# Patient Record
Sex: FEMALE | Race: WHITE | Employment: OTHER | URBAN - NONMETROPOLITAN AREA
[De-identification: names, ages, dates, MRNs, and addresses within clinical notes are randomized per-mention and may not be internally consistent; named-entity substitution may affect disease eponyms.]

---

## 2017-01-03 ENCOUNTER — HOSPITAL ENCOUNTER (OUTPATIENT)
Dept: MRI IMAGING | Age: 62
Discharge: HOME | End: 2017-01-03
Payer: COMMERCIAL

## 2017-01-03 ENCOUNTER — HOSPITAL ENCOUNTER (OUTPATIENT)
Dept: PSYCHIATRY | Age: 62
Setting detail: THERAPIES SERIES
Discharge: HOME | End: 2017-01-03
Payer: COMMERCIAL

## 2017-01-03 VITALS
TEMPERATURE: 99 F | DIASTOLIC BLOOD PRESSURE: 98 MMHG | RESPIRATION RATE: 20 BRPM | OXYGEN SATURATION: 94 % | SYSTOLIC BLOOD PRESSURE: 160 MMHG | HEART RATE: 96 BPM

## 2017-01-03 DIAGNOSIS — R56.9 SEIZURE (HCC): ICD-10-CM

## 2017-01-03 DIAGNOSIS — R51.9 HEADACHE, UNSPECIFIED HEADACHE TYPE: ICD-10-CM

## 2017-01-03 LAB
GFR NON-AFRICAN AMERICAN: >60
PERFORMED ON: NORMAL
POC CREATININE: 0.8 MG/DL (ref 0.3–1.3)
POC SAMPLE TYPE: NORMAL

## 2017-01-03 PROCEDURE — A9579 GAD-BASE MR CONTRAST NOS,1ML: HCPCS | Performed by: FAMILY MEDICINE

## 2017-01-03 PROCEDURE — 90853 GROUP PSYCHOTHERAPY: CPT

## 2017-01-03 PROCEDURE — G0177 OPPS/PHP; TRAIN & EDUC SERV: HCPCS

## 2017-01-03 PROCEDURE — 82565 ASSAY OF CREATININE: CPT

## 2017-01-03 PROCEDURE — 70553 MRI BRAIN STEM W/O & W/DYE: CPT

## 2017-01-03 PROCEDURE — 6360000004 HC RX CONTRAST MEDICATION: Performed by: FAMILY MEDICINE

## 2017-01-03 RX ORDER — QUETIAPINE FUMARATE 100 MG/1
TABLET, FILM COATED ORAL
Qty: 90 TABLET | Refills: 0 | Status: SHIPPED | OUTPATIENT
Start: 2017-01-03 | End: 2017-01-25

## 2017-01-03 RX ORDER — CLONAZEPAM 1 MG/1
TABLET ORAL
Qty: 90 TABLET | Refills: 0 | Status: SHIPPED | OUTPATIENT
Start: 2017-01-03 | End: 2017-01-31

## 2017-01-03 RX ORDER — CITALOPRAM 20 MG/1
20 TABLET ORAL DAILY
Qty: 30 TABLET | Refills: 0 | Status: SHIPPED | OUTPATIENT
Start: 2017-01-03 | End: 2017-01-09

## 2017-01-03 RX ADMIN — GADOPENTETATE DIMEGLUMINE 19 ML: 469.01 INJECTION INTRAVENOUS at 07:45

## 2017-01-05 ENCOUNTER — HOSPITAL ENCOUNTER (OUTPATIENT)
Dept: PSYCHIATRY | Age: 62
Setting detail: THERAPIES SERIES
Discharge: HOME | End: 2017-01-05
Payer: COMMERCIAL

## 2017-01-05 ENCOUNTER — TELEPHONE (OUTPATIENT)
Dept: FAMILY MEDICINE CLINIC | Facility: CLINIC | Age: 62
End: 2017-01-05

## 2017-01-05 PROCEDURE — 90853 GROUP PSYCHOTHERAPY: CPT

## 2017-01-05 PROCEDURE — G0177 OPPS/PHP; TRAIN & EDUC SERV: HCPCS

## 2017-01-05 RX ORDER — CLONIDINE HYDROCHLORIDE 0.1 MG/1
0.1 TABLET ORAL 2 TIMES DAILY
Qty: 60 TABLET | Refills: 0 | Status: SHIPPED | OUTPATIENT
Start: 2017-01-05 | End: 2017-02-03 | Stop reason: SDUPTHER

## 2017-01-05 RX ORDER — AMLODIPINE BESYLATE 10 MG/1
5 TABLET ORAL 2 TIMES DAILY
COMMUNITY

## 2017-01-05 NOTE — TELEPHONE ENCOUNTER
Pt called and states her BP is still running high this am at [place] 150/109 and is concerned and wants to know if needs to switch BP med's, add another BP med, etc [facility], [phone]

## 2017-01-05 NOTE — TELEPHONE ENCOUNTER
Add catapress 0.1 mg one bid  (she is not to let anyone give her a diuretic with her history of low Na)

## 2017-01-06 RX ORDER — CLONIDINE HYDROCHLORIDE 0.1 MG/1
0.1 TABLET ORAL 2 TIMES DAILY
COMMUNITY

## 2017-01-09 ENCOUNTER — HOSPITAL ENCOUNTER (OUTPATIENT)
Dept: PSYCHIATRY | Age: 62
Setting detail: THERAPIES SERIES
Discharge: HOME | End: 2017-01-09
Payer: COMMERCIAL

## 2017-01-09 PROCEDURE — 90853 GROUP PSYCHOTHERAPY: CPT

## 2017-01-09 PROCEDURE — G0177 OPPS/PHP; TRAIN & EDUC SERV: HCPCS

## 2017-01-09 PROCEDURE — 90832 PSYTX W PT 30 MINUTES: CPT | Performed by: NURSE PRACTITIONER

## 2017-01-09 RX ORDER — CITALOPRAM 20 MG/1
20 TABLET ORAL DAILY
Qty: 30 TABLET | Refills: 0
Start: 2017-01-09 | End: 2017-01-31

## 2017-01-11 ENCOUNTER — TELEPHONE (OUTPATIENT)
Dept: FAMILY MEDICINE CLINIC | Facility: CLINIC | Age: 62
End: 2017-01-11

## 2017-01-11 ENCOUNTER — TELEPHONE (OUTPATIENT)
Dept: PSYCHIATRY | Age: 62
End: 2017-01-11

## 2017-01-11 ENCOUNTER — HOSPITAL ENCOUNTER (OUTPATIENT)
Dept: PSYCHIATRY | Age: 62
Setting detail: THERAPIES SERIES
Discharge: HOME | End: 2017-01-11
Payer: COMMERCIAL

## 2017-01-11 VITALS — DIASTOLIC BLOOD PRESSURE: 93 MMHG | HEART RATE: 84 BPM | SYSTOLIC BLOOD PRESSURE: 137 MMHG

## 2017-01-11 DIAGNOSIS — Z79.899 LONG-TERM USE OF HIGH-RISK MEDICATION: ICD-10-CM

## 2017-01-11 DIAGNOSIS — Z79.899 LONG-TERM USE OF HIGH-RISK MEDICATION: Primary | ICD-10-CM

## 2017-01-11 DIAGNOSIS — F32.A CHRONIC DEPRESSION: ICD-10-CM

## 2017-01-11 DIAGNOSIS — M54.2 CHRONIC NECK PAIN: Chronic | ICD-10-CM

## 2017-01-11 DIAGNOSIS — M54.9 CHRONIC BACK PAIN, UNSPECIFIED BACK LOCATION, UNSPECIFIED BACK PAIN LATERALITY: Chronic | ICD-10-CM

## 2017-01-11 DIAGNOSIS — G89.29 CHRONIC NECK PAIN: Chronic | ICD-10-CM

## 2017-01-11 DIAGNOSIS — F33.2 SEVERE RECURRENT MAJOR DEPRESSION WITHOUT PSYCHOTIC FEATURES (HCC): ICD-10-CM

## 2017-01-11 DIAGNOSIS — R26.9 GAIT DIFFICULTY: Primary | ICD-10-CM

## 2017-01-11 DIAGNOSIS — G89.29 CHRONIC BACK PAIN, UNSPECIFIED BACK LOCATION, UNSPECIFIED BACK PAIN LATERALITY: Chronic | ICD-10-CM

## 2017-01-11 LAB
ALBUMIN SERPL-MCNC: 3.7 G/DL (ref 3.5–5.2)
ALP BLD-CCNC: 122 U/L (ref 35–104)
ALT SERPL-CCNC: 16 U/L (ref 5–33)
ANION GAP SERPL CALCULATED.3IONS-SCNC: 12 MMOL/L (ref 7–19)
AST SERPL-CCNC: 18 U/L (ref 5–32)
BILIRUB SERPL-MCNC: <0.2 MG/DL (ref 0.2–1.2)
BUN BLDV-MCNC: 9 MG/DL (ref 8–23)
CALCIUM SERPL-MCNC: 8.8 MG/DL (ref 8.8–10.2)
CHLORIDE BLD-SCNC: 97 MMOL/L (ref 98–111)
CO2: 25 MMOL/L (ref 22–29)
CREAT SERPL-MCNC: 0.7 MG/DL (ref 0.5–0.9)
GFR NON-AFRICAN AMERICAN: >60
GLOBULIN: 2.5 G/DL
GLUCOSE BLD-MCNC: 82 MG/DL (ref 74–109)
PHENYTOIN LEVEL: 5.4 UG/ML (ref 10–20)
POTASSIUM SERPL-SCNC: 4 MMOL/L (ref 3.5–5)
SODIUM BLD-SCNC: 134 MMOL/L (ref 136–145)
TOTAL PROTEIN: 6.2 G/DL (ref 6.6–8.7)
TSH SERPL DL<=0.05 MIU/L-ACNC: 5.91 UIU/ML (ref 0.27–4.2)

## 2017-01-11 PROCEDURE — 90853 GROUP PSYCHOTHERAPY: CPT

## 2017-01-11 PROCEDURE — G0177 OPPS/PHP; TRAIN & EDUC SERV: HCPCS

## 2017-01-11 RX ORDER — LEVETIRACETAM 1000 MG/1
1000 TABLET ORAL 2 TIMES DAILY
COMMUNITY

## 2017-01-11 RX ORDER — LEVOTHYROXINE SODIUM 0.1 MG/1
100 TABLET ORAL DAILY
COMMUNITY

## 2017-01-11 NOTE — TELEPHONE ENCOUNTER
When coming into office this am found a note tape to door and it states that she woke up this around 1 am and felt weird and took her BP it was 167/11 she took her am dose of med and by 4 am it was down to 147/94 and she states she is going to take another dose of medicine this am because she has a meeting, and pt is just wanting to know what to do next

## 2017-01-11 NOTE — TELEPHONE ENCOUNTER
142/93 when pt got to [place] for meeting pt's BP was a lot better and  thinks it might be home BP machine, advised  to take machine when pt has to got to [place] and see if they can check BP and machine to see if home machine accurate,  agreeable

## 2017-01-12 ENCOUNTER — TELEPHONE (OUTPATIENT)
Dept: NEUROLOGY | Age: 62
End: 2017-01-12

## 2017-01-12 ENCOUNTER — TELEPHONE (OUTPATIENT)
Dept: PSYCHIATRY | Age: 62
End: 2017-01-12

## 2017-01-12 PROBLEM — D64.9 ANEMIA: Chronic | Status: ACTIVE | Noted: 2017-01-12

## 2017-01-12 PROBLEM — M47.9 DEGENERATIVE JOINT DISEASE OF SPINE: Chronic | Status: ACTIVE | Noted: 2017-01-12

## 2017-01-12 PROBLEM — E61.1 IRON DEFICIENCY: Chronic | Status: ACTIVE | Noted: 2017-01-12

## 2017-01-12 PROBLEM — R73.01 ELEVATED FASTING GLUCOSE: Chronic | Status: ACTIVE | Noted: 2017-01-12

## 2017-01-12 PROBLEM — Z00.00 WELLNESS EXAMINATION: Status: ACTIVE | Noted: 2017-01-12

## 2017-01-13 ENCOUNTER — OFFICE VISIT (OUTPATIENT)
Dept: FAMILY MEDICINE CLINIC | Facility: CLINIC | Age: 62
End: 2017-01-13

## 2017-01-13 VITALS
RESPIRATION RATE: 18 BRPM | DIASTOLIC BLOOD PRESSURE: 84 MMHG | WEIGHT: 211 LBS | TEMPERATURE: 98 F | SYSTOLIC BLOOD PRESSURE: 120 MMHG | HEART RATE: 106 BPM | BODY MASS INDEX: 30.21 KG/M2 | HEIGHT: 70 IN | OXYGEN SATURATION: 97 %

## 2017-01-13 DIAGNOSIS — F41.9 ANXIETY: Primary | Chronic | ICD-10-CM

## 2017-01-13 DIAGNOSIS — G89.29 CHRONIC NECK PAIN: Chronic | ICD-10-CM

## 2017-01-13 DIAGNOSIS — I10 ESSENTIAL HYPERTENSION: ICD-10-CM

## 2017-01-13 DIAGNOSIS — M54.9 CHRONIC BACK PAIN, UNSPECIFIED BACK LOCATION, UNSPECIFIED BACK PAIN LATERALITY: Chronic | ICD-10-CM

## 2017-01-13 DIAGNOSIS — M54.2 CHRONIC NECK PAIN: Chronic | ICD-10-CM

## 2017-01-13 DIAGNOSIS — F32.A DEPRESSION, UNSPECIFIED DEPRESSION TYPE: Chronic | ICD-10-CM

## 2017-01-13 DIAGNOSIS — G89.29 CHRONIC BACK PAIN, UNSPECIFIED BACK LOCATION, UNSPECIFIED BACK PAIN LATERALITY: Chronic | ICD-10-CM

## 2017-01-13 PROCEDURE — 99213 OFFICE O/P EST LOW 20 MIN: CPT | Performed by: FAMILY MEDICINE

## 2017-01-13 RX ORDER — QUETIAPINE FUMARATE 100 MG/1
100 TABLET, FILM COATED ORAL 4 TIMES DAILY
Status: ON HOLD | COMMUNITY
End: 2017-07-17

## 2017-01-13 RX ORDER — CLONAZEPAM 1 MG/1
1 TABLET ORAL 3 TIMES DAILY PRN
COMMUNITY
End: 2018-06-01 | Stop reason: SDUPTHER

## 2017-01-13 RX ORDER — AMLODIPINE BESYLATE 10 MG/1
5 TABLET ORAL 2 TIMES DAILY
Qty: 30 TABLET | Refills: 5 | Status: ON HOLD | OUTPATIENT
Start: 2017-01-13 | End: 2017-07-13

## 2017-01-13 RX ORDER — TRAZODONE HYDROCHLORIDE 50 MG/1
50 TABLET ORAL NIGHTLY
COMMUNITY
End: 2017-06-08

## 2017-01-13 RX ORDER — LEVOTHYROXINE SODIUM 0.1 MG/1
100 TABLET ORAL DAILY
COMMUNITY
End: 2017-07-11

## 2017-01-13 RX ORDER — CITALOPRAM 20 MG/1
20 TABLET ORAL DAILY
COMMUNITY
End: 2017-07-11

## 2017-01-13 NOTE — PROGRESS NOTES
Subjective   Bita Croft is a 61 y.o. female presenting with chief complaint of:   Chief Complaint   Patient presents with   • Hypertension       History of Present Illness :  With .  To review her HTN; has had for years/it is a chronic problem.  Recently it has been elevated and Rx changes have been made. Has history of SIADH/hyponatremia contributed by SSRIs, diuretics, and polydypsia; currently using Biotene for dry mouth (other Rx influenced); and not drinking excessive fluids.  Back on Celexa. Feeling better; no seizures/spells and working with Dr Brown/[facility] (not [facility] currently); and doing better.  Has also seen Jose Alberto/neuro without any suggestions otherwise.     Other chronic problem/s to consider:  Anxiety: This has been present for years/over a year.  It is chronic.  It is stable.  It is associated with stressors.  Medications being used help.   Depression: This has been present for years/over a year.  It is chronic.  It is stable.  It is associated with stressors.  Medications being used help. No suicide ideation/intent.   Hypothyroidism: This has been present for years/over a year.  It is chronic.  It is stable as there has been stable labs and energy level, hair-skin texture, stooling, are all the same; no palpitations.    The following portions of the patient's history were reviewed and updated as appropriate: allergies, current medications, past family history, past medical history, past social history, past surgical history and problem list.  TCC migrated    Current Outpatient Prescriptions:   •  amLODIPine (NORVASC) 5 MG tablet, Take 1 tablet by mouth 2 (Two) Times a Day., Disp: 60 tablet, Rfl: 1  •  citalopram (CeleXA) 20 MG tablet, Take 20 mg by mouth Daily., Disp: , Rfl:   •  clonazePAM (KlonoPIN) 1 MG tablet, Take  by mouth. One half to one by mouth 2-3 times daily as needed for anxiety, Disp: , Rfl:   •  CloNIDine (CATAPRES) 0.1 MG tablet, Take 1 tablet by mouth 2 (Two)  Times a Day., Disp: 60 tablet, Rfl: 0  •  HYDROcodone-acetaminophen (NORCO)  MG per tablet, Take 1 tablet by mouth Every 8 (Eight) Hours As Needed for moderate pain (4-6) (headaches). (Patient taking differently: Take 1 tablet by mouth Every 4 (Four) Hours As Needed for moderate pain (4-6) (headaches).), Disp: 12 tablet, Rfl: 0  •  KLOR-CON 20 MEQ CR tablet, Take 1 tablet by mouth 2 (Two) Times a Day., Disp: , Rfl:   •  levETIRAcetam (KEPPRA) 1000 MG tablet, Take 1 tablet by mouth 2 (Two) Times a Day., Disp: , Rfl:   •  levothyroxine (SYNTHROID, LEVOTHROID) 100 MCG tablet, Take 100 mcg by mouth Daily., Disp: , Rfl:   •  phenytoin (DILANTIN) 100 MG ER capsule, Take 1 capsule by mouth 2 (Two) Times a Day., Disp: , Rfl:   •  QUEtiapine (SEROquel) 100 MG tablet, Take  by mouth Every Night. One by mouth in am and 2 by mouth in pm, Disp: , Rfl:   •  traZODone (DESYREL) 50 MG tablet, Take 50 mg by mouth Every Night., Disp: , Rfl:     No Known Allergies    ROS:  GENERAL:  Inactive/slower with limits, speed, samni for age, balance and back pain. Sleeps better with Rx.  No fever.  ENDO:  No syncope, near or diaphoretic sweaty spells.    HEENT: No head injury or headache,  No vision change, No hearing loss/pain/drainage.  No sore throat.  No nasal/sinus congestion/drainage. No epistaxis.  CHEST: No chest wall tenderness or mass. No cough, wheeze, SOB or hemoptysis.  CV: No chest pain, palpatations, ankle edema.  GI: No heartburn, dysphagia, abdominal pain, diarrhea, constipation, rectal bleeding, or melena.  :  Voids without dysuria, or incontience to completion.  ORTHO: No painful/swollen joints but various on /off sore.  No change daily sore neck or back.  No acute neck or back pain without recent injury.   NEURO: No dizziness; marked UE/LE weakness of extremities.  No change LE numbness/parethesias.   PSYCH: No memory loss.  Mood good; less anxious, depressed and not suicidal.  Tolerated stress.   Review of  Systems    Results for orders placed or performed in visit on 12/05/16   Comprehensive Metabolic Panel   Result Value Ref Range    Glucose 86 70 - 100 mg/dL    BUN 10 5 - 21 mg/dL    Creatinine 0.69 0.50 - 1.40 mg/dL    eGFR Non African Am 86 >60 mL/min/1.73    eGFR African Am 105 >60 mL/min/1.73    BUN/Creatinine Ratio 14.5 7.0 - 25.0    Sodium 131 (L) 135 - 145 mmol/L    Potassium 4.5 3.5 - 5.3 mmol/L    Chloride 93 (L) 98 - 110 mmol/L    Total CO2 28.0 24.0 - 31.0 mmol/L    Calcium 9.1 8.4 - 10.4 mg/dL    Total Protein 6.0 (L) 6.3 - 8.7 g/dL    Albumin 3.30 (L) 3.50 - 5.00 g/dL    Globulin 2.7 gm/dL    A/G Ratio 1.2 1.1 - 2.5 g/dL    Total Bilirubin 0.5 0.1 - 1.0 mg/dL    Alkaline Phosphatase 197 (H) 24 - 120 U/L    AST (SGOT) 28 7 - 45 U/L    ALT (SGPT) 35 0 - 54 U/L   TSH   Result Value Ref Range    TSH 10.600 (H) 0.470 - 4.680 mIU/mL   CBC & AUTO Differential   Result Value Ref Range    WBC 5.85 4.80 - 10.80 10*3/mm3    RBC 3.74 (L) 4.20 - 5.40 10*6/mm3    Hemoglobin 11.1 (L) 12.0 - 16.0 g/dL    Hematocrit 35.2 (L) 37.0 - 47.0 %    MCV 94.1 82.0 - 98.0 fL    MCH 29.7 28.0 - 32.0 pg    MCHC 31.5 (L) 33.0 - 36.0 g/dL    RDW 17.3 (H) 12.0 - 15.0 %    Platelets 369 130 - 400 10*3/mm3    Neutrophil Rel % 65.8 39.0 - 78.0 %    Lymphocyte Rel % 22.9 15.0 - 45.0 %    Monocyte Rel % 9.2 4.0 - 12.0 %    Eosinophil Rel % 1.4 0.0 - 4.0 %    Basophil Rel % 0.5 0.0 - 2.0 %    Neutrophils Absolute 3.85 1.87 - 8.40 10*3/mm3    Lymphocytes Absolute 1.34 0.72 - 4.86 10*3/mm3    Monocytes Absolute 0.54 0.19 - 1.30 10*3/mm3    Eosinophils Absolute 0.08 0.00 - 0.70 10*3/mm3    Basophils Absolute 0.03 0.00 - 0.20 10*3/mm3    Immature Granulocyte Rel % 0.2 0.0 - 5.0 %    Immature Grans Absolute 0.01 0.00 - 0.03 10*3/mm3       No results found for: HGBA1C    Lab Results   Component Value Date     (L) 12/05/2016     (L) 01/04/2016     (L) 05/31/2015    K 4.5 12/05/2016    K 4.7 01/04/2016    K 3.8 05/31/2015    CL 93  "(L) 12/05/2016    CL 90 (L) 01/04/2016     05/31/2015    CO2 28.0 12/05/2016    CO2 30 01/04/2016    CO2 25 05/31/2015    GLUCOSE 78 01/04/2016    GLUCOSE 57 (L) 05/31/2015    GLUCOSE 65 (L) 05/30/2015    BUN 10 12/05/2016    BUN 14 01/04/2016    BUN 4 (L) 05/31/2015    CREATININE 0.69 12/05/2016    CREATININE 1.03 01/04/2016    CREATININE 0.72 05/31/2015    CALCIUM 9.1 12/05/2016    CALCIUM 9.1 01/04/2016    CALCIUM 7.8 (L) 05/31/2015    EGFRCLEARA 55 01/04/2016       No results found for: PSA     Objective   Visit Vitals   • /84 (BP Location: Right arm, Patient Position: Sitting, Cuff Size: Large Adult)   • Pulse 106   • Temp 98 °F (36.7 °C) (Tympanic)   • Resp 18   • Ht 70\" (177.8 cm)   • Wt 211 lb (95.7 kg)   • SpO2 97%   • Breastfeeding No   • BMI 30.28 kg/m2       EXAM:  GENERAL:  Well nourished/developed  in no acute distress. Obese.   SKIN: Turgor excellent, without wound, rash, lesion.   HEENT: Normal cephalic without trauma.  Pupils equal round reactive to light. Extraocular motions full without nystagmus.   External canals nonobstructive nontender without reddness. Tymphatic membranes chris with jaskaran structures intact.   Oral cavity without growths, exudates, and moist.  Posterior pharnyx without mass, obstruction, reddness.  No thyroidmegaly, mass, tenderness, lymphadenopathy and supple.   CV: Regular rhythm.  No murmur, gallop, edema. Posterior pulses intact.  No carotid bruits.   CHEST: No chest wall tenderness or mass.   LUNGS: Symmetric motion with clear to auscultation.  No dullness to percussion.   ABD: Soft, nontender without mass.   ORTHO: Symmetric extremities without swelling/point tenderness.  Full gross range of motion.    NEURO: CN 2-12 grossly intact.  Symmetric facies.   UE/LE   3/5 strength throughout.  Nonfocal use extremities. Speech clear.   PSYCH: Oriented x 3.  Pleasant calm, well kept.  Purposeful/directed conservation with intact short/long gross memory.   Physical " Exam  Assessment/Plan     1. Anxiety  Chronic/doing better with current approach    2. Depression, unspecified depression type  Same as anxiety    3. Chronic back pain, unspecified back location, unspecified back pain laterality    4. Chronic neck pain    5. Essential hypertension  Not doing badly; maybe her home machine not working    Consider another bp cuff  Rx: reviewed/see above and: same  LAB: reviewed past TCC as needed, above and new orders: 6/12m  Wrap-up/other instructions  There are no Patient Instructions on file for this visit.    Follow up: Return for as planned 4.10.17.

## 2017-01-13 NOTE — MR AVS SNAPSHOT
Bita Croft   1/13/2017 9:45 AM   Office Visit    Dept Phone:  [phone]   Encounter #:  [id number]    Provider:  Josafat Gonzales MD   Department:  [facility]                Your Full Care Plan              Today's Medication Changes          These changes are accurate as of: 1/13/17 10:49 AM.  If you have any questions, ask your nurse or doctor.               Medication(s)that have changed:     amLODIPine 10 MG tablet   Commonly known as:  NORVASC   Take 0.5 tablets by mouth 2 (Two) Times a Day.   What changed:  medication strength   Changed by:  Josafat Gonzales MD       QUEtiapine 100 MG tablet   Commonly known as:  SEROquel   Take  by mouth Every Night. One by mouth in am and 2 by mouth in pm   What changed:  Another medication with the same name was removed. Continue taking this medication, and follow the directions you see here.   Changed by:  Josafat Gonzales MD         Stop taking medication(s)listed here:     metoprolol tartrate 50 MG tablet   Commonly known as:  LOPRESSOR   Stopped by:  Josafat Gonzales MD           oxyCODONE-acetaminophen  MG per tablet   Commonly known as:  PERCOCET   Stopped by:  Josafat Gonzales MD           zolpidem CR 12.5 MG CR tablet   Commonly known as:  AMBIEN CR   Stopped by:  Josafat Gonzales MD                Where to Get Your Medications      These medications were sent to [facility] [address] [place] [address] - [address] AT [facility] [address] [phone] [facility] [phone] FX  [address] [facility] [address]     Phone:  [phone]     amLODIPine 10 MG tablet                  Your Updated Medication List          This list is accurate as of: 1/13/17 10:49 AM.  Always use your most recent med list.                amLODIPine 10 MG tablet   Commonly known as:  NORVASC   Take 0.5 tablets by mouth 2 (Two) Times a Day.       citalopram 20 MG tablet   Commonly  known as:  CeleXA       clonazePAM 1 MG tablet   Commonly known as:  KlonoPIN       CloNIDine 0.1 MG tablet   Commonly known as:  CATAPRES   Take 1 tablet by mouth 2 (Two) Times a Day.       HYDROcodone-acetaminophen  MG per tablet   Commonly known as:  NORCO   Take 1 tablet by mouth Every 8 (Eight) Hours As Needed for moderate pain (4-6) (headaches).       KLOR-CON 20 MEQ CR tablet   Generic drug:  potassium chloride       levETIRAcetam 1000 MG tablet   Commonly known as:  KEPPRA       levothyroxine 100 MCG tablet   Commonly known as:  SYNTHROID, LEVOTHROID       phenytoin 100 MG ER capsule   Commonly known as:  DILANTIN       QUEtiapine 100 MG tablet   Commonly known as:  SEROquel       traZODone 50 MG tablet   Commonly known as:  DESYREL               You Were Diagnosed With        Codes Comments    Anxiety    -  Primary ICD-10-CM: F41.9  ICD-9-CM: 300.00     Depression, unspecified depression type     ICD-10-CM: F32.9  ICD-9-CM: 311     Chronic back pain, unspecified back location, unspecified back pain laterality     ICD-10-CM: M54.9, G89.29  ICD-9-CM: 724.5, 338.29     Chronic neck pain     ICD-10-CM: M54.2, G89.29  ICD-9-CM: 723.1, 338.29     Essential hypertension     ICD-10-CM: I10  ICD-9-CM: 401.9       Instructions     None    Patient Instructions History      Upcoming Appointments     Visit Type Date Time Department    OFFICE VISIT 1/13/2017  9:45 AM [facility], [place]    FOLLOW UP 4/10/2017 11:00 AM [facility], [place]      MyChart Signup     Our records indicate that you have declined [facility] signup. If you would like to sign up for [email or web address], please email [email or web address] or call [phone] to obtain an activation code.             Other Info from Your Visit           Your Appointments     Apr 10, 2017 11:00 AM CDT   Follow Up with Josafat Gonzales MD   [facility] (--)    [address] [place] [id number] [phone]         "   Arrive 15 minutes prior to appointment.              Allergies     No Known Allergies      Reason for Visit     Hypertension           Vital Signs     Blood Pressure Pulse Temperature Respirations Height Weight    120/84 (BP Location: Right arm, Patient Position: Sitting, Cuff Size: Large Adult) 106 98 °F (36.7 °C) (Tympanic) 18 70\" (177.8 cm) 211 lb (95.7 kg)    Oxygen Saturation Breastfeeding? Body Mass Index Smoking Status          97% No 30.28 kg/m2 Never Smoker        Problems and Diagnoses Noted     Anemia    Anxiety problem    Chronic back pain    Chronic neck pain    Arthritis of spine    Depression    Elevated fasting glucose    High blood pressure    Iron deficiency    Wellness examination        "

## 2017-01-16 ENCOUNTER — HOSPITAL ENCOUNTER (OUTPATIENT)
Dept: PSYCHIATRY | Age: 62
Setting detail: THERAPIES SERIES
Discharge: HOME | End: 2017-01-16
Payer: COMMERCIAL

## 2017-01-16 PROCEDURE — 90853 GROUP PSYCHOTHERAPY: CPT

## 2017-01-16 PROCEDURE — G0177 OPPS/PHP; TRAIN & EDUC SERV: HCPCS

## 2017-01-16 RX ORDER — POTASSIUM CHLORIDE 20 MEQ/1
20 TABLET, EXTENDED RELEASE ORAL 2 TIMES DAILY
COMMUNITY

## 2017-01-16 RX ORDER — TRAZODONE HYDROCHLORIDE 50 MG/1
50 TABLET ORAL NIGHTLY
Qty: 30 TABLET | Refills: 0
Start: 2017-01-16 | End: 2017-01-25

## 2017-01-18 ENCOUNTER — HOSPITAL ENCOUNTER (OUTPATIENT)
Dept: PSYCHIATRY | Age: 62
Setting detail: THERAPIES SERIES
Discharge: HOME | End: 2017-01-18
Payer: COMMERCIAL

## 2017-01-18 VITALS
DIASTOLIC BLOOD PRESSURE: 93 MMHG | TEMPERATURE: 99.3 F | HEART RATE: 96 BPM | RESPIRATION RATE: 18 BRPM | SYSTOLIC BLOOD PRESSURE: 143 MMHG

## 2017-01-18 PROCEDURE — 90853 GROUP PSYCHOTHERAPY: CPT

## 2017-01-18 PROCEDURE — G0177 OPPS/PHP; TRAIN & EDUC SERV: HCPCS

## 2017-01-20 ENCOUNTER — HOSPITAL ENCOUNTER (OUTPATIENT)
Dept: PSYCHIATRY | Age: 62
Setting detail: THERAPIES SERIES
Discharge: HOME | End: 2017-01-20
Payer: COMMERCIAL

## 2017-01-20 VITALS — SYSTOLIC BLOOD PRESSURE: 136 MMHG | DIASTOLIC BLOOD PRESSURE: 83 MMHG | HEART RATE: 80 BPM | RESPIRATION RATE: 18 BRPM

## 2017-01-20 PROCEDURE — G0177 OPPS/PHP; TRAIN & EDUC SERV: HCPCS | Performed by: SOCIAL WORKER

## 2017-01-20 PROCEDURE — 90853 GROUP PSYCHOTHERAPY: CPT | Performed by: SOCIAL WORKER

## 2017-01-23 ENCOUNTER — HOSPITAL ENCOUNTER (OUTPATIENT)
Dept: PSYCHIATRY | Age: 62
Setting detail: THERAPIES SERIES
Discharge: HOME | End: 2017-01-23
Payer: COMMERCIAL

## 2017-01-23 PROCEDURE — G0177 OPPS/PHP; TRAIN & EDUC SERV: HCPCS | Performed by: SOCIAL WORKER

## 2017-01-23 PROCEDURE — 90853 GROUP PSYCHOTHERAPY: CPT | Performed by: SOCIAL WORKER

## 2017-01-24 NOTE — RS.OPPTEV2
Date of Note: 01/24/17


Visit #: 1


Date of Evaluation: 01/24/17


Date of Onset/Injury/Change in Status: 01/20/16


Surgery Performed?: No


Treatment Diagnosis: Unsteady gait


History of Condition/Mechanism of Injury:: Patient reports she had a nervious 

breakdown in October and was in a coma for about 6 days.  Her  reports 

she had constant seizures while in the coma.  She is now very weak in her legs 

and arms. She is in a support group at [facility] 3X/wk and is now mainly having 

difficulty with her walking.  She had stage IV colon Ca in the past 3 yrs and 

had many complications from the chemo that followed the Ca.  She also has hx of 

neck and back surgeries.


Prior Level of Function.....Patient was independent with: ADL's, Self Care, Work

/Vocation, Caregiving, Ambulation/Mobility, Community Integration/Access


Functional Limitations: Sleep, Self Care, ADL's, Reaching, Pushing, Pulling, 

Lifting, Carrying, Sitting, Standing, Bending, Squatting, Ambulation, Community 

Access/Integration


Current Subjective/complaints:: I want to be able to walk again w/o the walker.


Treatment Side (optional): Bilateral





Medical History


Medical History: Hypertension, Arthritis, Cancer


Surgical History: Cervical Spine, Lumbar Spine, Tonsillectomy, Hysterectomy, 

Other


Surgical History Comments:: T12 - L1 fx with surgery due to fall.  Knee 

surgeries bilaterally.  Colon resection due to Ca.


Smoking Status: Never smoker





Pain Assessment





- Pain Description


Pain Location: Bilateral legs from her mid thighs distally to her feet.


Pain Description: Aching


Current Pain Intensity: 9/10


Worst Pain Intensity: 10/10





Functional Outcome Measure


Tinetti: 16





- G Codes & Severity Modifier


G Codes & Modifier: NA


Source of G Code score: NA





Gait





- Gait Pattern


Gait Comments: Patient has delayed step advancement greatest on the RLE, 

decreased kinesthetic awareness is noted and decreased balance with use of FWW.

  She is unable to maintain standing balance w/o support.  She has been on FWW 

since DC from the hospital in November 2016.





General


Range of Motion: Right knee extension -45 degrees; left knee extension -35 

degrees


Muscle Strength: Bilateral hip flexors 3/5, right knee ext 3+/5, left knee ext 4

/5 and bilateral hamstring 4/5, bilateral 4/5 to 4+/5.  Pateint has rapid 

muscle faitgue with all activity.





Palpation


Palpation Findings: Tenderness (Bilateral thighs with multiple tender points 

present left > right.)





Sensation





- Sensation


Right Lower Extremity: Impaired


Left Lower Extremity: Impaired


Sensation Description: Numbness (Lower legs and feet)





Balance





- Sitting Balance


Static Sitting Balance: Normal


Dynamic Sitting Balance: Normal





- Standing Balance


Static Standing Balance: Fair


Dynamic Standing Balance: Poor (Without assistive device)





Interventions





- Exercise/Activities/Manual Therapy


Exercises/Activities: NA


Manual Therapy: NA





- Charges


Total Direct Minutes: 60


Total Treatment Time: 60


Procedures billed for this date of service:: PT Perla (High)





Assessment


Assessment: Neuromuscular defictis with muslce weakness and rapid fatigue.  She 

has decreased standing balance and gait deficits as well as difficulty 

performing sit to/from transfers.


Patient Education: Education of Plan of Care


Rehab Potential: Good





Short Term Goals


Goal #1: Patient is independent with hamstring stretches.


Goal to be met by: 03/17/17


Goal #2: BLE strength 4 to 4+/5 throughout.


Goal to be met by: 02/17/17


Goal #3: Dynamic standing balance 4/5 without FWW.


Goal to be met by: 02/17/17


Goal #4: Patient ambulates 300' with SC and good balance.


Goal to be met by: 02/17/17





Long Term Goals


Goal #1: Bilateral hamstring length WFLs.


Goal to be met by: 03/10/17


Goal #2: Pateint is able to ambulate w/o use of AD safely.


Goal to be met by: 03/10/17


Goal #3: BLE strength 5/5 to improve stability and balance.


Goal to be met by: 03/10/17


Goal #4: Tinetti score 24/28


Goal to be met by: 03/10/17





Plan





- Treatment to be Provided


Procedures: Therapeutic Exercises, Therapeutic Activity, Gait Training, 

Neuromuscular Rehab, Manual Therapy, Massage, Patient Education


Modalities: Electrical Stimulation, Ultrasound/Phonophoresis, Cryotherapy, Hot 

Packs





- Treatment Plan


Frequency: 3 X week


Duration: 6 weeks


ORDER # VISITS AND/OR THROUGH DATE: 03/10/2017





- Treatment Code


(1) Ataxia


Comments: 


R27.0








(2) Muscle weakness


Comments: 


M62.81

## 2017-01-25 ENCOUNTER — HOSPITAL ENCOUNTER (OUTPATIENT)
Dept: PSYCHIATRY | Age: 62
Setting detail: THERAPIES SERIES
Discharge: HOME | End: 2017-01-25
Payer: COMMERCIAL

## 2017-01-25 VITALS — RESPIRATION RATE: 18 BRPM | DIASTOLIC BLOOD PRESSURE: 91 MMHG | SYSTOLIC BLOOD PRESSURE: 138 MMHG | HEART RATE: 80 BPM

## 2017-01-25 PROCEDURE — G0177 OPPS/PHP; TRAIN & EDUC SERV: HCPCS | Performed by: SOCIAL WORKER

## 2017-01-25 PROCEDURE — 90853 GROUP PSYCHOTHERAPY: CPT | Performed by: SOCIAL WORKER

## 2017-01-25 RX ORDER — TRAZODONE HYDROCHLORIDE 50 MG/1
50 TABLET ORAL NIGHTLY
Qty: 30 TABLET | Refills: 0 | Status: SHIPPED | OUTPATIENT
Start: 2017-01-25 | End: 2017-01-31

## 2017-01-25 RX ORDER — QUETIAPINE FUMARATE 100 MG/1
TABLET, FILM COATED ORAL
Qty: 90 TABLET | Refills: 0 | Status: SHIPPED | OUTPATIENT
Start: 2017-01-25 | End: 2017-02-22

## 2017-01-26 ENCOUNTER — HOSPITAL ENCOUNTER (OUTPATIENT)
Dept: HOSPITAL 58 - REHAB | Age: 62
LOS: 5 days | End: 2017-01-31
Attending: FAMILY MEDICINE

## 2017-01-26 VITALS — BODY MASS INDEX: 34.4 KG/M2

## 2017-01-26 DIAGNOSIS — R26.9: Primary | ICD-10-CM

## 2017-01-26 DIAGNOSIS — G89.29: ICD-10-CM

## 2017-01-26 DIAGNOSIS — M54.2: ICD-10-CM

## 2017-01-26 DIAGNOSIS — M54.9: ICD-10-CM

## 2017-01-26 NOTE — RS.OPPTDN
Subjective


Date of Note: 01/26/17


Visit #: 2


Date of Evaluation: 01/24/17


Treatment Diagnosis: Unsteady gait


Current Subjective/complaints:: Patient reports her main goal is to walk again 

without an assistive device.





Pain Assessment





- Pain Description


Pain Location: Bilateral legs from her mid thighs distally to her feet.


Pain Description: Burning, Aching


Pain Description: brning/tingling in her feet


Current Pain Intensity: 7/10





Interventions





- Exercise/Activities/Manual Therapy


Exercises/Activities: 45 mins. total of supine AROM to both LE's,with 2.5 #,

including ankle pumps,QS,gluteal sets,SAQ's,heelslides,hip abduction/

adduction.Attempted bridging,but elicits low back pain.Ended session with HEP 

review of those done today.


Total minutes of Exercise: 45


Manual Therapy: NA


Total minutes of Manual Therapy: 0


HOME EXERCISE PROGRAM: LE AROM in all planes,recommended 2-3x/day as tolerated 

in PAIN FREE ROM.





- Charges


Total Direct Minutes: 45


Total Treatment Time: 45


Procedures billed for this date of service:: ex 3


Assessment: Patient very attentive ,uses good technique for each exercise done 

today.She cannot do bridging due to this causing her back pain.She does need 

cues for controlling the speed of movement,especially with eccentric 

motions.Her gait pattern has widened CECIL,minimal knee flexion,as she reports 

occasionally falling at home,and feels safer at this time walking this way.


Patient Education: Education of diagnosis, Body/Joint mechanics, Home Exercise 

Program, Home Safety, Activity Modification, Education of Plan of Care


Patient demonstrates compliance with HEP?: Yes





Short Term Goals


Goal #1: Patient is independent with hamstring stretches.


Goal to be met by: 03/17/17


Goal #2: BLE strength 4 to 4+/5 throughout.


Goal to be met by: 02/17/17


Goal #3: Dynamic standing balance 4/5 without FWW.


Goal to be met by: 02/17/17


Goal #4: Patient ambulates 300' with SC and good balance.


Goal to be met by: 02/17/17





Long Term Goals


Goal #1: Bilateral hamstring length WFLs.


Goal to be met by: 03/10/17


Goal #2: Pateint is able to ambulate w/o use of AD safely.


Goal to be met by: 03/10/17


Goal #3: BLE strength 5/5 to improve stability and balance.


Goal to be met by: 03/10/17


Goal #4: Tinetti score 24/28


Goal to be met by: 03/10/17





Plan


PLAN OF CARE EXPIRES ON:: 03/10/17


ORDER # VISITS AND/OR THROUGH DATE: 03/10/2017


PLAN: Continue Plan of Care

## 2017-01-27 ENCOUNTER — HOSPITAL ENCOUNTER (OUTPATIENT)
Dept: PSYCHIATRY | Age: 62
Setting detail: THERAPIES SERIES
Discharge: HOME | End: 2017-01-27
Payer: COMMERCIAL

## 2017-01-27 VITALS
DIASTOLIC BLOOD PRESSURE: 93 MMHG | SYSTOLIC BLOOD PRESSURE: 131 MMHG | HEART RATE: 90 BPM | RESPIRATION RATE: 20 BRPM | OXYGEN SATURATION: 96 % | TEMPERATURE: 97.8 F

## 2017-01-27 PROCEDURE — 90853 GROUP PSYCHOTHERAPY: CPT

## 2017-01-27 PROCEDURE — G0177 OPPS/PHP; TRAIN & EDUC SERV: HCPCS

## 2017-01-30 ENCOUNTER — OFFICE VISIT (OUTPATIENT)
Dept: NEUROLOGY | Age: 62
End: 2017-01-30
Payer: COMMERCIAL

## 2017-01-30 VITALS
HEART RATE: 82 BPM | WEIGHT: 220 LBS | SYSTOLIC BLOOD PRESSURE: 126 MMHG | BODY MASS INDEX: 31.5 KG/M2 | DIASTOLIC BLOOD PRESSURE: 88 MMHG | HEIGHT: 70 IN | RESPIRATION RATE: 18 BRPM

## 2017-01-30 DIAGNOSIS — G89.29 CHRONIC BILATERAL LOW BACK PAIN WITH BILATERAL SCIATICA: ICD-10-CM

## 2017-01-30 DIAGNOSIS — G40.311 INTRACTABLE GENERALIZED IDIOPATHIC EPILEPSY WITH STATUS EPILEPTICUS (HCC): Primary | ICD-10-CM

## 2017-01-30 DIAGNOSIS — M54.42 CHRONIC BILATERAL LOW BACK PAIN WITH BILATERAL SCIATICA: ICD-10-CM

## 2017-01-30 DIAGNOSIS — M54.41 CHRONIC BILATERAL LOW BACK PAIN WITH BILATERAL SCIATICA: ICD-10-CM

## 2017-01-30 DIAGNOSIS — G60.3 IDIOPATHIC PROGRESSIVE NEUROPATHY: ICD-10-CM

## 2017-01-30 PROCEDURE — 99214 OFFICE O/P EST MOD 30 MIN: CPT | Performed by: PSYCHIATRY & NEUROLOGY

## 2017-01-30 RX ORDER — PHENYTOIN SODIUM 100 MG/1
CAPSULE, EXTENDED RELEASE ORAL
Qty: 90 CAPSULE | Refills: 5 | Status: SHIPPED | OUTPATIENT
Start: 2017-01-30 | End: 2017-07-11 | Stop reason: SDUPTHER

## 2017-01-30 NOTE — RS.QUICKDC
Discharge from PT


Date of Discharge: 01/30/17


Number of Visits: 2


Reason for Discharge: Patient called,reports Dr. Baugh wants her to 

discontinue therapy.

## 2017-01-31 ENCOUNTER — HOSPITAL ENCOUNTER (OUTPATIENT)
Dept: PSYCHIATRY | Age: 62
Setting detail: THERAPIES SERIES
Discharge: HOME | End: 2017-01-31
Payer: COMMERCIAL

## 2017-01-31 VITALS — SYSTOLIC BLOOD PRESSURE: 147 MMHG | RESPIRATION RATE: 18 BRPM | DIASTOLIC BLOOD PRESSURE: 99 MMHG | HEART RATE: 90 BPM

## 2017-01-31 PROCEDURE — 90853 GROUP PSYCHOTHERAPY: CPT

## 2017-01-31 PROCEDURE — G0177 OPPS/PHP; TRAIN & EDUC SERV: HCPCS

## 2017-01-31 PROCEDURE — 90832 PSYTX W PT 30 MINUTES: CPT | Performed by: NURSE PRACTITIONER

## 2017-01-31 RX ORDER — TRAZODONE HYDROCHLORIDE 50 MG/1
50 TABLET ORAL NIGHTLY
Qty: 30 TABLET | Refills: 1 | Status: SHIPPED | OUTPATIENT
Start: 2017-01-31 | End: 2017-02-22

## 2017-01-31 RX ORDER — CLONAZEPAM 1 MG/1
TABLET ORAL
Qty: 90 TABLET | Refills: 0 | Status: SHIPPED | OUTPATIENT
Start: 2017-01-31 | End: 2017-02-22

## 2017-01-31 RX ORDER — CITALOPRAM 40 MG/1
40 TABLET ORAL DAILY
Qty: 30 TABLET | Refills: 1 | Status: SHIPPED | OUTPATIENT
Start: 2017-01-31 | End: 2017-02-22 | Stop reason: HOSPADM

## 2017-01-31 RX ORDER — QUETIAPINE FUMARATE 25 MG/1
TABLET, FILM COATED ORAL
Qty: 60 TABLET | Refills: 0 | Status: SHIPPED | OUTPATIENT
Start: 2017-01-31 | End: 2017-02-22

## 2017-02-01 ENCOUNTER — HOSPITAL ENCOUNTER (OUTPATIENT)
Dept: PSYCHIATRY | Age: 62
Setting detail: THERAPIES SERIES
Discharge: HOME | End: 2017-02-01
Payer: COMMERCIAL

## 2017-02-01 VITALS — RESPIRATION RATE: 18 BRPM | DIASTOLIC BLOOD PRESSURE: 94 MMHG | SYSTOLIC BLOOD PRESSURE: 152 MMHG | HEART RATE: 95 BPM

## 2017-02-01 PROCEDURE — 90853 GROUP PSYCHOTHERAPY: CPT | Performed by: SOCIAL WORKER

## 2017-02-01 PROCEDURE — G0177 OPPS/PHP; TRAIN & EDUC SERV: HCPCS | Performed by: SOCIAL WORKER

## 2017-02-02 ENCOUNTER — TELEPHONE (OUTPATIENT)
Dept: NEUROLOGY | Age: 62
End: 2017-02-02

## 2017-02-03 ENCOUNTER — HOSPITAL ENCOUNTER (OUTPATIENT)
Dept: PSYCHIATRY | Age: 62
Setting detail: THERAPIES SERIES
Discharge: HOME | End: 2017-02-03
Payer: COMMERCIAL

## 2017-02-03 VITALS — DIASTOLIC BLOOD PRESSURE: 92 MMHG | RESPIRATION RATE: 18 BRPM | SYSTOLIC BLOOD PRESSURE: 153 MMHG | HEART RATE: 92 BPM

## 2017-02-03 PROCEDURE — 90853 GROUP PSYCHOTHERAPY: CPT

## 2017-02-03 PROCEDURE — G0177 OPPS/PHP; TRAIN & EDUC SERV: HCPCS

## 2017-02-03 RX ORDER — CLONIDINE HYDROCHLORIDE 0.1 MG/1
TABLET ORAL
Qty: 60 TABLET | Refills: 5 | Status: SHIPPED | OUTPATIENT
Start: 2017-02-03 | End: 2017-07-11

## 2017-02-06 ENCOUNTER — HOSPITAL ENCOUNTER (OUTPATIENT)
Dept: PSYCHIATRY | Age: 62
Setting detail: THERAPIES SERIES
Discharge: HOME | End: 2017-02-06
Payer: COMMERCIAL

## 2017-02-06 VITALS — HEART RATE: 84 BPM | SYSTOLIC BLOOD PRESSURE: 139 MMHG | RESPIRATION RATE: 18 BRPM | DIASTOLIC BLOOD PRESSURE: 90 MMHG

## 2017-02-06 PROCEDURE — G0177 OPPS/PHP; TRAIN & EDUC SERV: HCPCS

## 2017-02-06 PROCEDURE — 90853 GROUP PSYCHOTHERAPY: CPT

## 2017-02-08 ENCOUNTER — APPOINTMENT (OUTPATIENT)
Dept: PSYCHIATRY | Age: 62
End: 2017-02-08
Payer: COMMERCIAL

## 2017-02-08 ENCOUNTER — HOSPITAL ENCOUNTER (OUTPATIENT)
Dept: NEUROLOGY | Age: 62
Discharge: HOME | End: 2017-02-08
Payer: COMMERCIAL

## 2017-02-08 DIAGNOSIS — G60.3 IDIOPATHIC PROGRESSIVE NEUROPATHY: ICD-10-CM

## 2017-02-08 DIAGNOSIS — M54.41 CHRONIC BILATERAL LOW BACK PAIN WITH BILATERAL SCIATICA: ICD-10-CM

## 2017-02-08 DIAGNOSIS — M54.42 CHRONIC BILATERAL LOW BACK PAIN WITH BILATERAL SCIATICA: ICD-10-CM

## 2017-02-08 DIAGNOSIS — G89.29 CHRONIC BILATERAL LOW BACK PAIN WITH BILATERAL SCIATICA: ICD-10-CM

## 2017-02-08 PROCEDURE — 95886 MUSC TEST DONE W/N TEST COMP: CPT

## 2017-02-08 PROCEDURE — 95911 NRV CNDJ TEST 9-10 STUDIES: CPT

## 2017-02-08 PROCEDURE — 95912 NRV CNDJ TEST 11-12 STUDIES: CPT | Performed by: PSYCHIATRY & NEUROLOGY

## 2017-02-08 PROCEDURE — 95886 MUSC TEST DONE W/N TEST COMP: CPT | Performed by: PSYCHIATRY & NEUROLOGY

## 2017-02-09 ENCOUNTER — HOSPITAL ENCOUNTER (OUTPATIENT)
Dept: PSYCHIATRY | Age: 62
Setting detail: THERAPIES SERIES
Discharge: HOME | End: 2017-02-09
Payer: COMMERCIAL

## 2017-02-09 VITALS — DIASTOLIC BLOOD PRESSURE: 88 MMHG | HEART RATE: 87 BPM | RESPIRATION RATE: 18 BRPM | SYSTOLIC BLOOD PRESSURE: 135 MMHG

## 2017-02-09 DIAGNOSIS — G61.82 MULTIFOCAL MOTOR NEUROPATHY (HCC): Primary | ICD-10-CM

## 2017-02-09 PROCEDURE — 90853 GROUP PSYCHOTHERAPY: CPT

## 2017-02-09 PROCEDURE — G0177 OPPS/PHP; TRAIN & EDUC SERV: HCPCS

## 2017-02-10 ENCOUNTER — TELEPHONE (OUTPATIENT)
Dept: NEUROLOGY | Age: 62
End: 2017-02-10

## 2017-02-10 ENCOUNTER — HOSPITAL ENCOUNTER (OUTPATIENT)
Dept: PSYCHIATRY | Age: 62
Setting detail: THERAPIES SERIES
Discharge: HOME | End: 2017-02-10
Payer: COMMERCIAL

## 2017-02-10 VITALS — HEART RATE: 82 BPM | DIASTOLIC BLOOD PRESSURE: 87 MMHG | SYSTOLIC BLOOD PRESSURE: 134 MMHG | RESPIRATION RATE: 18 BRPM

## 2017-02-10 PROCEDURE — 90853 GROUP PSYCHOTHERAPY: CPT

## 2017-02-10 PROCEDURE — G0177 OPPS/PHP; TRAIN & EDUC SERV: HCPCS

## 2017-02-13 ENCOUNTER — HOSPITAL ENCOUNTER (OUTPATIENT)
Dept: PSYCHIATRY | Age: 62
Setting detail: THERAPIES SERIES
Discharge: HOME | End: 2017-02-13
Payer: COMMERCIAL

## 2017-02-13 VITALS — HEART RATE: 83 BPM | SYSTOLIC BLOOD PRESSURE: 138 MMHG | RESPIRATION RATE: 18 BRPM | DIASTOLIC BLOOD PRESSURE: 90 MMHG

## 2017-02-13 PROCEDURE — 90853 GROUP PSYCHOTHERAPY: CPT | Performed by: SOCIAL WORKER

## 2017-02-13 PROCEDURE — G0177 OPPS/PHP; TRAIN & EDUC SERV: HCPCS | Performed by: SOCIAL WORKER

## 2017-02-15 ENCOUNTER — HOSPITAL ENCOUNTER (OUTPATIENT)
Dept: PSYCHIATRY | Age: 62
Setting detail: THERAPIES SERIES
Discharge: HOME | End: 2017-02-15
Payer: COMMERCIAL

## 2017-02-15 VITALS — HEART RATE: 84 BPM | RESPIRATION RATE: 18 BRPM | DIASTOLIC BLOOD PRESSURE: 89 MMHG | SYSTOLIC BLOOD PRESSURE: 144 MMHG

## 2017-02-15 DIAGNOSIS — G61.82 MULTIFOCAL MOTOR NEUROPATHY (HCC): ICD-10-CM

## 2017-02-15 PROCEDURE — G0177 OPPS/PHP; TRAIN & EDUC SERV: HCPCS

## 2017-02-15 PROCEDURE — 90853 GROUP PSYCHOTHERAPY: CPT

## 2017-02-16 ENCOUNTER — TELEPHONE (OUTPATIENT)
Dept: NEUROLOGY | Age: 62
End: 2017-02-16

## 2017-02-17 ENCOUNTER — TELEPHONE (OUTPATIENT)
Dept: PSYCHIATRY | Age: 62
End: 2017-02-17

## 2017-02-18 LAB
ALBUMIN SERPL-MCNC: 4.65 G/DL (ref 3.75–5.01)
ALPHA-1-GLOBULIN: 0.33 G/DL (ref 0.19–0.46)
ALPHA-2-GLOBULIN: 1.01 G/DL (ref 0.48–1.05)
ANA IGG, ELISA: NORMAL
BETA GLOBULIN: 0.72 G/DL (ref 0.48–1.1)
C3 COMPLEMENT: 138 MG/DL (ref 88–201)
C4 COMPLEMENT: 32 MG/DL (ref 10–40)
GAMMA GLOBULIN: 0.68 G/DL (ref 0.62–1.51)
MISCELLANEOUS LAB TEST ORDER: NORMAL
PROTEIN ELECTROPHORESIS, SERUM: NORMAL
RHEUMATOID FACTOR: <10 IU/ML (ref 0–14)
SPE/IFE INTERPRETATION: NORMAL
TOTAL PROTEIN: 7.4 G/DL (ref 6–8.3)

## 2017-02-19 LAB
ARSENIC BLOOD: <10 UG/L (ref 0–13)
LEAD LEVEL BLOOD: <2 UG/DL (ref 0–4.9)
MERCURY BLOOD: <3 UG/L (ref 0–10)

## 2017-02-20 ENCOUNTER — TELEPHONE (OUTPATIENT)
Dept: NEUROLOGY | Age: 62
End: 2017-02-20

## 2017-02-22 ENCOUNTER — HOSPITAL ENCOUNTER (OUTPATIENT)
Dept: PSYCHIATRY | Age: 62
Setting detail: THERAPIES SERIES
Discharge: HOME | End: 2017-02-22
Payer: COMMERCIAL

## 2017-02-22 ENCOUNTER — TELEPHONE (OUTPATIENT)
Dept: NEUROLOGY | Age: 62
End: 2017-02-22

## 2017-02-22 DIAGNOSIS — G89.29 CHRONIC BILATERAL LOW BACK PAIN WITH BILATERAL SCIATICA: Primary | ICD-10-CM

## 2017-02-22 DIAGNOSIS — M54.42 CHRONIC BILATERAL LOW BACK PAIN WITH BILATERAL SCIATICA: Primary | ICD-10-CM

## 2017-02-22 DIAGNOSIS — M54.41 CHRONIC BILATERAL LOW BACK PAIN WITH BILATERAL SCIATICA: Primary | ICD-10-CM

## 2017-02-22 PROCEDURE — G0177 OPPS/PHP; TRAIN & EDUC SERV: HCPCS

## 2017-02-22 PROCEDURE — 90832 PSYTX W PT 30 MINUTES: CPT | Performed by: NURSE PRACTITIONER

## 2017-02-22 PROCEDURE — 90853 GROUP PSYCHOTHERAPY: CPT

## 2017-02-22 RX ORDER — TRAZODONE HYDROCHLORIDE 50 MG/1
100 TABLET ORAL NIGHTLY
Qty: 30 TABLET | Refills: 1 | Status: SHIPPED | OUTPATIENT
Start: 2017-02-22 | End: 2017-03-08

## 2017-02-22 RX ORDER — VENLAFAXINE HYDROCHLORIDE 75 MG/1
CAPSULE, EXTENDED RELEASE ORAL
Qty: 60 CAPSULE | Refills: 0 | Status: SHIPPED | OUTPATIENT
Start: 2017-02-22 | End: 2017-03-08

## 2017-02-22 RX ORDER — CITALOPRAM 40 MG/1
TABLET ORAL
Qty: 30 TABLET | Refills: 3
Start: 2017-02-22 | End: 2017-03-08 | Stop reason: HOSPADM

## 2017-02-22 RX ORDER — QUETIAPINE FUMARATE 25 MG/1
TABLET, FILM COATED ORAL
Qty: 60 TABLET | Refills: 0 | Status: SHIPPED | OUTPATIENT
Start: 2017-02-22 | End: 2017-03-08

## 2017-02-22 RX ORDER — QUETIAPINE FUMARATE 100 MG/1
TABLET, FILM COATED ORAL
Qty: 90 TABLET | Refills: 0 | Status: SHIPPED | OUTPATIENT
Start: 2017-02-22 | End: 2017-03-08

## 2017-02-22 RX ORDER — CLONAZEPAM 1 MG/1
TABLET ORAL
Qty: 90 TABLET | Refills: 0 | Status: SHIPPED | OUTPATIENT
Start: 2017-02-22 | End: 2017-03-08

## 2017-02-23 LAB — MISCELLANEOUS LAB TEST RESULT: NORMAL

## 2017-02-24 ENCOUNTER — HOSPITAL ENCOUNTER (OUTPATIENT)
Dept: PSYCHIATRY | Age: 62
Setting detail: THERAPIES SERIES
Discharge: HOME | End: 2017-02-24
Payer: COMMERCIAL

## 2017-02-24 ENCOUNTER — TELEPHONE (OUTPATIENT)
Dept: NEUROLOGY | Age: 62
End: 2017-02-24

## 2017-02-24 PROCEDURE — G0177 OPPS/PHP; TRAIN & EDUC SERV: HCPCS

## 2017-02-24 PROCEDURE — 90853 GROUP PSYCHOTHERAPY: CPT

## 2017-02-24 RX ORDER — GABAPENTIN 300 MG/1
300 CAPSULE ORAL 3 TIMES DAILY
Qty: 90 CAPSULE | Refills: 3 | Status: SHIPPED | OUTPATIENT
Start: 2017-02-24

## 2017-02-27 ENCOUNTER — HOSPITAL ENCOUNTER (OUTPATIENT)
Dept: PSYCHIATRY | Age: 62
Setting detail: THERAPIES SERIES
Discharge: HOME | End: 2017-02-27
Payer: COMMERCIAL

## 2017-02-27 VITALS — SYSTOLIC BLOOD PRESSURE: 138 MMHG | RESPIRATION RATE: 18 BRPM | HEART RATE: 82 BPM | DIASTOLIC BLOOD PRESSURE: 93 MMHG

## 2017-02-27 PROCEDURE — 90853 GROUP PSYCHOTHERAPY: CPT

## 2017-02-27 PROCEDURE — G0177 OPPS/PHP; TRAIN & EDUC SERV: HCPCS

## 2017-03-03 ENCOUNTER — HOSPITAL ENCOUNTER (OUTPATIENT)
Dept: PSYCHIATRY | Age: 62
Setting detail: THERAPIES SERIES
Discharge: HOME | End: 2017-03-03
Payer: COMMERCIAL

## 2017-03-03 PROCEDURE — 90853 GROUP PSYCHOTHERAPY: CPT

## 2017-03-03 PROCEDURE — G0177 OPPS/PHP; TRAIN & EDUC SERV: HCPCS

## 2017-03-06 ENCOUNTER — HOSPITAL ENCOUNTER (OUTPATIENT)
Dept: CT IMAGING | Age: 62
Discharge: HOME | End: 2017-03-06
Payer: COMMERCIAL

## 2017-03-08 ENCOUNTER — HOSPITAL ENCOUNTER (OUTPATIENT)
Dept: PSYCHIATRY | Age: 62
Setting detail: THERAPIES SERIES
Discharge: HOME | End: 2017-03-08
Payer: COMMERCIAL

## 2017-03-08 VITALS
HEART RATE: 92 BPM | OXYGEN SATURATION: 95 % | TEMPERATURE: 97.5 F | SYSTOLIC BLOOD PRESSURE: 130 MMHG | RESPIRATION RATE: 18 BRPM | DIASTOLIC BLOOD PRESSURE: 92 MMHG

## 2017-03-08 PROCEDURE — G0177 OPPS/PHP; TRAIN & EDUC SERV: HCPCS

## 2017-03-08 PROCEDURE — 90832 PSYTX W PT 30 MINUTES: CPT | Performed by: NURSE PRACTITIONER

## 2017-03-08 PROCEDURE — 90853 GROUP PSYCHOTHERAPY: CPT

## 2017-03-08 RX ORDER — QUETIAPINE FUMARATE 25 MG/1
TABLET, FILM COATED ORAL
Qty: 60 TABLET | Refills: 0 | Status: SHIPPED | OUTPATIENT
Start: 2017-03-08 | End: 2017-05-18

## 2017-03-08 RX ORDER — VENLAFAXINE HYDROCHLORIDE 150 MG/1
CAPSULE, EXTENDED RELEASE ORAL
Qty: 30 CAPSULE | Refills: 0 | Status: SHIPPED | OUTPATIENT
Start: 2017-03-08

## 2017-03-08 RX ORDER — CLONAZEPAM 1 MG/1
TABLET ORAL
Qty: 90 TABLET | Refills: 0 | Status: SHIPPED | OUTPATIENT
Start: 2017-03-08

## 2017-03-08 RX ORDER — QUETIAPINE FUMARATE 100 MG/1
TABLET, FILM COATED ORAL
Qty: 60 TABLET | Refills: 0 | Status: SHIPPED | OUTPATIENT
Start: 2017-03-08 | End: 2017-05-18

## 2017-03-08 RX ORDER — MORPHINE SULFATE 30 MG/1
30 TABLET ORAL EVERY 8 HOURS
COMMUNITY

## 2017-03-08 RX ORDER — TRAZODONE HYDROCHLORIDE 50 MG/1
50 TABLET ORAL NIGHTLY
Qty: 30 TABLET | Refills: 1 | Status: SHIPPED | OUTPATIENT
Start: 2017-03-08

## 2017-03-13 ENCOUNTER — HOSPITAL ENCOUNTER (OUTPATIENT)
Dept: CT IMAGING | Age: 62
Discharge: HOME | End: 2017-03-13
Payer: COMMERCIAL

## 2017-03-13 ENCOUNTER — OFFICE VISIT (OUTPATIENT)
Dept: NEUROLOGY | Age: 62
End: 2017-03-13
Payer: COMMERCIAL

## 2017-03-13 VITALS
HEART RATE: 86 BPM | SYSTOLIC BLOOD PRESSURE: 125 MMHG | BODY MASS INDEX: 30.06 KG/M2 | HEIGHT: 70 IN | WEIGHT: 210 LBS | DIASTOLIC BLOOD PRESSURE: 83 MMHG | RESPIRATION RATE: 18 BRPM

## 2017-03-13 DIAGNOSIS — M47.26 OSTEOARTHRITIS OF SPINE WITH RADICULOPATHY, LUMBAR REGION: ICD-10-CM

## 2017-03-13 DIAGNOSIS — G40.311 INTRACTABLE GENERALIZED IDIOPATHIC EPILEPSY WITH STATUS EPILEPTICUS (HCC): Primary | ICD-10-CM

## 2017-03-13 DIAGNOSIS — M54.42 CHRONIC BILATERAL LOW BACK PAIN WITH BILATERAL SCIATICA: ICD-10-CM

## 2017-03-13 DIAGNOSIS — M54.41 CHRONIC BILATERAL LOW BACK PAIN WITH BILATERAL SCIATICA: ICD-10-CM

## 2017-03-13 DIAGNOSIS — G60.9 IDIOPATHIC PERIPHERAL NEUROPATHY: ICD-10-CM

## 2017-03-13 DIAGNOSIS — G89.29 CHRONIC BILATERAL LOW BACK PAIN WITH BILATERAL SCIATICA: ICD-10-CM

## 2017-03-13 DIAGNOSIS — R53.1 WEAKNESS: ICD-10-CM

## 2017-03-13 DIAGNOSIS — G95.9 MYELOPATHY (HCC): ICD-10-CM

## 2017-03-13 PROCEDURE — 72131 CT LUMBAR SPINE W/O DYE: CPT

## 2017-03-13 PROCEDURE — 99214 OFFICE O/P EST MOD 30 MIN: CPT | Performed by: PSYCHIATRY & NEUROLOGY

## 2017-03-13 RX ORDER — CLONIDINE HYDROCHLORIDE 0.1 MG/1
TABLET ORAL
COMMUNITY
Start: 2017-02-03 | End: 2017-03-13

## 2017-03-13 RX ORDER — QUETIAPINE FUMARATE 300 MG/1
TABLET, FILM COATED ORAL
COMMUNITY
Start: 2017-02-19 | End: 2017-03-13

## 2017-03-17 ENCOUNTER — TELEPHONE (OUTPATIENT)
Dept: NEUROLOGY | Age: 62
End: 2017-03-17

## 2017-03-17 DIAGNOSIS — G95.9 MYELOPATHY (HCC): Primary | ICD-10-CM

## 2017-03-30 ENCOUNTER — TELEPHONE (OUTPATIENT)
Dept: NEUROLOGY | Age: 62
End: 2017-03-30

## 2017-04-21 ENCOUNTER — TELEPHONE (OUTPATIENT)
Dept: FAMILY MEDICINE CLINIC | Facility: CLINIC | Age: 62
End: 2017-04-21

## 2017-04-21 RX ORDER — AZITHROMYCIN 250 MG/1
TABLET, FILM COATED ORAL
Qty: 6 TABLET | Refills: 0 | Status: SHIPPED | OUTPATIENT
Start: 2017-04-21 | End: 2017-06-08

## 2017-04-21 NOTE — TELEPHONE ENCOUNTER
"Vm \"I wanted to see if Dr Gonzales could call me in something for an ear infection in my Left ear\"    Verbal ok for \"[facility]\"  "

## 2017-04-24 RX ORDER — LEVETIRACETAM 1000 MG/1
1000 TABLET ORAL 2 TIMES DAILY
Qty: 60 TABLET | Refills: 5 | Status: ON HOLD | OUTPATIENT
Start: 2017-04-24 | End: 2017-07-13

## 2017-04-24 RX ORDER — POTASSIUM CHLORIDE 1500 MG/1
20 TABLET, EXTENDED RELEASE ORAL 2 TIMES DAILY
Qty: 60 TABLET | Refills: 5 | Status: ON HOLD | OUTPATIENT
Start: 2017-04-24 | End: 2017-07-13

## 2017-04-30 ENCOUNTER — HOSPITAL ENCOUNTER (EMERGENCY)
Dept: HOSPITAL 58 - ED | Age: 62
Discharge: HOME | End: 2017-04-30

## 2017-04-30 VITALS — DIASTOLIC BLOOD PRESSURE: 92 MMHG | TEMPERATURE: 98.2 F | SYSTOLIC BLOOD PRESSURE: 135 MMHG

## 2017-04-30 VITALS — BODY MASS INDEX: 31.5 KG/M2

## 2017-04-30 DIAGNOSIS — M54.42: Primary | ICD-10-CM

## 2017-04-30 DIAGNOSIS — G89.29: ICD-10-CM

## 2017-04-30 PROCEDURE — 96372 THER/PROPH/DIAG INJ SC/IM: CPT

## 2017-04-30 PROCEDURE — 99283 EMERGENCY DEPT VISIT LOW MDM: CPT

## 2017-04-30 NOTE — ED.PDOC
General


ED Provider: 


Dr. JITENDRA MINAYA





Chief Complaint: Extremity Pain/Injury


Stated Complaint: Paige complains of severe lower back pain that is chronic 

but got worse tonight. has chronic bilateral lower ext numbness from her prior 

chemotherapy. Took Morphine 5 hours ago but did get any relief.


Time Seen by Physician: 02:28


Mode of Arrival: Wheelchair


Information Source: Patient


Exam Limitations: No limitations


Primary Care Provider: 


PASCALE REYNA





Nursing and Triage Documentation Reviewed and Agree: Yes





Review of Systems





- Review Of Systems


Constitutional: Reports: No symptoms


Eyes: Reports: No symptoms


Ears, Nose, Mouth, Throat: Reports: No symptoms


Respiratory: Reports: No symptoms


Cardiac: Reports: No symptoms


GI: Reports: No symptoms


: Reports: No symptoms


Musculoskeletal: Reports: Back pain, Joint pain


Skin: Reports: No symptoms


Neurological: Reports: Anxiety, Depressed


Endocrine: Reports: No symptoms


Hematologic/Lymphatic: Reports: No symptoms


All Other Systems: Reviewed and Negative





Past Medical History





- Past Medical History


Endocrine: Reports: None


Cardiovascular: Reports: Hypertension


Respiratory: Reports: None


Hematological: Reports: Anemia


Gastrointestinal: Reports: None


Genitourinary: Reports: None


Neuro/Psych: Reports: Seizure, Anxiety, Depression


Musculoskeletal: Reports: Back Pain, Joint Pain


Cancer: Reports: None


Last Menstrual Period: HYSTERECTOMY AT 32 YRS OLD





- Surgical History


General Surgical History: Reports: Tonsillectomy, Back Surgery ( x 2 )





- Family History


Family History: Reports: Unknown





- Social History


Smoking Status: Former smoker


Hx Substance Use: No


Alcohol Screening: None





- Immunizations


Tetanus Shot up to Date: Yes





Physical Exam





- Physical Exam


Appearance: Ill-appearing, Obese


Pain Distress: Severe


Neck: Supple


Respiratory: Airway patent, Breath sounds clear, Breath sounds equal


Cardiovascular: RRR, Pulses normal


GI/: Soft, Nontender, No masses, Bowel sounds normal, No Organomegaly


Musculoskeletal: Normal strength, ROM intact, No edema, No calf tenderness


Skin: Warm, Dry, Normal color


Neurological: Sensation intact, Reflexes intact, Alert, Oriented


Psychiatric: Anxious, Depressed





Re-Evaluation





- Re-Evaluation


Time of Re-Evaluation: 03:00


Status: Improved


Pain Level: much better 





Critical Care Note





- Critical Care Note


Total Time (mins): 0





Course





- Course


Orders, Labs, Meds: 


Orders











 Category Date Time Status


 


 Hydromorphone HCl [Dilaudid 1 mg/ml Syringe] MEDS  04/30/17 02:27 Discontinued





 1 mg IM ONCE STA   


 


 Ondansetron HCl/Pf [Zofran 4 mg/2 ml] MEDS  04/30/17 02:30 Discontinued





 4 mg IM ONCE STA   








Medications














Discontinued Medications














Generic Name Dose Route Start Last Admin





  Trade Name Carolyn  PRN Reason Stop Dose Admin


 


Hydromorphone HCl  1 mg  04/30/17 02:27  04/30/17 02:42





  Dilaudid 1 Mg/Ml Syringe  IM  04/30/17 02:28  1 mg





  ONCE STA   Administration


 


Ondansetron HCl  4 mg  04/30/17 02:30  04/30/17 02:43





  Zofran 4 Mg/2 Ml  IM  04/30/17 02:31  4 mg





  ONCE STA   Administration











Vital Signs: 


 











  Temp Pulse Resp BP Pulse Ox


 


 04/30/17 02:11  98.2 F  87  18  135/92 H  96














Departure





- Departure


Time of Disposition: 03:30


Disposition: HOME SELF-CARE


Discharge Problem: 


Chronic back pain


Qualifiers:


 Back pain location: low back pain Back pain laterality: bilateral Sciatica 

presence: with sciatica Sciatica laterality: bilateral sciatica Qualifier Code: 

(M54.42) Lumbago with sciatica, left side





Instructions:  Low Back Strain (ED), Chronic Back Pain (ED)


Condition: Stable


Pt referred to PMD for follow-up: Yes (3 days )


Additional Instructions: 


Continue home Medication 


Follow up with PCP in 3 days


Allergies/Adverse Reactions: 


Allergies





No Known Allergies Allergy (Unverified 04/30/17 02:28)


 








Home Medications: 


Ambulatory Orders





Levothyroxine Sodium [Synthroid] 100 mcg PO QDAC 03/13/14 


Losartan Potassium 100 mg PO DAILY 03/13/14 


Metoprolol Tartrate [Lopressor] 50 mg PO BID 03/13/14 


Oxycodone HCl/Acetaminophen [Oxycodone-Acetaminophen ] 2 tab PO Q6HR 03/13 /14 


Quetiapine Fumarate [Seroquel] 50 mg PO BEDTIME 03/13/14 


Venlafaxine HCl [Venlafaxine HCl ER] 2 tab PO BID 03/13/14 


Zolpidem Tartrate [Ambien] 5 mg PO BEDTIME 03/13/14 


Clonazepam [Clonazepam] 0.5 tab PO QID 08/04/14 








Disposition Discussed With: Patient, Family

## 2017-05-18 ENCOUNTER — OFFICE VISIT (OUTPATIENT)
Dept: NEUROLOGY | Age: 62
End: 2017-05-18
Payer: COMMERCIAL

## 2017-05-18 VITALS
DIASTOLIC BLOOD PRESSURE: 89 MMHG | HEART RATE: 94 BPM | SYSTOLIC BLOOD PRESSURE: 124 MMHG | OXYGEN SATURATION: 96 % | WEIGHT: 225 LBS | HEIGHT: 70 IN | BODY MASS INDEX: 32.21 KG/M2

## 2017-05-18 DIAGNOSIS — M54.42 CHRONIC BILATERAL LOW BACK PAIN WITH BILATERAL SCIATICA: ICD-10-CM

## 2017-05-18 DIAGNOSIS — G95.9 MYELOPATHY (HCC): ICD-10-CM

## 2017-05-18 DIAGNOSIS — G40.311 INTRACTABLE GENERALIZED IDIOPATHIC EPILEPSY WITH STATUS EPILEPTICUS (HCC): Primary | ICD-10-CM

## 2017-05-18 DIAGNOSIS — G89.29 CHRONIC BILATERAL LOW BACK PAIN WITH BILATERAL SCIATICA: ICD-10-CM

## 2017-05-18 DIAGNOSIS — M54.41 CHRONIC BILATERAL LOW BACK PAIN WITH BILATERAL SCIATICA: ICD-10-CM

## 2017-05-18 DIAGNOSIS — R53.1 WEAKNESS: ICD-10-CM

## 2017-05-18 PROCEDURE — 99214 OFFICE O/P EST MOD 30 MIN: CPT | Performed by: PSYCHIATRY & NEUROLOGY

## 2017-05-18 RX ORDER — AZITHROMYCIN 250 MG/1
TABLET, FILM COATED ORAL
COMMUNITY
Start: 2017-04-21

## 2017-05-18 RX ORDER — LEVETIRACETAM 1000 MG/1
1000 TABLET ORAL
COMMUNITY
Start: 2017-04-24 | End: 2017-05-18

## 2017-05-18 RX ORDER — OXYCODONE HYDROCHLORIDE 10 MG/1
TABLET ORAL
Refills: 0 | COMMUNITY
Start: 2017-05-09

## 2017-05-18 RX ORDER — QUETIAPINE FUMARATE 300 MG/1
TABLET, FILM COATED ORAL
Refills: 0 | COMMUNITY
Start: 2017-04-05 | End: 2017-06-14 | Stop reason: ALTCHOICE

## 2017-05-18 RX ORDER — ZOLPIDEM TARTRATE 12.5 MG/1
TABLET, FILM COATED, EXTENDED RELEASE ORAL
Refills: 2 | COMMUNITY
Start: 2017-05-05

## 2017-05-18 RX ORDER — AZITHROMYCIN 250 MG/1
TABLET, FILM COATED ORAL
Refills: 0 | COMMUNITY
Start: 2017-04-24 | End: 2017-05-18

## 2017-05-18 RX ORDER — HYDROCODONE BITARTRATE AND ACETAMINOPHEN 10; 325 MG/1; MG/1
TABLET ORAL
Refills: 0 | COMMUNITY
Start: 2017-03-17

## 2017-05-18 RX ORDER — POTASSIUM CHLORIDE 20 MEQ/1
20 TABLET, EXTENDED RELEASE ORAL
COMMUNITY
Start: 2017-04-24 | End: 2017-05-18

## 2017-05-18 RX ORDER — DULOXETIN HYDROCHLORIDE 30 MG/1
CAPSULE, DELAYED RELEASE ORAL
Refills: 1 | COMMUNITY
Start: 2017-05-09

## 2017-05-22 ENCOUNTER — TELEPHONE (OUTPATIENT)
Dept: NEUROLOGY | Age: 62
End: 2017-05-22

## 2017-06-08 ENCOUNTER — APPOINTMENT (OUTPATIENT)
Dept: PREADMISSION TESTING | Facility: HOSPITAL | Age: 62
End: 2017-06-08

## 2017-06-08 ENCOUNTER — HOSPITAL ENCOUNTER (EMERGENCY)
Dept: HOSPITAL 58 - ED | Age: 62
Discharge: HOME | End: 2017-06-08

## 2017-06-08 ENCOUNTER — TELEPHONE (OUTPATIENT)
Dept: FAMILY MEDICINE CLINIC | Facility: CLINIC | Age: 62
End: 2017-06-08

## 2017-06-08 VITALS — TEMPERATURE: 99.2 F | DIASTOLIC BLOOD PRESSURE: 82 MMHG | SYSTOLIC BLOOD PRESSURE: 133 MMHG

## 2017-06-08 VITALS
DIASTOLIC BLOOD PRESSURE: 98 MMHG | HEIGHT: 70 IN | TEMPERATURE: 98.2 F | SYSTOLIC BLOOD PRESSURE: 159 MMHG | WEIGHT: 218 LBS | HEART RATE: 93 BPM | OXYGEN SATURATION: 98 % | RESPIRATION RATE: 16 BRPM | BODY MASS INDEX: 31.21 KG/M2

## 2017-06-08 VITALS — BODY MASS INDEX: 32.3 KG/M2
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
## 2017-06-08 DIAGNOSIS — S52.501A: Primary | ICD-10-CM

## 2017-06-08 LAB
ANION GAP SERPL CALCULATED.3IONS-SCNC: 10 MMOL/L (ref 4–13)
BUN BLD-MCNC: 10 MG/DL (ref 5–21)
BUN/CREAT SERPL: 13 (ref 7–25)
CALCIUM SPEC-SCNC: 8.7 MG/DL (ref 8.4–10.4)
CHLORIDE SERPL-SCNC: 94 MMOL/L (ref 98–110)
CO2 SERPL-SCNC: 27 MMOL/L (ref 24–31)
CREAT BLD-MCNC: 0.77 MG/DL (ref 0.5–1.4)
DEPRECATED RDW RBC AUTO: 47.3 FL (ref 40–54)
ERYTHROCYTE [DISTWIDTH] IN BLOOD BY AUTOMATED COUNT: 14.4 % (ref 12–15)
GFR SERPL CREATININE-BSD FRML MDRD: 76 ML/MIN/1.73
GLUCOSE BLD-MCNC: 136 MG/DL (ref 70–100)
HCT VFR BLD AUTO: 39.1 % (ref 37–47)
HGB BLD-MCNC: 12.8 G/DL (ref 12–16)
MCH RBC QN AUTO: 29.3 PG (ref 28–32)
MCHC RBC AUTO-ENTMCNC: 32.7 G/DL (ref 33–36)
MCV RBC AUTO: 89.5 FL (ref 82–98)
PLATELET # BLD AUTO: 322 10*3/MM3 (ref 130–400)
PMV BLD AUTO: 9.6 FL (ref 6–12)
POTASSIUM BLD-SCNC: 4.3 MMOL/L (ref 3.5–5.3)
RBC # BLD AUTO: 4.37 10*6/MM3 (ref 4.2–5.4)
SODIUM BLD-SCNC: 131 MMOL/L (ref 135–145)
WBC NRBC COR # BLD: 11.07 10*3/MM3 (ref 4.8–10.8)

## 2017-06-08 PROCEDURE — 93005 ELECTROCARDIOGRAM TRACING: CPT

## 2017-06-08 PROCEDURE — 96372 THER/PROPH/DIAG INJ SC/IM: CPT

## 2017-06-08 PROCEDURE — 99283 EMERGENCY DEPT VISIT LOW MDM: CPT

## 2017-06-08 PROCEDURE — 36415 COLL VENOUS BLD VENIPUNCTURE: CPT

## 2017-06-08 PROCEDURE — 85027 COMPLETE CBC AUTOMATED: CPT | Performed by: ORTHOPAEDIC SURGERY

## 2017-06-08 PROCEDURE — 80048 BASIC METABOLIC PNL TOTAL CA: CPT | Performed by: ORTHOPAEDIC SURGERY

## 2017-06-08 PROCEDURE — 93010 ELECTROCARDIOGRAM REPORT: CPT | Performed by: INTERNAL MEDICINE

## 2017-06-08 RX ORDER — ZOLPIDEM TARTRATE 12.5 MG/1
12.5 TABLET, FILM COATED, EXTENDED RELEASE ORAL NIGHTLY
COMMUNITY
End: 2017-07-17 | Stop reason: HOSPADM

## 2017-06-08 NOTE — ED.PDOC
General


ED Provider: 


Dr. NICOLA TORREZ-ER





Chief Complaint: Wrist Pain/Injury


Stated Complaint: she feel this am--her wrist hurts


Time Seen by Physician: 09:40


Mode of Arrival: Wheelchair


Information Source: Patient


Exam Limitations: No limitations


Primary Care Provider: 


PASCALE REYNA





Nursing and Triage Documentation Reviewed and Agree: Yes





Musculoskeletal Complaint Exam





- Hand/Wrist Complaint/Exam


Location of Pain: Reports: Right, Wrist


Mechanism of Injury: Reports: Trauma


Onset/Duration: 6hrs


Symptoms Are: Still present


Onset of Pain: Reports: Immediate


Initial Severity: Moderate


Current Severity: Moderate


Location: Reports: Discrete (right wrist)


Character: Reports: Dull, Aching, Throbbing


Aggravating: Reports: Movement


Associated Signs and Symptoms: Reports: Swelling.  Denies: Redness, Bruising, 

Fever, Weakness, Numbness, Tingling


Related History: Reports: Similar episode


Dominant Hand: Right


Related Surgical History: Reports: Wrist, Other Orthopedic Surgery


Hand/Wrist Findings: Present: Swelling, Abnormal contour


Tenderness: Present: Radius, Ulna


Compartment Syndrome Risk Factors: Present: Pain.  Absent: Paralysis, Pallor, 

Pulselessness, Paresthesias


Differential Diagnoses: Closed Fracture





Review of Systems





- Review Of Systems


Constitutional: Reports: No symptoms


Eyes: Reports: No symptoms


Ears, Nose, Mouth, Throat: Reports: No symptoms


Respiratory: Reports: No symptoms


Cardiac: Reports: No symptoms


GI: Reports: No symptoms


: Reports: No symptoms


Musculoskeletal: Reports: Joint pain, Joint swelling


Skin: Reports: No symptoms


Neurological: Reports: No symptoms


Endocrine: Reports: No symptoms


Hematologic/Lymphatic: Reports: No symptoms


All Other Systems: Reviewed and Negative





Past Medical History





- Past Medical History


Previously Healthy: No


Endocrine: Reports: None


Cardiovascular: Reports: Hypertension


Respiratory: Reports: None


Hematological: Reports: Anemia


Gastrointestinal: Reports: None


Genitourinary: Reports: None


Neuro/Psych: Reports: Seizure, Anxiety, Depression


Musculoskeletal: Reports: Back Pain, Joint Pain


Cancer: Reports: None


Last Menstrual Period: N/A





- Surgical History


General Surgical History: Reports: Tonsillectomy, Back Surgery ( x 2 )





- Family History


Family History: Reports: Unknown





- Social History


Smoking Status: Former smoker


Hx Substance Use: No


Alcohol Screening: None


Lives: With family





- Immunizations


Tetanus Shot up to Date: Yes





Physical Exam





- Physical Exam


Appearance: Well-appearing


Pain Distress: Moderate


Eyes: ANGELA, EOMI, Conjunctiva clear


ENT: Ears normal, Nose normal, Oropharynx normal


Neck: Supple


Respiratory: Airway patent, Breath sounds clear, Breath sounds equal, 

Respirations nonlabored


Cardiovascular: RRR, Pulses normal, No rub, No murmur


GI/: Soft, Nontender, No masses, Bowel sounds normal, No Organomegaly


Musculoskeletal: Limited ROM


Skin: Warm, Dry, Normal color


Neurological: Sensation intact, Motor intact, Reflexes intact, Cranial nerves 

intact, Alert, Oriented


Psychiatric: Affect appropriate





Interpretation





- Radiology Interpretation


Radiology Interpretation By: Radiologist


Radiology Results: Positive ("displaced distal radial fx")





Procedures





- Splinting


Location: right wrist 


Hand-Made Type: Orthoglass


Splint: Sugar-tong


Pre-Proc Neuro Vasc Exam: Normal


Post-Proc Neuro Vasc Exam: Normal





Re-Evaluation





- Re-Evaluation


Time of Re-Evaluation: 11:12


Status: Improved


Vital Signs Stable: Yes


Pain Level: 2


Appearance: NAD


Lungs: Clear


Skin: Warm and Dry


Neuro: Alert and Oriented X3


CV: RRR





Physician Notification





- Case Discussed


Physician Notified: dr robins--asked to send the patient to the office now


Time of Notification: 11:12





Critical Care Note





- Critical Care Note


Total Time (mins): 0





Course





- Course


Orders, Labs, Meds: 


Orders











 Category Date Time Status


 


 Morphine Sulfate [Morphine 2 mg/ml Syringe] MEDS  06/08/17 09:53 Discontinued





 2 mg IM ONCE STA   


 


 Ondansetron HCl/Pf [Zofran 4 mg/2 ml] MEDS  06/08/17 09:53 Discontinued





 4 mg IM ONCE STA   


 


 WRIST, RIGHT 3 VIEWS Stat RADS  06/08/17 09:53 Completed








Medications














Discontinued Medications














Generic Name Dose Route Start Last Admin





  Trade Name Carolyn  PRN Reason Stop Dose Admin


 


Morphine Sulfate  2 mg  06/08/17 09:53  06/08/17 10:05





  Morphine 2 Mg/Ml Syringe  IM  06/08/17 09:54  2 mg





  ONCE STA   Administration


 


Ondansetron HCl  4 mg  06/08/17 09:53  06/08/17 10:05





  Zofran 4 Mg/2 Ml  IM  06/08/17 09:54  4 mg





  ONCE STA   Administration











Vital Signs: 


 











  Temp Pulse Resp BP Pulse Ox


 


 06/08/17 09:39  99.2 F  96 H  20  133/82  92 L














Departure





- Departure


Time of Disposition: 11:13


Disposition: HOME SELF-CARE


Discharge Problem: 


Fracture of distal end of right radius


Qualifiers:


 Encounter type: initial encounter Fracture type: closed Fracture morphology: 

unspecified fracture morphology Qualifier Code: (S52.501A) Unspecified fracture 

of the lower end of right radius, initial encounter for closed fracture





Instructions:  Wrist Fracture in Adults (ED)


Condition: Stable


Pt referred to PMD for follow-up: Yes


Additional Instructions: 


go straight to dr dean office 


Allergies/Adverse Reactions: 


Allergies





No Known Allergies Allergy (Unverified 06/08/17 09:48)


 








Home Medications: 


Ambulatory Orders





Levothyroxine Sodium [Synthroid] 100 mcg PO QDAC 03/13/14 


Losartan Potassium 100 mg PO DAILY 03/13/14 


Metoprolol Tartrate [Lopressor] 50 mg PO BID 03/13/14 


Oxycodone HCl/Acetaminophen [Oxycodone-Acetaminophen ] 2 tab PO Q6HR 03/13 /14 


Quetiapine Fumarate [Seroquel] 50 mg PO BEDTIME 03/13/14 


Venlafaxine HCl [Venlafaxine HCl ER] 2 tab PO BID 03/13/14 


Zolpidem Tartrate [Ambien] 5 mg PO BEDTIME 03/13/14 


Clonazepam [Clonazepam] 0.5 tab PO QID 08/04/14 








Disposition Discussed With: Patient, Family

## 2017-06-08 NOTE — DI
EXAM:  Right wrist three-view 

  

HISTORY:  Wrist injury 

  

COMPARISON:  None 

  

FINDINGS:  There is a mild to moderately displaced and impacted fracture of the distal radius with d
orsal angulation of the distal fracture fragment.  There is a small avulsion fracture of the ulnar s
tyloid.  No dislocation.  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  No focal soft
 tissue abnormality. 

  

IMPERSSION:  Fractures of the distal radius and ulnar styloid.

## 2017-06-08 NOTE — DISCHARGE INSTRUCTIONS
DAY OF SURGERY INSTRUCTIONS        YOUR SURGEON: Alec Cardoza     PROCEDURE: Open Reduction Internal Fixation of Right Wrist     DATE OF SURGERY: June 9, 2017     ARRIVAL TIME: AS DIRECTED BY OFFICE     DAY OF SURGERY TAKE ONLY THESE MEDICATIONS: Norvasc, Clonidine, Keppra, Dilantin          BEFORE YOU COME TO THE HOSPITAL  (Pre-op instructions)  • Do not eat, drink, smoke or chew gum after midnight the night before surgery.  This also includes no mints.  • Morning of surgery take only the medicines you have been instructed with a sip of water unless otherwise instructed  by your physician.  • Do not shave, wear makeup or dark nail polish.  • Remove all jewelry including rings.  • Leave anything you consider valuable at home.  • Leave your suitcase in the car until after your surgery.  • Bring the following with you if applicable:  o Picture ID and insurance, Medicare or Medicaid cards  o Co-pay/deductible required by insurance (cash, check, credit card)  o Copy of advance directive, living will or power-of- documents if not brought to PAT  o CPAP or BIPAP mask and tubing  o Relaxation aids (MP3 player, book, magazine)  • Confirm your arrival time with you surgeon the day before your surgery (surgery times are subject to change)  • On the day of surgery check in at registration located at the main entrance of the hospital.       Outpatient Surgery Guidelines, Adult  Outpatient procedures are those for which the person having the procedure is allowed to go home the same day as the procedure. Various procedures are done on an outpatient basis. You should follow some general guidelines if you will be having an outpatient procedure.  LET YOUR HEALTH CARE PROVIDER KNOW ABOUT:  · Any allergies you have.  · All medicines you are taking, including vitamins, herbs, eye drops, creams, and over-the-counter medicines.  · Previous problems you or members of your family have had with the use of anesthetics.  · Any  blood disorders you have.  · Previous surgeries you have had.  · Medical conditions you have.  RISKS AND COMPLICATIONS  Your health care provider will discuss possible risks and complications with you before surgery. Common risks and complications include:    · Problems due to the use of anesthetics.  · Blood loss and replacement (does not apply to minor surgical procedures).  · Temporary increase in pain due to surgery.  · Uncorrected pain or problems that the surgery was meant to correct.  · Infection.  · New damage.  BEFORE THE PROCEDURE  · Ask your health care provider about changing or stopping your regular medicines. You may need to stop taking certain medicines in the days or weeks before the procedure.  · Stop smoking at least 2 weeks before surgery. This lowers your risk for complications during and after surgery. Ask your health care provider for help with this if needed.  · Eat your usual meals and a light supper the day before surgery. Continue fluid intake. Do not drink alcohol.  · Do not eat or drink after midnight the night before your surgery.   · Arrange for someone to take you home and to stay with you for 24 hours after the procedure. Medicine given for your procedure may affect your ability to drive or to care for yourself.  · Call your health care provider's office if you develop an illness or problem that may prevent you from safely having your procedure.  AFTER THE PROCEDURE  After surgery, you will be taken to a recovery area, where your progress will be monitored. If there are no complications, you will be allowed to go home when you are awake, stable, and taking fluids well. You may have numbness around the surgical site. Healing will take some time. You will have tenderness at the surgical site and may have some swelling and bruising. You may also have some nausea.  HOME CARE INSTRUCTIONS  · Do not drive for 24 hours, or as directed by your health care provider. Do not drive while taking  prescription pain medicines.  · Do not drink alcohol for 24 hours.  · Do not make important decisions or sign legal documents for 24 hours.  · You may resume a normal diet and activities as directed.  · Do not lift anything heavier than 10 pounds (4.5 kg) or play contact sports until your health care provider says it is okay.  · Change your bandages (dressings) as directed.  · Only take over-the-counter or prescription medicines as directed by your health care provider.  · Follow up with your health care provider as directed.  SEEK MEDICAL CARE IF:  · You have increased bleeding (more than a small spot) from the surgical site.  · You have redness, swelling, or increasing pain in the wound.  · You see pus coming from the wound.  · You have a fever.  · You notice a bad smell coming from the wound or dressing.  · You feel lightheaded or faint.  · You develop a rash.  · You have trouble breathing.  · You develop allergies.  MAKE SURE YOU:  · Understand these instructions.  · Will watch your condition.  · Will get help right away if you are not doing well or get worse.     This information is not intended to replace advice given to you by your health care provider. Make sure you discuss any questions you have with your health care provider.     Document Released: 09/12/2002 Document Revised: 05/03/2016 Document Reviewed: 05/22/2014  [employer] Interactive Patient Education ©2016 [employer]       Fall Prevention in Hospitals, Adult  As a hospital patient, your condition and the treatments you receive can increase your risk for falls. Some additional risk factors for falls in a hospital include:  · Being in an unfamiliar environment.  · Being on bed rest.  · Your surgery.  · Taking certain medicines.  · Your tubing requirements, such as intravenous (IV) therapy or catheters.  It is important that you learn how to decrease fall risks while at the hospital. Below are important tips that can help prevent falls.  SAFETY TIPS  FOR PREVENTING FALLS  Talk about your risk of falling.  · Ask your health care provider why you are at risk for falling. Is it your medicine, illness, tubing placement, or something else?  · Make a plan with your health care provider to keep you safe from falls.  · Ask your health care provider or pharmacist about side effects of your medicines. Some medicines can make you dizzy or affect your coordination.  Ask for help.  · Ask for help before getting out of bed. You may need to press your call button.  · Ask for assistance in getting safely to the toilet.  · Ask for a walker or cane to be put at your bedside. Ask that most of the side rails on your bed be placed up before your health care provider leaves the room.  · Ask family or friends to sit with you.  · Ask for things that are out of your reach, such as your glasses, hearing aids, telephone, bedside table, or call button.  Follow these tips to avoid falling:  · Stay lying or seated, rather than standing, while waiting for help.  · Wear rubber-soled slippers or shoes whenever you walk in the hospital.  · Avoid quick, sudden movements.  ¨ Change positions slowly.  ¨ Sit on the side of your bed before standing.  ¨ Stand up slowly and wait before you start to walk.  · Let your health care provider know if there is a spill on the floor.  · Pay careful attention to the medical equipment, electrical cords, and tubes around you.  · When you need help, use your call button by your bed or in the bathroom. Wait for one of your health care providers to help you.  · If you feel dizzy or unsure of your footing, return to bed and wait for assistance.  · Avoid being distracted by the TV, telephone, or another person in your room.  · Do not lean or support yourself on rolling objects, such as IV poles or bedside tables.     This information is not intended to replace advice given to you by your health care provider. Make sure you discuss any questions you have with your  health care provider.     Document Released: 12/15/2001 Document Revised: 01/08/2016 Document Reviewed: 08/25/2013  [employer] Interactive Patient Education ©2016 [employer]       Surgical Site Infections FAQs  What is a Surgical Site Infection (SSI)?  A surgical site infection is an infection that occurs after surgery in the part of the body where the surgery took place. Most patients who have surgery do not develop an infection. However, infections develop in about 1 to 3 out of every 100 patients who have surgery.  Some of the common symptoms of a surgical site infection are:  · Redness and pain around the area where you had surgery  · Drainage of cloudy fluid from your surgical wound  · Fever  Can SSIs be treated?  Yes. Most surgical site infections can be treated with antibiotics. The antibiotic given to you depends on the bacteria (germs) causing the infection. Sometimes patients with SSIs also need another surgery to treat the infection.  What are some of the things that hospitals are doing to prevent SSIs?  To prevent SSIs, doctors, nurses, and other healthcare providers:  · Clean their hands and arms up to their elbows with an antiseptic agent just before the surgery.  · Clean their hands with soap and water or an alcohol-based hand rub before and after caring for each patient.  · May remove some of your hair immediately before your surgery using electric clippers if the hair is in the same area where the procedure will occur. They should not shave you with a razor.  · Wear special hair covers, masks, gowns, and gloves during surgery to keep the surgery area clean.  · Give you antibiotics before your surgery starts. In most cases, you should get antibiotics within 60 minutes before the surgery starts and the antibiotics should be stopped within 24 hours after surgery.  · Clean the skin at the site of your surgery with a special soap that kills germs.  What can I do to help prevent SSIs?  Before your  surgery:  · Tell your doctor about other medical problems you may have. Health problems such as allergies, diabetes, and obesity could affect your surgery and your treatment.  · Quit smoking. Patients who smoke get more infections. Talk to your doctor about how you can quit before your surgery.  · Do not shave near where you will have surgery. Shaving with a razor can irritate your skin and make it easier to develop an infection.  At the time of your surgery:  · Speak up if someone tries to shave you with a razor before surgery. Ask why you need to be shaved and talk with your surgeon if you have any concerns.  · Ask if you will get antibiotics before surgery.  After your surgery:  · Make sure that your healthcare providers clean their hands before examining you, either with soap and water or an alcohol-based hand rub.  · If you do not see your providers clean their hands, please ask them to do so.  · Family and friends who visit you should not touch the surgical wound or dressings.  · Family and friends should clean their hands with soap and water or an alcohol-based hand rub before and after visiting you. If you do not see them clean their hands, ask them to clean their hands.  What do I need to do when I go home from the hospital?  · Before you go home, your doctor or nurse should explain everything you need to know about taking care of your wound. Make sure you understand how to care for your wound before you leave the hospital.  · Always clean your hands before and after caring for your wound.  · Before you go home, make sure you know who to contact if you have questions or problems after you get home.  · If you have any symptoms of an infection, such as redness and pain at the surgery site, drainage, or fever, call your doctor immediately.  If you have additional questions, please ask your doctor or nurse.  Developed and co-sponsored by The Society for Healthcare Epidemiology of [place] (SHEA); Infectious  Diseases Society of [place] (IDSA); American Hospital Association; Association for Professionals in Infection Control and Epidemiology (APIC); Centers for Disease Control and Prevention (CDC); and The Joint Commission.     This information is not intended to replace advice given to you by your health care provider. Make sure you discuss any questions you have with your health care provider.     Document Released: 12/23/2014 Document Revised: 01/08/2016 Document Reviewed: 03/02/2016  [employer] Interactive Patient Education ©2016 [employer].       [facility]  CHG 4% Patient Instruction Sheet    Preparing the Skin Before Surgery  Preparing or “prepping” skin before surgery can reduce the risk of infection at the surgical site. To make the process easier,[facility] has chosen 4% Chlorhexidine Gluconate (CHG) antiseptic solution.   The steps below outline the prepping process and should be carefully followed.                                                                                                                                                      Prep the skin at the following time(s):                                                      We recommend you shower the night before surgery, and again the morning of surgery with the 4% CHG antiseptic solution using half of the bottle and a cloth each time.  Dress in clean clothes/sleepwear after showering.  See instructions below for application.          Do not apply any lotions or moisturizers.       Do not shave the area to be prepped for at least 2 days prior to surgery.    Clipping the hair may be done immediately prior to your surgery at the hospital    if needed.    Directions:  Thoroughly rinse your body with water.  Apply 4% CHG to a cloth and wash skin gently, paying special attention to the operative site.  Rinse again thoroughly.  Once you have begun using this product do not apply anything else to your skin. If itching or redness persists, rinse  affected areas and discontinue use.    When using this product:  • Keep out of eyes, ears, and mouth.  • If solution should contact these areas, rinse out promptly and thoroughly with water.  • For external use only.  • Do not use in genital area, on your face or head.      PATIENT/FAMILY/RESPONSIBLE PARTY VERBALIZES UNDERSTANDING OF ABOVE EDUCATION.  COPY OF PAIN SCALE GIVEN AND REVIEWED WITH VERBALIZED UNDERSTANDING.

## 2017-06-08 NOTE — TELEPHONE ENCOUNTER
MR DERREK CALLED AND SAID HIS WIFE FELL AND HE THINKS HER WRIST IS BROKEN.  HE IS TAKING HER TO [facility].  IF THE WRIST IS BROKEN HE SAID HE NEEDS TO HAVE HER AT [facility] UNTIL IT HEALS BECAUSE HE CAN'T TAKE CARE OF HER WITH BOTH WRISTS OUT OF COMMISSION.  DR ODELL INFORMED, SAID TO FIND OUT ABOUT THE WRIST FIRST.  ER WAS INFORMED THAT PATIENT IS ON THE WAY.

## 2017-06-09 ENCOUNTER — ANESTHESIA (OUTPATIENT)
Dept: PERIOP | Facility: HOSPITAL | Age: 62
End: 2017-06-09

## 2017-06-09 ENCOUNTER — HOSPITAL ENCOUNTER (OUTPATIENT)
Facility: HOSPITAL | Age: 62
Setting detail: HOSPITAL OUTPATIENT SURGERY
Discharge: HOME | End: 2017-06-09
Attending: ORTHOPAEDIC SURGERY | Admitting: ORTHOPAEDIC SURGERY

## 2017-06-09 ENCOUNTER — APPOINTMENT (OUTPATIENT)
Dept: GENERAL RADIOLOGY | Facility: HOSPITAL | Age: 62
End: 2017-06-09

## 2017-06-09 ENCOUNTER — ANESTHESIA EVENT (OUTPATIENT)
Dept: PERIOP | Facility: HOSPITAL | Age: 62
End: 2017-06-09

## 2017-06-09 VITALS
TEMPERATURE: 98.2 F | HEART RATE: 87 BPM | RESPIRATION RATE: 16 BRPM | OXYGEN SATURATION: 94 % | DIASTOLIC BLOOD PRESSURE: 74 MMHG | SYSTOLIC BLOOD PRESSURE: 144 MMHG

## 2017-06-09 PROCEDURE — 73100 X-RAY EXAM OF WRIST: CPT

## 2017-06-09 PROCEDURE — C1713 ANCHOR/SCREW BN/BN,TIS/BN: HCPCS | Performed by: ORTHOPAEDIC SURGERY

## 2017-06-09 PROCEDURE — 25010000002 FENTANYL CITRATE (PF) 250 MCG/5ML SOLUTION: Performed by: NURSE ANESTHETIST, CERTIFIED REGISTERED

## 2017-06-09 PROCEDURE — 25010000002 ROPIVACAINE PER 1 MG: Performed by: NURSE ANESTHETIST, CERTIFIED REGISTERED

## 2017-06-09 PROCEDURE — 76000 FLUOROSCOPY <1 HR PHYS/QHP: CPT

## 2017-06-09 PROCEDURE — 25010000002 ONDANSETRON PER 1 MG: Performed by: NURSE ANESTHETIST, CERTIFIED REGISTERED

## 2017-06-09 PROCEDURE — 25010000002 DEXAMETHASONE PER 1 MG: Performed by: NURSE ANESTHETIST, CERTIFIED REGISTERED

## 2017-06-09 PROCEDURE — 25010000003 CEFAZOLIN PER 500 MG: Performed by: NURSE ANESTHETIST, CERTIFIED REGISTERED

## 2017-06-09 PROCEDURE — 25010000002 PROPOFOL 10 MG/ML EMULSION: Performed by: NURSE ANESTHETIST, CERTIFIED REGISTERED

## 2017-06-09 PROCEDURE — 25010000002 MIDAZOLAM PER 1 MG: Performed by: ANESTHESIOLOGY

## 2017-06-09 PROCEDURE — 25010000002 FENTANYL CITRATE (PF) 100 MCG/2ML SOLUTION: Performed by: ANESTHESIOLOGY

## 2017-06-09 PROCEDURE — 25010000002 KETOROLAC TROMETHAMINE PER 15 MG: Performed by: NURSE ANESTHETIST, CERTIFIED REGISTERED

## 2017-06-09 DEVICE — PLT VOLR LCPVA 2CLMN D/R6H HD3H SS2.4RT: Type: IMPLANTABLE DEVICE | Status: FUNCTIONAL

## 2017-06-09 DEVICE — SCRW LK VA W STRDRV 2.4X20MM: Type: IMPLANTABLE DEVICE | Status: FUNCTIONAL

## 2017-06-09 DEVICE — SCRW LK VA W STRDRV 2.4X18MM: Type: IMPLANTABLE DEVICE | Status: FUNCTIONAL

## 2017-06-09 DEVICE — KWIRE TROC/TP SS 1.25X150MM NS: Type: IMPLANTABLE DEVICE | Status: FUNCTIONAL

## 2017-06-09 DEVICE — SCRW CORT S/TAP STRDRV 2.4X14MM: Type: IMPLANTABLE DEVICE | Status: FUNCTIONAL

## 2017-06-09 DEVICE — SCRW LK VA W STRDRV 2.4X22MM: Type: IMPLANTABLE DEVICE | Status: FUNCTIONAL

## 2017-06-09 RX ORDER — SODIUM CHLORIDE 0.9 % (FLUSH) 0.9 %
1-10 SYRINGE (ML) INJECTION AS NEEDED
Status: DISCONTINUED | OUTPATIENT
Start: 2017-06-09 | End: 2017-06-09 | Stop reason: HOSPADM

## 2017-06-09 RX ORDER — MIDAZOLAM HYDROCHLORIDE 1 MG/ML
1 INJECTION INTRAMUSCULAR; INTRAVENOUS
Status: DISCONTINUED | OUTPATIENT
Start: 2017-06-09 | End: 2017-06-09 | Stop reason: HOSPADM

## 2017-06-09 RX ORDER — DEXTROSE MONOHYDRATE 25 G/50ML
12.5 INJECTION, SOLUTION INTRAVENOUS AS NEEDED
Status: DISCONTINUED | OUTPATIENT
Start: 2017-06-09 | End: 2017-06-09 | Stop reason: HOSPADM

## 2017-06-09 RX ORDER — DEXAMETHASONE SODIUM PHOSPHATE 4 MG/ML
4 INJECTION, SOLUTION INTRA-ARTICULAR; INTRALESIONAL; INTRAMUSCULAR; INTRAVENOUS; SOFT TISSUE ONCE AS NEEDED
Status: DISCONTINUED | OUTPATIENT
Start: 2017-06-09 | End: 2017-06-09 | Stop reason: HOSPADM

## 2017-06-09 RX ORDER — DEXAMETHASONE SODIUM PHOSPHATE 4 MG/ML
INJECTION, SOLUTION INTRA-ARTICULAR; INTRALESIONAL; INTRAMUSCULAR; INTRAVENOUS; SOFT TISSUE AS NEEDED
Status: DISCONTINUED | OUTPATIENT
Start: 2017-06-09 | End: 2017-06-09 | Stop reason: SURG

## 2017-06-09 RX ORDER — LABETALOL HYDROCHLORIDE 5 MG/ML
5 INJECTION, SOLUTION INTRAVENOUS
Status: DISCONTINUED | OUTPATIENT
Start: 2017-06-09 | End: 2017-06-09 | Stop reason: HOSPADM

## 2017-06-09 RX ORDER — FENTANYL CITRATE 50 UG/ML
25 INJECTION, SOLUTION INTRAMUSCULAR; INTRAVENOUS
Status: DISCONTINUED | OUTPATIENT
Start: 2017-06-09 | End: 2017-06-09 | Stop reason: HOSPADM

## 2017-06-09 RX ORDER — ONDANSETRON 2 MG/ML
INJECTION INTRAMUSCULAR; INTRAVENOUS AS NEEDED
Status: DISCONTINUED | OUTPATIENT
Start: 2017-06-09 | End: 2017-06-09 | Stop reason: SURG

## 2017-06-09 RX ORDER — MORPHINE SULFATE 2 MG/ML
2 INJECTION, SOLUTION INTRAMUSCULAR; INTRAVENOUS
Status: DISCONTINUED | OUTPATIENT
Start: 2017-06-09 | End: 2017-06-09 | Stop reason: HOSPADM

## 2017-06-09 RX ORDER — PROPOFOL 10 MG/ML
VIAL (ML) INTRAVENOUS AS NEEDED
Status: DISCONTINUED | OUTPATIENT
Start: 2017-06-09 | End: 2017-06-09 | Stop reason: SURG

## 2017-06-09 RX ORDER — KETOROLAC TROMETHAMINE 30 MG/ML
INJECTION, SOLUTION INTRAMUSCULAR; INTRAVENOUS AS NEEDED
Status: DISCONTINUED | OUTPATIENT
Start: 2017-06-09 | End: 2017-06-09 | Stop reason: SURG

## 2017-06-09 RX ORDER — CEFAZOLIN SODIUM 1 G/3ML
INJECTION, POWDER, FOR SOLUTION INTRAMUSCULAR; INTRAVENOUS AS NEEDED
Status: DISCONTINUED | OUTPATIENT
Start: 2017-06-09 | End: 2017-06-09 | Stop reason: SURG

## 2017-06-09 RX ORDER — ONDANSETRON 2 MG/ML
4 INJECTION INTRAMUSCULAR; INTRAVENOUS ONCE AS NEEDED
Status: DISCONTINUED | OUTPATIENT
Start: 2017-06-09 | End: 2017-06-09 | Stop reason: HOSPADM

## 2017-06-09 RX ORDER — SODIUM CHLORIDE, SODIUM LACTATE, POTASSIUM CHLORIDE, CALCIUM CHLORIDE 600; 310; 30; 20 MG/100ML; MG/100ML; MG/100ML; MG/100ML
100 INJECTION, SOLUTION INTRAVENOUS CONTINUOUS PRN
Status: DISCONTINUED | OUTPATIENT
Start: 2017-06-09 | End: 2017-06-09 | Stop reason: HOSPADM

## 2017-06-09 RX ORDER — IPRATROPIUM BROMIDE AND ALBUTEROL SULFATE 2.5; .5 MG/3ML; MG/3ML
3 SOLUTION RESPIRATORY (INHALATION) ONCE AS NEEDED
Status: DISCONTINUED | OUTPATIENT
Start: 2017-06-09 | End: 2017-06-09 | Stop reason: HOSPADM

## 2017-06-09 RX ORDER — NALOXONE HCL 0.4 MG/ML
0.4 VIAL (ML) INJECTION AS NEEDED
Status: DISCONTINUED | OUTPATIENT
Start: 2017-06-09 | End: 2017-06-09 | Stop reason: HOSPADM

## 2017-06-09 RX ORDER — MAGNESIUM HYDROXIDE 1200 MG/15ML
LIQUID ORAL AS NEEDED
Status: DISCONTINUED | OUTPATIENT
Start: 2017-06-09 | End: 2017-06-09 | Stop reason: HOSPADM

## 2017-06-09 RX ORDER — ROPIVACAINE HYDROCHLORIDE 5 MG/ML
INJECTION, SOLUTION EPIDURAL; INFILTRATION; PERINEURAL AS NEEDED
Status: DISCONTINUED | OUTPATIENT
Start: 2017-06-09 | End: 2017-06-09 | Stop reason: SURG

## 2017-06-09 RX ORDER — HYDRALAZINE HYDROCHLORIDE 20 MG/ML
5 INJECTION INTRAMUSCULAR; INTRAVENOUS
Status: DISCONTINUED | OUTPATIENT
Start: 2017-06-09 | End: 2017-06-09 | Stop reason: HOSPADM

## 2017-06-09 RX ORDER — MIDAZOLAM HYDROCHLORIDE 1 MG/ML
2 INJECTION INTRAMUSCULAR; INTRAVENOUS
Status: DISCONTINUED | OUTPATIENT
Start: 2017-06-09 | End: 2017-06-09 | Stop reason: HOSPADM

## 2017-06-09 RX ORDER — MEPERIDINE HYDROCHLORIDE 25 MG/ML
12.5 INJECTION INTRAMUSCULAR; INTRAVENOUS; SUBCUTANEOUS
Status: DISCONTINUED | OUTPATIENT
Start: 2017-06-09 | End: 2017-06-09 | Stop reason: HOSPADM

## 2017-06-09 RX ORDER — SODIUM CHLORIDE, SODIUM LACTATE, POTASSIUM CHLORIDE, CALCIUM CHLORIDE 600; 310; 30; 20 MG/100ML; MG/100ML; MG/100ML; MG/100ML
9 INJECTION, SOLUTION INTRAVENOUS CONTINUOUS
Status: DISCONTINUED | OUTPATIENT
Start: 2017-06-09 | End: 2017-06-09 | Stop reason: HOSPADM

## 2017-06-09 RX ORDER — DIPHENHYDRAMINE HYDROCHLORIDE 50 MG/ML
12.5 INJECTION INTRAMUSCULAR; INTRAVENOUS
Status: DISCONTINUED | OUTPATIENT
Start: 2017-06-09 | End: 2017-06-09 | Stop reason: HOSPADM

## 2017-06-09 RX ORDER — FENTANYL CITRATE 50 UG/ML
INJECTION, SOLUTION INTRAMUSCULAR; INTRAVENOUS AS NEEDED
Status: DISCONTINUED | OUTPATIENT
Start: 2017-06-09 | End: 2017-06-09 | Stop reason: SURG

## 2017-06-09 RX ADMIN — LIDOCAINE HYDROCHLORIDE 0.5 ML: 10 INJECTION, SOLUTION EPIDURAL; INFILTRATION; INTRACAUDAL; PERINEURAL at 11:50

## 2017-06-09 RX ADMIN — KETOROLAC TROMETHAMINE 30 MG: 30 INJECTION, SOLUTION INTRAMUSCULAR at 13:18

## 2017-06-09 RX ADMIN — CEFAZOLIN 2 G: 1 INJECTION, POWDER, FOR SOLUTION INTRAVENOUS at 12:39

## 2017-06-09 RX ADMIN — DEXAMETHASONE SODIUM PHOSPHATE 4 MG: 4 INJECTION, SOLUTION INTRAMUSCULAR; INTRAVENOUS at 12:55

## 2017-06-09 RX ADMIN — ONDANSETRON HYDROCHLORIDE 4 MG: 2 SOLUTION INTRAMUSCULAR; INTRAVENOUS at 12:55

## 2017-06-09 RX ADMIN — ROPIVACAINE HYDROCHLORIDE 20 ML: 5 INJECTION, SOLUTION EPIDURAL; INFILTRATION; PERINEURAL at 11:56

## 2017-06-09 RX ADMIN — FENTANYL CITRATE 25 MCG: 50 INJECTION INTRAMUSCULAR; INTRAVENOUS at 12:31

## 2017-06-09 RX ADMIN — SODIUM CHLORIDE, POTASSIUM CHLORIDE, SODIUM LACTATE AND CALCIUM CHLORIDE 9 ML/HR: 600; 310; 30; 20 INJECTION, SOLUTION INTRAVENOUS at 11:50

## 2017-06-09 RX ADMIN — FENTANYL CITRATE 100 MCG: 50 INJECTION INTRAMUSCULAR; INTRAVENOUS at 11:56

## 2017-06-09 RX ADMIN — PROPOFOL 180 MG: 10 INJECTION, EMULSION INTRAVENOUS at 12:31

## 2017-06-09 RX ADMIN — MIDAZOLAM HYDROCHLORIDE 2 MG: 1 INJECTION, SOLUTION INTRAMUSCULAR; INTRAVENOUS at 11:55

## 2017-06-09 NOTE — PLAN OF CARE
Problem: Patient Care Overview (Adult)  Goal: Plan of Care Review  Outcome: Ongoing (interventions implemented as appropriate)    06/09/17 1224   Coping/Psychosocial Response Interventions   Plan Of Care Reviewed With patient   Patient Care Overview   Progress improving   Outcome Evaluation   Outcome Summary/Follow up Plan patient's pain level improving

## 2017-06-09 NOTE — PLAN OF CARE
Problem: Patient Care Overview (Adult)  Goal: Plan of Care Review  Outcome: Ongoing (interventions implemented as appropriate)    06/09/17 1400   Coping/Psychosocial Response Interventions   Plan Of Care Reviewed With patient   Patient Care Overview   Progress improving   Outcome Evaluation   Outcome Summary/Follow up Plan met pacu dc criteria. dtv. denies c/o, karen po ice well         Problem: Perioperative Period (Adult)  Goal: Signs and Symptoms of Listed Potential Problems Will be Absent or Manageable (Perioperative Period)  Outcome: Ongoing (interventions implemented as appropriate)

## 2017-06-09 NOTE — ANESTHESIA POSTPROCEDURE EVALUATION
Patient: Bita Croft    Procedure Summary     Date Anesthesia Start Anesthesia Stop Room / Location    06/09/17 1225 1328 [facility] OR 11 / [facility]       Procedure Diagnosis Surgeon Provider    WRIST OPEN REDUCTION INTERNAL FIXATION, RIGHT (Right Wrist) No diagnosis on file. MD Sergio Orozco, LITA          Anesthesia Type: general  Last vitals  BP (!) 154/107 (06/09/17 1435)    Temp      Pulse 86 (06/09/17 1435)   Resp 16 (06/09/17 1435)    SpO2 95 % (06/09/17 1435)      Post Anesthesia Care and Evaluation    Patient location during evaluation: PACU  Patient participation: complete - patient participated  Level of consciousness: awake and alert  Pain management: adequate  Airway patency: patent  Anesthetic complications: No anesthetic complications    Cardiovascular status: acceptable  Respiratory status: acceptable  Hydration status: acceptable

## 2017-06-09 NOTE — DISCHARGE INSTRUCTIONS
DO NOT remove splint. Keep left upper extremity elevated to help with swelling. Some swelling and bruising will be expected with this surgery. Contact the office with any questions or concerns. [phone] ext [phone]            General Anesthesia, Adult, Care After  Refer to this sheet in the next few weeks. These instructions provide you with information on caring for yourself after your procedure. Your health care provider may also give you more specific instructions. Your treatment has been planned according to current medical practices, but problems sometimes occur. Call your health care provider if you have any problems or questions after your procedure.  WHAT TO EXPECT AFTER THE PROCEDURE  After the procedure, it is typical to experience:  · Sleepiness.  · Nausea and vomiting.  HOME CARE INSTRUCTIONS  · For the first 24 hours after general anesthesia:  ¨ Have a responsible person with you.  ¨ Do not drive a car. If you are alone, do not take public transportation.  ¨ Do not drink alcohol.  ¨ Do not take medicine that has not been prescribed by your health care provider.  ¨ Do not sign important papers or make important decisions.  ¨ You may resume a normal diet and activities as directed by your health care provider.  · Change bandages (dressings) as directed.  · If you have questions or problems that seem related to general anesthesia, call the hospital and ask for the anesthetist or anesthesiologist on call.  SEEK MEDICAL CARE IF:  · You have nausea and vomiting that continue the day after anesthesia.  · You develop a rash.  SEEK IMMEDIATE MEDICAL CARE IF:    · You have difficulty breathing.  · You have chest pain.  · You have any allergic problems.     This information is not intended to replace advice given to you by your health care provider. Make sure you discuss any questions you have with your health care provider.     Document Released: 03/26/2002 Document Revised: 01/08/2016 Document Reviewed:  07/03/2014  [employer] Interactive Patient Education ©2016 [employer]    CALL YOUR PHYSICIAN IF YOU EXPERIENCE  INCREASED PAIN NOT HELPED BY YOUR PAIN MEDICATION.    IF YOU HAVE QUESTIONS OR CONCERNS AFTER YOU ARE DISCHARGED CALL THE NURSE AT THE [facility] LINE [phone].            Fall Prevention in the Home      Falls can cause injuries. They can happen to people of all ages. There are many things you can do to make your home safe and to help prevent falls.    WHAT CAN I DO ON THE OUTSIDE OF MY HOME?  · Regularly fix the edges of walkways and driveways and fix any cracks.  · Remove anything that might make you trip as you walk through a door, such as a raised step or threshold.  · Trim any bushes or trees on the path to your home.  · Use bright outdoor lighting.  · Clear any walking paths of anything that might make someone trip, such as rocks or tools.  · Regularly check to see if handrails are loose or broken. Make sure that both sides of any steps have handrails.  · Any raised decks and porches should have guardrails on the edges.  · Have any leaves, snow, or ice cleared regularly.  · Use sand or salt on walking paths during winter.  · Clean up any spills in your garage right away. This includes oil or grease spills.  WHAT CAN I DO IN THE BATHROOM?    · Use night lights.  · Install grab bars by the toilet and in the tub and shower. Do not use towel bars as grab bars.  · Use non-skid mats or decals in the tub or shower.  · If you need to sit down in the shower, use a plastic, non-slip stool.  · Keep the floor dry. Clean up any water that spills on the floor as soon as it happens.  · Remove soap buildup in the tub or shower regularly.  · Attach bath mats securely with double-sided non-slip rug tape.  · Do not have throw rugs and other things on the floor that can make you trip.  WHAT CAN I DO IN THE BEDROOM?  · Use night lights.  · Make sure that you have a light by your bed that is easy to  reach.  · Do not use any sheets or blankets that are too big for your bed. They should not hang down onto the floor.  · Have a firm chair that has side arms. You can use this for support while you get dressed.  · Do not have throw rugs and other things on the floor that can make you trip.  WHAT CAN I DO IN THE KITCHEN?  · Clean up any spills right away.  · Avoid walking on wet floors.  · Keep items that you use a lot in easy-to-reach places.  · If you need to reach something above you, use a strong step stool that has a grab bar.  · Keep electrical cords out of the way.  · Do not use floor polish or wax that makes floors slippery. If you must use wax, use non-skid floor wax.  · Do not have throw rugs and other things on the floor that can make you trip.  WHAT CAN I DO WITH MY STAIRS?  · Do not leave any items on the stairs.  · Make sure that there are handrails on both sides of the stairs and use them. Fix handrails that are broken or loose. Make sure that handrails are as long as the stairways.  · Check any carpeting to make sure that it is firmly attached to the stairs. Fix any carpet that is loose or worn.  · Avoid having throw rugs at the top or bottom of the stairs. If you do have throw rugs, attach them to the floor with carpet tape.  · Make sure that you have a light switch at the top of the stairs and the bottom of the stairs. If you do not have them, ask someone to add them for you.  WHAT ELSE CAN I DO TO HELP PREVENT FALLS?  · Wear shoes that:  ¨ Do not have high heels.  ¨ Have rubber bottoms.  ¨ Are comfortable and fit you well.  ¨ Are closed at the toe. Do not wear sandals.  · If you use a stepladder:  ¨ Make sure that it is fully opened. Do not climb a closed stepladder.  ¨ Make sure that both sides of the stepladder are locked into place.  ¨ Ask someone to hold it for you, if possible.  · Clearly jamey and make sure that you can see:  ¨ Any grab bars or handrails.  ¨ First and last steps.  ¨ Where the  edge of each step is.  · Use tools that help you move around (mobility aids) if they are needed. These include:  ¨ Canes.  ¨ Walkers.  ¨ Scooters.  ¨ Crutches.  · Turn on the lights when you go into a dark area. Replace any light bulbs as soon as they burn out.  · Set up your furniture so you have a clear path. Avoid moving your furniture around.  · If any of your floors are uneven, fix them.  · If there are any pets around you, be aware of where they are.  · Review your medicines with your doctor. Some medicines can make you feel dizzy. This can increase your chance of falling.  Ask your doctor what other things that you can do to help prevent falls.     This information is not intended to replace advice given to you by your health care provider. Make sure you discuss any questions you have with your health care provider.     Document Released: 10/14/2010 Document Revised: 05/03/2016 Document Reviewed: 01/22/2016  [employer] Interactive Patient Education ©2016 [employer]     PATIENT/FAMILY/RESPONSIBLE PARTY VERBALIZES UNDERSTANDING OF ABOVE EDUCATION.  COPY OF PAIN SCALE GIVEN AND REVIEWED WITH VERBALIZED UNDERSTANDING.

## 2017-06-09 NOTE — ANESTHESIA PREPROCEDURE EVALUATION
Anesthesia Evaluation     Patient summary reviewed   no history of anesthetic complications:  NPO Solid Status: > 8 hours       Airway   Mallampati: II  TM distance: >3 FB  Neck ROM: full  Dental      Pulmonary    (+) sleep apnea,   (-) asthma, not a smoker  Cardiovascular   Exercise tolerance: poor (<4 METS)    ECG reviewed    (+) hypertension,   (-) pacemaker, past MI, angina, cardiac stents      Neuro/Psych  (+) seizures (last 10/2016),    (-) CVA    ROS Comment: Pt reports h/o distal extremity neuropathy.  W/u by neuro has been non diagnostic to this point.  GI/Hepatic/Renal/Endo    (+) obesity,  hypothyroidism,   (-) GERD, liver disease, no renal disease, diabetes    Musculoskeletal     Abdominal    Substance History      OB/GYN          Other                                        Anesthesia Plan    ASA 3     general     intravenous induction   Anesthetic plan and risks discussed with patient.

## 2017-06-09 NOTE — PLAN OF CARE
Problem: Patient Care Overview (Adult)  Goal: Plan of Care Review  Outcome: Outcome(s) achieved Date Met:  06/09/17 06/09/17 5729   Coping/Psychosocial Response Interventions   Plan Of Care Reviewed With patient;spouse   Patient Care Overview   Progress improving   Outcome Evaluation   Outcome Summary/Follow up Plan Patient meets discharge criteria and is ready for discharge.       Goal: Adult Individualization and Mutuality  Outcome: Outcome(s) achieved Date Met:  06/09/17  Goal: Discharge Needs Assessment  Outcome: Outcome(s) achieved Date Met:  06/09/17    Problem: Perioperative Period (Adult)  Goal: Signs and Symptoms of Listed Potential Problems Will be Absent or Manageable (Perioperative Period)  Outcome: Outcome(s) achieved Date Met:  06/09/17

## 2017-06-09 NOTE — ANESTHESIA PROCEDURE NOTES
Peripheral Block    Patient location during procedure: holding area  Start time: 6/9/2017 11:55 AM  Stop time: 6/9/2017 11:57 AM  Reason for block: post-op pain management  Performed by  Anesthesiologist: CHRISTINE DENG  Preanesthetic Checklist  Completed: patient identified, site marked, surgical consent, pre-op evaluation, timeout performed, IV checked, risks and benefits discussed and monitors and equipment checked  Peripheral Block Prep:  Sterile barriers:gloves  Prep: ChloraPrep  Patient monitoring: blood pressure monitoring, continuous pulse oximetry and EKG  Peripheral Procedure  Sedation:yes  Guidance:ultrasound guided and nerve stimulator  Images:still images obtained    Laterality:right  Block Type:infraclavicular  Injection Technique:single-shot  Needle Type:echogenic  Needle Gauge:20 G    Medications  Local Injected:ropivacaine 0.5% without epinephrine Local Amount Injected:20mL  Post Assessment  Injection Assessment: negative aspiration for heme, no paresthesia on injection and incremental injection  Patient Tolerance:comfortable throughout block  Complications:no

## 2017-06-09 NOTE — ANESTHESIA PROCEDURE NOTES
Airway    General Information and Staff    CRNA: CHRISTINE DEMARCO    Indications and Patient Condition  Indications for airway management: CNS depression    Preoxygenated: yes  Mask difficulty assessment: 0 - not attempted    Final Airway Details  Final airway type: supraglottic airway      Successful airway: I-gel  Size 4    Number of attempts at approach: 1    Additional Comments  Atraumatic Insertion

## 2017-06-09 NOTE — PLAN OF CARE
Problem: Perioperative Period (Adult)  Goal: Signs and Symptoms of Listed Potential Problems Will be Absent or Manageable (Perioperative Period)  Outcome: Ongoing (interventions implemented as appropriate)    06/09/17 1140   Perioperative Period   Problems Assessed (Perioperative Period) pain;hypothermia;hypoxia/hypoxemia;situational response   Problems Present (Perioperative Period) situational response;pain

## 2017-06-13 RX ORDER — QUETIAPINE FUMARATE 25 MG/1
TABLET, FILM COATED ORAL
Refills: 1 | COMMUNITY
Start: 2017-05-21 | End: 2017-06-14 | Stop reason: ALTCHOICE

## 2017-06-14 ENCOUNTER — HOSPITAL ENCOUNTER (OUTPATIENT)
Dept: GENERAL RADIOLOGY | Age: 62
Discharge: HOME | End: 2017-06-14
Payer: COMMERCIAL

## 2017-06-14 ENCOUNTER — HOSPITAL ENCOUNTER (OUTPATIENT)
Dept: CT IMAGING | Age: 62
Discharge: HOME | End: 2017-06-14
Payer: COMMERCIAL

## 2017-06-14 VITALS
SYSTOLIC BLOOD PRESSURE: 146 MMHG | OXYGEN SATURATION: 94 % | WEIGHT: 218 LBS | DIASTOLIC BLOOD PRESSURE: 83 MMHG | RESPIRATION RATE: 16 BRPM | HEIGHT: 70 IN | HEART RATE: 86 BPM | TEMPERATURE: 98 F | BODY MASS INDEX: 31.21 KG/M2

## 2017-06-14 DIAGNOSIS — G89.29 CHRONIC BILATERAL LOW BACK PAIN WITH BILATERAL SCIATICA: ICD-10-CM

## 2017-06-14 DIAGNOSIS — R53.1 WEAKNESS: ICD-10-CM

## 2017-06-14 DIAGNOSIS — M54.42 CHRONIC BILATERAL LOW BACK PAIN WITH BILATERAL SCIATICA: ICD-10-CM

## 2017-06-14 DIAGNOSIS — G40.311 INTRACTABLE GENERALIZED IDIOPATHIC EPILEPSY WITH STATUS EPILEPTICUS (HCC): ICD-10-CM

## 2017-06-14 DIAGNOSIS — M54.41 CHRONIC BILATERAL LOW BACK PAIN WITH BILATERAL SCIATICA: ICD-10-CM

## 2017-06-14 DIAGNOSIS — Z98.890 STATUS POST MYELOGRAM: ICD-10-CM

## 2017-06-14 DIAGNOSIS — G40.311 GENERALIZED IDIOPATHIC EPILEPSY AND EPILEPTIC SYNDROMES, INTRACTABLE, WITH STATUS EPILEPTICUS (HCC): ICD-10-CM

## 2017-06-14 DIAGNOSIS — G95.9 MYELOPATHY (HCC): ICD-10-CM

## 2017-06-14 LAB
APPEARANCE CSF: CLEAR
APTT: 24.6 SEC (ref 26–36.2)
CLOT EVALUATION CSF: NORMAL
COLOR CSF: COLORLESS
GLUCOSE, CSF: 54 MG/DL (ref 40–70)
INR BLD: 0.93 (ref 0.88–1.18)
PATH CONSULT CSF: NO
PHENYTOIN LEVEL: 18.4 UG/ML (ref 10–20)
PLATELET # BLD: 313 K/UL (ref 130–400)
PROTEIN CSF: 38 MG/DL (ref 15–45)
PROTHROMBIN TIME: 12.4 SEC (ref 12–14.6)
RBC CSF: 3 /CUMM (ref 0–5)
TUBE NUMBER CSF: NORMAL
WBC CSF: 3 /CUMM (ref 0–8)

## 2017-06-14 PROCEDURE — 72125 CT NECK SPINE W/O DYE: CPT

## 2017-06-14 PROCEDURE — 89050 BODY FLUID CELL COUNT: CPT

## 2017-06-14 PROCEDURE — 84157 ASSAY OF PROTEIN OTHER: CPT

## 2017-06-14 PROCEDURE — 86618 LYME DISEASE ANTIBODY: CPT

## 2017-06-14 PROCEDURE — 86789 WEST NILE VIRUS ANTIBODY: CPT

## 2017-06-14 PROCEDURE — 82945 GLUCOSE OTHER FLUID: CPT

## 2017-06-14 PROCEDURE — 83916 OLIGOCLONAL BANDS: CPT

## 2017-06-14 PROCEDURE — 82040 ASSAY OF SERUM ALBUMIN: CPT

## 2017-06-14 PROCEDURE — 87529 HSV DNA AMP PROBE: CPT

## 2017-06-14 PROCEDURE — 82042 OTHER SOURCE ALBUMIN QUAN EA: CPT

## 2017-06-14 PROCEDURE — 82784 ASSAY IGA/IGD/IGG/IGM EACH: CPT

## 2017-06-14 PROCEDURE — 72131 CT LUMBAR SPINE W/O DYE: CPT

## 2017-06-14 PROCEDURE — 86788 WEST NILE VIRUS AB IGM: CPT

## 2017-06-14 PROCEDURE — 80185 ASSAY OF PHENYTOIN TOTAL: CPT

## 2017-06-14 PROCEDURE — 72270 MYELOGPHY 2/> SPINE REGIONS: CPT

## 2017-06-14 PROCEDURE — 86592 SYPHILIS TEST NON-TREP QUAL: CPT

## 2017-06-14 PROCEDURE — 83873 ASSAY OF CSF PROTEIN: CPT

## 2017-06-14 PROCEDURE — 85610 PROTHROMBIN TIME: CPT

## 2017-06-14 PROCEDURE — 85730 THROMBOPLASTIN TIME PARTIAL: CPT

## 2017-06-14 PROCEDURE — 87070 CULTURE OTHR SPECIMN AEROBIC: CPT

## 2017-06-14 PROCEDURE — 87075 CULTR BACTERIA EXCEPT BLOOD: CPT

## 2017-06-14 PROCEDURE — 85049 AUTOMATED PLATELET COUNT: CPT

## 2017-06-14 PROCEDURE — 87205 SMEAR GRAM STAIN: CPT

## 2017-06-14 PROCEDURE — 6360000004 HC RX CONTRAST MEDICATION: Performed by: PSYCHIATRY & NEUROLOGY

## 2017-06-14 RX ORDER — QUETIAPINE FUMARATE 100 MG/1
100 TABLET, FILM COATED ORAL 3 TIMES DAILY
COMMUNITY

## 2017-06-14 RX ADMIN — IOPAMIDOL 12 ML: 612 INJECTION, SOLUTION INTRATHECAL at 13:21

## 2017-06-14 ASSESSMENT — PAIN - FUNCTIONAL ASSESSMENT: PAIN_FUNCTIONAL_ASSESSMENT: 0-10

## 2017-06-15 ENCOUNTER — TELEPHONE (OUTPATIENT)
Dept: NEUROLOGY | Age: 62
End: 2017-06-15

## 2017-06-16 LAB — B BURGDORFERI AB,CSF: 0.08 LIV

## 2017-06-16 NOTE — OP NOTE
DATE OF PROCEDURE: 06/09/2017    PREOPERATIVE DIAGNOSIS: Closed transverse extra-articular fracture of a displaced right distal radius.     POSTOPERATIVE DIAGNOSIS: Closed transverse extra-articular fracture of a displaced right distal radius.     PROCEDURE PERFORMED: Open reduction and internal fixation, right distal radial fracture.     SURGEON: Alec Cardoza MD     INDICATIONS FOR PROCEDURE: The patient fell, sustaining the above-noted injury. It was felt that internal fixation was indicated. The patient understands the risk of infection, nonunion, and posttraumatic arthritis and asked me to proceed. At surgery, the Synthes volar locking plate system was used using a short plate.     DESCRIPTION OF PROCEDURE: After an adequate level of general anesthesia, the patient's right upper extremity was prepped and draped in the usual fashion.  The extremity was then exsanguinated with an Esmarch bandage and the tourniquet was inflated to 250 mmHg. An incision was then made over the flexor carpi radialis tendon and the tendon sheath was incised along the entirety of the wound. The tendon was then retracted toward the midline and the sheath deep to the tendon was incised. Blunt dissection was then carried down retracting the muscle and tendon of the flexor pollicis longus toward the midline. The pronator quadratus was then split in the midline and elevated off of the volar aspect of the radius. The fracture was then reduced by manipulation. The plate was then placed and temporarily fixated. The construct was then visualized in 2 planes with the fluoroscope and the plate placement and the reduction appeared excellent. Attention was then placed back to the plate. The distal locking screws were placed followed by the placement of the final proximal nonlocking screw. The fixation was then visualized again with the fluoroscope and appeared anatomic and stable. The wound was then irrigated. The skin was closed with nylon  suture. A dressing was then applied followed by a volar plaster splint. The patient tolerated the procedure well and is to follow up in the office.       cc:                   JORDI [id number]  D:  06/16/2017 09:08:59(Eastern Time)  T:  06/16/2017 09:26:12(Eastern Time)  Voice ID:  [id number]/Document ID:  [id number]

## 2017-06-17 LAB
ALBUMIN CSF: 24 MG/DL (ref 0–35)
ALBUMIN INDEX: 8.1 RATIO (ref 0–9)
ALBUMIN, SERUM BY NEPHELOMETRY: 2950 MG/DL (ref 3500–5200)
HERPES SIMPLEX VIRUS BY PCR: NOT DETECTED
HSV SOURCE: NORMAL
IGG CSF: 2 MG/DL (ref 0–6)
IGG INDEX: 0.46 RATIO (ref 0.28–0.66)
IGG SYNTHESIS RATE CSF: <0 MG/D
IGG/ALBUMIN CSF: 0.08 RATIO (ref 0.09–0.25)
IGG: 532 MG/DL (ref 768–1632)
MULTIPLE SCLEROSIS PANEL: ABNORMAL
MYELIN BASIC PROTEIN, CSF: 3.42 NG/ML (ref 0–5.5)
OLIGOCLONAL BANDS CSF: NEGATIVE
OLIGOCLONAL BANDS NUMBER: 0 BANDS (ref 0–1)
VDRL CSF SCREEN: NON REACTIVE
WEST NILE IGG, CSF: 0.08 IV
WEST NILE IGM ANTIBODY CSF: 0.03 IV

## 2017-06-20 ENCOUNTER — TELEPHONE (OUTPATIENT)
Dept: NEUROLOGY | Age: 62
End: 2017-06-20

## 2017-06-21 ENCOUNTER — TELEPHONE (OUTPATIENT)
Dept: NEUROLOGY | Age: 62
End: 2017-06-21

## 2017-06-21 ENCOUNTER — TELEPHONE (OUTPATIENT)
Dept: NEUROSURGERY | Age: 62
End: 2017-06-21

## 2017-06-21 LAB
ALBUMIN CSF: 22.3 MG/DL (ref 8.4–34.2)
ALPHA 1 CSF: 1.8 MG/DL (ref 0–3.1)
ALPHA 2 CSF: 3.1 MG/DL (ref 0–5.4)
ANAEROBIC CULTURE: NORMAL
BETA GLOBULIN (CSF): 4.5 MG/DL (ref 0–8.1)
CSF CULTURE: NORMAL
GAMMA GLOBULIN (CSF): 2.5 MG/DL (ref 0–5.4)
GRAM STAIN RESULT: NORMAL
PREALBUMIN CSF: 1.3 MG/DL (ref 0–3.1)
PROTEIN CSF: 35.5 MG/DL (ref 15–45)

## 2017-06-29 ENCOUNTER — OFFICE VISIT (OUTPATIENT)
Dept: NEUROSURGERY | Age: 62
End: 2017-06-29
Payer: COMMERCIAL

## 2017-06-29 VITALS
HEART RATE: 108 BPM | SYSTOLIC BLOOD PRESSURE: 137 MMHG | OXYGEN SATURATION: 94 % | HEIGHT: 70 IN | WEIGHT: 220 LBS | BODY MASS INDEX: 31.5 KG/M2 | DIASTOLIC BLOOD PRESSURE: 80 MMHG

## 2017-06-29 DIAGNOSIS — Z98.890 S/P LUMBAR LAMINECTOMY: ICD-10-CM

## 2017-06-29 DIAGNOSIS — F32.A DEPRESSION, UNSPECIFIED DEPRESSION TYPE: ICD-10-CM

## 2017-06-29 DIAGNOSIS — Z98.1 S/P LUMBAR FUSION: ICD-10-CM

## 2017-06-29 DIAGNOSIS — G95.9 MYELOPATHY (HCC): ICD-10-CM

## 2017-06-29 DIAGNOSIS — M54.42 CHRONIC BILATERAL LOW BACK PAIN WITH BILATERAL SCIATICA: Primary | ICD-10-CM

## 2017-06-29 DIAGNOSIS — M54.41 CHRONIC BILATERAL LOW BACK PAIN WITH BILATERAL SCIATICA: Primary | ICD-10-CM

## 2017-06-29 DIAGNOSIS — G89.29 CHRONIC BILATERAL LOW BACK PAIN WITH BILATERAL SCIATICA: Primary | ICD-10-CM

## 2017-06-29 DIAGNOSIS — Z98.1 S/P CERVICAL SPINAL FUSION: ICD-10-CM

## 2017-06-29 PROCEDURE — 99204 OFFICE O/P NEW MOD 45 MIN: CPT | Performed by: NURSE PRACTITIONER

## 2017-06-29 ASSESSMENT — ENCOUNTER SYMPTOMS
RESPIRATORY NEGATIVE: 1
HEARTBURN: 0
DIARRHEA: 1
NAUSEA: 0
CONSTIPATION: 0
ABDOMINAL PAIN: 0
VOMITING: 0
EYES NEGATIVE: 1
BACK PAIN: 1
BLOOD IN STOOL: 0

## 2017-06-30 ENCOUNTER — TELEPHONE (OUTPATIENT)
Dept: NEUROLOGY | Age: 62
End: 2017-06-30

## 2017-07-05 ENCOUNTER — TELEPHONE (OUTPATIENT)
Dept: FAMILY MEDICINE CLINIC | Facility: CLINIC | Age: 62
End: 2017-07-05

## 2017-07-05 DIAGNOSIS — R30.0 DYSURIA: Primary | ICD-10-CM

## 2017-07-05 NOTE — TELEPHONE ENCOUNTER
Pt notified and order done, pt wants to know after sample brought in will Dr Gonzales treat before urine come back

## 2017-07-05 NOTE — TELEPHONE ENCOUNTER
Pt called and wants to know if Dr Gonzales would be willing to call her something into [facility] for UTI states she has burning and frequency [phone]

## 2017-07-07 ENCOUNTER — TELEPHONE (OUTPATIENT)
Dept: NEUROLOGY | Age: 62
End: 2017-07-07

## 2017-07-11 ENCOUNTER — APPOINTMENT (OUTPATIENT)
Dept: MRI IMAGING | Facility: HOSPITAL | Age: 62
End: 2017-07-11

## 2017-07-11 ENCOUNTER — HOSPITAL ENCOUNTER (EMERGENCY)
Facility: HOSPITAL | Age: 62
Discharge: HOME | End: 2017-07-11
Attending: FAMILY MEDICINE | Admitting: FAMILY MEDICINE

## 2017-07-11 ENCOUNTER — APPOINTMENT (OUTPATIENT)
Dept: GENERAL RADIOLOGY | Facility: HOSPITAL | Age: 62
End: 2017-07-11

## 2017-07-11 ENCOUNTER — HOSPITAL ENCOUNTER (OUTPATIENT)
Dept: HOSPITAL 58 - AMBL | Age: 62
Discharge: ANOTHER HOSPITAL | End: 2017-07-11
Attending: INTERNAL MEDICINE

## 2017-07-11 VITALS
OXYGEN SATURATION: 98 % | HEART RATE: 97 BPM | DIASTOLIC BLOOD PRESSURE: 104 MMHG | SYSTOLIC BLOOD PRESSURE: 185 MMHG | BODY MASS INDEX: 31.5 KG/M2 | TEMPERATURE: 98.5 F | WEIGHT: 220 LBS | HEIGHT: 70 IN | RESPIRATION RATE: 16 BRPM

## 2017-07-11 VITALS — BODY MASS INDEX: 32.3 KG/M2

## 2017-07-11 DIAGNOSIS — M79.605: ICD-10-CM

## 2017-07-11 DIAGNOSIS — M79.671: ICD-10-CM

## 2017-07-11 DIAGNOSIS — G62.9 NEUROPATHY: ICD-10-CM

## 2017-07-11 DIAGNOSIS — T17.998A: Primary | ICD-10-CM

## 2017-07-11 DIAGNOSIS — M54.16 LUMBAR RADICULAR PAIN: Primary | ICD-10-CM

## 2017-07-11 DIAGNOSIS — F41.9: ICD-10-CM

## 2017-07-11 DIAGNOSIS — M79.604: ICD-10-CM

## 2017-07-11 DIAGNOSIS — R41.0: ICD-10-CM

## 2017-07-11 DIAGNOSIS — M54.6: ICD-10-CM

## 2017-07-11 DIAGNOSIS — M79.601: ICD-10-CM

## 2017-07-11 DIAGNOSIS — M79.672: ICD-10-CM

## 2017-07-11 DIAGNOSIS — R07.0: ICD-10-CM

## 2017-07-11 DIAGNOSIS — M79.602: ICD-10-CM

## 2017-07-11 DIAGNOSIS — M54.5: ICD-10-CM

## 2017-07-11 DIAGNOSIS — K22.4 ESOPHAGEAL SPASM: ICD-10-CM

## 2017-07-11 LAB
ALBUMIN SERPL-MCNC: 3.8 G/DL (ref 3.5–5)
ALBUMIN/GLOB SERPL: 1.3 G/DL (ref 1.1–2.5)
ALP SERPL-CCNC: 187 U/L (ref 24–120)
ALT SERPL W P-5'-P-CCNC: 34 U/L (ref 0–54)
ANION GAP SERPL CALCULATED.3IONS-SCNC: 11 MMOL/L (ref 4–13)
AST SERPL-CCNC: 24 U/L (ref 7–45)
BASOPHILS # BLD AUTO: 0.02 10*3/MM3 (ref 0–0.2)
BASOPHILS NFR BLD AUTO: 0.2 % (ref 0–2)
BILIRUB SERPL-MCNC: 0.4 MG/DL (ref 0.1–1)
BUN BLD-MCNC: 11 MG/DL (ref 5–21)
BUN/CREAT SERPL: 18.6 (ref 7–25)
CALCIUM SPEC-SCNC: 8.9 MG/DL (ref 8.4–10.4)
CHLORIDE SERPL-SCNC: 98 MMOL/L (ref 98–110)
CO2 SERPL-SCNC: 26 MMOL/L (ref 24–31)
CREAT BLD-MCNC: 0.59 MG/DL (ref 0.5–1.4)
DEPRECATED RDW RBC AUTO: 44.5 FL (ref 40–54)
EOSINOPHIL # BLD AUTO: 0.09 10*3/MM3 (ref 0–0.7)
EOSINOPHIL NFR BLD AUTO: 1.1 % (ref 0–4)
ERYTHROCYTE [DISTWIDTH] IN BLOOD BY AUTOMATED COUNT: 14 % (ref 12–15)
GFR SERPL CREATININE-BSD FRML MDRD: 104 ML/MIN/1.73
GLOBULIN UR ELPH-MCNC: 3 GM/DL
GLUCOSE BLD-MCNC: 112 MG/DL (ref 70–100)
HCT VFR BLD AUTO: 40.6 % (ref 37–47)
HGB BLD-MCNC: 13.4 G/DL (ref 12–16)
IMM GRANULOCYTES # BLD: 0.01 10*3/MM3 (ref 0–0.03)
IMM GRANULOCYTES NFR BLD: 0.1 % (ref 0–5)
LYMPHOCYTES # BLD AUTO: 0.75 10*3/MM3 (ref 0.72–4.86)
LYMPHOCYTES NFR BLD AUTO: 8.8 % (ref 15–45)
MCH RBC QN AUTO: 28.9 PG (ref 28–32)
MCHC RBC AUTO-ENTMCNC: 33 G/DL (ref 33–36)
MCV RBC AUTO: 87.7 FL (ref 82–98)
MONOCYTES # BLD AUTO: 0.61 10*3/MM3 (ref 0.19–1.3)
MONOCYTES NFR BLD AUTO: 7.1 % (ref 4–12)
NEUTROPHILS # BLD AUTO: 7.06 10*3/MM3 (ref 1.87–8.4)
NEUTROPHILS NFR BLD AUTO: 82.7 % (ref 39–78)
PLATELET # BLD AUTO: 280 10*3/MM3 (ref 130–400)
PMV BLD AUTO: 10 FL (ref 6–12)
POTASSIUM BLD-SCNC: 4.2 MMOL/L (ref 3.5–5.3)
PROT SERPL-MCNC: 6.8 G/DL (ref 6.3–8.7)
RBC # BLD AUTO: 4.63 10*6/MM3 (ref 4.2–5.4)
SODIUM BLD-SCNC: 135 MMOL/L (ref 135–145)
WBC NRBC COR # BLD: 8.54 10*3/MM3 (ref 4.8–10.8)

## 2017-07-11 PROCEDURE — 96374 THER/PROPH/DIAG INJ IV PUSH: CPT

## 2017-07-11 PROCEDURE — 25010000002 ONDANSETRON PER 1 MG: Performed by: FAMILY MEDICINE

## 2017-07-11 PROCEDURE — 72148 MRI LUMBAR SPINE W/O DYE: CPT

## 2017-07-11 PROCEDURE — 96376 TX/PRO/DX INJ SAME DRUG ADON: CPT

## 2017-07-11 PROCEDURE — 72141 MRI NECK SPINE W/O DYE: CPT

## 2017-07-11 PROCEDURE — 25010000002 HYDROMORPHONE PER 4 MG: Performed by: FAMILY MEDICINE

## 2017-07-11 PROCEDURE — 80053 COMPREHEN METABOLIC PANEL: CPT | Performed by: FAMILY MEDICINE

## 2017-07-11 PROCEDURE — 99284 EMERGENCY DEPT VISIT MOD MDM: CPT

## 2017-07-11 PROCEDURE — 85025 COMPLETE CBC W/AUTO DIFF WBC: CPT | Performed by: FAMILY MEDICINE

## 2017-07-11 PROCEDURE — 71010 HC CHEST PA OR AP: CPT

## 2017-07-11 PROCEDURE — 96361 HYDRATE IV INFUSION ADD-ON: CPT

## 2017-07-11 PROCEDURE — 96375 TX/PRO/DX INJ NEW DRUG ADDON: CPT

## 2017-07-11 PROCEDURE — 25010000002 LORAZEPAM PER 2 MG: Performed by: FAMILY MEDICINE

## 2017-07-11 RX ORDER — PHENYTOIN SODIUM 100 MG/1
200 CAPSULE, EXTENDED RELEASE ORAL NIGHTLY
COMMUNITY
End: 2017-12-26 | Stop reason: DRUGHIGH

## 2017-07-11 RX ORDER — LORAZEPAM 2 MG/ML
1 INJECTION INTRAMUSCULAR ONCE
Status: COMPLETED | OUTPATIENT
Start: 2017-07-11 | End: 2017-07-11

## 2017-07-11 RX ORDER — TRAZODONE HYDROCHLORIDE 50 MG/1
50 TABLET ORAL NIGHTLY
COMMUNITY
End: 2018-01-01 | Stop reason: SDUPTHER

## 2017-07-11 RX ORDER — CLONIDINE HYDROCHLORIDE 0.1 MG/1
0.1 TABLET ORAL EVERY 12 HOURS
COMMUNITY
End: 2017-07-17 | Stop reason: HOSPADM

## 2017-07-11 RX ORDER — VENLAFAXINE HYDROCHLORIDE 150 MG/1
300 CAPSULE, EXTENDED RELEASE ORAL DAILY
COMMUNITY
End: 2017-07-17 | Stop reason: HOSPADM

## 2017-07-11 RX ORDER — OXYCODONE HCL 10 MG/1
10 TABLET, FILM COATED, EXTENDED RELEASE ORAL EVERY 4 HOURS PRN
COMMUNITY
End: 2017-07-11

## 2017-07-11 RX ORDER — QUETIAPINE FUMARATE 25 MG/1
25 TABLET, FILM COATED ORAL 2 TIMES DAILY
Status: ON HOLD | COMMUNITY
End: 2017-07-13

## 2017-07-11 RX ORDER — DULOXETIN HYDROCHLORIDE 30 MG/1
30 CAPSULE, DELAYED RELEASE ORAL DAILY
COMMUNITY

## 2017-07-11 RX ORDER — ALUMINA, MAGNESIA, AND SIMETHICONE 2400; 2400; 240 MG/30ML; MG/30ML; MG/30ML
15 SUSPENSION ORAL ONCE
Status: COMPLETED | OUTPATIENT
Start: 2017-07-11 | End: 2017-07-11

## 2017-07-11 RX ORDER — DICYCLOMINE HCL 20 MG
20 TABLET ORAL EVERY 8 HOURS PRN
Qty: 10 TABLET | Refills: 0 | Status: ON HOLD | OUTPATIENT
Start: 2017-07-11 | End: 2017-07-13

## 2017-07-11 RX ORDER — ONDANSETRON 2 MG/ML
4 INJECTION INTRAMUSCULAR; INTRAVENOUS ONCE
Status: COMPLETED | OUTPATIENT
Start: 2017-07-11 | End: 2017-07-11

## 2017-07-11 RX ORDER — PHENYTOIN SODIUM 100 MG/1
CAPSULE, EXTENDED RELEASE ORAL
Qty: 90 CAPSULE | Refills: 5 | Status: SHIPPED | OUTPATIENT
Start: 2017-07-11

## 2017-07-11 RX ORDER — GABAPENTIN 300 MG/1
300 CAPSULE ORAL 3 TIMES DAILY
COMMUNITY
End: 2018-05-31 | Stop reason: DRUGHIGH

## 2017-07-11 RX ORDER — OXYCODONE HYDROCHLORIDE 5 MG/1
10 TABLET ORAL EVERY 4 HOURS PRN
Status: ON HOLD | COMMUNITY
End: 2017-07-13 | Stop reason: DRUGHIGH

## 2017-07-11 RX ADMIN — HYDROMORPHONE HYDROCHLORIDE 1 MG: 1 INJECTION, SOLUTION INTRAMUSCULAR; INTRAVENOUS; SUBCUTANEOUS at 14:16

## 2017-07-11 RX ADMIN — HYDROMORPHONE HYDROCHLORIDE 1 MG: 1 INJECTION, SOLUTION INTRAMUSCULAR; INTRAVENOUS; SUBCUTANEOUS at 17:22

## 2017-07-11 RX ADMIN — LIDOCAINE HYDROCHLORIDE 15 ML: 20 SOLUTION ORAL; TOPICAL at 14:43

## 2017-07-11 RX ADMIN — LORAZEPAM 1 MG: 2 INJECTION INTRAMUSCULAR; INTRAVENOUS at 14:16

## 2017-07-11 RX ADMIN — LIDOCAINE HYDROCHLORIDE 15 ML: 20 SOLUTION ORAL; TOPICAL at 19:51

## 2017-07-11 RX ADMIN — ALUMINUM HYDROXIDE, MAGNESIUM HYDROXIDE, AND DIMETHICONE 15 ML: 400; 400; 40 SUSPENSION ORAL at 19:51

## 2017-07-11 RX ADMIN — LORAZEPAM 1 MG: 2 INJECTION INTRAMUSCULAR; INTRAVENOUS at 19:51

## 2017-07-11 RX ADMIN — SODIUM CHLORIDE 1000 ML: 9 INJECTION, SOLUTION INTRAVENOUS at 14:43

## 2017-07-11 RX ADMIN — ONDANSETRON 4 MG: 2 INJECTION, SOLUTION INTRAMUSCULAR; INTRAVENOUS at 17:22

## 2017-07-11 RX ADMIN — ONDANSETRON 4 MG: 2 INJECTION, SOLUTION INTRAMUSCULAR; INTRAVENOUS at 14:16

## 2017-07-11 RX ADMIN — ALUMINUM HYDROXIDE, MAGNESIUM HYDROXIDE, AND DIMETHICONE 15 ML: 400; 400; 40 SUSPENSION ORAL at 14:43

## 2017-07-11 NOTE — ED NOTES
PT INCONTINENT OF BLADDER. CHANGED BRIEF AND SHEETS, PERICARE PROVIDED. PT ABLE TO TURN ON SIDE. NO SKIN ISSUES NOTED. PT REQUESTING PAIN MEDICATION. PT STATES PAIN IS EVERYWHERE. WO MRI. CHECK LIST COMPLETE     Kala Martinez RN  07/11/17 4650

## 2017-07-11 NOTE — ED NOTES
STATES PT WAS DRINKING A GLASS OF WATER AND SHE STARTED COUGHING. EMS CALLED. PT HAS HX OF NOT BEING ABLE TO WALK FOR ABOUT A YEAR. PT SEEN AT [facility] FOR THIS. CAN NOT RECALL NEUROLOGIST. PT IS INCONSOLABLE AND NOT REDIRECTABLE. PT SAYS HER NOSE HURTS AND HER BACK HURTS, BUT WHEN ASKED TO POINT AT PAIN SHE PRESSES ON HER NECK.     Kala Martinez RN  07/11/17 3805

## 2017-07-11 NOTE — ED PROVIDER NOTES
"Subjective   HPI Comments:  relates that the patient had a change in her chronic issues.  Her leg weakness and sensation drastically changed 1 day ago, and she can not stand now.  Recent ct shows disease and no acute fracture.    Patient is a 61 y.o. female presenting with general illness.   Illness   Location:  Choking on water episode leading to panic and throat pain.  Severity:  Severe  Onset quality:  Sudden  Duration:  3 hours  Timing:  Unable to specify  Progression:  Unchanged  Chronicity:  Recurrent  Context:  Choked on water  Relieved by:  Nothing  Worsened by:  \"everything\"  Ineffective treatments:  Waiting  Associated symptoms: cough, fatigue and nausea    Associated symptoms: no abdominal pain, no chest pain, no congestion, no diarrhea, no ear pain, no fever, no loss of consciousness, no myalgias, no rash, no rhinorrhea, no shortness of breath, no sore throat, no vomiting and no wheezing        Review of Systems   Constitutional: Positive for fatigue. Negative for fever.   HENT: Negative for congestion, ear pain, rhinorrhea and sore throat.    Respiratory: Positive for cough. Negative for shortness of breath and wheezing.    Cardiovascular: Negative for chest pain.   Gastrointestinal: Positive for nausea. Negative for abdominal pain, diarrhea and vomiting.   Musculoskeletal: Negative for myalgias.   Skin: Negative for rash.   Neurological: Negative for loss of consciousness.       Past Medical History:   Diagnosis Date   • Asthma    • Cancer     colon cancer   • Depression    • Disease of thyroid gland    • Hypertension    • Insomnia    • Seizure    • Sleep apnea        No Known Allergies    Past Surgical History:   Procedure Laterality Date   • BACK SURGERY     • COLON SURGERY     • GASTRIC BYPASS     • HYSTERECTOMY     • KNEE ARTHROSCOPY      bilateral   • NECK SURGERY     • ORIF WRIST FRACTURE Right 6/9/2017    Procedure: WRIST OPEN REDUCTION INTERNAL FIXATION, RIGHT;  Surgeon: Alec ANTON" MD Sony;  Location: [facility] OR;  Service:    • TONSILLECTOMY     • WRIST FRACTURE SURGERY         History reviewed. No pertinent family history.    Social History     Social History   • Marital status:      Spouse name: Carlito   • Number of children: 1   • Years of education: 18+     Occupational History   • retired      [employer]     Social History Main Topics   • Smoking status: Never Smoker   • Smokeless tobacco: Never Used   • Alcohol use No   • Drug use: No   • Sexual activity: Defer     Other Topics Concern   • None     Social History Narrative           Objective   Physical Exam   Constitutional: She appears well-developed and well-nourished.   HENT:   Head: Normocephalic.   Nose: Nose normal.   Mouth/Throat: Oropharynx is clear and moist.   Eyes: Conjunctivae and EOM are normal. Pupils are equal, round, and reactive to light.   Neck: Normal range of motion. Neck supple. No JVD present. No thyromegaly present.   Cardiovascular: Normal rate, regular rhythm, normal heart sounds and intact distal pulses.    Pulmonary/Chest: Effort normal and breath sounds normal.   Abdominal: Soft. Bowel sounds are normal. She exhibits no distension and no mass. There is no tenderness. There is no rebound and no guarding.   Musculoskeletal: Normal range of motion.   Lymphadenopathy:     She has no cervical adenopathy.   Neurological: She is alert. A sensory deficit is present. No cranial nerve deficit. She exhibits abnormal muscle tone.   Reflex Scores:       Patellar reflexes are 0 on the right side and 0 on the left side.  Pronounced weakness to lower ext.  Also stocking like decreased sensation to both lower ext.   Skin: Skin is warm and dry. No rash noted. No erythema.   Psychiatric: She has a normal mood and affect. Her behavior is normal. Judgment and thought content normal.   Nursing note and vitals reviewed.      Procedures         ED Course  ED Course                  MDM  Number of Diagnoses  or Management Options  Esophageal spasm: new and requires workup  Lumbar radicular pain: established and worsening  Neuropathy: established and worsening     Amount and/or Complexity of Data Reviewed  Clinical lab tests: ordered and reviewed  Tests in the radiology section of CPT®: ordered and reviewed  Decide to obtain previous medical records or to obtain history from someone other than the patient: yes  Review and summarize past medical records: yes  Independent visualization of images, tracings, or specimens: yes    Risk of Complications, Morbidity, and/or Mortality  Presenting problems: high  Diagnostic procedures: high  Management options: high    Patient Progress  Patient progress: stable      Final diagnoses:   Lumbar radicular pain   Neuropathy   Esophageal spasm            Codey Boothe MD  07/11/17 8221

## 2017-07-12 ENCOUNTER — TELEPHONE (OUTPATIENT)
Dept: FAMILY MEDICINE CLINIC | Facility: CLINIC | Age: 62
End: 2017-07-12

## 2017-07-12 ENCOUNTER — APPOINTMENT (OUTPATIENT)
Dept: CT IMAGING | Facility: HOSPITAL | Age: 62
End: 2017-07-12

## 2017-07-12 ENCOUNTER — HOSPITAL ENCOUNTER (INPATIENT)
Facility: HOSPITAL | Age: 62
LOS: 5 days | Discharge: HOME | End: 2017-07-17
Attending: INTERNAL MEDICINE | Admitting: FAMILY MEDICINE

## 2017-07-12 DIAGNOSIS — Z74.09 IMPAIRED MOBILITY: ICD-10-CM

## 2017-07-12 DIAGNOSIS — R13.10 DYSPHAGIA, UNSPECIFIED TYPE: ICD-10-CM

## 2017-07-12 DIAGNOSIS — R13.0 INABILITY TO SWALLOW: Primary | ICD-10-CM

## 2017-07-12 DIAGNOSIS — R26.2 DIFFICULTY IN WALKING, NOT ELSEWHERE CLASSIFIED: ICD-10-CM

## 2017-07-12 LAB
ALBUMIN SERPL-MCNC: 4.1 G/DL (ref 3.5–5)
ALBUMIN/GLOB SERPL: 1.3 G/DL (ref 1.1–2.5)
ALP SERPL-CCNC: 163 U/L (ref 24–120)
ALT SERPL W P-5'-P-CCNC: 23 U/L (ref 0–54)
ANION GAP SERPL CALCULATED.3IONS-SCNC: 13 MMOL/L (ref 4–13)
AST SERPL-CCNC: 26 U/L (ref 7–45)
BASOPHILS # BLD AUTO: 0.01 10*3/MM3 (ref 0–0.2)
BASOPHILS NFR BLD AUTO: 0.1 % (ref 0–2)
BILIRUB SERPL-MCNC: 0.7 MG/DL (ref 0.1–1)
BUN BLD-MCNC: 7 MG/DL (ref 5–21)
BUN/CREAT SERPL: 10.8 (ref 7–25)
CALCIUM SPEC-SCNC: 9.1 MG/DL (ref 8.4–10.4)
CHLORIDE SERPL-SCNC: 95 MMOL/L (ref 98–110)
CO2 SERPL-SCNC: 26 MMOL/L (ref 24–31)
CREAT BLD-MCNC: 0.65 MG/DL (ref 0.5–1.4)
D-LACTATE SERPL-SCNC: 2 MMOL/L (ref 0.5–2)
DEPRECATED RDW RBC AUTO: 44 FL (ref 40–54)
EOSINOPHIL # BLD AUTO: 0.05 10*3/MM3 (ref 0–0.7)
EOSINOPHIL NFR BLD AUTO: 0.4 % (ref 0–4)
ERYTHROCYTE [DISTWIDTH] IN BLOOD BY AUTOMATED COUNT: 13.8 % (ref 12–15)
GFR SERPL CREATININE-BSD FRML MDRD: 93 ML/MIN/1.73
GLOBULIN UR ELPH-MCNC: 3.1 GM/DL
GLUCOSE BLD-MCNC: 111 MG/DL (ref 70–100)
HCT VFR BLD AUTO: 39.1 % (ref 37–47)
HETEROPH AB SER QL LA: NEGATIVE
HGB BLD-MCNC: 13 G/DL (ref 12–16)
IMM GRANULOCYTES # BLD: 0.02 10*3/MM3 (ref 0–0.03)
IMM GRANULOCYTES NFR BLD: 0.2 % (ref 0–5)
LYMPHOCYTES # BLD AUTO: 1.17 10*3/MM3 (ref 0.72–4.86)
LYMPHOCYTES NFR BLD AUTO: 9.9 % (ref 15–45)
MCH RBC QN AUTO: 29 PG (ref 28–32)
MCHC RBC AUTO-ENTMCNC: 33.2 G/DL (ref 33–36)
MCV RBC AUTO: 87.3 FL (ref 82–98)
MONOCYTES # BLD AUTO: 0.96 10*3/MM3 (ref 0.19–1.3)
MONOCYTES NFR BLD AUTO: 8.2 % (ref 4–12)
NEUTROPHILS # BLD AUTO: 9.56 10*3/MM3 (ref 1.87–8.4)
NEUTROPHILS NFR BLD AUTO: 81.2 % (ref 39–78)
PLATELET # BLD AUTO: 267 10*3/MM3 (ref 130–400)
PMV BLD AUTO: 9.8 FL (ref 6–12)
POTASSIUM BLD-SCNC: 3.9 MMOL/L (ref 3.5–5.3)
PROT SERPL-MCNC: 7.2 G/DL (ref 6.3–8.7)
RBC # BLD AUTO: 4.48 10*6/MM3 (ref 4.2–5.4)
S PYO AG THROAT QL: NEGATIVE
SODIUM BLD-SCNC: 134 MMOL/L (ref 135–145)
WBC NRBC COR # BLD: 11.77 10*3/MM3 (ref 4.8–10.8)

## 2017-07-12 PROCEDURE — 87880 STREP A ASSAY W/OPTIC: CPT | Performed by: PHYSICIAN ASSISTANT

## 2017-07-12 PROCEDURE — 25010000002 METHYLPREDNISOLONE PER 125 MG: Performed by: PHYSICIAN ASSISTANT

## 2017-07-12 PROCEDURE — 25010000002 PIPERACILLIN SOD-TAZOBACTAM PER 1 G: Performed by: FAMILY MEDICINE

## 2017-07-12 PROCEDURE — 85025 COMPLETE CBC W/AUTO DIFF WBC: CPT | Performed by: PHYSICIAN ASSISTANT

## 2017-07-12 PROCEDURE — 83605 ASSAY OF LACTIC ACID: CPT | Performed by: PHYSICIAN ASSISTANT

## 2017-07-12 PROCEDURE — 25010000002 DEXAMETHASONE PER 1 MG: Performed by: FAMILY MEDICINE

## 2017-07-12 PROCEDURE — 25010000002 MORPHINE SULFATE (PF) 2 MG/ML SOLUTION: Performed by: FAMILY MEDICINE

## 2017-07-12 PROCEDURE — 87040 BLOOD CULTURE FOR BACTERIA: CPT | Performed by: INTERNAL MEDICINE

## 2017-07-12 PROCEDURE — 80053 COMPREHEN METABOLIC PANEL: CPT | Performed by: PHYSICIAN ASSISTANT

## 2017-07-12 PROCEDURE — 86308 HETEROPHILE ANTIBODY SCREEN: CPT | Performed by: PHYSICIAN ASSISTANT

## 2017-07-12 PROCEDURE — 25010000002 MORPHINE PER 10 MG: Performed by: EMERGENCY MEDICINE

## 2017-07-12 PROCEDURE — 99285 EMERGENCY DEPT VISIT HI MDM: CPT

## 2017-07-12 PROCEDURE — 70491 CT SOFT TISSUE NECK W/DYE: CPT

## 2017-07-12 PROCEDURE — 0 IOPAMIDOL 61 % SOLUTION: Performed by: PHYSICIAN ASSISTANT

## 2017-07-12 PROCEDURE — 87081 CULTURE SCREEN ONLY: CPT | Performed by: PHYSICIAN ASSISTANT

## 2017-07-12 RX ORDER — ONDANSETRON 2 MG/ML
4 INJECTION INTRAMUSCULAR; INTRAVENOUS EVERY 6 HOURS PRN
Status: DISCONTINUED | OUTPATIENT
Start: 2017-07-12 | End: 2017-07-17 | Stop reason: HOSPADM

## 2017-07-12 RX ORDER — MORPHINE SULFATE 4 MG/ML
4 INJECTION, SOLUTION INTRAMUSCULAR; INTRAVENOUS ONCE
Status: COMPLETED | OUTPATIENT
Start: 2017-07-12 | End: 2017-07-12

## 2017-07-12 RX ORDER — CLONIDINE HYDROCHLORIDE 0.1 MG/1
TABLET ORAL
Qty: 60 TABLET | Refills: 0 | Status: ON HOLD | OUTPATIENT
Start: 2017-07-12 | End: 2017-07-13 | Stop reason: SDUPTHER

## 2017-07-12 RX ORDER — DEXAMETHASONE SODIUM PHOSPHATE 4 MG/ML
4 INJECTION, SOLUTION INTRA-ARTICULAR; INTRALESIONAL; INTRAMUSCULAR; INTRAVENOUS; SOFT TISSUE EVERY 6 HOURS
Status: DISCONTINUED | OUTPATIENT
Start: 2017-07-12 | End: 2017-07-13

## 2017-07-12 RX ORDER — MORPHINE SULFATE 2 MG/ML
2 INJECTION, SOLUTION INTRAMUSCULAR; INTRAVENOUS
Status: DISCONTINUED | OUTPATIENT
Start: 2017-07-12 | End: 2017-07-17 | Stop reason: HOSPADM

## 2017-07-12 RX ORDER — CLINDAMYCIN PHOSPHATE 600 MG/50ML
600 INJECTION INTRAVENOUS EVERY 8 HOURS
Status: DISCONTINUED | OUTPATIENT
Start: 2017-07-12 | End: 2017-07-17

## 2017-07-12 RX ORDER — SODIUM CHLORIDE 9 MG/ML
100 INJECTION, SOLUTION INTRAVENOUS CONTINUOUS
Status: DISCONTINUED | OUTPATIENT
Start: 2017-07-12 | End: 2017-07-17 | Stop reason: HOSPADM

## 2017-07-12 RX ORDER — METHYLPREDNISOLONE SODIUM SUCCINATE 125 MG/2ML
125 INJECTION, POWDER, LYOPHILIZED, FOR SOLUTION INTRAMUSCULAR; INTRAVENOUS ONCE
Status: COMPLETED | OUTPATIENT
Start: 2017-07-12 | End: 2017-07-12

## 2017-07-12 RX ADMIN — ACETAMINOPHEN 650 MG: 325 SUPPOSITORY RECTAL at 15:23

## 2017-07-12 RX ADMIN — IOPAMIDOL 100 ML: 612 INJECTION, SOLUTION INTRAVENOUS at 15:55

## 2017-07-12 RX ADMIN — CLINDAMYCIN PHOSPHATE 600 MG: 12 INJECTION, SOLUTION INTRAVENOUS at 21:20

## 2017-07-12 RX ADMIN — METHYLPREDNISOLONE SODIUM SUCCINATE 125 MG: 125 INJECTION, POWDER, FOR SOLUTION INTRAMUSCULAR; INTRAVENOUS at 15:28

## 2017-07-12 RX ADMIN — MORPHINE SULFATE 2 MG: 2 INJECTION, SOLUTION INTRAMUSCULAR; INTRAVENOUS at 21:03

## 2017-07-12 RX ADMIN — MORPHINE SULFATE 4 MG: 4 INJECTION, SOLUTION INTRAMUSCULAR; INTRAVENOUS at 18:15

## 2017-07-12 RX ADMIN — SODIUM CHLORIDE 1000 ML: 9 INJECTION, SOLUTION INTRAVENOUS at 15:25

## 2017-07-12 RX ADMIN — DEXAMETHASONE SODIUM PHOSPHATE 4 MG: 4 INJECTION, SOLUTION INTRA-ARTICULAR; INTRALESIONAL; INTRAMUSCULAR; INTRAVENOUS; SOFT TISSUE at 21:04

## 2017-07-12 RX ADMIN — MORPHINE SULFATE 2 MG: 2 INJECTION, SOLUTION INTRAMUSCULAR; INTRAVENOUS at 23:06

## 2017-07-12 RX ADMIN — SODIUM CHLORIDE 100 ML/HR: 9 INJECTION, SOLUTION INTRAVENOUS at 20:08

## 2017-07-12 RX ADMIN — LIDOCAINE HYDROCHLORIDE 15 ML: 20 SOLUTION ORAL; TOPICAL at 16:21

## 2017-07-12 RX ADMIN — TAZOBACTAM SODIUM AND PIPERACILLIN SODIUM 3.38 G: 375; 3 INJECTION, SOLUTION INTRAVENOUS at 23:06

## 2017-07-12 NOTE — ED NOTES
Patients mouth suctioned with yankauer, small amount of clear mucus removed.     Mell Kennedy RN  07/12/17 9512

## 2017-07-12 NOTE — ED NOTES
Pt's sheets changed and partial bath given due to incont.  episode      Rosalio Marques, MARK ANTHONY  07/12/17 9770

## 2017-07-12 NOTE — ED NOTES
Pt continues to complain of severe throat pain.       Rosalio Marques, MARK ANTHONY  07/12/17 7834

## 2017-07-12 NOTE — ED PROVIDER NOTES
Subjective   History of Present Illness   61-year-old female presents with a chief complaint of throat pain.  She is uncooperative and a exam and in has difficulty answering questions.  She says her throat is more painful to swallow and to palpation bilaterally.  She confirms lymphadenopathy bilaterally of her neck.  Denies nausea vomiting diarrhea and confirms that her throat is worse than yesterday.  Today she presents with a fever and tachycardia.  She reports she received no medicine yesterday for her throat.  Has not taken anything for her fever.  Review of Systems   All other systems reviewed and are negative.      Past Medical History:   Diagnosis Date   • Asthma    • Cancer     colon cancer   • Depression    • Disease of thyroid gland    • Hypertension    • Insomnia    • Seizure    • Sleep apnea        No Known Allergies    Past Surgical History:   Procedure Laterality Date   • BACK SURGERY     • COLON SURGERY     • GASTRIC BYPASS     • HYSTERECTOMY     • KNEE ARTHROSCOPY      bilateral   • NECK SURGERY     • ORIF WRIST FRACTURE Right 6/9/2017    Procedure: WRIST OPEN REDUCTION INTERNAL FIXATION, RIGHT;  Surgeon: Alec Cardoza MD;  Location: [facility];  Service:    • TONSILLECTOMY     • WRIST FRACTURE SURGERY         History reviewed. No pertinent family history.    Social History     Social History   • Marital status:      Spouse name: Carlito   • Number of children: 1   • Years of education: 18+     Occupational History   • retired      [employer]     Social History Main Topics   • Smoking status: Never Smoker   • Smokeless tobacco: Never Used   • Alcohol use No   • Drug use: No   • Sexual activity: Defer     Other Topics Concern   • None     Social History Narrative           Objective   Physical Exam   Constitutional: She is oriented to person, place, and time. She appears distressed.   Grimacing in pain.   HENT:   Head: Normocephalic and atraumatic.   Eyes: EOM are normal.  Pupils are equal, round, and reactive to light.   Cardiovascular:   Tachycardic   Pulmonary/Chest: Effort normal and breath sounds normal.   Abdominal: Soft.   Musculoskeletal: Normal range of motion.   Lymphadenopathy:     She has cervical adenopathy.   Neurological: She is alert and oriented to person, place, and time.   Skin: Skin is warm. She is diaphoretic.   Nursing note and vitals reviewed.      Procedures         ED Course  ED Course   Comment By Time   [id number] Juanjose Last PA-C 07/12 1455                  MDM  Number of Diagnoses or Management Options  Diagnosis management comments: Patient has voiced no improvement in symptoms after viscous lidocaine attempt and solumedrol 125mg IV.  Unable to swallow or tolerate PO liquids or food at home.  Will admit.     Discussed patient with Dr. Gold, hospitalist.  He accepts patient to Dr. Lara for further evaluation of patients inability to swallow.        Amount and/or Complexity of Data Reviewed  Clinical lab tests: ordered and reviewed  Tests in the radiology section of CPT®: ordered and reviewed  Tests in the medicine section of CPT®: ordered and reviewed  Decide to obtain previous medical records or to obtain history from someone other than the patient: yes    Risk of Complications, Morbidity, and/or Mortality  Presenting problems: moderate  Diagnostic procedures: moderate  Management options: moderate    Patient Progress  Patient progress: stable      Final diagnoses:   Inability to swallow            Juanjose Last PA-C  07/12/17 1501       Juanjose Last PA-C  07/13/17 1143

## 2017-07-12 NOTE — TELEPHONE ENCOUNTER
Pt  called and said that pt was in ER last night and was supposed to call today.  He said he wasn't sure what they were supposed to call about.  [phone]

## 2017-07-12 NOTE — TELEPHONE ENCOUNTER
My understanding with talking to ER MD  A. They need home health; some help/PT?  B. Want us to refer them to another back MD?  (thought they might even be coming in today?)

## 2017-07-12 NOTE — ED NOTES
Patient continues to holler and scream out even when being spoke to.  Refused to hold and swallow her own medications.  While trying to get exit vitals, she would scream and tense up her arm, making the /104 and 162/149.  Could never get a true reading on her.  Consulted Dr. Boothe and he said to d/c patient.     Treva Li RN  07/11/17 2020

## 2017-07-13 ENCOUNTER — APPOINTMENT (OUTPATIENT)
Dept: GENERAL RADIOLOGY | Facility: HOSPITAL | Age: 62
End: 2017-07-13

## 2017-07-13 ENCOUNTER — TELEPHONE (OUTPATIENT)
Dept: FAMILY MEDICINE CLINIC | Facility: CLINIC | Age: 62
End: 2017-07-13

## 2017-07-13 PROBLEM — J38.7: Status: ACTIVE | Noted: 2017-07-13

## 2017-07-13 PROBLEM — F44.4 CONVERSION DISORDER WITH ABNORMAL MOVEMENT: Status: ACTIVE | Noted: 2017-07-13

## 2017-07-13 PROCEDURE — 99253 IP/OBS CNSLTJ NEW/EST LOW 45: CPT | Performed by: OTOLARYNGOLOGY

## 2017-07-13 PROCEDURE — 25010000003 DEXAMETHASONE SODIUM PHOSPHATE 100 MG/10ML SOLUTION 10 ML VIAL: Performed by: OTOLARYNGOLOGY

## 2017-07-13 PROCEDURE — 31575 DIAGNOSTIC LARYNGOSCOPY: CPT | Performed by: OTOLARYNGOLOGY

## 2017-07-13 PROCEDURE — 99222 1ST HOSP IP/OBS MODERATE 55: CPT | Performed by: INTERNAL MEDICINE

## 2017-07-13 PROCEDURE — 99223 1ST HOSP IP/OBS HIGH 75: CPT | Performed by: NEUROLOGICAL SURGERY

## 2017-07-13 PROCEDURE — 25010000002 DEXAMETHASONE PER 1 MG: Performed by: FAMILY MEDICINE

## 2017-07-13 PROCEDURE — 74230 X-RAY XM SWLNG FUNCJ C+: CPT

## 2017-07-13 PROCEDURE — G8997 SWALLOW GOAL STATUS: HCPCS

## 2017-07-13 PROCEDURE — 25010000002 MORPHINE SULFATE (PF) 2 MG/ML SOLUTION: Performed by: FAMILY MEDICINE

## 2017-07-13 PROCEDURE — 92611 MOTION FLUOROSCOPY/SWALLOW: CPT

## 2017-07-13 PROCEDURE — 25010000002 ENOXAPARIN PER 10 MG: Performed by: FAMILY MEDICINE

## 2017-07-13 PROCEDURE — 92526 ORAL FUNCTION THERAPY: CPT | Performed by: SPEECH-LANGUAGE PATHOLOGIST

## 2017-07-13 PROCEDURE — 25010000002 LORAZEPAM PER 2 MG: Performed by: FAMILY MEDICINE

## 2017-07-13 PROCEDURE — 92610 EVALUATE SWALLOWING FUNCTION: CPT | Performed by: SPEECH-LANGUAGE PATHOLOGIST

## 2017-07-13 PROCEDURE — 99222 1ST HOSP IP/OBS MODERATE 55: CPT | Performed by: FAMILY MEDICINE

## 2017-07-13 PROCEDURE — 0CJS8ZZ INSPECTION OF LARYNX, VIA NATURAL OR ARTIFICIAL OPENING ENDOSCOPIC: ICD-10-PCS | Performed by: OTOLARYNGOLOGY

## 2017-07-13 PROCEDURE — 25010000002 PIPERACILLIN SOD-TAZOBACTAM PER 1 G: Performed by: FAMILY MEDICINE

## 2017-07-13 PROCEDURE — G8996 SWALLOW CURRENT STATUS: HCPCS

## 2017-07-13 PROCEDURE — 25010000003 LEVETIRACETAM IN NACL 0.75% 1000 MG/100ML SOLUTION: Performed by: FAMILY MEDICINE

## 2017-07-13 RX ORDER — LEVETIRACETAM 1000 MG/1
1000 TABLET ORAL EVERY 12 HOURS
COMMUNITY
End: 2017-11-28 | Stop reason: SDUPTHER

## 2017-07-13 RX ORDER — LORAZEPAM 2 MG/ML
0.5 INJECTION INTRAMUSCULAR
Status: DISCONTINUED | OUTPATIENT
Start: 2017-07-13 | End: 2017-07-17 | Stop reason: HOSPADM

## 2017-07-13 RX ORDER — OXYCODONE HYDROCHLORIDE 10 MG/1
10 TABLET ORAL EVERY 4 HOURS PRN
COMMUNITY
End: 2018-06-07

## 2017-07-13 RX ORDER — POTASSIUM CHLORIDE 20 MEQ/1
20 TABLET, EXTENDED RELEASE ORAL EVERY 12 HOURS
COMMUNITY
End: 2018-05-23 | Stop reason: SDUPTHER

## 2017-07-13 RX ORDER — LEVETIRACETAM 10 MG/ML
1000 INJECTION INTRAVASCULAR EVERY 12 HOURS SCHEDULED
Status: DISCONTINUED | OUTPATIENT
Start: 2017-07-13 | End: 2017-07-16

## 2017-07-13 RX ORDER — FOSPHENYTOIN SODIUM 50 MG/ML
200 INJECTION, SOLUTION INTRAMUSCULAR; INTRAVENOUS NIGHTLY
Status: DISCONTINUED | OUTPATIENT
Start: 2017-07-13 | End: 2017-07-16

## 2017-07-13 RX ORDER — AMLODIPINE BESYLATE 10 MG/1
5 TABLET ORAL EVERY 12 HOURS
COMMUNITY
End: 2017-07-17 | Stop reason: HOSPADM

## 2017-07-13 RX ADMIN — MORPHINE SULFATE 2 MG: 2 INJECTION, SOLUTION INTRAMUSCULAR; INTRAVENOUS at 23:30

## 2017-07-13 RX ADMIN — MORPHINE SULFATE 2 MG: 2 INJECTION, SOLUTION INTRAMUSCULAR; INTRAVENOUS at 12:17

## 2017-07-13 RX ADMIN — MORPHINE SULFATE 2 MG: 2 INJECTION, SOLUTION INTRAMUSCULAR; INTRAVENOUS at 05:17

## 2017-07-13 RX ADMIN — CLINDAMYCIN PHOSPHATE 600 MG: 12 INJECTION, SOLUTION INTRAVENOUS at 20:51

## 2017-07-13 RX ADMIN — TAZOBACTAM SODIUM AND PIPERACILLIN SODIUM 3.38 G: 375; 3 INJECTION, SOLUTION INTRAVENOUS at 17:15

## 2017-07-13 RX ADMIN — DEXAMETHASONE SODIUM PHOSPHATE 6 MG: 10 INJECTION, SOLUTION INTRAMUSCULAR; INTRAVENOUS at 14:00

## 2017-07-13 RX ADMIN — MORPHINE SULFATE 2 MG: 2 INJECTION, SOLUTION INTRAMUSCULAR; INTRAVENOUS at 08:05

## 2017-07-13 RX ADMIN — TAZOBACTAM SODIUM AND PIPERACILLIN SODIUM 3.38 G: 375; 3 INJECTION, SOLUTION INTRAVENOUS at 22:54

## 2017-07-13 RX ADMIN — MORPHINE SULFATE 2 MG: 2 INJECTION, SOLUTION INTRAMUSCULAR; INTRAVENOUS at 10:14

## 2017-07-13 RX ADMIN — ENOXAPARIN SODIUM 40 MG: 40 INJECTION SUBCUTANEOUS at 10:12

## 2017-07-13 RX ADMIN — TAZOBACTAM SODIUM AND PIPERACILLIN SODIUM 3.38 G: 375; 3 INJECTION, SOLUTION INTRAVENOUS at 12:17

## 2017-07-13 RX ADMIN — CLINDAMYCIN PHOSPHATE 600 MG: 12 INJECTION, SOLUTION INTRAVENOUS at 15:56

## 2017-07-13 RX ADMIN — DEXAMETHASONE SODIUM PHOSPHATE 4 MG: 4 INJECTION, SOLUTION INTRA-ARTICULAR; INTRALESIONAL; INTRAMUSCULAR; INTRAVENOUS; SOFT TISSUE at 08:05

## 2017-07-13 RX ADMIN — CLINDAMYCIN PHOSPHATE 600 MG: 12 INJECTION, SOLUTION INTRAVENOUS at 04:23

## 2017-07-13 RX ADMIN — FOSPHENYTOIN SODIUM 200 MG PE: 50 INJECTION, SOLUTION INTRAMUSCULAR; INTRAVENOUS at 20:23

## 2017-07-13 RX ADMIN — CLONIDINE 1 PATCH: 0.1 PATCH TRANSDERMAL at 21:04

## 2017-07-13 RX ADMIN — TAZOBACTAM SODIUM AND PIPERACILLIN SODIUM 3.38 G: 375; 3 INJECTION, SOLUTION INTRAVENOUS at 05:17

## 2017-07-13 RX ADMIN — MORPHINE SULFATE 2 MG: 2 INJECTION, SOLUTION INTRAMUSCULAR; INTRAVENOUS at 21:21

## 2017-07-13 RX ADMIN — MORPHINE SULFATE 2 MG: 2 INJECTION, SOLUTION INTRAMUSCULAR; INTRAVENOUS at 01:05

## 2017-07-13 RX ADMIN — LEVETIRACETAM 1000 MG: 1000 INJECTION, SOLUTION INTRAVENOUS at 21:21

## 2017-07-13 RX ADMIN — MORPHINE SULFATE 2 MG: 2 INJECTION, SOLUTION INTRAMUSCULAR; INTRAVENOUS at 03:15

## 2017-07-13 RX ADMIN — DEXAMETHASONE SODIUM PHOSPHATE 4 MG: 4 INJECTION, SOLUTION INTRA-ARTICULAR; INTRALESIONAL; INTRAMUSCULAR; INTRAVENOUS; SOFT TISSUE at 02:41

## 2017-07-13 RX ADMIN — LORAZEPAM 0.5 MG: 2 INJECTION INTRAMUSCULAR at 20:21

## 2017-07-13 RX ADMIN — MORPHINE SULFATE 2 MG: 2 INJECTION, SOLUTION INTRAMUSCULAR; INTRAVENOUS at 19:15

## 2017-07-13 RX ADMIN — MORPHINE SULFATE 2 MG: 2 INJECTION, SOLUTION INTRAMUSCULAR; INTRAVENOUS at 15:03

## 2017-07-13 RX ADMIN — SODIUM CHLORIDE 100 ML/HR: 9 INJECTION, SOLUTION INTRAVENOUS at 11:19

## 2017-07-13 RX ADMIN — MORPHINE SULFATE 2 MG: 2 INJECTION, SOLUTION INTRAMUSCULAR; INTRAVENOUS at 17:09

## 2017-07-13 RX ADMIN — DEXAMETHASONE SODIUM PHOSPHATE 6 MG: 10 INJECTION, SOLUTION INTRAMUSCULAR; INTRAVENOUS at 20:21

## 2017-07-13 NOTE — MBS/VFSS/FEES
Acute Care - Speech Language Pathology   Swallow VFSS in Radiology  [facility]     Patient Name: Bita Croft  : 1955  MRN: [id number]  Today's Date: 2017        Referring Physician: Dr. Gonzales       Admit Date: 2017   SPEECH-LANGUAGE PATHOLOGY EVALUTION - VFSS  Subjective: The patient was seen on this date for a VFSS(Videofluoroscopic Swallowing Study).  Patient was alert and cooperative, complained of throat pain being a level 10 on a scale of 0-10 at time of eval.   Objective: Risks/benefits were reviewed with the patient, and consent was obtained. The study was completed with SLP and Radiologist present. The patient was seen in lateral view(s). Textures given included honey thick liquid and puree consistency.  Assessment: Pt was presented pudding thick and honey thick trials only, in the following order: honey thick, pudding thick, repeat honey thick. Pt was noted to have a significant delay in swallow initiation with premature loss of the bolus to the pyriform sinuses and into the laryngeal vestibule with the initial honey thick trial, as deep laryngeal penetration was observed. Pt was also noted to have weak laryngeal elevation and no epiglottic inversion. Three multiple swallows noted, with weak pharyngeal contraction and 1x laryngeal penetration on multiple swallows, which was also noted with the latter honey thick trial. With pudding thick, pt was noted to, again, have a delay in swallow initiation, with premature loss of the bolus to the pyriforms, again with weak laryngeal elevation and no epiglottic inversion. It must be noted that the pt did also exhibit multiple instances of laryngeal penetration with the pudding thick consistency. Four multiple swallows, with weak upper esophageal sphincter opening. No definite aspiration was observed throughout the study; however, the pt is at a high risk for aspiration with PO intake. Radiologist voiced concern with soft tissue swelling.  Recommended CT of neck for further assessment in regards to concern. Post swallow residue was felt to be of a severe extent in the pyriform sinuses following pudding thick, as well as the following remaining residue with honey thick: moderate in pyriform sinuses, mild back of tongue/base of tongue, vallecula, posterior pharyngeal wall, lateral channels. Esophageal screen was performed.       Silent Thin Nectar Honey Puree Fork Mashed Mech Soft Regular Liquid Wash   Penetration     x x       Aspiration               Residue    x x         SLP Findings: Patient presents with severe to profound pharyngeal dysphagia.   Comments: Immediately following the study, the pt was educated that she is at a high risk for aspiration at this time. She was educated that if any PO is consumed, ST recommends an occasional ice chip, yet given high risk for aspiration, pt was educated that oral care via water moistened toothette would be safest option for pt. Study results were also phoned to pt's RN, Tomas. She is aware that pt may be able to tolerate -Ovidio placement; however, ST is guarded if pt will be able to tolerate placement secondary to throat pain.   Recommendations: Diet Textures: NPO, including meds. Medications should be taken by alternate means. May have Ice between meals after oral care, under staff or family supervision and with the recommended strategies for safe swallowing, knowing the high risk for aspiration.  Recommended Strategies: Upright for PO. Oral care before breakfast, after all meals and PRN.  Other Recommended Evaluations: VFSS to be repeated when pt's swallow fx appears to be improved at bedside.  Dysphagia therapy is recommended, feel pt would most benefit from NMES tx when pain is more controlled. Rationale: See above. Thanks!   Marisabel Faye, CCC-SLP 2017 3:48 PM  Visit Dx:     ICD-10-CM ICD-9-CM   1. Inability to swallow R13.0 787.20   2. Dysphagia, unspecified type R13.10 787.20      Patient Active Problem List   Diagnosis   • Confusion   • Depression   • Anxiety   • Hyponatremia   • Seizure disorder   • Chronic neck pain   • Chronic back pain   • Hypothyroidism   • Abnormal thyroid blood test   • Hypertension   • Wellness examination-done   • Elevated fasting glucose   • Anemia   • Iron deficiency   • Degenerative joint disease of spine   • Inability to swallow   • Conversion disorder with abnormal movement   • Arytenoid cyst     Past Medical History:   Diagnosis Date   • Asthma    • Cancer     colon cancer   • Depression    • Disease of thyroid gland    • Hypertension    • Insomnia    • Seizure    • Sleep apnea      Past Surgical History:   Procedure Laterality Date   • BACK SURGERY     • COLON SURGERY     • GASTRIC BYPASS     • HYSTERECTOMY     • KNEE ARTHROSCOPY      bilateral   • NECK SURGERY     • ORIF WRIST FRACTURE Right 6/9/2017    Procedure: WRIST OPEN REDUCTION INTERNAL FIXATION, RIGHT;  Surgeon: Alec Cardoza MD;  Location: [facility] OR;  Service:    • TONSILLECTOMY     • WRIST FRACTURE SURGERY            SWALLOW EVALUATION (last 72 hours)      Swallow Evaluation       07/13/17 1350 07/13/17 0938             Rehab Evaluation    Document Type evaluation  -TM evaluation  -BN       Subjective Information complains of;pain;agree to therapy   Throat-Level 10 on 0-10 scale.  -TM no complaints;agree to therapy  -BN       General Information    Patient Profile Review yes  -TM yes  -BN       Onset of Illness/Injury 07/12/17  -TM 07/12/17  -BN       Referring Physician Dr. Gonzales   -TM Christian  -BN       Subjective Patient Observations Pt c/o pain with swallowing, which she stated has been present for the past few days. Level of discomfort at time of eval appeared high, pt rated pain in throat during swallowing to be a level 10. RN notified.  -TM Pt and spouse report coughing up phlegm shortly after swallowing. This started approx 3 days ago.   -BN       Pertinent History Of Current  Problem Pt stated swallowing trouble began approx 5 days prior to current admission. 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   -TM Pt stated swallowing trouble began approx 5 days prior to current admission. 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   -BN       Current Diet Limitations NPO  -TM NPO  -BN       Precautions/Limitations, Vision WFL with corrective lenses  -TM WFL with corrective lenses  -BN       Precautions/Limitations, Hearing WFL  -TM WFL  -BN       Prior Level of Function- Communication functional in all spheres  -TM functional in all spheres  -BN       Prior Level of Function- Swallowing no diet consistency restrictions  -TM no diet consistency restrictions  -BN       Plans/Goals Discussed With patient;other   RNTomas  -TM patient  -BN       Barriers to Rehab none identified  -TM none identified  -BN       Clinical Impression    Patient's Goals For Discharge patient did not state  -TM return to PO diet  -BN       Family Goals For Discharge other (see comments)   No family present at time of eval.   -TM patient able to return to PO diet  -BN       SLP Swallowing Diagnosis severe dysphagia;pharyngeal dysfunction  -TM pharyngeal dysfunction  -BN       Rehab Potential/Prognosis, Swallowing fair, will monitor progress closely  -TM good, to achieve stated therapy goals  -BN       Criteria for Skilled Therapeutic Interventions Met skilled criteria for dysphagia intervention met  -TM skilled criteria for dysphagia intervention met  -BN       FCM, Swallowing 1-->Level 1  -TM        Therapy Frequency daily  -TM PRN  -BN       Predicted Duration Therapy Interv (days) until discharge  -TM until discharge  -BN       Expected Duration Therapy Session (min) 15-30 minutes  -TM 15-30 minutes  -BN       SLP Diet Recommendation NPO: unsafe for food/liquid intake;temporary alternative means of nutrition/hydration  -TM NPO: unsafe for food/liquid intake  -BN       [place] [place]see comments)   When swallow fx appears improved at bedside, deemed per SLP.  [place] [place]  -BN       SLP Rec. for Method of Medication Administration meds via alternate route  -TM meds via alternate route  -BN       Monitor For Signs Of Aspiration cough;gurgly voice;throat clearing;pneumonia;right lower lobe infiltrates  -TM        Anticipated Discharge Disposition home  -TM home  -BN       Pain Assessment    Pain Assessment  0-10  -BN       Pain Score  10  -BN       Pain Location  Back   Pain with swallowing  -BN       Cognitive Assessment/Intervention    Current Cognitive/Communication Assessment  functional  -BN       Orientation Status  oriented x 4  -BN       Follows Commands/Answers Questions  100% of the time  -BN       Oral Motor Structure and Function    Oral Motor Anatomy and Physiology patient demonstrates anatomy and physiology that is WNL  -TM patient demonstrates anatomy and physiology that is WNL  -BN       Dentition Assessment present and adequate  -TM present and adequate  -BN       Secretion Management WNL/WFL  -TM WNL/WFL  -BN       Mucosal Quality moist, healthy  -TM moist, healthy  -BN       Velar Elevation  WFL (within functional limits)  -BN       Volitional Swallow mild to moderate difficulties initiating volitional swallow;other (see comments)   Felt to be exacerbated by throat pain.  -TM mild to moderate difficulties initiating volitional swallow  -BN       Volitional Cough no difficulties initiating volitional cough  -TM no difficulties initiating volitional cough;other (see comments)   coughs up and expels phlegm with cough  -BN       Oral Musculature General Assessment WFL (within functional limits)  -TM WFL (within functional limits)  -BN       General Feeding/Swallowing Observations    Current Feeding Method NPO  -TM NPO  -BN       Respiratory Support Currently in Use other (see comments)   Room air.   -TM other (see comments)   room air  -BN        "Observations of Posture During Feeding Upright in dysphagia chair.   -TM        Clinical Swallow Exam    Mode of Presentation  fed by clinician  -       Oral Phase Results  intact oral phase without signs of dysfunction  -BN       Pharyngeal Phase Results  cough;throat clear  -       Summary of Clinical Exam  Pt c/o of extreme pain with swallowing. Cough and throat clear with trials given. Pt expels phlegm upon coughing. Strained vocal quailty. Pt stated she feels like a \"hairball\" is stuck in the back of throat. Facial grimace and pain with puree, honey thick, and thin trials given. ST recommends MBS to r/o aspiration with PO.   -BN       Videofluoroscopic Swallowing Exam    Risks/Benefits Reviewed risks/benefits explained;agreed to eval;patient  -TM        Positioning Needs for Swallow Exam Upright in dysphagia chair.  -TM        Radiologic Views Used for Examination left lateral view  -TM        VFSS    Mode of Presentation spoon  -TM        Pharyngeal Phase Physiologic Impairment impaired timing with penetration before;bolus to pyriforms before initiation of response;reduced pharyngeal wall contraction;reduced laryngeal elevation;reduced anterior hyolaryngeal excursion;incomplete epiglottic inversion;other (see comments)   Penetration before swallow with honey thick.   -TM        Post Swallow Residue post swallow residue present;residue present pyriform sinuses;residue present in valleculae;residue present on pharyngeal walls;unable to clear residue in valleculae;unable to clear residue in pyriform sinuses  -TM        Att Compensatory Maneuvers multiple swallows  -TM        Pharyngeal Phase Results impaired pharyngeal phase of swallowing  -TM        Summary of VFSS VFSS completed. Pt was presented pudding thick and honey thick trials only, in the following order: honey thick, pudding thick, repeat honey thick. Pt was noted to have a significant delay in swallow initiation with premature loss of the bolus to " the pyriform sinuses and into the laryngeal vestibule with the initial honey thick trial, as deep laryngeal penetration was observed. Pt was also noted to have weak laryngeal elevation and no epiglottic inversion. Three multiple swallows noted, with weak pharyngeal contraction and 1x laryngeal penetration on multiple swallows, which was also noted with the latter honey thick trial. With pudding thick, pt was noted to, again, have a delay in swallow initiation, with premature loss of the bolus to the pyriforms, again with weak laryngeal elevation and no epiglottic inversion. It must be noted that the pt did also exhibit multiple instances of laryngeal penetration with the pudding thick consistency. Four multiple swallows, with weak upper esophageal sphincter opening. No definite aspiration was observed throughout the study; however, the pt is at a high risk for aspiration with PO intake. Radiologist voiced concern with soft tissue swelling. Recommended CT of neck for further assessment in regards to concern.   -TM        VFSS Swallow Recommendations    Oral Care oral care with toothbrush and dentifrice BID and PRN  -TM        Other Recommendations ice chips after oral care;other (comment)   Ok for occasional ice chip, knowing high risk for aspiration  -TM        Recommended Diet NPO: unsafe for food/liquid intake;temporary alternative means of nutrition/hydration  -TM        Pharyngeal Therapeutic Exercise Program Improve timing:;cold bolus;sour bolus;Improve elevation:;effortful pitch glide;Mendelsohn Maneuver;Improve tongue base/pharyngeal wall:;effortful swallow  -TM        Dysphagia Treatment Objectives and Progress    Dysphagia Treatment Objectives Improve timing of pharyngeal response;Improve laryngeal elevation;Improve tongue base & pharyngeal wall squeeze  -TM        Improve timing of pharyngeal response    To improve timing of pharyngeal response, patient will: Swallow in timely way after sensory  input;90%;without cues  -TM        Status: Improve timing of pharyngeal response New  -TM        Timing of Pharyngeal Response Progress continue to adress  -TM        Improve laryngeal elevation    To improve laryngeal elevation, patient will: Complete Mendelsohn maneuver;Complete pitch-glide;90%;without cues  -TM        Status: Improve laryngeal elevation New  -TM        Laryngeal Elevation Progress continue to adress  -TM        Improve tongue base & pharyngeal wall squeeze    To improve tongue base & pharyngeal wall squeeze, patient will: Complete effortful swallow;90%;without cues  -TM        Status: Improve tongue base & pharyngeal wall squeeze New  -TM        Tongue Base/Pharyngeal Wall Squeeze Progress continue to adress  -TM          User Key  (r) = Recorded By, (t) = Taken By, (c) = Cosigned By    Initials Name Effective Dates    TM Marisabel Faye, CCC-SLP 08/02/16 -     BN Suri Rosario CCC-SLP 08/02/16 -         EDUCATION  The patient has been educated in the following areas:   Dysphagia (Swallowing Impairment).    SLP Recommendation and Plan  SLP Swallowing Diagnosis: severe dysphagia, pharyngeal dysfunction  SLP Diet Recommendation: NPO: unsafe for food/liquid intake, temporary alternative means of nutrition/hydration     SLP Rec. for Method of Medication Administration: meds via alternate route  Monitor For Signs Of Aspiration: cough, gurgly voice, throat clearing, pneumonia, right lower lobe infiltrates  Recommended Diagnostics: VFSS ([facility] [place] other (see comments) (When swallow fx appears improved at bedside, deemed per SLP.)  Criteria for Skilled Therapeutic Interventions Met: skilled criteria for dysphagia intervention met  Anticipated Discharge Disposition: home  Rehab Potential/Prognosis, Swallowing: fair, will monitor progress closely  Therapy Frequency: daily             Plan of Care Review  Plan Of Care Reviewed With: patient, other (see comments) (Tomas)  Progress:  (Eval. )  Outcome  Summary/Follow up Plan: VFSS completed. Pt was presented pudding thick and honey thick trials only, in the following order: honey thick, pudding thick, repeat honey thick. Pt was noted to have a significant delay in swallow initiation with premature loss of the bolus to the pyriform sinuses and into the laryngeal vestibule with the initial honey thick trial, as deep laryngeal penetration was observed. Pt was also noted to have weak laryngeal elevation and no epiglottic inversion. Three multiple swallows noted, with weak pharyngeal contraction and 1x laryngeal penetration on multiple swallows, which was also noted with the latter honey thick trial. With pudding thick, pt was noted to, again, have a delay in swallow initiation, with premature loss of the bolus to the pyriforms, again with weak laryngeal elevation and no epiglottic inversion. It must be noted that the pt did also exhibit multiple instances of laryngeal penetration with the pudding thick consistency. Four multiple swallows, with weak upper esophageal sphincter opening. No definite aspiration was observed throughout the study; however, the pt is at a high risk for aspiration with PO intake. Radiologist voiced concern with soft tissue swelling. Recommended CT of neck for further assessment in regards to concern. At this time, ST recommends pt remain NPO secondary to the high risk for aspiration. Feel pt may be able to tolerate -Ovidio placement; however, uncertain of level of pt's ability to tolerate secondary to pain. Pt is ok for an occasional ice chip for comfort, knowing the high risk for aspiration. Otherwise, ST recommends oral care via water moistened toothette for comfort. ST to follow and tx.           IP SLP Goals       17 1531          Begin to Take Some PO Safely    Begin to Take Some PO Safely- SLP, Date Established 17  -TM      Begin to Take Some PO Safely- SLP, Time to Achieve by discharge  -TM      Begin to Take Some PO  Safely- SLP, Activity Level Patient will improve timing of pharyngeal response for safer, more efficient swallow;Patient will improve laryngeal elevation for safer, more efficient swallow;Patient will improve tongue base/pharyngeal wall squeeze for safer, more efficient swallow  -TM      Begin to Take Some PO Safely- SLP, Additional Goal Pt will tolerate NMES tx for dysphagia without s/s of distress and Pt will tolerate PO trials with SLP without overt s/s of aspiration.  -TM      Begin to Take Some PO Safely- SLP, Date Goal Reviewed 07/13/17  -TM      Begin to Take Some PO Safely- SLP, Outcome goal ongoing  -TM        User Key  (r) = Recorded By, (t) = Taken By, (c) = Cosigned By    Initials Name Provider Type     Marisabel Faye CCC-SLP Speech and Language Pathologist             SLP Outcome Measures (last 72 hours)      SLP Outcome Measures       07/13/17 1500          SLP Outcome Measures    Outcome Measure Used? Adult NOMS  -      FCM Scores    FCM Chosen Swallowing  -TM      Swallowing FCM Score 1  -TM        User Key  (r) = Recorded By, (t) = Taken By, (c) = Cosigned By    Initials Name Effective Dates     LEOLA Jones 08/02/16 -            Time Calculation:         Time Calculation- SLP       07/13/17 1537 07/13/17 1008       Time Calculation- SLP    SLP Start Time 1350  -TM 0938  -     SLP Stop Time 1510  -TM 1017  -BN     SLP Time Calculation (min) 80 min  -TM 39 min  -BN     SLP Received On 07/13/17  -TM 07/13/17  -BN     SLP Goal Re-Cert Due Date 07/23/17  -TM 07/23/17  -       User Key  (r) = Recorded By, (t) = Taken By, (c) = Cosigned By    Initials Name Provider Type     LEOLA Jones Speech and Language Pathologist    LEOLA Man Speech and Language Pathologist          Therapy Charges for Today     Code Description Service Date Service Provider Modifiers Qty    25586393578 HC ST SWALLOWING CURRENT STATUS 7/13/2017 LEOLA Jones GN, CN  1    47428040537 HC ST SWALLOWING PROJECTED 7/13/2017 RICH JonesSLP GN, CL 1    [id number] HC ST MOTION FLUORO EVAL SWALLOW 5 7/13/2017 LEOLA Jones GN 1          SLP G-Codes  SLP NOMS Used?: Yes  Functional Limitations: Swallowing  Swallow Current Status (): 100 percent impaired, limited or restricted  Swallow Goal Status (): At least 60 percent but less than 80 percent impaired, limited or restricted    LEOLA Castorena  7/13/2017

## 2017-07-13 NOTE — PLAN OF CARE
"Problem: Patient Care Overview (Adult)  Goal: Plan of Care Review  Outcome: Ongoing (interventions implemented as appropriate)    07/13/17 1007   Coping/Psychosocial Response Interventions   Plan Of Care Reviewed With patient;spouse   Outcome Evaluation   Outcome Summary/Follow up Plan Pt c/o of extreme pain with swallowing. Cough and throat clear with trials given. Pt expels phlegm upon coughing. Strained vocal quailty. Pt stated she feels like a \"hairball\" is stuck in the back of throat. Facial grimace and pain with puree, honey thick, and thin trials given. ST recommends MBS to r/o aspiration with PO.            "

## 2017-07-13 NOTE — CONSULTS
ENT/[facility] ([facility]) Consult Note:    Referring Provider: Josafat Gonzales MD    Reason for Consultation: Throat pain and dysphagia    Patient Care Team:  Josafat Gonzales MD as PCP - General  Josafat Gonzales MD as PCP - Family Medicine  Pawel Abrams MD as PCP - Cardiology (Cardiology)    Chief complaint inability to swallow and throat pain    Subjective .     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  This started 5 days ago.  This is progressed to the point of 9 out of 10 pain located in the laryngeal area.  She denies any shortness of breath.  She does have associated dysphagia.  The pain is constant and worse with swallowing.  Nothing makes it better.  She denies any chills.  She had one fever last night.  She has had no sick contacts.  She has had no otalgia.  She has had no voice change prior to this episode.  Now at this episode, she has a strained voice.  She is gotten a little bit better on antibiotics.    She had a stressful episode 1 year ago when her son was placed in [facility].  She reports that after that episode, she lost use of her legs.  She is now wheelchair bound.  She is seeing Dr. Magana neurology for this.  This sounds like it was some sort of conversion disorder.    Review of Systems  Review of Systems   Constitutional: Negative for unexpected weight change.   HENT: Positive for ear pain, trouble swallowing and voice change.    Genitourinary:        Incontinence   Musculoskeletal: Positive for back pain, gait problem and neck pain.        Lost use of lower extremities last year during a stressful episode.    Neurological: Negative for facial asymmetry.   Psychiatric/Behavioral:        Conversion disorder   All other systems reviewed and are negative.      History  Past Medical History:   Diagnosis Date   • Asthma    • Cancer     colon cancer   • Depression    •  Disease of thyroid gland    • Hypertension    • Insomnia    • Seizure    • Sleep apnea    ,   Past Surgical History:   Procedure Laterality Date   • BACK SURGERY     • COLON SURGERY     • GASTRIC BYPASS     • HYSTERECTOMY     • KNEE ARTHROSCOPY      bilateral   • NECK SURGERY     • ORIF WRIST FRACTURE Right 6/9/2017    Procedure: WRIST OPEN REDUCTION INTERNAL FIXATION, RIGHT;  Surgeon: Alec Cardoza MD;  Location: [facility];  Service:    • TONSILLECTOMY     • WRIST FRACTURE SURGERY     , History reviewed. No pertinent family history.,   Social History   Substance Use Topics   • Smoking status: Never Smoker   • Smokeless tobacco: Never Used   • Alcohol use No   ,   Prescriptions Prior to Admission   Medication Sig Dispense Refill Last Dose   • amLODIPine (NORVASC) 10 MG tablet Take 5 mg by mouth Every 12 (Twelve) Hours.   Past Week at Unknown time   • clonazePAM (KlonoPIN) 1 MG tablet Take 1 mg by mouth 3 (Three) Times a Day As Needed for Anxiety.   Past Week at Unknown time   • CloNIDine (CATAPRES) 0.1 MG tablet Take 0.1 mg by mouth Every 12 (Twelve) Hours.   Past Week at Unknown time   • DULoxetine (CYMBALTA) 30 MG capsule Take 30 mg by mouth Daily As Needed (for depression.).   Past Week at Unknown time   • gabapentin (NEURONTIN) 300 MG capsule Take 300 mg by mouth 3 (Three) Times a Day.   Past Week at Unknown time   • levETIRAcetam (KEPPRA) 1000 MG tablet Take 1,000 mg by mouth Every 12 (Twelve) Hours.   Past Week at Unknown time   • oxyCODONE (ROXICODONE) 10 MG tablet Take 10 mg by mouth Every 4 (Four) Hours As Needed for Moderate Pain .   Past Week at Unknown time   • phenytoin (DILANTIN) 100 MG ER capsule Take 100 mg by mouth Daily.   Past Week at Unknown time   • phenytoin (DILANTIN) 100 MG ER capsule Take 200 mg by mouth Every Night.   Past Week at Unknown time   • potassium chloride (K-DUR,KLOR-CON) 20 MEQ CR tablet Take 20 mEq by mouth Every 12 (Twelve) Hours.   Past Week at Unknown time   •  QUEtiapine (SEROquel) 100 MG tablet Take 100 mg by mouth 4 (Four) Times a Day.   Past Week at Unknown time   • traZODone (DESYREL) 50 MG tablet Take 50 mg by mouth Every Night.   Past Week at Unknown time   • venlafaxine XR (EFFEXOR-XR) 150 MG 24 hr capsule Take 300 mg by mouth Daily.   Past Week at Unknown time   • zolpidem CR (AMBIEN CR) 12.5 MG CR tablet Take 12.5 mg by mouth Every Night.   Past Week at Unknown time    and Allergies:  Review of patient's allergies indicates no known allergies.    Objective     Vital Signs   Temp:  [98.1 °F (36.7 °C)-101 °F (38.3 °C)] 98.4 °F (36.9 °C)  Heart Rate:  [] 85  Resp:  [18-22] 20  BP: (148-177)/() 177/99    Physical Exam:   Physical Exam   Constitutional: She appears well-developed. She appears distressed.   HENT:   Head: Normocephalic and atraumatic.   Right Ear: External ear normal.   Left Ear: External ear normal.   Nose: Nose normal.   Mouth/Throat: Oropharynx is clear and moist. No oropharyngeal exudate.   Eyes: Conjunctivae and EOM are normal. Pupils are equal, round, and reactive to light. Right eye exhibits no discharge. Left eye exhibits no discharge.   Neck: Normal range of motion. Neck supple. No tracheal deviation present. No thyromegaly present.   Pain is located at the left greater than right arytenoid area   Pulmonary/Chest: Effort normal. No stridor.   Lymphadenopathy:     She has no cervical adenopathy.   Neurological: She is alert. No cranial nerve deficit.   Skin: Skin is warm and dry. No rash noted. She is not diaphoretic. No erythema.   Psychiatric: Her behavior is normal. Judgment and thought content normal. Her mood appears anxious.   Speech is strained       Results Review:     Lab Results (last 24 hours)     Procedure Component Value Units Date/Time    CBC Auto Differential [[id number]]  (Abnormal) Collected:  07/12/17 1501    Specimen:  Blood Updated:  07/12/17 1514     WBC 11.77 (H) 10*3/mm3      RBC 4.48 10*6/mm3      Hemoglobin  13.0 g/dL      Hematocrit 39.1 %      MCV 87.3 fL      MCH 29.0 pg      MCHC 33.2 g/dL      RDW 13.8 %      RDW-SD 44.0 fl      MPV 9.8 fL      Platelets 267 10*3/mm3      Neutrophil % 81.2 (H) %      Lymphocyte % 9.9 (L) %      Monocyte % 8.2 %      Eosinophil % 0.4 %      Basophil % 0.1 %      Immature Grans % 0.2 %      Neutrophils, Absolute 9.56 (H) 10*3/mm3      Lymphocytes, Absolute 1.17 10*3/mm3      Monocytes, Absolute 0.96 10*3/mm3      Eosinophils, Absolute 0.05 10*3/mm3      Basophils, Absolute 0.01 10*3/mm3      Immature Grans, Absolute 0.02 10*3/mm3     CBC & Differential [[id number]] Collected:  07/12/17 1501    Specimen:  Blood Updated:  07/12/17 1514    Narrative:       The following orders were created for panel order CBC & Differential.  Procedure                               Abnormality         Status                     ---------                               -----------         ------                     CBC Auto Differential[[id number]]        Abnormal            Final result                 Please view results for these tests on the individual orders.    Lactic Acid, Plasma [[id number]]  (Normal) Collected:  07/12/17 1501    Specimen:  Blood Updated:  07/12/17 1523     Lactate 2.0 mmol/L     Comprehensive Metabolic Panel [[id number]]  (Abnormal) Collected:  07/12/17 1501    Specimen:  Blood Updated:  07/12/17 1528     Glucose 111 (H) mg/dL      BUN 7 mg/dL      Creatinine 0.65 mg/dL      Sodium 134 (L) mmol/L      Potassium 3.9 mmol/L      Chloride 95 (L) mmol/L      CO2 26.0 mmol/L      Calcium 9.1 mg/dL      Total Protein 7.2 g/dL      Albumin 4.10 g/dL      ALT (SGPT) 23 U/L      AST (SGOT) 26 U/L      Alkaline Phosphatase 163 (H) U/L      Total Bilirubin 0.7 mg/dL      eGFR Non African Amer 93 mL/min/1.73      Globulin 3.1 gm/dL      A/G Ratio 1.3 g/dL      BUN/Creatinine Ratio 10.8     Anion Gap 13.0 mmol/L     Mononucleosis Screen [[id number]]  (Normal) Collected:  07/12/17 1501    Specimen:   Blood Updated:  07/12/17 1542     Monospot Negative    Rapid Strep A Screen [[id number]]  (Normal) Collected:  07/12/17 1524    Specimen:  Swab from Throat Updated:  07/12/17 1558     Strep A Ag Negative    Blood Culture [[id number]]  (Normal) Collected:  07/12/17 1502    Specimen:  Blood from Arm, Left Updated:  07/13/17 0401     Blood Culture No growth at less than 24 hours    Beta Strep Culture, Throat [[id number]]  (Normal) Collected:  07/12/17 1524    Specimen:  Swab from Throat Updated:  07/13/17 0718     Throat Culture, Beta Strep No Beta Hemolytic Streptococcus Isolated at 24 hrs         Imaging Results (last 24 hours)     Procedure Component Value Units Date/Time    CT Soft Tissue Neck With Contrast [[id number]] Collected:  07/12/17 1636     Updated:  07/12/17 1644    Narrative:       EXAMINATION: CT SOFT TISSUE NECK W CONTRAST-  7/12/2017 4:36 PM CDT     HISTORY: Difficulty swallowing     COMPARISON:11/20/2014 head CT. Cervical spine MR dated 07/11/2017     TECHNIQUE:Radiation dose equals  mGy-cm.  Automated exposure  control dose reduction technique was implemented.     3 mm sequential transaxial images were obtained. 2-D sagittal and  coronal reconstruction images were generated.     FINDINGS:     Extensive postsurgical changes identified with anterior posterior fusion  changes.     Brain imaged appropriately without abnormal mass.     The nasopharynx is observed symmetrically.     There is artifact in the oral cavity related to the spinal fusion  changes. There are no oral cavity masses. The hypopharynx and laryngeal  structures are imaged symmetrically without definite masses.     6 mm low-attenuation nodule noted in the right lobe of the thyroid  gland.     The submandibular and parotid glands are imaged symmetrically.     There are no pathologically enlarged lymph nodes with small lateral  cervical nodes. No lymphadenopathy identified.       Impression:       1. No CT evidence of definite neck  mass or pathologically enlarged lymph  nodes/lymphadenopathy. 3. Extensive postsurgical changes cervical spine.  This report was finalized on 07/12/2017 16:41 by Dr. Obi Phan MD.          I have personally reviewed the CT scan of the neck.  The following is my interpretation: There is a fluid collection in the area of the right arytenoid measuring approximately 16 mm.  There is no peripheral enhancement, however, this is a low density, cystic type lesion.  She is status post extensive cervical fusion        Assessment/Plan     Active Problems:    Inability to swallow    Conversion disorder with abnormal movement    Arytenoid cyst      Continue clindamycin.  We will increase the Decadron to 6 mg IV every 6 hours following a 10 mg dose.  Will follow for progression/regression.    I discussed the patients findings and my recommendations with patient    Man Kunz MD  07/13/17  1:49 PM

## 2017-07-13 NOTE — PLAN OF CARE
Problem: Patient Care Overview (Adult)  Goal: Plan of Care Review  Outcome: Ongoing (interventions implemented as appropriate)    Problem: Inpatient SLP  Goal: Dysphagia- Patient will improve swallowing skills to begin to take some PO safely  Outcome: Ongoing (interventions implemented as appropriate)    07/13/17 1531   Begin to Take Some PO Safely   Begin to Take Some PO Safely- SLP, Date Established 07/13/17   Begin to Take Some PO Safely- SLP, Time to Achieve by discharge   Begin to Take Some PO Safely- SLP, Activity Level Patient will improve timing of pharyngeal response for safer, more efficient swallow;Patient will improve laryngeal elevation for safer, more efficient swallow;Patient will improve tongue base/pharyngeal wall squeeze for safer, more efficient swallow   Begin to Take Some PO Safely- SLP, Additional Goal Pt will tolerate NMES tx for dysphagia without s/s of distress and Pt will tolerate PO trials with SLP without overt s/s of aspiration.   Begin to Take Some PO Safely- SLP, Date Goal Reviewed 07/13/17   Begin to Take Some PO Safely- SLP, Outcome goal ongoing

## 2017-07-13 NOTE — THERAPY TREATMENT NOTE
Acute Care - Speech Language Pathology   Swallow Treatment Note [facility]     Patient Name: Bita Croft  : 1955  MRN: [id number]  Today's Date: 2017        Referring Physician: Dr. Gonzales       Admit Date: 2017    Visit Dx:      ICD-10-CM ICD-9-CM   1. Inability to swallow R13.0 787.20   2. Dysphagia, unspecified type R13.10 787.20     Patient Active Problem List   Diagnosis   • Confusion   • Depression   • Anxiety   • Hyponatremia   • Seizure disorder   • Chronic neck pain   • Chronic back pain   • Hypothyroidism   • Abnormal thyroid blood test   • Hypertension   • Wellness examination-done   • Elevated fasting glucose   • Anemia   • Iron deficiency   • Degenerative joint disease of spine   • Inability to swallow   • Conversion disorder with abnormal movement   • Arytenoid cyst             Adult Rehabilitation Note       17 1520 17 1350       Rehab Assessment/Intervention    Discipline speech language pathologist  -KW      Document Type therapy note (daily note)  -KW      Subjective Information pain  -KW      Recorded by [KW] Chaya Brewer MS CCC-SLP      Swallow Assessment/Intervention    Additional Documentation  Dysphagia/Swallow Intervention (Group)  -TM     Recorded by  [TM] Marisabel Faye CCC-SLP     Dysphagia/Swallow Intervention    Dysphagia/Swallow Intervention  Pt will tolerate 30 minutes of NMES tx without s/s of distress.  -TM     Dysphagia/Swallow Therapeutic Feed Discussed results of study with patient including NPO status, concern with no cricopharyngeal opeining, requested suction at bedside, RN to see about changing PO meds.   present and aware of recommendations.  Ok for ice chips.  -KW Pt will tolerate PO trials with SLP without overt s/s of aspiration.  -TM     Recorded by [KW] Chaya Brewer MS CCC-SLP [TM] Marisabel Faye CCC-SLP       User Key  (r) = Recorded By, (t) = Taken By, (c) = Cosigned By    Initials Name Effective Dates    TM Marisabel IRAHETA  Neyda CCC-SLP 08/02/16 -     KW Chaya Brewer, MS [facility]-SLP 08/02/16 -                   IP SLP Goals       07/13/17 1531          Begin to Take Some PO Safely    Begin to Take Some PO Safely- SLP, Date Established 07/13/17  -TM      Begin to Take Some PO Safely- SLP, Time to Achieve by discharge  -TM      Begin to Take Some PO Safely- SLP, Activity Level Patient will improve timing of pharyngeal response for safer, more efficient swallow;Patient will improve laryngeal elevation for safer, more efficient swallow;Patient will improve tongue base/pharyngeal wall squeeze for safer, more efficient swallow  -TM      Begin to Take Some PO Safely- SLP, Additional Goal Pt will tolerate NMES tx for dysphagia without s/s of distress and Pt will tolerate PO trials with SLP without overt s/s of aspiration.  -TM      Begin to Take Some PO Safely- SLP, Date Goal Reviewed 07/13/17  -TM      Begin to Take Some PO Safely- SLP, Outcome goal ongoing  -TM        User Key  (r) = Recorded By, (t) = Taken By, (c) = Cosigned By    Initials Name Provider Type    TM Marisabel Faye CCC-SLP Speech and Language Pathologist          EDUCATION  The patient has been educated in the following areas:   Dysphagia (Swallowing Impairment).    SLP Recommendation and Plan                                           Plan of Care Review  Plan Of Care Reviewed With: patient  Progress: improving  Outcome Summary/Follow up Plan: Discussed results of study with patient including NPO status, concern with no cricopharyngeal opeining, requested suction at bedside, RN to see about changing PO meds.  present and aware of recommendations. Ok for ice chips.       SLP Outcome Measures (last 72 hours)      SLP Outcome Measures       07/13/17 1500          SLP Outcome Measures    Outcome Measure Used? Adult NOMS  -TM      FCM Scores    FCM Chosen Swallowing  -TM      Swallowing FCM Score 1  -TM        User Key  (r) = Recorded By, (t) = Taken By, (c) = Cosigned By     Initials Name Effective Dates    TM RICH JonesSLP 08/02/16 -              Time Calculation:         Time Calculation- SLP       07/13/17 1603 07/13/17 1537 07/13/17 1008    Time Calculation- SLP    SLP Start Time 1520  -KW 1350  -TM 0938  -BN    SLP Stop Time 1530  -KW 1510  -TM 1017  -BN    SLP Time Calculation (min) 10 min  -KW 80 min  -TM 39 min  -BN    SLP Received On 07/13/17  -KW 07/13/17  -TM 07/13/17  -BN    SLP Goal Re-Cert Due Date  07/23/17  -TM 07/23/17  -BN      User Key  (r) = Recorded By, (t) = Taken By, (c) = Cosigned By    Initials Name Provider Type     Marisabel Faye CCC-SLP Speech and Language Pathologist    KW Chaya Brewer MS CCC-SLP Speech and Language Pathologist     Suri Rosario CCC-SLP Speech and Language Pathologist          Therapy Charges for Today     Code Description Service Date Service Provider Modifiers Qty    [id number]  ST TREATMENT SWALLOW 1 7/13/2017 Chaya Brewer MS CCC-SLP GN 1          SLP G-Codes  SLP NOMS Used?: Yes  Functional Limitations: Swallowing  Swallow Current Status (): 100 percent impaired, limited or restricted  Swallow Goal Status (): At least 60 percent but less than 80 percent impaired, limited or restricted      Chaya Brewer MS CCC-SLP  7/13/2017

## 2017-07-13 NOTE — H&P
"Admit: 17    SOURCE: The source of this information is prior knowledge of the patient, review of  office records,  current chart, as well as discussion with patient; she is considered reliable.      PATIENT PROFILE: The patient is a 62 y/o white  female; she was cooperative.      CHIEF COMPLAINT: \"I cannot swallow\"     HISTORY OF PRESENT ILLNESS:  Bita is a 61-year-old  female resident from [place], [place].  Her  called the day of admission and indicated his wife had not had oral intake for 3-4 days; that she could not swallow.  She was having a very severe sore throat.  I was aware she been in [facility] ER the day before; the call from Dr. Boothe at that time seemed to indicate that she was having more of her back problems and having difficulty getting around.  She is a very chronic neck and back problem.  So we had her sent back to the ER; this story was confirmed it was decided she needed to be admitted for IV antibiotics IV fluids to preclude dehydration and have ENT/GI  see her.    PAST HISTORY:   CHILDHOOD: unremarkable.      ALLERGIES: Cymbalta 30 mg (neurology opinion of [facility]), Emsam (rash), Lyrica (skin crawls), Neurotin (skin crawls), and Ziac 2.5 ([facility]).     HOME MEDICATIONS:  Klonopin 1 mg 3 times a day as needed  Balter 30 mg once a day as needed  Neurontin 300 mg 3 times a day  Keppra thousand milligrams twice a day  Roxicodone 10 mg every 4 hours as needed  Dilantin 100 mg in the morning and 200 at night  K-Dur 20 milliequivalents twice a day  Desyrel 50 mg at night    HOSPITALIZATION/SURGERY/PROCEDURES:  She is  1, Para 1, AB 0. She had a colonoscopy with a tortuous colon at [place], Dr. Michele, 2014; this was followed by a colonoscopy.  She had T and A at age 11.  Right knee surgery with Dr. Dodie Rodriguez at [facility] at age 13.  SANDRA and BSO with Dr. Mckeon, [facility], at age 32.  T12 surgery with Dr. Park, [place], in .   Gastric bypass in [place] in " 2001.  Left knee surgery with Dr. Martinez in [place] in 2005.  C-spine surgery with Dr. Mandel at [place] 05/08/2006.  C-spine  surgery with Dr. Mandel at [place] 02/04/2008.  Gallbladder at [facility],  Dr. [place], 03/18/2010.  Previous left knee arthroscopy, right laparoscopic  colectomy, Dr. Piedra, 08/13/2014.       MEDICAL ADMISSIONS: Last admit [facility] October-November 2016.  Other admits include [facility] 05/26/2015 through 05/31/2015 for  hyponatremia; [facility] 08/11/2008 through 08/12/2008 for hyponatremia;  02/08/2010 through 02/09/2010, left flank pain; 03/13/2014 through 03/15/2014  Dr. Hester for TIA.  Additional [facility] admissions include 07/21/1995 through  07/23/1995 for abdominal pain; 02/19/2008 through 02/21/2008 for DVT;  02/09/2010 through 02/11/2010 for abdominal pain, intercostal; 03/11/2012  through 03/19/2012 Dr. Hester for abdominal pain and SIADH; 03/24/2015 through  03/30/2015 for diarrhea.     FAMILY HISTORY:  Hypertension mother, maternal aunt, father, paternal  grandfather; heart disease in mother, father, maternal grandfather, maternal  grandmother, paternal grandmother; diabetes in maternal uncle.     HABITS:  Never smoked, only limited second-hand smoke exposure.  No alcohol or  drugs.     SOCIAL HISTORY:   in 1987 to her current , has 1 son.  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     REVIEW OF SYSTEMS:   GENERAL:  There has been no recent injuries or skin wounds.  INTEGUMENT:  No mention of any fever or rash prior to this.  HEENT:  No rhinorrhea.  NECK:  No mention of pain or mass.  CHEST:  Denies cough or wheeze.  CARDIOVASCULAR:  No mention of chest pain or ankle edema.    GI:  Frequent loose stools, nonbloody.  :  No mention of dysuria.  MUSCULOSKELETAL AND NEUROLOGIC:  Same mention of weakness of extremities; to point of not being able to walk..  PSYCHIATRIC:  She chronically sees Dr. Samaniego for anxiety and depression  and  has a significant amount of stress     PHYSICAL EXAMINATION:  Vitals: T 98.6, HR 85, /99, RR 20, W 217 Ht 71in  ..GENERAL:  Well nourished/developed in no acute distress. Obese  SKIN: Turgor excellent, without wound, rash, lesion.  HEENT: Normal cephalic without trauma.  Pupils equal round reactive to light. Extraocular motions full without nystagmus.   External canals nonobstructive nontender without reddness. Tymphatic membranes chris with jaskaran structures intact.   Oral cavity without growths, exudates, and moist.  Posterior pharnyx without mass, obstruction, reddness.  No thyroidmegaly, mass, tenderness, lymphadenopathy and supple.  CV: Regular rhythm.  No murmur, gallop,  edema. Posterior pulses intact.  No carotid bruits.   CHEST: No chest wall tenderness or mass.   LUNGS: Symmetric motion with clear to auscultation.  No dullness to percussion  ABD: Soft, nontender without mass.   ORTHO: Symmetric extremities without swelling/point tenderness.  Full gross range of motion.    NEURO: CN 2-12 grossly intact.  Symmetric facies. 1/4 x bicep knee equal reflexes.  UE/LE   3/5 strength throughout.  Nonfocal use extremities. Speech clear.    PSYCH: Oriented x 3.  Pleasant calm, well kept.  Purposeful/directed conservation with intact short/long gross memory.     ASSESSMENT/PROBLEM LIST:   A 61-year-old white female:  1.  Allergy/intolerances:  See above.  2.  Procedural history:  See above.  3.  Family history:  See above.  4.  Obesity.  This is status post gastric bypass initial failure and regain of  weight.    5.   1 para 1 AB 0.    6.  Surgical menopause.  7.  Osteoporosis on previous wrist study.    8.  Hypertension.    9.  Hypothyroidism.    10.  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    11.  History of DVT - left peroneal popliteal, 2008, treated  successfully with anticoagulation.    12.  Brief anticoagulation.  13.  Chronic insomnia.  14.  Spinal spondylosis; this is primarily cervical with multiple surgeries.  15.  Chronic neck pain.    16.  Chronic narcotics.  17.  Chronic depression - followed by Dr. Samaniego.    18.  Degenerative joint disease that is diffuse.    19.  History of gastric bypass and metabolic risk it carries.    20.  Colon cancer history with previous surgery and chemo, no obvious  recurrence.  21.  Gastroesophageal reflux.  22.  Documented neuropathy by EMG nerve conduction  23. Seizure disorder (2016)  24. antieliptics-Keppra/Dilantin  25. Gait decline    REASON FOR ADMISSION:    Dysphagia  Sore throat  NPO status  Gait decline-acute    PLANS:   IV fluids to prevent dehydration  Per ENT: they want abx/watch  Per GI: If needed for EGD  Per speech; recommendations for oral abilities/therapy  Per PT: goal to walk better  Per neurosurgery; worsening and ? Any options for LE weakness/gait decline  LAB-reviewed; to be ordered daily  Rx-with above/as IV able and transition back to oral if/when able  Discharge planning at beginning; home expected but other maybe necessary.

## 2017-07-13 NOTE — PLAN OF CARE
Problem: Patient Care Overview (Adult)  Goal: Plan of Care Review  Outcome: Ongoing (interventions implemented as appropriate)    07/13/17 0324   Coping/Psychosocial Response Interventions   Plan Of Care Reviewed With patient   Patient Care Overview   Progress no change   Outcome Evaluation   Outcome Summary/Follow up Plan Patient c/o pain throat 10/10. PRN pain meds given as ordered. A&O x4, VSS, Safety maintained.       Goal: Adult Individualization and Mutuality  Outcome: Ongoing (interventions implemented as appropriate)    Problem: Pain, Acute (Adult)  Goal: Identify Related Risk Factors and Signs and Symptoms  Outcome: Outcome(s) achieved Date Met:  07/13/17  Goal: Acceptable Pain Control/Comfort Level  Outcome: Ongoing (interventions implemented as appropriate)    Problem: Dysphagia (Adult)  Goal: Identify Related Risk Factors and Signs and Symptoms  Outcome: Outcome(s) achieved Date Met:  07/13/17  Goal: Functional/Safe Swallow  Outcome: Ongoing (interventions implemented as appropriate)  Goal: Compensatory Techniques to Improve Safety/Function with Swallowing  Outcome: Ongoing (interventions implemented as appropriate)    Problem: Fall Risk (Adult)  Goal: Identify Related Risk Factors and Signs and Symptoms  Outcome: Outcome(s) achieved Date Met:  07/13/17  Goal: Absence of Falls  Outcome: Ongoing (interventions implemented as appropriate)    Problem: Pressure Ulcer Risk (Jose Scale) (Adult,Obstetrics,Pediatric)  Goal: Identify Related Risk Factors and Signs and Symptoms  Outcome: Outcome(s) achieved Date Met:  07/13/17  Goal: Skin Integrity  Outcome: Ongoing (interventions implemented as appropriate)

## 2017-07-13 NOTE — TELEPHONE ENCOUNTER
Patient unable to swallow.  Swallow study showed she was unable to swallow anything.  Dr. Bates, Dr Pang, & Dr Kunz have all seen her.  She is in Room 352. Tomas said you could just put in some orders or you could call her.

## 2017-07-13 NOTE — CONSULTS
[facility]  Inpatient Consult Note  Today's date:  07/13/17    Bita Croft  1955       Referring Provider: Josafat Gonzales MD  Primary Physician: Josafat Gonzales MD   Primary Gastroenterologist: dr bullock    Date of Admission: 7/12/2017  Date of Service:  07/13/17    Reason for Consultation/Chief Complaint: Sore throat    History of present illness:    61-year-old female whom we have been asked to see for sore throat.  5 days ago she started with acute sore throat.  Before that she was having no throat pain.  She did not have any problems swallowing.  Over the last 5 days she has had extreme sore throat, described as burning.  It is very painful to swallow.  She has been avoiding swallowing much due to the pain.  She has febrile.  Temp on admission was 101, tenderness morning is 98.4.  White blood cell count is elevated.  Monospot negative, strep a negative, beta strep preliminary pending.  Blood cultures have also been obtained.  She denies any history of reflux symptoms.  No hematemesis.  Upper endoscopy done 2015 by Dr. Bullock noting findings of gastric bypass with a medium-sized pouch, normal esophagus.  Denies any complaints of esophageal dysphagia.  Speech is following the patient as well as well and to have a dysphagia study today.  ENT has been consult to see the patient.  Neurology is asked see the patient as well.  CT of the neck negative.  She has had some neurological symptoms over the last 8 months.  Has had seizures as well as unable to move her lower extremities.  She has been seen by some local neurologist.  Neurology has been consulted on  this admission as well.    Addendum per Dr. 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  She has asked to see regarding swallowing problems.  The patient has absolutely no problems with the food in the esophageal area.  She states that she has severe burning in her  throat and neck that was of abrupt onset 5 days ago.  She cannot identify any exacerbating or relieving factors..  An endoscopy done by Dr. Michele in May 2015 that showed post surgical state for gastric bypass.  Been no unintentional weight loss.  She has been no melena or hematochezia.  Speech therapy notes were reviewed.  There is concern of aspiration since she has laryngeal penetration with pudding thick substances.      Past Medical History:   Diagnosis Date   • Asthma    • Cancer     colon cancer   • Depression    • Disease of thyroid gland    • Hypertension    • Insomnia    • Seizure    • Sleep apnea        Past Surgical History:   Procedure Laterality Date   • BACK SURGERY     • COLON SURGERY     • GASTRIC BYPASS     • HYSTERECTOMY     • KNEE ARTHROSCOPY      bilateral   • NECK SURGERY     • ORIF WRIST FRACTURE Right 6/9/2017    Procedure: WRIST OPEN REDUCTION INTERNAL FIXATION, RIGHT;  Surgeon: Alec Cardoza MD;  Location: [facility];  Service:    • TONSILLECTOMY     • WRIST FRACTURE SURGERY          No Known Allergies    Prescriptions Prior to Admission   Medication Sig Dispense Refill Last Dose   • amLODIPine (NORVASC) 10 MG tablet Take 0.5 tablets by mouth 2 (Two) Times a Day. 30 tablet 5 6/9/2017 at 0730   • clonazePAM (KlonoPIN) 1 MG tablet Take 1 mg by mouth 3 (Three) Times a Day As Needed for Anxiety. One half to one by mouth 2-3 times daily as needed for anxiety    6/8/2017 at 2000   • CloNIDine (CATAPRES) 0.1 MG tablet Take 0.1 mg by mouth 2 (Two) Times a Day.      • CloNIDine (CATAPRES) 0.1 MG tablet TAKE 1 TABLET BY MOUTH TWICE DAILY 60 tablet 0    • dicyclomine (BENTYL) 20 MG tablet Take 1 tablet by mouth Every 8 (Eight) Hours As Needed (gi pain) for up to 10 doses. 10 tablet 0    • DULoxetine (CYMBALTA) 30 MG capsule Take 30 mg by mouth Daily.      • gabapentin (NEURONTIN) 300 MG capsule Take 300 mg by mouth 3 (Three) Times a Day.      • KLOR-CON 20 MEQ CR tablet Take 1 tablet by mouth 2  (Two) Times a Day. 60 tablet 5 6/8/2017 at 2000   • levETIRAcetam (KEPPRA) 1000 MG tablet Take 1 tablet by mouth 2 (Two) Times a Day. 60 tablet 5 6/9/2017 at 0730   • oxyCODONE (ROXICODONE) 5 MG immediate release tablet Take 10 mg by mouth Every 4 (Four) Hours As Needed for Moderate Pain (4-6).      • phenytoin (DILANTIN) 100 MG ER capsule Take 1 capsule by mouth Daily.   6/8/2017 at 2000   • phenytoin (DILANTIN) 100 MG ER capsule Take 200 mg by mouth Every Night.      • QUEtiapine (SEROquel) 100 MG tablet Take 100 mg by mouth 4 (Four) Times a Day.   6/8/2017 at 2000   • QUEtiapine (SEROquel) 25 MG tablet Take 25 mg by mouth 2 (Two) Times a Day. Take with 100mg      • traZODone (DESYREL) 50 MG tablet Take 50 mg by mouth Every Night.      • venlafaxine XR (EFFEXOR-XR) 150 MG 24 hr capsule Take 150 mg by mouth 2 (Two) Times a Day.      • zolpidem CR (AMBIEN CR) 12.5 MG CR tablet Take 12.5 mg by mouth At Night As Needed for Sleep.   6/8/2017 at 2000       Hospital Medications (active)       Dose Frequency Start End    acetaminophen (TYLENOL) suppository 650 mg 650 mg Once 7/12/2017 7/12/2017    Sig - Route: Insert 2 suppositories into the rectum 1 (One) Time. - Rectal    Cosign for Ordering: Accepted by Ryan Henning MD on 7/12/2017  3:20 PM    clindamycin (CLEOCIN) 600 mg in dextrose 5% 50 mL IVPB (premix) 600 mg Every 8 Hours 7/12/2017     Sig - Route: Infuse 50 mL into a venous catheter Every 8 (Eight) Hours. - Intravenous    dexamethasone (DECADRON) injection 4 mg 4 mg Every 6 Hours 7/12/2017     Sig - Route: Infuse 1 mL into a venous catheter Every 6 (Six) Hours. - Intravenous    enoxaparin (LOVENOX) syringe 40 mg 40 mg Daily 7/13/2017     Sig - Route: Inject 0.4 mL under the skin Daily. - Subcutaneous    iopamidol (ISOVUE-300) 61 % injection 100 mL 100 mL Once in Imaging 7/12/2017 7/12/2017    Sig - Route: Infuse 100 mL into a venous catheter Once. - Intravenous    lidocaine viscous (XYLOCAINE) 2 % mouth solution  15 mL 15 mL Once 7/12/2017 7/12/2017    Sig - Route: Apply 15 mL to the mouth or throat 1 (One) Time. - Mouth/Throat    Cosign for Ordering: Accepted by Ryan Henning MD on 7/12/2017  3:20 PM    methylPREDNISolone sodium succinate (SOLU-Medrol) injection 125 mg 125 mg Once 7/12/2017 7/12/2017    Sig - Route: Inject 2 mL into the shoulder, thigh, or buttocks 1 (One) Time. - Intramuscular    Cosign for Ordering: Accepted by Ryan Henning MD on 7/12/2017  3:20 PM    Morphine sulfate (PF) injection 2 mg 2 mg Every 2 Hours PRN 7/12/2017 7/22/2017    Sig - Route: Infuse 1 mL into a venous catheter Every 2 (Two) Hours As Needed for Severe Pain . - Intravenous    Morphine sulfate (PF) injection 4 mg 4 mg Once 7/12/2017 7/12/2017    Sig - Route: Infuse 1 mL into a venous catheter 1 (One) Time. - Intravenous    ondansetron (ZOFRAN) injection 4 mg 4 mg Every 6 Hours PRN 7/12/2017     Sig - Route: Infuse 2 mL into a venous catheter Every 6 (Six) Hours As Needed for Nausea or Vomiting. - Intravenous    piperacillin-tazobactam (ZOSYN) 3.375 g in iso-osmotic dextrose 50 ml (premix) 3.375 g Every 6 Hours 7/12/2017     Sig - Route: Infuse 50 mL into a venous catheter Every 6 (Six) Hours. - Intravenous    sodium chloride 0.9 % bolus 1,000 mL 1,000 mL Once 7/12/2017 7/12/2017    Sig - Route: Infuse 1,000 mL into a venous catheter 1 (One) Time. - Intravenous    Cosign for Ordering: Accepted by Ryan Henning MD on 7/12/2017  3:20 PM    sodium chloride 0.9 % bolus 1,000 mL 1,000 mL Once 7/12/2017     Sig - Route: Infuse 1,000 mL into a venous catheter 1 (One) Time. - Intravenous    Cosign for Ordering: Accepted by Ryan Henning MD on 7/12/2017  6:12 PM    sodium chloride 0.9 % infusion 100 mL/hr Continuous 7/12/2017     Sig - Route: Infuse 100 mL/hr into a venous catheter Continuous. - Intravenous    sodium chloride 0.9 % bolus 500 mL (Discontinued) 500 mL Once 7/12/2017 7/12/2017    Sig - Route: Infuse 500 mL into a venous catheter 1  (One) Time. - Intravenous    Cosign for Ordering: Accepted by Ryan Henning MD on 7/12/2017  3:20 PM          Social History   Substance Use Topics   • Smoking status: Never Smoker   • Smokeless tobacco: Never Used   • Alcohol use No        Past Family History:  History reviewed. No pertinent family history.    Review of Systems:  Review of Systems   Constitutional: Positive for fever.   HENT: Positive for sore throat and trouble swallowing.    Respiratory: Negative for cough, shortness of breath and wheezing.    Cardiovascular: Negative for chest pain and palpitations.   Gastrointestinal: Negative for abdominal distention, abdominal pain, anal bleeding, blood in stool, constipation, diarrhea, nausea, rectal pain and vomiting.   Endocrine: Negative for cold intolerance and heat intolerance.   Genitourinary: Negative for difficulty urinating and hematuria.   Musculoskeletal: Positive for gait problem.   Skin: Negative for color change and rash.   Neurological: Positive for seizures and light-headedness.        Over the last 8 months states that she has been unable to move her legs well.  She has to use a walker at home.  States that she has decreased sensation to the lower extremities.   Psychiatric/Behavioral: Negative for confusion.       Physical Exam:  Temp:  [98.1 °F (36.7 °C)-101 °F (38.3 °C)] 98.4 °F (36.9 °C)  Heart Rate:  [] 85  Resp:  [18-22] 20  BP: (148-177)/() 177/99  Body mass index is 30.27 kg/(m^2).    Intake/Output Summary (Last 24 hours) at 07/13/17 1046  Last data filed at 07/12/17 2120   Gross per 24 hour   Intake               50 ml   Output                0 ml   Net               50 ml        Physical Exam   Constitutional: She is oriented to person, place, and time. She appears well-developed and well-nourished. No distress.   HENT:   Head: Normocephalic and atraumatic.   Mouth/Throat: Oropharynx is clear and moist.   Eyes: No scleral icterus.   Neck: Normal range of motion. Neck  supple. No JVD present. No tracheal deviation present.   Cardiovascular: Normal rate, regular rhythm and normal heart sounds.  Exam reveals no gallop and no friction rub.    No murmur heard.  Pulmonary/Chest: Effort normal and breath sounds normal. No stridor. No respiratory distress. She has no wheezes. She has no rales.   Abdominal: Soft. Bowel sounds are normal. She exhibits no distension and no mass. There is no tenderness. There is no rebound and no guarding.   Musculoskeletal: She exhibits no edema.   Neurological: She is alert and oriented to person, place, and time.   Skin: Skin is warm and dry. No rash noted.   Psychiatric: She has a normal mood and affect. Her behavior is normal.   Vitals reviewed.    I have performed the physical exam and agree with the findings as noted above.   YUMI Pang MD    Results Review:  Lab Results (last 24 hours)     Procedure Component Value Units Date/Time    CBC Auto Differential [[id number]]  (Abnormal) Collected:  07/12/17 1501    Specimen:  Blood Updated:  07/12/17 1514     WBC 11.77 (H) 10*3/mm3      RBC 4.48 10*6/mm3      Hemoglobin 13.0 g/dL      Hematocrit 39.1 %      MCV 87.3 fL      MCH 29.0 pg      MCHC 33.2 g/dL      RDW 13.8 %      RDW-SD 44.0 fl      MPV 9.8 fL      Platelets 267 10*3/mm3      Neutrophil % 81.2 (H) %      Lymphocyte % 9.9 (L) %      Monocyte % 8.2 %      Eosinophil % 0.4 %      Basophil % 0.1 %      Immature Grans % 0.2 %      Neutrophils, Absolute 9.56 (H) 10*3/mm3      Lymphocytes, Absolute 1.17 10*3/mm3      Monocytes, Absolute 0.96 10*3/mm3      Eosinophils, Absolute 0.05 10*3/mm3      Basophils, Absolute 0.01 10*3/mm3      Immature Grans, Absolute 0.02 10*3/mm3     CBC & Differential [[id number]] Collected:  07/12/17 1501    Specimen:  Blood Updated:  07/12/17 1514    Narrative:       The following orders were created for panel order CBC & Differential.  Procedure                               Abnormality         Status                      ---------                               -----------         ------                     CBC Auto Differential[[id number]]        Abnormal            Final result                 Please view results for these tests on the individual orders.    Lactic Acid, Plasma [[id number]]  (Normal) Collected:  07/12/17 1501    Specimen:  Blood Updated:  07/12/17 1523     Lactate 2.0 mmol/L     Comprehensive Metabolic Panel [[id number]]  (Abnormal) Collected:  07/12/17 1501    Specimen:  Blood Updated:  07/12/17 1528     Glucose 111 (H) mg/dL      BUN 7 mg/dL      Creatinine 0.65 mg/dL      Sodium 134 (L) mmol/L      Potassium 3.9 mmol/L      Chloride 95 (L) mmol/L      CO2 26.0 mmol/L      Calcium 9.1 mg/dL      Total Protein 7.2 g/dL      Albumin 4.10 g/dL      ALT (SGPT) 23 U/L      AST (SGOT) 26 U/L      Alkaline Phosphatase 163 (H) U/L      Total Bilirubin 0.7 mg/dL      eGFR Non African Amer 93 mL/min/1.73      Globulin 3.1 gm/dL      A/G Ratio 1.3 g/dL      BUN/Creatinine Ratio 10.8     Anion Gap 13.0 mmol/L     Mononucleosis Screen [[id number]]  (Normal) Collected:  07/12/17 1501    Specimen:  Blood Updated:  07/12/17 1542     Monospot Negative    Rapid Strep A Screen [[id number]]  (Normal) Collected:  07/12/17 1524    Specimen:  Swab from Throat Updated:  07/12/17 1558     Strep A Ag Negative    Blood Culture [[id number]]  (Normal) Collected:  07/12/17 1502    Specimen:  Blood from Arm, Left Updated:  07/13/17 0401     Blood Culture No growth at less than 24 hours    Beta Strep Culture, Throat [[id number]]  (Normal) Collected:  07/12/17 1524    Specimen:  Swab from Throat Updated:  07/13/17 0718     Throat Culture, Beta Strep No Beta Hemolytic Streptococcus Isolated at 24 hrs          Radiology Review:  Imaging Results (last 72 hours)     Procedure Component Value Units Date/Time    CT Soft Tissue Neck With Contrast [[id number]] Collected:  07/12/17 1636     Updated:  07/12/17 1644    Narrative:       EXAMINATION: CT SOFT TISSUE  NECK W CONTRAST-  7/12/2017 4:36 PM CDT     HISTORY: Difficulty swallowing     COMPARISON:11/20/2014 head CT. Cervical spine MR dated 07/11/2017     TECHNIQUE:Radiation dose equals  mGy-cm.  Automated exposure  control dose reduction technique was implemented.     3 mm sequential transaxial images were obtained. 2-D sagittal and  coronal reconstruction images were generated.     FINDINGS:     Extensive postsurgical changes identified with anterior posterior fusion  changes.     Brain imaged appropriately without abnormal mass.     The nasopharynx is observed symmetrically.     There is artifact in the oral cavity related to the spinal fusion  changes. There are no oral cavity masses. The hypopharynx and laryngeal  structures are imaged symmetrically without definite masses.     6 mm low-attenuation nodule noted in the right lobe of the thyroid  gland.     The submandibular and parotid glands are imaged symmetrically.     There are no pathologically enlarged lymph nodes with small lateral  cervical nodes. No lymphadenopathy identified.       Impression:       1. No CT evidence of definite neck mass or pathologically enlarged lymph  nodes/lymphadenopathy. 3. Extensive postsurgical changes cervical spine.  This report was finalized on 07/12/2017 16:41 by Dr. Obi Phan MD.          Impression/Plan:  Patient Active Problem List   Diagnosis Code   • Confusion R41.0   • Depression F32.9   • Anxiety F41.9   • Hyponatremia E87.1   • Seizure disorder G40.909   • Chronic neck pain M54.2, G89.29   • Chronic back pain M54.9, G89.29   • Hypothyroidism E03.9   • Abnormal thyroid blood test R94.6   • Hypertension I10   • Wellness examination-done Z00.00   • Elevated fasting glucose R73.01   • Anemia D64.9   • Iron deficiency E61.1   • Degenerative joint disease of spine M47.9   • Inability to swallow R13.0       Oral pharyngeal dysphagia  Her main complaint is that of throat pain which started acutely 5 days ago.  Prior  to this she had no problems swallowing.  She continues to deny any problems with esophageal dysphagia.  Last endoscopy was done May 2015 by Dr. Michele.  This because of nausea and vomiting.  This study showed gastric bypass state.  He saw a small hole at the anastomosis that he thought might represent a fistula.  An upper GI series however was subsequently negative.  She has also had a temperature and white blood count has been elevated.  I feel this could be more infectious process.  No plans for EGD at this point.  If she is unable to maintain any by mouth intake due to oropharyngeal dysphagia, she unfortunately is NOT a candidate for PEG tube placement because she has had a gastric bypass.      Nella Mancia, DEBORAH  07/13/17   10:46 AM    Darlene Pang MD  [facility]  07/13/17  1:14 PM    Much of this encounter note is an electronic transcription/translation of spoken language to printed text. The electronic translation of spoken language may permit erroneous, or at times, nonsensical words or phrases to be inadvertently transcribed; although I have reviewed the note for such errors, some may still exist.

## 2017-07-13 NOTE — PLAN OF CARE
Problem: Patient Care Overview (Adult)  Goal: Plan of Care Review  Outcome: Ongoing (interventions implemented as appropriate)    07/13/17 1728   Coping/Psychosocial Response Interventions   Plan Of Care Reviewed With patient;spouse   Outcome Evaluation   Outcome Summary/Follow up Plan medicated about every 2 hours for c/o throat pain. spitting up clear mucous. failed dysphagia test today with speech therapy. npo. dr rutherford notified. [place] suction at bedside and pt taught how to use         Problem: Pain, Acute (Adult)  Goal: Acceptable Pain Control/Comfort Level  Outcome: Ongoing (interventions implemented as appropriate)    07/13/17 1728   Pain, Acute (Adult)   Acceptable Pain Control/Comfort Level making progress toward outcome         Problem: Dysphagia (Adult)  Goal: Functional/Safe Swallow  Outcome: Ongoing (interventions implemented as appropriate)    07/13/17 1728   Dysphagia (Adult)   Functional/Safe Swallow making progress toward outcome       Goal: Compensatory Techniques to Improve Safety/Function with Swallowing  Outcome: Ongoing (interventions implemented as appropriate)    07/13/17 1728   Dysphagia (Adult)   Compensatory Techniques to Improve Safety/Function with Swallowing making progress toward outcome         Problem: Fall Risk (Adult)  Goal: Absence of Falls  Outcome: Ongoing (interventions implemented as appropriate)    07/13/17 1728   Fall Risk (Adult)   Absence of Falls making progress toward outcome

## 2017-07-13 NOTE — CONSULTS
Referring Provider: @REFPROV    Reason for Consultation: The patient has had a chronic neurological impairment for almost a year involving her lower extremities primarily with weakness and numbness    Patient Care Team:  Josafat Gonzales MD as PCP - General  Josafat Gonzales MD as PCP - Family Medicine  Pawel Abrams MD as PCP - Cardiology (Cardiology)      Subjective .     Chief complaint/History of present illness:  She had history of 19 years ago falling down stairs steps necessitating surgery to thoraco- lumbar for fracture she also has had anterior and posterior cervical fusion in the past.  She is not certain why she had that surgery.  He has history of seizure disorder.  Her last seizure was approximately 6 weeks ago when she fell and sustained an injury of the right forearm forearm which is in a splint.  She has been followed extensively by Dr. Howie Abrams at [facility] having had imaging and spinal fluid studies.  She has also seen Dr. hunter the neurosurgeon at [facility].  He has not the demonstrated any neurosurgical lesion.  She indicates clearly that she has not been able to use her legs since last October.  Is not related specific pain in the spine.  She has had colon cancer.  She presented on this occasion with difficulty swallowing and fever and sore throat she denies hearing visual speech difficulty she has been incontinent for apparently over a year.  She is currently being treated by Dr. Delvis Samaniego for severe depression  .  .  Review of Systems  Review of Systems   Constitutional: Negative.    HENT: Positive for sore throat and trouble swallowing.    Eyes: Negative.    Respiratory: Positive for cough and choking.    Cardiovascular: Negative.    Gastrointestinal: Negative.         Colon cancer and incontinence   Endocrine: Negative.    Genitourinary: Negative.         In continence   Musculoskeletal: Negative.    Skin: Negative.    Allergic/Immunologic: Negative.    Neurological:  Positive for seizures.   Hematological: Negative.    Psychiatric/Behavioral: Negative.          depression   All other systems reviewed and are negative.      History  Past Medical History:   Diagnosis Date   • Asthma    • Cancer     colon cancer   • Depression    • Disease of thyroid gland    • Hypertension    • Insomnia    • Seizure    • Sleep apnea    ,   Past Surgical History:   Procedure Laterality Date   • BACK SURGERY     • COLON SURGERY     • GASTRIC BYPASS     • HYSTERECTOMY     • KNEE ARTHROSCOPY      bilateral   • NECK SURGERY     • ORIF WRIST FRACTURE Right 6/9/2017    Procedure: WRIST OPEN REDUCTION INTERNAL FIXATION, RIGHT;  Surgeon: Alec Cardoza MD;  Location: [facility];  Service:    • TONSILLECTOMY     • WRIST FRACTURE SURGERY     , History reviewed. No pertinent family history.,   Social History   Substance Use Topics   • Smoking status: Never Smoker   • Smokeless tobacco: Never Used   • Alcohol use No   ,   Prescriptions Prior to Admission   Medication Sig Dispense Refill Last Dose   • amLODIPine (NORVASC) 10 MG tablet Take 5 mg by mouth Every 12 (Twelve) Hours.   Past Week at Unknown time   • clonazePAM (KlonoPIN) 1 MG tablet Take 1 mg by mouth 3 (Three) Times a Day As Needed for Anxiety.   Past Week at Unknown time   • CloNIDine (CATAPRES) 0.1 MG tablet Take 0.1 mg by mouth Every 12 (Twelve) Hours.   Past Week at Unknown time   • DULoxetine (CYMBALTA) 30 MG capsule Take 30 mg by mouth Daily As Needed (for depression.).   Past Week at Unknown time   • gabapentin (NEURONTIN) 300 MG capsule Take 300 mg by mouth 3 (Three) Times a Day.   Past Week at Unknown time   • levETIRAcetam (KEPPRA) 1000 MG tablet Take 1,000 mg by mouth Every 12 (Twelve) Hours.   Past Week at Unknown time   • oxyCODONE (ROXICODONE) 10 MG tablet Take 10 mg by mouth Every 4 (Four) Hours As Needed for Moderate Pain .   Past Week at Unknown time   • phenytoin (DILANTIN) 100 MG ER capsule Take 100 mg by mouth Daily.   Past  Week at Unknown time   • phenytoin (DILANTIN) 100 MG ER capsule Take 200 mg by mouth Every Night.   Past Week at Unknown time   • potassium chloride (K-DUR,KLOR-CON) 20 MEQ CR tablet Take 20 mEq by mouth Every 12 (Twelve) Hours.   Past Week at Unknown time   • QUEtiapine (SEROquel) 100 MG tablet Take 100 mg by mouth 4 (Four) Times a Day.   Past Week at Unknown time   • traZODone (DESYREL) 50 MG tablet Take 50 mg by mouth Every Night.   Past Week at Unknown time   • venlafaxine XR (EFFEXOR-XR) 150 MG 24 hr capsule Take 300 mg by mouth Daily.   Past Week at Unknown time   • zolpidem CR (AMBIEN CR) 12.5 MG CR tablet Take 12.5 mg by mouth Every Night.   Past Week at Unknown time    and Allergies:  Review of patient's allergies indicates no known allergies.    Objective     Vital Signs   Temp:  [98.1 °F (36.7 °C)-99.6 °F (37.6 °C)] 98.4 °F (36.9 °C)  Heart Rate:  [] 85  Resp:  [18-20] 20  BP: (150-177)/() 177/99    Physical Exam:  She is awake alert she is oriented properly.  She does have flat affect.  She follows commands appropriately.  Her chest was clear heart regular.  I did not hear murmur nor bruit.  Her abdomen is soft.  She has no peripheral edema.  She is hirsuite.  Her cranial nerves II through XII appear to be intact.  She has generalized weakness of the upper extremities with the right forearm and wrist in a splint.  She has minimal movement of the feet.  No antigravity power in the lower extremities.  Pinprick sensation is appreciated throughout.  She denies position sense in both feet.  As no clonus she has no Elba signs.  She has the deep tendon reflexes are 1+ the upper extremities 1-2+ in the lower extremities with plantar extensor responses bilateral .   finger  to finger no past pointing  Physical Exam    Results Review:  Lab Results (last 24 hours)     Procedure Component Value Units Date/Time    Rapid Strep A Screen [[id number]]  (Normal) Collected:  07/12/17 4944    Specimen:  Swab  from Throat Updated:  07/12/17 1558     Strep A Ag Negative    Blood Culture [[id number]]  (Normal) Collected:  07/12/17 1502    Specimen:  Blood from Arm, Left Updated:  07/13/17 0401     Blood Culture No growth at less than 24 hours    Beta Strep Culture, Throat [[id number]]  (Normal) Collected:  07/12/17 1524    Specimen:  Swab from Throat Updated:  07/13/17 0718     Throat Culture, Beta Strep No Beta Hemolytic Streptococcus Isolated at 24 hrs            Assessment/Plan     Active Problems:    Inability to swallow    Conversion disorder with abnormal movement    Arytenoid cyst      She has a chronic condition which results inparaparesis with significant psychological overlay from the history.  She has had significant spinal injuries in the past requiring anterior and posterior cervical fusion in the cervical and posterior thoracic and lumbar fusion in the past.  She has had current imaging of the cervical spine and lumbar spine.  Beside  her previous surgical hardware and degenerative changes I do not see any acute findings that would require surgical intervention or that would be expected to change her current outcome.  She has had extensive workup by Dr. Howie Abrams with regards to  her weakness and seizure disorder.  I would recommend that she continue her care with Dr. Abrams to  maintain the continuity of her care    I discussed the patient's findings and my recommendations with the patient the patient's  and Dr. Pang and the physical therapist who are seeing the patient,  concurrently    Jr Bates MD  07/13/17  3:56 PM

## 2017-07-13 NOTE — THERAPY EVALUATION
"Acute Care - Speech Language Pathology   Swallow Initial Evaluation [facility]     Patient Name: Bita Croft  : 1955  MRN: [id number]  Today's Date: 2017        Referring Physician: Christian      Admit Date: 2017  Pt c/o of extreme pain with swallowing. Cough and throat clear with trials given. Pt expels phlegm upon coughing. Strained vocal quailty. Pt stated she feels like a \"hairball\" is stuck in the back of throat. Facial grimace and pain with puree, honey thick, and thin trials given. ST recommends MBS to r/o aspiration with PO. Pt to remain NPO at this time except small sips of water if needed. Meds via alt. Route. ST to follow.  Suir Rosario, CCC-SLP 2017 10:11 AM      Visit Dx:     ICD-10-CM ICD-9-CM   1. Inability to swallow R13.0 787.20   2. Dysphagia, unspecified type R13.10 787.20     Patient Active Problem List   Diagnosis   • Confusion   • Depression   • Anxiety   • Hyponatremia   • Seizure disorder   • Chronic neck pain   • Chronic back pain   • Hypothyroidism   • Abnormal thyroid blood test   • Hypertension   • Wellness examination-done   • Elevated fasting glucose   • Anemia   • Iron deficiency   • Degenerative joint disease of spine   • Inability to swallow     Past Medical History:   Diagnosis Date   • Asthma    • Cancer     colon cancer   • Depression    • Disease of thyroid gland    • Hypertension    • Insomnia    • Seizure    • Sleep apnea      Past Surgical History:   Procedure Laterality Date   • BACK SURGERY     • COLON SURGERY     • GASTRIC BYPASS     • HYSTERECTOMY     • KNEE ARTHROSCOPY      bilateral   • NECK SURGERY     • ORIF WRIST FRACTURE Right 2017    Procedure: WRIST OPEN REDUCTION INTERNAL FIXATION, RIGHT;  Surgeon: Alec Cardoza MD;  Location: [facility];  Service:    • TONSILLECTOMY     • WRIST FRACTURE SURGERY            SWALLOW EVALUATION (last 72 hours)      Swallow Evaluation       17 0938                Rehab Evaluation    " Document Type evaluation  -BN        Subjective Information no complaints;agree to therapy  -BN        General Information    Patient Profile Review yes  -BN        Onset of Illness/Injury 07/12/17  -BN        Referring Physician Christian  -BN        Subjective Patient Observations Pt and spouse report coughing up phlegm shortly after swallowing. This started approx 3 days ago.   -BN        Pertinent History Of Current Problem Pt stated swallowing trouble began approx 5 days prior to current admission. 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   -BN        Current Diet Limitations NPO  -BN        Precautions/Limitations, Vision WFL with corrective lenses  -BN        Precautions/Limitations, Hearing WFL  -BN        Prior Level of Function- Communication functional in all spheres  -BN        Prior Level of Function- Swallowing no diet consistency restrictions  -BN        Plans/Goals Discussed With patient  -BN        Barriers to Rehab none identified  -BN        Clinical Impression    Patient's Goals For Discharge return to PO diet  -BN        Family Goals For Discharge patient able to return to PO diet  -BN        SLP Swallowing Diagnosis pharyngeal dysfunction  -BN        Rehab Potential/Prognosis, Swallowing good, to achieve stated therapy goals  -BN        Criteria for Skilled Therapeutic Interventions Met skilled criteria for dysphagia intervention met  -BN        Therapy Frequency PRN  -BN        Predicted Duration Therapy Interv (days) until discharge  -BN        Expected Duration Therapy Session (min) 15-30 minutes  -BN        SLP Diet Recommendation NPO: unsafe for food/liquid intake  -BN        Recommended Diagnostics VFSS ([facility])  -BN        SLP Rec. for Method of Medication Administration meds via alternate route  -BN        Anticipated Discharge Disposition home  -BN        Pain Assessment    Pain Assessment 0-10  -BN        Pain Score 10  -BN        Pain Location Back   Pain with swallowing   "-BN        Cognitive Assessment/Intervention    Current Cognitive/Communication Assessment functional  -BN        Orientation Status oriented x 4  -BN        Follows Commands/Answers Questions 100% of the time  -BN        Oral Motor Structure and Function    Oral Motor Anatomy and Physiology patient demonstrates anatomy and physiology that is WNL  -BN        Dentition Assessment present and adequate  -BN        Secretion Management WNL/WFL  -BN        Mucosal Quality moist, healthy  -BN        Velar Elevation WFL (within functional limits)  -BN        Volitional Swallow mild to moderate difficulties initiating volitional swallow  -BN        Volitional Cough no difficulties initiating volitional cough;other (see comments)   coughs up and expels phlegm with cough  -BN        Oral Musculature General Assessment WFL (within functional limits)  -BN        General Feeding/Swallowing Observations    Current Feeding Method NPO  -BN        Respiratory Support Currently in Use other (see comments)   room air  -BN        Clinical Swallow Exam    Mode of Presentation fed by clinician  -BN        Oral Phase Results intact oral phase without signs of dysfunction  -BN        Pharyngeal Phase Results cough;throat clear  -BN        Summary of Clinical Exam Pt c/o of extreme pain with swallowing. Cough and throat clear with trials given. Pt expels phlegm upon coughing. Strained vocal quailty. Pt stated she feels like a \"hairball\" is stuck in the back of throat. Facial grimace and pain with puree, honey thick, and thin trials given. ST recommends MBS to r/o aspiration with PO.   -BN          User Key  (r) = Recorded By, (t) = Taken By, (c) = Cosigned By    Initials Name Effective Dates    CLARICE Rosario CCC-SLP 08/02/16 -         EDUCATION  The patient has been educated in the following areas:   Dysphagia (Swallowing Impairment).    SLP Recommendation and Plan  SLP Swallowing Diagnosis: pharyngeal dysfunction  SLP Diet " "Recommendation: NPO: unsafe for food/liquid intake     SLP Rec. for Method of Medication Administration: meds via alternate route     Recommended Diagnostics: VFSS (MBS)  Criteria for Skilled Therapeutic Interventions Met: skilled criteria for dysphagia intervention met  Anticipated Discharge Disposition: home  Rehab Potential/Prognosis, Swallowing: good, to achieve stated therapy goals  Therapy Frequency: PRN             Plan of Care Review  Plan Of Care Reviewed With: patient, spouse  Outcome Summary/Follow up Plan: Pt c/o of extreme pain with swallowing. Cough and throat clear with trials given. Pt expels phlegm upon coughing. Strained vocal quailty. Pt stated she feels like a \"hairball\" is stuck in the back of throat. Facial grimace and pain with puree, honey thick, and thin trials given.  recommends MBS to r/o aspiration with PO.              Time Calculation:         Time Calculation- SLP       07/13/17 1008          Time Calculation- SLP    SLP Start Time 0938  -BN      SLP Stop Time 1017  -      SLP Time Calculation (min) 39 min  -BN      SLP Received On 07/13/17  -      SLP Goal Re-Cert Due Date 07/23/17  -        User Key  (r) = Recorded By, (t) = Taken By, (c) = Cosigned By    Initials Name Provider Type    CLARICE Rosario CCC-SLP Speech and Language Pathologist          Therapy Charges for Today     Code Description Service Date Service Provider Modifiers Qty    [id number] [facility] EVAL ORAL PHARYNG SWALLOW 3 7/13/2017 RICH McgrathSLP GN 1               LEOLA Mcgrath  7/13/2017  "

## 2017-07-13 NOTE — PLAN OF CARE
Problem: Patient Care Overview (Adult)  Goal: Plan of Care Review  Outcome: Ongoing (interventions implemented as appropriate)    17 1531   Coping/Psychosocial Response Interventions   Plan Of Care Reviewed With patient;other (see comments)  (Tomas)   Patient Care Overview   Progress (Eval. )   Outcome Evaluation   Outcome Summary/Follow up Plan VFSS completed. Pt was presented pudding thick and honey thick trials only, in the following order: honey thick, pudding thick, repeat honey thick. Pt was noted to have a significant delay in swallow initiation with premature loss of the bolus to the pyriform sinuses and into the laryngeal vestibule with the initial honey thick trial, as deep laryngeal penetration was observed. Pt was also noted to have weak laryngeal elevation and no epiglottic inversion. Three multiple swallows noted, with weak pharyngeal contraction and 1x laryngeal penetration on multiple swallows, which was also noted with the latter honey thick trial. With pudding thick, pt was noted to, again, have a delay in swallow initiation, with premature loss of the bolus to the pyriforms, again with weak laryngeal elevation and no epiglottic inversion. It must be noted that the pt did also exhibit multiple instances of laryngeal penetration with the pudding thick consistency. Four multiple swallows, with weak upper esophageal sphincter opening. No definite aspiration was observed throughout the study; however, the pt is at a high risk for aspiration with PO intake. Radiologist voiced concern with soft tissue swelling. Recommended CT of neck for further assessment in regards to concern. At this time, ST recommends pt remain NPO secondary to the high risk for aspiration. Feel pt may be able to tolerate -Ovidio placement; however, uncertain of level of pt's ability to tolerate secondary to pain. Pt is ok for an occasional ice chip for comfort, knowing the high risk for aspiration. Otherwise, ST  recommends oral care via water moistened toothette for comfort. ST to follow and tx.          Problem: Inpatient SLP  Goal: Dysphagia- Patient will improve swallowing skills to begin to take some PO safely  Outcome: Ongoing (interventions implemented as appropriate)    07/13/17 1531   Begin to Take Some PO Safely   Begin to Take Some PO Safely- SLP, Date Established 07/13/17   Begin to Take Some PO Safely- SLP, Time to Achieve by discharge   Begin to Take Some PO Safely- SLP, Activity Level Patient will improve timing of pharyngeal response for safer, more efficient swallow;Patient will improve laryngeal elevation for safer, more efficient swallow;Patient will improve tongue base/pharyngeal wall squeeze for safer, more efficient swallow   Begin to Take Some PO Safely- SLP, Additional Goal Pt will tolerate NMES tx for dysphagia without s/s of distress and Pt will tolerate PO trials with SLP without overt s/s of aspiration.   Begin to Take Some PO Safely- SLP, Date Goal Reviewed 07/13/17   Begin to Take Some PO Safely- SLP, Outcome goal ongoing

## 2017-07-13 NOTE — PROGRESS NOTES
Discharge Planning Assessment  [facility]     Patient Name: iBta Croft  MRN: [id number]  Today's Date: 7/13/2017    Admit Date: 7/12/2017          Discharge Needs Assessment       07/13/17 0839    Living Environment    Lives With spouse    Living Arrangements house    Transportation Available family or friend will provide;car;ambulance    Living Environment    Provides Primary Care For no one    Primary Care Provided By spouse/significant other    Quality Of Family Relationships supportive;helpful;involved    Able to Return to Prior Living Arrangements yes    Discharge Needs Assessment    Concerns To Be Addressed adjustment to diagnosis/illness concerns;discharge planning concerns;home safety concerns    Readmission Within The Last 30 Days no previous admission in last 30 days    Outpatient/Agency/Support Group Needs homecare agency (specify level of care);inpatient rehabilitation facility (specify);skilled nursing facility (specify)    Anticipated Changes Related to Illness inability to care for self    Equipment Currently Used at Home wheelchair;commode;shower chair;walker, rolling    Equipment Needed After Discharge none    Discharge Facility/Level Of Care Needs home with home health;nursing facility, skilled    Current Discharge Risk physical impairment;dependent with mobility/activities of daily living    Discharge Planning Comments Patient lives with spouse and has all needed DME at home. Patient is dependent on  for ADLs at home and is wheelchair bound. Patient was just admitted last night, definite discharge planning disposition is unclear at this time. Options are potentially return home with home health care vs SNF.             Discharge Plan     None        Discharge Placement     No information found                Demographic Summary     None            Functional Status     None            Psychosocial     None            Abuse/Neglect     None            Legal     None            Substance  Abuse     None            Patient Forms     None          ANAND GuzmanW

## 2017-07-13 NOTE — OP NOTE
Preprocedure diagnosis: Dysphagia and right arytenoid swelling/mass    Post procedure diagnosis: Dysphagia and right arytenoid swelling/mass    Procedure performed: Flexible fiberoptic laryngoscopy     Surgeon: Man Kunz M.D.    Anesthesia: Topical Pontocaine and phenylephrine    Details: After patient verification and consent was obtained, the nasal cavities were topically anesthetized with Pontocaine and phenylephrine.  The flexible fiberoptic laryngoscope was passed into the left nostril.  The following is a summary of findings:    Nasal mucosa: Allergic appearing    Nasal septum: Deflected towards the right    Inferior turbinate: Healthy    Middle turbinate: Healthy    Nasopharynx: Healthy without mass or lesion    Oropharynx: Healthy without mass or lesion    Hypopharynx: No lesion in the hypopharynx.  There is swelling extending down onto the right arytenoid.  There is pooling of frothy solutions in the esophageal introitus.  She is asked to clear her secretions and the mucosa appears healthy without concerning mass.  There is, again swelling in the right arytenoid which appears noninfectious.      Larynx: Vocal folds are healthy with full mobility of the subglottis is healthy.    The scope was withdrawn.  The patient tolerated the procedure without any complications.

## 2017-07-14 PROBLEM — J04.0 REFLUX LARYNGITIS: Status: ACTIVE | Noted: 2017-07-14

## 2017-07-14 PROBLEM — J02.9 SORE THROAT: Status: ACTIVE | Noted: 2017-07-14

## 2017-07-14 PROBLEM — R29.898 WEAKNESS OF BOTH LEGS: Status: ACTIVE | Noted: 2017-07-14

## 2017-07-14 PROBLEM — F44.4 FUNCTIONAL DYSPHONIA: Status: ACTIVE | Noted: 2017-07-14

## 2017-07-14 PROBLEM — K21.9 REFLUX LARYNGITIS: Status: ACTIVE | Noted: 2017-07-14

## 2017-07-14 PROBLEM — R26.9 GAIT DIFFICULTY: Status: ACTIVE | Noted: 2017-07-14

## 2017-07-14 LAB — BACTERIA SPEC AEROBE CULT: NORMAL

## 2017-07-14 PROCEDURE — 25010000002 MORPHINE SULFATE (PF) 2 MG/ML SOLUTION: Performed by: FAMILY MEDICINE

## 2017-07-14 PROCEDURE — 25010000002 PIPERACILLIN SOD-TAZOBACTAM PER 1 G: Performed by: FAMILY MEDICINE

## 2017-07-14 PROCEDURE — 99231 SBSQ HOSP IP/OBS SF/LOW 25: CPT | Performed by: INTERNAL MEDICINE

## 2017-07-14 PROCEDURE — 99232 SBSQ HOSP IP/OBS MODERATE 35: CPT | Performed by: FAMILY MEDICINE

## 2017-07-14 PROCEDURE — 99232 SBSQ HOSP IP/OBS MODERATE 35: CPT | Performed by: OTOLARYNGOLOGY

## 2017-07-14 PROCEDURE — 92526 ORAL FUNCTION THERAPY: CPT

## 2017-07-14 PROCEDURE — G8978 MOBILITY CURRENT STATUS: HCPCS | Performed by: PHYSICAL THERAPIST

## 2017-07-14 PROCEDURE — 97530 THERAPEUTIC ACTIVITIES: CPT

## 2017-07-14 PROCEDURE — 25010000003 DEXAMETHASONE SODIUM PHOSPHATE 100 MG/10ML SOLUTION 1 ML VIAL: Performed by: OTOLARYNGOLOGY

## 2017-07-14 PROCEDURE — 25010000003 LEVETIRACETAM IN NACL 0.75% 1000 MG/100ML SOLUTION: Performed by: FAMILY MEDICINE

## 2017-07-14 PROCEDURE — 97162 PT EVAL MOD COMPLEX 30 MIN: CPT

## 2017-07-14 PROCEDURE — 25010000002 LORAZEPAM PER 2 MG: Performed by: FAMILY MEDICINE

## 2017-07-14 PROCEDURE — 97110 THERAPEUTIC EXERCISES: CPT

## 2017-07-14 PROCEDURE — 25010000002 ENOXAPARIN PER 10 MG: Performed by: FAMILY MEDICINE

## 2017-07-14 PROCEDURE — 97542 WHEELCHAIR MNGMENT TRAINING: CPT

## 2017-07-14 PROCEDURE — G8979 MOBILITY GOAL STATUS: HCPCS | Performed by: PHYSICAL THERAPIST

## 2017-07-14 PROCEDURE — 25010000003 DEXAMETHASONE SODIUM PHOSPHATE 100 MG/10ML SOLUTION 10 ML VIAL: Performed by: OTOLARYNGOLOGY

## 2017-07-14 PROCEDURE — 99231 SBSQ HOSP IP/OBS SF/LOW 25: CPT | Performed by: NEUROLOGICAL SURGERY

## 2017-07-14 RX ORDER — PANTOPRAZOLE SODIUM 40 MG/10ML
40 INJECTION, POWDER, LYOPHILIZED, FOR SOLUTION INTRAVENOUS EVERY 12 HOURS SCHEDULED
Status: DISCONTINUED | OUTPATIENT
Start: 2017-07-14 | End: 2017-07-14 | Stop reason: RX

## 2017-07-14 RX ORDER — FAMOTIDINE 10 MG/ML
40 INJECTION, SOLUTION INTRAVENOUS EVERY 12 HOURS SCHEDULED
Status: DISCONTINUED | OUTPATIENT
Start: 2017-07-14 | End: 2017-07-17 | Stop reason: HOSPADM

## 2017-07-14 RX ADMIN — MORPHINE SULFATE 2 MG: 2 INJECTION, SOLUTION INTRAMUSCULAR; INTRAVENOUS at 18:54

## 2017-07-14 RX ADMIN — FOSPHENYTOIN SODIUM 200 MG PE: 50 INJECTION, SOLUTION INTRAMUSCULAR; INTRAVENOUS at 22:26

## 2017-07-14 RX ADMIN — DEXAMETHASONE SODIUM PHOSPHATE 6 MG: 10 INJECTION, SOLUTION INTRAMUSCULAR; INTRAVENOUS at 09:23

## 2017-07-14 RX ADMIN — MORPHINE SULFATE 2 MG: 2 INJECTION, SOLUTION INTRAMUSCULAR; INTRAVENOUS at 06:50

## 2017-07-14 RX ADMIN — LORAZEPAM 0.5 MG: 2 INJECTION INTRAMUSCULAR at 14:23

## 2017-07-14 RX ADMIN — MORPHINE SULFATE 2 MG: 2 INJECTION, SOLUTION INTRAMUSCULAR; INTRAVENOUS at 21:04

## 2017-07-14 RX ADMIN — LORAZEPAM 0.5 MG: 2 INJECTION INTRAMUSCULAR at 21:05

## 2017-07-14 RX ADMIN — LORAZEPAM 0.5 MG: 2 INJECTION INTRAMUSCULAR at 12:04

## 2017-07-14 RX ADMIN — FAMOTIDINE 40 MG: 10 INJECTION INTRAVENOUS at 09:23

## 2017-07-14 RX ADMIN — LEVETIRACETAM 1000 MG: 1000 INJECTION, SOLUTION INTRAVENOUS at 09:23

## 2017-07-14 RX ADMIN — MORPHINE SULFATE 2 MG: 2 INJECTION, SOLUTION INTRAMUSCULAR; INTRAVENOUS at 02:20

## 2017-07-14 RX ADMIN — MORPHINE SULFATE 2 MG: 2 INJECTION, SOLUTION INTRAMUSCULAR; INTRAVENOUS at 09:24

## 2017-07-14 RX ADMIN — CLINDAMYCIN PHOSPHATE 600 MG: 12 INJECTION, SOLUTION INTRAVENOUS at 12:04

## 2017-07-14 RX ADMIN — MORPHINE SULFATE 2 MG: 2 INJECTION, SOLUTION INTRAMUSCULAR; INTRAVENOUS at 12:04

## 2017-07-14 RX ADMIN — CLONIDINE 1 PATCH: 0.2 PATCH TRANSDERMAL at 20:18

## 2017-07-14 RX ADMIN — MORPHINE SULFATE 2 MG: 2 INJECTION, SOLUTION INTRAMUSCULAR; INTRAVENOUS at 16:51

## 2017-07-14 RX ADMIN — TAZOBACTAM SODIUM AND PIPERACILLIN SODIUM 3.38 G: 375; 3 INJECTION, SOLUTION INTRAVENOUS at 16:55

## 2017-07-14 RX ADMIN — TAZOBACTAM SODIUM AND PIPERACILLIN SODIUM 3.38 G: 375; 3 INJECTION, SOLUTION INTRAVENOUS at 05:51

## 2017-07-14 RX ADMIN — LORAZEPAM 0.5 MG: 2 INJECTION INTRAMUSCULAR at 02:20

## 2017-07-14 RX ADMIN — SODIUM CHLORIDE 100 ML/HR: 9 INJECTION, SOLUTION INTRAVENOUS at 03:13

## 2017-07-14 RX ADMIN — CLINDAMYCIN PHOSPHATE 600 MG: 12 INJECTION, SOLUTION INTRAVENOUS at 04:48

## 2017-07-14 RX ADMIN — FAMOTIDINE 40 MG: 10 INJECTION INTRAVENOUS at 21:56

## 2017-07-14 RX ADMIN — MORPHINE SULFATE 2 MG: 2 INJECTION, SOLUTION INTRAMUSCULAR; INTRAVENOUS at 04:50

## 2017-07-14 RX ADMIN — DEXAMETHASONE SODIUM PHOSPHATE 6 MG: 10 INJECTION, SOLUTION INTRAMUSCULAR; INTRAVENOUS at 02:23

## 2017-07-14 RX ADMIN — LEVETIRACETAM 1000 MG: 1000 INJECTION, SOLUTION INTRAVENOUS at 21:56

## 2017-07-14 RX ADMIN — CLINDAMYCIN PHOSPHATE 600 MG: 12 INJECTION, SOLUTION INTRAVENOUS at 21:04

## 2017-07-14 RX ADMIN — TAZOBACTAM SODIUM AND PIPERACILLIN SODIUM 3.38 G: 375; 3 INJECTION, SOLUTION INTRAVENOUS at 11:20

## 2017-07-14 RX ADMIN — ENOXAPARIN SODIUM 40 MG: 40 INJECTION SUBCUTANEOUS at 09:23

## 2017-07-14 RX ADMIN — TAZOBACTAM SODIUM AND PIPERACILLIN SODIUM 3.38 G: 375; 3 INJECTION, SOLUTION INTRAVENOUS at 22:26

## 2017-07-14 RX ADMIN — MORPHINE SULFATE 2 MG: 2 INJECTION, SOLUTION INTRAMUSCULAR; INTRAVENOUS at 14:22

## 2017-07-14 RX ADMIN — LORAZEPAM 0.5 MG: 2 INJECTION INTRAMUSCULAR at 09:23

## 2017-07-14 NOTE — PROGRESS NOTES
LOS: 2 days   Patient Care Team:  Josafat Gonzales MD as PCP - General  Josafat Gonzales MD as PCP - Family Medicine  Pawel Abrams MD as PCP - Cardiology (Cardiology)    Chief Complaint: sore throat    Subjective     Interval History: Her temperature has been down.  She has no change of her neurological symptomatology.        Review of Systems:     unchanged    Objective     Vital Signs  Temp:  [96.9 °F (36.1 °C)-98.1 °F (36.7 °C)] 98.1 °F (36.7 °C)  Heart Rate:  [79-85] 80  Resp:  [16-20] 16  BP: (161-169)/(86-98) 169/98see above    Physical Exam:  Awake alert and oriented properly neck supple.  Minimal movement of all motor groups in lower extremities.  She still has reflexes and pain sensation absent position sense plantar extensor responses.  Cranial nerves intact        Results Review:    I reviewed the patient's new clinical results.  I reviewed the patient's new imaging results and agree with the interpretation.  I reviewed the patient's other test results and agree with the interpretation        Assessment/Plan 's  Active Problems:    Inability to swallow    Conversion disorder with abnormal movement    Arytenoid cyst      No changes in neurological condition since yesterday    Plan for disposition: Should maintain follow-up with Dr. Abrams, who has treated her throughout this neurological condition.  She does not have demonstration of a surgical problem at this point    Jr Bates MD  07/14/17  8:16 AM      Time:

## 2017-07-14 NOTE — PROGRESS NOTES
[facility] Gastroenterology  Inpatient Progress Note  Bita Croft  1955 7/14/2017  Reason for Follow Up:  Sore throat    Subjective     Subjective:   No new complaints    Current Facility-Administered Medications:   •  clindamycin (CLEOCIN) 600 mg in dextrose 5% 50 mL IVPB (premix), 600 mg, Intravenous, Q8H, Zeeshan Hester MD, Last Rate: 0 mL/hr at 07/14/17 0545, 600 mg at 07/14/17 1204  •  CloNIDine (CATAPRES-TTS) 0.1 MG/24HR patch 1 patch, 1 patch, Transdermal, Weekly, Josafat Gonzales MD, 1 patch at 07/13/17 2104  •  enoxaparin (LOVENOX) syringe 40 mg, 40 mg, Subcutaneous, Daily, Josafat Gonzales MD, 40 mg at 07/14/17 0923  •  famotidine (PEPCID) injection 40 mg, 40 mg, Intravenous, Q12H, Man Kunz MD, 40 mg at 07/14/17 0923  •  fosphenytoin (Cerebyx) injection 200 mg PE, 200 mg, Intravenous, Nightly, Josafat Gonzales MD, 200 mg PE at 07/13/17 2023  •  levETIRAcetam in NaCl 0.75% (KEPPRA) IVPB 1,000 mg, 1,000 mg, Intravenous, Q12H, Josafat Gonzales MD, Last Rate: 0 mL/hr at 07/13/17 2221, 1,000 mg at 07/14/17 0923  •  LORazepam (ATIVAN) injection 0.5 mg, 0.5 mg, Intravenous, Q2H PRN, Josafat Gonzales MD, 0.5 mg at 07/14/17 1204  •  Morphine sulfate (PF) injection 2 mg, 2 mg, Intravenous, Q2H PRN, Zeeshan Hester MD, 2 mg at 07/14/17 1204  •  ondansetron (ZOFRAN) injection 4 mg, 4 mg, Intravenous, Q6H PRN, Zeeshan Hester MD  •  piperacillin-tazobactam (ZOSYN) 3.375 g in iso-osmotic dextrose 50 ml (premix), 3.375 g, Intravenous, Q6H, Zeeshan Hester MD, Last Rate: 0 mL/hr at 07/14/17 0012, 3.375 g at 07/14/17 1120  •  sodium chloride 0.9 % bolus 1,000 mL, 1,000 mL, Intravenous, Once, Juanjose Last PA-C, 1,000 mL at 07/12/17 1721  •  sodium chloride 0.9 % infusion, 100 mL/hr, Intravenous, Continuous, Zeeshan Hester MD, Last Rate: 100 mL/hr at 07/14/17 0313, 100 mL/hr at 07/14/17 0313    Review of Systems:    Review of Systems    Constitutional: Positive for fatigue. Negative for activity change, appetite change, chills, diaphoresis, fever and unexpected weight change.   HENT: Positive for sore throat and trouble swallowing. Negative for ear pain, hearing loss, mouth sores and voice change.    Eyes: Negative.    Respiratory: Negative for cough, choking, shortness of breath and wheezing.    Cardiovascular: Negative for chest pain and palpitations.   Gastrointestinal: Negative for abdominal pain, blood in stool, constipation, diarrhea, nausea and vomiting.   Endocrine: Negative for cold intolerance and heat intolerance.   Genitourinary: Negative for decreased urine volume, dysuria, frequency, hematuria and urgency.   Musculoskeletal: Positive for gait problem. Negative for back pain and myalgias.   Skin: Negative for color change, pallor and rash.   Allergic/Immunologic: Negative for food allergies and immunocompromised state.   Neurological: Negative for dizziness, tremors, seizures, syncope, weakness, light-headedness, numbness and headaches.   Hematological: Negative for adenopathy. Does not bruise/bleed easily.   Psychiatric/Behavioral: Negative for agitation and confusion. The patient is not nervous/anxious.    All other systems reviewed and are negative.       Objective     Vital Signs  Temp:  [96.9 °F (36.1 °C)-98.5 °F (36.9 °C)] 98.5 °F (36.9 °C)  Heart Rate:  [79-85] 84  Resp:  [16-20] 18  BP: (161-169)/(86-98) 165/95  Body mass index is 30.27 kg/(m^2).    Intake/Output Summary (Last 24 hours) at 07/14/17 1323  Last data filed at 07/14/17 0313   Gross per 24 hour   Intake              551 ml   Output                0 ml   Net              551 ml             Physical Exam:   General: patient awake, alert and cooperative, no acute distress   Eyes: Normal lids and lashes, no scleral icterus   Neck: supple, no JVD   Skin: warm and dry   Cardiovascular: regular rhythm and rate, no murmurs auscultated   Pulm: clear to auscultation  bilaterally, regular and unlabored   Abdomen: soft, nontender, nondistended; normal bowel sounds   Psychiatric: Normal mood and behavior; converses appropriately     Results Review:    I have reviewed all of the patients current test results      Results from last 7 days  Lab Units 07/12/17  1501 07/11/17  1426   WBC 10*3/mm3 11.77* 8.54   HEMOGLOBIN g/dL 13.0 13.4   HEMATOCRIT % 39.1 40.6   PLATELETS 10*3/mm3 267 280         Results from last 7 days  Lab Units 07/12/17  1501 07/11/17  1426   SODIUM mmol/L 134* 135   POTASSIUM mmol/L 3.9 4.2   CHLORIDE mmol/L 95* 98   CO2 mmol/L 26.0 26.0   BUN mg/dL 7 11   CREATININE mg/dL 0.65 0.59   CALCIUM mg/dL 9.1 8.9   BILIRUBIN mg/dL 0.7 0.4   ALK PHOS U/L 163* 187*   ALT (SGPT) U/L 23 34   AST (SGOT) U/L 26 24   GLUCOSE mg/dL 111* 112*               0  Lab Value Date/Time   LIPASE 24 05/28/2015 0540   LIPASE 60 05/26/2015 1410       Radiology:    Imaging Results (last 24 hours)     Procedure Component Value Units Date/Time    CT Soft Tissue Neck With Contrast [[id number]] Collected:  07/12/17 1636     Updated:  07/13/17 1419    Addenda:        Addendum:     There is question low-attenuation asymmetric soft tissue prominence in  the right false cord/vestibule along the right aryepiglottic fold. The  significance uncertain, direct visualization suggested.  This report was finalized on 07/13/2017 14:16 by Dr. Obi Phan MD.  Signed:  07/13/17 1416 by Obi Phan MD    Narrative:       EXAMINATION: CT SOFT TISSUE NECK W CONTRAST-  7/12/2017 4:36 PM CDT     HISTORY: Difficulty swallowing     COMPARISON:11/20/2014 head CT. Cervical spine MR dated 07/11/2017     TECHNIQUE:Radiation dose equals  mGy-cm.  Automated exposure  control dose reduction technique was implemented.     3 mm sequential transaxial images were obtained. 2-D sagittal and  coronal reconstruction images were generated.     FINDINGS:     Extensive postsurgical changes identified with anterior  posterior fusion  changes.     Brain imaged appropriately without abnormal mass.     The nasopharynx is observed symmetrically.     There is artifact in the oral cavity related to the spinal fusion  changes. There are no oral cavity masses. The hypopharynx and laryngeal  structures are imaged symmetrically without definite masses.     6 mm low-attenuation nodule noted in the right lobe of the thyroid  gland.     The submandibular and parotid glands are imaged symmetrically.     There are no pathologically enlarged lymph nodes with small lateral  cervical nodes. No lymphadenopathy identified.       Impression:       1. No CT evidence of definite neck mass or pathologically enlarged lymph  nodes/lymphadenopathy. 3. Extensive postsurgical changes cervical spine.  This report was finalized on 07/12/2017 16:41 by Dr. Obi Phan MD.    FL Video Swallow With Speech [[id number]] Collected:  07/13/17 1415     Updated:  07/13/17 1421    Narrative:       FL VIDEO SWALLOW W SPEECH- 7/13/2017 12:44 CST     REASON FOR EXAM: dysphagia; R13.0-Aphagia; R13.10-Dysphagia, unspecified     COMPARISON: NONE      FLUOROSCOPY TIME: 1 minutes     TECHNIQUE: In conjunction with speech pathology services, video  fluoroscopic swallow examination was performed with oral ingestion of  barium containing substances of various viscosities.      FINDINGS: Medium bolus sizes were attempted. Unfortunately, procedure  was terminated as patient was having pain with each attempt. There is  prominence of the prevertebral soft tissues       Impression:       1.  Procedure prematurely terminated.  2.  Prominence of the soft tissues in the prevertebral space. Recommend  direct visualization.        Please see speech pathologist's report for further details and  recommendations.         This report was finalized on 07/13/2017 14:18 by Dr. Guillermina Laws MD.            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 Sore throat negative for strep    EMR Dragon/transcription disclaimer: Much of this encounter note is electronic transcription/translation of spoken language to printed text. The electronic translation of spoken language may be erroneous, or at times, nonsensical words or phrases may be inadvertently transcribed. Although I have reviewed the note for such errors, some may still exist.    DEBORAH Cobos  07/14/17  1:23 PM        I performed a history, physical examination, reviewed the progress note/consult note, and discussed the management of this patient with DEBORAH Suarez as her supervising physician.  I agree with the documented findings and plan of care as outlined with any exceptions or new recommendations noted below.    I do not think this is an ongoing GI condition.  Defer evaluation speech pathology and ENT    EMR Dragon/transcription disclaimer: Much of this encounter note is an electronic transcription/translation of spoken language to printed text.  The electronic translation of spoken language may permit erroneous, or at times, nonsensical words or phrases to be inadvertently transcribed.  Although I have reviewed the note for such errors, some may still exist.      Wilton Michele MD  2:30 PM  7/14/2017

## 2017-07-14 NOTE — THERAPY TREATMENT NOTE
Acute Care - Speech Language Pathology   Swallow Treatment Note [facility]     Patient Name: Bita Croft  : 1955  MRN: [id number]  Today's Date: 2017  Onset of Illness/Injury or Date of Surgery Date: (P) 17     Referring Physician: Dr. Gonzales       Admit Date: 2017  ST attempted PO trials of ice chips; pt immediately coughed with first trial and used suction to retrieve mucus. ST tried one more ice chip trial three minutes later and throat clear x2 was noted. Pt stated level of pain was 10/10 today; ST inquired if she wanted to participate in swallowing exercises and she agreed. Completed effortful x10 and Mendelsohn x5 with level 7/10 pain reported. ST to come back later and do e-stim with pt.  Lynn Dallas MS, CFY-SLP 2017 10:21 AM  Visit Dx:      ICD-10-CM ICD-9-CM   1. Inability to swallow R13.0 787.20   2. Dysphagia, unspecified type R13.10 787.20   3. Difficulty in walking, not elsewhere classified R26.2 719.7     Patient Active Problem List   Diagnosis   • Confusion   • Depression   • Anxiety   • Hyponatremia   • Seizure disorder   • Chronic neck pain   • Chronic back pain   • Hypothyroidism   • Abnormal thyroid blood test   • Hypertension   • Wellness examination-done   • Elevated fasting glucose   • Anemia   • Iron deficiency   • Degenerative joint disease of spine   • Inability to swallow   • Conversion disorder with abnormal movement   • Arytenoid cyst   • Functional dysphonia   • Reflux laryngitis             Adult Rehabilitation Note       17 0937 17 1520 17 1350    Rehab Assessment/Intervention    Discipline speech language pathologist  -EA speech language pathologist  -KW     Document Type therapy note (daily note)  -EA therapy note (daily note)  -KW     Subjective Information pain;agree to therapy  -EA pain  -KW     Recorded by [EA] Lynn Dallas MS, CFY-SLP [KW] Chaya Brewer MS CCC-SLP     Swallow Assessment/Intervention    Additional  Documentation   Dysphagia/Swallow Intervention (Group)  -TM    Recorded by   [TM] Marisabel Faye, [facility]-SLP    Dysphagia/Swallow Intervention    Dysphagia/Swallow Intervention   Pt will tolerate 30 minutes of NMES tx without s/s of distress.  -TM    Dysphagia/Swallow Therapeutic Feed ST attempted PO trials of ice chips; pt immediately coughed with first trial and used suction to retrieve mucus. ST tried one more ice chip trial three minutes later and throat clear x2 was noted. Pt stated level of pain was 10/10 today; ST inquired if she wanted to participate in swallowing exercises and she agreed. Completed effortful x10 and Mendelsohn x5 with level 7/10 pain reported.  -EA Discussed results of study with patient including NPO status, concern with no cricopharyngeal opeining, requested suction at bedside, RN to see about changing PO meds.   present and aware of recommendations.  Ok for ice chips.  -KW Pt will tolerate PO trials with SLP without overt s/s of aspiration.  -TM    Recorded by [EA] Lynn Dallas MS, CFY-SLP [KW] Chaya Brewer MS CCC-SLP [TM] Marisabel Faye, CCC-SLP    Improve timing of pharyngeal response    To improve timing of pharyngeal response, patient will: Swallow in timely way after sensory input;90%;without cues  -EA      Status: Improve timing of pharyngeal response Progressing as expected  -EA      Timing of Pharyngeal Response Progress 60%;with inconsistent cues;continue to adress  -EA      Comments: Improve timing of pharyngeal response Pt presented with weak and delayed swallow during ice chip trials and swallowing exercises.  -EA      Recorded by [EA] Lynn Dallas MS, CFY-SLP      Improve laryngeal elevation    To improve laryngeal elevation, patient will: Complete Mendelsohn maneuver;without cues;Complete pitch-glide;90%  -EA      Status: Improve laryngeal elevation Progressing as expected  -EA      Laryngeal Elevation Progress 50%;with inconsistent cues;continue to adress  -EA       Comments: Improve laryngeal elevation Pt completed Mendelsohn maneuver with 50% accuracy; delayed swallow was noted 3/5 times exercise was completed.  -EA      Recorded by [EA] Lynn Dallas MS, CFY-SLP      Improve tongue base & pharyngeal wall squeeze    To improve tongue base & pharyngeal wall squeeze, patient will: Complete effortful swallow;90%;without cues  -EA      Status: Improve tongue base & pharyngeal wall squeeze Progressing as expected  -EA      Tongue Base/Pharyngeal Wall Squeeze Progress 50%;with inconsistent cues;continue to adress  -EA      Comments: Improve tongue base & pharyngeal wall squeeze Pt completed effortful swallow 50% accuracy x10; delayed and weak swallow was noted. Pt became fatigued at end.  -EA      Recorded by [EA] Lynn Dallas MS, CFY-SLP        User Key  (r) = Recorded By, (t) = Taken By, (c) = Cosigned By    Initials Name Effective Dates    TM Marisabel Faye, CCC-SLP 08/02/16 -     KW Chaya Brewer MS [facility]SLP 08/02/16 -     EA Lynn Dallas MS, CFY-SLP 02/21/17 -                   IP SLP Goals       07/14/17 1018 07/13/17 1531       Begin to Take Some PO Safely    Begin to Take Some PO Safely- SLP, Date Established  07/13/17  -TM     Begin to Take Some PO Safely- SLP, Time to Achieve by discharge  -EA by discharge  -TM     Begin to Take Some PO Safely- SLP, Activity Level Patient will improve timing of pharyngeal response for safer, more efficient swallow;Patient will improve laryngeal elevation for safer, more efficient swallow;Patient will improve tongue base/pharyngeal wall squeeze for safer, more efficient swallow  -EA Patient will improve timing of pharyngeal response for safer, more efficient swallow;Patient will improve laryngeal elevation for safer, more efficient swallow;Patient will improve tongue base/pharyngeal wall squeeze for safer, more efficient swallow  -TM     Begin to Take Some PO Safely- SLP, Additional Goal  Pt will tolerate NMES tx for dysphagia  without s/s of distress and Pt will tolerate PO trials with SLP without overt s/s of aspiration.  -TM     Begin to Take Some PO Safely- SLP, Date Goal Reviewed 07/14/17  -EA 07/13/17  -TM     Begin to Take Some PO Safely- SLP, Outcome goal ongoing  -EA goal ongoing  -TM       User Key  (r) = Recorded By, (t) = Taken By, (c) = Cosigned By    Initials Name Provider Type    TM Marisabel Faye CCC-SLP Speech and Language Pathologist    STEPHANIE Dallas, MS, CFY-SLP Speech and Language Pathologist          EDUCATION  The patient has been educated in the following areas:   Dysphagia (Swallowing Impairment).    SLP Recommendation and Plan                                           Plan of Care Review  Plan Of Care Reviewed With: patient, other (see comments) (RNYudith.)  Progress: progress toward functional goals as expected  Outcome Summary/Follow up Plan: ST attempted PO trials of ice chips; pt immediately coughed with first trial and used suction to retrieve mucus. ST tried one more ice chip trial three minutes later and throat clear x2 was noted. Pt stated level of pain was 10/10 today; ST inquired if she wanted to participate in swallowing exercises and she agreed. Completed effortful x10 and Mendelsohn x5 with level 7/10 pain reported.       SLP Outcome Measures (last 72 hours)      SLP Outcome Measures       07/13/17 1500          SLP Outcome Measures    Outcome Measure Used? Adult NOMS  -      FCM Scores    FCM Chosen Swallowing  -      Swallowing FCM Score 1  -TM        User Key  (r) = Recorded By, (t) = Taken By, (c) = Cosigned By    Initials Name Effective Dates    TM Marisabel Faye CCC-SLP 08/02/16 -              Time Calculation:         Time Calculation- SLP       07/14/17 1020          Time Calculation- SLP    SLP Start Time 0937  -EA      SLP Stop Time 1020  -EA      SLP Time Calculation (min) 43 min  -EA      SLP Received On 07/14/17  -EA        User Key  (r) = Recorded By, (t) = Taken By, (c) =  Cosigned By    Initials Name Provider Type    STEPHANIE Dallas MS, CFY-SLP Speech and Language Pathologist          Therapy Charges for Today     Code Description Service Date Service Provider Modifiers Qty    32657350289 HC ST TREATMENT SWALLOW 3 7/14/2017 Lynn Dallas MS, GENEVIEVE-SLP GN 1          SLP G-Codes  SLP NOMS Used?: Yes  Functional Limitations: Swallowing  Swallow Current Status (): 100 percent impaired, limited or restricted  Swallow Goal Status (): At least 60 percent but less than 80 percent impaired, limited or restricted      Lynn Dallas MS, GENEVIEVE-SLP  7/14/2017

## 2017-07-14 NOTE — PLAN OF CARE
Problem: Patient Care Overview (Adult)  Goal: Plan of Care Review  Outcome: Ongoing (interventions implemented as appropriate)    07/14/17 0430   Coping/Psychosocial Response Interventions   Plan Of Care Reviewed With patient   Patient Care Overview   Progress no change   Outcome Evaluation   Outcome Summary/Follow up Plan Patient c/o pain, prn pain meds given as ordered. NPO, d/t failed dysghagia study with speech. Bp elevated Dr. Gonzales notified, clondine patch ordered. Safety maintained.          Problem: Pain, Acute (Adult)  Goal: Acceptable Pain Control/Comfort Level  Outcome: Ongoing (interventions implemented as appropriate)    Problem: Dysphagia (Adult)  Goal: Functional/Safe Swallow  Outcome: Ongoing (interventions implemented as appropriate)  Goal: Compensatory Techniques to Improve Safety/Function with Swallowing  Outcome: Ongoing (interventions implemented as appropriate)    Problem: Fall Risk (Adult)  Goal: Absence of Falls  Outcome: Ongoing (interventions implemented as appropriate)    Problem: Pressure Ulcer Risk (Jose Scale) (Adult,Obstetrics,Pediatric)  Goal: Skin Integrity  Outcome: Ongoing (interventions implemented as appropriate)

## 2017-07-14 NOTE — PLAN OF CARE
Problem: Patient Care Overview (Adult)  Goal: Plan of Care Review  Outcome: Ongoing (interventions implemented as appropriate)    07/14/17 0919   Coping/Psychosocial Response Interventions   Plan Of Care Reviewed With patient   Patient Care Overview   Progress progress toward functional goals as expected   Outcome Evaluation   Outcome Summary/Follow up Plan Physical therapy evaluation completed. Pt presents with B LE weakness and decreased functional activity tolerance. Pt was GCA for stand pivot transfers and min assist for 2 step transfer from bed to chair. Pt will benefit from continued skilled therapy for gait and transfer training in order to decrease her need for assistance. Therapy reccommends pt to d/c home with assist and HH PT vs SNF pending pts and spouse safety with transfers.          Problem: Inpatient Physical Therapy  Goal: Transfer Training Goal 1 LTG- PT  Outcome: Ongoing (interventions implemented as appropriate)    07/14/17 0919   Transfer Training PT LTG   Transfer Training PT LTG, Date Established 07/14/17   Transfer Training PT LTG, Time to Achieve by discharge   Transfer Training PT LTG, Activity Type all transfers   Transfer Training PT LTG, [place] Level contact guard assist   Transfer Training PT LTG, Assist Device walker, rolling       Goal: Gait Training Goal LTG- PT  Outcome: Ongoing (interventions implemented as appropriate)    07/14/17 0919   Gait Training PT LTG   Gait Training Goal PT LTG, Date Established 07/14/17   Gait Training Goal PT LTG, Time to Achieve by discharge   Gait Training Goal PT LTG, [place] Level minimum assist (75% patient effort)   Gait Training Goal PT LTG, Assist Device walker, rolling platform   Gait Training Goal PT LTG, Distance to Achieve 50 ft       Goal: Strength Goal LTG- PT  Outcome: Ongoing (interventions implemented as appropriate)    07/14/17 0919   Strength Goal PT LTG   Strength Goal PT LTG, Date Established 07/14/17   Strength Goal PT  LTG, Time to Achieve by discharge   Strength Goal PT LTG, Measure to Achieve Pt to increase all major LE musculaure to 4+/5       Goal: Caregiver Training Goal LTG- PT  Outcome: Ongoing (interventions implemented as appropriate)    07/14/17 0919   Caregiver Training PT LTG   Caregiver Training PT LTG, Date Established 07/14/17   Caregiver Training PT LTG, Time to Achieve by discharge   Caregiver Training PT LTG, Activity Type Pts spouse will demonstrate proper body mechanics and technique for all transfers 100% of the time.    Caregiver Training PT LTG, [place] Level able to assist adequately

## 2017-07-14 NOTE — PLAN OF CARE
Problem: Patient Care Overview (Adult)  Goal: Plan of Care Review  Outcome: Ongoing (interventions implemented as appropriate)    07/14/17 1018   Coping/Psychosocial Response Interventions   Plan Of Care Reviewed With patient;other (see comments)  (RNYudith.)   Patient Care Overview   Progress progress toward functional goals as expected   Outcome Evaluation   Outcome Summary/Follow up Plan ST attempted PO trials of ice chips; pt immediately coughed with first trial and used suction to retrieve mucus. ST tried one more ice chip trial three minutes later and throat clear x2 was noted. Pt stated level of pain was 10/10 today; ST inquired if she wanted to participate in swallowing exercises and she agreed. Completed effortful x10 and Mendelsohn x5 with level 7/10 pain reported.         Problem: Inpatient SLP  Goal: Dysphagia- Patient will improve swallowing skills to begin to take some PO safely  Outcome: Ongoing (interventions implemented as appropriate)    07/13/17 1531 07/14/17 1018   Begin to Take Some PO Safely   Begin to Take Some PO Safely- SLP, Date Established 07/13/17 --    Begin to Take Some PO Safely- SLP, Time to Achieve --  by discharge   Begin to Take Some PO Safely- SLP, Activity Level --  Patient will improve timing of pharyngeal response for safer, more efficient swallow;Patient will improve laryngeal elevation for safer, more efficient swallow;Patient will improve tongue base/pharyngeal wall squeeze for safer, more efficient swallow   Begin to Take Some PO Safely- SLP, Date Goal Reviewed --  07/14/17   Begin to Take Some PO Safely- SLP, Outcome --  goal ongoing

## 2017-07-14 NOTE — PROGRESS NOTES
LOS: 2 days   Patient Care Team:  Josafat Gonzales MD as PCP - General  Josafat Gonzales MD as PCP - Family Medicine  Pawel Abrams MD as PCP - Cardiology (Cardiology)    Chief Complaint:  I cannot swallow.    Subjective  2nd ER visit 2 days; admitted for sore throat/inability to swallow increasing for 5 days.  Reported fever at home; 101 temp in ER.  CT in ER unremarkable.  Mono, strept neg. Flex laryngoscopy only swelling R arytenoid/appearing noninfectious. Since admit IV fluids as speech cannot prove safe to swallow.   Many Rx on hold; as able Keppra/Dilantin transitioned to IV.  Pain being treated MS.  Along with this progressive back pain with LE weakness; unable to walk now.   Has been seeing Jason Nieto Hogancamp with CT/myelogram C/LS spine and slowly getting weaker with legs; now unable to walk.  Additional history of neuropathy, seizure disorder, chronic neck/back pain and polypharmacy/narcotic use.     Interval History: Not any better with sore throat, swallow or weakness LE.  GI does not think EGD would help; Joaquina following and Jana says nothing surgical; back to [facility].     Patient Complaints: I'm no better  Patient Denies:  Chest pain , SOB  History taken from: patient chart RN    Review of Systems:    All systems were reviewed and negative except for:  Constitution:  positive for fatigue  ENT:  positive for sore throat  Musculoskeletal: positive for  back pain, joint pain and neck pain    Objective     Vital Signs  Temp:  [96.9 °F (36.1 °C)-98.5 °F (36.9 °C)] 97.6 °F (36.4 °C)  Heart Rate:  [78-84] 78  Resp:  [16-18] 16  BP: (161-178)/(93-98) 178/94    Labs  Lab Results (last 24 hours)     Procedure Component Value Units Date/Time    Beta Strep Culture, Throat [[id number]]  (Normal) Collected:  07/12/17 1524    Specimen:  Swab from Throat Updated:  07/14/17 0631     Throat Culture, Beta Strep No Beta Hemolytic Streptococcus Isolated at 2 days    Blood Culture  [[id number]]  (Normal) Collected:  07/12/17 1502    Specimen:  Blood from Arm, Left Updated:  07/14/17 1601     Blood Culture No growth at 2 days          Lab Results:  CBC:     Results from last 7 days  Lab Units 07/12/17  1501 07/11/17  1426   WBC 10*3/mm3 11.77* 8.54   HEMOGLOBIN g/dL 13.0 13.4   HEMATOCRIT % 39.1 40.6   PLATELETS 10*3/mm3 267 280     BMP:    Results from last 7 days  Lab Units 07/12/17  1501 07/11/17  1426   SODIUM mmol/L 134* 135   POTASSIUM mmol/L 3.9 4.2   CHLORIDE mmol/L 95* 98   CO2 mmol/L 26.0 26.0   BUN mg/dL 7 11   CREATININE mg/dL 0.65 0.59   GLUCOSE mg/dL 111* 112*   CALCIUM mg/dL 9.1 8.9   ALT (SGPT) U/L 23 34     Culture Results:   Blood Culture   Date Value Ref Range Status   07/12/2017 No growth at 24 hours  Preliminary     Radiology: None  Additional Studies Reviewed: None    Physical Exam:   General Appearance alert, appears stated age and cooperative  Eyes lids and lashes normal, conjunctivae and sclerae normal, no icterus, no pallor, corneas clear and PERRLA  Lungs clear to auscultation, respirations regular, respirations even and respirations unlabored  Heart regular rhythm & normal rate, normal S1, S2, no murmur, no tala, no rub and no click  Chest Wall no abnormalities ovserved  Abdomen normal bowel sounds, no masses, no hepatomegaly, no splenomegaly, soft non-tender, no guarding and no rebound tenderness  Neurologic Mental Status orientated to person, place, time and situation, alertness alert, orientation date, person and place, mood/affect flat and thought content exhibits organized, Cranial Nerves cranial nerves 2 - 12 grossly intact as examined, Speech normal content and execusion, Motor strength is equal in upper and lower extremitits and weakness right upper extremity, left upper extremity, right lower extremity and left lower extremity     Results Review:     I reviewed the patient's new clinical results.  I reviewed the patient's new imaging results and agree with  the interpretation.  I reviewed the patient's other test results and agree with the interpretation  Discussed with patient    Medication Review: med list reviewed and necessary changes made    Assessment/Plan     Principal Problem:    Sore throat  Active Problems:    Chronic neck pain    Chronic back pain    Inability to swallow    Conversion disorder with abnormal movement    Arytenoid cyst    Functional dysphonia    Reflux laryngitis    Gait difficulty    Weakness of both legs    Rx-for now as we can  Labs reviewed and needed labs ordered:-daily  Diet reviewed-until oked by speech eval; NPO  Fluids reviewed-same/needed  Increase activity-as PT able  Discussed possible/future discharge plans- with RN; ? NH  Available for POA/caregiver        Josafat Gonzales MD  07/14/17  8:53 PM

## 2017-07-14 NOTE — PROGRESS NOTES
Continued Stay Note   Chanel     Patient Name: Bita Croft  MRN: [id number]  Today's Date: 7/14/2017    Admit Date: 7/12/2017          Discharge Plan       07/14/17 0920    Case Management/Social Work Plan    Plan Home    Additional Comments SW spoke to patient in room re needs at discharge. Now patient states she is unsure if she and her  will even want home health. Patient states that she and her  are private people and she is unsure if they will want someone to come in their home. Patient did agree to speak to her  re home health care. SW will follow and if patient requests, SW will request home health orders from Dr. Gonzales.               Discharge Codes     None            KRISHNA Guzman

## 2017-07-14 NOTE — PAYOR COMM NOTE
"FROM: DEE DEE GOMEZ  PHONE: [phone]  FAX: [phone]    PENDIN    Bita Croft (61 y.o. Female)     Date of Birth Social Security Number Address Home Phone MRN    1955  [address]   [facility] [address] [phone] [id number]    Holiness Marital Status          Gnosticism        Admission Date Admission Type Admitting Provider Attending Provider Department, Room/Bed    17 Emergency Josafat Gonzales MD Oliver, Josafat Melton MD [facility] 3A, 352/1    Discharge Date Discharge Disposition Discharge Destination                      Attending Provider: Josafat Gonzales MD     Allergies:  No Known Allergies    Isolation:  None   Infection:  None   Code Status:  FULL    Ht:  71\" (180.3 cm)   Wt:  217 lb (98.4 kg)    Admission Cmt:  None   Principal Problem:  None                Active Insurance as of 2017     Primary Coverage     Payor Plan Insurance Group Employer/Plan Group    [facility] [id number]     Payor Plan Address Payor Plan Phone Number Effective From Effective To    [address] [phone] [address]     [place] [address]       Subscriber Name Subscriber Birth Date Member ID       OCHOA CROFT 1955 [id number]                 Emergency Contacts      (Rel.) Home Phone Work Phone Mobile Phone    Carlito Croft (Spouse) [phone] -- --            History & Physical     No notes of this type exist for this encounter.           Emergency Department Notes      Juanjose Last PA-C at 2017  2:48 PM          Subjective   History of Present Illness   61-year-old female presents with a chief complaint of throat pain.  She is uncooperative and a exam and in has difficulty answering questions.  She says her throat is more painful to swallow and to palpation bilaterally.  She confirms lymphadenopathy bilaterally of her neck.  Denies nausea vomiting diarrhea and confirms that her throat is worse than yesterday.  Today " she presents with a fever and tachycardia.  She reports she received no medicine yesterday for her throat.  Has not taken anything for her fever.  Review of Systems   All other systems reviewed and are negative.      Past Medical History:   Diagnosis Date   • Asthma    • Cancer     colon cancer   • Depression    • Disease of thyroid gland    • Hypertension    • Insomnia    • Seizure    • Sleep apnea        No Known Allergies    Past Surgical History:   Procedure Laterality Date   • BACK SURGERY     • COLON SURGERY     • GASTRIC BYPASS     • HYSTERECTOMY     • KNEE ARTHROSCOPY      bilateral   • NECK SURGERY     • ORIF WRIST FRACTURE Right 6/9/2017    Procedure: WRIST OPEN REDUCTION INTERNAL FIXATION, RIGHT;  Surgeon: Alec Cardoza MD;  Location: [facility] OR;  Service:    • TONSILLECTOMY     • WRIST FRACTURE SURGERY         History reviewed. No pertinent family history.    Social History     Social History   • Marital status:      Spouse name: Carlito   • Number of children: 1   • Years of education: 18+     Occupational History   • retired      [employer]     Social History Main Topics   • Smoking status: Never Smoker   • Smokeless tobacco: Never Used   • Alcohol use No   • Drug use: No   • Sexual activity: Defer     Other Topics Concern   • None     Social History Narrative           Objective   Physical Exam   Constitutional: She is oriented to person, place, and time. She appears distressed.   Grimacing in pain.   HENT:   Head: Normocephalic and atraumatic.   Eyes: EOM are normal. Pupils are equal, round, and reactive to light.   Cardiovascular:   Tachycardic   Pulmonary/Chest: Effort normal and breath sounds normal.   Abdominal: Soft.   Musculoskeletal: Normal range of motion.   Lymphadenopathy:     She has cervical adenopathy.   Neurological: She is alert and oriented to person, place, and time.   Skin: Skin is warm. She is diaphoretic.   Nursing note and vitals  reviewed.      Procedures        ED Course  ED Course   Comment By Time   [id number] Juanjose Last PA-C 07/12 1455                  MDM  Number of Diagnoses or Management Options  Diagnosis management comments: Patient has voiced no improvement in symptoms after viscous lidocaine attempt and solumedrol 125mg IV.  Unable to swallow or tolerate PO liquids or food at home.  Will admit.     Discussed patient with Dr. Gold, hospitalist.  He accepts patient to Dr. Lara for further evaluation of patients inability to swallow.        Amount and/or Complexity of Data Reviewed  Clinical lab tests: ordered and reviewed  Tests in the radiology section of CPT®:  ordered and reviewed  Tests in the medicine section of CPT®:  ordered and reviewed  Decide to obtain previous medical records or to obtain history from someone other than the patient: yes    Risk of Complications, Morbidity, and/or Mortality  Presenting problems: moderate  Diagnostic procedures: moderate  Management options: moderate    Patient Progress  Patient progress: stable      Final diagnoses:   Inability to swallow            Juanjose Last PA-C  07/12/17 1501       Juanjose Last PA-C  07/13/17 1144       Electronically signed by Juanjose Last PA-C at 7/13/2017 11:44 AM      Rosalio Marques RN at 7/12/2017  3:38 PM          Pt's sheets changed and partial bath given due to incont.  episode      Rosalio Marques RN  07/12/17 1539       Electronically signed by Rosalio Marques RN at 7/12/2017  3:39 PM      Rosalio Marques RN at 7/12/2017  4:37 PM          Pt continues to complain of severe throat pain.       Rosalio Marques RN  07/12/17 1638       Electronically signed by Rosalio Marques RN at 7/12/2017  4:38 PM      Mell Kennedy RN at 7/12/2017  5:24 PM          Patients mouth suctioned with yankauer, small amount of clear mucus removed.     Mell Kennedy RN  07/12/17 6594       Electronically signed by Mell Kennedy RN at 7/12/2017   5:25 PM        Vital Signs (last 72 hrs)       07/11 0700  -  07/12 0659 07/12 0700  -  07/13 0659 07/13 0700  -  07/14 0659 07/14 0700  -  07/14 0919   Most Recent    Temp (°F)   98.1 -  (!)101    96.9 -  98.4      98.1     98.1 (36.7)    Heart Rate   96 -  (!)126    79 -  85      80     80    Resp   18 -  22    18 -  20      16     16    BP   148/83 -  166/100    161/93 -  177/99      169/98     169/98  Comment: RN NOTIFIED    SpO2 (%)   92 -  96    94 -  98      97     97          Hospital Medications (all)       Dose Frequency Start End    acetaminophen (TYLENOL) suppository 650 mg 650 mg Once 7/12/2017 7/12/2017    Sig - Route: Insert 2 suppositories into the rectum 1 (One) Time. - Rectal    Cosign for Ordering: Accepted by Ryan Henning MD on 7/12/2017  3:20 PM    clindamycin (CLEOCIN) 600 mg in dextrose 5% 50 mL IVPB (premix) 600 mg Every 8 Hours 7/12/2017     Sig - Route: Infuse 50 mL into a venous catheter Every 8 (Eight) Hours. - Intravenous    CloNIDine (CATAPRES-TTS) 0.1 MG/24HR patch 1 patch 1 patch Weekly 7/13/2017     Sig - Route: Place 1 patch on the skin 1 (One) Time Per Week. - Transdermal    dexamethasone sodium phosphate 6 mg in sodium chloride 0.9 % IVPB 6 mg Every 6 Hours 7/13/2017 7/14/2017    Sig - Route: Infuse 6 mg into a venous catheter Every 6 (Six) Hours. - Intravenous    Notes to Pharmacy: 6mg IV now, then 6 mg IV q 6 hours x 4 doses    enoxaparin (LOVENOX) syringe 40 mg 40 mg Daily 7/13/2017     Sig - Route: Inject 0.4 mL under the skin Daily. - Subcutaneous    famotidine (PEPCID) injection 40 mg 40 mg Every 12 Hours Scheduled 7/14/2017     Sig - Route: Infuse 4 mL into a venous catheter Every 12 (Twelve) Hours. - Intravenous    fosphenytoin (Cerebyx) injection 200 mg  mg Nightly 7/13/2017     Sig - Route: Infuse 4 mL into a venous catheter Every Night. - Intravenous    iopamidol (ISOVUE-300) 61 % injection 100 mL 100 mL Once in Imaging 7/12/2017 7/12/2017    Sig - Route: Infuse  100 mL into a venous catheter Once. - Intravenous    levETIRAcetam in NaCl 0.75% (KEPPRA) IVPB 1,000 mg 1,000 mg Every 12 Hours Scheduled 7/13/2017     Sig - Route: Infuse 100 mL into a venous catheter Every 12 (Twelve) Hours. - Intravenous    lidocaine viscous (XYLOCAINE) 2 % mouth solution 15 mL 15 mL Once 7/12/2017 7/12/2017    Sig - Route: Apply 15 mL to the mouth or throat 1 (One) Time. - Mouth/Throat    Cosign for Ordering: Accepted by Ryan Henning MD on 7/12/2017  3:20 PM    LORazepam (ATIVAN) injection 0.5 mg 0.5 mg Every 2 Hours PRN 7/13/2017 7/23/2017    Sig - Route: Infuse 0.25 mL into a venous catheter Every 2 (Two) Hours As Needed for Anxiety. - Intravenous    methylPREDNISolone sodium succinate (SOLU-Medrol) injection 125 mg 125 mg Once 7/12/2017 7/12/2017    Sig - Route: Inject 2 mL into the shoulder, thigh, or buttocks 1 (One) Time. - Intramuscular    Cosign for Ordering: Accepted by Ryan Henning MD on 7/12/2017  3:20 PM    Morphine sulfate (PF) injection 2 mg 2 mg Every 2 Hours PRN 7/12/2017 7/22/2017    Sig - Route: Infuse 1 mL into a venous catheter Every 2 (Two) Hours As Needed for Severe Pain . - Intravenous    Morphine sulfate (PF) injection 4 mg 4 mg Once 7/12/2017 7/12/2017    Sig - Route: Infuse 1 mL into a venous catheter 1 (One) Time. - Intravenous    ondansetron (ZOFRAN) injection 4 mg 4 mg Every 6 Hours PRN 7/12/2017     Sig - Route: Infuse 2 mL into a venous catheter Every 6 (Six) Hours As Needed for Nausea or Vomiting. - Intravenous    piperacillin-tazobactam (ZOSYN) 3.375 g in iso-osmotic dextrose 50 ml (premix) 3.375 g Every 6 Hours 7/12/2017     Sig - Route: Infuse 50 mL into a venous catheter Every 6 (Six) Hours. - Intravenous    sodium chloride 0.9 % bolus 1,000 mL 1,000 mL Once 7/12/2017 7/12/2017    Sig - Route: Infuse 1,000 mL into a venous catheter 1 (One) Time. - Intravenous    Cosign for Ordering: Accepted by Ryan Henning MD on 7/12/2017  3:20 PM    sodium chloride 0.9 %  bolus 1,000 mL 1,000 mL Once 7/12/2017     Sig - Route: Infuse 1,000 mL into a venous catheter 1 (One) Time. - Intravenous    Cosign for Ordering: Accepted by Ryan Henning MD on 7/12/2017  6:12 PM    sodium chloride 0.9 % infusion 100 mL/hr Continuous 7/12/2017     Sig - Route: Infuse 100 mL/hr into a venous catheter Continuous. - Intravenous    dexamethasone (DECADRON) injection 4 mg (Discontinued) 4 mg Every 6 Hours 7/12/2017 7/13/2017    Sig - Route: Infuse 1 mL into a venous catheter Every 6 (Six) Hours. - Intravenous    pantoprazole (PROTONIX) injection 40 mg (Discontinued) 40 mg Every 12 Hours Scheduled 7/14/2017 7/14/2017    Sig - Route: Infuse 10 mL into a venous catheter Every 12 (Twelve) Hours. - Intravenous    Reason for Discontinue: Availability    sodium chloride 0.9 % bolus 500 mL (Discontinued) 500 mL Once 7/12/2017 7/12/2017    Sig - Route: Infuse 500 mL into a venous catheter 1 (One) Time. - Intravenous    Cosign for Ordering: Accepted by yRan Henning MD on 7/12/2017  3:20 PM          Lab Results (last 72 hours)     Procedure Component Value Units Date/Time    CBC Auto Differential [[id number]]  (Abnormal) Collected:  07/12/17 1501    Specimen:  Blood Updated:  07/12/17 1514     WBC 11.77 (H) 10*3/mm3      RBC 4.48 10*6/mm3      Hemoglobin 13.0 g/dL      Hematocrit 39.1 %      MCV 87.3 fL      MCH 29.0 pg      MCHC 33.2 g/dL      RDW 13.8 %      RDW-SD 44.0 fl      MPV 9.8 fL      Platelets 267 10*3/mm3      Neutrophil % 81.2 (H) %      Lymphocyte % 9.9 (L) %      Monocyte % 8.2 %      Eosinophil % 0.4 %      Basophil % 0.1 %      Immature Grans % 0.2 %      Neutrophils, Absolute 9.56 (H) 10*3/mm3      Lymphocytes, Absolute 1.17 10*3/mm3      Monocytes, Absolute 0.96 10*3/mm3      Eosinophils, Absolute 0.05 10*3/mm3      Basophils, Absolute 0.01 10*3/mm3      Immature Grans, Absolute 0.02 10*3/mm3     CBC & Differential [[id number]] Collected:  07/12/17 1501    Specimen:  Blood Updated:  07/12/17  1514    Narrative:       The following orders were created for panel order CBC & Differential.  Procedure                               Abnormality         Status                     ---------                               -----------         ------                     CBC Auto Differential[[id number]]        Abnormal            Final result                 Please view results for these tests on the individual orders.    Lactic Acid, Plasma [[id number]]  (Normal) Collected:  07/12/17 1501    Specimen:  Blood Updated:  07/12/17 1523     Lactate 2.0 mmol/L     Comprehensive Metabolic Panel [[id number]]  (Abnormal) Collected:  07/12/17 1501    Specimen:  Blood Updated:  07/12/17 1528     Glucose 111 (H) mg/dL      BUN 7 mg/dL      Creatinine 0.65 mg/dL      Sodium 134 (L) mmol/L      Potassium 3.9 mmol/L      Chloride 95 (L) mmol/L      CO2 26.0 mmol/L      Calcium 9.1 mg/dL      Total Protein 7.2 g/dL      Albumin 4.10 g/dL      ALT (SGPT) 23 U/L      AST (SGOT) 26 U/L      Alkaline Phosphatase 163 (H) U/L      Total Bilirubin 0.7 mg/dL      eGFR Non African Amer 93 mL/min/1.73      Globulin 3.1 gm/dL      A/G Ratio 1.3 g/dL      BUN/Creatinine Ratio 10.8     Anion Gap 13.0 mmol/L     Mononucleosis Screen [[id number]]  (Normal) Collected:  07/12/17 1501    Specimen:  Blood Updated:  07/12/17 1542     Monospot Negative    Rapid Strep A Screen [[id number]]  (Normal) Collected:  07/12/17 1524    Specimen:  Swab from Throat Updated:  07/12/17 1558     Strep A Ag Negative    Blood Culture [[id number]]  (Normal) Collected:  07/12/17 1502    Specimen:  Blood from Arm, Left Updated:  07/13/17 1601     Blood Culture No growth at 24 hours    Beta Strep Culture, Throat [[id number]]  (Normal) Collected:  07/12/17 1524    Specimen:  Swab from Throat Updated:  07/14/17 0631     Throat Culture, Beta Strep No Beta Hemolytic Streptococcus Isolated at 2 days          Imaging Results (last 72 hours)     Procedure Component Value Units  Date/Time    CT Soft Tissue Neck With Contrast [[id number]] Collected:  07/12/17 1636     Updated:  07/13/17 1419    Addenda:        Addendum:     There is question low-attenuation asymmetric soft tissue prominence in  the right false cord/vestibule along the right aryepiglottic fold. The  significance uncertain, direct visualization suggested.  This report was finalized on 07/13/2017 14:16 by Dr. Obi Phan MD.  Signed:  07/13/17 1416 by Obi Phan MD    Narrative:       EXAMINATION: CT SOFT TISSUE NECK W CONTRAST-  7/12/2017 4:36 PM CDT     HISTORY: Difficulty swallowing     COMPARISON:11/20/2014 head CT. Cervical spine MR dated 07/11/2017     TECHNIQUE:Radiation dose equals  mGy-cm.  Automated exposure  control dose reduction technique was implemented.     3 mm sequential transaxial images were obtained. 2-D sagittal and  coronal reconstruction images were generated.     FINDINGS:     Extensive postsurgical changes identified with anterior posterior fusion  changes.     Brain imaged appropriately without abnormal mass.     The nasopharynx is observed symmetrically.     There is artifact in the oral cavity related to the spinal fusion  changes. There are no oral cavity masses. The hypopharynx and laryngeal  structures are imaged symmetrically without definite masses.     6 mm low-attenuation nodule noted in the right lobe of the thyroid  gland.     The submandibular and parotid glands are imaged symmetrically.     There are no pathologically enlarged lymph nodes with small lateral  cervical nodes. No lymphadenopathy identified.       Impression:       1. No CT evidence of definite neck mass or pathologically enlarged lymph  nodes/lymphadenopathy. 3. Extensive postsurgical changes cervical spine.  This report was finalized on 07/12/2017 16:41 by Dr. Obi Phan MD.    FL Video Swallow With Speech [[id number]] Collected:  07/13/17 1415     Updated:  07/13/17 1421    Narrative:       FL VIDEO SWALLOW W  SPEECH- 7/13/2017 12:44 CST     REASON FOR EXAM: dysphagia; R13.0-Aphagia; R13.10-Dysphagia, unspecified     COMPARISON: NONE      FLUOROSCOPY TIME: 1 minutes     TECHNIQUE: In conjunction with speech pathology services, video  fluoroscopic swallow examination was performed with oral ingestion of  barium containing substances of various viscosities.      FINDINGS: Medium bolus sizes were attempted. Unfortunately, procedure  was terminated as patient was having pain with each attempt. There is  prominence of the prevertebral soft tissues       Impression:       1.  Procedure prematurely terminated.  2.  Prominence of the soft tissues in the prevertebral space. Recommend  direct visualization.        Please see speech pathologist's report for further details and  recommendations.         This report was finalized on 07/13/2017 14:18 by Dr. Guillermina Laws MD.          ECG/EMG Results (last 72 hours)     ** No results found for the last 72 hours. **        Orders (last 72 hrs)     Start     Ordered    07/14/17 0900  pantoprazole (PROTONIX) injection 40 mg  Every 12 Hours Scheduled,   Status:  Discontinued      07/14/17 0707    07/14/17 0900  famotidine (PEPCID) injection 40 mg  Every 12 Hours Scheduled      07/14/17 0722    07/13/17 2100  levETIRAcetam in NaCl 0.75% (KEPPRA) IVPB 1,000 mg  Every 12 Hours Scheduled      07/13/17 1719    07/13/17 2100  fosphenytoin (Cerebyx) injection 200 mg PE  Nightly      07/13/17 1719 07/13/17 2030  CloNIDine (CATAPRES-TTS) 0.1 MG/24HR patch 1 patch  Weekly      07/13/17 1950    07/13/17 1943  LORazepam (ATIVAN) injection 0.5 mg  Every 2 Hours PRN      07/13/17 1948    07/13/17 1430  dexamethasone sodium phosphate 6 mg in sodium chloride 0.9 % IVPB  Every 6 Hours     Comments:  6mg IV now, then 6 mg IV q 6 hours x 4 doses    07/13/17 1351    07/13/17 1009  SLP Plan of Care Cert / Re-Cert  Once     Comments:  Speech Language Pathology Plan of Care  Initial  "Certification  Certification Period: 2017 - 2017    Patient Name: Bita Croft  : 1955    (R13.0) Inability to swallow  (primary encounter diagnosis)  (R13.10) Dysphagia, unspecified type                Assessment  SLP Assessment  SLP Swallowing Diagnosis: pharyngeal dysfunction  Rehab Potential/Prognosis, Swallowing: good, to achieve stated therapy goals  Plans/Goals Discussed With: patient  Criteria for Skilled Therapeutic Interventions Met: skilled criteria for dysphagia intervention met                    Plan    SLP Plan  Therapy Frequency: PRN  Predicted Duration Therapy Interv (days): until discharge  Expected Duration Therapy Session (min): 15-30 minutes  Outcome Summary/Follow up Plan: Pt c/o of extreme pain with swallowing. Cough and throat clear with trials given. Pt expels phlegm upon coughing. Strained vocal quailty. Pt stated she feels like a \"hairball\" is stuck in the back of throat. Facial grimace and pain with puree, honey thick, and thin trials given. ST recommends MBS to r/o aspiration with PO.         Suri Rosario CCC-SLP  2017      By cosigning this order, either electronically or physically, I certify that the therapy services are furnished while this patient is under my care, the services outline above are required by this patient, and will be reviewed every 30 days.        M.D.:__________________________________________ Date: ______________    17 1008    17 1009  FL Video Swallow With Speech  1 Time Imaging      17 1008    17 1009  SLP Video Swallow  Once      17 1008    17 0900  enoxaparin (LOVENOX) syringe 40 mg  Daily      17 0815    17 0818  Inpatient Consult to Neurosurgery  Once     Specialty:  Neurosurgery  Provider:  Fredy Aragon MD    17 0849    17 0817  Inpatient Consult to Gastroenterology  Once     Specialty:  Gastroenterology  Provider:  Darlene Pang MD    17 0849    " 07/13/17 0817  PT Consult: Eval & Treat  Once      07/13/17 0849    07/13/17 0816  VTE Risk Assessment - Moderate Risk  Once      07/13/17 0815    07/13/17 0816  Place Sequential Compression Device  Once      07/13/17 0815    07/13/17 0816  Maintain Sequential Compression Device  Continuous      07/13/17 0815    07/12/17 2135  Inpatient Consult to Case Management   Once     Provider:  (Not yet assigned)    07/12/17 2135 07/12/17 2133  Inpatient Consult to Spiritual Care  Once     Provider:  (Not yet assigned)    07/12/17 2133 07/12/17 2030  clindamycin (CLEOCIN) 600 mg in dextrose 5% 50 mL IVPB (premix)  Every 8 Hours      07/12/17 1957 07/12/17 2030  piperacillin-tazobactam (ZOSYN) 3.375 g in iso-osmotic dextrose 50 ml (premix)  Every 6 Hours      07/12/17 1957 07/12/17 2030  dexamethasone (DECADRON) injection 4 mg  Every 6 Hours,   Status:  Discontinued      07/12/17 1957 07/12/17 2030  sodium chloride 0.9 % infusion  Continuous      07/12/17 1958 07/12/17 1958  Inpatient Consult to ENT  Once     Specialty:  Otolaryngology  Provider:  Man Kunz MD    07/12/17 1957 07/12/17 1957  ondansetron (ZOFRAN) injection 4 mg  Every 6 Hours PRN      07/12/17 1957 07/12/17 1957  Morphine sulfate (PF) injection 2 mg  Every 2 Hours PRN      07/12/17 1957 07/12/17 1954  Full Code  Continuous      07/12/17 1953 07/12/17 1950  NPO Diet  Diet Effective Now     Comments:  Should remain in effect until a complete SLP evaluation occurs and a superceding MD order is received.    07/12/17 1949 07/12/17 1950  SLP Consult: Eval & Treat  Once      07/12/17 1949 07/12/17 1950  SLP Evaluation - Failed Bedside Dysphagia Screening  Once      07/12/17 1949 07/12/17 1815  Inpatient Admission  Once      07/12/17 1815 07/12/17 1812  Morphine sulfate (PF) injection 4 mg  Once      07/12/17 1810 07/12/17 1729  Inpatient Admission  Once,   Status:  Canceled      07/12/17 1725     07/12/17 1713  sodium chloride 0.9 % bolus 1,000 mL  Once      07/12/17 1711    07/12/17 1559  Beta Strep Culture, Throat  Once      07/12/17 1558    07/12/17 1555  iopamidol (ISOVUE-300) 61 % injection 100 mL  Once in Imaging      07/12/17 1554    07/12/17 1505  CT Soft Tissue Neck With Contrast  1 Time Imaging      07/12/17 1505    07/12/17 1502  Blood Culture  Once      07/12/17 1502    07/12/17 1456  CT Angiogram Neck With & Without Contrast  1 Time Imaging,   Status:  Canceled      07/12/17 1456    07/12/17 1456  MRI Brain With & Without Contrast  1 Time Imaging,   Status:  Canceled      07/12/17 1456    07/12/17 1450  sodium chloride 0.9 % bolus 1,000 mL  Once      07/12/17 1448    07/12/17 1449  acetaminophen (TYLENOL) suppository 650 mg  Once      07/12/17 1447    07/12/17 1449  sodium chloride 0.9 % bolus 500 mL  Once,   Status:  Discontinued      07/12/17 1447    07/12/17 1448  lidocaine viscous (XYLOCAINE) 2 % mouth solution 15 mL  Once      07/12/17 1446    07/12/17 1448  methylPREDNISolone sodium succinate (SOLU-Medrol) injection 125 mg  Once      07/12/17 1446    07/12/17 1446  Rapid Strep A Screen  STAT      07/12/17 1446    07/12/17 1446  Mononucleosis Screen  STAT      07/12/17 1446    07/12/17 1446  CBC & Differential  STAT      07/12/17 1446    07/12/17 1446  Comprehensive Metabolic Panel  STAT      07/12/17 1446    07/12/17 1446  Lactic Acid, Plasma  STAT      07/12/17 1446    07/12/17 1446  CBC Auto Differential  PROCEDURE ONCE      07/12/17 1447    --  levETIRAcetam (KEPPRA) 1000 MG tablet  Every 12 Hours      07/13/17 1217    --  oxyCODONE (ROXICODONE) 10 MG tablet  Every 4 Hours PRN      07/13/17 1217    --  amLODIPine (NORVASC) 10 MG tablet  Every 12 Hours      07/13/17 1217    --  potassium chloride (K-DUR,KLOR-CON) 20 MEQ CR tablet  Every 12 Hours      07/13/17 1217             Physician Progress Notes (last 72 hours) (Notes from 7/11/2017  9:19 AM through 7/14/2017  9:19 AM)      Jr  Ismael Bates MD at 7/14/2017  8:16 AM  Version 1 of 1              LOS: 2 days   Patient Care Team:  Josafat Gonzales MD as PCP - General  Josafat Gonzales MD as PCP - Family Medicine  Pawel Abrams MD as PCP - Cardiology (Cardiology)    Chief Complaint: sore throat    Subjective     Interval History: Her temperature has been down.  She has no change of her neurological symptomatology.        Review of Systems:     unchanged    Objective     Vital Signs  Temp:  [96.9 °F (36.1 °C)-98.1 °F (36.7 °C)] 98.1 °F (36.7 °C)  Heart Rate:  [79-85] 80  Resp:  [16-20] 16  BP: (161-169)/(86-98) 169/98see above    Physical Exam:  Awake alert and oriented properly neck supple.  Minimal movement of all motor groups in lower extremities.  She still has reflexes and pain sensation absent position sense plantar extensor responses.  Cranial nerves intact        Results Review:    I reviewed the patient's new clinical results.  I reviewed the patient's new imaging results and agree with the interpretation.  I reviewed the patient's other test results and agree with the interpretation        Assessment/Plan 's  Active Problems:    Inability to swallow    Conversion disorder with abnormal movement    Arytenoid cyst      No changes in neurological condition since yesterday    Plan for disposition: Should maintain follow-up with Dr. Abrams, who has treated her throughout this neurological condition.  She does not have demonstration of a surgical problem at this point    Jr Bates MD  07/14/17  8:16 AM      Time:      Electronically signed by Jr Bates MD at 7/14/2017  8:19 AM      Man Kunz MD at 7/14/2017  8:51 AM  Version 1 of 1         ENT/[facility] ([facility]) Progress Note:       LOS: 2 days   Patient Care Team:  Josafat Gonzales MD as PCP - General  Josafat Gonzales MD as PCP - Family Medicine  Pawel Abrams MD as PCP - Cardiology  (Cardiology)    Active consulting complaint: Dysphagia and throat pain    Subjective     Interval History:     Status of active consulting problem: Despite IV antibiotics and continued steroids, Ms. Croft reports that she is still unable to swallow.  Her pain is still the same, 9 out of 10 and worse with swallowing.  She has had no shortness of breath.  Her voice is stressed but this is been her baseline over the past 2-3 years.  She denies any fevers or chills.    History taken from: patient    Review of Systems:    Review of Systems   Constitutional: Negative for chills and fever.   HENT: Positive for sore throat and trouble swallowing. Negative for voice change.    Respiratory: Negative for shortness of breath.    Cardiovascular: Negative for chest pain.       Objective     Vital Signs  Temp:  [96.9 °F (36.1 °C)-98.1 °F (36.7 °C)] 98.1 °F (36.7 °C)  Heart Rate:  [79-85] 80  Resp:  [16-20] 16  BP: (161-169)/(86-98) 169/98    Physical Exam:   Physical Exam   Constitutional: She is oriented to person, place, and time. She appears well-developed and well-nourished. She is cooperative. No distress.   HENT:   Head: Normocephalic and atraumatic.   Right Ear: External ear normal.   Left Ear: External ear normal.   Nose: Nose normal.   Mouth/Throat: Oropharynx is clear and moist.   Eyes: Conjunctivae and EOM are normal. Pupils are equal, round, and reactive to light. Right eye exhibits no discharge. Left eye exhibits no discharge. No scleral icterus.   Neck: Normal range of motion. Neck supple. No JVD present. No tracheal deviation present. No thyromegaly present.   Cervical fusion scars    Phonation somewhat stressed consistent with a functional dysphonia   Pulmonary/Chest: Effort normal. No stridor.   Musculoskeletal: Normal range of motion. She exhibits no edema or deformity.   Lymphadenopathy:     She has no cervical adenopathy.   Neurological: She is alert and oriented to person, place, and time. She has normal  strength. No cranial nerve deficit. Coordination normal.   Skin: Skin is warm and dry. No rash noted. She is not diaphoretic. No erythema. No pallor.   Psychiatric: She has a normal mood and affect. Her speech is normal and behavior is normal. Judgment and thought content normal. Cognition and memory are normal.   Nursing note and vitals reviewed.       Results Review:       Lab Results (last 24 hours)     Procedure Component Value Units Date/Time    Blood Culture [[id number]]  (Normal) Collected:  07/12/17 1502    Specimen:  Blood from Arm, Left Updated:  07/13/17 1601     Blood Culture No growth at 24 hours    Beta Strep Culture, Throat [[id number]]  (Normal) Collected:  07/12/17 1524    Specimen:  Swab from Throat Updated:  07/14/17 0631     Throat Culture, Beta Strep No Beta Hemolytic Streptococcus Isolated at 2 days        Imaging Results (last 24 hours)     Procedure Component Value Units Date/Time    CT Soft Tissue Neck With Contrast [[id number]] Collected:  07/12/17 1636     Updated:  07/13/17 1419    Addenda:        Addendum:     There is question low-attenuation asymmetric soft tissue prominence in  the right false cord/vestibule along the right aryepiglottic fold. The  significance uncertain, direct visualization suggested.  This report was finalized on 07/13/2017 14:16 by Dr. Obi Phan MD.  Signed:  07/13/17 1416 by Obi Phan MD    Narrative:       EXAMINATION: CT SOFT TISSUE NECK W CONTRAST-  7/12/2017 4:36 PM CDT     HISTORY: Difficulty swallowing     COMPARISON:11/20/2014 head CT. Cervical spine MR dated 07/11/2017     TECHNIQUE:Radiation dose equals  mGy-cm.  Automated exposure  control dose reduction technique was implemented.     3 mm sequential transaxial images were obtained. 2-D sagittal and  coronal reconstruction images were generated.     FINDINGS:     Extensive postsurgical changes identified with anterior posterior fusion  changes.     Brain imaged appropriately without  abnormal mass.     The nasopharynx is observed symmetrically.     There is artifact in the oral cavity related to the spinal fusion  changes. There are no oral cavity masses. The hypopharynx and laryngeal  structures are imaged symmetrically without definite masses.     6 mm low-attenuation nodule noted in the right lobe of the thyroid  gland.     The submandibular and parotid glands are imaged symmetrically.     There are no pathologically enlarged lymph nodes with small lateral  cervical nodes. No lymphadenopathy identified.       Impression:       1. No CT evidence of definite neck mass or pathologically enlarged lymph  nodes/lymphadenopathy. 3. Extensive postsurgical changes cervical spine.  This report was finalized on 07/12/2017 16:41 by Dr. Obi Phan MD.    FL Video Swallow With Speech [[id number]] Collected:  07/13/17 1415     Updated:  07/13/17 1421    Narrative:       FL VIDEO SWALLOW W SPEECH- 7/13/2017 12:44 CST     REASON FOR EXAM: dysphagia; R13.0-Aphagia; R13.10-Dysphagia, unspecified     COMPARISON: NONE      FLUOROSCOPY TIME: 1 minutes     TECHNIQUE: In conjunction with speech pathology services, video  fluoroscopic swallow examination was performed with oral ingestion of  barium containing substances of various viscosities.      FINDINGS: Medium bolus sizes were attempted. Unfortunately, procedure  was terminated as patient was having pain with each attempt. There is  prominence of the prevertebral soft tissues       Impression:       1.  Procedure prematurely terminated.  2.  Prominence of the soft tissues in the prevertebral space. Recommend  direct visualization.        Please see speech pathologist's report for further details and  recommendations.         This report was finalized on 07/13/2017 14:18 by Dr. Guillermina Laws MD.          Medication Review:     Current Facility-Administered Medications   Medication Dose Route Frequency Provider Last Rate Last Dose   • clindamycin (CLEOCIN) 600  mg in dextrose 5% 50 mL IVPB (premix)  600 mg Intravenous Q8H Zeeshna Hester MD   Stopped at 07/14/17 0545   • CloNIDine (CATAPRES-TTS) 0.1 MG/24HR patch 1 patch  1 patch Transdermal Weekly Josafat Gonzales MD   1 patch at 07/13/17 2104   • dexamethasone sodium phosphate 6 mg in sodium chloride 0.9 % IVPB  6 mg Intravenous Q6H Man Kunz MD   Stopped at 07/14/17 0255   • enoxaparin (LOVENOX) syringe 40 mg  40 mg Subcutaneous Daily Josafat Gonzales MD   40 mg at 07/13/17 1012   • famotidine (PEPCID) injection 40 mg  40 mg Intravenous Q12H Man Kunz MD       • fosphenytoin (Cerebyx) injection 200 mg PE  200 mg Intravenous Nightly Josafat Gonzales MD   200 mg PE at 07/13/17 2023   • levETIRAcetam in NaCl 0.75% (KEPPRA) IVPB 1,000 mg  1,000 mg Intravenous Q12H Josafat Gonzales MD   Stopped at 07/13/17 2221   • LORazepam (ATIVAN) injection 0.5 mg  0.5 mg Intravenous Q2H PRN Josafat Gonzales MD   0.5 mg at 07/14/17 0220   • Morphine sulfate (PF) injection 2 mg  2 mg Intravenous Q2H PRN Zeeshan Hester MD   2 mg at 07/14/17 0650   • ondansetron (ZOFRAN) injection 4 mg  4 mg Intravenous Q6H PRN Zeeshan Hester MD       • piperacillin-tazobactam (ZOSYN) 3.375 g in iso-osmotic dextrose 50 ml (premix)  3.375 g Intravenous Q6H Zeeshan Hester MD 0 mL/hr at 07/14/17 0012 3.375 g at 07/14/17 0551   • sodium chloride 0.9 % bolus 1,000 mL  1,000 mL Intravenous Once Juanjose Last PA-C   1,000 mL at 07/12/17 1721   • sodium chloride 0.9 % infusion  100 mL/hr Intravenous Continuous Zeeshan Hester  mL/hr at 07/14/17 0313 100 mL/hr at 07/14/17 0313       Assessment/Plan     Active Problems:    Inability to swallow    Conversion disorder with abnormal movement    Arytenoid cyst    Functional dysphonia    Reflux laryngitis      She has had minimal improvement on the antibiotics and steroids.  Laryngoscopy demonstrated no obvious drainable  abscess.  I reviewed the  images with Dr. Choudhury, who will be taking call this weekend we will start a proton pump inhibitor and continue IV antibiotics and steroids and check for resolution.  We will continue to follow.    Man Kunz MD    07/14/17  8:54 AM         Electronically signed by Man Kunz MD at 7/14/2017  8:55 AM           Consult Notes (last 72 hours) (Notes from 7/11/2017  9:19 AM through 7/14/2017  9:19 AM)      Man Kunz MD at 7/13/2017 10:16 AM  Version 1 of 1     Consult Orders:    1. Inpatient Consult to ENT [[id number]] ordered by Zeeshan Hester MD at 07/12/17 1957                ENT/[facility] ([facility]) Consult Note:    Referring Provider: Josafat Gonzales MD    Reason for Consultation: Throat pain and dysphagia    Patient Care Team:  Josafat Gonzales MD as PCP - General  Josafat Gonzales MD as PCP - Family Medicine  Pawel Abrams MD as PCP - Cardiology (Cardiology)    Chief complaint inability to swallow and throat pain    Subjective .     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  This started 5 days ago.  This is progressed to the point of 9 out of 10 pain located in the laryngeal area.  She denies any shortness of breath.  She does have associated dysphagia.  The pain is constant and worse with swallowing.  Nothing makes it better.  She denies any chills.  She had one fever last night.  She has had no sick contacts.  She has had no otalgia.  She has had no voice change prior to this episode.  Now at this episode, she has a strained voice.  She is gotten a little bit better on antibiotics.    She had a stressful episode 1 year ago when her son was placed in longterm.  She reports that after that episode, she lost use of her legs.  She is now wheelchair bound.  She is seeing Dr. Magana neurology for this.  This sounds like it was some sort of conversion  disorder.    Review of Systems  Review of Systems   Constitutional: Negative for unexpected weight change.   HENT: Positive for ear pain, trouble swallowing and voice change.    Genitourinary:        Incontinence   Musculoskeletal: Positive for back pain, gait problem and neck pain.        Lost use of lower extremities last year during a stressful episode.    Neurological: Negative for facial asymmetry.   Psychiatric/Behavioral:        Conversion disorder   All other systems reviewed and are negative.      History  Past Medical History:   Diagnosis Date   • Asthma    • Cancer     colon cancer   • Depression    • Disease of thyroid gland    • Hypertension    • Insomnia    • Seizure    • Sleep apnea    ,   Past Surgical History:   Procedure Laterality Date   • BACK SURGERY     • COLON SURGERY     • GASTRIC BYPASS     • HYSTERECTOMY     • KNEE ARTHROSCOPY      bilateral   • NECK SURGERY     • ORIF WRIST FRACTURE Right 6/9/2017    Procedure: WRIST OPEN REDUCTION INTERNAL FIXATION, RIGHT;  Surgeon: Alec Cardoza MD;  Location: [facility] OR;  Service:    • TONSILLECTOMY     • WRIST FRACTURE SURGERY     , History reviewed. No pertinent family history.,   Social History   Substance Use Topics   • Smoking status: Never Smoker   • Smokeless tobacco: Never Used   • Alcohol use No   ,   Prescriptions Prior to Admission   Medication Sig Dispense Refill Last Dose   • amLODIPine (NORVASC) 10 MG tablet Take 5 mg by mouth Every 12 (Twelve) Hours.   Past Week at Unknown time   • clonazePAM (KlonoPIN) 1 MG tablet Take 1 mg by mouth 3 (Three) Times a Day As Needed for Anxiety.   Past Week at Unknown time   • CloNIDine (CATAPRES) 0.1 MG tablet Take 0.1 mg by mouth Every 12 (Twelve) Hours.   Past Week at Unknown time   • DULoxetine (CYMBALTA) 30 MG capsule Take 30 mg by mouth Daily As Needed (for depression.).   Past Week at Unknown time   • gabapentin (NEURONTIN) 300 MG capsule Take 300 mg by mouth 3 (Three) Times a Day.   Past Week  at Unknown time   • levETIRAcetam (KEPPRA) 1000 MG tablet Take 1,000 mg by mouth Every 12 (Twelve) Hours.   Past Week at Unknown time   • oxyCODONE (ROXICODONE) 10 MG tablet Take 10 mg by mouth Every 4 (Four) Hours As Needed for Moderate Pain .   Past Week at Unknown time   • phenytoin (DILANTIN) 100 MG ER capsule Take 100 mg by mouth Daily.   Past Week at Unknown time   • phenytoin (DILANTIN) 100 MG ER capsule Take 200 mg by mouth Every Night.   Past Week at Unknown time   • potassium chloride (K-DUR,KLOR-CON) 20 MEQ CR tablet Take 20 mEq by mouth Every 12 (Twelve) Hours.   Past Week at Unknown time   • QUEtiapine (SEROquel) 100 MG tablet Take 100 mg by mouth 4 (Four) Times a Day.   Past Week at Unknown time   • traZODone (DESYREL) 50 MG tablet Take 50 mg by mouth Every Night.   Past Week at Unknown time   • venlafaxine XR (EFFEXOR-XR) 150 MG 24 hr capsule Take 300 mg by mouth Daily.   Past Week at Unknown time   • zolpidem CR (AMBIEN CR) 12.5 MG CR tablet Take 12.5 mg by mouth Every Night.   Past Week at Unknown time    and Allergies:  Review of patient's allergies indicates no known allergies.    Objective     Vital Signs   Temp:  [98.1 °F (36.7 °C)-101 °F (38.3 °C)] 98.4 °F (36.9 °C)  Heart Rate:  [] 85  Resp:  [18-22] 20  BP: (148-177)/() 177/99    Physical Exam:   Physical Exam   Constitutional: She appears well-developed. She appears distressed.   HENT:   Head: Normocephalic and atraumatic.   Right Ear: External ear normal.   Left Ear: External ear normal.   Nose: Nose normal.   Mouth/Throat: Oropharynx is clear and moist. No oropharyngeal exudate.   Eyes: Conjunctivae and EOM are normal. Pupils are equal, round, and reactive to light. Right eye exhibits no discharge. Left eye exhibits no discharge.   Neck: Normal range of motion. Neck supple. No tracheal deviation present. No thyromegaly present.   Pain is located at the left greater than right arytenoid area   Pulmonary/Chest: Effort normal. No  stridor.   Lymphadenopathy:     She has no cervical adenopathy.   Neurological: She is alert. No cranial nerve deficit.   Skin: Skin is warm and dry. No rash noted. She is not diaphoretic. No erythema.   Psychiatric: Her behavior is normal. Judgment and thought content normal. Her mood appears anxious.   Speech is strained       Results Review:     Lab Results (last 24 hours)     Procedure Component Value Units Date/Time    CBC Auto Differential [[id number]]  (Abnormal) Collected:  07/12/17 1501    Specimen:  Blood Updated:  07/12/17 1514     WBC 11.77 (H) 10*3/mm3      RBC 4.48 10*6/mm3      Hemoglobin 13.0 g/dL      Hematocrit 39.1 %      MCV 87.3 fL      MCH 29.0 pg      MCHC 33.2 g/dL      RDW 13.8 %      RDW-SD 44.0 fl      MPV 9.8 fL      Platelets 267 10*3/mm3      Neutrophil % 81.2 (H) %      Lymphocyte % 9.9 (L) %      Monocyte % 8.2 %      Eosinophil % 0.4 %      Basophil % 0.1 %      Immature Grans % 0.2 %      Neutrophils, Absolute 9.56 (H) 10*3/mm3      Lymphocytes, Absolute 1.17 10*3/mm3      Monocytes, Absolute 0.96 10*3/mm3      Eosinophils, Absolute 0.05 10*3/mm3      Basophils, Absolute 0.01 10*3/mm3      Immature Grans, Absolute 0.02 10*3/mm3     CBC & Differential [[id number]] Collected:  07/12/17 1501    Specimen:  Blood Updated:  07/12/17 1514    Narrative:       The following orders were created for panel order CBC & Differential.  Procedure                               Abnormality         Status                     ---------                               -----------         ------                     CBC Auto Differential[[id number]]        Abnormal            Final result                 Please view results for these tests on the individual orders.    Lactic Acid, Plasma [[id number]]  (Normal) Collected:  07/12/17 1501    Specimen:  Blood Updated:  07/12/17 1523     Lactate 2.0 mmol/L     Comprehensive Metabolic Panel [[id number]]  (Abnormal) Collected:  07/12/17 1501    Specimen:  Blood  Updated:  07/12/17 1528     Glucose 111 (H) mg/dL      BUN 7 mg/dL      Creatinine 0.65 mg/dL      Sodium 134 (L) mmol/L      Potassium 3.9 mmol/L      Chloride 95 (L) mmol/L      CO2 26.0 mmol/L      Calcium 9.1 mg/dL      Total Protein 7.2 g/dL      Albumin 4.10 g/dL      ALT (SGPT) 23 U/L      AST (SGOT) 26 U/L      Alkaline Phosphatase 163 (H) U/L      Total Bilirubin 0.7 mg/dL      eGFR Non African Amer 93 mL/min/1.73      Globulin 3.1 gm/dL      A/G Ratio 1.3 g/dL      BUN/Creatinine Ratio 10.8     Anion Gap 13.0 mmol/L     Mononucleosis Screen [[id number]]  (Normal) Collected:  07/12/17 1501    Specimen:  Blood Updated:  07/12/17 1542     Monospot Negative    Rapid Strep A Screen [[id number]]  (Normal) Collected:  07/12/17 1524    Specimen:  Swab from Throat Updated:  07/12/17 1558     Strep A Ag Negative    Blood Culture [[id number]]  (Normal) Collected:  07/12/17 1502    Specimen:  Blood from Arm, Left Updated:  07/13/17 0401     Blood Culture No growth at less than 24 hours    Beta Strep Culture, Throat [[id number]]  (Normal) Collected:  07/12/17 1524    Specimen:  Swab from Throat Updated:  07/13/17 0718     Throat Culture, Beta Strep No Beta Hemolytic Streptococcus Isolated at 24 hrs         Imaging Results (last 24 hours)     Procedure Component Value Units Date/Time    CT Soft Tissue Neck With Contrast [[id number]] Collected:  07/12/17 1636     Updated:  07/12/17 1644    Narrative:       EXAMINATION: CT SOFT TISSUE NECK W CONTRAST-  7/12/2017 4:36 PM CDT     HISTORY: Difficulty swallowing     COMPARISON:11/20/2014 head CT. Cervical spine MR dated 07/11/2017     TECHNIQUE:Radiation dose equals  mGy-cm.  Automated exposure  control dose reduction technique was implemented.     3 mm sequential transaxial images were obtained. 2-D sagittal and  coronal reconstruction images were generated.     FINDINGS:     Extensive postsurgical changes identified with anterior posterior fusion  changes.     Brain  imaged appropriately without abnormal mass.     The nasopharynx is observed symmetrically.     There is artifact in the oral cavity related to the spinal fusion  changes. There are no oral cavity masses. The hypopharynx and laryngeal  structures are imaged symmetrically without definite masses.     6 mm low-attenuation nodule noted in the right lobe of the thyroid  gland.     The submandibular and parotid glands are imaged symmetrically.     There are no pathologically enlarged lymph nodes with small lateral  cervical nodes. No lymphadenopathy identified.       Impression:       1. No CT evidence of definite neck mass or pathologically enlarged lymph  nodes/lymphadenopathy. 3. Extensive postsurgical changes cervical spine.  This report was finalized on 07/12/2017 16:41 by Dr. Obi Phan MD.          I have personally reviewed the CT scan of the neck.  The following is my interpretation: There is a fluid collection in the area of the right arytenoid measuring approximately 16 mm.  There is no peripheral enhancement, however, this is a low density, cystic type lesion.  She is status post extensive cervical fusion        Assessment/Plan     Active Problems:    Inability to swallow    Conversion disorder with abnormal movement    Arytenoid cyst      Continue clindamycin.  We will increase the Decadron to 6 mg IV every 6 hours following a 10 mg dose.  Will follow for progression/regression.    I discussed the patients findings and my recommendations with patient    Man Kunz MD  07/13/17  1:49 PM               Electronically signed by Man Kunz MD at 7/13/2017  1:51 PM      Darlene Pang MD at 7/13/2017 10:46 AM  Version 2 of 2     Consult Orders:    1. Inpatient Consult to Gastroenterology [[id number]] ordered by Josafat Gonzales MD at 07/13/17 0849                        [facility] Gastroenterology  Inpatient Consult Note  Today's date:  07/13/17    Bita Croft  1955       Referring  Provider: Josafat Gonzales MD  Primary Physician: Josafat Gonzales MD   Primary Gastroenterologist: dr bullock    Date of Admission: 7/12/2017  Date of Service:  07/13/17    Reason for Consultation/Chief Complaint: Sore throat    History of present illness:    61-year-old female whom we have been asked to see for sore throat.  5 days ago she started with acute sore throat.  Before that she was having no throat pain.  She did not have any problems swallowing.  Over the last 5 days she has had extreme sore throat, described as burning.  It is very painful to swallow.  She has been avoiding swallowing much due to the pain.  She has febrile.  Temp on admission was 101, tenderness morning is 98.4.  White blood cell count is elevated.  Monospot negative, strep a negative, beta strep preliminary pending.  Blood cultures have also been obtained.  She denies any history of reflux symptoms.  No hematemesis.  Upper endoscopy done 2015 by Dr. Bullock noting findings of gastric bypass with a medium-sized pouch, normal esophagus.  Denies any complaints of esophageal dysphagia.  Speech is following the patient as well as well and to have a dysphagia study today.  ENT has been consult to see the patient.  Neurology is asked see the patient as well.  CT of the neck negative.  She has had some neurological symptoms over the last 8 months.  Has had seizures as well as unable to move her lower extremities.  She has been seen by some local neurologist.  Neurology has been consulted on  this admission as well.    Addendum per Dr. 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  She has asked to see regarding swallowing problems.  The patient has absolutely no problems with the food in the esophageal area.  She states that she has severe burning in her throat and neck that was of abrupt onset 5 days ago.  She cannot identify any exacerbating or relieving factors..  An endoscopy done by  Dr. Michele in May 2015 that showed post surgical state for gastric bypass.  Been no unintentional weight loss.  She has been no melena or hematochezia.  Speech therapy notes were reviewed.  There is concern of aspiration since she has laryngeal penetration with pudding thick substances.      Past Medical History:   Diagnosis Date   • Asthma    • Cancer     colon cancer   • Depression    • Disease of thyroid gland    • Hypertension    • Insomnia    • Seizure    • Sleep apnea        Past Surgical History:   Procedure Laterality Date   • BACK SURGERY     • COLON SURGERY     • GASTRIC BYPASS     • HYSTERECTOMY     • KNEE ARTHROSCOPY      bilateral   • NECK SURGERY     • ORIF WRIST FRACTURE Right 6/9/2017    Procedure: WRIST OPEN REDUCTION INTERNAL FIXATION, RIGHT;  Surgeon: Alec Cardoza MD;  Location: [facility];  Service:    • TONSILLECTOMY     • WRIST FRACTURE SURGERY          No Known Allergies    Prescriptions Prior to Admission   Medication Sig Dispense Refill Last Dose   • amLODIPine (NORVASC) 10 MG tablet Take 0.5 tablets by mouth 2 (Two) Times a Day. 30 tablet 5 6/9/2017 at 0730   • clonazePAM (KlonoPIN) 1 MG tablet Take 1 mg by mouth 3 (Three) Times a Day As Needed for Anxiety. One half to one by mouth 2-3 times daily as needed for anxiety    6/8/2017 at 2000   • CloNIDine (CATAPRES) 0.1 MG tablet Take 0.1 mg by mouth 2 (Two) Times a Day.      • CloNIDine (CATAPRES) 0.1 MG tablet TAKE 1 TABLET BY MOUTH TWICE DAILY 60 tablet 0    • dicyclomine (BENTYL) 20 MG tablet Take 1 tablet by mouth Every 8 (Eight) Hours As Needed (gi pain) for up to 10 doses. 10 tablet 0    • DULoxetine (CYMBALTA) 30 MG capsule Take 30 mg by mouth Daily.      • gabapentin (NEURONTIN) 300 MG capsule Take 300 mg by mouth 3 (Three) Times a Day.      • KLOR-CON 20 MEQ CR tablet Take 1 tablet by mouth 2 (Two) Times a Day. 60 tablet 5 6/8/2017 at 2000   • levETIRAcetam (KEPPRA) 1000 MG tablet Take 1 tablet by mouth 2 (Two) Times a Day. 60  tablet 5 6/9/2017 at 0730   • oxyCODONE (ROXICODONE) 5 MG immediate release tablet Take 10 mg by mouth Every 4 (Four) Hours As Needed for Moderate Pain (4-6).      • phenytoin (DILANTIN) 100 MG ER capsule Take 1 capsule by mouth Daily.   6/8/2017 at 2000   • phenytoin (DILANTIN) 100 MG ER capsule Take 200 mg by mouth Every Night.      • QUEtiapine (SEROquel) 100 MG tablet Take 100 mg by mouth 4 (Four) Times a Day.   6/8/2017 at 2000   • QUEtiapine (SEROquel) 25 MG tablet Take 25 mg by mouth 2 (Two) Times a Day. Take with 100mg      • traZODone (DESYREL) 50 MG tablet Take 50 mg by mouth Every Night.      • venlafaxine XR (EFFEXOR-XR) 150 MG 24 hr capsule Take 150 mg by mouth 2 (Two) Times a Day.      • zolpidem CR (AMBIEN CR) 12.5 MG CR tablet Take 12.5 mg by mouth At Night As Needed for Sleep.   6/8/2017 at 2000       Hospital Medications (active)       Dose Frequency Start End    acetaminophen (TYLENOL) suppository 650 mg 650 mg Once 7/12/2017 7/12/2017    Sig - Route: Insert 2 suppositories into the rectum 1 (One) Time. - Rectal    Cosign for Ordering: Accepted by Ryan Henning MD on 7/12/2017  3:20 PM    clindamycin (CLEOCIN) 600 mg in dextrose 5% 50 mL IVPB (premix) 600 mg Every 8 Hours 7/12/2017     Sig - Route: Infuse 50 mL into a venous catheter Every 8 (Eight) Hours. - Intravenous    dexamethasone (DECADRON) injection 4 mg 4 mg Every 6 Hours 7/12/2017     Sig - Route: Infuse 1 mL into a venous catheter Every 6 (Six) Hours. - Intravenous    enoxaparin (LOVENOX) syringe 40 mg 40 mg Daily 7/13/2017     Sig - Route: Inject 0.4 mL under the skin Daily. - Subcutaneous    iopamidol (ISOVUE-300) 61 % injection 100 mL 100 mL Once in Imaging 7/12/2017 7/12/2017    Sig - Route: Infuse 100 mL into a venous catheter Once. - Intravenous    lidocaine viscous (XYLOCAINE) 2 % mouth solution 15 mL 15 mL Once 7/12/2017 7/12/2017    Sig - Route: Apply 15 mL to the mouth or throat 1 (One) Time. - Mouth/Throat    Cosign for  Ordering: Accepted by Ryan Henning MD on 7/12/2017  3:20 PM    methylPREDNISolone sodium succinate (SOLU-Medrol) injection 125 mg 125 mg Once 7/12/2017 7/12/2017    Sig - Route: Inject 2 mL into the shoulder, thigh, or buttocks 1 (One) Time. - Intramuscular    Cosign for Ordering: Accepted by Ryan Henning MD on 7/12/2017  3:20 PM    Morphine sulfate (PF) injection 2 mg 2 mg Every 2 Hours PRN 7/12/2017 7/22/2017    Sig - Route: Infuse 1 mL into a venous catheter Every 2 (Two) Hours As Needed for Severe Pain . - Intravenous    Morphine sulfate (PF) injection 4 mg 4 mg Once 7/12/2017 7/12/2017    Sig - Route: Infuse 1 mL into a venous catheter 1 (One) Time. - Intravenous    ondansetron (ZOFRAN) injection 4 mg 4 mg Every 6 Hours PRN 7/12/2017     Sig - Route: Infuse 2 mL into a venous catheter Every 6 (Six) Hours As Needed for Nausea or Vomiting. - Intravenous    piperacillin-tazobactam (ZOSYN) 3.375 g in iso-osmotic dextrose 50 ml (premix) 3.375 g Every 6 Hours 7/12/2017     Sig - Route: Infuse 50 mL into a venous catheter Every 6 (Six) Hours. - Intravenous    sodium chloride 0.9 % bolus 1,000 mL 1,000 mL Once 7/12/2017 7/12/2017    Sig - Route: Infuse 1,000 mL into a venous catheter 1 (One) Time. - Intravenous    Cosign for Ordering: Accepted by Ryan Henning MD on 7/12/2017  3:20 PM    sodium chloride 0.9 % bolus 1,000 mL 1,000 mL Once 7/12/2017     Sig - Route: Infuse 1,000 mL into a venous catheter 1 (One) Time. - Intravenous    Cosign for Ordering: Accepted by Ryan Henning MD on 7/12/2017  6:12 PM    sodium chloride 0.9 % infusion 100 mL/hr Continuous 7/12/2017     Sig - Route: Infuse 100 mL/hr into a venous catheter Continuous. - Intravenous    sodium chloride 0.9 % bolus 500 mL (Discontinued) 500 mL Once 7/12/2017 7/12/2017    Sig - Route: Infuse 500 mL into a venous catheter 1 (One) Time. - Intravenous    Cosign for Ordering: Accepted by Ryan Henning MD on 7/12/2017  3:20 PM          Social History    Substance Use Topics   • Smoking status: Never Smoker   • Smokeless tobacco: Never Used   • Alcohol use No        Past Family History:  History reviewed. No pertinent family history.    Review of Systems:  Review of Systems   Constitutional: Positive for fever.   HENT: Positive for sore throat and trouble swallowing.    Respiratory: Negative for cough, shortness of breath and wheezing.    Cardiovascular: Negative for chest pain and palpitations.   Gastrointestinal: Negative for abdominal distention, abdominal pain, anal bleeding, blood in stool, constipation, diarrhea, nausea, rectal pain and vomiting.   Endocrine: Negative for cold intolerance and heat intolerance.   Genitourinary: Negative for difficulty urinating and hematuria.   Musculoskeletal: Positive for gait problem.   Skin: Negative for color change and rash.   Neurological: Positive for seizures and light-headedness.        Over the last 8 months states that she has been unable to move her legs well.  She has to use a walker at home.  States that she has decreased sensation to the lower extremities.   Psychiatric/Behavioral: Negative for confusion.       Physical Exam:  Temp:  [98.1 °F (36.7 °C)-101 °F (38.3 °C)] 98.4 °F (36.9 °C)  Heart Rate:  [] 85  Resp:  [18-22] 20  BP: (148-177)/() 177/99  Body mass index is 30.27 kg/(m^2).    Intake/Output Summary (Last 24 hours) at 07/13/17 1046  Last data filed at 07/12/17 2120   Gross per 24 hour   Intake               50 ml   Output                0 ml   Net               50 ml        Physical Exam   Constitutional: She is oriented to person, place, and time. She appears well-developed and well-nourished. No distress.   HENT:   Head: Normocephalic and atraumatic.   Mouth/Throat: Oropharynx is clear and moist.   Eyes: No scleral icterus.   Neck: Normal range of motion. Neck supple. No JVD present. No tracheal deviation present.   Cardiovascular: Normal rate, regular rhythm and normal heart sounds.   Exam reveals no gallop and no friction rub.    No murmur heard.  Pulmonary/Chest: Effort normal and breath sounds normal. No stridor. No respiratory distress. She has no wheezes. She has no rales.   Abdominal: Soft. Bowel sounds are normal. She exhibits no distension and no mass. There is no tenderness. There is no rebound and no guarding.   Musculoskeletal: She exhibits no edema.   Neurological: She is alert and oriented to person, place, and time.   Skin: Skin is warm and dry. No rash noted.   Psychiatric: She has a normal mood and affect. Her behavior is normal.   Vitals reviewed.    I have performed the physical exam and agree with the findings as noted above.   YUMI Pang MD    Results Review:  Lab Results (last 24 hours)     Procedure Component Value Units Date/Time    CBC Auto Differential [[id number]]  (Abnormal) Collected:  07/12/17 1501    Specimen:  Blood Updated:  07/12/17 1514     WBC 11.77 (H) 10*3/mm3      RBC 4.48 10*6/mm3      Hemoglobin 13.0 g/dL      Hematocrit 39.1 %      MCV 87.3 fL      MCH 29.0 pg      MCHC 33.2 g/dL      RDW 13.8 %      RDW-SD 44.0 fl      MPV 9.8 fL      Platelets 267 10*3/mm3      Neutrophil % 81.2 (H) %      Lymphocyte % 9.9 (L) %      Monocyte % 8.2 %      Eosinophil % 0.4 %      Basophil % 0.1 %      Immature Grans % 0.2 %      Neutrophils, Absolute 9.56 (H) 10*3/mm3      Lymphocytes, Absolute 1.17 10*3/mm3      Monocytes, Absolute 0.96 10*3/mm3      Eosinophils, Absolute 0.05 10*3/mm3      Basophils, Absolute 0.01 10*3/mm3      Immature Grans, Absolute 0.02 10*3/mm3     CBC & Differential [[id number]] Collected:  07/12/17 1501    Specimen:  Blood Updated:  07/12/17 1514    Narrative:       The following orders were created for panel order CBC & Differential.  Procedure                               Abnormality         Status                     ---------                               -----------         ------                     CBC Auto Differential[[id number]]         Abnormal            Final result                 Please view results for these tests on the individual orders.    Lactic Acid, Plasma [[id number]]  (Normal) Collected:  07/12/17 1501    Specimen:  Blood Updated:  07/12/17 1523     Lactate 2.0 mmol/L     Comprehensive Metabolic Panel [[id number]]  (Abnormal) Collected:  07/12/17 1501    Specimen:  Blood Updated:  07/12/17 1528     Glucose 111 (H) mg/dL      BUN 7 mg/dL      Creatinine 0.65 mg/dL      Sodium 134 (L) mmol/L      Potassium 3.9 mmol/L      Chloride 95 (L) mmol/L      CO2 26.0 mmol/L      Calcium 9.1 mg/dL      Total Protein 7.2 g/dL      Albumin 4.10 g/dL      ALT (SGPT) 23 U/L      AST (SGOT) 26 U/L      Alkaline Phosphatase 163 (H) U/L      Total Bilirubin 0.7 mg/dL      eGFR Non African Amer 93 mL/min/1.73      Globulin 3.1 gm/dL      A/G Ratio 1.3 g/dL      BUN/Creatinine Ratio 10.8     Anion Gap 13.0 mmol/L     Mononucleosis Screen [[id number]]  (Normal) Collected:  07/12/17 1501    Specimen:  Blood Updated:  07/12/17 1542     Monospot Negative    Rapid Strep A Screen [[id number]]  (Normal) Collected:  07/12/17 1524    Specimen:  Swab from Throat Updated:  07/12/17 1558     Strep A Ag Negative    Blood Culture [[id number]]  (Normal) Collected:  07/12/17 1502    Specimen:  Blood from Arm, Left Updated:  07/13/17 0401     Blood Culture No growth at less than 24 hours    Beta Strep Culture, Throat [[id number]]  (Normal) Collected:  07/12/17 1524    Specimen:  Swab from Throat Updated:  07/13/17 0718     Throat Culture, Beta Strep No Beta Hemolytic Streptococcus Isolated at 24 hrs          Radiology Review:  Imaging Results (last 72 hours)     Procedure Component Value Units Date/Time    CT Soft Tissue Neck With Contrast [[id number]] Collected:  07/12/17 1636     Updated:  07/12/17 1644    Narrative:       EXAMINATION: CT SOFT TISSUE NECK W CONTRAST-  7/12/2017 4:36 PM CDT     HISTORY: Difficulty swallowing     COMPARISON:11/20/2014 head CT. Cervical spine MR  dated 07/11/2017     TECHNIQUE:Radiation dose equals  mGy-cm.  Automated exposure  control dose reduction technique was implemented.     3 mm sequential transaxial images were obtained. 2-D sagittal and  coronal reconstruction images were generated.     FINDINGS:     Extensive postsurgical changes identified with anterior posterior fusion  changes.     Brain imaged appropriately without abnormal mass.     The nasopharynx is observed symmetrically.     There is artifact in the oral cavity related to the spinal fusion  changes. There are no oral cavity masses. The hypopharynx and laryngeal  structures are imaged symmetrically without definite masses.     6 mm low-attenuation nodule noted in the right lobe of the thyroid  gland.     The submandibular and parotid glands are imaged symmetrically.     There are no pathologically enlarged lymph nodes with small lateral  cervical nodes. No lymphadenopathy identified.       Impression:       1. No CT evidence of definite neck mass or pathologically enlarged lymph  nodes/lymphadenopathy. 3. Extensive postsurgical changes cervical spine.  This report was finalized on 07/12/2017 16:41 by Dr. Obi Phan MD.          Impression/Plan:  Patient Active Problem List   Diagnosis Code   • Confusion R41.0   • Depression F32.9   • Anxiety F41.9   • Hyponatremia E87.1   • Seizure disorder G40.909   • Chronic neck pain M54.2, G89.29   • Chronic back pain M54.9, G89.29   • Hypothyroidism E03.9   • Abnormal thyroid blood test R94.6   • Hypertension I10   • Wellness examination-done Z00.00   • Elevated fasting glucose R73.01   • Anemia D64.9   • Iron deficiency E61.1   • Degenerative joint disease of spine M47.9   • Inability to swallow R13.0       Oral pharyngeal dysphagia  Her main complaint is that of throat pain which started acutely 5 days ago.  Prior to this she had no problems swallowing.  She continues to deny any problems with esophageal dysphagia.  Last endoscopy was done  May 2015 by Dr. Bullock.  This because of nausea and vomiting.  This study showed gastric bypass state.  He saw a small hole at the anastomosis that he thought might represent a fistula.  An upper GI series however was subsequently negative.  She has also had a temperature and white blood count has been elevated.  I feel this could be more infectious process.  No plans for EGD at this point.  If she is unable to maintain any by mouth intake due to oropharyngeal dysphagia, she unfortunately is NOT a candidate for PEG tube placement because she has had a gastric bypass.      DEBORAH Samuels  07/13/17   10:46 AM    Darlene Pang MD  [facility]  07/13/17  1:14 PM    Much of this encounter note is an electronic transcription/translation of spoken language to printed text. The electronic translation of spoken language may permit erroneous, or at times, nonsensical words or phrases to be inadvertently transcribed; although I have reviewed the note for such errors, some may still exist.             Electronically signed by Darlene Pang MD at 7/13/2017  4:00 PM      DEBORAH Cooper at 7/13/2017 10:46 AM  Version 1 of 2                 [facility]  Inpatient Consult Note  Today's date:  07/13/17    Bita Croft  1955       Referring Provider: Josafat Gonzales MD  Primary Physician: Josafat Gonzales MD   Primary Gastroenterologist: dr bullock    Date of Admission: 7/12/2017  Date of Service:  07/13/17    Reason for Consultation/Chief Complaint: Dysphagia    History of present illness:        61-year-old female whom we have been asked to see for dysphagia.  5 days ago she started with acute sore throat.  Before that she was having no throat pain.  She did not have any problems swallowing.  Over the last 5 days she has had extreme sore throat, described as burning.  It is very painful to swallow.  She has been avoiding swallowing much due to the pain.  She has  febrile.  Temp on admission was 101, tenderness morning is 98.4.  White blood cell count is elevated.  Monospot negative, strep a negative, beta strep preliminary pending.  Blood cultures have also been obtained.  She denies any history of reflux symptoms.  No hematemesis.  Upper endoscopy done 2015 by Dr. Michele noting findings of gastric bypass with a medium-sized pouch, normal esophagus.  Denies any complaints of esophageal dysphagia.  Speech is following the patient as well as well and to have a dysphagia study today.  ENT has been consult to see the patient.  Neurology is asked see the patient as well.  CT of the neck negative.  She has had some neurological symptoms over the last 8 months.  Has had seizures as well as unable to move her lower extremities.  She has been seen by some local neurologist.  Neurology's consult this admission as well.    Past Medical History:   Diagnosis Date   • Asthma    • Cancer     colon cancer   • Depression    • Disease of thyroid gland    • Hypertension    • Insomnia    • Seizure    • Sleep apnea        Past Surgical History:   Procedure Laterality Date   • BACK SURGERY     • COLON SURGERY     • GASTRIC BYPASS     • HYSTERECTOMY     • KNEE ARTHROSCOPY      bilateral   • NECK SURGERY     • ORIF WRIST FRACTURE Right 6/9/2017    Procedure: WRIST OPEN REDUCTION INTERNAL FIXATION, RIGHT;  Surgeon: Alec Cardoza MD;  Location: [facility];  Service:    • TONSILLECTOMY     • WRIST FRACTURE SURGERY          No Known Allergies    Prescriptions Prior to Admission   Medication Sig Dispense Refill Last Dose   • amLODIPine (NORVASC) 10 MG tablet Take 0.5 tablets by mouth 2 (Two) Times a Day. 30 tablet 5 6/9/2017 at 0730   • clonazePAM (KlonoPIN) 1 MG tablet Take 1 mg by mouth 3 (Three) Times a Day As Needed for Anxiety. One half to one by mouth 2-3 times daily as needed for anxiety    6/8/2017 at 2000   • CloNIDine (CATAPRES) 0.1 MG tablet Take 0.1 mg by mouth 2 (Two) Times a Day.       • CloNIDine (CATAPRES) 0.1 MG tablet TAKE 1 TABLET BY MOUTH TWICE DAILY 60 tablet 0    • dicyclomine (BENTYL) 20 MG tablet Take 1 tablet by mouth Every 8 (Eight) Hours As Needed (gi pain) for up to 10 doses. 10 tablet 0    • DULoxetine (CYMBALTA) 30 MG capsule Take 30 mg by mouth Daily.      • gabapentin (NEURONTIN) 300 MG capsule Take 300 mg by mouth 3 (Three) Times a Day.      • KLOR-CON 20 MEQ CR tablet Take 1 tablet by mouth 2 (Two) Times a Day. 60 tablet 5 6/8/2017 at 2000   • levETIRAcetam (KEPPRA) 1000 MG tablet Take 1 tablet by mouth 2 (Two) Times a Day. 60 tablet 5 6/9/2017 at 0730   • oxyCODONE (ROXICODONE) 5 MG immediate release tablet Take 10 mg by mouth Every 4 (Four) Hours As Needed for Moderate Pain (4-6).      • phenytoin (DILANTIN) 100 MG ER capsule Take 1 capsule by mouth Daily.   6/8/2017 at 2000   • phenytoin (DILANTIN) 100 MG ER capsule Take 200 mg by mouth Every Night.      • QUEtiapine (SEROquel) 100 MG tablet Take 100 mg by mouth 4 (Four) Times a Day.   6/8/2017 at 2000   • QUEtiapine (SEROquel) 25 MG tablet Take 25 mg by mouth 2 (Two) Times a Day. Take with 100mg      • traZODone (DESYREL) 50 MG tablet Take 50 mg by mouth Every Night.      • venlafaxine XR (EFFEXOR-XR) 150 MG 24 hr capsule Take 150 mg by mouth 2 (Two) Times a Day.      • zolpidem CR (AMBIEN CR) 12.5 MG CR tablet Take 12.5 mg by mouth At Night As Needed for Sleep.   6/8/2017 at 2000       Hospital Medications (active)       Dose Frequency Start End    acetaminophen (TYLENOL) suppository 650 mg 650 mg Once 7/12/2017 7/12/2017    Sig - Route: Insert 2 suppositories into the rectum 1 (One) Time. - Rectal    Cosign for Ordering: Accepted by Ryan Henning MD on 7/12/2017  3:20 PM    clindamycin (CLEOCIN) 600 mg in dextrose 5% 50 mL IVPB (premix) 600 mg Every 8 Hours 7/12/2017     Sig - Route: Infuse 50 mL into a venous catheter Every 8 (Eight) Hours. - Intravenous    dexamethasone (DECADRON) injection 4 mg 4 mg Every 6 Hours  7/12/2017     Sig - Route: Infuse 1 mL into a venous catheter Every 6 (Six) Hours. - Intravenous    enoxaparin (LOVENOX) syringe 40 mg 40 mg Daily 7/13/2017     Sig - Route: Inject 0.4 mL under the skin Daily. - Subcutaneous    iopamidol (ISOVUE-300) 61 % injection 100 mL 100 mL Once in Imaging 7/12/2017 7/12/2017    Sig - Route: Infuse 100 mL into a venous catheter Once. - Intravenous    lidocaine viscous (XYLOCAINE) 2 % mouth solution 15 mL 15 mL Once 7/12/2017 7/12/2017    Sig - Route: Apply 15 mL to the mouth or throat 1 (One) Time. - Mouth/Throat    Cosign for Ordering: Accepted by Ryan Henning MD on 7/12/2017  3:20 PM    methylPREDNISolone sodium succinate (SOLU-Medrol) injection 125 mg 125 mg Once 7/12/2017 7/12/2017    Sig - Route: Inject 2 mL into the shoulder, thigh, or buttocks 1 (One) Time. - Intramuscular    Cosign for Ordering: Accepted by Ryan Henning MD on 7/12/2017  3:20 PM    Morphine sulfate (PF) injection 2 mg 2 mg Every 2 Hours PRN 7/12/2017 7/22/2017    Sig - Route: Infuse 1 mL into a venous catheter Every 2 (Two) Hours As Needed for Severe Pain . - Intravenous    Morphine sulfate (PF) injection 4 mg 4 mg Once 7/12/2017 7/12/2017    Sig - Route: Infuse 1 mL into a venous catheter 1 (One) Time. - Intravenous    ondansetron (ZOFRAN) injection 4 mg 4 mg Every 6 Hours PRN 7/12/2017     Sig - Route: Infuse 2 mL into a venous catheter Every 6 (Six) Hours As Needed for Nausea or Vomiting. - Intravenous    piperacillin-tazobactam (ZOSYN) 3.375 g in iso-osmotic dextrose 50 ml (premix) 3.375 g Every 6 Hours 7/12/2017     Sig - Route: Infuse 50 mL into a venous catheter Every 6 (Six) Hours. - Intravenous    sodium chloride 0.9 % bolus 1,000 mL 1,000 mL Once 7/12/2017 7/12/2017    Sig - Route: Infuse 1,000 mL into a venous catheter 1 (One) Time. - Intravenous    Cosign for Ordering: Accepted by Ryan Henning MD on 7/12/2017  3:20 PM    sodium chloride 0.9 % bolus 1,000 mL 1,000 mL Once 7/12/2017     Sig  - Route: Infuse 1,000 mL into a venous catheter 1 (One) Time. - Intravenous    Cosign for Ordering: Accepted by Ryan Henning MD on 7/12/2017  6:12 PM    sodium chloride 0.9 % infusion 100 mL/hr Continuous 7/12/2017     Sig - Route: Infuse 100 mL/hr into a venous catheter Continuous. - Intravenous    sodium chloride 0.9 % bolus 500 mL (Discontinued) 500 mL Once 7/12/2017 7/12/2017    Sig - Route: Infuse 500 mL into a venous catheter 1 (One) Time. - Intravenous    Cosign for Ordering: Accepted by Ryan Henning MD on 7/12/2017  3:20 PM          Social History   Substance Use Topics   • Smoking status: Never Smoker   • Smokeless tobacco: Never Used   • Alcohol use No        Past Family History:  History reviewed. No pertinent family history.    Review of Systems:  Review of Systems   Constitutional: Positive for fever.   HENT: Positive for sore throat and trouble swallowing.    Respiratory: Negative for cough, shortness of breath and wheezing.    Cardiovascular: Negative for chest pain and palpitations.   Gastrointestinal: Negative for abdominal distention, abdominal pain, anal bleeding, blood in stool, constipation, diarrhea, nausea, rectal pain and vomiting.   Endocrine: Negative for cold intolerance and heat intolerance.   Genitourinary: Negative for difficulty urinating and hematuria.   Musculoskeletal: Positive for gait problem.   Skin: Negative for color change and rash.   Neurological: Positive for seizures and light-headedness.        Over the last 8 months states that she has been unable to move her legs well.  She has to use a walker at home.  States that she has decreased sensation to the lower extremities.   Psychiatric/Behavioral: Negative for confusion.       Physical Exam:  Temp:  [98.1 °F (36.7 °C)-101 °F (38.3 °C)] 98.4 °F (36.9 °C)  Heart Rate:  [] 85  Resp:  [18-22] 20  BP: (148-177)/() 177/99  Body mass index is 30.27 kg/(m^2).    Intake/Output Summary (Last 24 hours) at 07/13/17  1046  Last data filed at 07/12/17 2120   Gross per 24 hour   Intake               50 ml   Output                0 ml   Net               50 ml        Physical Exam   Constitutional: She is oriented to person, place, and time. She appears well-developed and well-nourished. No distress.   HENT:   Head: Normocephalic and atraumatic.   Mouth/Throat: Oropharynx is clear and moist.   Eyes: No scleral icterus.   Neck: Normal range of motion. Neck supple. No JVD present. No tracheal deviation present.   Cardiovascular: Normal rate, regular rhythm and normal heart sounds.  Exam reveals no gallop and no friction rub.    No murmur heard.  Pulmonary/Chest: Effort normal and breath sounds normal. No stridor. No respiratory distress. She has no wheezes. She has no rales.   Abdominal: Soft. Bowel sounds are normal. She exhibits no distension and no mass. There is no tenderness. There is no rebound and no guarding.   Musculoskeletal: She exhibits no edema.   Neurological: She is alert and oriented to person, place, and time.   Skin: Skin is warm and dry. No rash noted.   Psychiatric: She has a normal mood and affect. Her behavior is normal.   Vitals reviewed.      Results Review:  Lab Results (last 24 hours)     Procedure Component Value Units Date/Time    CBC Auto Differential [[id number]]  (Abnormal) Collected:  07/12/17 1501    Specimen:  Blood Updated:  07/12/17 1514     WBC 11.77 (H) 10*3/mm3      RBC 4.48 10*6/mm3      Hemoglobin 13.0 g/dL      Hematocrit 39.1 %      MCV 87.3 fL      MCH 29.0 pg      MCHC 33.2 g/dL      RDW 13.8 %      RDW-SD 44.0 fl      MPV 9.8 fL      Platelets 267 10*3/mm3      Neutrophil % 81.2 (H) %      Lymphocyte % 9.9 (L) %      Monocyte % 8.2 %      Eosinophil % 0.4 %      Basophil % 0.1 %      Immature Grans % 0.2 %      Neutrophils, Absolute 9.56 (H) 10*3/mm3      Lymphocytes, Absolute 1.17 10*3/mm3      Monocytes, Absolute 0.96 10*3/mm3      Eosinophils, Absolute 0.05 10*3/mm3      Basophils,  Absolute 0.01 10*3/mm3      Immature Grans, Absolute 0.02 10*3/mm3     CBC & Differential [[id number]] Collected:  07/12/17 1501    Specimen:  Blood Updated:  07/12/17 1514    Narrative:       The following orders were created for panel order CBC & Differential.  Procedure                               Abnormality         Status                     ---------                               -----------         ------                     CBC Auto Differential[[id number]]        Abnormal            Final result                 Please view results for these tests on the individual orders.    Lactic Acid, Plasma [[id number]]  (Normal) Collected:  07/12/17 1501    Specimen:  Blood Updated:  07/12/17 1523     Lactate 2.0 mmol/L     Comprehensive Metabolic Panel [[id number]]  (Abnormal) Collected:  07/12/17 1501    Specimen:  Blood Updated:  07/12/17 1528     Glucose 111 (H) mg/dL      BUN 7 mg/dL      Creatinine 0.65 mg/dL      Sodium 134 (L) mmol/L      Potassium 3.9 mmol/L      Chloride 95 (L) mmol/L      CO2 26.0 mmol/L      Calcium 9.1 mg/dL      Total Protein 7.2 g/dL      Albumin 4.10 g/dL      ALT (SGPT) 23 U/L      AST (SGOT) 26 U/L      Alkaline Phosphatase 163 (H) U/L      Total Bilirubin 0.7 mg/dL      eGFR Non African Amer 93 mL/min/1.73      Globulin 3.1 gm/dL      A/G Ratio 1.3 g/dL      BUN/Creatinine Ratio 10.8     Anion Gap 13.0 mmol/L     Mononucleosis Screen [[id number]]  (Normal) Collected:  07/12/17 1501    Specimen:  Blood Updated:  07/12/17 1542     Monospot Negative    Rapid Strep A Screen [[id number]]  (Normal) Collected:  07/12/17 1524    Specimen:  Swab from Throat Updated:  07/12/17 1558     Strep A Ag Negative    Blood Culture [[id number]]  (Normal) Collected:  07/12/17 1502    Specimen:  Blood from Arm, Left Updated:  07/13/17 0401     Blood Culture No growth at less than 24 hours    Beta Strep Culture, Throat [[id number]]  (Normal) Collected:  07/12/17 1524    Specimen:  Swab from Throat Updated:   07/13/17 0718     Throat Culture, Beta Strep No Beta Hemolytic Streptococcus Isolated at 24 hrs          Radiology Review:  Imaging Results (last 72 hours)     Procedure Component Value Units Date/Time    CT Soft Tissue Neck With Contrast [[id number]] Collected:  07/12/17 1636     Updated:  07/12/17 1644    Narrative:       EXAMINATION: CT SOFT TISSUE NECK W CONTRAST-  7/12/2017 4:36 PM CDT     HISTORY: Difficulty swallowing     COMPARISON:11/20/2014 head CT. Cervical spine MR dated 07/11/2017     TECHNIQUE:Radiation dose equals  mGy-cm.  Automated exposure  control dose reduction technique was implemented.     3 mm sequential transaxial images were obtained. 2-D sagittal and  coronal reconstruction images were generated.     FINDINGS:     Extensive postsurgical changes identified with anterior posterior fusion  changes.     Brain imaged appropriately without abnormal mass.     The nasopharynx is observed symmetrically.     There is artifact in the oral cavity related to the spinal fusion  changes. There are no oral cavity masses. The hypopharynx and laryngeal  structures are imaged symmetrically without definite masses.     6 mm low-attenuation nodule noted in the right lobe of the thyroid  gland.     The submandibular and parotid glands are imaged symmetrically.     There are no pathologically enlarged lymph nodes with small lateral  cervical nodes. No lymphadenopathy identified.       Impression:       1. No CT evidence of definite neck mass or pathologically enlarged lymph  nodes/lymphadenopathy. 3. Extensive postsurgical changes cervical spine.  This report was finalized on 07/12/2017 16:41 by Dr. Obi Phan MD.          Impression/Plan:  Patient Active Problem List   Diagnosis Code   • Confusion R41.0   • Depression F32.9   • Anxiety F41.9   • Hyponatremia E87.1   • Seizure disorder G40.909   • Chronic neck pain M54.2, G89.29   • Chronic back pain M54.9, G89.29   • Hypothyroidism E03.9   • Abnormal  thyroid blood test R94.6   • Hypertension I10   • Wellness examination-done Z00.00   • Elevated fasting glucose R73.01   • Anemia D64.9   • Iron deficiency E61.1   • Degenerative joint disease of spine M47.9   • Inability to swallow R13.0     Her main complaint is that of throat pain which started acutely 5 days ago.  Prior to this she had no problems swallowing.  She has also had a temperature and white blood count has been elevated.  I feel this could be more infectious process.  No plans for EGD at this point.  Agree with ENT evaluation as well as speech evaluation at this time.      DEBORAH Samuels    07/13/17   10:46 AM           Electronically signed by DEBORAH Cooper at 7/13/2017 10:53 AM      Kash Cardoza at 7/13/2017 11:45 AM  Version 1 of 1     Consult Orders:    1. Inpatient Consult to Spiritual Care [[id number]] ordered by Josafat Gonzales MD at 07/12/17 2133                Patient requested visit, completed 7-     Electronically signed by Kash Cardoza at 7/13/2017 11:45 AM      Jr Bates MD at 7/13/2017  3:55 PM  Version 1 of 1     Consult Orders:    1. Inpatient Consult to Neurosurgery [[id number]] ordered by Josafat Gonzales MD at 07/13/17 0849                  Referring Provider: @REFPROV    Reason for Consultation: The patient has had a chronic neurological impairment for almost a year involving her lower extremities primarily with weakness and numbness    Patient Care Team:  Josafat Gonzales MD as PCP - General  Josafat Gonzales MD as PCP - Family Medicine  Pawel Abrams MD as PCP - Cardiology (Cardiology)      Subjective .     Chief complaint/History of present illness:  She had history of 19 years ago falling down stairs steps necessitating surgery to thoraco- lumbar for fracture she also has had anterior and posterior cervical fusion in the past.  She is not certain why she had that surgery.  He has history of seizure disorder.   Her last seizure was approximately 6 weeks ago when she fell and sustained an injury of the right forearm forearm which is in a splint.  She has been followed extensively by Dr. Howie Abrams at [facility] having had imaging and spinal fluid studies.  She has also seen Dr. hunter the neurosurgeon at [facility].  He has not the demonstrated any neurosurgical lesion.  She indicates clearly that she has not been able to use her legs since last October.  Is not related specific pain in the spine.  She has had colon cancer.  She presented on this occasion with difficulty swallowing and fever and sore throat she denies hearing visual speech difficulty she has been incontinent for apparently over a year.  She is currently being treated by Dr. Delvis Samaniego for severe depression  .  .  Review of Systems  Review of Systems   Constitutional: Negative.    HENT: Positive for sore throat and trouble swallowing.    Eyes: Negative.    Respiratory: Positive for cough and choking.    Cardiovascular: Negative.    Gastrointestinal: Negative.         Colon cancer and incontinence   Endocrine: Negative.    Genitourinary: Negative.         In continence   Musculoskeletal: Negative.    Skin: Negative.    Allergic/Immunologic: Negative.    Neurological: Positive for seizures.   Hematological: Negative.    Psychiatric/Behavioral: Negative.          depression   All other systems reviewed and are negative.      History  Past Medical History:   Diagnosis Date   • Asthma    • Cancer     colon cancer   • Depression    • Disease of thyroid gland    • Hypertension    • Insomnia    • Seizure    • Sleep apnea    ,   Past Surgical History:   Procedure Laterality Date   • BACK SURGERY     • COLON SURGERY     • GASTRIC BYPASS     • HYSTERECTOMY     • KNEE ARTHROSCOPY      bilateral   • NECK SURGERY     • ORIF WRIST FRACTURE Right 6/9/2017    Procedure: WRIST OPEN REDUCTION INTERNAL FIXATION, RIGHT;  Surgeon: Alec Cardoza MD;  Location: [facility] OR;   Service:    • TONSILLECTOMY     • WRIST FRACTURE SURGERY     , History reviewed. No pertinent family history.,   Social History   Substance Use Topics   • Smoking status: Never Smoker   • Smokeless tobacco: Never Used   • Alcohol use No   ,   Prescriptions Prior to Admission   Medication Sig Dispense Refill Last Dose   • amLODIPine (NORVASC) 10 MG tablet Take 5 mg by mouth Every 12 (Twelve) Hours.   Past Week at Unknown time   • clonazePAM (KlonoPIN) 1 MG tablet Take 1 mg by mouth 3 (Three) Times a Day As Needed for Anxiety.   Past Week at Unknown time   • CloNIDine (CATAPRES) 0.1 MG tablet Take 0.1 mg by mouth Every 12 (Twelve) Hours.   Past Week at Unknown time   • DULoxetine (CYMBALTA) 30 MG capsule Take 30 mg by mouth Daily As Needed (for depression.).   Past Week at Unknown time   • gabapentin (NEURONTIN) 300 MG capsule Take 300 mg by mouth 3 (Three) Times a Day.   Past Week at Unknown time   • levETIRAcetam (KEPPRA) 1000 MG tablet Take 1,000 mg by mouth Every 12 (Twelve) Hours.   Past Week at Unknown time   • oxyCODONE (ROXICODONE) 10 MG tablet Take 10 mg by mouth Every 4 (Four) Hours As Needed for Moderate Pain .   Past Week at Unknown time   • phenytoin (DILANTIN) 100 MG ER capsule Take 100 mg by mouth Daily.   Past Week at Unknown time   • phenytoin (DILANTIN) 100 MG ER capsule Take 200 mg by mouth Every Night.   Past Week at Unknown time   • potassium chloride (K-DUR,KLOR-CON) 20 MEQ CR tablet Take 20 mEq by mouth Every 12 (Twelve) Hours.   Past Week at Unknown time   • QUEtiapine (SEROquel) 100 MG tablet Take 100 mg by mouth 4 (Four) Times a Day.   Past Week at Unknown time   • traZODone (DESYREL) 50 MG tablet Take 50 mg by mouth Every Night.   Past Week at Unknown time   • venlafaxine XR (EFFEXOR-XR) 150 MG 24 hr capsule Take 300 mg by mouth Daily.   Past Week at Unknown time   • zolpidem CR (AMBIEN CR) 12.5 MG CR tablet Take 12.5 mg by mouth Every Night.   Past Week at Unknown time    and Allergies:   Review of patient's allergies indicates no known allergies.    Objective     Vital Signs   Temp:  [98.1 °F (36.7 °C)-99.6 °F (37.6 °C)] 98.4 °F (36.9 °C)  Heart Rate:  [] 85  Resp:  [18-20] 20  BP: (150-177)/() 177/99    Physical Exam:  She is awake alert she is oriented properly.  She does have flat affect.  She follows commands appropriately.  Her chest was clear heart regular.  I did not hear murmur nor bruit.  Her abdomen is soft.  She has no peripheral edema.  She is hirsuite.  Her cranial nerves II through XII appear to be intact.  She has generalized weakness of the upper extremities with the right forearm and wrist in a splint.  She has minimal movement of the feet.  No antigravity power in the lower extremities.  Pinprick sensation is appreciated throughout.  She denies position sense in both feet.  As no clonus she has no Elba signs.  She has the deep tendon reflexes are 1+ the upper extremities 1-2+ in the lower extremities with plantar extensor responses bilateral .   finger  to finger no past pointing  Physical Exam    Results Review:  Lab Results (last 24 hours)     Procedure Component Value Units Date/Time    Rapid Strep A Screen [[id number]]  (Normal) Collected:  07/12/17 1524    Specimen:  Swab from Throat Updated:  07/12/17 1558     Strep A Ag Negative    Blood Culture [[id number]]  (Normal) Collected:  07/12/17 1502    Specimen:  Blood from Arm, Left Updated:  07/13/17 0401     Blood Culture No growth at less than 24 hours    Beta Strep Culture, Throat [[id number]]  (Normal) Collected:  07/12/17 1524    Specimen:  Swab from Throat Updated:  07/13/17 0718     Throat Culture, Beta Strep No Beta Hemolytic Streptococcus Isolated at 24 hrs            Assessment/Plan     Active Problems:    Inability to swallow    Conversion disorder with abnormal movement    Arytenoid cyst      She has a chronic condition which results inparaparesis with significant psychological overlay from the history.   She has had significant spinal injuries in the past requiring anterior and posterior cervical fusion in the cervical and posterior thoracic and lumbar fusion in the past.  She has had current imaging of the cervical spine and lumbar spine.  Beside  her previous surgical hardware and degenerative changes I do not see any acute findings that would require surgical intervention or that would be expected to change her current outcome.  She has had extensive workup by Dr. Howie Abrams with regards to  her weakness and seizure disorder.  I would recommend that she continue her care with Dr. Abrams to  maintain the continuity of her care    I discussed the patient's findings and my recommendations with the patient the patient's  and Dr. Pang and the physical therapist who are seeing the patient,  concurrently    Jr Bates MD  07/13/17  3:56 PM     Electronically signed by Jr Bates MD at 7/13/2017  4:09 PM

## 2017-07-14 NOTE — THERAPY TREATMENT NOTE
Acute Care - Physical Therapy Treatment Note  [facility]     Patient Name: Bita Croft  : 1955  MRN: [id number]  Today's Date: 2017  Onset of Illness/Injury or Date of Surgery Date: 17  Date of Referral to PT: 17  Referring Physician: Josafat Gonzales MD    Admit Date: 2017    Visit Dx:    ICD-10-CM ICD-9-CM   1. Inability to swallow R13.0 787.20   2. Dysphagia, unspecified type R13.10 787.20   3. Difficulty in walking, not elsewhere classified R26.2 719.7   4. Impaired mobility Z74.09 799.89     Patient Active Problem List   Diagnosis   • Confusion   • Depression   • Anxiety   • Hyponatremia   • Seizure disorder   • Chronic neck pain   • Chronic back pain   • Hypothyroidism   • Abnormal thyroid blood test   • Hypertension   • Wellness examination-done   • Elevated fasting glucose   • Anemia   • Iron deficiency   • Degenerative joint disease of spine   • Inability to swallow   • Conversion disorder with abnormal movement   • Arytenoid cyst   • Functional dysphonia   • Reflux laryngitis               Adult Rehabilitation Note       17 1330 17 0937 17 1520    Rehab Assessment/Intervention    Discipline physical therapy assistant  -NESSA speech language pathologist  -EA speech language pathologist  -KW    Document Type therapy note (daily note)  -NESSA therapy note (daily note)  -EA therapy note (daily note)  -KW    Subjective Information agree to therapy;complains of;fatigue;weakness  -NESSA pain;agree to therapy  -EA pain  -KW    Precautions/Limitations fall precautions  -NESSA      Recorded by [NESSA] Wolfgang Bates PTA [EA] Lynn Dallas, MS, CFY-SLP [KW] Chaya Brewer MS CCC-SLP    Pain Assessment    Pain Assessment 0-10  -NESSA      Pain Score 10  -NESSA      Pain Location Throat  -NESSA      Pain Descriptors Burning  -NESSA      Pain Frequency Constant/continuous  -NESSA      Pain Intervention(s) Medication (See MAR)  -NESSA      Response to Interventions tolerated  -NESSA      Recorded by  [NESSA] Wolfgang Bates, PTA      Dysphagia/Swallow Intervention    Dysphagia/Swallow Therapeutic Feed  ST attempted PO trials of ice chips; pt immediately coughed with first trial and used suction to retrieve mucus. ST tried one more ice chip trial three minutes later and throat clear x2 was noted. Pt stated level of pain was 10/10 today; ST inquired if she wanted to participate in swallowing exercises and she agreed. Completed effortful x10 and Mendelsohn x5 with level 7/10 pain reported.  -EA Discussed results of study with patient including NPO status, concern with no cricopharyngeal opeining, requested suction at bedside, RN to see about changing PO meds.   present and aware of recommendations.  Ok for ice chips.  -KW    Recorded by  [EA] Lynn Dallas MS, CFY-SLP [KW] Chaya Brewer, MS CCC-SLP    Improve timing of pharyngeal response    To improve timing of pharyngeal response, patient will:  Swallow in timely way after sensory input;90%;without cues  -EA     Status: Improve timing of pharyngeal response  Progressing as expected  -EA     Timing of Pharyngeal Response Progress  60%;with inconsistent cues;continue to adress  -EA     Comments: Improve timing of pharyngeal response  Pt presented with weak and delayed swallow during ice chip trials and swallowing exercises.  -EA     Recorded by  [EA] Lynn Dallas MS, CFY-SLP     Improve laryngeal elevation    To improve laryngeal elevation, patient will:  Complete Mendelsohn maneuver;without cues;Complete pitch-glide;90%  -EA     Status: Improve laryngeal elevation  Progressing as expected  -EA     Laryngeal Elevation Progress  50%;with inconsistent cues;continue to adress  -EA     Comments: Improve laryngeal elevation  Pt completed Mendelsohn maneuver with 50% accuracy; delayed swallow was noted 3/5 times exercise was completed.  -EA     Recorded by  [EA] Lynn Dallas, MS, CFY-SLP     Improve tongue base & pharyngeal wall squeeze    To improve tongue  base & pharyngeal wall squeeze, patient will:  Complete effortful swallow;90%;without cues  -EA     Status: Improve tongue base & pharyngeal wall squeeze  Progressing as expected  -EA     Tongue Base/Pharyngeal Wall Squeeze Progress  50%;with inconsistent cues;continue to adress  -EA     Comments: Improve tongue base & pharyngeal wall squeeze  Pt completed effortful swallow 50% accuracy x10; delayed and weak swallow was noted. Pt became fatigued at end.  -EA     Recorded by  [EA] Lnyn Dallas MS, CFY-SLP     Bed Mobility, Assessment/Treatment    Bed Mobility, Assistive Device bed rails;head of bed elevated  -NESSA      Bed Mob, Supine to Sit, [place] conditional independence  -NESSA      Bed Mob, Sit to Supine, [place] conditional independence   in chair  -NESSA      Recorded by [NESSA] Wolfgang Bates PTA      Transfer Assessment/Treatment    Transfers, Sit-Stand [place] verbal cues required;minimum assist (75% patient effort)  -NESSA      Transfers, Stand-Sit [place] verbal cues required;contact guard assist  -NESSA      Transfer, Comment Pt stood with CGA/min assist.  Stand pivot to w/c with CGA.  Pt's knee buckled but was able to self correct.  T/F w/c to BSC, BSC to w/c all with CGA/ min assist  -NESSA      Recorded by [NESSA] Wolfgang Bates PTA      Balance Skills Training    Standing-Level of Assistance Contact guard  -NESSA      Static Standing Balance Support --   with RWX  -NESSA      Recorded by [NESSA] Wolfgang Bates PTA      Therapy Exercises    Bilateral Lower Extremities AROM:;20 reps;sitting;hip flexion;LAQ;ankle pumps/circles  -NESSA      Recorded by [NESSA] Wolfgang Bates PTA      Positioning and Restraints    Pre-Treatment Position in bed  -NESSA      Post Treatment Position wheelchair  -NESSA      In Wheelchair sitting;call light within reach;encouraged to call for assist;with family/caregiver  -NESSA      Recorded by [NESSA] Wolfgang Bates PTA        07/13/17 1350          Swallow Assessment/Intervention     Additional Documentation Dysphagia/Swallow Intervention (Group)  -TM      Recorded by [TM] Marisabel Faye [facility]-SLP      Dysphagia/Swallow Intervention    Dysphagia/Swallow Intervention Pt will tolerate 30 minutes of NMES tx without s/s of distress.  -TM      Dysphagia/Swallow Therapeutic Feed Pt will tolerate PO trials with SLP without overt s/s of aspiration.  -TM      Recorded by [TM] Marisabel Faye CCC-SLP        User Key  (r) = Recorded By, (t) = Taken By, (c) = Cosigned By    Initials Name Effective Dates    TM Marisabel Faye CCC-SLP 08/02/16 -     NESSA Bates, PTA 12/08/16 -     KW Chaya Brewer, MS [facility]-SLP 08/02/16 -     EA Lynn Dallas, MS, [facility]-SLP 02/21/17 -                 IP PT Goals       07/14/17 0919          Transfer Training PT LTG    Transfer Training PT LTG, Date Established 07/14/17  -MS (r) RZ (t) MS (c)      Transfer Training PT LTG, Time to Achieve by discharge  -MS (r) RZ (t) MS (c)      Transfer Training PT LTG, Activity Type all transfers  -MS (r) RZ (t) MS (c)      Transfer Training PT LTG, [place] Level contact guard assist  -MS (r) RZ (t) MS (c)      Transfer Training PT LTG, Assist Device walker, rolling  -MS (r) RZ (t) MS (c)      Gait Training PT LTG    Gait Training Goal PT LTG, Date Established 07/14/17  -MS (r) RZ (t) MS (c)      Gait Training Goal PT LTG, Time to Achieve by discharge  -MS (r) RZ (t) MS (c)      Gait Training Goal PT LTG, [place] Level minimum assist (75% patient effort)  -MS (r) RZ (t) MS (c)      Gait Training Goal PT LTG, Assist Device walker, rolling platform  -MS (r) RZ (t) MS (c)      Gait Training Goal PT LTG, Distance to Achieve 50 ft  -MS (r) RZ (t) MS (c)      Strength Goal PT LTG    Strength Goal PT LTG, Date Established 07/14/17  -MS (r) RZ (t) MS (c)      Strength Goal PT LTG, Time to Achieve by discharge  -MS (r) RZ (t) MS (c)      Strength Goal PT LTG, Measure to Achieve Pt to increase all major LE musculaure to 4+/5  -MS (r) RZ  (t) MS (c)      Caregiver Training PT LTG    Caregiver Training PT LTG, Date Established 07/14/17  -MS (r) RZ (t) MS (c)      Caregiver Training PT LTG, Time to Achieve by discharge  -MS (r) RZ (t) MS (c)      Caregiver Training PT LTG, Activity Type Pts spouse will demonstrate proper body mechanics and technique for all transfers 100% of the time.   -MS (r) RZ (t) MS (c)      Caregiver Training PT LTG, [place] Level able to assist adequately  -MS (r) RZ (t) MS (c)        User Key  (r) = Recorded By, (t) = Taken By, (c) = Cosigned By    Initials Name Provider Type    MS Belkis Esteban, PT DPT Physical Therapist    NINA Rae, PT Student PT Student          Physical Therapy Education     Title: PT OT SLP Therapies (Active)     Topic: Physical Therapy (Active)     Point: Mobility training (Done)    Learning Progress Summary    Learner Readiness Method Response Comment Documented by Status   Patient Acceptance E VU Pt educated on bed mobility and transfer training. Pt educated on fall precautions and to call for assistance prior to ambualtion.  07/14/17 0928 Done               Point: Precautions (Done)    Learning Progress Summary    Learner Readiness Method Response Comment Documented by Status   Patient Acceptance E VU Pt educated on bed mobility and transfer training. Pt educated on fall precautions and to call for assistance prior to ambualtion.  07/14/17 0928 Done                      User Key     Initials Effective Dates Name Provider Type Discipline     05/08/17 -  Kika Rae, PT Student PT Student PT                    PT Recommendation and Plan  Anticipated Equipment Needs At Discharge: tub bench  Anticipated Discharge Disposition: skilled nursing facility, home with home health  Demonstrates Need for Referral to Another Service: other (see comments) (psych)  Planned Therapy Interventions: balance training, bed mobility training, gait training, home exercise program, patient/family  education, strengthening, transfer training, wheelchair management/propulsion training  PT Frequency: daily, 2 times/day, per priority policy             Outcome Measures       07/14/17 0742          How much help from another person do you currently need...    Turning from your back to your side while in flat bed without using bedrails? 4  -MS (r) RZ (t) MS (c)      Moving from lying on back to sitting on the side of a flat bed without bedrails? 4  -MS (r) RZ (t) MS (c)      Moving to and from a bed to a chair (including a wheelchair)? 3  -MS (r) RZ (t) MS (c)      Standing up from a chair using your arms (e.g., wheelchair, bedside chair)? 3  -MS (r) RZ (t) MS (c)      Climbing 3-5 steps with a railing? 2  -MS (r) RZ (t) MS (c)      To walk in hospital room? 2  -MS (r) RZ (t) MS (c)      AM-PAC 6 Clicks Score 18  -MS (r) RZ (t)      Functional Assessment    Outcome Measure Options AM-PAC 6 Clicks Basic Mobility (PT)  -MS (r) RZ (t) MS (c)        User Key  (r) = Recorded By, (t) = Taken By, (c) = Cosigned By    Initials Name Provider Type    MS Belkis Esteban, PT DPT Physical Therapist    NINA Rae, PT Student PT Student           Time Calculation:         PT Charges       07/14/17 1429 07/14/17 0929       Time Calculation    Start Time 1330  -NESSA 0742  -MS (r) RZ (t) MS (c)     Stop Time 1430  -NESSA 0850  -MS (r) RZ (t) MS (c)     Time Calculation (min) 60 min  -NESSA 68 min  -MS (r) RZ (t)     PT Received On 07/14/17  -NESSA 07/14/17  -MS (r) RZ (t) MS (c)     PT Goal Re-Cert Due Date 07/24/17  -NESSA 07/24/17  -MS (r) RZ (t) MS (c)     Time Calculation- PT    Total Timed Code Minutes- PT 60 minute(s)  -NESSA 9 minute(s)  -MS (r) RZ (t) MS (c)       User Key  (r) = Recorded By, (t) = Taken By, (c) = Cosigned By    Initials Name Provider Type    MS Belkis Esteban, PT DPT Physical Therapist    NESSA Bates, PTA Physical Therapy Assistant    RBESSY Rae, PT Student PT Student          Therapy Charges for  Today     Code Description Service Date Service Provider Modifiers Qty    95549610760 HC PT THERAPEUTIC ACT EA 15 MIN 7/14/2017 Wolfgang Bates PTA GP 2    60661411605 HC PT THER PROC EA 15 MIN 7/14/2017 Wolfgang Bates PTA GP 2          PT G-Codes  PT Professional Judgement Used?: Yes  Outcome Measure Options: AM-PAC 6 Clicks Basic Mobility (PT)  Score: 18  Functional Limitation: Mobility: Walking and moving around  Mobility: Walking and Moving Around Current Status (): At least 40 percent but less than 60 percent impaired, limited or restricted  Mobility: Walking and Moving Around Goal Status (): At least 20 percent but less than 40 percent impaired, limited or restricted    Wolfgang Bates PTA  7/14/2017

## 2017-07-14 NOTE — THERAPY EVALUATION
Acute Care - Physical Therapy Initial Evaluation  [facility]     Patient Name: Bita Croft  : 1955  MRN: [id number]  Today's Date: 2017   Onset of Illness/Injury or Date of Surgery Date: 17  Date of Referral to PT: 17  Referring Physician: Josafat Gonzales MD      Admit Date: 2017     Visit Dx:    ICD-10-CM ICD-9-CM   1. Inability to swallow R13.0 787.20   2. Dysphagia, unspecified type R13.10 787.20   3. Difficulty in walking, not elsewhere classified R26.2 719.7     Patient Active Problem List   Diagnosis   • Confusion   • Depression   • Anxiety   • Hyponatremia   • Seizure disorder   • Chronic neck pain   • Chronic back pain   • Hypothyroidism   • Abnormal thyroid blood test   • Hypertension   • Wellness examination-done   • Elevated fasting glucose   • Anemia   • Iron deficiency   • Degenerative joint disease of spine   • Inability to swallow   • Conversion disorder with abnormal movement   • Arytenoid cyst   • Functional dysphonia   • Reflux laryngitis     Past Medical History:   Diagnosis Date   • Asthma    • Cancer     colon cancer   • Depression    • Disease of thyroid gland    • Hypertension    • Insomnia    • Seizure    • Sleep apnea      Past Surgical History:   Procedure Laterality Date   • BACK SURGERY     • COLON SURGERY     • GASTRIC BYPASS     • HYSTERECTOMY     • KNEE ARTHROSCOPY      bilateral   • NECK SURGERY     • ORIF WRIST FRACTURE Right 2017    Procedure: WRIST OPEN REDUCTION INTERNAL FIXATION, RIGHT;  Surgeon: Alec Cardoza MD;  Location: [facility];  Service:    • TONSILLECTOMY     • WRIST FRACTURE SURGERY            PT ASSESSMENT (last 72 hours)      PT Evaluation       17 0937 17 0742    Rehab Evaluation    Document Type therapy note (daily note)  -EA evaluation  -MS (r) RZ (t) MS (c)    Subjective Information pain;agree to therapy  -EA agree to therapy;complains of;pain;numbness;swelling;weakness   numbness and weakness B LE since 10/17   -MS (r) RZ (t) MS (c)    Patient Effort, Rehab Treatment  good  -MS (r) RZ (t) MS (c)    Symptoms Noted During/After Treatment  fatigue  -MS (r) RZ (t) MS (c)    General Information    Patient Profile Review  yes  -MS (r) RZ (t) MS (c)    Onset of Illness/Injury or Date of Surgery Date  07/11/17  -MS (r) RZ (t) MS (c)    Referring Physician  Josafat Gonzales MD  -MS (r) RZ (t) MS (c)    General Observations  Pt in fowlers, SCDs donned, IV intact, slight swelling noted at IV site nsg notifiied.  -MS    Pertinent History Of Current Problem  /Pt presented to ED on 7/11/17 with throat pain and inability to swallow and was sent home no significant findings were discovered and pt was sent home. Pt presented back to ED on 7/12/17 with sore throat inability to swallow, fever and tachycardia. Dx: inability to swallow, conversion disorder with abnormal movement, arythenoid cyst, hx seizures. 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 hx of cervical and lumbar fusion with no recent acute findings on imaging. Pt is followed by neurologist at [facility] ([facility]) and by Mendoza Samaniego for severe depression.  -MS (r) RZ (t) MS (c)    Precautions/Limitations  NPO;fall precautions  -MS (r) RZ (t) MS (c)    Prior Level of Function  independent:;grooming;dressing;bathing;mod assist:;max assist:;all household mobility;community mobility;gait;transfer;bed mobility;cooking;cleaning;driving   incontenent (since October), husbands helps with bathroom  -MS (r) RZ (t) MS (c)    Equipment Currently Used at Home  wheelchair;walker, rolling;shower chair;commode  -MS (r) RZ (t) MS (c)    Plans/Goals Discussed With  patient;agreed upon  -MS (r) RZ (t) MS (c)    Risks Reviewed  patient:;LOB;nausea/vomiting;dizziness;increased discomfort;lines disloged  -MS (r) RZ (t) MS (c)    Benefits Reviewed  patient:;improve function;increase  "independence;increase strength;increase balance;decrease pain;increase knowledge  -MS (r) RZ (t) MS (c)    Barriers to Rehab  physical barrier;ineffective coping;previous functional deficit   psychological   -MS (r) RZ (t) MS (c)    Living Environment    Lives With  spouse  -MS (r) RZ (t) MS (c)    Living Arrangements  house  -MS (r) RZ (t) MS (c)    Home Accessibility  bed and bath on same level;tub/shower is not walk in;stairs to enter home    assist with bath transfer  -MS (r) RZ (t) MS (c)    Number of Stairs to Enter Home  1  -MS (r) RZ (t) MS (c)    Number of Stairs Within Home  0  -MS (r) RZ (t) MS (c)    Living Environment Comment  Pt reports that  has been assisting her with all mobility and transfers and \"it is beginning to take a tole on him\"  -MS (r) RZ (t) MS (c)    Clinical Impression    Date of Referral to PT  07/13/17  -MS (r) RZ (t) MS (c)    PT Diagnosis  P presents with B LE and UE weakness.  -MS (r) RZ (t) MS (c)    Prognosis  Good  -MS (r) RZ (t) MS (c)    Patient/Family Goals Statement  I want to walk  -MS (r) RZ (t) MS (c)    Criteria for Skilled Therapeutic Interventions Met  yes;treatment indicated  -MS (r) RZ (t) MS (c)    Pathology/Pathophysiology Noted (Describe Specifically for Each System)  musculoskeletal;other (see comments)   Cognition/psycological   -MS (r) RZ (t) MS (c)    Impairments Found (describe specific impairments)  gait, locomotion, and balance;muscle performance;sensory Integrity  -MS (r) RZ (t) MS (c)    Rehab Potential  good, to achieve stated therapy goals  -MS (r) RZ (t) MS (c)    Predicted Duration of Therapy Intervention (days/wks)  until discharge   -MS (r) RZ (t) MS (c)    Pain Assessment    Pain Assessment  0-10  -MS (r) RZ (t) MS (c)    Pain Score  10  -MS (r) RZ (t) MS (c)    Pain Location  Throat  -MS (r) RZ (t) MS (c)    Pain Descriptors  Burning;Aching  -MS (r) RZ (t) MS (c)    Pain Onset  Awakened from sleep  -MS (r) RZ (t) MS (c)    Pain " Intervention(s)  Aromatherapy;Repositioned  -MS (r) RZ (t) MS (c)    Response to Interventions  No change with intervention   -MS (r) RZ (t) MS (c)    Multiple Pain Sites  Yes  -MS (r) RZ (t) MS (c)    Pain 2    Pain Score 2  10  -MS (r) RZ (t) MS (c)    Pre Tx Pain Score 2  10  -MS (r) RZ (t) MS (c)    Pain Type 2  Chronic pain  -MS (r) RZ (t) MS (c)    Pain Location 2  Leg  -MS (r) RZ (t) MS (c)    Pain Frequency 2  Constant/continuous  -MS (r) RZ (t) MS (c)    Pain Intervention(s) 2  Medication (See MAR);Ambulation/increased activity;Repositioned  -MS (r) RZ (t) MS (c)    Response to Interventions 2  Pt reports decreased with activity  -MS (r) RZ (t) MS (c)    Vision Assessment/Intervention    Visual Impairment  WFL with corrective lenses  -MS (r) RZ (t) MS (c)    Cognitive Assessment/Intervention    Current Cognitive/Communication Assessment  functional  -MS (r) RZ (t) MS (c)    Orientation Status  oriented x 4  -MS (r) RZ (t) MS (c)    Follows Commands/Answers Questions  100% of the time;able to follow single-step instructions;able to follow multi-step instructions  -MS (r) RZ (t) MS (c)    Personal Safety  WNL/WFL;fully aware of deficits;good awareness, safety precautions  -MS (r) RZ (t) MS (c)    Personal Safety Interventions  fall prevention program maintained;gait belt;muscle strengthening facilitated;nonskid shoes/slippers when out of bed;supervised activity  -MS (r) RZ (t) MS (c)    ROM (Range of Motion)    General ROM  no range of motion deficits identified  -MS (r) RZ (t) MS (c)    MMT (Manual Muscle Testing)    General MMT Assessment  upper extremity strength deficits identified;lower extremity strength deficits identified  -MS (r) RZ (t) MS (c)    Upper Extremity    Upper Ext Manual Muscle Testing Detail  B UE grossly 4/5, with noted weakness L UE 3+/5  -MS (r) RZ (t) MS (c)    Lower Extremity    Lower Ext Manual Muscle Testing Detail  B LE strength 4/5  -MS (r) RZ (t) MS (c)    Mobility  Assessment/Training    Extremity Weight-Bearing Status  right upper extremity  -MS (r) RZ (t) MS (c)    Right Upper Extremity Weight-Bearing  full weight-bearing   per pt report   -MS (r) RZ (t) MS (c)    Bed Mobility, Assessment/Treatment    Bed Mobility, Assistive Device  bed rails;head of bed elevated  -MS (r) RZ (t) MS (c)    Bed Mobility, Scoot/Bridge, [place]  supervision required  -MS (r) RZ (t) MS (c)    Bed Mob, Supine to Sit, [place]  conditional independence  -MS (r) RZ (t) MS (c)    Bed Mob, Sit to Supine, [place]  conditional independence  -MS (r) RZ (t) MS (c)    Bed Mobility, Impairments  pain  -MS (r) RZ (t) MS (c)    Bed Mobility, Comment  Pt reports that her  was assisting her with bed mobility prior  -MS (r) RZ (t) MS (c)    Transfer Assessment/Treatment    Transfers, Bed-Chair [place]  minimum assist (75% patient effort)   stand with two steps, knees buckle  -MS (r) RZ (t) MS (c)    Transfers, Chair-Bed [place]  contact guard assist   Stand pivot  -MS (r) RZ (t) MS (c)    Transfers, Sit-Stand [place]  contact guard assist;verbal cues required  -MS (r) RZ (t) MS (c)    Transfers, Stand-Sit [place]  contact guard assist;verbal cues required  -MS (r) RZ (t) MS (c)    Transfers, Sit-Stand-Sit, Assist Device  other (see comments)   HHA therapist   -MS (r) RZ (t) MS (c)    Transfer, Safety Issues  knees buckling  -MS (r) RZ (t) MS (c)    Transfer, Impairments  pain;strength decreased  -MS (r) RZ (t) MS (c)    Transfer, Comment  Pt was CGA for stand pivot for chair to bed, and min A for knee buckle with 2 side step transfer from bed to chair.  -MS (r) RZ (t) MS (c)    Gait Assessment/Treatment    Gait, Comment  Anticipate 2 person assist with chair to follow.   -MS (r) RZ (t) MS (c)    Motor Skills/Interventions    Additional Documentation  Balance Skills Training (Group);Gross Motor Coordination Training (Group)  -MS (r) RZ (t) MS (c)    Balance Skills Training     Sitting-Level of Assistance  Close supervision  -MS (r) RZ (t) MS (c)    Sitting-Balance Support  Feet supported  -MS (r) RZ (t) MS (c)    Standing-Level of Assistance  Contact guard  -MS (r) RZ (t) MS (c)    Static Standing Balance Support  Left upper extremity supported  -MS (r) RZ (t) MS (c)    Gross Motor Coordination Training    Gross Motor Skill, Impairments Detail  Grossly intact   -MS (r) RZ (t) MS (c)    Sensory Assessment/Intervention    Sensory Impairment  --   B UE intact and symmetrical per pt report   -MS (r) RZ (t) MS (c)    Light Touch  RLE;LLE  -MS (r) RZ (t) MS (c)    LLE Light Touch  WNL  -MS (r) RZ (t) MS (c)    RLE Light Touch  WNL  -MS (r) RZ (t) MS (c)    Deep Pressure  LLE;RLE  -MS (r) RZ (t) MS (c)    LLE Deep Pressure  WNL  -MS (r) RZ (t) MS (c)    RLE Deep Pressure  WNL  -MS (r) RZ (t) MS (c)    Proprioception  LLE;RLE  -MS (r) RZ (t) MS (c)    LLE Proprioception  mild impairment  -MS (r) RZ (t) MS (c)    RLE Proprioception  mild impairment  -MS (r) RZ (t) MS (c)    Edema Management    Edema Amount  left:;minimal   from IV nsg notified   -MS (r) RZ (t) MS (c)    General Interventions    Gait Training  Body weight support gait training for first session, progress from there.   -MS (r) RZ (t) MS (c)    Positioning and Restraints    Pre-Treatment Position  in bed  -MS (r) RZ (t) MS (c)    Post Treatment Position  bed  -MS (r) RZ (t) MS (c)    In Bed  notified nsg;fowlers;call light within reach;encouraged to call for assist;side rails up x1  -MS (r) RZ (t) MS (c)      07/13/17 1520 07/13/17 1350    Rehab Evaluation    Document Type therapy note (daily note)  -KW evaluation  -TM    Subjective Information pain  -KW complains of;pain;agree to therapy   Throat-Level 10 on 0-10 scale.  -TM      07/13/17 0938 07/13/17 0839    Rehab Evaluation    Document Type evaluation  -BN     Subjective Information no complaints;agree to therapy  -BN     General Information    Equipment Currently Used at Home   wheelchair;commode;shower chair;walker, rolling  -KP    Living Environment    Lives With  spouse  -KP    Living Arrangements  house  -KP    Transportation Available  family or friend will provide;car;ambulance  -    Pain Assessment    Pain Assessment 0-10  -BN     Pain Score 10  -BN     Pain Location Back   Pain with swallowing  -BN     Cognitive Assessment/Intervention    Current Cognitive/Communication Assessment functional  -BN     Orientation Status oriented x 4  -BN     Follows Commands/Answers Questions 100% of the time  -BN       07/12/17 2133 07/12/17 2129    General Information    Equipment Currently Used at Home  wheelchair;shower chair;commode  -AR    Living Environment    Lives With spouse  -AR     Living Arrangements house  -AR     Home Accessibility stairs to enter home  -AR     Number of Stairs to Enter Home 1  -AR     Stair Railings at Home none  -AR     Type of Financial/Environmental Concern none  -AR     Transportation Available car   with great difficulty can get into car, lots of times takes ambulence   -AR       User Key  (r) = Recorded By, (t) = Taken By, (c) = Cosigned By    Initials Name Provider Type    TM Marisabel Faye, CCC-SLP Speech and Language Pathologist    MS Belkis Esteban, PT DPT Physical Therapist    KW Chaya Brewer, MS CCC-SLP Speech and Language Pathologist    AR Sakshi Barajas, RN Registered Nurse    CLARICE Rosario, CCC-SLP Speech and Language Pathologist    KP Vesna Pollock, ANANDW     STEPHANIE Dallas, MS, CF-SLP Speech and Language Pathologist    RBESSY Rae, PT Student PT Student          Physical Therapy Education     Title: PT OT SLP Therapies (Active)     Topic: Physical Therapy (Active)     Point: Mobility training (Done)    Learning Progress Summary    Learner Readiness Method Response Comment Documented by Status   Patient Acceptance E VU Pt educated on bed mobility and transfer training. Pt educated on fall precautions and to  call for assistance prior to ambualtion.  07/14/17 0928 Done               Point: Precautions (Done)    Learning Progress Summary    Learner Readiness Method Response Comment Documented by Status   Patient Acceptance E VU Pt educated on bed mobility and transfer training. Pt educated on fall precautions and to call for assistance prior to ambualtion.  07/14/17 0928 Done                      User Key     Initials Effective Dates Name Provider Type Discipline     05/08/17 -  Kika Rae, PT Student PT Student PT                PT Recommendation and Plan  Anticipated Equipment Needs At Discharge: tub bench  Anticipated Discharge Disposition: skilled nursing facility, home with home health  Demonstrates Need for Referral to Another Service: other (see comments) (psych)  Planned Therapy Interventions: balance training, bed mobility training, gait training, home exercise program, patient/family education, strengthening, transfer training, wheelchair management/propulsion training  PT Frequency: daily, 2 times/day, per priority policy  Plan of Care Review  Plan Of Care Reviewed With: patient  Progress: progress toward functional goals as expected  Outcome Summary/Follow up Plan: Physical therapy evaluation completed. Pt presents with B LE weakness and decreased functional activity tolerance. Pt was GCA for stand pivot transfers and min assist for 2 step transfer from bed to chair. Pt will benefit from continued skilled therapy for gait and transfer training in order to decrease her need for assistance. Therapy reccommends pt to d/c home with assist and HH PT vs SNF pending pts and spouse safety with transfers.           IP PT Goals       07/14/17 0919          Transfer Training PT LTG    Transfer Training PT LTG, Date Established 07/14/17  -MS (r) RZ (t) MS (c)      Transfer Training PT LTG, Time to Achieve by discharge  -MS (r) RZ (t) MS (c)      Transfer Training PT LTG, Activity Type all transfers  -MS (r) RZ  (t) MS (c)      Transfer Training PT LTG, [place] Level contact guard assist  -MS (r) RZ (t) MS (c)      Transfer Training PT LTG, Assist Device walker, rolling  -MS (r) RZ (t) MS (c)      Gait Training PT LTG    Gait Training Goal PT LTG, Date Established 07/14/17  -MS (r) RZ (t) MS (c)      Gait Training Goal PT LTG, Time to Achieve by discharge  -MS (r) RZ (t) MS (c)      Gait Training Goal PT LTG, [place] minimum assist (75% patient effort)  -MS (r) RZ (t) MS (c)      Gait Training Goal PT LTG, Assist Device walker, rolling platform  -MS (r) RZ (t) MS (c)      Gait Training Goal PT LTG, Distance to Achieve 50 ft  -MS (r) RZ (t) MS (c)      Strength Goal PT LTG    Strength Goal PT LTG, Date Established 07/14/17  -MS (r) RZ (t) MS (c)      Strength Goal PT LTG, Time to Achieve by discharge  -MS (r) RZ (t) MS (c)      Strength Goal PT LTG, Measure to Achieve Pt to increase all major LE musculaure to 4+/5  -MS (r) RZ (t) MS (c)      Caregiver Training PT LTG    Caregiver Training PT LTG, Date Established 07/14/17  -MS (r) RZ (t) MS (c)      Caregiver Training PT LTG, Time to Achieve by discharge  -MS (r) RZ (t) MS (c)      Caregiver Training PT LTG, Activity Type Pts spouse will demonstrate proper body mechanics and technique for all transfers 100% of the time.   -MS (r) RZ (t) MS (c)      Caregiver Training PT LTG, [place] Level able to assist adequately  -MS (r) RZ (t) MS (c)        User Key  (r) = Recorded By, (t) = Taken By, (c) = Cosigned By    Initials Name Provider Type    MS Belkis Esteban, PT DPT Physical Therapist    NINA Rae, PT Student PT Student                Outcome Measures       07/14/17 0758          How much help from another person do you currently need...    Turning from your back to your side while in flat bed without using bedrails? 4  -MS (r) RZ (t) MS (c)      Moving from lying on back to sitting on the side of a flat bed without bedrails? 4  -MS (r) RZ (t)  MS (c)      Moving to and from a bed to a chair (including a wheelchair)? 3  -MS (r) RZ (t) MS (c)      Standing up from a chair using your arms (e.g., wheelchair, bedside chair)? 3  -MS (r) RZ (t) MS (c)      Climbing 3-5 steps with a railing? 2  -MS (r) RZ (t) MS (c)      To walk in hospital room? 2  -MS (r) RZ (t) MS (c)      AM-PAC 6 Clicks Score 18  -MS (r) RZ (t)      Functional Assessment    Outcome Measure Options AM-PAC 6 Clicks Basic Mobility (PT)  -MS (r) RZ (t) MS (c)        User Key  (r) = Recorded By, (t) = Taken By, (c) = Cosigned By    Initials Name Provider Type    MS Belkis FAUSTIN Carlito, PT DPT Physical Therapist    NINA Rae, PT Student PT Student           Time Calculation:         PT Charges       07/14/17 0929          Time Calculation    Start Time 0742  -MS (r) RZ (t) MS (c)      Stop Time 0850  -MS (r) RZ (t) MS (c)      Time Calculation (min) 68 min  -MS (r) RZ (t)      PT Received On 07/14/17  -MS (r) RZ (t) MS (c)      PT Goal Re-Cert Due Date 07/24/17  -MS (r) RZ (t) MS (c)      Time Calculation- PT    Total Timed Code Minutes- PT 9 minute(s)  -MS (r) RZ (t) MS (c)        User Key  (r) = Recorded By, (t) = Taken By, (c) = Cosigned By    Initials Name Provider Type    MS Belkis Esteban, PT DPT Physical Therapist    NINA Rae, PT Student PT Student          Therapy Charges for Today     Code Description Service Date Service Provider Modifiers Qty    65583396422 HC PT EVAL MOD COMPLEXITY 4 7/14/2017 Kika Rae, PT Student GP 1    49454756637 HC PT WHEELCHAIR MGMT EA 15 MIN 7/14/2017 Kika Rae PT Student GP 1          PT G-Codes  PT Professional Judgement Used?: Yes  Outcome Measure Options: AM-PAC 6 Clicks Basic Mobility (PT)  Score: 18  Functional Limitation: Mobility: Walking and moving around  Mobility: Walking and Moving Around Current Status (): At least 40 percent but less than 60 percent impaired, limited or restricted  Mobility: Walking and Moving  Around Goal Status (): At least 20 percent but less than 40 percent impaired, limited or restricted      Kika Rae, PT Student  7/14/2017

## 2017-07-14 NOTE — PLAN OF CARE
Problem: Patient Care Overview (Adult)  Goal: Plan of Care Review  Outcome: Ongoing (interventions implemented as appropriate)    07/14/17 1143   Coping/Psychosocial Response Interventions   Plan Of Care Reviewed With patient   Outcome Evaluation   Outcome Summary/Follow up Plan Continues to require pain medication about every 2 hours. Per speech swallow is improving, they will continue to work with her. Up with assist and gait belt to transfer to wheelchair with 1-2 steps. Safety maintained.          Problem: Pain, Acute (Adult)  Goal: Acceptable Pain Control/Comfort Level  Outcome: Ongoing (interventions implemented as appropriate)    Problem: Dysphagia (Adult)  Goal: Functional/Safe Swallow  Outcome: Ongoing (interventions implemented as appropriate)  Goal: Compensatory Techniques to Improve Safety/Function with Swallowing  Outcome: Ongoing (interventions implemented as appropriate)    Problem: Fall Risk (Adult)  Goal: Absence of Falls  Outcome: Ongoing (interventions implemented as appropriate)    Problem: Pressure Ulcer Risk (Jose Scale) (Adult,Obstetrics,Pediatric)  Goal: Skin Integrity  Outcome: Ongoing (interventions implemented as appropriate)

## 2017-07-14 NOTE — PLAN OF CARE
Problem: Patient Care Overview (Adult)  Goal: Plan of Care Review  Outcome: Ongoing (interventions implemented as appropriate)    07/14/17 1546   Coping/Psychosocial Response Interventions   Plan Of Care Reviewed With patient;other (see comments)  (Yudith HOPSON)   Patient Care Overview   Progress progress toward functional goals as expected   Outcome Evaluation   Outcome Summary/Follow up Plan ST spoke with Yudith HOPSON re: MIRIAN. It was stated that MD had not communicated any plans re: MIRIAN at this time. Pt unable to have PEG tube as she had gastric bypass surgery in the past. ST suggested Dophoff tube for MIRIAN as nutrition/hydration by mouth is not safe or appropriate at this time. ST to follow up with MD re: MIRIAN.

## 2017-07-14 NOTE — PROGRESS NOTES
[facility] ([facility]) Progress Note:       LOS: 2 days   Patient Care Team:  Josafat Gonzales MD as PCP - General  Josafat Gonzales MD as PCP - Family Medicine  Pawel Abrams MD as PCP - Cardiology (Cardiology)    Active consulting complaint: Dysphagia and throat pain    Subjective     Interval History:     Status of active consulting problem: Despite IV antibiotics and continued steroids, Ms. Croft reports that she is still unable to swallow.  Her pain is still the same, 9 out of 10 and worse with swallowing.  She has had no shortness of breath.  Her voice is stressed but this is been her baseline over the past 2-3 years.  She denies any fevers or chills.    History taken from: patient    Review of Systems:    Review of Systems   Constitutional: Negative for chills and fever.   HENT: Positive for sore throat and trouble swallowing. Negative for voice change.    Respiratory: Negative for shortness of breath.    Cardiovascular: Negative for chest pain.       Objective     Vital Signs  Temp:  [96.9 °F (36.1 °C)-98.1 °F (36.7 °C)] 98.1 °F (36.7 °C)  Heart Rate:  [79-85] 80  Resp:  [16-20] 16  BP: (161-169)/(86-98) 169/98    Physical Exam:   Physical Exam   Constitutional: She is oriented to person, place, and time. She appears well-developed and well-nourished. She is cooperative. No distress.   HENT:   Head: Normocephalic and atraumatic.   Right Ear: External ear normal.   Left Ear: External ear normal.   Nose: Nose normal.   Mouth/Throat: Oropharynx is clear and moist.   Eyes: Conjunctivae and EOM are normal. Pupils are equal, round, and reactive to light. Right eye exhibits no discharge. Left eye exhibits no discharge. No scleral icterus.   Neck: Normal range of motion. Neck supple. No JVD present. No tracheal deviation present. No thyromegaly present.   Cervical fusion scars    Phonation somewhat stressed consistent with a functional dysphonia   Pulmonary/Chest: Effort normal. No stridor.    Musculoskeletal: Normal range of motion. She exhibits no edema or deformity.   Lymphadenopathy:     She has no cervical adenopathy.   Neurological: She is alert and oriented to person, place, and time. She has normal strength. No cranial nerve deficit. Coordination normal.   Skin: Skin is warm and dry. No rash noted. She is not diaphoretic. No erythema. No pallor.   Psychiatric: She has a normal mood and affect. Her speech is normal and behavior is normal. Judgment and thought content normal. Cognition and memory are normal.   Nursing note and vitals reviewed.       Results Review:       Lab Results (last 24 hours)     Procedure Component Value Units Date/Time    Blood Culture [[id number]]  (Normal) Collected:  07/12/17 1502    Specimen:  Blood from Arm, Left Updated:  07/13/17 1601     Blood Culture No growth at 24 hours    Beta Strep Culture, Throat [[id number]]  (Normal) Collected:  07/12/17 1524    Specimen:  Swab from Throat Updated:  07/14/17 0631     Throat Culture, Beta Strep No Beta Hemolytic Streptococcus Isolated at 2 days        Imaging Results (last 24 hours)     Procedure Component Value Units Date/Time    CT Soft Tissue Neck With Contrast [[id number]] Collected:  07/12/17 1636     Updated:  07/13/17 1419    Addenda:        Addendum:     There is question low-attenuation asymmetric soft tissue prominence in  the right false cord/vestibule along the right aryepiglottic fold. The  significance uncertain, direct visualization suggested.  This report was finalized on 07/13/2017 14:16 by Dr. Obi Phan MD.  Signed:  07/13/17 1416 by Obi Phan MD    Narrative:       EXAMINATION: CT SOFT TISSUE NECK W CONTRAST-  7/12/2017 4:36 PM CDT     HISTORY: Difficulty swallowing     COMPARISON:11/20/2014 head CT. Cervical spine MR dated 07/11/2017     TECHNIQUE:Radiation dose equals  mGy-cm.  Automated exposure  control dose reduction technique was implemented.     3 mm sequential transaxial images were  obtained. 2-D sagittal and  coronal reconstruction images were generated.     FINDINGS:     Extensive postsurgical changes identified with anterior posterior fusion  changes.     Brain imaged appropriately without abnormal mass.     The nasopharynx is observed symmetrically.     There is artifact in the oral cavity related to the spinal fusion  changes. There are no oral cavity masses. The hypopharynx and laryngeal  structures are imaged symmetrically without definite masses.     6 mm low-attenuation nodule noted in the right lobe of the thyroid  gland.     The submandibular and parotid glands are imaged symmetrically.     There are no pathologically enlarged lymph nodes with small lateral  cervical nodes. No lymphadenopathy identified.       Impression:       1. No CT evidence of definite neck mass or pathologically enlarged lymph  nodes/lymphadenopathy. 3. Extensive postsurgical changes cervical spine.  This report was finalized on 07/12/2017 16:41 by Dr. Obi Phan MD.    FL Video Swallow With Speech [[id number]] Collected:  07/13/17 1415     Updated:  07/13/17 1421    Narrative:       FL VIDEO SWALLOW W SPEECH- 7/13/2017 12:44 CST     REASON FOR EXAM: dysphagia; R13.0-Aphagia; R13.10-Dysphagia, unspecified     COMPARISON: NONE      FLUOROSCOPY TIME: 1 minutes     TECHNIQUE: In conjunction with speech pathology services, video  fluoroscopic swallow examination was performed with oral ingestion of  barium containing substances of various viscosities.      FINDINGS: Medium bolus sizes were attempted. Unfortunately, procedure  was terminated as patient was having pain with each attempt. There is  prominence of the prevertebral soft tissues       Impression:       1.  Procedure prematurely terminated.  2.  Prominence of the soft tissues in the prevertebral space. Recommend  direct visualization.        Please see speech pathologist's report for further details and  recommendations.         This report was finalized  on 07/13/2017 14:18 by Dr. Guillermina Laws MD.          Medication Review:     Current Facility-Administered Medications   Medication Dose Route Frequency Provider Last Rate Last Dose   • clindamycin (CLEOCIN) 600 mg in dextrose 5% 50 mL IVPB (premix)  600 mg Intravenous Q8H Zeeshan Hester MD   Stopped at 07/14/17 0545   • CloNIDine (CATAPRES-TTS) 0.1 MG/24HR patch 1 patch  1 patch Transdermal Weekly Josafat Gonzales MD   1 patch at 07/13/17 2104   • dexamethasone sodium phosphate 6 mg in sodium chloride 0.9 % IVPB  6 mg Intravenous Q6H Man Kunz MD   Stopped at 07/14/17 0255   • enoxaparin (LOVENOX) syringe 40 mg  40 mg Subcutaneous Daily Josafat Gonzales MD   40 mg at 07/13/17 1012   • famotidine (PEPCID) injection 40 mg  40 mg Intravenous Q12H Man Kunz MD       • fosphenytoin (Cerebyx) injection 200 mg PE  200 mg Intravenous Nightly Josafat Gonzales MD   200 mg PE at 07/13/17 2023   • levETIRAcetam in NaCl 0.75% (KEPPRA) IVPB 1,000 mg  1,000 mg Intravenous Q12H Josafat Gonzales MD   Stopped at 07/13/17 2221   • LORazepam (ATIVAN) injection 0.5 mg  0.5 mg Intravenous Q2H PRN Josafat Gonzales MD   0.5 mg at 07/14/17 0220   • Morphine sulfate (PF) injection 2 mg  2 mg Intravenous Q2H PRN Zeeshan Hester MD   2 mg at 07/14/17 0650   • ondansetron (ZOFRAN) injection 4 mg  4 mg Intravenous Q6H PRN Zeeshan Hester MD       • piperacillin-tazobactam (ZOSYN) 3.375 g in iso-osmotic dextrose 50 ml (premix)  3.375 g Intravenous Q6H Zeeshan Hester MD 0 mL/hr at 07/14/17 0012 3.375 g at 07/14/17 0551   • sodium chloride 0.9 % bolus 1,000 mL  1,000 mL Intravenous Once Juanjose Last PA-C   1,000 mL at 07/12/17 1721   • sodium chloride 0.9 % infusion  100 mL/hr Intravenous Continuous Zeeshan Hester  mL/hr at 07/14/17 0313 100 mL/hr at 07/14/17 0313       Assessment/Plan     Active Problems:    Inability to swallow    Conversion disorder with abnormal  movement    Arytenoid cyst    Functional dysphonia    Reflux laryngitis      She has had minimal improvement on the antibiotics and steroids.  Laryngoscopy demonstrated no obvious drainable  abscess.  I reviewed the images with Dr. Choudhury, who will be taking call this weekend we will start a proton pump inhibitor and continue IV antibiotics and steroids and check for resolution.  We will continue to follow.    Man Kunz MD    07/14/17  8:54 AM

## 2017-07-15 LAB
ANION GAP SERPL CALCULATED.3IONS-SCNC: 12 MMOL/L (ref 4–13)
BASOPHILS # BLD AUTO: 0.02 10*3/MM3 (ref 0–0.2)
BASOPHILS NFR BLD AUTO: 0.3 % (ref 0–2)
BUN BLD-MCNC: 12 MG/DL (ref 5–21)
BUN/CREAT SERPL: 22.6 (ref 7–25)
CALCIUM SPEC-SCNC: 9.3 MG/DL (ref 8.4–10.4)
CHLORIDE SERPL-SCNC: 98 MMOL/L (ref 98–110)
CO2 SERPL-SCNC: 28 MMOL/L (ref 24–31)
CREAT BLD-MCNC: 0.53 MG/DL (ref 0.5–1.4)
DEPRECATED RDW RBC AUTO: 43 FL (ref 40–54)
EOSINOPHIL # BLD AUTO: 0.08 10*3/MM3 (ref 0–0.7)
EOSINOPHIL NFR BLD AUTO: 1.1 % (ref 0–4)
ERYTHROCYTE [DISTWIDTH] IN BLOOD BY AUTOMATED COUNT: 13.5 % (ref 12–15)
GFR SERPL CREATININE-BSD FRML MDRD: 117 ML/MIN/1.73
GLUCOSE BLD-MCNC: 93 MG/DL (ref 70–100)
HCT VFR BLD AUTO: 37.6 % (ref 37–47)
HGB BLD-MCNC: 12.4 G/DL (ref 12–16)
IMM GRANULOCYTES # BLD: 0.01 10*3/MM3 (ref 0–0.03)
IMM GRANULOCYTES NFR BLD: 0.1 % (ref 0–5)
LYMPHOCYTES # BLD AUTO: 0.99 10*3/MM3 (ref 0.72–4.86)
LYMPHOCYTES NFR BLD AUTO: 14 % (ref 15–45)
MCH RBC QN AUTO: 28.6 PG (ref 28–32)
MCHC RBC AUTO-ENTMCNC: 33 G/DL (ref 33–36)
MCV RBC AUTO: 86.6 FL (ref 82–98)
MONOCYTES # BLD AUTO: 0.61 10*3/MM3 (ref 0.19–1.3)
MONOCYTES NFR BLD AUTO: 8.6 % (ref 4–12)
NEUTROPHILS # BLD AUTO: 5.35 10*3/MM3 (ref 1.87–8.4)
NEUTROPHILS NFR BLD AUTO: 75.9 % (ref 39–78)
PLATELET # BLD AUTO: 275 10*3/MM3 (ref 130–400)
PMV BLD AUTO: 9.6 FL (ref 6–12)
POTASSIUM BLD-SCNC: 2.7 MMOL/L (ref 3.5–5.3)
RBC # BLD AUTO: 4.34 10*6/MM3 (ref 4.2–5.4)
SODIUM BLD-SCNC: 138 MMOL/L (ref 135–145)
WBC NRBC COR # BLD: 7.06 10*3/MM3 (ref 4.8–10.8)

## 2017-07-15 PROCEDURE — 92526 ORAL FUNCTION THERAPY: CPT | Performed by: SPEECH-LANGUAGE PATHOLOGIST

## 2017-07-15 PROCEDURE — 80048 BASIC METABOLIC PNL TOTAL CA: CPT | Performed by: FAMILY MEDICINE

## 2017-07-15 PROCEDURE — 85025 COMPLETE CBC W/AUTO DIFF WBC: CPT | Performed by: FAMILY MEDICINE

## 2017-07-15 PROCEDURE — 92612 ENDOSCOPY SWALLOW (FEES) VID: CPT | Performed by: SPEECH-LANGUAGE PATHOLOGIST

## 2017-07-15 PROCEDURE — 25010000003 LEVETIRACETAM IN NACL 0.75% 1000 MG/100ML SOLUTION: Performed by: FAMILY MEDICINE

## 2017-07-15 PROCEDURE — 25010000002 PIPERACILLIN SOD-TAZOBACTAM PER 1 G: Performed by: FAMILY MEDICINE

## 2017-07-15 PROCEDURE — 25010000002 POTASSIUM CHLORIDE PER 2 MEQ OF POTASSIUM: Performed by: FAMILY MEDICINE

## 2017-07-15 PROCEDURE — G8997 SWALLOW GOAL STATUS: HCPCS | Performed by: SPEECH-LANGUAGE PATHOLOGIST

## 2017-07-15 PROCEDURE — 99231 SBSQ HOSP IP/OBS SF/LOW 25: CPT | Performed by: NEUROLOGICAL SURGERY

## 2017-07-15 PROCEDURE — 25010000002 ENOXAPARIN PER 10 MG: Performed by: FAMILY MEDICINE

## 2017-07-15 PROCEDURE — G8996 SWALLOW CURRENT STATUS: HCPCS | Performed by: SPEECH-LANGUAGE PATHOLOGIST

## 2017-07-15 PROCEDURE — 25010000002 MORPHINE SULFATE (PF) 2 MG/ML SOLUTION: Performed by: FAMILY MEDICINE

## 2017-07-15 PROCEDURE — 97116 GAIT TRAINING THERAPY: CPT

## 2017-07-15 PROCEDURE — 25010000002 LORAZEPAM PER 2 MG: Performed by: FAMILY MEDICINE

## 2017-07-15 PROCEDURE — 99232 SBSQ HOSP IP/OBS MODERATE 35: CPT | Performed by: FAMILY MEDICINE

## 2017-07-15 RX ADMIN — MORPHINE SULFATE 2 MG: 2 INJECTION, SOLUTION INTRAMUSCULAR; INTRAVENOUS at 06:12

## 2017-07-15 RX ADMIN — POTASSIUM CHLORIDE 125 ML/HR: 2 INJECTION, SOLUTION, CONCENTRATE INTRAVENOUS at 15:31

## 2017-07-15 RX ADMIN — SODIUM CHLORIDE 100 ML/HR: 9 INJECTION, SOLUTION INTRAVENOUS at 11:11

## 2017-07-15 RX ADMIN — TAZOBACTAM SODIUM AND PIPERACILLIN SODIUM 3.38 G: 375; 3 INJECTION, SOLUTION INTRAVENOUS at 05:55

## 2017-07-15 RX ADMIN — LORAZEPAM 0.5 MG: 2 INJECTION INTRAMUSCULAR at 16:57

## 2017-07-15 RX ADMIN — FOSPHENYTOIN SODIUM 200 MG PE: 50 INJECTION, SOLUTION INTRAMUSCULAR; INTRAVENOUS at 22:01

## 2017-07-15 RX ADMIN — MORPHINE SULFATE 2 MG: 2 INJECTION, SOLUTION INTRAMUSCULAR; INTRAVENOUS at 09:02

## 2017-07-15 RX ADMIN — MORPHINE SULFATE 2 MG: 2 INJECTION, SOLUTION INTRAMUSCULAR; INTRAVENOUS at 03:18

## 2017-07-15 RX ADMIN — ENOXAPARIN SODIUM 40 MG: 40 INJECTION SUBCUTANEOUS at 09:02

## 2017-07-15 RX ADMIN — LEVETIRACETAM 1000 MG: 1000 INJECTION, SOLUTION INTRAVENOUS at 22:01

## 2017-07-15 RX ADMIN — MORPHINE SULFATE 2 MG: 2 INJECTION, SOLUTION INTRAMUSCULAR; INTRAVENOUS at 14:54

## 2017-07-15 RX ADMIN — CLINDAMYCIN PHOSPHATE 600 MG: 12 INJECTION, SOLUTION INTRAVENOUS at 05:00

## 2017-07-15 RX ADMIN — CLINDAMYCIN PHOSPHATE 600 MG: 12 INJECTION, SOLUTION INTRAVENOUS at 20:46

## 2017-07-15 RX ADMIN — CLINDAMYCIN PHOSPHATE 600 MG: 12 INJECTION, SOLUTION INTRAVENOUS at 14:56

## 2017-07-15 RX ADMIN — FAMOTIDINE 40 MG: 10 INJECTION INTRAVENOUS at 09:01

## 2017-07-15 RX ADMIN — LEVETIRACETAM 1000 MG: 1000 INJECTION, SOLUTION INTRAVENOUS at 09:01

## 2017-07-15 RX ADMIN — MORPHINE SULFATE 2 MG: 2 INJECTION, SOLUTION INTRAMUSCULAR; INTRAVENOUS at 11:13

## 2017-07-15 RX ADMIN — TAZOBACTAM SODIUM AND PIPERACILLIN SODIUM 3.38 G: 375; 3 INJECTION, SOLUTION INTRAVENOUS at 17:25

## 2017-07-15 RX ADMIN — TAZOBACTAM SODIUM AND PIPERACILLIN SODIUM 3.38 G: 375; 3 INJECTION, SOLUTION INTRAVENOUS at 10:18

## 2017-07-15 RX ADMIN — FAMOTIDINE 40 MG: 10 INJECTION INTRAVENOUS at 22:00

## 2017-07-15 RX ADMIN — MORPHINE SULFATE 2 MG: 2 INJECTION, SOLUTION INTRAMUSCULAR; INTRAVENOUS at 20:46

## 2017-07-15 NOTE — CODE DOCUMENTATION
Dr. Pérez at bedside, verbal order for IV labetalol . O2 sat 97% RA. Pt coughing up thick white mucus.

## 2017-07-15 NOTE — PLAN OF CARE
Problem: Patient Care Overview (Adult)  Goal: Plan of Care Review  Outcome: Ongoing (interventions implemented as appropriate)    07/15/17 1537   Coping/Psychosocial Response Interventions   Plan Of Care Reviewed With patient;spouse;caregiver   Patient Care Overview   Progress no change   Outcome Evaluation   Outcome Summary/Follow up Plan FEES study completed on swallow. Scope placed in left nares. Patient tolerated passage of scope well. Patient did have secretions sitting above the cricopharyngus muscle and what appeared to be white patchy area or secretions on the posterior pharyngeal wall. Left vocal cord did show some bloody red type tissue concerning for vocal cord abnormality. Patient does complain with high pain with swallow. She was observed to have possible swelling in laryngeal and pharyngeal area and at false vocal cords, however difficulty for SLP to tell fully. She was given a bite of applesauce resulting in patients inability to clear out of the left pharyngeal area unless patient takes multiple swallows. Patient does have gagging and coughing through out PO attempt. Did not try other consistencies as patient became very anxious and requested to discontinue testing. Rec 1) Natalio continues to have significant dysphagia of unknow origin. She is at high risk for aspiration. Continue NPO. 2) Patient will need some sort of MIRIAN as she has gone several days without nutrition. 3) ENT consult due to concerns with vocal cords seen during FEES and to r/o this as cause of her current deficits. 4) ST to follow.         Problem: Inpatient SLP  Goal: Dysphagia- Patient will improve swallowing skills to begin to take some PO safely  Outcome: Revised    07/13/17 1531 07/15/17 1537   Begin to Take Some PO Safely   Begin to Take Some PO Safely- SLP, Date Established --  07/15/17   Begin to Take Some PO Safely- SLP, Time to Achieve --  by discharge   Begin to Take Some PO Safely- SLP, Activity Level --  Patient will  improve laryngeal elevation for safer, more efficient swallow;Patient will improve hyolaryngeal excursion for safer, more efficient swallow;Patient will improve tongue base/pharyngeal wall squeeze for safer, more efficient swallow   Begin to Take Some PO Safely- SLP, Additional Goal Pt will tolerate NMES tx for dysphagia without s/s of distress and Pt will tolerate PO trials with SLP without overt s/s of aspiration. --    Begin to Take Some PO Safely- SLP, Date Goal Reviewed --  07/15/17

## 2017-07-15 NOTE — PROGRESS NOTES
LOS: 3 days   Patient Care Team:  Josafat Gonzales MD as PCP - General  Josafat Gonzales MD as PCP - Family Medicine  Pawel Abrams MD as PCP - Cardiology (Cardiology)     Chief Complaint:  I cannot swallow    Clifton Sunshine is a 61-year-old  female resident from [place], [place].  Her  called the day of admission and indicated his wife had not had oral intake for 3-4 days; that she could not swallow.  She was having a very severe sore throat.  I was aware she been in [facility], [place], operated by [facility] ER the day before; the call from Dr. Boothe at that time seemed to indicate that she was having more of her back problems and having difficulty getting around.  She is a very chronic neck and back problem.  So we had her sent back to the ER; this story was confirmed it was decided she needed to be admitted for IV antibiotics IV fluids to preclude dehydration and have ENT see her.    Interval History:  On abx; still sore throat.   No cough, sob, chest pain.     Patient Complaints: sore throat  Patient Denies:  Chest pain, SOB  History taken from: patient chart RN    Review of Systems:    All systems were reviewed and negative except for:  Constitution:  positive for fatigue  ENT:  positive for sore throat  Cardiovascular: positive for  elevated bp  Musculoskeletal: positive for  back pain and neck pain    Objective     Vital Signs  Temp:  [97.5 °F (36.4 °C)-98.5 °F (36.9 °C)] 97.9 °F (36.6 °C)  Heart Rate:  [75-84] 83  Resp:  [16-18] 18  BP: (163-178)/(85-97) 163/97    Labs  Lab Results (last 24 hours)     Procedure Component Value Units Date/Time    Blood Culture [[id number]]  (Normal) Collected:  07/12/17 1502    Specimen:  Blood from Arm, Left Updated:  07/14/17 1601     Blood Culture No growth at 2 days          Lab Results:  CBC:   Results from last 7 days  Lab Units 07/12/17  1501 07/11/17  1426   WBC 10*3/mm3 11.77* 8.54   HEMOGLOBIN g/dL 13.0 13.4   HEMATOCRIT % 39.1 40.6   PLATELETS 10*3/mm3 267 280      BMP:  Results from last 7 days  Lab Units 07/12/17  1501 07/11/17  1426   SODIUM mmol/L 134* 135   POTASSIUM mmol/L 3.9 4.2   CHLORIDE mmol/L 95* 98   CO2 mmol/L 26.0 26.0   BUN mg/dL 7 11   CREATININE mg/dL 0.65 0.59   GLUCOSE mg/dL 111* 112*   CALCIUM mg/dL 9.1 8.9   ALT (SGPT) U/L 23 34     Culture Results:   Blood Culture   Date Value Ref Range Status   07/12/2017 No growth at 2 days  Preliminary     Radiology: None  Additional Studies Reviewed: None    Physical Exam:   General Appearance alert, appears stated age and cooperative  Eyes lids and lashes normal, conjunctivae and sclerae normal, no icterus, no pallor, corneas clear and PERRLA  Lungs clear to auscultation, respirations regular, respirations even and respirations unlabored  Heart regular rhythm & normal rate, normal S1, S2, no murmur, no tala, no rub and no click  Chest Wall no abnormalities ovserved  Abdomen normal bowel sounds, no masses, no hepatomegaly, no splenomegaly, soft non-tender, no guarding and no rebound tenderness  Neurologic Mental Status orientated to person, place, time and situation, alertness awake, orientation date, person and place, mood/affect flat and thought content exhibits organized     Results Review:     I reviewed the patient's new clinical results.  I reviewed the patient's new imaging results and agree with the interpretation.  I reviewed the patient's other test results and agree with the interpretation  Discussed with patient    Medication Review: med list reviewed and necessary changes made    Assessment/Plan     Principal Problem:    Sore throat  Active Problems:    Chronic neck pain    Chronic back pain    Inability to swallow    Conversion disorder with abnormal movement    Arytenoid cyst    Functional dysphonia    Reflux laryngitis    Gait difficulty    Weakness of both legs    Elevated bp-asx    hypokalemia    Rx-reviewed; increase IV K  Labs reviewed and needed labs ordered: daily  Diet reviewed; not  able  Fluids reviewed-maintance  Increase activity-if/as able  Discussed possible/future discharge plans-see expects home  Available for POA/caregiver      Josafat Gonzales MD  07/15/17  9:32 AM

## 2017-07-15 NOTE — NURSING NOTE
Dr. Gonzales cancelled order of Labetalol 10 mg IVP...wanted nurse to recheck BP in a few minutes to see if would come down without meds..recheck at 1700 was 167/92 which was back to baseline

## 2017-07-15 NOTE — MBS/VFSS/FEES
Acute Care - Speech Language Pathology   Swallow FEES Study [facility]     Patient Name: Bita Croft  : 1955  MRN: [id number]  Today's Date: 7/15/2017  Onset of Illness/Injury or Date of Surgery Date: 17     Referring Physician: Dr. Gonzales       Admit Date: 2017  SPEECH-LANGUAGE PATHOLOGY EVALUATION - FEES  Subjective: The patient was seen on this date for a FEES (Fiberoptic Endoscopic Evaluation of Swallow).  Patient was alert and cooperative.    The patient's history is significant for dysphagia of unknown origin.  Patient is currently NPO.  Concerns with ability for NG due to hx of gastric bypass.   Objective: Risks/benefits were reviewed with the patient, and consent was obtained. The nasendoscope was introduced into the left nare without the use of topical anaesthetic. Textures given included puree consistency.  Assessment:  Velopharyngeal function was n/a.  Epiglottis was Adequate in appearance. Tongue base movement was symmetrical with adequate range of motion. Laryngeal function was characterized by possible swelling of false vocal cords and red bloody type area on left vocal cord.  Secretion rating scale score was 1 - Secretions present around the laryngeal vestibule. Difficulties were noted with puree consistency, characterized by significant residue on the left side resulting in patient having multiple swallows and gagging behaviors.  Patient did not want to continue with testing.     Thin Nectar Honey Puree Fork Mashed Mech Soft Regular Liquid Wash   Penetration           Aspiration           Residue    yes         SLP Findings:  Patient presents with severe oropharyngeal dysphagia.   Recommendations:  Diet Textures: NPO, no food. Medications should be taken crushed by alternate means. May have Ice between meals after oral care, under staff or family supervision and with the recommended strategies for safe swallowing.  Patient will also need MIRIAN as she has not been receiving  nutrition.  Recommended Strategies: Upright for PO and small bites and sips  Other Recommended Evaluations: Referral to ENT to determine if any abnormality of the vocal cords which may be causing her dysphagia and pain with swallow.    Dysphagia therapy is recommended.   Chaya Brewer MS CCC-SLP 7/15/2017 3:47 PM    Visit Dx:     ICD-10-CM ICD-9-CM   1. Inability to swallow R13.0 787.20   2. Dysphagia, unspecified type R13.10 787.20   3. Difficulty in walking, not elsewhere classified R26.2 719.7   4. Impaired mobility Z74.09 799.89     Patient Active Problem List   Diagnosis   • Confusion   • Depression   • Anxiety   • Hyponatremia   • Seizure disorder   • Chronic neck pain   • Chronic back pain   • Hypothyroidism   • Abnormal thyroid blood test   • Hypertension   • Wellness examination-done   • Elevated fasting glucose   • Anemia   • Iron deficiency   • Degenerative joint disease of spine   • Inability to swallow   • Conversion disorder with abnormal movement   • Arytenoid cyst   • Functional dysphonia   • Reflux laryngitis   • Gait difficulty   • Weakness of both legs   • Sore throat     Past Medical History:   Diagnosis Date   • Asthma    • Cancer     colon cancer   • Depression    • Disease of thyroid gland    • Hypertension    • Insomnia    • Seizure    • Sleep apnea      Past Surgical History:   Procedure Laterality Date   • BACK SURGERY     • COLON SURGERY     • GASTRIC BYPASS     • HYSTERECTOMY     • KNEE ARTHROSCOPY      bilateral   • NECK SURGERY     • ORIF WRIST FRACTURE Right 6/9/2017    Procedure: WRIST OPEN REDUCTION INTERNAL FIXATION, RIGHT;  Surgeon: Alec Cardoza MD;  Location: [facility];  Service:    • TONSILLECTOMY     • WRIST FRACTURE SURGERY            SWALLOW EVALUATION (last 72 hours)      Swallow Evaluation       07/15/17 1330 07/13/17 1350 07/13/17 0938          Rehab Evaluation    Document Type evaluation  -KW evaluation  -TM evaluation  -BN      Subjective Information complains  of;pain  -KW complains of;pain;agree to therapy   Throat-Level 10 on 0-10 scale.  -TM no complaints;agree to therapy  -BN      General Information    Patient Profile Review  yes  -TM yes  -BN      Onset of Illness/Injury  07/12/17  -TM 07/12/17  -BN      Referring Physician  Dr. Gonzales   - Christian  -      Subjective Patient Observations  Pt c/o pain with swallowing, which she stated has been present for the past few days. Level of discomfort at time of eval appeared high, pt rated pain in throat during swallowing to be a level 10. RN notified.  -TM Pt and spouse report coughing up phlegm shortly after swallowing. This started approx 3 days ago.   -BN      Pertinent History Of Current Problem  Pt stated swallowing trouble began approx 5 days prior to current admission. 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   -TM Pt stated swallowing trouble began approx 5 days prior to current admission. 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   -BN      Current Diet Limitations  NPO  -TM NPO  -BN      Precautions/Limitations, Vision  WFL with corrective lenses  -TM WFL with corrective lenses  -BN      Precautions/Limitations, Hearing  WFL  -TM WFL  -BN      Prior Level of Function- Communication  functional in all spheres  -TM functional in all spheres  -BN      Prior Level of Function- Swallowing  no diet consistency restrictions  -TM no diet consistency restrictions  -BN      Plans/Goals Discussed With  patient;other   RNTomas  - patient  -BN      Barriers to Rehab  none identified  -TM none identified  -BN      Clinical Impression    Patient's Goals For Discharge eat/drink without coughing/choking  -KW patient did not state  -TM return to PO diet  -BN      Family Goals For Discharge patient able to eat/drink without coughing/choking  -KW other (see comments)   No family present at time of eval.   -TM patient able to return to PO diet  -BN      SLP Swallowing Diagnosis severe  dysphagia  -KW severe dysphagia;pharyngeal dysfunction  -TM pharyngeal dysfunction  -BN      Rehab Potential/Prognosis, Swallowing fair, will monitor progress closely  -KW fair, will monitor progress closely  -TM good, to achieve stated therapy goals  -BN      Criteria for Skilled Therapeutic Interventions Met demonstrates skilled criteria for intervention  -KW skilled criteria for dysphagia intervention met  -TM skilled criteria for dysphagia intervention met  -BN      FCM, Swallowing 2-->Level 2  -KW 1-->Level 1  -TM       Therapy Frequency 3-5 times/wk  -KW daily  -TM PRN  -BN      Predicted Duration Therapy Interv (days) until discharge  -KW until discharge  -TM until discharge  -BN      Expected Duration Therapy Session (min) 15-30 minutes  -KW 15-30 minutes  -TM 15-30 minutes  -BN      SLP Diet Recommendation NPO: unsafe for food/liquid intake  -KW NPO: unsafe for food/liquid intake;temporary alternative means of nutrition/hydration  -TM NPO: unsafe for food/liquid intake  -BN      Recommended Diagnostics  VFSS (MBS);other (see comments)   When swallow fx appears improved at bedside, deemed per SLP.  -TM VFSS (MBS)  -BN      SLP Rec. for Method of Medication Administration meds via alternate route  -KW meds via alternate route  -TM meds via alternate route  -BN      Monitor For Signs Of Aspiration  cough;gurgly voice;throat clearing;pneumonia;right lower lobe infiltrates  -TM       Anticipated Discharge Disposition home with home health  -KW home  -TM home  -BN      Pain Assessment    Pain Assessment   0-10  -BN      Pain Score   10  -BN      Pain Location   Back   Pain with swallowing  -BN      Cognitive Assessment/Intervention    Current Cognitive/Communication Assessment   functional  -BN      Orientation Status   oriented x 4  -BN      Follows Commands/Answers Questions   100% of the time  -BN      Oral Motor Structure and Function    Oral Motor Anatomy and Physiology patient demonstrates anatomy and  "physiology that is WNL  -KW patient demonstrates anatomy and physiology that is WNL  -TM patient demonstrates anatomy and physiology that is WNL  -BN      Dentition Assessment present and adequate  -KW present and adequate  -TM present and adequate  -BN      Secretion Management wet vocal quality  -KW WNL/WFL  -TM WNL/WFL  -BN      Mucosal Quality moist, healthy  -KW moist, healthy  -TM moist, healthy  -BN      Velar Elevation WFL (within functional limits)  -KW  WFL (within functional limits)  -BN      Gag Response WFL (within functional limits)  -KW        Volitional Swallow no difficulties initiating volitional swallow  -KW mild to moderate difficulties initiating volitional swallow;other (see comments)   Felt to be exacerbated by throat pain.  -TM mild to moderate difficulties initiating volitional swallow  -BN      Volitional Cough no difficulties initiating volitional cough  -KW no difficulties initiating volitional cough  -TM no difficulties initiating volitional cough;other (see comments)   coughs up and expels phlegm with cough  -BN      Oral Musculature General Assessment WFL (within functional limits)  -KW WFL (within functional limits)  -TM WFL (within functional limits)  -BN      General Feeding/Swallowing Observations    Current Feeding Method NPO  -KW NPO  -TM NPO  -BN      Respiratory Support Currently in Use  other (see comments)   Room air.   -TM other (see comments)   room air  -BN      Observations of Posture During Feeding  Upright in dysphagia chair.   -TM       Clinical Swallow Exam    Mode of Presentation   fed by clinician  -BN      Oral Phase Results   intact oral phase without signs of dysfunction  -BN      Pharyngeal Phase Results   cough;throat clear  -BN      Summary of Clinical Exam   Pt c/o of extreme pain with swallowing. Cough and throat clear with trials given. Pt expels phlegm upon coughing. Strained vocal quailty. Pt stated she feels like a \"hairball\" is stuck in the back of " throat. Facial grimace and pain with puree, honey thick, and thin trials given. ST recommends MBS to r/o aspiration with PO.   -BN      Videofluoroscopic Swallowing Exam    Risks/Benefits Reviewed  risks/benefits explained;agreed to eval;patient  -TM       Positioning Needs for Swallow Exam  Upright in dysphagia chair.  -TM       Radiologic Views Used for Examination  left lateral view  -TM       VFSS    Mode of Presentation  spoon  -TM       Pharyngeal Phase Physiologic Impairment  impaired timing with penetration before;bolus to pyriforms before initiation of response;reduced pharyngeal wall contraction;reduced laryngeal elevation;reduced anterior hyolaryngeal excursion;incomplete epiglottic inversion;other (see comments)   Penetration before swallow with honey thick.   -TM       Post Swallow Residue  post swallow residue present;residue present pyriform sinuses;residue present in valleculae;residue present on pharyngeal walls;unable to clear residue in valleculae;unable to clear residue in pyriform sinuses  -TM       Att Compensatory Maneuvers  multiple swallows  -TM       Pharyngeal Phase Results  impaired pharyngeal phase of swallowing  -TM       Summary of VFSS  VFSS completed. Pt was presented pudding thick and honey thick trials only, in the following order: honey thick, pudding thick, repeat honey thick. Pt was noted to have a significant delay in swallow initiation with premature loss of the bolus to the pyriform sinuses and into the laryngeal vestibule with the initial honey thick trial, as deep laryngeal penetration was observed. Pt was also noted to have weak laryngeal elevation and no epiglottic inversion. Three multiple swallows noted, with weak pharyngeal contraction and 1x laryngeal penetration on multiple swallows, which was also noted with the latter honey thick trial. With pudding thick, pt was noted to, again, have a delay in swallow initiation, with premature loss of the bolus to the pyriforms,  again with weak laryngeal elevation and no epiglottic inversion. It must be noted that the pt did also exhibit multiple instances of laryngeal penetration with the pudding thick consistency. Four multiple swallows, with weak upper esophageal sphincter opening. No definite aspiration was observed throughout the study; however, the pt is at a high risk for aspiration with PO intake. Radiologist voiced concern with soft tissue swelling. Recommended CT of neck for further assessment in regards to concern.   -TM       VFSS Swallow Recommendations    Oral Care  oral care with toothbrush and dentifrice BID and PRN  -TM       Other Recommendations  ice chips after oral care;other (comment)   Ok for occasional ice chip, knowing high risk for aspiration  -TM       Recommended Diet  NPO: unsafe for food/liquid intake;temporary alternative means of nutrition/hydration  -TM       Pharyngeal Therapeutic Exercise Program  Improve timing:;cold bolus;sour bolus;Improve elevation:;effortful pitch glide;Mendelsohn Maneuver;Improve tongue base/pharyngeal wall:;effortful swallow  -TM       Fiberoptic Endoscopic Swallowing Exam    Risks/Benefits Reviewed risks/benefits explained;agreed to eval;patient  -KW        Positioning Needs for Swallow Exam Was upright in bed during evaluation.  -KW        Nasal Entry, Topical Anesthetic left:;nostril entered using no topical anesthetic  -KW        Anatomy and Physiology    Base of Tongue symmetrical  -KW        Epiglottis WFL  -KW        Secretions 1- Secretions present around the laryngeal vestibule  -KW        Secretion Description white  -KW        Spontaneous Swallow did not clear secretions  -KW        Sensory senses scope  -KW        Anatomy Hypopharynx    Observation Anatomic Considerations anatomic deviation observed  -KW        Observations Comment Patient observed to have some possible edema but difficult to tell.  Pateint did have red left vocal cord appearing to be busted blood  vessel.  Some possible swelling of the false vocal cords.  -        FEES    Mode of Presentation pudding:;spoon  -KW        Pharyngeal Phase Impairment pudding:  -KW        Post Swallow Residue residue present in valleculae;residue present on pharyngeal walls;unable to clear residue in pyriform sinuses  -KW        Pharyngeal Phase Results impaired pharyngeal phase of swallowing  -        FEES Summary FEES study completed on swallow.  Scope placed in left nares.  Patient tolerated passage of scope well.  Patient did have secretions sitting above the cricopharyngus muscle and what appeared to be white patchy area or secretions on the posterior pharyngeal wall.  Left vocal cord did show some bloody red type tissue concerning for vocal cord abnormality.  Patient does complain with high pain with swallow.  She was observed to have possible swelling in laryngeal and pharyngeal area and at false vocal cords, however difficulty for SLP to tell fully.  She was given a bite of applesauce resulting in patients inability to clear out of the left pharyngeal area unless patient takes multiple swallows.  Patient does have gagging and coughing through out PO attempt.  Did not try other consistencies as patient became very anxious and requested to discontinue testing.  Rec 1)  Patiwindy continues to  have significant dysphagia of unknow origin.  She is at high risk for aspiration.  Continue NPO.  2)  Patient will need some sort of MIRIAN as she has gone several days without nutrition.  3)  ENT consult due to concerns with vocal cords seen during FEES and to r/o this as cause of her current deficits.  4) ST to follow.  -        FEES Swallow Recommendations    Recommended Diet NPO: unsafe for food/liquid intake  -        Dysphagia Treatment Objectives and Progress    Dysphagia Treatment Objectives Improve laryngeal elevation;Improve tongue base & pharyngeal wall squeeze;Improve hyolaryngeal excursion  - Improve timing of  pharyngeal response;Improve laryngeal elevation;Improve tongue base & pharyngeal wall squeeze  -TM       Improve timing of pharyngeal response    To improve timing of pharyngeal response, patient will:  Swallow in timely way after sensory input;90%;without cues  -TM       Status: Improve timing of pharyngeal response  New  -TM       Timing of Pharyngeal Response Progress not met but discontinue  -KW continue to adress  -TM       Improve laryngeal elevation    To improve laryngeal elevation, patient will: Complete Mendelsohn maneuver;Complete pitch-glide;Complete super-supraglottic swallow;90%;without cues  -KW Complete Mendelsohn maneuver;Complete pitch-glide;90%;without cues  -TM       Status: Improve laryngeal elevation Revised  -KW New  -TM       Laryngeal Elevation Progress  continue to adress  -TM       Improve hyolaryngeal excursion    To improve hyolaryngeal excursion, patient will: Complete head lift sustained (comment number of seconds);Complete head lift repetitive (comment number of lifts);Complete chin tuck against resistance (comment number of repetitions);90%;without cues  -KW        Status: Improve hyolaryngeal excursion New  -KW        Improve tongue base & pharyngeal wall squeeze    To improve tongue base & pharyngeal wall squeeze, patient will: Complete effortful swallow;Complete tongue hold swallow;90%;without cues  -KW Complete effortful swallow;90%;without cues  -TM       Status: Improve tongue base & pharyngeal wall squeeze Revised  -KW New  -TM       Tongue Base/Pharyngeal Wall Squeeze Progress  continue to adress  -TM         User Key  (r) = Recorded By, (t) = Taken By, (c) = Cosigned By    Initials Name Effective Dates    TM Marisabel Faye CCC-SLP 08/02/16 -     KW Chaya Brewer MS [facility]-SLP 08/02/16 -     BN Suri Rosario CCC-SLP 08/02/16 -         EDUCATION  The patient has been educated in the following areas:   Dysphagia (Swallowing Impairment) NPO rationale.    SLP  Recommendation and Plan  SLP Swallowing Diagnosis: severe dysphagia  SLP Diet Recommendation: NPO: unsafe for food/liquid intake     SLP Rec. for Method of Medication Administration: meds via alternate route        Criteria for Skilled Therapeutic Interventions Met: demonstrates skilled criteria for intervention  Anticipated Discharge Disposition: home with home health  Rehab Potential/Prognosis, Swallowing: fair, will monitor progress closely  Therapy Frequency: 3-5 times/wk             Plan of Care Review  Plan Of Care Reviewed With: patient, spouse, caregiver  Progress: no change  Outcome Summary/Follow up Plan: FEES study completed on swallow. Scope placed in left nares. Patient tolerated passage of scope well. Patient did have secretions sitting above the cricopharyngus muscle and what appeared to be white patchy area or secretions on the posterior pharyngeal wall. Left vocal cord did show some bloody red type tissue concerning for vocal cord abnormality. Patient does complain with high pain with swallow. She was observed to have possible swelling in laryngeal and pharyngeal area and at false vocal cords, however difficulty for SLP to tell fully. She was given a bite of applesauce resulting in patients inability to clear out of the left pharyngeal area unless patient takes multiple swallows. Patient does have gagging and coughing through out PO attempt. Did not try other consistencies as patient became very anxious and requested to discontinue testing. Rec 1) Patien continues to have significant dysphagia of unknow origin. She is at high risk for aspiration. Continue NPO. 2) Patient will need some sort of MIRIAN as she has gone several days without nutrition. 3) ENT consult due to concerns with vocal cords seen during FEES and to r/o this as cause of her current deficits. 4) ST to follow.          IP SLP Goals       07/15/17 1537 07/15/17 1452 07/14/17 1018    Begin to Take Some PO Safely    Begin to Take Some PO  Safely- SLP, Date Established 07/15/17  -KW      Begin to Take Some PO Safely- SLP, Time to Achieve by discharge  -KW  by discharge  -EA    Begin to Take Some PO Safely- SLP, Activity Level Patient will improve laryngeal elevation for safer, more efficient swallow;Patient will improve hyolaryngeal excursion for safer, more efficient swallow;Patient will improve tongue base/pharyngeal wall squeeze for safer, more efficient swallow  -KW  Patient will improve timing of pharyngeal response for safer, more efficient swallow;Patient will improve laryngeal elevation for safer, more efficient swallow;Patient will improve tongue base/pharyngeal wall squeeze for safer, more efficient swallow  -EA    Begin to Take Some PO Safely- SLP, Date Goal Reviewed 07/15/17  -KW 07/15/17  -KW 07/14/17  -EA    Begin to Take Some PO Safely- SLP, Outcome   goal ongoing  -EA      07/13/17 1531          Begin to Take Some PO Safely    Begin to Take Some PO Safely- SLP, Date Established 07/13/17  -TM      Begin to Take Some PO Safely- SLP, Time to Achieve by discharge  -TM      Begin to Take Some PO Safely- SLP, Activity Level Patient will improve timing of pharyngeal response for safer, more efficient swallow;Patient will improve laryngeal elevation for safer, more efficient swallow;Patient will improve tongue base/pharyngeal wall squeeze for safer, more efficient swallow  -TM      Begin to Take Some PO Safely- SLP, Additional Goal Pt will tolerate NMES tx for dysphagia without s/s of distress and Pt will tolerate PO trials with SLP without overt s/s of aspiration.  -TM      Begin to Take Some PO Safely- SLP, Date Goal Reviewed 07/13/17  -TM      Begin to Take Some PO Safely- SLP, Outcome goal ongoing  -TM        User Key  (r) = Recorded By, (t) = Taken By, (c) = Cosigned By    Initials Name Provider Type    TM Marisabel Faye, CCC-SLP Speech and Language Pathologist    PEG Brewer, MS CCC-SLP Speech and Language Pathologist    STEPHANIE MARIN  MS Burt, CFY-SLP Speech and Language Pathologist             SLP Outcome Measures (last 72 hours)      SLP Outcome Measures       07/15/17 1500 07/13/17 1500       SLP Outcome Measures    Outcome Measure Used? Adult NOMS  -KW Adult NOMS  -TM     FCM Scores    FCM Chosen Swallowing  -KW Swallowing  -TM     Swallowing FCM Score 2  -KW 1  -TM       User Key  (r) = Recorded By, (t) = Taken By, (c) = Cosigned By    Initials Name Effective Dates    TM Marisabel Faye CCC-SLP 08/02/16 -     KW Chaya Brewer MS CCC-CHAIM 08/02/16 -            Time Calculation:         Time Calculation- SLP       07/15/17 1540 07/15/17 1454       Time Calculation- SLP    SLP Start Time 1330  -KW 1300  -KW     SLP Stop Time 1500  -KW 1315  -KW     SLP Time Calculation (min) 90 min  -KW 15 min  -KW     SLP Received On 07/15/17  -KW 07/15/17  -KW     SLP Goal Re-Cert Due Date 07/25/17  -KW        User Key  (r) = Recorded By, (t) = Taken By, (c) = Cosigned By    Initials Name Provider Type    PEG Brewer MS CCC-SLP Speech and Language Pathologist          Therapy Charges for Today     Code Description Service Date Service Provider Modifiers Qty    59167046010 HC ST TREATMENT SWALLOW 1 7/15/2017 Chaya Brewer MS CCC-SLP GN 1    86833923754 HC ST SWALLOWING CURRENT STATUS 7/15/2017 Chaya Brewer MS CCC-SLP GN, CM 1    32403037784 HC ST SWALLOWING PROJECTED 7/15/2017 Chaya Brewer MS CCC-SLP GN, CL 1    52116430734 HC ST FIBEROPTIC ENDO EVAL SWALL 6 7/15/2017 Chaya Brewer MS CCC-SLP GN 1          SLP G-Codes  SLP NOMS Used?: Yes  Functional Limitations: Swallowing  Swallow Current Status (): At least 80 percent but less than 100 percent impaired, limited or restricted  Swallow Goal Status (): At least 60 percent but less than 80 percent impaired, limited or restricted    Chaya Brewer MS CCC-CHAIM  7/15/2017

## 2017-07-15 NOTE — PLAN OF CARE
Problem: Patient Care Overview (Adult)  Goal: Plan of Care Review  Outcome: Ongoing (interventions implemented as appropriate)    07/15/17 1132   Coping/Psychosocial Response Interventions   Plan Of Care Reviewed With patient   Patient Care Overview   Progress progress towards functional goals is fair   Outcome Evaluation   Outcome Summary/Follow up Plan Pt trans to EOB cga-min assist, sit-stand min of 2, pt amb 12 feet min of 2 with rwx, follow with w/c, one sitting rest, then pt amb 12 feet back to bed. Pt would benefit from HH or out patient therapy

## 2017-07-15 NOTE — PLAN OF CARE
Problem: Patient Care Overview (Adult)  Goal: Plan of Care Review  Outcome: Ongoing (interventions implemented as appropriate)    07/15/17 3652   Coping/Psychosocial Response Interventions   Plan Of Care Reviewed With patient;caregiver   Patient Care Overview   Progress no change   Outcome Evaluation   Outcome Summary/Follow up Plan Asked to reevalulate swallow due to patinet being discharged if swallow is improved. Patient wanting to sit on side of bed. Complains of pain all over. Trials of applesauce showed multiple swallows, gag, couging up secretions, complaints of pain with swallow. Attempted thin liquids. This resulted in immediate cough with thin liquids coming out of mouth and nasal regurgitation. Reviewed MBS which showed decreaed cricopharyngeal opening. Difficulty to determine at times if behavior is having psychogenic behaviors, however resluts of dysphagia study did show deficits. Due to know change at bedside, I do not feel that patient has had any improvement in swallow. However, due to undetermined cuase of deficits, will complete FEES study to gain a second objective view on swallow. Results pending.         Problem: Inpatient SLP  Goal: Dysphagia- Patient will improve swallowing skills to begin to take some PO safely  Outcome: Ongoing (interventions implemented as appropriate)

## 2017-07-15 NOTE — PROGRESS NOTES
LOS: 3 days   Patient Care Team:  Josafat Gonzales MD as PCP - General  Josafat Gonzales MD as PCP - Family Medicine  Pawel Abrams MD as PCP - Cardiology (Cardiology)    Chief Complaint: paraparesis    Subjective     Interval History: She has remained afebrile.  She reports swallowing some ice chips earlier today and sitting up in a wheelchair.  No new neurological symptoms        Review of Systems:     unchanged    Objective     Vital Signs  Temp:  [97.5 °F (36.4 °C)-98.5 °F (36.9 °C)] 97.9 °F (36.6 °C)  Heart Rate:  [75-84] 83  Resp:  [16-18] 18  BP: (163-178)/(85-97) 163/97see above    Physical Exam:  She continues to have generalized weakness of both lower extremities sensation to pinprick intact position sense impaired plantar extensor responses present bilateral.   no change of neurological exam        Results Review:    I reviewed the patient's new clinical results.  I reviewed the patient's new imaging results and agree with the interpretation.  I reviewed the patient's other test results and agree with the interpretation        Assessment/Plan 's  Principal Problem:    Sore throat  Active Problems:    Chronic neck pain    Chronic back pain    Inability to swallow    Conversion disorder with abnormal movement    Arytenoid cyst    Functional dysphonia    Reflux laryngitis    Gait difficulty    Weakness of both legs    Neurological status is stable      Plan for disposition:   may need repeat dysphagia study next week    Jr Bates MD  07/15/17  8:55 AM      Time:

## 2017-07-15 NOTE — PROGRESS NOTES
[facility] Rapid Response Note       Date of Encounter: 7/15/2017    Attending Physician: Dr Gonzales    Description of Events: Rapid response to call to room 352. Pt was in respiratory distress and hypertensive.  Patient presented like choking episode.  Patient  try multiple attempts to cough, until she cough up a bottle cap.  Reevaluate patient's lung was clear.  Blood pressure remained high, 210/110.  Patient was given labetalol 10 mg IV.  Discussed with nurse to recheck blood pressure in 10 minutes.  O2 sats 97%.  Patient is alert and awake after she cough up the bottle.  No sign of confusion.  Patient was very thankful.    Mariano Pérez MD  7/15/2017  4:54 PM

## 2017-07-15 NOTE — PLAN OF CARE
Problem: Patient Care Overview (Adult)  Goal: Plan of Care Review  Outcome: Ongoing (interventions implemented as appropriate)    07/15/17 0728   Coping/Psychosocial Response Interventions   Plan Of Care Reviewed With patient   Patient Care Overview   Progress no change   Outcome Evaluation   Outcome Summary/Follow up Plan Pt sedated most of shift. C/o of pain 10/10 but falling asleep and speech slurred. Up x2 to pivot to [facility].          Problem: Pain, Acute (Adult)  Goal: Acceptable Pain Control/Comfort Level  Outcome: Ongoing (interventions implemented as appropriate)    Problem: Dysphagia (Adult)  Goal: Functional/Safe Swallow  Outcome: Ongoing (interventions implemented as appropriate)  Goal: Compensatory Techniques to Improve Safety/Function with Swallowing  Outcome: Ongoing (interventions implemented as appropriate)    Problem: Fall Risk (Adult)  Goal: Absence of Falls  Outcome: Ongoing (interventions implemented as appropriate)    Problem: Pressure Ulcer Risk (Jose Scale) (Adult,Obstetrics,Pediatric)  Goal: Skin Integrity  Outcome: Ongoing (interventions implemented as appropriate)

## 2017-07-15 NOTE — THERAPY TREATMENT NOTE
Acute Care - Physical Therapy Treatment Note  [facility]     Patient Name: Bita Croft  : 1955  MRN: [id number]  Today's Date: 7/15/2017  Onset of Illness/Injury or Date of Surgery Date: 17  Date of Referral to PT: 17  Referring Physician: Josafat Gonzales MD    Admit Date: 2017    Visit Dx:    ICD-10-CM ICD-9-CM   1. Inability to swallow R13.0 787.20   2. Dysphagia, unspecified type R13.10 787.20   3. Difficulty in walking, not elsewhere classified R26.2 719.7   4. Impaired mobility Z74.09 799.89     Patient Active Problem List   Diagnosis   • Confusion   • Depression   • Anxiety   • Hyponatremia   • Seizure disorder   • Chronic neck pain   • Chronic back pain   • Hypothyroidism   • Abnormal thyroid blood test   • Hypertension   • Wellness examination-done   • Elevated fasting glucose   • Anemia   • Iron deficiency   • Degenerative joint disease of spine   • Inability to swallow   • Conversion disorder with abnormal movement   • Arytenoid cyst   • Functional dysphonia   • Reflux laryngitis   • Gait difficulty   • Weakness of both legs   • Sore throat               Adult Rehabilitation Note       07/15/17 1015 17 1330 17 0937    Rehab Assessment/Intervention    Discipline physical therapy assistant  - physical therapy assistant  -NESSA speech language pathologist  -EA    Document Type therapy note (daily note)  - therapy note (daily note)  -NESSA therapy note (daily note)  -EA    Subjective Information agree to therapy;complains of;weakness;fatigue;pain  - agree to therapy;complains of;fatigue;weakness  -NESSA pain;agree to therapy  -EA    Precautions/Limitations fall precautions  - fall precautions  -     Recorded by [] Hailey Hernandez, PTA [NESSA] Wolfgang Bates PTA [EA] Lynn Dallas, MS, CFY-SLP    Pain Assessment    Pain Assessment 0-10  - 0-10  -NESSA     Pain Score 10  - 10  -NESSA     Pain Location Throat  - Throat  -     Pain Descriptors  Burning  -NESSA     Pain  Frequency Constant/continuous  -AH Constant/continuous  -NESSA     Pain Intervention(s) Medication (See MAR)  -AH Medication (See MAR)  -NESSA     Response to Interventions  tolerated  -NESSA     Recorded by [] Hailey Hernandez PTA [NESSA] Wolfgang Bates PTA     Pain 2    Pain Score 2 10  -AH      Pain Type 2 Chronic pain  -AH      Pain Location 2 Leg  -AH      Pain Orientation 2 Right;Left  -AH      Pain Frequency 2 Constant/continuous  -AH      Pain Intervention(s) 2 Medication (See MAR);Ambulation/increased activity;Repositioned  -      Response to Interventions 2 tolerated  -AH      Recorded by [] Hailey Hernandez PTA      Dysphagia/Swallow Intervention    Dysphagia/Swallow Therapeutic Feed   ST attempted PO trials of ice chips; pt immediately coughed with first trial and used suction to retrieve mucus. ST tried one more ice chip trial three minutes later and throat clear x2 was noted. Pt stated level of pain was 10/10 today; ST inquired if she wanted to participate in swallowing exercises and she agreed. Completed effortful x10 and Mendelsohn x5 with level 7/10 pain reported.  -EA    Recorded by   [EA] Lynn Dallas MS, CFY-SLP    Improve timing of pharyngeal response    To improve timing of pharyngeal response, patient will:   Swallow in timely way after sensory input;90%;without cues  -EA    Status: Improve timing of pharyngeal response   Progressing as expected  -EA    Timing of Pharyngeal Response Progress   60%;with inconsistent cues;continue to adress  -EA    Comments: Improve timing of pharyngeal response   Pt presented with weak and delayed swallow during ice chip trials and swallowing exercises.  -EA    Recorded by   [EA] Lynn Dallas MS, CFY-SLP    Improve laryngeal elevation    To improve laryngeal elevation, patient will:   Complete Mendelsohn maneuver;without cues;Complete pitch-glide;90%  -EA    Status: Improve laryngeal elevation   Progressing as expected  -EA    Laryngeal Elevation Progress    50%;with inconsistent cues;continue to adress  -EA    Comments: Improve laryngeal elevation   Pt completed Mendelsohn maneuver with 50% accuracy; delayed swallow was noted 3/5 times exercise was completed.  -EA    Recorded by   [EA] Lynn Dallas MS, CFY-SLP    Improve tongue base & pharyngeal wall squeeze    To improve tongue base & pharyngeal wall squeeze, patient will:   Complete effortful swallow;90%;without cues  -EA    Status: Improve tongue base & pharyngeal wall squeeze   Progressing as expected  -EA    Tongue Base/Pharyngeal Wall Squeeze Progress   50%;with inconsistent cues;continue to adress  -EA    Comments: Improve tongue base & pharyngeal wall squeeze   Pt completed effortful swallow 50% accuracy x10; delayed and weak swallow was noted. Pt became fatigued at end.  -EA    Recorded by   [EA] Lynn Dallas MS, CFY-SLP    Bed Mobility, Assessment/Treatment    Bed Mobility, Assistive Device  bed rails;head of bed elevated  -NESSA     Bed Mob, Supine to Sit, [place] verbal cues required;contact guard assist;minimum assist (75% patient effort)  - conditional independence  -NESSA     Bed Mob, Sit to Supine, [place] contact guard assist;verbal cues required  - conditional independence   in chair  -NESSA     Recorded by [] Hailey Hernandez PTA [NESSA] Wolfgang Bates PTA     Transfer Assessment/Treatment    Transfers, Sit-Stand [place] minimum assist (75% patient effort);2 person assist required;verbal cues required  - verbal cues required;minimum assist (75% patient effort)  -NESSA     Transfers, Stand-Sit [place] contact guard assist;verbal cues required  - verbal cues required;contact guard assist  -NESSA     Transfer, Comment  Pt stood with CGA/min assist.  Stand pivot to w/c with CGA.  Pt's knee buckled but was able to self correct.  T/F w/c to BSC, BSC to w/c all with CGA/ min assist  -NESSA     Recorded by [] Hailey Hernandez PTA [NESSA] Wolfgang Bates PTA     Gait Assessment/Treatment     Gait, [place] Level contact guard assist;2 person assist required;verbal cues required  -      Gait, Assistive Device rolling walker  -      Gait, Distance (Feet) 12   12 x 2, follow with w/c sit rest  -      Gait, Comment pt amb 12 feet, follow with chair, one sitting rest, then amb 12 feet back to bed  -      Recorded by [AH] Hailey Hernandez PTA      Balance Skills Training    Standing-Level of Assistance  Contact guard  -     Static Standing Balance Support  --   with RWX  -NESSA     Recorded by  [NESSA] Wolfgang Bates PTA     Therapy Exercises    Bilateral Lower Extremities  AROM:;20 reps;sitting;hip flexion;LAQ;ankle pumps/circles  -NESSA     Recorded by  [NESSA] Wolfgang Bates PTA     Positioning and Restraints    Pre-Treatment Position in bed  - in bed  -     Post Treatment Position bed  - wheelchair  -     In Bed fowlers;call light within reach;encouraged to call for assist;exit alarm on;SCD pump applied  -      In Wheelchair  sitting;call light within reach;encouraged to call for assist;with family/caregiver  -NESSA     Recorded by [AH] Hailey Hernandez PTA [NESSA] Wolfgang Bates PTA       07/13/17 1520 07/13/17 1350       Rehab Assessment/Intervention    Discipline speech language pathologist  -KW      Document Type therapy note (daily note)  -KW      Subjective Information pain  -KW      Recorded by [KW] Chaya Brewer MS CCC-SLP      Swallow Assessment/Intervention    Additional Documentation  Dysphagia/Swallow Intervention (Group)  -TM     Recorded by  [TM] Marisabel Faye [facility]-SLP     Dysphagia/Swallow Intervention    Dysphagia/Swallow Intervention  Pt will tolerate 30 minutes of NMES tx without s/s of distress.  -TM     Dysphagia/Swallow Therapeutic Feed Discussed results of study with patient including NPO status, concern with no cricopharyngeal opeining, requested suction at bedside, RN to see about changing PO meds.   present and aware of recommendations.  Ok for ice chips.   -KW Pt will tolerate PO trials with SLP without overt s/s of aspiration.  -TM     Recorded by [KW] Chaya Brewer, MS CCC-SLP [TM] Marisabel Faye, CCC-SLP       User Key  (r) = Recorded By, (t) = Taken By, (c) = Cosigned By    Initials Name Effective Dates    AH Hailey Hernandez, PTA 08/02/16 -     TM Marisabel Faye, CCC-[facility] 08/02/16 -     NESSA Wolfgang COBB Jana, PTA 12/08/16 -     KW Chaya Brewer, MS [facility]-SLP 08/02/16 -     EA Lynn Dallas, MS, [facility]-SLP 02/21/17 -                 IP PT Goals       07/14/17 0919          Transfer Training PT LTG    Transfer Training PT LTG, Date Established 07/14/17  -MS (r) RZ (t) MS (c)      Transfer Training PT LTG, Time to Achieve by discharge  -MS (r) RZ (t) MS (c)      Transfer Training PT LTG, Activity Type all transfers  -MS (r) RZ (t) MS (c)      Transfer Training PT LTG, [place] Level contact guard assist  -MS (r) RZ (t) MS (c)      Transfer Training PT LTG, Assist Device walker, rolling  -MS (r) RZ (t) MS (c)      Gait Training PT LTG    Gait Training Goal PT LTG, Date Established 07/14/17  -MS (r) RZ (t) MS (c)      Gait Training Goal PT LTG, Time to Achieve by discharge  -MS (r) RZ (t) MS (c)      Gait Training Goal PT LTG, [place] Level minimum assist (75% patient effort)  -MS (r) RZ (t) MS (c)      Gait Training Goal PT LTG, Assist Device walker, rolling platform  -MS (r) RZ (t) MS (c)      Gait Training Goal PT LTG, Distance to Achieve 50 ft  -MS (r) RZ (t) MS (c)      Strength Goal PT LTG    Strength Goal PT LTG, Date Established 07/14/17  -MS (r) RZ (t) MS (c)      Strength Goal PT LTG, Time to Achieve by discharge  -MS (r) RZ (t) MS (c)      Strength Goal PT LTG, Measure to Achieve Pt to increase all major LE musculaure to 4+/5  -MS (r) RZ (t) MS (c)      Caregiver Training PT LTG    Caregiver Training PT LTG, Date Established 07/14/17  -MS (r) RZ (t) MS (c)      Caregiver Training PT LTG, Time to Achieve by discharge  -MS (r) RZ (t) MS (c)      Caregiver  Training PT LTG, Activity Type Pts spouse will demonstrate proper body mechanics and technique for all transfers 100% of the time.   -MS (r) RZ (t) MS (c)      Caregiver Training PT LTG, [place] Level able to assist adequately  -MS (r) RZ (t) MS (c)        User Key  (r) = Recorded By, (t) = Taken By, (c) = Cosigned By    Initials Name Provider Type    MS Belkis Esteban, PT DPT Physical Therapist    NINA Rae, PT Student PT Student          Physical Therapy Education     Title: PT OT SLP Therapies (Active)     Topic: Physical Therapy (Active)     Point: Mobility training (Done)    Learning Progress Summary    Learner Readiness Method Response Comment Documented by Status   Patient Acceptance E VU safe trans  07/15/17 1131 Done    Acceptance E VU Pt educated on bed mobility and transfer training. Pt educated on fall precautions and to call for assistance prior to ambualtion.  07/14/17 0928 Done               Point: Precautions (Done)    Learning Progress Summary    Learner Readiness Method Response Comment Documented by Status   Patient Acceptance E VU Pt educated on bed mobility and transfer training. Pt educated on fall precautions and to call for assistance prior to ambualtion.  07/14/17 0928 Done                      User Key     Initials Effective Dates Name Provider Type [facility] 08/02/16 -  Hailey Hernandez, PTA Physical Therapy Assistant PT     05/08/17 -  Kika Rae, PT Student PT Student PT                    PT Recommendation and Plan  Anticipated Equipment Needs At Discharge: tub bench  Anticipated Discharge Disposition: skilled nursing facility, home with home health  Demonstrates Need for Referral to Another Service: other (see comments) (psych)  Planned Therapy Interventions: balance training, bed mobility training, gait training, home exercise program, patient/family education, strengthening, transfer training, wheelchair management/propulsion training  PT Frequency:  daily, 2 times/day, per priority policy  Plan of Care Review  Plan Of Care Reviewed With: patient  Progress: progress towards functional goals is fair  Outcome Summary/Follow up Plan: Pt trans to EOB cga-min assist, sit-stand min of 2, pt amb 12 feet min of 2 with rwx, follow with w/c, one sitting rest, then pt amb 12 feet back to bed. Pt would benefit from HH or out patient therapy          Outcome Measures       07/15/17 1100 07/14/17 0742       How much help from another person do you currently need...    Turning from your back to your side while in flat bed without using bedrails? 4  - 4  -MS (r) RZ (t) MS (c)     Moving from lying on back to sitting on the side of a flat bed without bedrails? 3  - 4  -MS (r) RZ (t) MS (c)     Moving to and from a bed to a chair (including a wheelchair)? 3  - 3  -MS (r) RZ (t) MS (c)     Standing up from a chair using your arms (e.g., wheelchair, bedside chair)? 3  - 3  -MS (r) RZ (t) MS (c)     Climbing 3-5 steps with a railing? 2  - 2  -MS (r) RZ (t) MS (c)     To walk in hospital room? 3  - 2  -MS (r) RZ (t) MS (c)     AM-PAC 6 Clicks Score 18  - 18  -MS (r) RZ (t)     Functional Assessment    Outcome Measure Options AM-PAC 6 Clicks Basic Mobility (PT)  - AM-PAC 6 Clicks Basic Mobility (PT)  -MS (r) RZ (t) MS (c)       User Key  (r) = Recorded By, (t) = Taken By, (c) = Cosigned By    Initials Name Provider Type     Hailey Hernandez, PTA Physical Therapy Assistant    MS Belkis Esteban, PT DPT Physical Therapist    NINA Rae, PT Student PT Student           Time Calculation:         PT Charges       07/15/17 1137          Time Calculation    Start Time 1015  -      Stop Time 1040  -      Time Calculation (min) 25 min  -      PT Received On 07/15/17  -      PT Goal Re-Cert Due Date 07/24/17  -      Time Calculation- PT    Total Timed Code Minutes- PT 25 minute(s)  -        User Key  (r) = Recorded By, (t) = Taken By, (c) = Cosigned By     Initials Name Provider Type     Hailey Hernandez PTA Physical Therapy Assistant          Therapy Charges for Today     Code Description Service Date Service Provider Modifiers Qty    [id number] HC GAIT TRAINING EA 15 MIN 7/15/2017 Hailey Hernandez PTA GP 2          PT G-Codes  PT Professional Judgement Used?: Yes  Outcome Measure Options: AM-PAC 6 Clicks Basic Mobility (PT)  Score: 18  Functional Limitation: Mobility: Walking and moving around  Mobility: Walking and Moving Around Current Status (): At least 40 percent but less than 60 percent impaired, limited or restricted  Mobility: Walking and Moving Around Goal Status (): At least 20 percent but less than 40 percent impaired, limited or restricted    Hailey Hernandez PTA  7/15/2017

## 2017-07-15 NOTE — THERAPY TREATMENT NOTE
Acute Care - Speech Language Pathology   Swallow Treatment Note [facility]     Patient Name: Bita Croft  : 1955  MRN: [id number]  Today's Date: 7/15/2017  Onset of Illness/Injury or Date of Surgery Date: 17     Referring Physician: Dr. Gonzales       Admit Date: 2017  Asked to reevalulate swallow due to patinet being discharged if swallow is improved. Patient wanting to sit on side of bed. Complains of pain all over. Trials of applesauce showed multiple swallows, gag, couging up secretions, complaints of pain with swallow. Attempted thin liquids. This resulted in immediate cough with thin liquids coming out of mouth and nasal regurgitation. Reviewed MBS which showed decreaed cricopharyngeal opening. Difficulty to determine at times if behavior is having psychogenic behaviors, however resluts of dysphagia study did show deficits. Due to know change at bedside, I do not feel that patient has had any improvement in swallow. However, due to undetermined cuase of deficits, will complete FEES study to gain a second objective view on swallow. Results pending.  Chaya Brewer, MS CCC-SLP 7/15/2017 2:54 PM    Visit Dx:      ICD-10-CM ICD-9-CM   1. Inability to swallow R13.0 787.20   2. Dysphagia, unspecified type R13.10 787.20   3. Difficulty in walking, not elsewhere classified R26.2 719.7   4. Impaired mobility Z74.09 799.89     Patient Active Problem List   Diagnosis   • Confusion   • Depression   • Anxiety   • Hyponatremia   • Seizure disorder   • Chronic neck pain   • Chronic back pain   • Hypothyroidism   • Abnormal thyroid blood test   • Hypertension   • Wellness examination-done   • Elevated fasting glucose   • Anemia   • Iron deficiency   • Degenerative joint disease of spine   • Inability to swallow   • Conversion disorder with abnormal movement   • Arytenoid cyst   • Functional dysphonia   • Reflux laryngitis   • Gait difficulty   • Weakness of both legs   • Sore throat             Adult  Rehabilitation Note       07/15/17 1300 07/15/17 1015 07/14/17 1330    Rehab Assessment/Intervention    Discipline speech language pathologist  -KW physical therapy assistant  - physical therapy assistant  -NESSA    Document Type therapy note (daily note)  -KW therapy note (daily note)  - therapy note (daily note)  -    Subjective Information complains of;pain  - agree to therapy;complains of;weakness;fatigue;pain  - agree to therapy;complains of;fatigue;weakness  -    Patient Effort, Rehab Treatment adequate  -      Precautions/Limitations  fall precautions  - fall precautions  -    Recorded by [KW] Chaya Brewer, MS CCC-SLP [] Hailey Hernandez PTA [NESSA] Wolfgang Bates PTA    Pain Assessment    Pain Assessment 0-10  - 0-10  - 0-10  -NESSA    Pain Score 10  -KW 10  - 10  -NESSA    Pain Location  Throat  - Throat  -    Pain Descriptors   Burning  -    Pain Frequency  Constant/continuous  - Constant/continuous  -    Pain Intervention(s)  Medication (See MAR)  - Medication (See MAR)  -    Response to Interventions   tolerated  -NESSA    Recorded by [KW] Chaya Brewer, MS CCC-SLP [] aHiley Hernandez PTA [NESSA] Wolfgang Bates PTA    Pain 2    Pain Score 2  10  -     Pain Type 2  Chronic pain  -     Pain Location 2  Leg  -     Pain Orientation 2  Right;Left  -     Pain Frequency 2  Constant/continuous  -     Pain Intervention(s) 2  Medication (See MAR);Ambulation/increased activity;Repositioned  -     Response to Interventions 2  tolerated  -     Recorded by  [] Hailey Hernandez PTA     Dysphagia/Swallow Intervention    Dysphagia/Swallow Therapeutic Feed Asked to reevalulate swallow due to patinet being discharged if swallow is improved.  Patient wanting to sit on side of bed.  Complains of pain all over.  Trials of applesauce showed multiple swallows, gag, couging up secretions, complaints of pain with swallow.  Attempted thin liquids.  This resulted in immediate cough with  thin liquids coming out of mouth and nasal regurgitation.  Reviewed MBS which showed decreaed cricopharyngeal opening.  Difficulty to determine at times if behavior is having psychogenic behaviors, however resluts of dysphagia study did show deficits.  Due to know change at bedside, I do not feel that patient has had any improvement in swallow.  However, due to undetermined cuase of deficits, will complete FEES study to gain a second objective view on swallow.  Results pending.  -KW      Recorded by [KW] Chaya Brewer MS CCC-SLP      Bed Mobility, Assessment/Treatment    Bed Mobility, Assistive Device   bed rails;head of bed elevated  -NESSA    Bed Mob, Supine to Sit, [place]  verbal cues required;contact guard assist;minimum assist (75% patient effort)  - conditional independence  -NESSA    Bed Mob, Sit to Supine, [place]  contact guard assist;verbal cues required  - conditional independence   in chair  -NESSA    Recorded by  [] Hailey Hernandez PTA [NESSA] Wolfgang Bates PTA    Transfer Assessment/Treatment    Transfers, Sit-Stand [place]  minimum assist (75% patient effort);2 person assist required;verbal cues required  - verbal cues required;minimum assist (75% patient effort)  -NESSA    Transfers, Stand-Sit [place]  contact guard assist;verbal cues required  - verbal cues required;contact guard assist  -NESSA    Transfer, Comment   Pt stood with CGA/min assist.  Stand pivot to w/c with CGA.  Pt's knee buckled but was able to self correct.  T/F w/c to BSC, BSC to w/c all with CGA/ min assist  -NESSA    Recorded by  [] Hailey Hernandez PTA [NESSA] Wolfgang Bates PTA    Gait Assessment/Treatment    Gait, [place] Level  contact guard assist;2 person assist required;verbal cues required  -     Gait, Assistive Device  rolling walker  -     Gait, Distance (Feet)  12   12 x 2, follow with w/c sit rest  -     Gait, Comment  pt amb 12 feet, follow with chair, one sitting rest, then amb 12 feet  back to bed  -     Recorded by  [AH] Hailey Hernandez PTA     Balance Skills Training    Standing-Level of Assistance   Contact guard  -NESSA    Static Standing Balance Support   --   with RWX  -NESSA    Recorded by   [NESSA] Wolfgang Bates PTA    Therapy Exercises    Bilateral Lower Extremities   AROM:;20 reps;sitting;hip flexion;LAQ;ankle pumps/circles  -NESSA    Recorded by   [NESSA] Wolfgang Bates PTA    Positioning and Restraints    Pre-Treatment Position  in bed  - in bed  -    Post Treatment Position  bed  - wheelchair  -    In Bed  fowlers;call light within reach;encouraged to call for assist;exit alarm on;SCD pump applied  -     In Wheelchair   sitting;call light within reach;encouraged to call for assist;with family/caregiver  -NESSA    Recorded by  [AH] Hailey Hernandez PTA [NESSA] Wolfgang Bates PTA      07/14/17 0937 07/13/17 1520 07/13/17 1350    Rehab Assessment/Intervention    Discipline speech language pathologist  -EA speech language pathologist  -KW     Document Type therapy note (daily note)  -EA therapy note (daily note)  -KW     Subjective Information pain;agree to therapy  -EA pain  -KW     Recorded by [EA] Lynn Dallas MS, CFY-SLP [KW] Chaya Brewer MS CCC-SLP     Swallow Assessment/Intervention    Additional Documentation   Dysphagia/Swallow Intervention (Group)  -TM    Recorded by   [TM] Marisabel Faye, [facility]-SLP    Dysphagia/Swallow Intervention    Dysphagia/Swallow Intervention   Pt will tolerate 30 minutes of NMES tx without s/s of distress.  -TM    Dysphagia/Swallow Therapeutic Feed ST attempted PO trials of ice chips; pt immediately coughed with first trial and used suction to retrieve mucus. ST tried one more ice chip trial three minutes later and throat clear x2 was noted. Pt stated level of pain was 10/10 today; ST inquired if she wanted to participate in swallowing exercises and she agreed. Completed effortful x10 and Mendelsohn x5 with level 7/10 pain reported.  -EA Discussed  results of study with patient including NPO status, concern with no cricopharyngeal opeining, requested suction at bedside, RN to see about changing PO meds.   present and aware of recommendations.  Ok for ice chips.  -KW Pt will tolerate PO trials with SLP without overt s/s of aspiration.  -TM    Recorded by [EA] Lynn Dallas MS, CFY-SLP [KW] Chaya Brewer MS CCC-SLP [TM] Marisabel Faye, CCC-SLP    Improve timing of pharyngeal response    To improve timing of pharyngeal response, patient will: Swallow in timely way after sensory input;90%;without cues  -EA      Status: Improve timing of pharyngeal response Progressing as expected  -EA      Timing of Pharyngeal Response Progress 60%;with inconsistent cues;continue to adress  -EA      Comments: Improve timing of pharyngeal response Pt presented with weak and delayed swallow during ice chip trials and swallowing exercises.  -EA      Recorded by [EA] Lynn Dallas MS, CFY-SLP      Improve laryngeal elevation    To improve laryngeal elevation, patient will: Complete Mendelsohn maneuver;without cues;Complete pitch-glide;90%  -EA      Status: Improve laryngeal elevation Progressing as expected  -EA      Laryngeal Elevation Progress 50%;with inconsistent cues;continue to adress  -EA      Comments: Improve laryngeal elevation Pt completed Mendelsohn maneuver with 50% accuracy; delayed swallow was noted 3/5 times exercise was completed.  -EA      Recorded by [EA] Lynn Dallas MS, CFY-SLP      Improve tongue base & pharyngeal wall squeeze    To improve tongue base & pharyngeal wall squeeze, patient will: Complete effortful swallow;90%;without cues  -EA      Status: Improve tongue base & pharyngeal wall squeeze Progressing as expected  -EA      Tongue Base/Pharyngeal Wall Squeeze Progress 50%;with inconsistent cues;continue to adress  -EA      Comments: Improve tongue base & pharyngeal wall squeeze Pt completed effortful swallow 50% accuracy x10; delayed and weak  swallow was noted. Pt became fatigued at end.  -EA      Recorded by [EA] Lynn Dallas, MS, CFY-SLP        User Key  (r) = Recorded By, (t) = Taken By, (c) = Cosigned By    Initials Name Effective Dates     Hailey Hernandez, PTA 08/02/16 -     TM Marisabel Faye CCC-SLP 08/02/16 -     NESSA Wolfgang JORDYN ObandoJana, PTA 12/08/16 -     KW Chaya Brewer, MS [facility]-SLP 08/02/16 -     EA Lynn Dallas, MS, CFY-SLP 02/21/17 -                   IP SLP Goals       07/15/17 1452 07/14/17 1018 07/13/17 1531    Begin to Take Some PO Safely    Begin to Take Some PO Safely- SLP, Date Established   07/13/17  -TM    Begin to Take Some PO Safely- SLP, Time to Achieve  by discharge  -EA by discharge  -TM    Begin to Take Some PO Safely- SLP, Activity Level  Patient will improve timing of pharyngeal response for safer, more efficient swallow;Patient will improve laryngeal elevation for safer, more efficient swallow;Patient will improve tongue base/pharyngeal wall squeeze for safer, more efficient swallow  -EA Patient will improve timing of pharyngeal response for safer, more efficient swallow;Patient will improve laryngeal elevation for safer, more efficient swallow;Patient will improve tongue base/pharyngeal wall squeeze for safer, more efficient swallow  -TM    Begin to Take Some PO Safely- SLP, Additional Goal   Pt will tolerate NMES tx for dysphagia without s/s of distress and Pt will tolerate PO trials with SLP without overt s/s of aspiration.  -TM    Begin to Take Some PO Safely- SLP, Date Goal Reviewed 07/15/17  -KW 07/14/17  -EA 07/13/17  -TM    Begin to Take Some PO Safely- SLP, Outcome  goal ongoing  -EA goal ongoing  -TM      User Key  (r) = Recorded By, (t) = Taken By, (c) = Cosigned By    Initials Name Provider Type    TM Marisabel Faye CCC-SLP Speech and Language Pathologist    PEG Brewer, MS CCC-SLP Speech and Language Pathologist    STEPHANIE Dallas, MS, CFY-SLP Speech and Language Pathologist          EDUCATION  The  patient has been educated in the following areas:   Dysphagia (Swallowing Impairment) NPO rationale.    SLP Recommendation and Plan                                           Plan of Care Review  Plan Of Care Reviewed With: patient, caregiver  Progress: no change  Outcome Summary/Follow up Plan: Asked to reevalulate swallow due to patinet being discharged if swallow is improved. Patient wanting to sit on side of bed. Complains of pain all over. Trials of applesauce showed multiple swallows, gag, couging up secretions, complaints of pain with swallow. Attempted thin liquids. This resulted in immediate cough with thin liquids coming out of mouth and nasal regurgitation. Reviewed MBS which showed decreaed cricopharyngeal opening. Difficulty to determine at times if behavior is having psychogenic behaviors, however resluts of dysphagia study did show deficits. Due to know change at bedside, I do not feel that patient has had any improvement in swallow. However, due to undetermined cuase of deficits, will complete FEES study to gain a second objective view on swallow. Results pending.       SLP Outcome Measures (last 72 hours)      SLP Outcome Measures       07/13/17 1500          SLP Outcome Measures    Outcome Measure Used? Adult NOMS  -      FCM Scores    FCM Chosen Swallowing  -      Swallowing FCM Score 1  -TM        User Key  (r) = Recorded By, (t) = Taken By, (c) = Cosigned By    Initials Name Effective Dates     Marisabel Faye CCC-SLP 08/02/16 -              Time Calculation:         Time Calculation- SLP       07/15/17 1454          Time Calculation- SLP    SLP Start Time 1300  -KW      SLP Stop Time 1315  -KW      SLP Time Calculation (min) 15 min  -KW      SLP Received On 07/15/17  -        User Key  (r) = Recorded By, (t) = Taken By, (c) = Cosigned By    Initials Name Provider Type    PEG Brewer MS CCC-SLP Speech and Language Pathologist          Therapy Charges for Today     Code Description  Service Date Service Provider Modifiers Qty    [id number]  ST TREATMENT SWALLOW 1 7/15/2017 Chaya Brewer, MS CCC-SLP GN 1          SLP G-Codes  SLP NOMS Used?: Yes  Functional Limitations: Swallowing  Swallow Current Status (): 100 percent impaired, limited or restricted  Swallow Goal Status (): At least 60 percent but less than 80 percent impaired, limited or restricted      Chaya Brewer MS CCC-SLP  7/15/2017

## 2017-07-15 NOTE — NURSING NOTE
"Patient called out yelling help...entered room and found patient in respiratory distress...stridor noted...Rapid response called..the patient coughed up a white \"water bottle lid\" before any interventions done..the patient reported immediate relief...both Dr. Gonzales and Dr. Choudhury notified  "

## 2017-07-16 PROBLEM — T17.208A: Status: RESOLVED | Noted: 2017-07-16 | Resolved: 2017-07-16

## 2017-07-16 PROBLEM — R13.0 INABILITY TO SWALLOW: Status: RESOLVED | Noted: 2017-07-12 | Resolved: 2017-07-16

## 2017-07-16 PROBLEM — F44.4 FUNCTIONAL DYSPHONIA: Status: RESOLVED | Noted: 2017-07-14 | Resolved: 2017-07-16

## 2017-07-16 PROBLEM — T17.208A: Status: ACTIVE | Noted: 2017-07-16

## 2017-07-16 LAB
ANION GAP SERPL CALCULATED.3IONS-SCNC: 10 MMOL/L (ref 4–13)
BUN BLD-MCNC: 9 MG/DL (ref 5–21)
BUN/CREAT SERPL: 18 (ref 7–25)
CALCIUM SPEC-SCNC: 8.5 MG/DL (ref 8.4–10.4)
CHLORIDE SERPL-SCNC: 100 MMOL/L (ref 98–110)
CO2 SERPL-SCNC: 27 MMOL/L (ref 24–31)
CREAT BLD-MCNC: 0.5 MG/DL (ref 0.5–1.4)
GFR SERPL CREATININE-BSD FRML MDRD: 125 ML/MIN/1.73
GLUCOSE BLD-MCNC: 86 MG/DL (ref 70–100)
POTASSIUM BLD-SCNC: 2.7 MMOL/L (ref 3.5–5.3)
SODIUM BLD-SCNC: 137 MMOL/L (ref 135–145)

## 2017-07-16 PROCEDURE — 25010000003 LEVETIRACETAM IN NACL 0.75% 1000 MG/100ML SOLUTION: Performed by: FAMILY MEDICINE

## 2017-07-16 PROCEDURE — 99232 SBSQ HOSP IP/OBS MODERATE 35: CPT | Performed by: FAMILY MEDICINE

## 2017-07-16 PROCEDURE — 25010000002 LORAZEPAM PER 2 MG: Performed by: FAMILY MEDICINE

## 2017-07-16 PROCEDURE — 99231 SBSQ HOSP IP/OBS SF/LOW 25: CPT | Performed by: NEUROLOGICAL SURGERY

## 2017-07-16 PROCEDURE — 25010000002 ENOXAPARIN PER 10 MG: Performed by: FAMILY MEDICINE

## 2017-07-16 PROCEDURE — 31575 DIAGNOSTIC LARYNGOSCOPY: CPT | Performed by: OTOLARYNGOLOGY

## 2017-07-16 PROCEDURE — 25010000002 MORPHINE SULFATE (PF) 2 MG/ML SOLUTION: Performed by: FAMILY MEDICINE

## 2017-07-16 PROCEDURE — 80048 BASIC METABOLIC PNL TOTAL CA: CPT | Performed by: FAMILY MEDICINE

## 2017-07-16 PROCEDURE — 99231 SBSQ HOSP IP/OBS SF/LOW 25: CPT | Performed by: OTOLARYNGOLOGY

## 2017-07-16 PROCEDURE — 92526 ORAL FUNCTION THERAPY: CPT | Performed by: SPEECH-LANGUAGE PATHOLOGIST

## 2017-07-16 PROCEDURE — 25010000002 PIPERACILLIN SOD-TAZOBACTAM PER 1 G: Performed by: FAMILY MEDICINE

## 2017-07-16 PROCEDURE — 25010000002 POTASSIUM CHLORIDE PER 2 MEQ OF POTASSIUM: Performed by: FAMILY MEDICINE

## 2017-07-16 PROCEDURE — 25010000002 ONDANSETRON PER 1 MG: Performed by: FAMILY MEDICINE

## 2017-07-16 RX ORDER — TRAZODONE HYDROCHLORIDE 50 MG/1
50 TABLET ORAL NIGHTLY
Status: DISCONTINUED | OUTPATIENT
Start: 2017-07-16 | End: 2017-07-17 | Stop reason: HOSPADM

## 2017-07-16 RX ORDER — OXYCODONE HYDROCHLORIDE 5 MG/1
10 TABLET ORAL EVERY 4 HOURS PRN
Status: DISCONTINUED | OUTPATIENT
Start: 2017-07-16 | End: 2017-07-17 | Stop reason: HOSPADM

## 2017-07-16 RX ORDER — CLONIDINE HYDROCHLORIDE 0.1 MG/1
0.1 TABLET ORAL EVERY 12 HOURS SCHEDULED
Status: DISCONTINUED | OUTPATIENT
Start: 2017-07-16 | End: 2017-07-17 | Stop reason: HOSPADM

## 2017-07-16 RX ORDER — LEVETIRACETAM 500 MG/1
1000 TABLET ORAL EVERY 12 HOURS SCHEDULED
Status: DISCONTINUED | OUTPATIENT
Start: 2017-07-16 | End: 2017-07-17 | Stop reason: HOSPADM

## 2017-07-16 RX ORDER — POTASSIUM CHLORIDE 750 MG/1
20 CAPSULE, EXTENDED RELEASE ORAL 2 TIMES DAILY WITH MEALS
Status: DISCONTINUED | OUTPATIENT
Start: 2017-07-16 | End: 2017-07-17 | Stop reason: HOSPADM

## 2017-07-16 RX ORDER — PHENYTOIN SODIUM 100 MG/1
200 CAPSULE, EXTENDED RELEASE ORAL NIGHTLY
Status: DISCONTINUED | OUTPATIENT
Start: 2017-07-16 | End: 2017-07-17 | Stop reason: HOSPADM

## 2017-07-16 RX ORDER — DULOXETIN HYDROCHLORIDE 30 MG/1
30 CAPSULE, DELAYED RELEASE ORAL DAILY
Status: DISCONTINUED | OUTPATIENT
Start: 2017-07-16 | End: 2017-07-17 | Stop reason: HOSPADM

## 2017-07-16 RX ORDER — AMLODIPINE BESYLATE 5 MG/1
5 TABLET ORAL EVERY 12 HOURS SCHEDULED
Status: DISCONTINUED | OUTPATIENT
Start: 2017-07-16 | End: 2017-07-17 | Stop reason: HOSPADM

## 2017-07-16 RX ORDER — QUETIAPINE FUMARATE 100 MG/1
100 TABLET, FILM COATED ORAL EVERY 12 HOURS SCHEDULED
Status: DISCONTINUED | OUTPATIENT
Start: 2017-07-16 | End: 2017-07-17 | Stop reason: HOSPADM

## 2017-07-16 RX ORDER — GABAPENTIN 300 MG/1
300 CAPSULE ORAL EVERY 8 HOURS SCHEDULED
Status: DISCONTINUED | OUTPATIENT
Start: 2017-07-16 | End: 2017-07-17 | Stop reason: HOSPADM

## 2017-07-16 RX ORDER — PHENYTOIN SODIUM 100 MG/1
100 CAPSULE, EXTENDED RELEASE ORAL DAILY
Status: DISCONTINUED | OUTPATIENT
Start: 2017-07-16 | End: 2017-07-17 | Stop reason: HOSPADM

## 2017-07-16 RX ORDER — CLONAZEPAM 1 MG/1
1 TABLET ORAL 3 TIMES DAILY PRN
Status: DISCONTINUED | OUTPATIENT
Start: 2017-07-16 | End: 2017-07-17 | Stop reason: HOSPADM

## 2017-07-16 RX ADMIN — POTASSIUM CHLORIDE 20 MEQ: 750 CAPSULE, EXTENDED RELEASE ORAL at 11:35

## 2017-07-16 RX ADMIN — CLINDAMYCIN PHOSPHATE 600 MG: 12 INJECTION, SOLUTION INTRAVENOUS at 12:32

## 2017-07-16 RX ADMIN — CLINDAMYCIN PHOSPHATE 600 MG: 12 INJECTION, SOLUTION INTRAVENOUS at 21:13

## 2017-07-16 RX ADMIN — MORPHINE SULFATE 2 MG: 2 INJECTION, SOLUTION INTRAMUSCULAR; INTRAVENOUS at 16:18

## 2017-07-16 RX ADMIN — CLONIDINE HYDROCHLORIDE 0.1 MG: 0.1 TABLET ORAL at 21:16

## 2017-07-16 RX ADMIN — TAZOBACTAM SODIUM AND PIPERACILLIN SODIUM 3.38 G: 375; 3 INJECTION, SOLUTION INTRAVENOUS at 04:36

## 2017-07-16 RX ADMIN — POTASSIUM CHLORIDE 20 MEQ: 750 CAPSULE, EXTENDED RELEASE ORAL at 17:35

## 2017-07-16 RX ADMIN — CLINDAMYCIN PHOSPHATE 600 MG: 12 INJECTION, SOLUTION INTRAVENOUS at 03:47

## 2017-07-16 RX ADMIN — POTASSIUM CHLORIDE 125 ML/HR: 2 INJECTION, SOLUTION, CONCENTRATE INTRAVENOUS at 03:47

## 2017-07-16 RX ADMIN — LORAZEPAM 0.5 MG: 2 INJECTION INTRAMUSCULAR at 04:30

## 2017-07-16 RX ADMIN — TAZOBACTAM SODIUM AND PIPERACILLIN SODIUM 3.38 G: 375; 3 INJECTION, SOLUTION INTRAVENOUS at 22:17

## 2017-07-16 RX ADMIN — QUETIAPINE FUMARATE 100 MG: 100 TABLET ORAL at 11:33

## 2017-07-16 RX ADMIN — GABAPENTIN 300 MG: 300 CAPSULE ORAL at 14:00

## 2017-07-16 RX ADMIN — TAZOBACTAM SODIUM AND PIPERACILLIN SODIUM 3.38 G: 375; 3 INJECTION, SOLUTION INTRAVENOUS at 00:09

## 2017-07-16 RX ADMIN — MORPHINE SULFATE 2 MG: 2 INJECTION, SOLUTION INTRAMUSCULAR; INTRAVENOUS at 10:00

## 2017-07-16 RX ADMIN — QUETIAPINE FUMARATE 100 MG: 100 TABLET ORAL at 22:17

## 2017-07-16 RX ADMIN — MORPHINE SULFATE 2 MG: 2 INJECTION, SOLUTION INTRAMUSCULAR; INTRAVENOUS at 03:10

## 2017-07-16 RX ADMIN — GABAPENTIN 300 MG: 300 CAPSULE ORAL at 22:17

## 2017-07-16 RX ADMIN — ENOXAPARIN SODIUM 40 MG: 40 INJECTION SUBCUTANEOUS at 09:49

## 2017-07-16 RX ADMIN — ONDANSETRON 4 MG: 2 INJECTION INTRAMUSCULAR; INTRAVENOUS at 04:31

## 2017-07-16 RX ADMIN — TRAZODONE HYDROCHLORIDE 50 MG: 50 TABLET ORAL at 22:17

## 2017-07-16 RX ADMIN — PHENYTOIN SODIUM 100 MG: 100 CAPSULE, EXTENDED RELEASE ORAL at 11:33

## 2017-07-16 RX ADMIN — AMLODIPINE BESYLATE 5 MG: 5 TABLET ORAL at 21:14

## 2017-07-16 RX ADMIN — LEVETIRACETAM 1000 MG: 500 TABLET, FILM COATED ORAL at 21:14

## 2017-07-16 RX ADMIN — POTASSIUM CHLORIDE 125 ML/HR: 2 INJECTION, SOLUTION, CONCENTRATE INTRAVENOUS at 15:55

## 2017-07-16 RX ADMIN — MORPHINE SULFATE 2 MG: 2 INJECTION, SOLUTION INTRAMUSCULAR; INTRAVENOUS at 00:28

## 2017-07-16 RX ADMIN — MORPHINE SULFATE 2 MG: 2 INJECTION, SOLUTION INTRAMUSCULAR; INTRAVENOUS at 07:19

## 2017-07-16 RX ADMIN — TAZOBACTAM SODIUM AND PIPERACILLIN SODIUM 3.38 G: 375; 3 INJECTION, SOLUTION INTRAVENOUS at 16:22

## 2017-07-16 RX ADMIN — TAZOBACTAM SODIUM AND PIPERACILLIN SODIUM 3.38 G: 375; 3 INJECTION, SOLUTION INTRAVENOUS at 11:32

## 2017-07-16 RX ADMIN — OXYCODONE HYDROCHLORIDE 10 MG: 5 TABLET ORAL at 21:13

## 2017-07-16 RX ADMIN — AMLODIPINE BESYLATE 5 MG: 5 TABLET ORAL at 11:33

## 2017-07-16 RX ADMIN — FAMOTIDINE 40 MG: 10 INJECTION INTRAVENOUS at 09:49

## 2017-07-16 RX ADMIN — FAMOTIDINE 40 MG: 10 INJECTION INTRAVENOUS at 22:17

## 2017-07-16 RX ADMIN — LEVETIRACETAM 1000 MG: 1000 INJECTION, SOLUTION INTRAVENOUS at 09:49

## 2017-07-16 RX ADMIN — DULOXETINE HYDROCHLORIDE 30 MG: 30 CAPSULE, DELAYED RELEASE ORAL at 11:33

## 2017-07-16 RX ADMIN — MORPHINE SULFATE 2 MG: 2 INJECTION, SOLUTION INTRAMUSCULAR; INTRAVENOUS at 19:03

## 2017-07-16 RX ADMIN — PHENYTOIN SODIUM 200 MG: 100 CAPSULE, EXTENDED RELEASE ORAL at 21:14

## 2017-07-16 NOTE — PLAN OF CARE
Problem: Patient Care Overview (Adult)  Goal: Plan of Care Review  Outcome: Ongoing (interventions implemented as appropriate)    07/16/17 0454   Coping/Psychosocial Response Interventions   Plan Of Care Reviewed With patient   Patient Care Overview   Progress no change   Outcome Evaluation   Outcome Summary/Follow up Plan Pt. slept most of night. c/o severe pain. medicated as ordered. At times pt. seemed confused/drowsy. pulled IV out twice. Ate a total of 1 1/2 cups ice chips over the night with no s/s dysphagia. Incontinent of urine.        Goal: Adult Individualization and Mutuality  Outcome: Ongoing (interventions implemented as appropriate)    Problem: Pain, Acute (Adult)  Goal: Acceptable Pain Control/Comfort Level  Outcome: Ongoing (interventions implemented as appropriate)    Problem: Dysphagia (Adult)  Goal: Functional/Safe Swallow  Outcome: Ongoing (interventions implemented as appropriate)  Goal: Compensatory Techniques to Improve Safety/Function with Swallowing  Outcome: Ongoing (interventions implemented as appropriate)    Problem: Fall Risk (Adult)  Goal: Absence of Falls  Outcome: Ongoing (interventions implemented as appropriate)    Problem: Pressure Ulcer Risk (Jose Scale) (Adult,Obstetrics,Pediatric)  Goal: Skin Integrity  Outcome: Ongoing (interventions implemented as appropriate)

## 2017-07-16 NOTE — THERAPY TREATMENT NOTE
Acute Care - Speech Language Pathology   Swallow Treatment Note [facility]     Patient Name: Bita Croft  : 1955  MRN: [id number]  Today's Date: 2017  Onset of Illness/Injury or Date of Surgery Date: 17     Referring Physician: Dr. Gonzales       Admit Date: 2017  Pooja had respiratory event last PM and then couoghed out bottle cap. Since then swallow has improved. She was started on pureed diet. No overt s/s of aspiration today with thin liquids or solids. Will upgrade to [facility] soft with thin liquids.  Chaya Brewer MS CCC-SLP 2017 1:53 PM    Visit Dx:      ICD-10-CM ICD-9-CM   1. Inability to swallow R13.0 787.20   2. Dysphagia, unspecified type R13.10 787.20   3. Difficulty in walking, not elsewhere classified R26.2 719.7   4. Impaired mobility Z74.09 799.89     Patient Active Problem List   Diagnosis   • Confusion   • Depression   • Anxiety   • Hyponatremia   • Seizure disorder   • Chronic neck pain   • Chronic back pain   • Hypothyroidism   • Abnormal thyroid blood test   • Hypertension   • Wellness examination-done   • Elevated fasting glucose   • Anemia   • Iron deficiency   • Degenerative joint disease of spine   • Conversion disorder with abnormal movement   • Reflux laryngitis   • Gait difficulty   • Weakness of both legs   • Sore throat             Adult Rehabilitation Note       17 1330 07/15/17 1330 07/15/17 1300    Rehab Assessment/Intervention    Discipline speech language pathologist  -KW  speech language pathologist  -KW    Document Type therapy note (daily note)  -KW  therapy note (daily note)  -KW    Subjective Information no complaints  -KW  complains of;pain  -KW    Patient Effort, Rehab Treatment good  -KW  adequate  -KW    Recorded by [KW] Chaya Brewer MS CCC-SLP  [KW] MS RICH RussoSLP    Pain Assessment    Pain Assessment   0-10  -KW    Pain Score   10  -KW    Recorded by   [KW] Chaya Brewer MS CCC-CHAIM    Dysphagia/Swallow Intervention     Dysphagia/Swallow Intervention  Patient will complete NMES for 30 minutes while completing swallow exercises with 90% acc  -KW     Dysphagia/Swallow Therapeutic Feed Pooja had respiratory event last PM and then couoghed out bottle cap.  Since then swallow has improved.  She was started on pureed diet.  No overt s/s of aspiration today with thin liquids or solids.  Will upgrade to [facility] soft with thin liquids.  -KW Patient will tolerate PO trials with SLP with no s/s of aspiration.  -KW Asked to reevalulate swallow due to patinet being discharged if swallow is improved.  Patient wanting to sit on side of bed.  Complains of pain all over.  Trials of applesauce showed multiple swallows, gag, couging up secretions, complaints of pain with swallow.  Attempted thin liquids.  This resulted in immediate cough with thin liquids coming out of mouth and nasal regurgitation.  Reviewed MBS which showed decreaed cricopharyngeal opening.  Difficulty to determine at times if behavior is having psychogenic behaviors, however resluts of dysphagia study did show deficits.  Due to know change at bedside, I do not feel that patient has had any improvement in swallow.  However, due to undetermined cuase of deficits, will complete FEES study to gain a second objective view on swallow.  Results pending.  -KW    Recorded by [KW] Chaya Brewer MS CCC-SLP [KW] Chaya Brewer MS CCC-SLP [KW] Chaya Brewer MS CCC-SLP      07/15/17 1015 07/14/17 1330 07/14/17 0937    Rehab Assessment/Intervention    Discipline physical therapy assistant  - physical therapy assistant  -NESSA speech language pathologist  -EA    Document Type therapy note (daily note)  - therapy note (daily note)  -NESSA therapy note (daily note)  -STEPHANIE    Subjective Information agree to therapy;complains of;weakness;fatigue;pain  - agree to therapy;complains of;fatigue;weakness  -NESSA pain;agree to therapy  -EA    Precautions/Limitations fall precautions  - fall precautions   -NESSA     Recorded by [] Hailey Hernandez PTA [NESSA] Wolfgang Bates PTA [EA] Lynn Dallas MS, CFY-SLP    Pain Assessment    Pain Assessment 0-10  - 0-10  -NESSA     Pain Score 10  -AH 10  -NESSA     Pain Location Throat  -AH Throat  -NESSA     Pain Descriptors  Burning  -NESSA     Pain Frequency Constant/continuous  -AH Constant/continuous  -NESSA     Pain Intervention(s) Medication (See MAR)  - Medication (See MAR)  -NESSA     Response to Interventions  tolerated  -NESSA     Recorded by [] Hailey Hernandez PTA [NESSA] Wolfgang Bates PTA     Pain 2    Pain Score 2 10  -      Pain Type 2 Chronic pain  -      Pain Location 2 Leg  -      Pain Orientation 2 Right;Left  -      Pain Frequency 2 Constant/continuous  -AH      Pain Intervention(s) 2 Medication (See MAR);Ambulation/increased activity;Repositioned  -      Response to Interventions 2 tolerated  -      Recorded by [] Hailey Hernandez PTA      Dysphagia/Swallow Intervention    Dysphagia/Swallow Therapeutic Feed   ST attempted PO trials of ice chips; pt immediately coughed with first trial and used suction to retrieve mucus. ST tried one more ice chip trial three minutes later and throat clear x2 was noted. Pt stated level of pain was 10/10 today; ST inquired if she wanted to participate in swallowing exercises and she agreed. Completed effortful x10 and Mendelsohn x5 with level 7/10 pain reported.  -EA    Recorded by   [EA] Lynn Dallas MS, CFY-SLP    Improve timing of pharyngeal response    To improve timing of pharyngeal response, patient will:   Swallow in timely way after sensory input;90%;without cues  -EA    Status: Improve timing of pharyngeal response   Progressing as expected  -EA    Timing of Pharyngeal Response Progress   60%;with inconsistent cues;continue to adress  -EA    Comments: Improve timing of pharyngeal response   Pt presented with weak and delayed swallow during ice chip trials and swallowing exercises.  -EA    Recorded by   [EA] Lynn MARIN  MS Burt, CFY-SLP    Improve laryngeal elevation    To improve laryngeal elevation, patient will:   Complete Mendelsohn maneuver;without cues;Complete pitch-glide;90%  -EA    Status: Improve laryngeal elevation   Progressing as expected  -EA    Laryngeal Elevation Progress   50%;with inconsistent cues;continue to adress  -EA    Comments: Improve laryngeal elevation   Pt completed Mendelsohn maneuver with 50% accuracy; delayed swallow was noted 3/5 times exercise was completed.  -EA    Recorded by   [EA] Lynn Dallas MS, CFY-SLP    Improve tongue base & pharyngeal wall squeeze    To improve tongue base & pharyngeal wall squeeze, patient will:   Complete effortful swallow;90%;without cues  -EA    Status: Improve tongue base & pharyngeal wall squeeze   Progressing as expected  -EA    Tongue Base/Pharyngeal Wall Squeeze Progress   50%;with inconsistent cues;continue to adress  -EA    Comments: Improve tongue base & pharyngeal wall squeeze   Pt completed effortful swallow 50% accuracy x10; delayed and weak swallow was noted. Pt became fatigued at end.  -EA    Recorded by   [EA] Lynn Dallas MS, CFY-SLP    Bed Mobility, Assessment/Treatment    Bed Mobility, Assistive Device  bed rails;head of bed elevated  -NESSA     Bed Mob, Supine to Sit, [place] verbal cues required;contact guard assist;minimum assist (75% patient effort)  - conditional independence  -     Bed Mob, Sit to Supine, [place] contact guard assist;verbal cues required  - conditional independence   in chair  -NESSA     Recorded by [] Hailey Hernandez, PTA [NESSA] Wolfgang Bates, PTA     Transfer Assessment/Treatment    Transfers, Sit-Stand [place] minimum assist (75% patient effort);2 person assist required;verbal cues required  - verbal cues required;minimum assist (75% patient effort)  -NESSA     Transfers, Stand-Sit [place] contact guard assist;verbal cues required  - verbal cues required;contact guard assist  -NESSA     Transfer,  Comment  Pt stood with CGA/min assist.  Stand pivot to w/c with CGA.  Pt's knee buckled but was able to self correct.  T/F w/c to BSC, BSC to w/c all with CGA/ min assist  -NESSA     Recorded by [AH] Hailey Hernandez PTA [NESSA] Wolfgang Bates PTA     Gait Assessment/Treatment    Gait, [place] Level contact guard assist;2 person assist required;verbal cues required  -      Gait, Assistive Device rolling walker  -      Gait, Distance (Feet) 12   12 x 2, follow with w/c sit rest  -      Gait, Comment pt amb 12 feet, follow with chair, one sitting rest, then amb 12 feet back to bed  -      Recorded by [AH] Hailey Hernandez PTA      Balance Skills Training    Standing-Level of Assistance  Contact guard  -     Static Standing Balance Support  --   with RWX  -NESSA     Recorded by  [NESSA] Wolfgang Bates PTA     Therapy Exercises    Bilateral Lower Extremities  AROM:;20 reps;sitting;hip flexion;LAQ;ankle pumps/circles  -NESSA     Recorded by  [NESSA] Wolfgang Bates PTA     Positioning and Restraints    Pre-Treatment Position in bed  - in bed  -     Post Treatment Position bed  - wheelchair  -NESSA     In Bed fowlers;call light within reach;encouraged to call for assist;exit alarm on;SCD pump applied  -      In Wheelchair  sitting;call light within reach;encouraged to call for assist;with family/caregiver  -NESSA     Recorded by [AH] Hailey Hernandez PTA [NESSA] Wolfgang Bates PTA       07/13/17 1520          Rehab Assessment/Intervention    Discipline speech language pathologist  -KW      Document Type therapy note (daily note)  -KW      Subjective Information pain  -KW      Recorded by [KW] Chaya Brewer MS CCC-SLP      Dysphagia/Swallow Intervention    Dysphagia/Swallow Therapeutic Feed Discussed results of study with patient including NPO status, concern with no cricopharyngeal opeining, requested suction at bedside, RN to see about changing PO meds.   present and aware of recommendations.  Ok for ice  chips.  -KW      Recorded by [KW] Chaya Brewer, MS CCC-SLP        User Key  (r) = Recorded By, (t) = Taken By, (c) = Cosigned By    Initials Name Effective Dates     Hailey Hernandez, PTA 08/02/16 -     NESSA Wolfgang Bates, PTA 12/08/16 -     KW Chaya Brewer, MS CCC-SLP 08/02/16 -     EA Lynn Dallas, MS, CFY-SLP 02/21/17 -                   IP SLP Goals       07/16/17 1350 07/15/17 1537 07/15/17 1452    Begin to Take Some PO Safely    Begin to Take Some PO Safely- SLP, Date Established  07/15/17  -KW     Begin to Take Some PO Safely- SLP, Time to Achieve  by discharge  -KW     Begin to Take Some PO Safely- SLP, Activity Level  Patient will improve laryngeal elevation for safer, more efficient swallow;Patient will improve hyolaryngeal excursion for safer, more efficient swallow;Patient will improve tongue base/pharyngeal wall squeeze for safer, more efficient swallow  -KW     Begin to Take Some PO Safely- SLP, Date Goal Reviewed 07/16/17  -KW 07/15/17  -KW 07/15/17  -KW      07/14/17 1018 07/13/17 1531       Begin to Take Some PO Safely    Begin to Take Some PO Safely- SLP, Date Established  07/13/17  -TM     Begin to Take Some PO Safely- SLP, Time to Achieve by discharge  -EA by discharge  -TM     Begin to Take Some PO Safely- SLP, Activity Level Patient will improve timing of pharyngeal response for safer, more efficient swallow;Patient will improve laryngeal elevation for safer, more efficient swallow;Patient will improve tongue base/pharyngeal wall squeeze for safer, more efficient swallow  -EA Patient will improve timing of pharyngeal response for safer, more efficient swallow;Patient will improve laryngeal elevation for safer, more efficient swallow;Patient will improve tongue base/pharyngeal wall squeeze for safer, more efficient swallow  -TM     Begin to Take Some PO Safely- SLP, Additional Goal  Pt will tolerate NMES tx for dysphagia without s/s of distress and Pt will tolerate PO trials with SLP  without overt s/s of aspiration.  -TM     Begin to Take Some PO Safely- SLP, Date Goal Reviewed 07/14/17  -EA 07/13/17  -TM     Begin to Take Some PO Safely- SLP, Outcome goal ongoing  -EA goal ongoing  -TM       User Key  (r) = Recorded By, (t) = Taken By, (c) = Cosigned By    Initials Name Provider Type    TM Marisabel Faye CCC-SLP Speech and Language Pathologist    PEG Brewer MS CCC-SLP Speech and Language Pathologist    STEPHANIE Dallas, MS, CFY-SLP Speech and Language Pathologist          EDUCATION  The patient has been educated in the following areas:   Dysphagia (Swallowing Impairment).    SLP Recommendation and Plan                                           Plan of Care Review  Plan Of Care Reviewed With: patient  Progress: improving  Outcome Summary/Follow up Plan: Pooja had respiratory event last PM and then couoghed out bottle cap. Since then swallow has improved. She was started on pureed diet. No overt s/s of aspiration today with thin liquids or solids. Will upgrade to [facility] soft with thin liquids.       SLP Outcome Measures (last 72 hours)      SLP Outcome Measures       07/15/17 1500 07/13/17 1500       SLP Outcome Measures    Outcome Measure Used? Adult NOMS  -KW Adult NOMS  -TM     FCM Scores    FCM Chosen Swallowing  -KW Swallowing  -TM     Swallowing FCM Score 2  -KW 1  -TM       User Key  (r) = Recorded By, (t) = Taken By, (c) = Cosigned By    Initials Name Effective Dates    TM Marisabel Faye CCC-SLP 08/02/16 -     PEG Brewer MS CCC-CHAIM 08/02/16 -              Time Calculation:         Time Calculation- SLP       07/16/17 1352          Time Calculation- SLP    SLP Start Time 1330  -KW      SLP Stop Time 1345  -KW      SLP Time Calculation (min) 15 min  -KW      SLP Received On 07/16/17  -        User Key  (r) = Recorded By, (t) = Taken By, (c) = Cosigned By    Initials Name Provider Type    PEG Brewer MS CCC-SLP Speech and Language Pathologist          Therapy  Charges for Today     Code Description Service Date Service Provider Modifiers Qty    19901197484 HC ST TREATMENT SWALLOW 1 7/15/2017 Chaya Brewer MS CCC-SLP GN 1    58682597146 HC ST SWALLOWING CURRENT STATUS 7/15/2017 Chaya Brewer MS CCC-SLP GN, CM 1    12569116514 HC ST SWALLOWING PROJECTED 7/15/2017 MS LEOLA Russo GN, CL 1    28291277756 HC ST FIBEROPTIC ENDO EVAL SWALL 6 7/15/2017 MS LEOLA Russo GN 1    80332421747 HC ST TREATMENT SWALLOW 1 7/16/2017 MS RICH RussoSLP GN 1          SLP G-Codes  SLP NOMS Used?: Yes  Functional Limitations: Swallowing  Swallow Current Status (): At least 80 percent but less than 100 percent impaired, limited or restricted  Swallow Goal Status (): At least 60 percent but less than 80 percent impaired, limited or restricted      MS LEOLA Bo  7/16/2017

## 2017-07-16 NOTE — PROCEDURES
Procedure Note    Anesthesia: topical 4% tetracaine and oxymetazoline mix    Endoscopy Type: Flexible Laryngoscopy    Indications for Procedure: Dysphagia status post expression of a foreign body of the throat    Procedure Details:    The patient was placed in the sitting position.  After topical anesthesia and decongestion, the 4 mm laryngoscope was passed.  The nasal cavities, nasopharynx, oropharynx, hypopharynx, and larynx were all examined.  Vocal cords were examined during respiration and phonation.  The following findings were noted:    Findings: There is erythema of the right vocal cord with good mobility of both vocal cords.  There is edema change to the right arytenoid region.  There was some mild secretions throughout the laryngeal examination.  There was no mass or lesion seen.  There was excoriation of the post cricoid region without old or fresh blood.    Condition:  Stable.  Patient tolerated procedure well.    Complications:  None    Man Choudhury MD  7/16/2017  9:09 AM

## 2017-07-16 NOTE — PLAN OF CARE
Problem: Patient Care Overview (Adult)  Goal: Plan of Care Review  Outcome: Ongoing (interventions implemented as appropriate)    07/16/17 1350   Coping/Psychosocial Response Interventions   Plan Of Care Reviewed With patient   Patient Care Overview   Progress improving   Outcome Evaluation   Outcome Summary/Follow up Plan Pooja had respiratory event last PM and then couoghed out bottle cap. Since then swallow has improved. She was started on pureed diet. No overt s/s of aspiration today with thin liquids or solids. Will upgrade to [facility] soft with thin liquids.         Problem: Inpatient SLP  Goal: Dysphagia- Patient will improve swallowing skills to begin to take some PO safely  Outcome: Ongoing (interventions implemented as appropriate)    07/13/17 1531 07/14/17 1018 07/15/17 1537   Begin to Take Some PO Safely   Begin to Take Some PO Safely- SLP, Date Established --  --  07/15/17   Begin to Take Some PO Safely- SLP, Time to Achieve --  --  by discharge   Begin to Take Some PO Safely- SLP, Activity Level --  --  Patient will improve laryngeal elevation for safer, more efficient swallow;Patient will improve hyolaryngeal excursion for safer, more efficient swallow;Patient will improve tongue base/pharyngeal wall squeeze for safer, more efficient swallow   Begin to Take Some PO Safely- SLP, Additional Goal Pt will tolerate NMES tx for dysphagia without s/s of distress and Pt will tolerate PO trials with SLP without overt s/s of aspiration. --  --    Begin to Take Some PO Safely- SLP, Date Goal Reviewed --  --  --    Begin to Take Some PO Safely- SLP, Outcome --  goal ongoing --      07/16/17 1350   Begin to Take Some PO Safely   Begin to Take Some PO Safely- SLP, Date Established --    Begin to Take Some PO Safely- SLP, Time to Achieve --    Begin to Take Some PO Safely- SLP, Activity Level --    Begin to Take Some PO Safely- SLP, Additional Goal --    Begin to Take Some PO Safely- SLP, Date Goal Reviewed 07/16/17    Begin to Take Some PO Safely- SLP, Outcome --

## 2017-07-16 NOTE — PLAN OF CARE
Problem: Patient Care Overview (Adult)  Goal: Plan of Care Review  Outcome: Ongoing (interventions implemented as appropriate)    07/16/17 1444   Coping/Psychosocial Response Interventions   Plan Of Care Reviewed With patient   Patient Care Overview   Progress no change   Outcome Evaluation   Outcome Summary/Follow up Plan Pt swallowing improved. Pt s/o sore throat d/t throat being scratched from bottle. Pt started on soft diet. tolerating well. Pt BP elevated this am. pt started back on home meds. Pt med with morphine for nicolás as ordered.         Problem: Pain, Acute (Adult)  Goal: Acceptable Pain Control/Comfort Level  Outcome: Ongoing (interventions implemented as appropriate)    Problem: Dysphagia (Adult)  Goal: Functional/Safe Swallow  Outcome: Ongoing (interventions implemented as appropriate)  Goal: Compensatory Techniques to Improve Safety/Function with Swallowing  Outcome: Ongoing (interventions implemented as appropriate)    Problem: Fall Risk (Adult)  Goal: Absence of Falls  Outcome: Ongoing (interventions implemented as appropriate)    Problem: Pressure Ulcer Risk (Jose Scale) (Adult,Obstetrics,Pediatric)  Goal: Skin Integrity  Outcome: Ongoing (interventions implemented as appropriate)

## 2017-07-16 NOTE — PROGRESS NOTES
LOS: 4 days   Patient Care Team:  Josafat Gonzales MD as PCP - General  Josafat Gonzales MD as PCP - Family Medicine  Pawel Abrams MD as PCP - Cardiology (Cardiology)    Chief Complaint:  Dysphagia    Subjective     Interval History:   Bita Croft is a 61 y.o. female admitted for dysphagia and numbness and weakness of her lower extremities.  A consult was obtained from Dr. Man Kunz for the dysphagia.  I am cross covering for him this weekend.  Dr. Kunz had performed flexible laryngoscopy and saw edema but no signs of mass lesion and specifically no overt signs of foreign body.  Patient had no history of foreign body aspiration but evidently yesterday had a choking spell in which a rapid response was called.  She eventually coughed out a water bottle lid.  She recalls no memory of swallowing this.  Since that time, she is much better with improved throat pain, improved ability to tolerate ice chips and no airway complaints.    Review of Systems:   Review of Systems   Constitutional: Negative for chills and fever.   HENT: Positive for sore throat and trouble swallowing (Improving). Negative for voice change.         As per HPI   Gastrointestinal: Negative for nausea and vomiting.   Neurological: Positive for weakness.       Objective     Vital Signs  Temp:  [97.7 °F (36.5 °C)-98.4 °F (36.9 °C)] 98.1 °F (36.7 °C)  Heart Rate:  [71-90] 76  Resp:  [18-20] 18  BP: (154-213)/() 166/96    Physical Exam:  CONSTITUTIONAL: well nourished, well-developed, alert, oriented, in no acute distress  COMMUNICATION AND VOICE: able to communicate normally for age, normal voice quality  HEAD: normocephalic, no lesions, atraumatic, no tenderness, no masses  FACE: appearance normal, no lesions, no tenderness, no deformities, facial motion symmetric  EYES: ocular motility normal, eyelids normal, orbits normal, no proptosis, conjunctiva normal , pupils equal, round  EARS:  Hearing: response to  conversational voice normal bilaterally  External Ears: auricles without lesions  NOSE:  External Nose: structure normal, no tenderness on palpation, no nasal discharge, no lesions, no evidence of trauma, nostrils patent  ORAL:  Lips: upper and lower lips without lesion  Oral cavity: No mass or lesion seen  Oropharynx no mass or lesion, no postnasal drip  Laryngeal: See flexible scope exam, there is right sided vocal cord erythema and right arytenoid edema with good mobility bilaterally.  There is some excoriation in the post cricoid region  NECK: neck appearance normal  CHEST/RESPIRATORY: respiratory effort normal, normal chest excursion  CARDIOVASCULAR: extremities without cyanosis or edema  NEUROLOGIC/PSYCHIATRIC: oriented appropriately, mood normal, affect appropriate, CN II-XII intact grossly       Results Review:    Lab Results (last 24 hours)     Procedure Component Value Units Date/Time    CBC & Differential [[id number]] Collected:  07/15/17 1251    Specimen:  Blood Updated:  07/15/17 1314    Narrative:       The following orders were created for panel order CBC & Differential.  Procedure                               Abnormality         Status                     ---------                               -----------         ------                     CBC Auto Differential[[id number]]        Abnormal            Final result                 Please view results for these tests on the individual orders.    CBC Auto Differential [[id number]]  (Abnormal) Collected:  07/15/17 1251    Specimen:  Blood Updated:  07/15/17 1314     WBC 7.06 10*3/mm3      RBC 4.34 10*6/mm3      Hemoglobin 12.4 g/dL      Hematocrit 37.6 %      MCV 86.6 fL      MCH 28.6 pg      MCHC 33.0 g/dL      RDW 13.5 %      RDW-SD 43.0 fl      MPV 9.6 fL      Platelets 275 10*3/mm3      Neutrophil % 75.9 %      Lymphocyte % 14.0 (L) %      Monocyte % 8.6 %      Eosinophil % 1.1 %      Basophil % 0.3 %      Immature Grans % 0.1 %      Neutrophils,  Absolute 5.35 10*3/mm3      Lymphocytes, Absolute 0.99 10*3/mm3      Monocytes, Absolute 0.61 10*3/mm3      Eosinophils, Absolute 0.08 10*3/mm3      Basophils, Absolute 0.02 10*3/mm3      Immature Grans, Absolute 0.01 10*3/mm3     Basic Metabolic Panel [[id number]]  (Abnormal) Collected:  07/15/17 1251    Specimen:  Blood Updated:  07/15/17 1329     Glucose 93 mg/dL      BUN 12 mg/dL      Creatinine 0.53 mg/dL      Sodium 138 mmol/L      Potassium 2.7 (C) mmol/L      Chloride 98 mmol/L      CO2 28.0 mmol/L      Calcium 9.3 mg/dL      eGFR Non African Amer 117 mL/min/1.73      BUN/Creatinine Ratio 22.6     Anion Gap 12.0 mmol/L     Narrative:       GFR Normal >60  Chronic Kidney Disease <60  Kidney Failure <15    Blood Culture [[id number]]  (Normal) Collected:  07/12/17 1502    Specimen:  Blood from Arm, Left Updated:  07/15/17 1601     Blood Culture No growth at 3 days        Imaging Results (last 24 hours)     ** No results found for the last 24 hours. **            Medications, Allergies and Past History Reviewed with the following changes:  No significant changes    Assessment/Plan     1. Inability to swallow -Improving    2. Dysphagia, unspecified type -Improving    3. Difficulty in walking, not elsewhere classified    4. Impaired mobility    5.      Status post foreign body ingestion      Assessment:    Condition: Improving.   (Improving status post expression of her foreign body of throat.).     Plan:   Advance diet as tolerated.  (I discussed with her the options of obtaining another swallow study to make sure there was no perforation versus advancing her diet.  She has been able to tolerate her ice chips without difficulty and I feel no evidence of subcutaneous emphysema.  She was to go ahead and advance her diet and get a swallow study if she is showing signs of an inability to swallow or signs of perforation.  She is on adequate antibiotic coverage in case of this.  If she tolerates her diet without  difficulty she will be able to be discharged from an ENT standpoint).         Man Choudhury MD  07/16/17  8:58 AM

## 2017-07-16 NOTE — PROGRESS NOTES
LOS: 4 days   Patient Care Team:  Josafat Gonzales MD as PCP - General  Josafat Gonzales MD as PCP - Family Medicine  Pawel Abrams MD as PCP - Cardiology (Cardiology)    Chief Complaint:  I cannot swallow    Subjective  Bita is a 61-year-old  female resident from [place], [place].  Her  called the day of admission and indicated his wife had not had oral intake for 3-4 days; that she could not swallow.  She was having a very severe sore throat.  I was aware she been in [facility] ER the day before; the call from Dr. Boothe at that time seemed to indicate that she was having more of her back problems and having difficulty getting around.  She is a very chronic neck and back problem.  So we had her sent back to the ER; this story was confirmed it was decided she needed to be admitted for IV antibiotics IV fluids to preclude dehydration and have ENT see her.    Interval History:  Coughed up top of water bottle; feeling a lot better.  Resser examined; nothing major now.     Patient Complaints: tired  Patient Denies:  Chest pain, SOB  History taken from: patient chart RN    Review of Systems:    All systems were reviewed and negative except for:  Constitution:  positive for fatigue  Cardiovascular: positive for  elevated bp  Musculoskeletal: positive for usual LE weakness  Neurological: positive for  difficulty walking and weakness    Objective     Vital Signs  Temp:  [97.7 °F (36.5 °C)-98.4 °F (36.9 °C)] 98.1 °F (36.7 °C)  Heart Rate:  [71-90] 76  Resp:  [18-20] 18  BP: (154-213)/() 166/96    Labs  Lab Results (last 24 hours)     Procedure Component Value Units Date/Time    CBC & Differential [[id number]] Collected:  07/15/17 1251    Specimen:  Blood Updated:  07/15/17 1314    Narrative:       The following orders were created for panel order CBC & Differential.  Procedure                               Abnormality         Status                     ---------                                -----------         ------                     CBC Auto Differential[[id number]]        Abnormal            Final result                 Please view results for these tests on the individual orders.    CBC Auto Differential [[id number]]  (Abnormal) Collected:  07/15/17 1251    Specimen:  Blood Updated:  07/15/17 1314     WBC 7.06 10*3/mm3      RBC 4.34 10*6/mm3      Hemoglobin 12.4 g/dL      Hematocrit 37.6 %      MCV 86.6 fL      MCH 28.6 pg      MCHC 33.0 g/dL      RDW 13.5 %      RDW-SD 43.0 fl      MPV 9.6 fL      Platelets 275 10*3/mm3      Neutrophil % 75.9 %      Lymphocyte % 14.0 (L) %      Monocyte % 8.6 %      Eosinophil % 1.1 %      Basophil % 0.3 %      Immature Grans % 0.1 %      Neutrophils, Absolute 5.35 10*3/mm3      Lymphocytes, Absolute 0.99 10*3/mm3      Monocytes, Absolute 0.61 10*3/mm3      Eosinophils, Absolute 0.08 10*3/mm3      Basophils, Absolute 0.02 10*3/mm3      Immature Grans, Absolute 0.01 10*3/mm3     Basic Metabolic Panel [[id number]]  (Abnormal) Collected:  07/15/17 1251    Specimen:  Blood Updated:  07/15/17 1329     Glucose 93 mg/dL      BUN 12 mg/dL      Creatinine 0.53 mg/dL      Sodium 138 mmol/L      Potassium 2.7 (C) mmol/L      Chloride 98 mmol/L      CO2 28.0 mmol/L      Calcium 9.3 mg/dL      eGFR Non African Amer 117 mL/min/1.73      BUN/Creatinine Ratio 22.6     Anion Gap 12.0 mmol/L     Narrative:       GFR Normal >60  Chronic Kidney Disease <60  Kidney Failure <15    Blood Culture [[id number]]  (Normal) Collected:  07/12/17 1502    Specimen:  Blood from Arm, Left Updated:  07/15/17 1601     Blood Culture No growth at 3 days    Basic Metabolic Panel [[id number]]  (Abnormal) Collected:  07/16/17 0951    Specimen:  Blood Updated:  07/16/17 1036     Glucose 86 mg/dL      BUN 9 mg/dL      Creatinine 0.50 mg/dL      Sodium 137 mmol/L      Potassium 2.7 (C) mmol/L      Chloride 100 mmol/L      CO2 27.0 mmol/L      Calcium 8.5 mg/dL      eGFR Non African Amer 125 mL/min/1.73       BUN/Creatinine Ratio 18.0     Anion Gap 10.0 mmol/L     Narrative:       GFR Normal >60  Chronic Kidney Disease <60  Kidney Failure <15          Lab Results:  CBC:   Results from last 7 days  Lab Units 07/15/17  1251 07/12/17  1501 07/11/17  1426   WBC 10*3/mm3 7.06 11.77* 8.54   HEMOGLOBIN g/dL 12.4 13.0 13.4   HEMATOCRIT % 37.6 39.1 40.6   PLATELETS 10*3/mm3 275 267 280     BMP:  Results from last 7 days  Lab Units 07/16/17  0951 07/15/17  1251 07/12/17  1501 07/11/17  1426   SODIUM mmol/L 137 138 134* 135   POTASSIUM mmol/L 2.7* 2.7* 3.9 4.2   CHLORIDE mmol/L 100 98 95* 98   CO2 mmol/L 27.0 28.0 26.0 26.0   BUN mg/dL 9 12 7 11   CREATININE mg/dL 0.50 0.53 0.65 0.59   GLUCOSE mg/dL 86 93 111* 112*   CALCIUM mg/dL 8.5 9.3 9.1 8.9   ALT (SGPT) U/L  --   --  23 34     Culture Results:   Blood Culture   Date Value Ref Range Status   07/12/2017 No growth at 3 days  Preliminary     Radiology: None  Additional Studies Reviewed: None    Physical Exam:   General Appearance alert, appears stated age and cooperative  Eyes lids and lashes normal, conjunctivae and sclerae normal, no icterus, no pallor, corneas clear and PERRLA  Lungs clear to auscultation, respirations regular, respirations even and respirations unlabored  Heart regular rhythm & normal rate, normal S1, S2, no murmur, no tala, no rub and no click  Chest Wall no abnormalities ovserved  Abdomen normal bowel sounds, no masses, no hepatomegaly, no splenomegaly, soft non-tender, no guarding and no rebound tenderness  Neurologic Mental Status orientated to person, place, time and situation, alertness alert, orientation date, person and place, mood/affect flat and thought content exhibits organized     Results Review:     I reviewed the patient's new clinical results.  I reviewed the patient's new imaging results and agree with the interpretation.  I reviewed the patient's other test results and agree with the interpretation  Discussed with  brother/patient    Medication Review: med list reviewed and necessary changes made    Assessment/Plan     Principal Problem:    Foreign body upper airway  Active Problems:    Sore throat    Chronic neck pain    Chronic back pain    Reflux laryngitis    Gait difficulty    Weakness of both legs    hypokalemia    Rx-reviewed; same but expect abx can be stopped.   Labs reviewed and needed labs ordered: daily  Diet reviewed-advance  Fluids reviewed-wean  Increase activity-as able  Discussed possible/future discharge plans-she wants home  Available for POA/caregiver        Josafat Gonzales MD  07/16/17  11:06 AM

## 2017-07-16 NOTE — PROGRESS NOTES
LOS: 4 days   Patient Care Team:  Josafat Gonzales MD as PCP - General  Josafat Gonzales MD as PCP - Family Medicine  Pawel Abrams MD as PCP - Cardiology (Cardiology)    Chief Complaint: Paraparesis    Subjective     Interval History: Yesterday she had an acute respiratory event and eventually coughed up a water bottle cap.  She feels that she is swallowing much better since then.  His been followed by physical therapy and does feel somewhat stronger with regards to her lower extremities        Review of Systems:     unchanged    Objective     Vital Signs  Temp:  [97.7 °F (36.5 °C)-98.4 °F (36.9 °C)] 98.1 °F (36.7 °C)  Heart Rate:  [71-90] 76  Resp:  [18-20] 18  BP: (154-213)/() 166/96see above    Physical Exam: Her generalized weakness in the lower extremities may be slightly improved.  She has continued sensation to pain of the lower extremities and absent position sense.  Awake alert and oriented properly        Results Review:    I reviewed the patient's new clinical results.  I reviewed the patient's new imaging results and agree with the interpretation.  I reviewed the patient's other test results and agree with the interpretation        Assessment/Plan 's  Principal Problem:    Sore throat  Active Problems:    Chronic neck pain    Chronic back pain    Conversion disorder with abnormal movement    Reflux laryngitis    Gait difficulty    Weakness of both legs      Neurological condition is stable    Plan for disposition: As per ENT    Jrernestine Bates MD  07/16/17  9:16 AM      Time:

## 2017-07-17 VITALS
SYSTOLIC BLOOD PRESSURE: 139 MMHG | HEIGHT: 71 IN | WEIGHT: 217 LBS | DIASTOLIC BLOOD PRESSURE: 77 MMHG | RESPIRATION RATE: 20 BRPM | HEART RATE: 84 BPM | TEMPERATURE: 98.1 F | OXYGEN SATURATION: 100 % | BODY MASS INDEX: 30.38 KG/M2

## 2017-07-17 LAB
ALBUMIN SERPL-MCNC: 3.2 G/DL (ref 3.5–5)
ALBUMIN/GLOB SERPL: 1.3 G/DL (ref 1.1–2.5)
ALP SERPL-CCNC: 105 U/L (ref 24–120)
ALT SERPL W P-5'-P-CCNC: 28 U/L (ref 0–54)
ANION GAP SERPL CALCULATED.3IONS-SCNC: 9 MMOL/L (ref 4–13)
AST SERPL-CCNC: 27 U/L (ref 7–45)
BACTERIA SPEC AEROBE CULT: NORMAL
BILIRUB SERPL-MCNC: 0.3 MG/DL (ref 0.1–1)
BUN BLD-MCNC: 5 MG/DL (ref 5–21)
BUN/CREAT SERPL: 8.9 (ref 7–25)
CALCIUM SPEC-SCNC: 8.3 MG/DL (ref 8.4–10.4)
CHLORIDE SERPL-SCNC: 103 MMOL/L (ref 98–110)
CO2 SERPL-SCNC: 26 MMOL/L (ref 24–31)
CREAT BLD-MCNC: 0.56 MG/DL (ref 0.5–1.4)
GFR SERPL CREATININE-BSD FRML MDRD: 110 ML/MIN/1.73
GLOBULIN UR ELPH-MCNC: 2.5 GM/DL
GLUCOSE BLD-MCNC: 81 MG/DL (ref 70–100)
POTASSIUM BLD-SCNC: 2.9 MMOL/L (ref 3.5–5.3)
PROT SERPL-MCNC: 5.7 G/DL (ref 6.3–8.7)
SODIUM BLD-SCNC: 138 MMOL/L (ref 135–145)

## 2017-07-17 PROCEDURE — 97110 THERAPEUTIC EXERCISES: CPT

## 2017-07-17 PROCEDURE — 99231 SBSQ HOSP IP/OBS SF/LOW 25: CPT | Performed by: CLINICAL NURSE SPECIALIST

## 2017-07-17 PROCEDURE — 97530 THERAPEUTIC ACTIVITIES: CPT

## 2017-07-17 PROCEDURE — 99231 SBSQ HOSP IP/OBS SF/LOW 25: CPT | Performed by: NURSE PRACTITIONER

## 2017-07-17 PROCEDURE — 25010000002 PIPERACILLIN SOD-TAZOBACTAM PER 1 G: Performed by: FAMILY MEDICINE

## 2017-07-17 PROCEDURE — 99231 SBSQ HOSP IP/OBS SF/LOW 25: CPT | Performed by: NEUROLOGICAL SURGERY

## 2017-07-17 PROCEDURE — 25010000002 ENOXAPARIN PER 10 MG: Performed by: FAMILY MEDICINE

## 2017-07-17 PROCEDURE — 25010000002 POTASSIUM CHLORIDE PER 2 MEQ OF POTASSIUM: Performed by: FAMILY MEDICINE

## 2017-07-17 PROCEDURE — 99238 HOSP IP/OBS DSCHRG MGMT 30/<: CPT | Performed by: FAMILY MEDICINE

## 2017-07-17 PROCEDURE — 80053 COMPREHEN METABOLIC PANEL: CPT | Performed by: NEUROLOGICAL SURGERY

## 2017-07-17 PROCEDURE — 25010000002 MORPHINE SULFATE (PF) 2 MG/ML SOLUTION: Performed by: FAMILY MEDICINE

## 2017-07-17 PROCEDURE — 97116 GAIT TRAINING THERAPY: CPT

## 2017-07-17 PROCEDURE — 92526 ORAL FUNCTION THERAPY: CPT

## 2017-07-17 RX ORDER — POTASSIUM CHLORIDE 750 MG/1
20 CAPSULE, EXTENDED RELEASE ORAL 2 TIMES DAILY WITH MEALS
Status: COMPLETED | OUTPATIENT
Start: 2017-07-17 | End: 2017-07-17

## 2017-07-17 RX ORDER — QUETIAPINE FUMARATE 100 MG/1
100 TABLET, FILM COATED ORAL 2 TIMES DAILY
Status: ON HOLD
Start: 2017-07-17 | End: 2019-01-01 | Stop reason: SDUPTHER

## 2017-07-17 RX ORDER — CLONIDINE HYDROCHLORIDE 0.1 MG/1
0.1 TABLET ORAL EVERY 12 HOURS SCHEDULED
Start: 2017-07-17 | End: 2018-06-07

## 2017-07-17 RX ORDER — CLONIDINE HYDROCHLORIDE 0.1 MG/1
0.1 TABLET ORAL ONCE
Status: COMPLETED | OUTPATIENT
Start: 2017-07-17 | End: 2017-07-17

## 2017-07-17 RX ORDER — CLONIDINE HYDROCHLORIDE 0.1 MG/1
0.1 TABLET ORAL EVERY 12 HOURS SCHEDULED
Status: DISCONTINUED | OUTPATIENT
Start: 2017-07-17 | End: 2017-07-17

## 2017-07-17 RX ORDER — AMLODIPINE BESYLATE 5 MG/1
5 TABLET ORAL EVERY 12 HOURS SCHEDULED
Start: 2017-07-17 | End: 2018-06-07

## 2017-07-17 RX ADMIN — PHENYTOIN SODIUM 100 MG: 100 CAPSULE, EXTENDED RELEASE ORAL at 09:48

## 2017-07-17 RX ADMIN — CLONIDINE HYDROCHLORIDE 0.1 MG: 0.1 TABLET ORAL at 17:14

## 2017-07-17 RX ADMIN — POTASSIUM CHLORIDE 125 ML/HR: 2 INJECTION, SOLUTION, CONCENTRATE INTRAVENOUS at 03:37

## 2017-07-17 RX ADMIN — CLONIDINE HYDROCHLORIDE 0.1 MG: 0.1 TABLET ORAL at 09:48

## 2017-07-17 RX ADMIN — GABAPENTIN 300 MG: 300 CAPSULE ORAL at 05:37

## 2017-07-17 RX ADMIN — OXYCODONE HYDROCHLORIDE 10 MG: 5 TABLET ORAL at 05:37

## 2017-07-17 RX ADMIN — ENOXAPARIN SODIUM 40 MG: 40 INJECTION SUBCUTANEOUS at 09:56

## 2017-07-17 RX ADMIN — OXYCODONE HYDROCHLORIDE 10 MG: 5 TABLET ORAL at 15:35

## 2017-07-17 RX ADMIN — FAMOTIDINE 40 MG: 10 INJECTION INTRAVENOUS at 09:57

## 2017-07-17 RX ADMIN — POTASSIUM CHLORIDE 20 MEQ: 750 CAPSULE, EXTENDED RELEASE ORAL at 17:15

## 2017-07-17 RX ADMIN — DULOXETINE HYDROCHLORIDE 30 MG: 30 CAPSULE, DELAYED RELEASE ORAL at 09:49

## 2017-07-17 RX ADMIN — TAZOBACTAM SODIUM AND PIPERACILLIN SODIUM 3.38 G: 375; 3 INJECTION, SOLUTION INTRAVENOUS at 05:37

## 2017-07-17 RX ADMIN — LEVETIRACETAM 1000 MG: 500 TABLET, FILM COATED ORAL at 09:48

## 2017-07-17 RX ADMIN — GABAPENTIN 300 MG: 300 CAPSULE ORAL at 14:28

## 2017-07-17 RX ADMIN — OXYCODONE HYDROCHLORIDE 10 MG: 5 TABLET ORAL at 09:56

## 2017-07-17 RX ADMIN — SODIUM CHLORIDE 100 ML/HR: 9 INJECTION, SOLUTION INTRAVENOUS at 13:10

## 2017-07-17 RX ADMIN — MORPHINE SULFATE 2 MG: 2 INJECTION, SOLUTION INTRAMUSCULAR; INTRAVENOUS at 12:13

## 2017-07-17 RX ADMIN — QUETIAPINE FUMARATE 100 MG: 100 TABLET ORAL at 09:49

## 2017-07-17 RX ADMIN — CLINDAMYCIN PHOSPHATE 600 MG: 12 INJECTION, SOLUTION INTRAVENOUS at 03:38

## 2017-07-17 RX ADMIN — AMLODIPINE BESYLATE 5 MG: 5 TABLET ORAL at 09:49

## 2017-07-17 RX ADMIN — POTASSIUM CHLORIDE 20 MEQ: 750 CAPSULE, EXTENDED RELEASE ORAL at 09:49

## 2017-07-17 RX ADMIN — POTASSIUM CHLORIDE 20 MEQ: 750 CAPSULE, EXTENDED RELEASE ORAL at 10:31

## 2017-07-17 NOTE — DISCHARGE INSTRUCTIONS
No driving  Gradually increase as able activity  Healthy heart diet; soft consistency  Fluid restrict 1500 mL/24 hr (dont drink too much water)  Try to resume your physicial therapy OR call Dr Gonzales office if you decide you want to do it with home health

## 2017-07-17 NOTE — THERAPY TREATMENT NOTE
Acute Care - Speech Language Pathology   Swallow Treatment Note [facility]     Patient Name: Bita Croft  : 1955  MRN: [id number]  Today's Date: 2017  Onset of Illness/Injury or Date of Surgery Date: 17     Referring Physician: Dr. Gonzales       Admit Date: 2017   Upon ST arrival, breakfast tray present on bedside table in front of pt. Pt was noted to have a 1x cough, yet uncertain how closely this was experienced in relation to PO trial (consumed before arrival). Persistent hoarse vocal quality. ST observed pt with multiple trials of mech soft biscuit and thin liquids. 1x delayed cough, yet this was minutes following a PO trial. Maintained clear vocal quality throughout session. Cannot fully r/o aspiration at this time, yet feel pt is tolerating diet. Educated pt re: mech soft diet consistency to be utilized at home until hoarse vocal quality and sore throat fully resolve. Then pt can begin to reattempt regular solid diet consistency and increase diet as tolerated. Pt was also educated to monitor for persistent concerns with dysphagia at home, as a repeat VFSS can be completed on an outpatient basis to r/o change or new concerns with swallow fx. Thanks!   Marisabel Faye, CCC-SLP 2017 9:52 AM  Visit Dx:      ICD-10-CM ICD-9-CM   1. Inability to swallow R13.0 787.20   2. Dysphagia, unspecified type R13.10 787.20   3. Difficulty in walking, not elsewhere classified R26.2 719.7   4. Impaired mobility Z74.09 799.89     Patient Active Problem List   Diagnosis   • Confusion   • Depression   • Anxiety   • Hyponatremia   • Seizure disorder   • Chronic neck pain   • Chronic back pain   • Hypothyroidism   • Abnormal thyroid blood test   • Hypertension   • Wellness examination-done   • Elevated fasting glucose   • Anemia   • Iron deficiency   • Degenerative joint disease of spine   • Conversion disorder with abnormal movement   • Reflux laryngitis   • Gait difficulty   • Weakness of both legs    • Sore throat             Adult Rehabilitation Note       07/17/17 0940 07/17/17 0855 07/17/17 0827    Rehab Assessment/Intervention    Discipline physical therapy assistant  -CHRISTI --  -KJ speech language pathologist  -TM    Document Type therapy note (daily note)  -KJ  therapy note (daily note)  -TM    Subjective Information   no complaints;agree to therapy  -TM    Patient Effort, Rehab Treatment   adequate  -TM    Recorded by [KJ] Mell Trinidad, PTA [KJ] Mell Trinidad PTA [TM] Marisabel Faye CCC-SLP    Pain Assessment    Pain Assessment   No/denies pain  -TM    Recorded by   [TM] Marisabel Faye CCC-SLP    Dysphagia/Swallow Intervention    Dysphagia/Swallow Therapeutic Feed   Upon ST arrival, breakfast tray present on bedside table in front of pt. Pt was noted to have a 1x cough, yet uncertain how closely this was experienced in relation to PO trial (consumed before arrival). Persistent hoarse vocal quality. ST observed pt with multiple trials of mech soft biscuit and thin liquids. 1x delayed cough, yet this was minutes following a PO trial. Maintained clear vocal quality throughout session. Cannot fully r/o aspiration at this time, yet feel pt is tolerating diet. Educated pt re: mech soft diet consistency to be utilized at home until hoarse vocal quality and sore throat fully resolve. Then pt can begin to reattempt regular solid diet consistency and increase diet as tolerated. Pt was also educated to monitor for persistent concerns with dysphagia at home, as a repeat VFSS can be completed on an outpatient basis to r/o change or new concerns with swallow fx.   -TM    Recorded by   [TM] Marisabel Faye CCC-SLP      07/16/17 1330 07/15/17 1330 07/15/17 1300    Rehab Assessment/Intervention    Discipline speech language pathologist  -KW  speech language pathologist  -KW    Document Type therapy note (daily note)  -KW  therapy note (daily note)  -KW    Subjective Information no complaints  -KW  complains of;pain   -KW    Patient Effort, Rehab Treatment good  -KW  adequate  -KW    Recorded by [PEG] Chaya Brewer MS CCC-SLP  [KW] Chaya Brewer MS CCC-SLP    Pain Assessment    Pain Assessment   0-10  -KW    Pain Score   10  -KW    Recorded by   [PEG] MS LEOLA Russo    Dysphagia/Swallow Intervention    Dysphagia/Swallow Intervention  Patient will complete NMES for 30 minutes while completing swallow exercises with 90% acc  -KW     Dysphagia/Swallow Therapeutic Feed Pooja had respiratory event last PM and then couoghed out bottle cap.  Since then swallow has improved.  She was started on pureed diet.  No overt s/s of aspiration today with thin liquids or solids.  Will upgrade to [facility] soft with thin liquids.  -KW Patient will tolerate PO trials with SLP with no s/s of aspiration.  -KW Asked to reevalulate swallow due to patinet being discharged if swallow is improved.  Patient wanting to sit on side of bed.  Complains of pain all over.  Trials of applesauce showed multiple swallows, gag, couging up secretions, complaints of pain with swallow.  Attempted thin liquids.  This resulted in immediate cough with thin liquids coming out of mouth and nasal regurgitation.  Reviewed MBS which showed decreaed cricopharyngeal opening.  Difficulty to determine at times if behavior is having psychogenic behaviors, however resluts of dysphagia study did show deficits.  Due to know change at bedside, I do not feel that patient has had any improvement in swallow.  However, due to undetermined cuase of deficits, will complete FEES study to gain a second objective view on swallow.  Results pending.  -KW    Recorded by [PEG] Chaya Brewer MS CCC-CHAIM [KW] MS LEOLA Russo [KW] Chaya Brewer MS CCC-SLP      07/15/17 1015 07/14/17 1330       Rehab Assessment/Intervention    Discipline physical therapy assistant  - physical therapy assistant  -NESSA     Document Type therapy note (daily note)  - therapy note (daily note)  -NESSA      Subjective Information agree to therapy;complains of;weakness;fatigue;pain  - agree to therapy;complains of;fatigue;weakness  -     Precautions/Limitations fall precautions  - fall precautions  -NESSA     Recorded by [] Hailey Hernandez PTA [NESSA] Wolfgang Bates PTA     Pain Assessment    Pain Assessment 0-10  - 0-10  -     Pain Score 10  - 10  -     Pain Location Throat  - Throat  -     Pain Descriptors  Burning  -     Pain Frequency Constant/continuous  - Constant/continuous  -NESSA     Pain Intervention(s) Medication (See MAR)  - Medication (See MAR)  -     Response to Interventions  tolerated  -     Recorded by [] Hailey Hernandez PTA [NESSA] Wolfgang Bates PTA     Pain 2    Pain Score 2 10  -      Pain Type 2 Chronic pain  -      Pain Location 2 Leg  -      Pain Orientation 2 Right;Left  -      Pain Frequency 2 Constant/continuous  -      Pain Intervention(s) 2 Medication (See MAR);Ambulation/increased activity;Repositioned  -      Response to Interventions 2 tolerated  -      Recorded by [] Hailey Hernandez PTA      Bed Mobility, Assessment/Treatment    Bed Mobility, Assistive Device  bed rails;head of bed elevated  -     Bed Mob, Supine to Sit, [place] verbal cues required;contact guard assist;minimum assist (75% patient effort)  - conditional independence  -     Bed Mob, Sit to Supine, [place] contact guard assist;verbal cues required  - conditional independence   in chair  -NESSA     Recorded by [] Hailey Hernandez PTA [NESSA] Wolfgang Bates PTA     Transfer Assessment/Treatment    Transfers, Sit-Stand [place] minimum assist (75% patient effort);2 person assist required;verbal cues required  - verbal cues required;minimum assist (75% patient effort)  -     Transfers, Stand-Sit [place] contact guard assist;verbal cues required  - verbal cues required;contact guard assist  -NESSA     Transfer, Comment  Pt stood with CGA/min assist.  Stand  pivot to w/c with CGA.  Pt's knee buckled but was able to self correct.  T/F w/c to BSC, BSC to w/c all with CGA/ min assist  -NESSA     Recorded by [] Hailey Hernandez PTA [NESSA] Wolfgang Bates PTA     Gait Assessment/Treatment    Gait, [place] Level contact guard assist;2 person assist required;verbal cues required  -      Gait, Assistive Device rolling walker  -      Gait, Distance (Feet) 12   12 x 2, follow with w/c sit rest  -      Gait, Comment pt amb 12 feet, follow with chair, one sitting rest, then amb 12 feet back to bed  -      Recorded by [] Hailey Hernandez PTA      Balance Skills Training    Standing-Level of Assistance  Contact guard  -     Static Standing Balance Support  --   with RWX  -NESSA     Recorded by  [NESSA] Wolfgang Bates PTA     Therapy Exercises    Bilateral Lower Extremities  AROM:;20 reps;sitting;hip flexion;LAQ;ankle pumps/circles  -NESSA     Recorded by  [NESSA] Wolfgang Bates PTA     Positioning and Restraints    Pre-Treatment Position in bed  - in bed  -     Post Treatment Position bed  - wheelchair  -NESSA     In Bed fowlers;call light within reach;encouraged to call for assist;exit alarm on;SCD pump applied  -      In Wheelchair  sitting;call light within reach;encouraged to call for assist;with family/caregiver  -NESSA     Recorded by [] Hailey Hernandez PTA [NESSA] Wolfgang Bates PTA       User Key  (r) = Recorded By, (t) = Taken By, (c) = Cosigned By    Initials Name Effective Dates     Hailey Hernandez, FOREIGN 08/02/16 -     KJ Mell Trinidad, [facility] 08/02/16 -     TM Marisabel Faye CCC-SLP 08/02/16 -     NESSA Wolfgang Bates, FOREIGN 12/08/16 -     KW Chaya Brewer, MS [facility]-SLP 08/02/16 -                   IP SLP Goals       07/17/17 0949 07/16/17 1350 07/15/17 1537    Begin to Take Some PO Safely    Begin to Take Some PO Safely- SLP, Date Established   07/15/17  -KW    Begin to Take Some PO Safely- SLP, Time to Achieve   by discharge  -KW    Begin to Take Some PO Safely-  SLP, Activity Level   Patient will improve laryngeal elevation for safer, more efficient swallow;Patient will improve hyolaryngeal excursion for safer, more efficient swallow;Patient will improve tongue base/pharyngeal wall squeeze for safer, more efficient swallow  -KW    Begin to Take Some PO Safely- SLP, Date Goal Reviewed 07/17/17  -TM 07/16/17  -KW 07/15/17  -KW      07/15/17 1452 07/14/17 1018 07/13/17 1531    Begin to Take Some PO Safely    Begin to Take Some PO Safely- SLP, Date Established   07/13/17  -TM    Begin to Take Some PO Safely- SLP, Time to Achieve  by discharge  -EA by discharge  -TM    Begin to Take Some PO Safely- SLP, Activity Level  Patient will improve timing of pharyngeal response for safer, more efficient swallow;Patient will improve laryngeal elevation for safer, more efficient swallow;Patient will improve tongue base/pharyngeal wall squeeze for safer, more efficient swallow  -EA Patient will improve timing of pharyngeal response for safer, more efficient swallow;Patient will improve laryngeal elevation for safer, more efficient swallow;Patient will improve tongue base/pharyngeal wall squeeze for safer, more efficient swallow  -TM    Begin to Take Some PO Safely- SLP, Additional Goal   Pt will tolerate NMES tx for dysphagia without s/s of distress and Pt will tolerate PO trials with SLP without overt s/s of aspiration.  -TM    Begin to Take Some PO Safely- SLP, Date Goal Reviewed 07/15/17  -KW 07/14/17  -EA 07/13/17  -TM    Begin to Take Some PO Safely- SLP, Outcome  goal ongoing  -EA goal ongoing  -TM      User Key  (r) = Recorded By, (t) = Taken By, (c) = Cosigned By    Initials Name Provider Type    TM Marisabel Faye, CCC-SLP Speech and Language Pathologist    PEG Brewer, MS CCC-SLP Speech and Language Pathologist    STEPHANIE [place], MS, CFY-SLP Speech and Language Pathologist          EDUCATION  The patient has been educated in the following areas:   Dysphagia (Swallowing  Impairment).    SLP Recommendation and Plan                                           Plan of Care Review  Plan Of Care Reviewed With: patient  Progress: improving  Outcome Summary/Follow up Plan: Swallowing tx completed to assess diet toleration. Upon ST arrival, breakfast tray present on bedside table in front of pt. Pt was noted to have a 1x cough, yet uncertain how closely this was experienced in relation to PO trial (consumed before arrival). Persistent hoarse vocal quality. ST observed pt with multiple trials of mech soft biscuit and thin liquids. 1x delayed cough, yet this was minutes following a PO trial. Maintained clear vocal quality throughout session. Cannot fully r/o aspiration at this time, yet feel pt is tolerating diet. Educated pt re: mech soft diet consistency to be utilized at home until hoarse vocal quality and sore throat fully resolve. Then pt can begin to reattempt regular solid diet consistency and increase diet as tolerated. Pt was also educated to monitor for persistent concerns with dysphagia at home, as a repeat VFSS can be completed on an outpatient basis to r/o change or new concerns with swallow fx. ST to monitor PRN. Thanks!        SLP Outcome Measures (last 72 hours)      SLP Outcome Measures       07/15/17 1500          SLP Outcome Measures    Outcome Measure Used? Adult NOMS  -KW      FCM Scores    FCM Chosen Swallowing  -KW      Swallowing FCM Score 2  -KW        User Key  (r) = Recorded By, (t) = Taken By, (c) = Cosigned By    Initials Name Effective Dates    KW Chaya Brewer MS CCC-SLP 08/02/16 -              Time Calculation:         Time Calculation- SLP       07/17/17 0950          Time Calculation- SLP    SLP Start Time 0827  -TM      SLP Stop Time 0851  -      SLP Time Calculation (min) 24 min  -      SLP Received On 07/17/17  -        User Key  (r) = Recorded By, (t) = Taken By, (c) = Cosigned By    Initials Name Provider Type    TM Marisabel Faye CCC-SLP Speech  and Language Pathologist          Therapy Charges for Today     Code Description Service Date Service Provider Modifiers Qty    [id number] HC ST TREATMENT SWALLOW 2 7/17/2017 Marisabel Faye CCC-SLP GN 1          SLP G-Codes  SLP NOMS Used?: Yes  Functional Limitations: Swallowing  Swallow Current Status (): At least 80 percent but less than 100 percent impaired, limited or restricted  Swallow Goal Status (): At least 60 percent but less than 80 percent impaired, limited or restricted      LEOLA Castorena  7/17/2017

## 2017-07-17 NOTE — PROGRESS NOTES
LOS: 5 days   Patient Care Team:  Josafat Gonzales MD as PCP - General  Josafat Gonzales MD as PCP - Family Medicine  Pawel Abrams MD as PCP - Cardiology (Cardiology)    Chief Complaint: paraparesis    Subjective     Interval History: She is feeling better.  She has started a diet and is swallowing better.  She also relates moving her lower extremities better.  Potassium remains slow in spite of some mild and her intravenous fluids I will order an oral potassium supplement        Review of Systems:     unchanged    Objective     Vital Signs  Temp:  [97.5 °F (36.4 °C)-98.1 °F (36.7 °C)] 97.7 °F (36.5 °C)  Heart Rate:  [72-78] 72  Resp:  [18] 18  BP: (133-166)/(74-96) 160/88see above    Physical Exam:  Awake alert and oriented properly better spirits.  She is moving her lower extremities with improved strength lifting her legs off the bed easily now.  As before it was quite a struggle.  Sensation remains the same.        Results Review:    I reviewed the patient's new clinical results.  I reviewed the patient's new imaging results and agree with the interpretation.  I reviewed the patient's other test results and agree with the interpretation        Assessment/Plan 's  Principal Problem:    Sore throat  Active Problems:    Chronic neck pain    Chronic back pain    Conversion disorder with abnormal movement    Reflux laryngitis    Gait difficulty    Weakness of both legs      She appears stronger in both lower extremities and is now eating    Plan for disposition: As per ENT and primary physician follow-up with Dr. Jose Alberto Bates MD  07/17/17  8:17 AM      Time:

## 2017-07-17 NOTE — PLAN OF CARE
Problem: Patient Care Overview (Adult)  Goal: Plan of Care Review  Outcome: Ongoing (interventions implemented as appropriate)    07/17/17 0359   Coping/Psychosocial Response Interventions   Plan Of Care Reviewed With patient   Patient Care Overview   Progress improving   Outcome Evaluation   Outcome Summary/Follow up Plan No s/s of swallowing difficulty. c/o pain in left hand/wrist. Discussed trying to take PO pain meds since that is what will be available to her at home. Pt. agreed. Medicated x1 so far for pain. Pt. has slept most of shift. Hopes to be discharged home today.         Problem: Pain, Acute (Adult)  Goal: Acceptable Pain Control/Comfort Level  Outcome: Ongoing (interventions implemented as appropriate)    Problem: Dysphagia (Adult)  Goal: Functional/Safe Swallow  Outcome: Ongoing (interventions implemented as appropriate)  Goal: Compensatory Techniques to Improve Safety/Function with Swallowing  Outcome: Ongoing (interventions implemented as appropriate)    Problem: Fall Risk (Adult)  Goal: Absence of Falls  Outcome: Ongoing (interventions implemented as appropriate)    Problem: Pressure Ulcer Risk (Jose Scale) (Adult,Obstetrics,Pediatric)  Goal: Skin Integrity  Outcome: Ongoing (interventions implemented as appropriate)

## 2017-07-17 NOTE — THERAPY TREATMENT NOTE
Acute Care - Physical Therapy Treatment Note  [facility]     Patient Name: Bita Croft  : 1955  MRN: [id number]  Today's Date: 2017  Onset of Illness/Injury or Date of Surgery Date: 17  Date of Referral to PT: 17  Referring Physician: Josafat Gonzales MD    Admit Date: 2017    Visit Dx:    ICD-10-CM ICD-9-CM   1. Inability to swallow R13.0 787.20   2. Dysphagia, unspecified type R13.10 787.20   3. Difficulty in walking, not elsewhere classified R26.2 719.7   4. Impaired mobility Z74.09 799.89     Patient Active Problem List   Diagnosis   • Confusion   • Depression   • Anxiety   • Hyponatremia   • Seizure disorder   • Chronic neck pain   • Chronic back pain   • Hypothyroidism   • Abnormal thyroid blood test   • Hypertension   • Wellness examination-done   • Elevated fasting glucose   • Anemia   • Iron deficiency   • Degenerative joint disease of spine   • Conversion disorder with abnormal movement   • Reflux laryngitis   • Gait difficulty   • Weakness of both legs   • Sore throat               Adult Rehabilitation Note       17 1418 17 0940 17 0855    Rehab Assessment/Intervention    Discipline physical therapy assistant  -KJ physical therapy assistant  -KJ --  -KJ    Document Type therapy note (daily note)  -KJ therapy note (daily note)  -KJ     Subjective Information agree to therapy;complains of;weakness  -KJ agree to therapy  -KJ     Patient Effort, Rehab Treatment adequate  -KJ      Symptoms Noted During/After Treatment  fatigue  -KJ     Precautions/Limitations fall precautions  -KJ fall precautions  -KJ     Recorded by [KJ] Mell Trinidad PTA [KJ] Mell Trinidad PTA [KJ] Mell Trinidad PTA    Pain Assessment    Pain Assessment 0-10  -KJ 0-10  -KJ     Pain Score 8  -KJ 10  -KJ     Pain Location Throat  -KJ Hand  -KJ     Pain Orientation Left  -KJ Left  -KJ     Pain Descriptors Aching  -KJ Aching  -KJ     Pain Intervention(s) Medication (See MAR)  -KJ       Response to Interventions tolerated  -KJ      Recorded by [KJ] Mell Trinidad PTA [KJ] Mell Trinidad PTA     Bed Mobility, Assessment/Treatment    Bed Mob, Supine to Sit, [place] independent  -KJ independent  -KJ     Bed Mob, Sit to Supine, [place] independent  -KJ      Recorded by [KJ] Mell Trinidad PTA [KJ] Mell Trinidad PTA     Transfer Assessment/Treatment    Transfers, Sit-Stand [place] verbal cues required;contact guard assist;minimum assist (75% patient effort)   minimal assistance from w/c  -KJ verbal cues required;contact guard assist;minimum assist (75% patient effort)   minimal assistance from w/c  -KJ     Transfers, Stand-Sit [place] verbal cues required;contact guard assist  -KJ verbal cues required;contact guard assist  -KJ     Transfer, Comment cues for hand placment  -KJ cueing for hand placement  -KJ     Recorded by [KJ] Mell Trinidad PTA [KJ] Mell Trinidad PTA     Gait Assessment/Treatment    Gait, [place] Level contact guard assist  -KJ contact guard assist  -KJ     Gait, Assistive Device rolling walker  -KJ rolling walker  -KJ     Gait, Distance (Feet) 185  -KJ 35   followed with w/c, sitting rest 35' back to room  -KJ     Gait, Gait Deviations bilateral:;hari decreased;step length decreased;toe-to-floor clearance decreased;weight-shifting ability decreased  -KJ bilateral:;hari decreased;step length decreased  -KJ     Gait, Safety Issues balance decreased during turns;step length decreased  -KJ step length decreased;weight-shifting ability decreased   patient has had several falls  -KJ     Gait, Comment follow with w/c for safety  -KJ follow with w/c for safety  -KJ     Recorded by [KJ] Mell Trinidad PTA [KJ] Mell Trinidad PTA     Positioning and Restraints    Pre-Treatment Position in bed  -KJ in bed  -KJ     Post Treatment Position bed  -KJ bed  -KJ     Recorded by [KJ] Mell Trinidad PTA [KJ] Mell Trinidad PTA       07/17/17 0827 07/16/17 1331  07/15/17 1330    Rehab Assessment/Intervention    Discipline speech language pathologist  -TM speech language pathologist  -KW     Document Type therapy note (daily note)  -TM therapy note (daily note)  -KW     Subjective Information no complaints;agree to therapy  -TM no complaints  -KW     Patient Effort, Rehab Treatment adequate  -TM good  -KW     Recorded by [TM] Marisabel Faye CCC-SLP [KW] Chaya ESEQUIEL Brewer, MS CCC-SLP     Pain Assessment    Pain Assessment No/denies pain  -TM      Recorded by [TM] Marisabel Faye CCC-SLP      Dysphagia/Swallow Intervention    Dysphagia/Swallow Intervention   Patient will complete NMES for 30 minutes while completing swallow exercises with 90% acc  -KW    Dysphagia/Swallow Therapeutic Feed Upon ST arrival, breakfast tray present on bedside table in front of pt. Pt was noted to have a 1x cough, yet uncertain how closely this was experienced in relation to PO trial (consumed before arrival). Persistent hoarse vocal quality. ST observed pt with multiple trials of mech soft biscuit and thin liquids. 1x delayed cough, yet this was minutes following a PO trial. Maintained clear vocal quality throughout session. Cannot fully r/o aspiration at this time, yet feel pt is tolerating diet. Educated pt re: mech soft diet consistency to be utilized at home until hoarse vocal quality and sore throat fully resolve. Then pt can begin to reattempt regular solid diet consistency and increase diet as tolerated. Pt was also educated to monitor for persistent concerns with dysphagia at home, as a repeat VFSS can be completed on an outpatient basis to r/o change or new concerns with swallow fx.   -TM Pooja had respiratory event last PM and then couoghed out bottle cap.  Since then swallow has improved.  She was started on pureed diet.  No overt s/s of aspiration today with thin liquids or solids.  Will upgrade to mech soft with thin liquids.  -KW Patient will tolerate PO trials with SLP with no s/s  of aspiration.  -KW    Recorded by [TM] Marisabel Faye, CCC-SLP [KW] Chaya Brewer MS CCC-SLP [KW] Chaya Brewer MS CCC-SLP      07/15/17 1300 07/15/17 1015       Rehab Assessment/Intervention    Discipline speech language pathologist  -KW physical therapy assistant  -     Document Type therapy note (daily note)  -KW therapy note (daily note)  -     Subjective Information complains of;pain  -KW agree to therapy;complains of;weakness;fatigue;pain  -     Patient Effort, Rehab Treatment adequate  -KW      Precautions/Limitations  fall precautions  -     Recorded by [KW] Chaya Brewer MS CCC-SLP [AH] Hailey Hernandez PTA     Pain Assessment    Pain Assessment 0-10  -KW 0-10  -     Pain Score 10  -KW 10  -AH     Pain Location  Throat  -     Pain Frequency  Constant/continuous  -     Pain Intervention(s)  Medication (See MAR)  -     Recorded by [KW] Chaya Brewer MS CCC-SLP [] Hailey Hernandez PTA     Pain 2    Pain Score 2  10  -AH     Pain Type 2  Chronic pain  -AH     Pain Location 2  Leg  -     Pain Orientation 2  Right;Left  -     Pain Frequency 2  Constant/continuous  -     Pain Intervention(s) 2  Medication (See MAR);Ambulation/increased activity;Repositioned  -     Response to Interventions 2  tolerated  -     Recorded by  [] Hailey Hernandez PTA     Dysphagia/Swallow Intervention    Dysphagia/Swallow Therapeutic Feed Asked to reevalulate swallow due to patinet being discharged if swallow is improved.  Patient wanting to sit on side of bed.  Complains of pain all over.  Trials of applesauce showed multiple swallows, gag, couging up secretions, complaints of pain with swallow.  Attempted thin liquids.  This resulted in immediate cough with thin liquids coming out of mouth and nasal regurgitation.  Reviewed MBS which showed decreaed cricopharyngeal opening.  Difficulty to determine at times if behavior is having psychogenic behaviors, however resluts of dysphagia study did show  deficits.  Due to know change at bedside, I do not feel that patient has had any improvement in swallow.  However, due to undetermined cuase of deficits, will complete FEES study to gain a second objective view on swallow.  Results pending.  -KW      Recorded by [KW] Chaya Brewer MS CCC-SLP      Bed Mobility, Assessment/Treatment    Bed Mob, Supine to Sit, [place]  verbal cues required;contact guard assist;minimum assist (75% patient effort)  -     Bed Mob, Sit to Supine, [place]  contact guard assist;verbal cues required  -     Recorded by  [] Hailey Hernandez PTA     Transfer Assessment/Treatment    Transfers, Sit-Stand [place]  minimum assist (75% patient effort);2 person assist required;verbal cues required  -     Transfers, Stand-Sit [place]  contact guard assist;verbal cues required  -     Recorded by  [] Hailey Hernandez PTA     Gait Assessment/Treatment    Gait, [place] Level  contact guard assist;2 person assist required;verbal cues required  -     Gait, Assistive Device  rolling walker  -     Gait, Distance (Feet)  12   12 x 2, follow with w/c sit rest  -     Gait, Comment  pt amb 12 feet, follow with chair, one sitting rest, then amb 12 feet back to bed  -     Recorded by  [] Hailey Hernandez PTA     Positioning and Restraints    Pre-Treatment Position  in bed  -     Post Treatment Position  bed  -AH     In Bed  fowlers;call light within reach;encouraged to call for assist;exit alarm on;SCD pump applied  -     Recorded by  [] Hailey Hernandez PTA       User Key  (r) = Recorded By, (t) = Taken By, (c) = Cosigned By    Initials Name Effective Dates     Hailey Hernandez, FOREIGN 08/02/16 -     KJ Mell Trinidad, hospitals 08/02/16 -     TM Marisabel Faye CCC-SLP 08/02/16 -     KW Chaya Brewer MS CCC-SLP 08/02/16 -                 IP PT Goals       07/14/17 0919          Transfer Training PT LTG    Transfer Training PT LTG, Date Established 07/14/17  -MS (sivan) NINA  (t) MS (c)      Transfer Training PT LTG, Time to Achieve by discharge  -MS (r) RZ (t) MS (c)      Transfer Training PT LTG, Activity Type all transfers  -MS (r) RZ (t) MS (c)      Transfer Training PT LTG, [place] Level contact guard assist  -MS (r) RZ (t) MS (c)      Transfer Training PT LTG, Assist Device walker, rolling  -MS (r) RZ (t) MS (c)      Gait Training PT LTG    Gait Training Goal PT LTG, Date Established 07/14/17  -MS (r) RZ (t) MS (c)      Gait Training Goal PT LTG, Time to Achieve by discharge  -MS (r) RZ (t) MS (c)      Gait Training Goal PT LTG, [place] Level minimum assist (75% patient effort)  -MS (r) RZ (t) MS (c)      Gait Training Goal PT LTG, Assist Device walker, rolling platform  -MS (r) RZ (t) MS (c)      Gait Training Goal PT LTG, Distance to Achieve 50 ft  -MS (r) RZ (t) MS (c)      Strength Goal PT LTG    Strength Goal PT LTG, Date Established 07/14/17  -MS (r) RZ (t) MS (c)      Strength Goal PT LTG, Time to Achieve by discharge  -MS (r) RZ (t) MS (c)      Strength Goal PT LTG, Measure to Achieve Pt to increase all major LE musculaure to 4+/5  -MS (r) RZ (t) MS (c)      Caregiver Training PT LTG    Caregiver Training PT LTG, Date Established 07/14/17  -MS (r) RZ (t) MS (c)      Caregiver Training PT LTG, Time to Achieve by discharge  -MS (r) RZ (t) MS (c)      Caregiver Training PT LTG, Activity Type Pts spouse will demonstrate proper body mechanics and technique for all transfers 100% of the time.   -MS (r) RZ (t) MS (c)      Caregiver Training PT LTG, [place] Level able to assist adequately  -MS (r) RZ (t) MS (c)        User Key  (r) = Recorded By, (t) = Taken By, (c) = Cosigned By    Initials Name Provider Type    MS Belkis Esteban, PT DPT Physical Therapist    NINA Rae, PT Student PT Student          Physical Therapy Education     Title: PT OT SLP Therapies (Active)     Topic: Physical Therapy (Active)     Point: Mobility training (Active)    Learning  Progress Summary    Learner Readiness Method Response Comment Documented by Status   Patient Acceptance E NR benefits of increasing activity, HEP, safety awareness program  07/17/17 1026 Active    Acceptance E VU safe trans  07/15/17 1131 Done    Acceptance E VU Pt educated on bed mobility and transfer training. Pt educated on fall precautions and to call for assistance prior to ambualtion.  07/14/17 0928 Done               Point: Body mechanics (Active)    Learning Progress Summary    Learner Readiness Method Response Comment Documented by Status   Patient Acceptance E NR benefits of increasing activity, HEP, safety awareness program  07/17/17 1026 Active               Point: Precautions (Done)    Learning Progress Summary    Learner Readiness Method Response Comment Documented by Status   Patient Acceptance E VU Pt educated on bed mobility and transfer training. Pt educated on fall precautions and to call for assistance prior to ambualtion.  07/14/17 0928 Done                      User Key     Initials Effective Dates Name Provider Type Discipline     08/02/16 -  Hailey Hernandez, PTA Physical Therapy Assistant PT     08/02/16 -  Mell Trinidad, PTA Physical Therapy Assistant PT     05/08/17 -  Kika Rae, PT Student PT Student PT                    PT Recommendation and Plan  Anticipated Equipment Needs At Discharge: tub bench  Anticipated Discharge Disposition: skilled nursing facility, home with home health  Demonstrates Need for Referral to Another Service: other (see comments) (psych)  Planned Therapy Interventions: balance training, bed mobility training, gait training, home exercise program, patient/family education, strengthening, transfer training, wheelchair management/propulsion training  PT Frequency: daily, 2 times/day, per priority policy  Plan of Care Review  Plan Of Care Reviewed With: patient  Progress: progress towards functional goals is fair  Outcome Summary/Follow up Plan:  PT tx completed. Pt sitting edge of bed I. C/O weakness past several months leading to falls. States she does not walk at home. CG/MIN sit-stand transfers, and amb 35' x 2. Followed with w/c for saffety and required 1 sitting rest. Pt would benefit from rehab or home health for strengthening program.          Outcome Measures       07/17/17 0940 07/15/17 1100       How much help from another person do you currently need...    Turning from your back to your side while in flat bed without using bedrails? 4  -KJ 4  -AH     Moving from lying on back to sitting on the side of a flat bed without bedrails? 3  -KJ 3  -AH     Moving to and from a bed to a chair (including a wheelchair)? 3  -KJ 3  -AH     Standing up from a chair using your arms (e.g., wheelchair, bedside chair)? 3  -KJ 3  -AH     Climbing 3-5 steps with a railing? 2  -KJ 2  -AH     To walk in hospital room? 3  -KJ 3  -AH     AM-PAC 6 Clicks Score 18  -KJ 18  -AH     Functional Assessment    Outcome Measure Options AM-PAC 6 Clicks Basic Mobility (PT)  -KJ AM-PAC 6 Clicks Basic Mobility (PT)  -AH       User Key  (r) = Recorded By, (t) = Taken By, (c) = Cosigned By    Initials Name Provider Type     Hailey Hernandez PTA Physical Therapy Assistant    CHRISTI Trinidad PTA Physical Therapy Assistant           Time Calculation:         PT Charges       07/17/17 0940          Time Calculation    Start Time 0940  -KJ      Stop Time 1020  -KJ      Time Calculation (min) 40 min  -KJ      PT Received On 07/17/17  -KJ      PT Goal Re-Cert Due Date 07/24/17  -KJ      Time Calculation- PT    Total Timed Code Minutes- PT 40 minute(s)  -KJ        User Key  (r) = Recorded By, (t) = Taken By, (c) = Cosigned By    Initials Name Provider Type    CHRISTI Trinidad PTA Physical Therapy Assistant          Therapy Charges for Today     Code Description Service Date Service Provider Modifiers Qty    20530571166 HC GAIT TRAINING EA 15 MIN 7/17/2017 Mell Trinidad PTA GP 1     [id number]  PT THER PROC EA 15 MIN 7/17/2017 Mell Trinidad, PTA GP 1    [id number]  PT THERAPEUTIC ACT EA 15 MIN 7/17/2017 Mell Trinidad, PTA GP 1          PT G-Codes  PT Professional Judgement Used?: Yes  Outcome Measure Options: AM-PAC 6 Clicks Basic Mobility (PT)  Score: 18  Functional Limitation: Mobility: Walking and moving around  Mobility: Walking and Moving Around Current Status (): At least 40 percent but less than 60 percent impaired, limited or restricted  Mobility: Walking and Moving Around Goal Status (): At least 20 percent but less than 40 percent impaired, limited or restricted    Mell Trinidad, PTA  7/17/2017

## 2017-07-17 NOTE — THERAPY TREATMENT NOTE
Acute Care - Physical Therapy Treatment Note  [facility]     Patient Name: Bita Croft  : 1955  MRN: [id number]  Today's Date: 2017  Onset of Illness/Injury or Date of Surgery Date: 17  Date of Referral to PT: 17  Referring Physician: Josafat Gonzales MD    Admit Date: 2017    Visit Dx:    ICD-10-CM ICD-9-CM   1. Inability to swallow R13.0 787.20   2. Dysphagia, unspecified type R13.10 787.20   3. Difficulty in walking, not elsewhere classified R26.2 719.7   4. Impaired mobility Z74.09 799.89     Patient Active Problem List   Diagnosis   • Confusion   • Depression   • Anxiety   • Hyponatremia   • Seizure disorder   • Chronic neck pain   • Chronic back pain   • Hypothyroidism   • Abnormal thyroid blood test   • Hypertension   • Wellness examination-done   • Elevated fasting glucose   • Anemia   • Iron deficiency   • Degenerative joint disease of spine   • Conversion disorder with abnormal movement   • Reflux laryngitis   • Gait difficulty   • Weakness of both legs   • Sore throat               Adult Rehabilitation Note       17 0940 17 0855 17 0827    Rehab Assessment/Intervention    Discipline physical therapy assistant  -KJ --  -KJ speech language pathologist  -TM    Document Type therapy note (daily note)  -KJ  therapy note (daily note)  -TM    Subjective Information agree to therapy  -KJ  no complaints;agree to therapy  -TM    Patient Effort, Rehab Treatment   adequate  -TM    Symptoms Noted During/After Treatment fatigue  -KJ      Precautions/Limitations fall precautions  -KJ      Recorded by [KJ] Mell Trinidad PTA [KJ] Mell Trinidad PTA [TM] Marisabel Faye CCC-SLP    Pain Assessment    Pain Assessment 0-10  -KJ  No/denies pain  -TM    Pain Score 10  -KJ      Pain Location Hand  -KJ      Pain Orientation Left  -KJ      Pain Descriptors Aching  -KJ      Recorded by [KJ] Mell Trinidad PTA  [TM] Marisabel Faye CCC-SLP    Dysphagia/Swallow Intervention     Dysphagia/Swallow Therapeutic Feed   Upon ST arrival, breakfast tray present on bedside table in front of pt. Pt was noted to have a 1x cough, yet uncertain how closely this was experienced in relation to PO trial (consumed before arrival). Persistent hoarse vocal quality. ST observed pt with multiple trials of mech soft biscuit and thin liquids. 1x delayed cough, yet this was minutes following a PO trial. Maintained clear vocal quality throughout session. Cannot fully r/o aspiration at this time, yet feel pt is tolerating diet. Educated pt re: mech soft diet consistency to be utilized at home until hoarse vocal quality and sore throat fully resolve. Then pt can begin to reattempt regular solid diet consistency and increase diet as tolerated. Pt was also educated to monitor for persistent concerns with dysphagia at home, as a repeat VFSS can be completed on an outpatient basis to r/o change or new concerns with swallow fx.   -TM    Recorded by   [TM] Marisabel Faye, CCC-SLP    Bed Mobility, Assessment/Treatment    Bed Mob, Supine to Sit, [place] independent  -KJ      Recorded by [KJ] Mell Trinidad PTA      Transfer Assessment/Treatment    Transfers, Sit-Stand [place] verbal cues required;contact guard assist;minimum assist (75% patient effort)   minimal assistance from w/c  -KJ      Transfers, Stand-Sit [place] verbal cues required;contact guard assist  -KJ      Transfer, Comment cueing for hand placement  -KJ      Recorded by [KJ] Mell Trinidad PTA      Gait Assessment/Treatment    Gait, [place] Level contact guard assist  -KJ      Gait, Assistive Device rolling walker  -KJ      Gait, Distance (Feet) 35   followed with w/c, sitting rest 35' back to room  -KJ      Gait, Gait Deviations bilateral:;hari decreased;step length decreased  -KJ      Gait, Safety Issues step length decreased;weight-shifting ability decreased   patient has had several falls  -KJ      Gait, Comment follow with w/c  for safety  -KJ      Recorded by [KJ] Mell Trinidad PTA      Positioning and Restraints    Pre-Treatment Position in bed  -KJ      Post Treatment Position bed  -KJ      Recorded by [KJ] Mell Trinidad PTA        07/16/17 1330 07/15/17 1330 07/15/17 1300    Rehab Assessment/Intervention    Discipline speech language pathologist  -KW  speech language pathologist  -KW    Document Type therapy note (daily note)  -KW  therapy note (daily note)  -KW    Subjective Information no complaints  -KW  complains of;pain  -KW    Patient Effort, Rehab Treatment good  -KW  adequate  -KW    Recorded by [KW] Chaya Brewer MS CCC-SLP  [KW] Chaya Brewer MS CCC-SLP    Pain Assessment    Pain Assessment   0-10  -KW    Pain Score   10  -KW    Recorded by   [KW] Chaya Brewer MS CCC-SLP    Dysphagia/Swallow Intervention    Dysphagia/Swallow Intervention  Patient will complete NMES for 30 minutes while completing swallow exercises with 90% acc  -KW     Dysphagia/Swallow Therapeutic Feed Pooja had respiratory event last PM and then couoghed out bottle cap.  Since then swallow has improved.  She was started on pureed diet.  No overt s/s of aspiration today with thin liquids or solids.  Will upgrade to [facility] soft with thin liquids.  -KW Patient will tolerate PO trials with SLP with no s/s of aspiration.  -KW Asked to reevalulate swallow due to pooja being discharged if swallow is improved.  Patient wanting to sit on side of bed.  Complains of pain all over.  Trials of applesauce showed multiple swallows, gag, couging up secretions, complaints of pain with swallow.  Attempted thin liquids.  This resulted in immediate cough with thin liquids coming out of mouth and nasal regurgitation.  Reviewed MBS which showed decreaed cricopharyngeal opening.  Difficulty to determine at times if behavior is having psychogenic behaviors, however resluts of dysphagia study did show deficits.  Due to know change at bedside, I do not feel that patient  has had any improvement in swallow.  However, due to undetermined cuase of deficits, will complete FEES study to gain a second objective view on swallow.  Results pending.  -KW    Recorded by [KW] Chaya Brewer MS CCC-SLP [KW] Chaya Brewer MS CCC-SLP [KW] Chaya Brewer MS CCC-SLP      07/15/17 1015 07/14/17 1330       Rehab Assessment/Intervention    Discipline physical therapy assistant  - physical therapy assistant  -     Document Type therapy note (daily note)  - therapy note (daily note)  -     Subjective Information agree to therapy;complains of;weakness;fatigue;pain  - agree to therapy;complains of;fatigue;weakness  -     Precautions/Limitations fall precautions  - fall precautions  -NESSA     Recorded by [] Hailey Hernandez PTA [NESSA] Wolfgang Bates PTA     Pain Assessment    Pain Assessment 0-10  - 0-10  -     Pain Score 10  - 10  -NESSA     Pain Location Throat  - Throat  -     Pain Descriptors  Burning  -     Pain Frequency Constant/continuous  - Constant/continuous  -     Pain Intervention(s) Medication (See MAR)  - Medication (See MAR)  -NESSA     Response to Interventions  tolerated  -NESSA     Recorded by [] Hailey Hernandez PTA [NESSA] Wolfgang Bates PTA     Pain 2    Pain Score 2 10  -      Pain Type 2 Chronic pain  -      Pain Location 2 Leg  -      Pain Orientation 2 Right;Left  -      Pain Frequency 2 Constant/continuous  -      Pain Intervention(s) 2 Medication (See MAR);Ambulation/increased activity;Repositioned  -      Response to Interventions 2 tolerated  -      Recorded by [] Hailey Heranndez PTA      Bed Mobility, Assessment/Treatment    Bed Mobility, Assistive Device  bed rails;head of bed elevated  -     Bed Mob, Supine to Sit, [place] verbal cues required;contact guard assist;minimum assist (75% patient effort)  - conditional independence  -     Bed Mob, Sit to Supine, [place] contact guard assist;verbal cues required  -  conditional independence   in chair  -NESSA     Recorded by [] Hailey Hernandez PTA [NESSA] Wolfgang Bates PTA     Transfer Assessment/Treatment    Transfers, Sit-Stand [place] minimum assist (75% patient effort);2 person assist required;verbal cues required  - verbal cues required;minimum assist (75% patient effort)  -NESSA     Transfers, Stand-Sit [place] contact guard assist;verbal cues required  - verbal cues required;contact guard assist  -NESSA     Transfer, Comment  Pt stood with CGA/min assist.  Stand pivot to w/c with CGA.  Pt's knee buckled but was able to self correct.  T/F w/c to BSC, BSC to w/c all with CGA/ min assist  -NESSA     Recorded by [] Hailey Hernandez PTA [NESSA] Wolfgang Bates PTA     Gait Assessment/Treatment    Gait, [place] Level contact guard assist;2 person assist required;verbal cues required  -      Gait, Assistive Device rolling walker  -      Gait, Distance (Feet) 12   12 x 2, follow with w/c sit rest  -      Gait, Comment pt amb 12 feet, follow with chair, one sitting rest, then amb 12 feet back to bed  -      Recorded by [] Hailey Hernandez PTA      Balance Skills Training    Standing-Level of Assistance  Contact guard  -     Static Standing Balance Support  --   with RWX  -ENSSA     Recorded by  [NESSA] Wolfgang Bates PTA     Therapy Exercises    Bilateral Lower Extremities  AROM:;20 reps;sitting;hip flexion;LAQ;ankle pumps/circles  -NESSA     Recorded by  [NESSA] Wolfgang Bates PTA     Positioning and Restraints    Pre-Treatment Position in bed  - in bed  -     Post Treatment Position bed  - wheelchair  -     In Bed fowlers;call light within reach;encouraged to call for assist;exit alarm on;SCD pump applied  -      In Wheelchair  sitting;call light within reach;encouraged to call for assist;with family/caregiver  -NESSA     Recorded by [] Hailey Hernandez PTA [NESSA] Wolfgang Bates PTA       User Key  (r) = Recorded By, (t) = Taken By, (c) = Cosigned By     Initials Name Effective Dates    AH Hailey HUMPHRIES aMry, PTA 08/02/16 -     KJ Mell Trinidad, PTA 08/02/16 -     TM Marisabel Faye, CCC-SLP 08/02/16 -     NESSA Wolfgang Bates, PTA 12/08/16 -     KW Chaya ESEQUIEL Brewer, MS CCC-SLP 08/02/16 -                 IP PT Goals       07/14/17 0919          Transfer Training PT LTG    Transfer Training PT LTG, Date Established 07/14/17  -MS (r) RZ (t) MS (c)      Transfer Training PT LTG, Time to Achieve by discharge  -MS (r) RZ (t) MS (c)      Transfer Training PT LTG, Activity Type all transfers  -MS (r) RZ (t) MS (c)      Transfer Training PT LTG, [place] Level contact guard assist  -MS (r) RZ (t) MS (c)      Transfer Training PT LTG, Assist Device walker, rolling  -MS (r) RZ (t) MS (c)      Gait Training PT LTG    Gait Training Goal PT LTG, Date Established 07/14/17  -MS (r) RZ (t) MS (c)      Gait Training Goal PT LTG, Time to Achieve by discharge  -MS (r) RZ (t) MS (c)      Gait Training Goal PT LTG, [place] Level minimum assist (75% patient effort)  -MS (r) RZ (t) MS (c)      Gait Training Goal PT LTG, Assist Device walker, rolling platform  -MS (r) RZ (t) MS (c)      Gait Training Goal PT LTG, Distance to Achieve 50 ft  -MS (r) RZ (t) MS (c)      Strength Goal PT LTG    Strength Goal PT LTG, Date Established 07/14/17  -MS (r) RZ (t) MS (c)      Strength Goal PT LTG, Time to Achieve by discharge  -MS (r) RZ (t) MS (c)      Strength Goal PT LTG, Measure to Achieve Pt to increase all major LE musculaure to 4+/5  -MS (r) RZ (t) MS (c)      Caregiver Training PT LTG    Caregiver Training PT LTG, Date Established 07/14/17  -MS (r) RZ (t) MS (c)      Caregiver Training PT LTG, Time to Achieve by discharge  -MS (r) RZ (t) MS (c)      Caregiver Training PT LTG, Activity Type Pts spouse will demonstrate proper body mechanics and technique for all transfers 100% of the time.   -MS (r) RZ (t) MS (c)      Caregiver Training PT LTG, [place] Level able to assist adequately  -MS  (r) RZ (t) MS (c)        User Key  (r) = Recorded By, (t) = Taken By, (c) = Cosigned By    Initials Name Provider Type    MS Belkis Esteban, PT DPT Physical Therapist    NINA Rae, PT Student PT Student          Physical Therapy Education     Title: PT OT SLP Therapies (Active)     Topic: Physical Therapy (Active)     Point: Mobility training (Active)    Learning Progress Summary    Learner Readiness Method Response Comment Documented by Status   Patient Acceptance E NR benefits of increasing activity, HEP, safety awareness program  07/17/17 1026 Active    Acceptance E VU safe trans  07/15/17 1131 Done    Acceptance E VU Pt educated on bed mobility and transfer training. Pt educated on fall precautions and to call for assistance prior to ambualtion.  07/14/17 0928 Done               Point: Body mechanics (Active)    Learning Progress Summary    Learner Readiness Method Response Comment Documented by Status   Patient Acceptance E NR benefits of increasing activity, HEP, safety awareness program  07/17/17 1026 Active               Point: Precautions (Done)    Learning Progress Summary    Learner Readiness Method Response Comment Documented by Status   Patient Acceptance E VU Pt educated on bed mobility and transfer training. Pt educated on fall precautions and to call for assistance prior to ambualtion.  07/14/17 0928 Done                      User Key     Initials Effective Dates Name Provider Type [facility] 08/02/16 -  Hailey Hernandez, PTA Physical Therapy Assistant PT     08/02/16 -  Mell Trinidad PTA Physical Therapy Assistant PT     05/08/17 -  Kika Rae, PT Student PT Student PT                    PT Recommendation and Plan  Anticipated Equipment Needs At Discharge: tub bench  Anticipated Discharge Disposition: skilled nursing facility, home with home health  Demonstrates Need for Referral to Another Service: other (see comments) (psych)  Planned Therapy Interventions:  balance training, bed mobility training, gait training, home exercise program, patient/family education, strengthening, transfer training, wheelchair management/propulsion training  PT Frequency: daily, 2 times/day, per priority policy  Plan of Care Review  Plan Of Care Reviewed With: patient  Progress: progress towards functional goals is fair  Outcome Summary/Follow up Plan: PT tx completed. Pt sitting edge of bed I. C/O weakness past several months leading to falls. States she does not walk at home. CG/MIN sit-stand transfers, and amb 35' x 2. Followed with w/c for saffety and required 1 sitting rest. Pt would benefit from rehab or home health for strengthening program.          Outcome Measures       07/15/17 1100          How much help from another person do you currently need...    Turning from your back to your side while in flat bed without using bedrails? 4  -AH      Moving from lying on back to sitting on the side of a flat bed without bedrails? 3  -AH      Moving to and from a bed to a chair (including a wheelchair)? 3  -AH      Standing up from a chair using your arms (e.g., wheelchair, bedside chair)? 3  -AH      Climbing 3-5 steps with a railing? 2  -AH      To walk in hospital room? 3  -AH      AM-PAC 6 Clicks Score 18  -AH      Functional Assessment    Outcome Measure Options AM-PAC 6 Clicks Basic Mobility (PT)  -        User Key  (r) = Recorded By, (t) = Taken By, (c) = Cosigned By    Initials Name Provider Type    RAMIRO Hernandez, PTA Physical Therapy Assistant           Time Calculation:           PT G-Codes  PT Professional Judgement Used?: Yes  Outcome Measure Options: AM-PAC 6 Clicks Basic Mobility (PT)  Score: 18  Functional Limitation: Mobility: Walking and moving around  Mobility: Walking and Moving Around Current Status (): At least 40 percent but less than 60 percent impaired, limited or restricted  Mobility: Walking and Moving Around Goal Status (): At least 20 percent but  less than 40 percent impaired, limited or restricted    Mell Trinidad, PTA  7/17/2017

## 2017-07-17 NOTE — PAYOR COMM NOTE
"[id number]  CLINICAL UPDATE FROM 7-15-17  UR PHONE [phone]   [phone]      Bita Croft (61 y.o. Female)     Date of Birth Social Security Number Address Home Phone MRN    1955  [address]   [place] [address] [phone] [id number]    Judaism Marital Status          Tenriism        Admission Date Admission Type Admitting Provider Attending Provider Department, Room/Bed    7/12/17 Emergency Josafat Gonzales MD Oliver, Randy Eugene, MD [facility] 3A, 352/1    Discharge Date Discharge Disposition Discharge Destination                      Attending Provider: Josafat Gonzales MD     Allergies:  No Known Allergies    Isolation:  None   Infection:  None   Code Status:  FULL    Ht:  71\" (180.3 cm)   Wt:  217 lb (98.4 kg)    Admission Cmt:  None   Principal Problem:  Sore throat [J02.9]                 Active Insurance as of 7/12/2017     Primary Coverage     Payor Plan Insurance Group Employer/Plan Group    [employer]ERCIAL AETNA [id number]     Payor Plan Address Payor Plan Phone Number Effective From Effective To    [address] [phone] 7/1/2017     [place] [address]       Subscriber Name Subscriber Birth Date Member ID       OCHOA CROFT 1955 [id number]                 Emergency Contacts      (Rel.) Home Phone Work Phone Mobile Phone    Carlito Croft (Spouse) [phone] -- --            Vital Signs (last 72 hrs)       07/14 0700  -  07/15 0659 07/15 0700  -  07/16 0659 07/16 0700  -  07/17 0659 07/17 0700  -  07/17 0712   Most Recent    Temp (°F) 97.5 -  98.5    97.7 -  98.4    97.5 -  98.1       97.7 (36.5)    Heart Rate 75 -  84    71 -  90    72 -  78       72    Resp 16 -  18    18 -  20      18       18    /95 -  178/96    154/86 -  (!) 213/100    133/74 -  166/96       160/88    SpO2 (%) 95 -  100    95 -  99    93 -  98       96          Intake & Output (last 3 days)       07/14 0701 - 07/15 0700 07/15 0701 - 07/16 0700 " 07/16 0701 - 07/17 0700 07/17 0701 - 07/18 0700    P.O.   240     I.V. (mL/kg)  2471.5 (25.1) 2414.6 (24.5)     IV Piggyback  200 50     Total Intake(mL/kg)  2671.5 (27.1) 2704.6 (27.5)     Urine (mL/kg/hr) 900 (0.4)       Total Output 900          Net -900 +2671.5 +2704.6              Unmeasured Urine Occurrence 5 x 5 x 5 x     Unmeasured Stool Occurrence  1 x 3 x         Lines, Drains & Airways    Active LDAs     Name:   Placement date:   Placement time:   Site:   Days:    Peripheral IV Line - Single Lumen 07/16/17 0212 basilic vein (medial side of arm), left 22 gauge  07/16/17    0212      1         Inactive LDAs     Name:   Placement date:   Placement time:   Removal date:   Removal time:   Site:   Days:    [REMOVED] Peripheral IV Line - Single Lumen 07/11/17 1420 median cubital vein (antecubital fossa), right 18 gauge  07/11/17    1420    07/11/17 2021      less than 1    [REMOVED] Peripheral IV Line - Single Lumen 07/12/17 1500 median cubital vein (antecubital fossa), left 20 gauge;1 in length  07/12/17    1500    07/12/17 2005      less than 1    [REMOVED] Peripheral IV Line - Single Lumen 07/12/17 2100 other (see comments) 20 gauge  07/12/17    2100    07/14/17    0904      1    [REMOVED] Peripheral IV Line - Single Lumen 07/14/17 0904 median cubital vein (antecubital fossa), left 20 gauge  07/14/17    0904    07/15/17    2000      1    [REMOVED] Peripheral IV Line - Single Lumen 07/15/17 2030 cephalic vein (lateral side of arm), left 20 gauge  07/15/17    2030    07/16/17    0200      less than 1                Hospital Medications (all)       Dose Frequency Start End    acetaminophen (TYLENOL) suppository 650 mg 650 mg Once 7/12/2017 7/12/2017    Sig - Route: Insert 2 suppositories into the rectum 1 (One) Time. - Rectal    Cosign for Ordering: Accepted by Ryan Henning MD on 7/12/2017  3:20 PM    amLODIPine (NORVASC) tablet 5 mg 5 mg Every 12 Hours Scheduled 7/16/2017     Sig - Route: Take 1 tablet  by mouth Every 12 (Twelve) Hours. - Oral    clindamycin (CLEOCIN) 600 mg in dextrose 5% 50 mL IVPB (premix) 600 mg Every 8 Hours 7/12/2017     Sig - Route: Infuse 50 mL into a venous catheter Every 8 (Eight) Hours. - Intravenous    clonazePAM (KlonoPIN) tablet 1 mg 1 mg 3 Times Daily PRN 7/16/2017     Sig - Route: Take 1 tablet by mouth 3 (Three) Times a Day As Needed for Anxiety. - Oral    CloNIDine (CATAPRES) tablet 0.1 mg 0.1 mg Every 12 Hours Scheduled 7/16/2017     Sig - Route: Take 1 tablet by mouth Every 12 (Twelve) Hours. - Oral    dexamethasone sodium phosphate 6 mg in sodium chloride 0.9 % IVPB 6 mg Every 6 Hours 7/13/2017 7/14/2017    Sig - Route: Infuse 6 mg into a venous catheter Every 6 (Six) Hours. - Intravenous    Notes to Pharmacy: 6mg IV now, then 6 mg IV q 6 hours x 4 doses    DULoxetine (CYMBALTA) DR capsule 30 mg 30 mg Daily 7/16/2017     Sig - Route: Take 1 capsule by mouth Daily. - Oral    enoxaparin (LOVENOX) syringe 40 mg 40 mg Daily 7/13/2017     Sig - Route: Inject 0.4 mL under the skin Daily. - Subcutaneous    famotidine (PEPCID) injection 40 mg 40 mg Every 12 Hours Scheduled 7/14/2017     Sig - Route: Infuse 4 mL into a venous catheter Every 12 (Twelve) Hours. - Intravenous    gabapentin (NEURONTIN) capsule 300 mg 300 mg Every 8 Hours Scheduled 7/16/2017     Sig - Route: Take 1 capsule by mouth Every 8 (Eight) Hours. - Oral    iopamidol (ISOVUE-300) 61 % injection 100 mL 100 mL Once in Imaging 7/12/2017 7/12/2017    Sig - Route: Infuse 100 mL into a venous catheter Once. - Intravenous    levETIRAcetam (KEPPRA) tablet 1,000 mg 1,000 mg Every 12 Hours Scheduled 7/16/2017     Sig - Route: Take 2 tablets by mouth Every 12 (Twelve) Hours. - Oral    lidocaine viscous (XYLOCAINE) 2 % mouth solution 15 mL 15 mL Once 7/12/2017 7/12/2017    Sig - Route: Apply 15 mL to the mouth or throat 1 (One) Time. - Mouth/Throat    Cosign for Ordering: Accepted by Ryan Henning MD on 7/12/2017  3:20 PM     LORazepam (ATIVAN) injection 0.5 mg 0.5 mg Every 2 Hours PRN 7/13/2017 7/23/2017    Sig - Route: Infuse 0.25 mL into a venous catheter Every 2 (Two) Hours As Needed for Anxiety. - Intravenous    methylPREDNISolone sodium succinate (SOLU-Medrol) injection 125 mg 125 mg Once 7/12/2017 7/12/2017    Sig - Route: Inject 2 mL into the shoulder, thigh, or buttocks 1 (One) Time. - Intramuscular    Cosign for Ordering: Accepted by Ryan Henning MD on 7/12/2017  3:20 PM    Morphine sulfate (PF) injection 2 mg 2 mg Every 2 Hours PRN 7/12/2017 7/22/2017    Sig - Route: Infuse 1 mL into a venous catheter Every 2 (Two) Hours As Needed for Severe Pain . - Intravenous    Morphine sulfate (PF) injection 4 mg 4 mg Once 7/12/2017 7/12/2017    Sig - Route: Infuse 1 mL into a venous catheter 1 (One) Time. - Intravenous    ondansetron (ZOFRAN) injection 4 mg 4 mg Every 6 Hours PRN 7/12/2017     Sig - Route: Infuse 2 mL into a venous catheter Every 6 (Six) Hours As Needed for Nausea or Vomiting. - Intravenous    oxyCODONE (ROXICODONE) immediate release tablet 10 mg 10 mg Every 4 Hours PRN 7/16/2017     Sig - Route: Take 2 tablets by mouth Every 4 (Four) Hours As Needed for Moderate Pain . - Oral    phenytoin (DILANTIN) ER capsule 100 mg 100 mg Daily 7/16/2017     Sig - Route: Take 1 capsule by mouth Daily. - Oral    phenytoin (DILANTIN) ER capsule 200 mg 200 mg Nightly 7/16/2017     Sig - Route: Take 2 capsules by mouth Every Night. - Oral    piperacillin-tazobactam (ZOSYN) 3.375 g in iso-osmotic dextrose 50 ml (premix) 3.375 g Every 6 Hours 7/12/2017     Sig - Route: Infuse 50 mL into a venous catheter Every 6 (Six) Hours. - Intravenous    potassium chloride (MICRO-K) CR capsule 20 mEq 20 mEq 2 Times Daily With Meals 7/16/2017     Sig - Route: Take 2 capsules by mouth 2 (Two) Times a Day With Meals. - Oral    QUEtiapine (SEROquel) tablet 100 mg 100 mg Every 12 Hours Scheduled 7/16/2017     Sig - Route: Take 1 tablet by mouth Every 12  (Twelve) Hours. - Oral    sodium chloride 0.9 % 985 mL with potassium chloride 30 mEq infusion 125 mL/hr Continuous 7/15/2017     Sig - Route: Infuse 125 mL/hr into a venous catheter Continuous. - Intravenous    sodium chloride 0.9 % bolus 1,000 mL 1,000 mL Once 7/12/2017 7/12/2017    Sig - Route: Infuse 1,000 mL into a venous catheter 1 (One) Time. - Intravenous    Cosign for Ordering: Accepted by Ryan Henning MD on 7/12/2017  3:20 PM    sodium chloride 0.9 % bolus 1,000 mL 1,000 mL Once 7/12/2017     Sig - Route: Infuse 1,000 mL into a venous catheter 1 (One) Time. - Intravenous    Cosign for Ordering: Accepted by Ryan Henning MD on 7/12/2017  6:12 PM    sodium chloride 0.9 % infusion 100 mL/hr Continuous 7/12/2017     Sig - Route: Infuse 100 mL/hr into a venous catheter Continuous. - Intravenous    traZODone (DESYREL) tablet 50 mg 50 mg Nightly 7/16/2017     Sig - Route: Take 1 tablet by mouth Every Night. - Oral    CloNIDine (CATAPRES-TTS) 0.1 MG/24HR patch 1 patch (Discontinued) 1 patch Weekly 7/13/2017 7/14/2017    Sig - Route: Place 1 patch on the skin 1 (One) Time Per Week. - Transdermal    CloNIDine (CATAPRES-TTS) 0.2 MG/24HR patch 1 patch (Discontinued) 1 patch Weekly 7/14/2017 7/16/2017    Sig - Route: Place 1 patch on the skin 1 (One) Time Per Week. - Transdermal    dexamethasone (DECADRON) injection 4 mg (Discontinued) 4 mg Every 6 Hours 7/12/2017 7/13/2017    Sig - Route: Infuse 1 mL into a venous catheter Every 6 (Six) Hours. - Intravenous    fosphenytoin (Cerebyx) injection 200 mg PE (Discontinued) 200 mg Nightly 7/13/2017 7/16/2017    Sig - Route: Infuse 4 mL into a venous catheter Every Night. - Intravenous    levETIRAcetam in NaCl 0.75% (KEPPRA) IVPB 1,000 mg (Discontinued) 1,000 mg Every 12 Hours Scheduled 7/13/2017 7/16/2017    Sig - Route: Infuse 100 mL into a venous catheter Every 12 (Twelve) Hours. - Intravenous    pantoprazole (PROTONIX) injection 40 mg (Discontinued) 40 mg Every 12 Hours  Scheduled 7/14/2017 7/14/2017    Sig - Route: Infuse 10 mL into a venous catheter Every 12 (Twelve) Hours. - Intravenous    Reason for Discontinue: Availability    sodium chloride 0.9 % bolus 500 mL (Discontinued) 500 mL Once 7/12/2017 7/12/2017    Sig - Route: Infuse 500 mL into a venous catheter 1 (One) Time. - Intravenous    Cosign for Ordering: Accepted by Ryan Henning MD on 7/12/2017  3:20 PM          Lab Results (last 72 hours)     Procedure Component Value Units Date/Time    CBC & Differential [[id number]] Collected:  07/15/17 1251    Specimen:  Blood Updated:  07/15/17 1314    Narrative:       The following orders were created for panel order CBC & Differential.  Procedure                               Abnormality         Status                     ---------                               -----------         ------                     CBC Auto Differential[[id number]]        Abnormal            Final result                 Please view results for these tests on the individual orders.    CBC Auto Differential [[id number]]  (Abnormal) Collected:  07/15/17 1251    Specimen:  Blood Updated:  07/15/17 1314     WBC 7.06 10*3/mm3      RBC 4.34 10*6/mm3      Hemoglobin 12.4 g/dL      Hematocrit 37.6 %      MCV 86.6 fL      MCH 28.6 pg      MCHC 33.0 g/dL      RDW 13.5 %      RDW-SD 43.0 fl      MPV 9.6 fL      Platelets 275 10*3/mm3      Neutrophil % 75.9 %      Lymphocyte % 14.0 (L) %      Monocyte % 8.6 %      Eosinophil % 1.1 %      Basophil % 0.3 %      Immature Grans % 0.1 %      Neutrophils, Absolute 5.35 10*3/mm3      Lymphocytes, Absolute 0.99 10*3/mm3      Monocytes, Absolute 0.61 10*3/mm3      Eosinophils, Absolute 0.08 10*3/mm3      Basophils, Absolute 0.02 10*3/mm3      Immature Grans, Absolute 0.01 10*3/mm3     Basic Metabolic Panel [[id number]]  (Abnormal) Collected:  07/15/17 1251    Specimen:  Blood Updated:  07/15/17 1329     Glucose 93 mg/dL      BUN 12 mg/dL      Creatinine 0.53 mg/dL      Sodium 138  mmol/L      Potassium 2.7 (C) mmol/L      Chloride 98 mmol/L      CO2 28.0 mmol/L      Calcium 9.3 mg/dL      eGFR Non African Amer 117 mL/min/1.73      BUN/Creatinine Ratio 22.6     Anion Gap 12.0 mmol/L     Narrative:       GFR Normal >60  Chronic Kidney Disease <60  Kidney Failure <15    Basic Metabolic Panel [[id number]]  (Abnormal) Collected:  07/16/17 0951    Specimen:  Blood Updated:  07/16/17 1036     Glucose 86 mg/dL      BUN 9 mg/dL      Creatinine 0.50 mg/dL      Sodium 137 mmol/L      Potassium 2.7 (C) mmol/L      Chloride 100 mmol/L      CO2 27.0 mmol/L      Calcium 8.5 mg/dL      eGFR Non African Amer 125 mL/min/1.73      BUN/Creatinine Ratio 18.0     Anion Gap 10.0 mmol/L     Narrative:       GFR Normal >60  Chronic Kidney Disease <60  Kidney Failure <15    Blood Culture [[id number]]  (Normal) Collected:  07/12/17 1502    Specimen:  Blood from Arm, Left Updated:  07/16/17 1601     Blood Culture No growth at 4 days    Comprehensive Metabolic Panel [[id number]]  (Abnormal) Collected:  07/17/17 0606    Specimen:  Blood Updated:  07/17/17 0637     Glucose 81 mg/dL      BUN 5 mg/dL      Creatinine 0.56 mg/dL      Sodium 138 mmol/L      Potassium 2.9 (C) mmol/L      Chloride 103 mmol/L      CO2 26.0 mmol/L      Calcium 8.3 (L) mg/dL      Total Protein 5.7 (L) g/dL      Albumin 3.20 (L) g/dL      ALT (SGPT) 28 U/L      AST (SGOT) 27 U/L      Alkaline Phosphatase 105 U/L      Total Bilirubin 0.3 mg/dL      eGFR Non African Amer 110 mL/min/1.73      Globulin 2.5 gm/dL      A/G Ratio 1.3 g/dL      BUN/Creatinine Ratio 8.9     Anion Gap 9.0 mmol/L           Imaging Results (last 72 hours)     ** No results found for the last 72 hours. **        Orders (last 72 hrs)     Start     Ordered    07/17/17 0600  Comprehensive Metabolic Panel  Once      07/16/17 0920    07/16/17 2100  levETIRAcetam (KEPPRA) tablet 1,000 mg  Every 12 Hours Scheduled      07/16/17 1104    07/16/17 2100  CloNIDine (CATAPRES) tablet 0.1 mg   Every 12 Hours Scheduled      07/16/17 1104    07/16/17 2100  phenytoin (DILANTIN) ER capsule 200 mg  Nightly      07/16/17 1104    07/16/17 2100  traZODone (DESYREL) tablet 50 mg  Nightly      07/16/17 1104    07/16/17 1400  gabapentin (NEURONTIN) capsule 300 mg  Every 8 Hours Scheduled      07/16/17 1104    07/16/17 1353  Diet Soft Texture; Ground; Thin  Diet Effective Now     Comments:  Advance to soft diet as tolerated. If problems have speech do bedside swallow evaluation    07/16/17 1352    07/16/17 1145  phenytoin (DILANTIN) ER capsule 100 mg  Daily      07/16/17 1104    07/16/17 1145  QUEtiapine (SEROquel) tablet 100 mg  Every 12 Hours Scheduled      07/16/17 1104    07/16/17 1115  amLODIPine (NORVASC) tablet 5 mg  Every 12 Hours Scheduled      07/16/17 1104    07/16/17 1115  potassium chloride (MICRO-K) CR capsule 20 mEq  2 Times Daily With Meals      07/16/17 1104    07/16/17 1115  DULoxetine (CYMBALTA) DR capsule 30 mg  Daily      07/16/17 1104    07/16/17 1101  clonazePAM (KlonoPIN) tablet 1 mg  3 Times Daily PRN      07/16/17 1104    07/16/17 1101  oxyCODONE (ROXICODONE) immediate release tablet 10 mg  Every 4 Hours PRN      07/16/17 1104    07/16/17 0934  Basic Metabolic Panel  Daily      07/16/17 0933    07/16/17 0915  Nursing Communication Advance to soft diet as tolerated. If problems have speech do bedside swallow evaluation  Continuous     Comments:  Advance to soft diet as tolerated. If problems have speech do bedside swallow evaluation    07/16/17 0914    07/16/17 0914  Diet Dysphagia; III - Pureed With Some Mashed  Diet Effective Now,   Status:  Canceled     Comments:  Advance to soft diet as tolerated. If problems have speech do bedside swallow evaluation    07/16/17 0914    07/16/17 0600  CBC & Differential  Morning Draw,   Status:  Canceled      07/15/17 0933    07/16/17 0600  Basic Metabolic Panel  Morning Draw,   Status:  Canceled      07/15/17 0933    07/15/17 1540  SLP Plan of Care Cert  / Re-Cert  Once     Comments:  Speech Language Pathology Plan of Care  Initial Certification  Certification Period: 7/15/2017 - 2017    Patient Name: Bita Croft  : 1955    (R13.0) Inability to swallow  (primary encounter diagnosis)  (R13.10) Dysphagia, unspecified type  (R26.2) Difficulty in walking, not elsewhere classified  (Z74.09) Impaired mobility                Assessment  SLP Assessment  SLP Swallowing Diagnosis: severe dysphagia  Rehab Potential/Prognosis, Swallowing: fair, will monitor progress closely  Plans/Goals Discussed With: patient, other (RN, Tomas)  Criteria for Skilled Therapeutic Interventions Met: demonstrates skilled criteria for intervention            IP SLP Goals       07/15/17 1537 07/15/17 1452 17 1018    Begin to Take Some PO Safely    Begin to Take Some PO Safely- SLP, Date Established 07/15/17  -KW      Begin to Take Some PO Safely- SLP, Time to Achieve by discharge  -KW  by   discharge  -EA    Begin to Take Some PO Safely- SLP, Activity Level Patient will improve   laryngeal elevation for safer, more efficient swallow;Patient will improve   hyolaryngeal excursion for safer, more efficient swallow;Patient will   improve tongue base/pharyngeal wall squeeze for safer, more efficient   swallow  -KW  Patient will improve timing of pharyngeal response for   safer, more efficient swallow;Patient will improve laryngeal elevation for   safer, more efficient swallow;Patient will improve tongue base/pharyngeal   wall squeeze for safer, more efficient swallow  -EA    Begin to Take Some PO Safely- SLP, Date Goal Reviewed 07/15/17  -KW   07/15/17  -KW 17  -EA    Begin to Take Some PO Safely- SLP, Outcome   goal ongoing  -EA      17 1531          Begin to Take Some PO Safely    Begin to Take Some PO Safely- SLP, Date Established 17  -TM      Begin to Take Some PO Safely- SLP, Time to Achieve by discharge  -TM      Begin to Take Some PO Safely- SLP,  Activity Level Patient will improve   timing of pharyngeal response for safer, more efficient swallow;Patient   will improve laryngeal elevation for safer, more efficient swallow;Patient   will improve tongue base/pharyngeal wall squeeze for safer, more efficient   swallow  -TM      Begin to Take Some PO Safely- SLP, Additional Goal Pt will tolerate NMES   tx for dysphagia without s/s of distress and Pt will tolerate PO trials   with SLP without overt s/s of aspiration.  -TM      Begin to Take Some PO Safely- SLP, Date Goal Reviewed 07/13/17  -TM      Begin to Take Some PO Safely- SLP, Outcome goal ongoing  -TM        User Key  (r) = Recorded By, (t) = Taken By, (c) = Cosigned By    Initials Name Provider Type     Marisabel Faye, CCC-SLP Speech and Language Pathologist    PEG Brewer, MS CCC-SLP Speech and Language Pathologist    STEPHANIE Dallas, MS, CFY-SLP Speech and Language Pathologist                SLP OP Goals       07/15/17 1137       Time Calculation    PT Goal Re-Cert Due Date 07/24/17  -       User Key  (r) = Recorded By, (t) = Taken By, (c) = Cosigned By    Initials Name Provider Type     Hailey Hernandez, PTA Physical Therapy Assistant            Plan    SLP Plan  Therapy Frequency: 3-5 times/wk  Predicted Duration Therapy Interv (days): until discharge  Expected Duration Therapy Session (min): 15-30 minutes  Outcome Summary/Follow up Plan: FEES study completed on swallow. Scope placed in left nares. Patient tolerated passage of scope well. Patient did have secretions sitting above the cricopharyngus muscle and what appeared to be white patchy area or secretions on the posterior pharyngeal wall. Left vocal cord did show some bloody red type tissue concerning for vocal cord abnormality. Patient does complain with high pain with swallow. She was observed to have possible swelling in laryngeal and pharyngeal area and at false vocal cords, however difficulty for SLP to tell fully. She was given a  bite of applesauce resulting in patients inability to clear out of the left pharyngeal area unless patient takes multiple swallows. Patient does have gagging and coughing through out PO attempt. Did not try other consistencies as patient became very anxious and requested to discontinue testing. Rec 1) Natalio continues to have significant dysphagia of unknow origin. She is at high risk for aspiration. Continue NPO. 2) Patient will need some sort of MIRIAN as she has gone several days without nutrition. 3) ENT consult due to concerns with vocal cords seen during FEES and to r/o this as cause of her current deficits. 4) ST to follow.        Chaya Brewer, MS CCC-SLP  7/15/2017      By cosigning this order, either electronically or physically, I certify that the therapy services are furnished while this patient is under my care, the services outline above are required by this patient, and will be reviewed every 30 days.        M.D.:__________________________________________ Date: ______________    07/15/17 1539    07/15/17 1530  sodium chloride 0.9 % 985 mL with potassium chloride 30 mEq infusion  Continuous      07/15/17 1450    07/15/17 1454  SLP FEES - Fiberoptic Endo Eval Swallow  Once      07/15/17 1454    07/15/17 0934  CBC & Differential  Morning Draw      07/15/17 0934    07/15/17 0934  CBC Auto Differential  PROCEDURE ONCE      07/15/17 0934    07/15/17 0933  Basic Metabolic Panel  Morning Draw      07/15/17 0934    17 2030  CloNIDine (CATAPRES-TTS) 0.2 MG/24HR patch 1 patch  Weekly,   Status:  Discontinued      17 1322  PT Plan of Care Cert / Re-Cert  Once     Comments:  Physical Therapy Plan of Care  Initial Certification  Certification Period: 2017 - 2017    Patient Name: Bita Croft  : 1955    (R13.0) Inability to swallow  (primary encounter diagnosis)  (R13.10) Dysphagia, unspecified type  (R26.2) Difficulty in walking, not elsewhere classified                   Assessment  PT Assessment  Impairments Found (describe specific impairments): gait, locomotion, and balance, muscle performance, sensory Integrity  PT Diagnosis: P presents with B LE and UE weakness.  Rehab Potential: good, to achieve stated therapy goals  Plans/Goals Discussed With: patient, agreed upon  Criteria for Skilled Therapeutic Interventions Met: yes, treatment indicated              IP PT Goals       07/14/17 0919          Transfer Training PT LTG    Transfer Training PT LTG, Date Established 07/14/17  -MS (r) RZ (t) MS   (c)      Transfer Training PT LTG, Time to Achieve by discharge  -MS (r) RZ (t) MS   (c)      Transfer Training PT LTG, Activity Type all transfers  -MS (r) RZ (t) MS   (c)      Transfer Training PT LTG, [place] Level contact guard assist  -MS   (r) RZ (t) MS (c)      Transfer Training PT LTG, Assist Device walker, rolling  -MS (r) RZ (t)   MS (c)      Gait Training PT LTG    Gait Training Goal PT LTG, Date Established 07/14/17  -MS (r) RZ (t) MS   (c)      Gait Training Goal PT LTG, Time to Achieve by discharge  -MS (r) RZ (t)   MS (c)      Gait Training Goal PT LTG, [place] Level minimum assist (75% patient   effort)  -MS (r) RZ (t) MS (c)      Gait Training Goal PT LTG, Assist Device walker, rolling platform  -MS   (r) RZ (t) MS (c)      Gait Training Goal PT LTG, Distance to Achieve 50 ft  -MS (r) RZ (t) MS   (c)      Strength Goal PT LTG    Strength Goal PT LTG, Date Established 07/14/17  -MS (r) RZ (t) MS (c)      Strength Goal PT LTG, Time to Achieve by discharge  -MS (r) RZ (t) MS (c)        Strength Goal PT LTG, Measure to Achieve Pt to increase all major LE   musculaure to 4+/5  -MS (r) RZ (t) MS (c)      Caregiver Training PT LTG    Caregiver Training PT LTG, Date Established 07/14/17  -MS (r) RZ (t) MS   (c)      Caregiver Training PT LTG, Time to Achieve by discharge  -MS (r) RZ (t)   MS (c)      Caregiver Training PT LTG, Activity Type Pts spouse will  demonstrate   proper body mechanics and technique for all transfers 100% of the time.     -MS (r) RZ (t) MS (c)      Caregiver Training PT LTG, [place] Level able to assist adequately    -MS (r) RZ (t) MS (c)        User Key  (r) = Recorded By, (t) = Taken By, (c) = Cosigned By    Initials Name Provider Type    MS Belkis R Carlito, PT DPT Physical Therapist    NINA Rae, PT Student PT Student            PT OP Goals       07/14/17 0929       Time Calculation    PT Goal Re-Cert Due Date 07/24/17  -MS (r) RZ (t) MS (c)       User Key  (r) = Recorded By, (t) = Taken By, (c) = Cosigned By    Initials Name Provider Type    MS Belkis R Carlito, PT DPT Physical Therapist    NINA Rae, PT Student PT Student            Plan  PT Plan  PT Frequency: daily, 2 times/day, per priority policy  Predicted Duration of Therapy Intervention (days/wks): until discharge   Planned Therapy Interventions: balance training, bed mobility training, gait training, home exercise program, patient/family education, strengthening, transfer training, wheelchair management/propulsion training  Outcome Summary/Follow up Plan: Physical therapy evaluation completed. Pt presents with B LE weakness and decreased functional activity tolerance. Pt was [facility] for stand pivot transfers and min assist for 2 step transfer from bed to chair. Pt will benefit from continued skilled therapy for gait and transfer training in order to decrease her need for assistance. Therapy reccommends pt to d/c home with assist and  PT vs SNF pending pts and spouse safety with transfers.         Belkis Esteban, PT DPT  7/14/2017            By cosigning this order, either electronically or physically, I certify that the therapy services are furnished while this patient is under my care, the services outline above are required by this patient, and will be reviewed every 30 days.        M.D.:__________________________________________ Date: ______________     07/14/17 1321    07/14/17 0900  pantoprazole (PROTONIX) injection 40 mg  Every 12 Hours Scheduled,   Status:  Discontinued      07/14/17 0707    07/14/17 0900  famotidine (PEPCID) injection 40 mg  Every 12 Hours Scheduled      07/14/17 0722    07/13/17 2100  levETIRAcetam in NaCl 0.75% (KEPPRA) IVPB 1,000 mg  Every 12 Hours Scheduled,   Status:  Discontinued      07/13/17 1719 07/13/17 2100  fosphenytoin (Cerebyx) injection 200 mg PE  Nightly,   Status:  Discontinued      07/13/17 1719 07/13/17 2030  CloNIDine (CATAPRES-TTS) 0.1 MG/24HR patch 1 patch  Weekly,   Status:  Discontinued      07/13/17 1950    07/13/17 1943  LORazepam (ATIVAN) injection 0.5 mg  Every 2 Hours PRN      07/13/17 1948 07/13/17 1430  dexamethasone sodium phosphate 6 mg in sodium chloride 0.9 % IVPB  Every 6 Hours     Comments:  6mg IV now, then 6 mg IV q 6 hours x 4 doses    07/13/17 1351    07/13/17 0900  enoxaparin (LOVENOX) syringe 40 mg  Daily      07/13/17 0815    07/12/17 2030  clindamycin (CLEOCIN) 600 mg in dextrose 5% 50 mL IVPB (premix)  Every 8 Hours      07/12/17 1957 07/12/17 2030  piperacillin-tazobactam (ZOSYN) 3.375 g in iso-osmotic dextrose 50 ml (premix)  Every 6 Hours      07/12/17 1957 07/12/17 2030  sodium chloride 0.9 % infusion  Continuous      07/12/17 1958 07/12/17 1957  ondansetron (ZOFRAN) injection 4 mg  Every 6 Hours PRN      07/12/17 1957 07/12/17 1957  Morphine sulfate (PF) injection 2 mg  Every 2 Hours PRN      07/12/17 1957 07/12/17 1713  sodium chloride 0.9 % bolus 1,000 mL  Once      07/12/17 1711    --  levETIRAcetam (KEPPRA) 1000 MG tablet  Every 12 Hours      07/13/17 1217    --  oxyCODONE (ROXICODONE) 10 MG tablet  Every 4 Hours PRN      07/13/17 1217    --  amLODIPine (NORVASC) 10 MG tablet  Every 12 Hours      07/13/17 1217    --  potassium chloride (K-DUR,KLOR-CON) 20 MEQ CR tablet  Every 12 Hours      07/13/17 1217             Physician Progress Notes (last 72 hours) (Notes  from 7/14/2017  7:13 AM through 7/17/2017  7:13 AM)      Jr Bates MD at 7/14/2017  8:16 AM  Version 1 of 1              LOS: 2 days   Patient Care Team:  Josafat Gonzales MD as PCP - General  Josafat Gonzales MD as PCP - Family Medicine  Pawel Abrams MD as PCP - Cardiology (Cardiology)    Chief Complaint: sore throat    Subjective     Interval History: Her temperature has been down.  She has no change of her neurological symptomatology.        Review of Systems:     unchanged    Objective     Vital Signs  Temp:  [96.9 °F (36.1 °C)-98.1 °F (36.7 °C)] 98.1 °F (36.7 °C)  Heart Rate:  [79-85] 80  Resp:  [16-20] 16  BP: (161-169)/(86-98) 169/98see above    Physical Exam:  Awake alert and oriented properly neck supple.  Minimal movement of all motor groups in lower extremities.  She still has reflexes and pain sensation absent position sense plantar extensor responses.  Cranial nerves intact        Results Review:    I reviewed the patient's new clinical results.  I reviewed the patient's new imaging results and agree with the interpretation.  I reviewed the patient's other test results and agree with the interpretation        Assessment/Plan 's  Active Problems:    Inability to swallow    Conversion disorder with abnormal movement    Arytenoid cyst      No changes in neurological condition since yesterday    Plan for disposition: Should maintain follow-up with Dr. Abrams, who has treated her throughout this neurological condition.  She does not have demonstration of a surgical problem at this point    Jr Bates MD  07/14/17  8:16 AM      Time:      Electronically signed by Jr Bates MD at 7/14/2017  8:19 AM      Josafat Gonzales MD at 7/14/2017  8:29 AM  Version 1 of 1              LOS: 2 days   Patient Care Team:  Josafat Gonzales MD as PCP - General  Josafat Gonzales MD as PCP - Family Medicine  Pawel Abrams MD as  PCP - Cardiology (Cardiology)    Chief Complaint:  I cannot swallow.    Subjective  2nd ER visit 2 days; admitted for sore throat/inability to swallow increasing for 5 days.  Reported fever at home; 101 temp in ER.  CT in ER unremarkable.  Mono, strept neg. Flex laryngoscopy only swelling R arytenoid/appearing noninfectious. Since admit IV fluids as speech cannot prove safe to swallow.   Many Rx on hold; as able Keppra/Dilantin transitioned to IV.  Pain being treated MS.  Along with this progressive back pain with LE weakness; unable to walk now.   Has been seeing Jason Nieto Hogmirna with CT/myelogram C/LS spine and slowly getting weaker with legs; now unable to walk.  Additional history of neuropathy, seizure disorder, chronic neck/back pain and polypharmacy/narcotic use.     Interval History: Not any better with sore throat, swallow or weakness LE.  GI does not think EGD would help; [facility] following and Jana says nothing surgical; back to [facility].     Patient Complaints: I'm no better  Patient Denies:  Chest pain , SOB  History taken from: patient chart RN    Review of Systems:    All systems were reviewed and negative except for:  Constitution:  positive for fatigue  ENT:  positive for sore throat  Musculoskeletal: positive for  back pain, joint pain and neck pain    Objective     Vital Signs  Temp:  [96.9 °F (36.1 °C)-98.5 °F (36.9 °C)] 97.6 °F (36.4 °C)  Heart Rate:  [78-84] 78  Resp:  [16-18] 16  BP: (161-178)/(93-98) 178/94    Labs  Lab Results (last 24 hours)     Procedure Component Value Units Date/Time    Beta Strep Culture, Throat [[id number]]  (Normal) Collected:  07/12/17 1524    Specimen:  Swab from Throat Updated:  07/14/17 0631     Throat Culture, Beta Strep No Beta Hemolytic Streptococcus Isolated at 2 days    Blood Culture [[id number]]  (Normal) Collected:  07/12/17 1502    Specimen:  Blood from Arm, Left Updated:  07/14/17 1601     Blood Culture No growth at 2 days          Lab  Results:  CBC:     Results from last 7 days  Lab Units 07/12/17  1501 07/11/17  1426   WBC 10*3/mm3 11.77* 8.54   HEMOGLOBIN g/dL 13.0 13.4   HEMATOCRIT % 39.1 40.6   PLATELETS 10*3/mm3 267 280     BMP:    Results from last 7 days  Lab Units 07/12/17  1501 07/11/17  1426   SODIUM mmol/L 134* 135   POTASSIUM mmol/L 3.9 4.2   CHLORIDE mmol/L 95* 98   CO2 mmol/L 26.0 26.0   BUN mg/dL 7 11   CREATININE mg/dL 0.65 0.59   GLUCOSE mg/dL 111* 112*   CALCIUM mg/dL 9.1 8.9   ALT (SGPT) U/L 23 34     Culture Results:   Blood Culture   Date Value Ref Range Status   07/12/2017 No growth at 24 hours  Preliminary     Radiology: None  Additional Studies Reviewed: None    Physical Exam:   General Appearance alert, appears stated age and cooperative  Eyes lids and lashes normal, conjunctivae and sclerae normal, no icterus, no pallor, corneas clear and PERRLA  Lungs clear to auscultation, respirations regular, respirations even and respirations unlabored  Heart regular rhythm & normal rate, normal S1, S2, no murmur, no tala, no rub and no click  Chest Wall no abnormalities ovserved  Abdomen normal bowel sounds, no masses, no hepatomegaly, no splenomegaly, soft non-tender, no guarding and no rebound tenderness  Neurologic Mental Status orientated to person, place, time and situation, alertness alert, orientation date, person and place, mood/affect flat and thought content exhibits organized, Cranial Nerves cranial nerves 2 - 12 grossly intact as examined, Speech normal content and execusion, Motor strength is equal in upper and lower extremitits and weakness right upper extremity, left upper extremity, right lower extremity and left lower extremity     Results Review:     I reviewed the patient's new clinical results.  I reviewed the patient's new imaging results and agree with the interpretation.  I reviewed the patient's other test results and agree with the interpretation  Discussed with patient    Medication Review: med list  reviewed and necessary changes made    Assessment/Plan     Principal Problem:    Sore throat  Active Problems:    Chronic neck pain    Chronic back pain    Inability to swallow    Conversion disorder with abnormal movement    Arytenoid cyst    Functional dysphonia    Reflux laryngitis    Gait difficulty    Weakness of both legs    Rx-for now as we can  Labs reviewed and needed labs ordered:-daily  Diet reviewed-until oked by speech eval; NPO  Fluids reviewed-same/needed  Increase activity-as PT able  Discussed possible/future discharge plans- with RN; ? NH  Available for POA/caregiver        Josafat Gonzales MD  07/14/17  8:53 PM       Electronically signed by Josafat Gonzales MD at 7/14/2017  8:54 PM      Man Kunz MD at 7/14/2017  8:51 AM  Version 1 of 1         ENT/[facility]PR[facility] ([facility]) Progress Note:       LOS: 2 days   Patient Care Team:  Josafat Gonzales MD as PCP - General  Josafat Gonzales MD as PCP - Family Medicine  Pawel Abrams MD as PCP - Cardiology (Cardiology)    Active consulting complaint: Dysphagia and throat pain    Subjective     Interval History:     Status of active consulting problem: Despite IV antibiotics and continued steroids, Ms. Croft reports that she is still unable to swallow.  Her pain is still the same, 9 out of 10 and worse with swallowing.  She has had no shortness of breath.  Her voice is stressed but this is been her baseline over the past 2-3 years.  She denies any fevers or chills.    History taken from: patient    Review of Systems:    Review of Systems   Constitutional: Negative for chills and fever.   HENT: Positive for sore throat and trouble swallowing. Negative for voice change.    Respiratory: Negative for shortness of breath.    Cardiovascular: Negative for chest pain.       Objective     Vital Signs  Temp:  [96.9 °F (36.1 °C)-98.1 °F (36.7 °C)] 98.1 °F (36.7 °C)  Heart Rate:  [79-85] 80  Resp:  [16-20] 16  BP: (161-169)/(86-98)  169/98    Physical Exam:   Physical Exam   Constitutional: She is oriented to person, place, and time. She appears well-developed and well-nourished. She is cooperative. No distress.   HENT:   Head: Normocephalic and atraumatic.   Right Ear: External ear normal.   Left Ear: External ear normal.   Nose: Nose normal.   Mouth/Throat: Oropharynx is clear and moist.   Eyes: Conjunctivae and EOM are normal. Pupils are equal, round, and reactive to light. Right eye exhibits no discharge. Left eye exhibits no discharge. No scleral icterus.   Neck: Normal range of motion. Neck supple. No JVD present. No tracheal deviation present. No thyromegaly present.   Cervical fusion scars    Phonation somewhat stressed consistent with a functional dysphonia   Pulmonary/Chest: Effort normal. No stridor.   Musculoskeletal: Normal range of motion. She exhibits no edema or deformity.   Lymphadenopathy:     She has no cervical adenopathy.   Neurological: She is alert and oriented to person, place, and time. She has normal strength. No cranial nerve deficit. Coordination normal.   Skin: Skin is warm and dry. No rash noted. She is not diaphoretic. No erythema. No pallor.   Psychiatric: She has a normal mood and affect. Her speech is normal and behavior is normal. Judgment and thought content normal. Cognition and memory are normal.   Nursing note and vitals reviewed.       Results Review:       Lab Results (last 24 hours)     Procedure Component Value Units Date/Time    Blood Culture [[id number]]  (Normal) Collected:  07/12/17 1502    Specimen:  Blood from Arm, Left Updated:  07/13/17 1601     Blood Culture No growth at 24 hours    Beta Strep Culture, Throat [[id number]]  (Normal) Collected:  07/12/17 1524    Specimen:  Swab from Throat Updated:  07/14/17 0631     Throat Culture, Beta Strep No Beta Hemolytic Streptococcus Isolated at 2 days        Imaging Results (last 24 hours)     Procedure Component Value Units Date/Time    CT Soft Tissue  Neck With Contrast [[id number]] Collected:  07/12/17 1636     Updated:  07/13/17 1419    Addenda:        Addendum:     There is question low-attenuation asymmetric soft tissue prominence in  the right false cord/vestibule along the right aryepiglottic fold. The  significance uncertain, direct visualization suggested.  This report was finalized on 07/13/2017 14:16 by Dr. Obi Phan MD.  Signed:  07/13/17 1416 by Obi Phan MD    Narrative:       EXAMINATION: CT SOFT TISSUE NECK W CONTRAST-  7/12/2017 4:36 PM CDT     HISTORY: Difficulty swallowing     COMPARISON:11/20/2014 head CT. Cervical spine MR dated 07/11/2017     TECHNIQUE:Radiation dose equals  mGy-cm.  Automated exposure  control dose reduction technique was implemented.     3 mm sequential transaxial images were obtained. 2-D sagittal and  coronal reconstruction images were generated.     FINDINGS:     Extensive postsurgical changes identified with anterior posterior fusion  changes.     Brain imaged appropriately without abnormal mass.     The nasopharynx is observed symmetrically.     There is artifact in the oral cavity related to the spinal fusion  changes. There are no oral cavity masses. The hypopharynx and laryngeal  structures are imaged symmetrically without definite masses.     6 mm low-attenuation nodule noted in the right lobe of the thyroid  gland.     The submandibular and parotid glands are imaged symmetrically.     There are no pathologically enlarged lymph nodes with small lateral  cervical nodes. No lymphadenopathy identified.       Impression:       1. No CT evidence of definite neck mass or pathologically enlarged lymph  nodes/lymphadenopathy. 3. Extensive postsurgical changes cervical spine.  This report was finalized on 07/12/2017 16:41 by Dr. Obi Phan MD.    FL Video Swallow With Speech [[id number]] Collected:  07/13/17 1415     Updated:  07/13/17 1421    Narrative:       FL VIDEO SWALLOW W SPEECH- 7/13/2017 12:44  CST     REASON FOR EXAM: dysphagia; R13.0-Aphagia; R13.10-Dysphagia, unspecified     COMPARISON: NONE      FLUOROSCOPY TIME: 1 minutes     TECHNIQUE: In conjunction with speech pathology services, video  fluoroscopic swallow examination was performed with oral ingestion of  barium containing substances of various viscosities.      FINDINGS: Medium bolus sizes were attempted. Unfortunately, procedure  was terminated as patient was having pain with each attempt. There is  prominence of the prevertebral soft tissues       Impression:       1.  Procedure prematurely terminated.  2.  Prominence of the soft tissues in the prevertebral space. Recommend  direct visualization.        Please see speech pathologist's report for further details and  recommendations.         This report was finalized on 07/13/2017 14:18 by Dr. Guillermina Laws MD.          Medication Review:     Current Facility-Administered Medications   Medication Dose Route Frequency Provider Last Rate Last Dose   • clindamycin (CLEOCIN) 600 mg in dextrose 5% 50 mL IVPB (premix)  600 mg Intravenous Q8H Zeeshan Hester MD   Stopped at 07/14/17 0545   • CloNIDine (CATAPRES-TTS) 0.1 MG/24HR patch 1 patch  1 patch Transdermal Weekly Josafat Gonzales MD   1 patch at 07/13/17 2104   • dexamethasone sodium phosphate 6 mg in sodium chloride 0.9 % IVPB  6 mg Intravenous Q6H Man Kunz MD   Stopped at 07/14/17 0255   • enoxaparin (LOVENOX) syringe 40 mg  40 mg Subcutaneous Daily Josafat Gonzales MD   40 mg at 07/13/17 1012   • famotidine (PEPCID) injection 40 mg  40 mg Intravenous Q12H Man Kunz MD       • fosphenytoin (Cerebyx) injection 200 mg PE  200 mg Intravenous Nightly Josafat Gonzales MD   200 mg PE at 07/13/17 2023   • levETIRAcetam in NaCl 0.75% (KEPPRA) IVPB 1,000 mg  1,000 mg Intravenous Q12H Josafat Gonzales MD   Stopped at 07/13/17 2221   • LORazepam (ATIVAN) injection 0.5 mg  0.5 mg Intravenous Q2H PRN Josafat Gonzales  MD   0.5 mg at 07/14/17 0220   • Morphine sulfate (PF) injection 2 mg  2 mg Intravenous Q2H PRN Zeeshan Hester MD   2 mg at 07/14/17 0650   • ondansetron (ZOFRAN) injection 4 mg  4 mg Intravenous Q6H PRN Zeeshan Hester MD       • piperacillin-tazobactam (ZOSYN) 3.375 g in iso-osmotic dextrose 50 ml (premix)  3.375 g Intravenous Q6H Zeeshan Hester MD 0 mL/hr at 07/14/17 0012 3.375 g at 07/14/17 0551   • sodium chloride 0.9 % bolus 1,000 mL  1,000 mL Intravenous Once Juanjose Last PA-C   1,000 mL at 07/12/17 1721   • sodium chloride 0.9 % infusion  100 mL/hr Intravenous Continuous Zeeshan Hester  mL/hr at 07/14/17 0313 100 mL/hr at 07/14/17 0313       Assessment/Plan     Active Problems:    Inability to swallow    Conversion disorder with abnormal movement    Arytenoid cyst    Functional dysphonia    Reflux laryngitis      She has had minimal improvement on the antibiotics and steroids.  Laryngoscopy demonstrated no obvious drainable  abscess.  I reviewed the images with Dr. Choudhury, who will be taking call this weekend we will start a proton pump inhibitor and continue IV antibiotics and steroids and check for resolution.  We will continue to follow.    Man Kunz MD    07/14/17  8:54 AM         Electronically signed by Man Kunz MD at 7/14/2017  8:55 AM      Wilton Michele MD at 7/14/2017  1:23 PM  Version 2 of 2         [facility]  Inpatient Progress Note  Bita Croft  1955 7/14/2017  Reason for Follow Up:  Sore throat    Subjective     Subjective:   No new complaints    Current Facility-Administered Medications:   •  clindamycin (CLEOCIN) 600 mg in dextrose 5% 50 mL IVPB (premix), 600 mg, Intravenous, Q8H, Zeeshan Hester MD, Last Rate: 0 mL/hr at 07/14/17 0545, 600 mg at 07/14/17 1204  •  CloNIDine (CATAPRES-TTS) 0.1 MG/24HR patch 1 patch, 1 patch, Transdermal, Weekly, Josafat Gonzales MD, 1 patch at 07/13/17 3765  •   enoxaparin (LOVENOX) syringe 40 mg, 40 mg, Subcutaneous, Daily, Josafat Gonzales MD, 40 mg at 07/14/17 0923  •  famotidine (PEPCID) injection 40 mg, 40 mg, Intravenous, Q12H, Mna Kunz MD, 40 mg at 07/14/17 0923  •  fosphenytoin (Cerebyx) injection 200 mg PE, 200 mg, Intravenous, Nightly, Josafat Gonzales MD, 200 mg PE at 07/13/17 2023  •  levETIRAcetam in NaCl 0.75% (KEPPRA) IVPB 1,000 mg, 1,000 mg, Intravenous, Q12H, Josafat Gonzales MD, Last Rate: 0 mL/hr at 07/13/17 2221, 1,000 mg at 07/14/17 0923  •  LORazepam (ATIVAN) injection 0.5 mg, 0.5 mg, Intravenous, Q2H PRN, Josafat Gonzales MD, 0.5 mg at 07/14/17 1204  •  Morphine sulfate (PF) injection 2 mg, 2 mg, Intravenous, Q2H PRN, Zeeshan Hester MD, 2 mg at 07/14/17 1204  •  ondansetron (ZOFRAN) injection 4 mg, 4 mg, Intravenous, Q6H PRN, Zeeshan Hester MD  •  piperacillin-tazobactam (ZOSYN) 3.375 g in iso-osmotic dextrose 50 ml (premix), 3.375 g, Intravenous, Q6H, Zeeshan Hester MD, Last Rate: 0 mL/hr at 07/14/17 0012, 3.375 g at 07/14/17 1120  •  sodium chloride 0.9 % bolus 1,000 mL, 1,000 mL, Intravenous, Once, Juanjose Last PA-C, 1,000 mL at 07/12/17 1721  •  sodium chloride 0.9 % infusion, 100 mL/hr, Intravenous, Continuous, Zeeshan Hester MD, Last Rate: 100 mL/hr at 07/14/17 0313, 100 mL/hr at 07/14/17 0313    Review of Systems:    Review of Systems   Constitutional: Positive for fatigue. Negative for activity change, appetite change, chills, diaphoresis, fever and unexpected weight change.   HENT: Positive for sore throat and trouble swallowing. Negative for ear pain, hearing loss, mouth sores and voice change.    Eyes: Negative.    Respiratory: Negative for cough, choking, shortness of breath and wheezing.    Cardiovascular: Negative for chest pain and palpitations.   Gastrointestinal: Negative for abdominal pain, blood in stool, constipation, diarrhea, nausea and vomiting.   Endocrine: Negative for  cold intolerance and heat intolerance.   Genitourinary: Negative for decreased urine volume, dysuria, frequency, hematuria and urgency.   Musculoskeletal: Positive for gait problem. Negative for back pain and myalgias.   Skin: Negative for color change, pallor and rash.   Allergic/Immunologic: Negative for food allergies and immunocompromised state.   Neurological: Negative for dizziness, tremors, seizures, syncope, weakness, light-headedness, numbness and headaches.   Hematological: Negative for adenopathy. Does not bruise/bleed easily.   Psychiatric/Behavioral: Negative for agitation and confusion. The patient is not nervous/anxious.    All other systems reviewed and are negative.       Objective     Vital Signs  Temp:  [96.9 °F (36.1 °C)-98.5 °F (36.9 °C)] 98.5 °F (36.9 °C)  Heart Rate:  [79-85] 84  Resp:  [16-20] 18  BP: (161-169)/(86-98) 165/95  Body mass index is 30.27 kg/(m^2).    Intake/Output Summary (Last 24 hours) at 07/14/17 1323  Last data filed at 07/14/17 0313   Gross per 24 hour   Intake              551 ml   Output                0 ml   Net              551 ml             Physical Exam:   General: patient awake, alert and cooperative, no acute distress   Eyes: Normal lids and lashes, no scleral icterus   Neck: supple, no JVD   Skin: warm and dry   Cardiovascular: regular rhythm and rate, no murmurs auscultated   Pulm: clear to auscultation bilaterally, regular and unlabored   Abdomen: soft, nontender, nondistended; normal bowel sounds   Psychiatric: Normal mood and behavior; converses appropriately     Results Review:    I have reviewed all of the patients current test results      Results from last 7 days  Lab Units 07/12/17  1501 07/11/17  1426   WBC 10*3/mm3 11.77* 8.54   HEMOGLOBIN g/dL 13.0 13.4   HEMATOCRIT % 39.1 40.6   PLATELETS 10*3/mm3 267 280         Results from last 7 days  Lab Units 07/12/17  1501 07/11/17  1426   SODIUM mmol/L 134* 135   POTASSIUM mmol/L 3.9 4.2   CHLORIDE mmol/L 95*  98   CO2 mmol/L 26.0 26.0   BUN mg/dL 7 11   CREATININE mg/dL 0.65 0.59   CALCIUM mg/dL 9.1 8.9   BILIRUBIN mg/dL 0.7 0.4   ALK PHOS U/L 163* 187*   ALT (SGPT) U/L 23 34   AST (SGOT) U/L 26 24   GLUCOSE mg/dL 111* 112*               0  Lab Value Date/Time   LIPASE 24 05/28/2015 0540   LIPASE 60 05/26/2015 1410       Radiology:    Imaging Results (last 24 hours)     Procedure Component Value Units Date/Time    CT Soft Tissue Neck With Contrast [[id number]] Collected:  07/12/17 1636     Updated:  07/13/17 1419    Addenda:        Addendum:     There is question low-attenuation asymmetric soft tissue prominence in  the right false cord/vestibule along the right aryepiglottic fold. The  significance uncertain, direct visualization suggested.  This report was finalized on 07/13/2017 14:16 by Dr. Obi Phan MD.  Signed:  07/13/17 1416 by Obi Phan MD    Narrative:       EXAMINATION: CT SOFT TISSUE NECK W CONTRAST-  7/12/2017 4:36 PM CDT     HISTORY: Difficulty swallowing     COMPARISON:11/20/2014 head CT. Cervical spine MR dated 07/11/2017     TECHNIQUE:Radiation dose equals  mGy-cm.  Automated exposure  control dose reduction technique was implemented.     3 mm sequential transaxial images were obtained. 2-D sagittal and  coronal reconstruction images were generated.     FINDINGS:     Extensive postsurgical changes identified with anterior posterior fusion  changes.     Brain imaged appropriately without abnormal mass.     The nasopharynx is observed symmetrically.     There is artifact in the oral cavity related to the spinal fusion  changes. There are no oral cavity masses. The hypopharynx and laryngeal  structures are imaged symmetrically without definite masses.     6 mm low-attenuation nodule noted in the right lobe of the thyroid  gland.     The submandibular and parotid glands are imaged symmetrically.     There are no pathologically enlarged lymph nodes with small lateral  cervical nodes. No  lymphadenopathy identified.       Impression:       1. No CT evidence of definite neck mass or pathologically enlarged lymph  nodes/lymphadenopathy. 3. Extensive postsurgical changes cervical spine.  This report was finalized on 07/12/2017 16:41 by Dr. Obi Phan MD.    FL Video Swallow With Speech [[id number]] Collected:  07/13/17 1415     Updated:  07/13/17 1421    Narrative:       FL VIDEO SWALLOW W SPEECH- 7/13/2017 12:44 CST     REASON FOR EXAM: dysphagia; R13.0-Aphagia; R13.10-Dysphagia, unspecified     COMPARISON: NONE      FLUOROSCOPY TIME: 1 minutes     TECHNIQUE: In conjunction with speech pathology services, video  fluoroscopic swallow examination was performed with oral ingestion of  barium containing substances of various viscosities.      FINDINGS: Medium bolus sizes were attempted. Unfortunately, procedure  was terminated as patient was having pain with each attempt. There is  prominence of the prevertebral soft tissues       Impression:       1.  Procedure prematurely terminated.  2.  Prominence of the soft tissues in the prevertebral space. Recommend  direct visualization.        Please see speech pathologist's report for further details and  recommendations.         This report was finalized on 07/13/2017 14:18 by Dr. Guillermina Laws MD.            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 Sore throat negative for strep    EMR Dragon/transcription disclaimer: Much of this encounter note is electronic transcription/translation of spoken language to printed text. The electronic translation of spoken language may be erroneous, or at times, nonsensical words or phrases may be inadvertently transcribed. Although I have reviewed the note for such errors, some may still exist.    DEBORAH Cobos  07/14/17  1:23 PM        I performed a history, physical examination, reviewed the progress note/consult note, and discussed the management of this patient with DEBORAH Suarez as her supervising physician.  I agree with the documented findings and plan of care as outlined with any exceptions or new recommendations noted below.    I do not think this is an ongoing GI condition.  Defer evaluation speech pathology and ENT    EMR Dragon/transcription disclaimer: Much of this encounter note is an electronic transcription/translation of spoken language to printed text.  The electronic translation of spoken language may permit erroneous, or at times, nonsensical words or phrases to be inadvertently transcribed.  Although I have reviewed the note for such errors, some may still exist.      Wilton Michele MD  2:30 PM  7/14/2017           Electronically signed by Wilton Michele MD at 7/14/2017  2:31 PM      DEBORAH Cobos at 7/14/2017  1:23 PM  Version 1 of 2         [facility] Gastroenterology  Inpatient Progress Note  Bita WRIGHT Lang  1955 7/14/2017  Reason for Follow Up:  Sore throat    Subjective     Subjective:   No new complaints    Current Facility-Administered Medications:   •  clindamycin (CLEOCIN) 600 mg in dextrose 5% 50 mL IVPB (premix), 600 mg, Intravenous, Q8H, Zeeshan Hester MD, Last Rate: 0 mL/hr at 07/14/17 0545, 600 mg at 07/14/17 1204  •  CloNIDine (CATAPRES-TTS) 0.1 MG/24HR patch 1 patch, 1 patch, Transdermal, Weekly, Josafat Melton  MD Christian, 1 patch at 07/13/17 2104  •  enoxaparin (LOVENOX) syringe 40 mg, 40 mg, Subcutaneous, Daily, Josafat Gonzales MD, 40 mg at 07/14/17 0923  •  famotidine (PEPCID) injection 40 mg, 40 mg, Intravenous, Q12H, Man Kunz MD, 40 mg at 07/14/17 0923  •  fosphenytoin (Cerebyx) injection 200 mg PE, 200 mg, Intravenous, Nightly, Josafat Gonzales MD, 200 mg PE at 07/13/17 2023  •  levETIRAcetam in NaCl 0.75% (KEPPRA) IVPB 1,000 mg, 1,000 mg, Intravenous, Q12H, Josafat Gonzales MD, Last Rate: 0 mL/hr at 07/13/17 2221, 1,000 mg at 07/14/17 0923  •  LORazepam (ATIVAN) injection 0.5 mg, 0.5 mg, Intravenous, Q2H PRN, Josafat Gonzales MD, 0.5 mg at 07/14/17 1204  •  Morphine sulfate (PF) injection 2 mg, 2 mg, Intravenous, Q2H PRN, Zeeshan Hester MD, 2 mg at 07/14/17 1204  •  ondansetron (ZOFRAN) injection 4 mg, 4 mg, Intravenous, Q6H PRN, Zeeshan Hester MD  •  piperacillin-tazobactam (ZOSYN) 3.375 g in iso-osmotic dextrose 50 ml (premix), 3.375 g, Intravenous, Q6H, Zeeshan Hester MD, Last Rate: 0 mL/hr at 07/14/17 0012, 3.375 g at 07/14/17 1120  •  sodium chloride 0.9 % bolus 1,000 mL, 1,000 mL, Intravenous, Once, Juanjose Last PA-C, 1,000 mL at 07/12/17 1721  •  sodium chloride 0.9 % infusion, 100 mL/hr, Intravenous, Continuous, Zeeshan Hester MD, Last Rate: 100 mL/hr at 07/14/17 0313, 100 mL/hr at 07/14/17 0313    Review of Systems:    Review of Systems   Constitutional: Positive for fatigue. Negative for activity change, appetite change, chills, diaphoresis, fever and unexpected weight change.   HENT: Positive for sore throat and trouble swallowing. Negative for ear pain, hearing loss, mouth sores and voice change.    Eyes: Negative.    Respiratory: Negative for cough, choking, shortness of breath and wheezing.    Cardiovascular: Negative for chest pain and palpitations.   Gastrointestinal: Negative for abdominal pain, blood in stool, constipation, diarrhea, nausea  and vomiting.   Endocrine: Negative for cold intolerance and heat intolerance.   Genitourinary: Negative for decreased urine volume, dysuria, frequency, hematuria and urgency.   Musculoskeletal: Positive for gait problem. Negative for back pain and myalgias.   Skin: Negative for color change, pallor and rash.   Allergic/Immunologic: Negative for food allergies and immunocompromised state.   Neurological: Negative for dizziness, tremors, seizures, syncope, weakness, light-headedness, numbness and headaches.   Hematological: Negative for adenopathy. Does not bruise/bleed easily.   Psychiatric/Behavioral: Negative for agitation and confusion. The patient is not nervous/anxious.    All other systems reviewed and are negative.       Objective     Vital Signs  Temp:  [96.9 °F (36.1 °C)-98.5 °F (36.9 °C)] 98.5 °F (36.9 °C)  Heart Rate:  [79-85] 84  Resp:  [16-20] 18  BP: (161-169)/(86-98) 165/95  Body mass index is 30.27 kg/(m^2).    Intake/Output Summary (Last 24 hours) at 07/14/17 1323  Last data filed at 07/14/17 0313   Gross per 24 hour   Intake              551 ml   Output                0 ml   Net              551 ml             Physical Exam:   General: patient awake, alert and cooperative, no acute distress   Eyes: Normal lids and lashes, no scleral icterus   Neck: supple, no JVD   Skin: warm and dry   Cardiovascular: regular rhythm and rate, no murmurs auscultated   Pulm: clear to auscultation bilaterally, regular and unlabored   Abdomen: soft, nontender, nondistended; normal bowel sounds   Psychiatric: Normal mood and behavior; converses appropriately     Results Review:    I have reviewed all of the patients current test results      Results from last 7 days  Lab Units 07/12/17  1501 07/11/17  1426   WBC 10*3/mm3 11.77* 8.54   HEMOGLOBIN g/dL 13.0 13.4   HEMATOCRIT % 39.1 40.6   PLATELETS 10*3/mm3 267 280         Results from last 7 days  Lab Units 07/12/17  1501 07/11/17  1426   SODIUM mmol/L 134* 135    POTASSIUM mmol/L 3.9 4.2   CHLORIDE mmol/L 95* 98   CO2 mmol/L 26.0 26.0   BUN mg/dL 7 11   CREATININE mg/dL 0.65 0.59   CALCIUM mg/dL 9.1 8.9   BILIRUBIN mg/dL 0.7 0.4   ALK PHOS U/L 163* 187*   ALT (SGPT) U/L 23 34   AST (SGOT) U/L 26 24   GLUCOSE mg/dL 111* 112*               0  Lab Value Date/Time   LIPASE 24 05/28/2015 0540   LIPASE 60 05/26/2015 1410       Radiology:    Imaging Results (last 24 hours)     Procedure Component Value Units Date/Time    CT Soft Tissue Neck With Contrast [[id number]] Collected:  07/12/17 1636     Updated:  07/13/17 1419    Addenda:        Addendum:     There is question low-attenuation asymmetric soft tissue prominence in  the right false cord/vestibule along the right aryepiglottic fold. The  significance uncertain, direct visualization suggested.  This report was finalized on 07/13/2017 14:16 by Dr. Obi Phan MD.  Signed:  07/13/17 1416 by Obi Phan MD    Narrative:       EXAMINATION: CT SOFT TISSUE NECK W CONTRAST-  7/12/2017 4:36 PM CDT     HISTORY: Difficulty swallowing     COMPARISON:11/20/2014 head CT. Cervical spine MR dated 07/11/2017     TECHNIQUE:Radiation dose equals  mGy-cm.  Automated exposure  control dose reduction technique was implemented.     3 mm sequential transaxial images were obtained. 2-D sagittal and  coronal reconstruction images were generated.     FINDINGS:     Extensive postsurgical changes identified with anterior posterior fusion  changes.     Brain imaged appropriately without abnormal mass.     The nasopharynx is observed symmetrically.     There is artifact in the oral cavity related to the spinal fusion  changes. There are no oral cavity masses. The hypopharynx and laryngeal  structures are imaged symmetrically without definite masses.     6 mm low-attenuation nodule noted in the right lobe of the thyroid  gland.     The submandibular and parotid glands are imaged symmetrically.     There are no pathologically enlarged lymph  nodes with small lateral  cervical nodes. No lymphadenopathy identified.       Impression:       1. No CT evidence of definite neck mass or pathologically enlarged lymph  nodes/lymphadenopathy. 3. Extensive postsurgical changes cervical spine.  This report was finalized on 07/12/2017 16:41 by Dr. Obi Phan MD.    FL Video Swallow With Speech [[id number]] Collected:  07/13/17 1415     Updated:  07/13/17 1421    Narrative:       FL VIDEO SWALLOW W SPEECH- 7/13/2017 12:44 CST     REASON FOR EXAM: dysphagia; R13.0-Aphagia; R13.10-Dysphagia, unspecified     COMPARISON: NONE      FLUOROSCOPY TIME: 1 minutes     TECHNIQUE: In conjunction with speech pathology services, video  fluoroscopic swallow examination was performed with oral ingestion of  barium containing substances of various viscosities.      FINDINGS: Medium bolus sizes were attempted. Unfortunately, procedure  was terminated as patient was having pain with each attempt. There is  prominence of the prevertebral soft tissues       Impression:       1.  Procedure prematurely terminated.  2.  Prominence of the soft tissues in the prevertebral space. Recommend  direct visualization.        Please see speech pathologist's report for further details and  recommendations.         This report was finalized on 07/13/2017 14:18 by Dr. Guillermina Laws MD.            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 Sore throat negative for strep    EMR Dragon/transcription disclaimer: Much of this encounter note is electronic transcription/translation of spoken language to printed text. The electronic translation of spoken language may be erroneous, or at times, nonsensical words or phrases may be inadvertently transcribed. Although I have reviewed the note for such errors, some may still exist.    DEBORAH Cobos  07/14/17  1:23 PM                 Electronically signed by DEBORAH Cobos at 7/14/2017  1:33 PM      Jr Bates MD at 7/15/2017  8:55 AM  Version 1 of 1              LOS: 3 days   Patient Care Team:  Josafat Gonzales MD as PCP - General  Josafat Gonzales MD as PCP - Family Medicine  Pawel Abrams MD as PCP - Cardiology (Cardiology)    Chief Complaint: paraparesis    Subjective     Interval History: She has remained afebrile.  She reports swallowing some ice chips earlier today and sitting up in a wheelchair.  No new neurological symptoms        Review of Systems:     unchanged    Objective     Vital Signs  Temp:  [97.5 °F (36.4 °C)-98.5 °F (36.9 °C)] 97.9 °F (36.6 °C)  Heart Rate:  [75-84] 83  Resp:  [16-18] 18  BP: (163-178)/(85-97) 163/97see above    Physical Exam:  She continues to have generalized weakness of both lower extremities sensation to pinprick intact position sense impaired plantar extensor responses present bilateral.   no change of neurological exam        Results Review:    I reviewed the patient's new clinical results.  I reviewed the patient's new imaging results and agree with the interpretation.  I reviewed the patient's other test results and agree with the interpretation        Assessment/Plan 's  Principal Problem:    Sore throat  Active Problems:    Chronic neck pain    Chronic back pain    Inability to swallow    Conversion disorder with abnormal movement     Arytenoid cyst    Functional dysphonia    Reflux laryngitis    Gait difficulty    Weakness of both legs    Neurological status is stable      Plan for disposition:   may need repeat dysphagia study next week    Jr Bates MD  07/15/17  8:55 AM      Time:      Electronically signed by Jr Bates MD at 7/15/2017  8:58 AM      Mariano Pérez MD at 7/15/2017  4:53 PM  Version 1 of 1             [facility] Medicine Rapid Response Note       Date of Encounter: 7/15/2017    Attending Physician: Dr Gonzales    Description of Events: Rapid response to call to room 352. Pt was in respiratory distress and hypertensive.  Patient presented like choking episode.  Patient  try multiple attempts to cough, until she cough up a bottle cap.  Reevaluate patient's lung was clear.  Blood pressure remained high, 210/110.  Patient was given labetalol 10 mg IV.  Discussed with nurse to recheck blood pressure in 10 minutes.  O2 sats 97%.  Patient is alert and awake after she cough up the bottle.  No sign of confusion.  Patient was very thankful.    Mariano Pérez MD  7/15/2017  4:54 PM         Electronically signed by Mariano Pérez MD at 7/15/2017  4:59 PM      Man Choudhury MD at 7/16/2017  8:58 AM  Version 1 of 1          LOS: 4 days   Patient Care Team:  Josafat Gonzales MD as PCP - General  Josafat Gonzales MD as PCP - Family Medicine  Pawel Abrams MD as PCP - Cardiology (Cardiology)    Chief Complaint:  Dysphagia    Subjective     Interval History:   Bita Croft is a 61 y.o. female admitted for dysphagia and numbness and weakness of her lower extremities.  A consult was obtained from Dr. Man Kunz for the dysphagia.  I am cross covering for him this weekend.  Dr. Kunz had performed flexible laryngoscopy and saw edema but no signs of mass lesion and specifically no overt signs of foreign body.  Patient had no history of foreign body  aspiration but evidently yesterday had a choking spell in which a rapid response was called.  She eventually coughed out a water bottle lid.  She recalls no memory of swallowing this.  Since that time, she is much better with improved throat pain, improved ability to tolerate ice chips and no airway complaints.    Review of Systems:   Review of Systems   Constitutional: Negative for chills and fever.   HENT: Positive for sore throat and trouble swallowing (Improving). Negative for voice change.         As per HPI   Gastrointestinal: Negative for nausea and vomiting.   Neurological: Positive for weakness.       Objective     Vital Signs  Temp:  [97.7 °F (36.5 °C)-98.4 °F (36.9 °C)] 98.1 °F (36.7 °C)  Heart Rate:  [71-90] 76  Resp:  [18-20] 18  BP: (154-213)/() 166/96    Physical Exam:  CONSTITUTIONAL: well nourished, well-developed, alert, oriented, in no acute distress  COMMUNICATION AND VOICE: able to communicate normally for age, normal voice quality  HEAD: normocephalic, no lesions, atraumatic, no tenderness, no masses  FACE: appearance normal, no lesions, no tenderness, no deformities, facial motion symmetric  EYES: ocular motility normal, eyelids normal, orbits normal, no proptosis, conjunctiva normal , pupils equal, round  EARS:  Hearing: response to conversational voice normal bilaterally  External Ears: auricles without lesions  NOSE:  External Nose: structure normal, no tenderness on palpation, no nasal discharge, no lesions, no evidence of trauma, nostrils patent  ORAL:  Lips: upper and lower lips without lesion  Oral cavity: No mass or lesion seen  Oropharynx no mass or lesion, no postnasal drip  Laryngeal: See flexible scope exam, there is right sided vocal cord erythema and right arytenoid edema with good mobility bilaterally.  There is some excoriation in the post cricoid region  NECK: neck appearance normal  CHEST/RESPIRATORY: respiratory effort normal, normal chest excursion  CARDIOVASCULAR:  extremities without cyanosis or edema  NEUROLOGIC/PSYCHIATRIC: oriented appropriately, mood normal, affect appropriate, CN II-XII intact grossly       Results Review:    Lab Results (last 24 hours)     Procedure Component Value Units Date/Time    CBC & Differential [[id number]] Collected:  07/15/17 1251    Specimen:  Blood Updated:  07/15/17 1314    Narrative:       The following orders were created for panel order CBC & Differential.  Procedure                               Abnormality         Status                     ---------                               -----------         ------                     CBC Auto Differential[[id number]]        Abnormal            Final result                 Please view results for these tests on the individual orders.    CBC Auto Differential [[id number]]  (Abnormal) Collected:  07/15/17 1251    Specimen:  Blood Updated:  07/15/17 1314     WBC 7.06 10*3/mm3      RBC 4.34 10*6/mm3      Hemoglobin 12.4 g/dL      Hematocrit 37.6 %      MCV 86.6 fL      MCH 28.6 pg      MCHC 33.0 g/dL      RDW 13.5 %      RDW-SD 43.0 fl      MPV 9.6 fL      Platelets 275 10*3/mm3      Neutrophil % 75.9 %      Lymphocyte % 14.0 (L) %      Monocyte % 8.6 %      Eosinophil % 1.1 %      Basophil % 0.3 %      Immature Grans % 0.1 %      Neutrophils, Absolute 5.35 10*3/mm3      Lymphocytes, Absolute 0.99 10*3/mm3      Monocytes, Absolute 0.61 10*3/mm3      Eosinophils, Absolute 0.08 10*3/mm3      Basophils, Absolute 0.02 10*3/mm3      Immature Grans, Absolute 0.01 10*3/mm3     Basic Metabolic Panel [[id number]]  (Abnormal) Collected:  07/15/17 1251    Specimen:  Blood Updated:  07/15/17 1329     Glucose 93 mg/dL      BUN 12 mg/dL      Creatinine 0.53 mg/dL      Sodium 138 mmol/L      Potassium 2.7 (C) mmol/L      Chloride 98 mmol/L      CO2 28.0 mmol/L      Calcium 9.3 mg/dL      eGFR Non African Amer 117 mL/min/1.73      BUN/Creatinine Ratio 22.6     Anion Gap 12.0 mmol/L     Narrative:       GFR Normal  >60  Chronic Kidney Disease <60  Kidney Failure <15    Blood Culture [[id number]]  (Normal) Collected:  07/12/17 1502    Specimen:  Blood from Arm, Left Updated:  07/15/17 1601     Blood Culture No growth at 3 days        Imaging Results (last 24 hours)     ** No results found for the last 24 hours. **            Medications, Allergies and Past History Reviewed with the following changes:  No significant changes    Assessment/Plan     1. Inability to swallow -Improving    2. Dysphagia, unspecified type -Improving    3. Difficulty in walking, not elsewhere classified    4. Impaired mobility    5.      Status post foreign body ingestion      Assessment:    Condition: Improving.   (Improving status post expression of her foreign body of throat.).     Plan:   Advance diet as tolerated.  (I discussed with her the options of obtaining another swallow study to make sure there was no perforation versus advancing her diet.  She has been able to tolerate her ice chips without difficulty and I feel no evidence of subcutaneous emphysema.  She was to go ahead and advance her diet and get a swallow study if she is showing signs of an inability to swallow or signs of perforation.  She is on adequate antibiotic coverage in case of this.  If she tolerates her diet without difficulty she will be able to be discharged from an ENT standpoint).         Man Choudhury MD  07/16/17  8:58 AM           Electronically signed by Man Choudhury MD at 7/16/2017  9:06 AM      Jr Bates MD at 7/16/2017  9:15 AM  Version 1 of 1              LOS: 4 days   Patient Care Team:  Josafat Gonzales MD as PCP - General  Josafat Gonzales MD as PCP - Family Medicine  Pawel Abrams MD as PCP - Cardiology (Cardiology)    Chief Complaint: Paraparesis    Subjective     Interval History: Yesterday she had an acute respiratory event and eventually coughed up a water bottle cap.  She feels that she is swallowing much  better since then.  His been followed by physical therapy and does feel somewhat stronger with regards to her lower extremities        Review of Systems:     unchanged    Objective     Vital Signs  Temp:  [97.7 °F (36.5 °C)-98.4 °F (36.9 °C)] 98.1 °F (36.7 °C)  Heart Rate:  [71-90] 76  Resp:  [18-20] 18  BP: (154-213)/() 166/96see above    Physical Exam: Her generalized weakness in the lower extremities may be slightly improved.  She has continued sensation to pain of the lower extremities and absent position sense.  Awake alert and oriented properly        Results Review:    I reviewed the patient's new clinical results.  I reviewed the patient's new imaging results and agree with the interpretation.  I reviewed the patient's other test results and agree with the interpretation        Assessment/Plan 's  Principal Problem:    Sore throat  Active Problems:    Chronic neck pain    Chronic back pain    Conversion disorder with abnormal movement    Reflux laryngitis    Gait difficulty    Weakness of both legs      Neurological condition is stable    Plan for disposition: As per ENT    Jr Bates MD  07/16/17  9:16 AM      Time:      Electronically signed by Jr Bates MD at 7/16/2017  9:19 AM        Consult Notes (last 72 hours) (Notes from 7/14/2017  7:13 AM through 7/17/2017  7:13 AM)     No notes of this type exist for this encounter.

## 2017-07-17 NOTE — PROGRESS NOTES
[facility] Gastroenterology  Inpatient Progress Note  Bita Croft  1955 7/17/2017  Reason for Follow Up:  Difficulty swallowing sore throat    Subjective     Subjective:   Pt is awake alert oriented sitting up at bedside. She is ready to go home. She says that her speech is better. Her swallowing is back to normal and her throat is feeling better. No nausea or vomiting.     Current Facility-Administered Medications:   •  amLODIPine (NORVASC) tablet 5 mg, 5 mg, Oral, Q12H, Josafat Gonzales MD, 5 mg at 07/17/17 0949  •  clonazePAM (KlonoPIN) tablet 1 mg, 1 mg, Oral, TID PRN, Josafat Gonzales MD  •  CloNIDine (CATAPRES) tablet 0.1 mg, 0.1 mg, Oral, Q12H, Josafat Gonzales MD, 0.1 mg at 07/17/17 0948  •  DULoxetine (CYMBALTA) DR capsule 30 mg, 30 mg, Oral, Daily, Josafat Gonzales MD, 30 mg at 07/17/17 0949  •  enoxaparin (LOVENOX) syringe 40 mg, 40 mg, Subcutaneous, Daily, Josafat Gonzales MD, 40 mg at 07/17/17 0956  •  famotidine (PEPCID) injection 40 mg, 40 mg, Intravenous, Q12H, Man Kunz MD, 40 mg at 07/17/17 0957  •  gabapentin (NEURONTIN) capsule 300 mg, 300 mg, Oral, Q8H, Josafat Gonzales MD, 300 mg at 07/17/17 0537  •  levETIRAcetam (KEPPRA) tablet 1,000 mg, 1,000 mg, Oral, Q12H, Josafat Gonzales MD, 1,000 mg at 07/17/17 0948  •  LORazepam (ATIVAN) injection 0.5 mg, 0.5 mg, Intravenous, Q2H PRN, Josafat Gonzales MD, 0.5 mg at 07/16/17 0430  •  Morphine sulfate (PF) injection 2 mg, 2 mg, Intravenous, Q2H PRN, Zeeshan Hester MD, 2 mg at 07/16/17 1903  •  ondansetron (ZOFRAN) injection 4 mg, 4 mg, Intravenous, Q6H PRN, Zeeshan Hester MD, 4 mg at 07/16/17 0431  •  oxyCODONE (ROXICODONE) immediate release tablet 10 mg, 10 mg, Oral, Q4H PRN, Josafat Gonzales MD, 10 mg at 07/17/17 0956  •  phenytoin (DILANTIN) ER capsule 100 mg, 100 mg, Oral, Daily, Josafat Gonzales MD, 100 mg at 07/17/17 0948  •  phenytoin (DILANTIN) ER capsule 200 mg, 200 mg,  Oral, Nightly, Josafat Gonzales MD, 200 mg at 07/16/17 2114  •  potassium chloride (MICRO-K) CR capsule 20 mEq, 20 mEq, Oral, BID With Meals, Josafat Gonzales MD, 20 mEq at 07/17/17 0949  •  potassium chloride (MICRO-K) CR capsule 20 mEq, 20 mEq, Oral, BID With Meals, Jr Bates MD  •  QUEtiapine (SEROquel) tablet 100 mg, 100 mg, Oral, Q12H, Josafat Gonzales MD, 100 mg at 07/17/17 0949  •  sodium chloride 0.9 % bolus 1,000 mL, 1,000 mL, Intravenous, Once, Juanjose Last PA-C, 1,000 mL at 07/12/17 1721  •  sodium chloride 0.9 % infusion, 100 mL/hr, Intravenous, Continuous, Zeeshan Hester MD, Stopped at 07/15/17 1530  •  traZODone (DESYREL) tablet 50 mg, 50 mg, Oral, Nightly, Josafat Gonzales MD, 50 mg at 07/16/17 2217    Review of Systems:    Review of Systems   Constitutional: Negative for activity change, appetite change, chills, diaphoresis, fatigue, fever and unexpected weight change.   HENT: Negative for ear pain, hearing loss, mouth sores, sore throat, trouble swallowing and voice change.    Eyes: Negative.    Respiratory: Negative for cough, choking, shortness of breath and wheezing.    Cardiovascular: Negative for chest pain and palpitations.   Gastrointestinal: Negative for abdominal pain, blood in stool, constipation, diarrhea, nausea and vomiting.   Endocrine: Negative for cold intolerance and heat intolerance.   Genitourinary: Negative for decreased urine volume, dysuria, frequency, hematuria and urgency.   Musculoskeletal: Negative for back pain, gait problem and myalgias.   Skin: Negative for color change, pallor and rash.   Allergic/Immunologic: Negative for food allergies and immunocompromised state.   Neurological: Negative for dizziness, tremors, seizures, syncope, weakness, light-headedness, numbness and headaches.   Hematological: Negative for adenopathy. Does not bruise/bleed easily.   Psychiatric/Behavioral: Negative for agitation and confusion. The  patient is not nervous/anxious.    All other systems reviewed and are negative.       Objective     Vital Signs  Temp:  [97.5 °F (36.4 °C)-97.9 °F (36.6 °C)] 97.9 °F (36.6 °C)  Heart Rate:  [72-90] 90  Resp:  [18] 18  BP: (133-160)/(74-93) 149/88  Body mass index is 30.27 kg/(m^2).    Intake/Output Summary (Last 24 hours) at 07/17/17 1016  Last data filed at 07/17/17 0438   Gross per 24 hour   Intake          2704.58 ml   Output                0 ml   Net          2704.58 ml             Physical Exam:   General: patient awake, alert and cooperative, no acute distress   Eyes: Normal lids and lashes, no scleral icterus   Neck: supple, no JVD   Skin: warm and dry   Cardiovascular: regular rhythm and rate, no murmurs auscultated   Pulm: clear to auscultation bilaterally, regular and unlabored   Abdomen: soft, nontender, nondistended; normal bowel sounds   Psychiatric: Normal mood and behavior; converses appropriately     Results Review:    I have reviewed all of the patients current test results      Results from last 7 days  Lab Units 07/15/17  1251 07/12/17  1501 07/11/17  1426   WBC 10*3/mm3 7.06 11.77* 8.54   HEMOGLOBIN g/dL 12.4 13.0 13.4   HEMATOCRIT % 37.6 39.1 40.6   PLATELETS 10*3/mm3 275 267 280         Results from last 7 days  Lab Units 07/17/17  0606 07/16/17  0951 07/15/17  1251 07/12/17  1501 07/11/17  1426   SODIUM mmol/L 138 137 138 134* 135   POTASSIUM mmol/L 2.9* 2.7* 2.7* 3.9 4.2   CHLORIDE mmol/L 103 100 98 95* 98   CO2 mmol/L 26.0 27.0 28.0 26.0 26.0   BUN mg/dL 5 9 12 7 11   CREATININE mg/dL 0.56 0.50 0.53 0.65 0.59   CALCIUM mg/dL 8.3* 8.5 9.3 9.1 8.9   BILIRUBIN mg/dL 0.3  --   --  0.7 0.4   ALK PHOS U/L 105  --   --  163* 187*   ALT (SGPT) U/L 28  --   --  23 34   AST (SGOT) U/L 27  --   --  26 24   GLUCOSE mg/dL 81 86 93 111* 112*               0  Lab Value Date/Time   LIPASE 24 05/28/2015 0540   LIPASE 60 05/26/2015 1410       Radiology:    Imaging Results (last 24 hours)     ** No results  found for the last 24 hours. 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 Sore throat difficulty swallowing better after coughing up a water bottle lid symptoms are resolving to follow up with us as needed. Will sign off.     EMR Dragon/transcription disclaimer: Much of this encounter note is electronic transcription/translation of spoken language to printed text. The electronic translation of spoken language may be erroneous, or at times, nonsensical words or phrases may be inadvertently transcribed. Although I have reviewed the note for such errors, some may still exist.    Almita Byers, DEBORAH  07/17/17  10:16 AM

## 2017-07-17 NOTE — PLAN OF CARE
Problem: Patient Care Overview (Adult)  Goal: Plan of Care Review  Outcome: Ongoing (interventions implemented as appropriate)    07/17/17 0000   Coping/Psychosocial Response Interventions   Plan Of Care Reviewed With patient   Patient Care Overview   Progress improving   Outcome Evaluation   Outcome Summary/Follow up Plan A&O X4. Safety maintained. Prn pain medication given upon request. Pt states her throat is sore but she will take something when she gets home. Waiting for husbands approval to discharge patient. Mechanical soft diet with thin liquids. Up X1 with walker. Incontinent at times. Right wrist brace, prior to admission, has been worn.        Goal: Adult Individualization and Mutuality  Outcome: Ongoing (interventions implemented as appropriate)  Goal: Discharge Needs Assessment  Outcome: Ongoing (interventions implemented as appropriate)    Problem: Pain, Acute (Adult)  Goal: Acceptable Pain Control/Comfort Level  Outcome: Ongoing (interventions implemented as appropriate)    Problem: Dysphagia (Adult)  Goal: Functional/Safe Swallow  Outcome: Ongoing (interventions implemented as appropriate)  Goal: Compensatory Techniques to Improve Safety/Function with Swallowing  Outcome: Ongoing (interventions implemented as appropriate)    Problem: Fall Risk (Adult)  Goal: Absence of Falls  Outcome: Ongoing (interventions implemented as appropriate)    Problem: Pressure Ulcer Risk (Jose Scale) (Adult,Obstetrics,Pediatric)  Goal: Skin Integrity  Outcome: Ongoing (interventions implemented as appropriate)

## 2017-07-17 NOTE — PLAN OF CARE
Problem: Patient Care Overview (Adult)  Goal: Plan of Care Review  Outcome: Ongoing (interventions implemented as appropriate)    07/17/17 1027   Coping/Psychosocial Response Interventions   Plan Of Care Reviewed With patient   Patient Care Overview   Progress progress towards functional goals is fair   Outcome Evaluation   Outcome Summary/Follow up Plan PT tx completed. Pt sitting edge of bed I. C/O weakness past several months leading to falls. States she does not walk at home. CG/MIN sit-stand transfers, and amb 35' x 2. Followed with w/c for saffety and required 1 sitting rest. Pt would benefit from rehab or home health for strengthening program.

## 2017-07-17 NOTE — PROGRESS NOTES
LOS: 5 days   Patient Care Team:  Josafat Gonzales MD as PCP - General  Josafat Gonzales MD as PCP - Family Medicine  Pawel Abrams MD as PCP - Cardiology (Cardiology)    Chief Complaint:  Dysphagia, sore throat    Subjective     Interval History: She reports she is much better and continues to improve. She does report mild, but improving throat pain. She tolerated soft diet for lunch without difficulty. Denies any dysphagia or SOB. No airway complaints. She is hoping to be discharged home today.     Review of Systems:   Review of Systems   Constitutional: Negative for chills and fever.   HENT: Positive for sore throat. Negative for trouble swallowing and voice change.         As per HPI   Gastrointestinal: Negative for nausea and vomiting.   Neurological: Positive for weakness.       Objective     Vital Signs  Temp:  [97.5 °F (36.4 °C)-98 °F (36.7 °C)] 98 °F (36.7 °C)  Heart Rate:  [72-90] 75  Resp:  [18] 18  BP: (133-160)/(74-93) 150/92    Physical Exam:  CONSTITUTIONAL: well nourished, well-developed, alert, oriented, in no acute distress  COMMUNICATION AND VOICE: able to communicate normally for age, normal voice quality  HEAD: normocephalic, no lesions, atraumatic, no tenderness, no masses  FACE: appearance normal, no lesions, no tenderness, no deformities, facial motion symmetric  EYES: ocular motility normal, eyelids normal, orbits normal, no proptosis, conjunctiva normal , pupils equal, round  EARS:  Hearing: response to conversational voice normal bilaterally  External Ears: auricles without lesions  NOSE:  External Nose: structure normal, no tenderness on palpation, no nasal discharge, no lesions, no evidence of trauma, nostrils patent  ORAL:  Lips: upper and lower lips without lesion  Oral cavity: No mass or lesion seen  Oropharynx: no mass or lesion, no postnasal drip, no erythema  NECK: neck appearance normal  CHEST/RESPIRATORY: respiratory effort normal, normal chest  excursion  CARDIOVASCULAR: extremities without cyanosis or edema  NEUROLOGIC/PSYCHIATRIC: oriented appropriately, mood normal, affect appropriate, CN II-XII intact grossly       Results Review:    Lab Results (last 24 hours)     Procedure Component Value Units Date/Time    Blood Culture [[id number]]  (Normal) Collected:  07/12/17 1502    Specimen:  Blood from Arm, Left Updated:  07/16/17 1601     Blood Culture No growth at 4 days    Comprehensive Metabolic Panel [[id number]]  (Abnormal) Collected:  07/17/17 0606    Specimen:  Blood Updated:  07/17/17 0637     Glucose 81 mg/dL      BUN 5 mg/dL      Creatinine 0.56 mg/dL      Sodium 138 mmol/L      Potassium 2.9 (C) mmol/L      Chloride 103 mmol/L      CO2 26.0 mmol/L      Calcium 8.3 (L) mg/dL      Total Protein 5.7 (L) g/dL      Albumin 3.20 (L) g/dL      ALT (SGPT) 28 U/L      AST (SGOT) 27 U/L      Alkaline Phosphatase 105 U/L      Total Bilirubin 0.3 mg/dL      eGFR Non African Amer 110 mL/min/1.73      Globulin 2.5 gm/dL      A/G Ratio 1.3 g/dL      BUN/Creatinine Ratio 8.9     Anion Gap 9.0 mmol/L         Imaging Results (last 24 hours)     ** No results found for the last 24 hours. **            Medications, Allergies and Past History Reviewed with the following changes:  No significant changes    Assessment/Plan     1. Inability to swallow - Improving   2. Dysphagia, unspecified type -Improving    3. Difficulty in walking, not elsewhere classified    4. Impaired mobility    5.      Status post foreign body ingestion      Assessment:    Condition: Improving.   (Improving status post expression of her foreign body of throat.).     Plan:   Advance diet as tolerated.  (She has tolerated a soft diet without difficulty. OK to be discharged home from an ENT standpoint. ).         Milagro Arnett, APRN  07/17/17  1:16 PM

## 2017-07-17 NOTE — PLAN OF CARE
Problem: Patient Care Overview (Adult)  Goal: Plan of Care Review  Outcome: Ongoing (interventions implemented as appropriate)    07/17/17 0940   Coping/Psychosocial Response Interventions   Plan Of Care Reviewed With patient   Patient Care Overview   Progress improving   Outcome Evaluation   Outcome Summary/Follow up Plan Swallowing tx completed to assess diet toleration. Upon ST arrival, breakfast tray present on bedside table in front of pt. Pt was noted to have a 1x cough, yet uncertain how closely this was experienced in relation to PO trial (consumed before arrival). Persistent hoarse vocal quality. ST observed pt with multiple trials of mech soft biscuit and thin liquids. 1x delayed cough, yet this was minutes following a PO trial. Maintained clear vocal quality throughout session. Cannot fully r/o aspiration at this time, yet feel pt is tolerating diet. Educated pt re: mech soft diet consistency to be utilized at home until hoarse vocal quality and sore throat fully resolve. Then pt can begin to reattempt regular solid diet consistency and increase diet as tolerated. Pt was also educated to monitor for persistent concerns with dysphagia at home, as a repeat VFSS can be completed on an outpatient basis to r/o change or new concerns with swallow fx. ST to monitor PRN. Thanks!          Problem: Inpatient SLP  Goal: Dysphagia- Patient will improve swallowing skills to begin to take some PO safely  Outcome: Ongoing (interventions implemented as appropriate)    07/13/17 1531 07/14/17 1018 07/15/17 1537   Begin to Take Some PO Safely   Begin to Take Some PO Safely- SLP, Date Established --  --  07/15/17   Begin to Take Some PO Safely- SLP, Time to Achieve --  --  by discharge   Begin to Take Some PO Safely- SLP, Activity Level --  --  Patient will improve laryngeal elevation for safer, more efficient swallow;Patient will improve hyolaryngeal excursion for safer, more efficient swallow;Patient will improve tongue  base/pharyngeal wall squeeze for safer, more efficient swallow   Begin to Take Some PO Safely- SLP, Additional Goal Pt will tolerate NMES tx for dysphagia without s/s of distress and Pt will tolerate PO trials with SLP without overt s/s of aspiration. --  --    Begin to Take Some PO Safely- SLP, Date Goal Reviewed --  --  --    Begin to Take Some PO Safely- SLP, Outcome --  goal ongoing --      07/17/17 0949   Begin to Take Some PO Safely   Begin to Take Some PO Safely- SLP, Date Established --    Begin to Take Some PO Safely- SLP, Time to Achieve --    Begin to Take Some PO Safely- SLP, Activity Level --    Begin to Take Some PO Safely- SLP, Additional Goal --    Begin to Take Some PO Safely- SLP, Date Goal Reviewed 07/17/17   Begin to Take Some PO Safely- SLP, Outcome --

## 2017-07-18 ENCOUNTER — TELEPHONE (OUTPATIENT)
Dept: FAMILY MEDICINE CLINIC | Facility: CLINIC | Age: 62
End: 2017-07-18

## 2017-07-18 DIAGNOSIS — M54.2 CHRONIC NECK PAIN: Chronic | ICD-10-CM

## 2017-07-18 DIAGNOSIS — R41.0 CONFUSION: ICD-10-CM

## 2017-07-18 DIAGNOSIS — R26.9 GAIT DIFFICULTY: ICD-10-CM

## 2017-07-18 DIAGNOSIS — G40.909 SEIZURE DISORDER (HCC): ICD-10-CM

## 2017-07-18 DIAGNOSIS — F32.A DEPRESSION, UNSPECIFIED DEPRESSION TYPE: Primary | Chronic | ICD-10-CM

## 2017-07-18 DIAGNOSIS — G89.29 CHRONIC NECK PAIN: Chronic | ICD-10-CM

## 2017-07-18 DIAGNOSIS — M54.9 CHRONIC BACK PAIN, UNSPECIFIED BACK LOCATION, UNSPECIFIED BACK PAIN LATERALITY: Chronic | ICD-10-CM

## 2017-07-18 DIAGNOSIS — F41.9 ANXIETY: Chronic | ICD-10-CM

## 2017-07-18 DIAGNOSIS — G89.29 CHRONIC BACK PAIN, UNSPECIFIED BACK LOCATION, UNSPECIFIED BACK PAIN LATERALITY: Chronic | ICD-10-CM

## 2017-07-18 DIAGNOSIS — R73.01 ELEVATED FASTING GLUCOSE: Chronic | ICD-10-CM

## 2017-07-18 DIAGNOSIS — D64.9 ANEMIA, UNSPECIFIED TYPE: Chronic | ICD-10-CM

## 2017-07-18 DIAGNOSIS — J04.0 REFLUX LARYNGITIS: ICD-10-CM

## 2017-07-18 DIAGNOSIS — R29.898 WEAKNESS OF BOTH LEGS: ICD-10-CM

## 2017-07-18 DIAGNOSIS — K21.9 REFLUX LARYNGITIS: ICD-10-CM

## 2017-07-18 NOTE — DISCHARGE SUMMARY
"ADMIT: 17  DISCHARGE: 17  CC:  Man Bates MD    PATIENT PROFILE: The patient is a 60 y/o white  female; she was cooperative.       CHIEF COMPLAINT: \"I cannot swallow\"      HISTORY OF PRESENT ILLNESS:  Bita is a 61-year-old  female resident from [place], [place]. Her  called the day of admission and indicated his wife had not had oral intake for 3-4 days; that she could not swallow. She was having a very severe sore throat. I was aware she been in [facility] ER the day before; the call from Dr. Boothe at that time seemed to indicate that she was having more of her back problems and having difficulty getting around. She is a very chronic neck and back problem. So we had her sent back to the ER; this story was confirmed it was decided she needed to be admitted for IV antibiotics IV fluids to preclude dehydration and have ENT/GI  see her.     PAST HISTORY:   CHILDHOOD: unremarkable.       ALLERGIES: Cymbalta 30 mg (neurology opinion of [facility]), Emsam (rash), Lyrica (skin crawls), Neurotin (skin crawls), and Ziac 2.5 ([facility]).      HOME MEDICATIONS:  Klonopin 1 mg 3 times a day as needed  Balter 30 mg once a day as needed  Neurontin 300 mg 3 times a day  Keppra thousand milligrams twice a day  Roxicodone 10 mg every 4 hours as needed  Dilantin 100 mg in the morning and 200 at night  K-Dur 20 milliequivalents twice a day  Desyrel 50 mg at night     HOSPITALIZATION/SURGERY/PROCEDURES:  She is  1, Para 1, AB 0. She had a colonoscopy with a tortuous colon at [place], Dr. Michele, 2014; this was followed by a colonoscopy.  She had T and A at age 11.  Right knee surgery with Dr. Dodie Rodriguez at [facility] at age 13.  SANDRA and BSO with Dr. Mckeon, [facility], at age 32.  T12 surgery with Dr. Park, [place], in .   Gastric bypass in [place] in .  Left knee surgery with Dr. Martinez in [place] in .  C-spine surgery with Dr. Mandel at [place] " 05/08/2006.  C-spine  surgery with Dr. Mandel at [place] 02/04/2008.  Gallbladder at [facility],  Dr. Roberts, 03/18/2010.  Previous left knee arthroscopy, right laparoscopic  colectomy, Dr. Piedra, 08/13/2014.        MEDICAL ADMISSIONS: Last admit [facility] October-November 2016. Other admits include [facility] 05/26/2015 through 05/31/2015 for  hyponatremia; [facility] 08/11/2008 through 08/12/2008 for hyponatremia;  02/08/2010 through 02/09/2010, left flank pain; 03/13/2014 through 03/15/2014  Dr. Hester for TIA.  Additional [facility] admissions include 07/21/1995 through  07/23/1995 for abdominal pain; 02/19/2008 through 02/21/2008 for DVT;  02/09/2010 through 02/11/2010 for abdominal pain, intercostal; 03/11/2012  through 03/19/2012 Dr. Hester for abdominal pain and SIADH; 03/24/2015 through  03/30/2015 for diarrhea.      FAMILY HISTORY:  Hypertension mother, maternal aunt, father, paternal  grandfather; heart disease in mother, father, maternal grandfather, maternal  grandmother, paternal grandmother; diabetes in maternal uncle.      HABITS:  Never smoked, only limited second-hand smoke exposure.  No alcohol or  drugs.      SOCIAL HISTORY:   in 1987 to her current , has 1 son.  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      REVIEW OF SYSTEMS:   GENERAL:  There has been no recent injuries or skin wounds.  INTEGUMENT:  No mention of any fever or rash prior to this.  HEENT:  No rhinorrhea.  NECK:  No mention of pain or mass.  CHEST:  Denies cough or wheeze.  CARDIOVASCULAR:  No mention of chest pain or ankle edema.    GI:  Frequent loose stools, nonbloody.  :  No mention of dysuria.  MUSCULOSKELETAL AND NEUROLOGIC:  Same mention of weakness of extremities; to point of not being able to walk..  PSYCHIATRIC:  She chronically sees Dr. Samaniego for anxiety and depression and  has a significant amount of stress      PHYSICAL EXAMINATION:  Vitals: T 98.6, HR 85, /99, RR  20, W 217 Ht 71in  ..GENERAL: Well nourished/developed in no acute distress. Obese  SKIN: Turgor excellent, without wound, rash, lesion.  HEENT: Normal cephalic without trauma. Pupils equal round reactive to light. Extraocular motions full without nystagmus. External canals nonobstructive nontender without reddness. Tymphatic membranes chris with jaskaran structures intact. Oral cavity without growths, exudates, and moist. Posterior pharnyx without mass, obstruction, reddness. No thyroidmegaly, mass, tenderness, lymphadenopathy and supple.  CV: Regular rhythm. No murmur, gallop, edema. Posterior pulses intact. No carotid bruits.   CHEST: No chest wall tenderness or mass.   LUNGS: Symmetric motion with clear to auscultation. No dullness to percussion  ABD: Soft, nontender without mass.   ORTHO: Symmetric extremities without swelling/point tenderness. Full gross range of motion.   NEURO: CN 2-12 grossly intact. Symmetric facies. 1/4 x bicep knee equal reflexes. UE/LE 3/5 strength throughout. Nonfocal use extremities. Speech clear.   PSYCH: Oriented x 3. Pleasant calm, well kept. Purposeful/directed conservation with intact short/long gross memory.      ASSESSMENT/PROBLEM LIST:   A 61-year-old white female:  1.  Allergy/intolerances:  See above.  2.  Procedural history:  See above.  3.  Family history:  See above.  4.  Obesity.  This is status post gastric bypass initial failure and regain of  weight.    5.   1 para 1 AB 0.    6.  Surgical menopause.  7.  Osteoporosis on previous wrist study.    8.  Hypertension.    9.  Hypothyroidism.    10.  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    11.  History of DVT - left peroneal popliteal, 2008, treated  successfully with anticoagulation.   12.  Brief anticoagulation.  13.  Chronic insomnia.  14.  Spinal spondylosis; this is primarily cervical with multiple  surgeries.  15.  Chronic neck pain.    16.  Chronic narcotics.  17.  Chronic depression - followed by Dr. Samaniego.    18.  Degenerative joint disease that is diffuse.    19.  History of gastric bypass and metabolic risk it carries.    20.  Colon cancer history with previous surgery and chemo, no obvious  recurrence.  21.  Gastroesophageal reflux.  22.  Documented neuropathy by EMG nerve conduction  23. Seizure disorder (2016)  24. antieliptics-Keppra/Dilantin  25. Gait decline     REASON FOR ADMISSION:    Dysphagia  Sore throat  NPO status  Gait decline-acute    HOSPITAL COARSE: She was kept on IV fluids for most of the stay;  for approximately 3 days  speech continued to say that she was not safe to swallow or eat.  Laryngoscopy was initially performed by Dr. Kunz and swelling around the arytenoid area was seen raising the question of a cyst there.  She was placed on antibiotics; and did not improve.  Dr. Pang did not think an EGD was necessary.  CT of the area showed only postsurgical changes from cervical spine surgery.  She had a coughing episode on July 15 and coughed up a water bottle cap.  She immediately improved after that.  Dr. Magaña cover the following day and examine the area and found no significant issues except some swelling.  She was started back on a diet; and tolerated this.  Speech came back the next day and thought it was okay for her to continue to eat.  Along with this her gait was improving with the days of therapy that she had one at this juncture her  thinks he can handle her at home.  While she was nothing by mouth several her medications could not be given; she seemed as she tolerate a reduction of medicines like Seroquel.  Her blood pressure did run high while here but was never symptomatic; we try to treat it with topical blood pressure patches.  Her potassium did drop and was replaced IV and then oral: He was improving by discharge.  Mono rapid strep beta culture were all  negative.  He had repeats of her MRIs of her neck and lumbar spine with only degenerative and postsurgical changes.  CXR was normal.    DISCHARGE ASSESSMENT:  Accidental injection water bottle cap  Dysphagia  Sore throat  NPO/decreased oral intake (several days)  Hypokalemia  Elevated blood pressure (asymptomatic)  hypertension    PLAN:   See AVS    Discharge Disposition:  Home or Self Care    Discharge Medications:   Bita Croft   Home Medication Instructions DESHAWN:[id number]    Printed on:07/17/17 2056   Medication Information                      amLODIPine (NORVASC) 5 MG tablet  Take 1 tablet by mouth Every 12 (Twelve) Hours.             clonazePAM (KlonoPIN) 1 MG tablet  Take 1 mg by mouth 3 (Three) Times a Day As Needed for Anxiety.             CloNIDine (CATAPRES) 0.1 MG tablet  Take 1 tablet by mouth Every 12 (Twelve) Hours.             DULoxetine (CYMBALTA) 30 MG capsule  Take 30 mg by mouth Daily As Needed (for depression.).             gabapentin (NEURONTIN) 300 MG capsule  Take 300 mg by mouth 3 (Three) Times a Day.             levETIRAcetam (KEPPRA) 1000 MG tablet  Take 1,000 mg by mouth Every 12 (Twelve) Hours.             oxyCODONE (ROXICODONE) 10 MG tablet  Take 10 mg by mouth Every 4 (Four) Hours As Needed for Moderate Pain .             phenytoin (DILANTIN) 100 MG ER capsule  Take 100 mg by mouth Daily.             phenytoin (DILANTIN) 100 MG ER capsule  Take 200 mg by mouth Every Night.             potassium chloride (K-DUR,KLOR-CON) 20 MEQ CR tablet  Take 20 mEq by mouth Every 12 (Twelve) Hours.             QUEtiapine (SEROquel) 100 MG tablet  Take 1 tablet by mouth 2 (Two) Times a Day.             traZODone (DESYREL) 50 MG tablet  Take 50 mg by mouth Every Night.                 No driving  Gradually increase as able activity  Healthy heart diet; soft consistency  Fluid restrict 1500 mL/24 hr (dont drink too much water)  Try to resume your physicial therapy OR call Dr Gonzales office if you  decide you want to do it with home health     Follow-up Appointments:   Dr Gonzales 4-7 days; to call for appointment.     CONDITION: stable/improved    PROGNOSIS: good

## 2017-07-18 NOTE — PLAN OF CARE
Problem: Inpatient Physical Therapy  Goal: Transfer Training Goal 1 LTG- PT  Outcome: Outcome(s) achieved Date Met:  07/18/17 07/14/17 0919 07/18/17 1453   Transfer Training PT LTG   Transfer Training PT LTG, Date Established 07/14/17 --    Transfer Training PT LTG, Time to Achieve by discharge --    Transfer Training PT LTG, Activity Type all transfers --    Transfer Training PT LTG, McKean Level contact guard assist --    Transfer Training PT LTG, Assist Device walker, rolling --    Transfer Training PT LTG, Date Goal Reviewed --  07/18/17   Transfer Training PT LTG, Outcome --  goal met       Goal: Gait Training Goal LTG- PT  Outcome: Outcome(s) achieved Date Met:  07/18/17 07/14/17 0919 07/18/17 1453   Gait Training PT LTG   Gait Training Goal PT LTG, Date Established 07/14/17 --    Gait Training Goal PT LTG, Time to Achieve by discharge --    Gait Training Goal PT LTG, McKean Level minimum assist (75% patient effort) --    Gait Training Goal PT LTG, Assist Device walker, rolling platform --    Gait Training Goal PT LTG, Distance to Achieve 50 ft --    Gait Training Goal PT LTG, Date Goal Reviewed --  07/18/17   Gait Training Goal PT LTG, Outcome --  goal met       Goal: Strength Goal LTG- PT  Outcome: Unable to achieve outcome(s) by discharge Date Met:  07/18/17 07/14/17 0919 07/18/17 1453   Strength Goal PT LTG   Strength Goal PT LTG, Date Established 07/14/17 --    Strength Goal PT LTG, Time to Achieve by discharge --    Strength Goal PT LTG, Measure to Achieve Pt to increase all major LE musculaure to 4+/5 --    Strength Goal PT LTG, Date Goal Reviewed --  07/18/17   Strength Goal PT LTG, Outcome --  goal not met   Strength Goal PT LTG, Reason Goal Not Met --  discharged from facility       Goal: Caregiver Training Goal LTG- PT  Outcome: Unable to achieve outcome(s) by discharge Date Met:  07/18/17 07/14/17 0919 07/18/17 1453   Caregiver Training PT LTG   Caregiver Training PT LTG,  Date Established 07/14/17 --    Caregiver Training PT LTG, Time to Achieve by discharge --    Caregiver Training PT LTG, Activity Type Pts spouse will demonstrate proper body mechanics and technique for all transfers 100% of the time.  --    Caregiver Training PT LTG, Daniels Level able to assist adequately --    Caregiver Training PT LTG, Date Goal Reviewed --  07/18/17   Caregiver Training PT LTG, Outcome --  goal not met   Caregiver Training PT LTG, Reason Goal Not Met --  discharged from facility

## 2017-07-18 NOTE — PLAN OF CARE
Problem: Inpatient SLP  Goal: Dysphagia- Patient will improve swallowing skills to begin to take some PO safely  Outcome: Unable to achieve outcome(s) by discharge Date Met:  07/18/17 07/13/17 1531 07/15/17 1537 07/17/17 0949   Begin to Take Some PO Safely   Begin to Take Some PO Safely- SLP, Date Established --  07/15/17 --    Begin to Take Some PO Safely- SLP, Time to Achieve --  by discharge --    Begin to Take Some PO Safely- SLP, Activity Level --  Patient will improve laryngeal elevation for safer, more efficient swallow;Patient will improve hyolaryngeal excursion for safer, more efficient swallow;Patient will improve tongue base/pharyngeal wall squeeze for safer, more efficient swallow --    Begin to Take Some PO Safely- SLP, Additional Goal Pt will tolerate NMES tx for dysphagia without s/s of distress and Pt will tolerate PO trials with SLP without overt s/s of aspiration. --  --    Begin to Take Some PO Safely- SLP, Date Goal Reviewed --  --  07/17/17   Begin to Take Some PO Safely- SLP, Outcome --  --  --    Begin to Take Some PO Safely- SLP, Reason Goal Not Met --  --  --      07/18/17 1457   Begin to Take Some PO Safely   Begin to Take Some PO Safely- SLP, Date Established --    Begin to Take Some PO Safely- SLP, Time to Achieve --    Begin to Take Some PO Safely- SLP, Activity Level --    Begin to Take Some PO Safely- SLP, Additional Goal --    Begin to Take Some PO Safely- SLP, Date Goal Reviewed --    Begin to Take Some PO Safely- SLP, Outcome goal partially met   Begin to Take Some PO Safely- SLP, Reason Goal Not Met discharged from facility

## 2017-07-18 NOTE — PAYOR COMM NOTE
"DC HOME 7-17-17  [id number]   PHONE    [phone]    Bita Croft (61 y.o. Female)     Date of Birth Social Security Number Address Home Phone MRN    1955  [address]   [place] [address] [phone] [id number]    [employer] Status          [employer]        Admission Date Admission Type Admitting Provider Attending Provider Department, Room/Bed    7/12/17 Emergency Josafat Gonzales MD  [facility] 3A, 352/1    Discharge Date Discharge Disposition Discharge Destination        7/17/2017 Home or Self Care             Attending Provider: (none)    Allergies:  No Known Allergies    Isolation:  None   Infection:  None   Code Status:  Prior    Ht:  71\" (180.3 cm)   Wt:  217 lb (98.4 kg)    Admission Cmt:  None   Principal Problem:  Sore throat [J02.9]                 Active Insurance as of 7/12/2017     Primary Coverage     Payor Plan Insurance Group Employer/Plan Group    [employer] A[employer] [id number]     Payor Plan Address Payor Plan Phone Number Effective From Effective To    [address] [phone] 7/1/2017     [place] [address]       Subscriber Name Subscriber Birth Date Member ID       OCHOA CROFT 1955 [id number]                 Emergency Contacts      (Rel.) Home Phone Work Phone Mobile Phone    Carlito Croft (Spouse) [phone] -- --               Physician Progress Notes (last 72 hours) (Notes from 7/15/2017  6:56 AM through 7/18/2017  6:56 AM)      Jr Bates MD at 7/15/2017  8:55 AM  Version 1 of 1              LOS: 3 days   Patient Care Team:  Josafat Gonzales MD as PCP - General  Josafat Gonzales MD as PCP - Family Medicine  Pawel Abrams MD as PCP - Cardiology (Cardiology)    Chief Complaint: paraparesis    Subjective     Interval History: She has remained afebrile.  She reports swallowing some ice chips earlier today and sitting up in a wheelchair.  No new neurological " symptoms        Review of Systems:     unchanged    Objective     Vital Signs  Temp:  [97.5 °F (36.4 °C)-98.5 °F (36.9 °C)] 97.9 °F (36.6 °C)  Heart Rate:  [75-84] 83  Resp:  [16-18] 18  BP: (163-178)/(85-97) 163/97see above    Physical Exam:  She continues to have generalized weakness of both lower extremities sensation to pinprick intact position sense impaired plantar extensor responses present bilateral.   no change of neurological exam        Results Review:    I reviewed the patient's new clinical results.  I reviewed the patient's new imaging results and agree with the interpretation.  I reviewed the patient's other test results and agree with the interpretation        Assessment/Plan 's  Principal Problem:    Sore throat  Active Problems:    Chronic neck pain    Chronic back pain    Inability to swallow    Conversion disorder with abnormal movement    Arytenoid cyst    Functional dysphonia    Reflux laryngitis    Gait difficulty    Weakness of both legs    Neurological status is stable      Plan for disposition:   may need repeat dysphagia study next week    Jr Bates MD  07/15/17  8:55 AM      Time:      Electronically signed by Jr Bates MD at 7/15/2017  8:58 AM      Josafat Gonzales MD at 7/15/2017  9:32 AM  Version 1 of 1              LOS: 3 days   Patient Care Team:  Josafat Gonzales MD as PCP - General  Josafat Gonzales MD as PCP - Family Medicine  Pawel Abrams MD as PCP - Cardiology (Cardiology)     Chief Complaint:  I cannot swallow    Clifton Sunshine is a 61-year-old  female resident from [place], [place].  Her  called the day of admission and indicated his wife had not had oral intake for 3-4 days; that she could not swallow.  She was having a very severe sore throat.  I was aware she been in [facility] ER the day before; the call from Dr. Boothe at that time seemed to indicate that she was having more of her back problems and  having difficulty getting around.  She is a very chronic neck and back problem.  So we had her sent back to the ER; this story was confirmed it was decided she needed to be admitted for IV antibiotics IV fluids to preclude dehydration and have ENT see her.    Interval History:  On abx; still sore throat.   No cough, sob, chest pain.     Patient Complaints: sore throat  Patient Denies:  Chest pain, SOB  History taken from: patient chart RN    Review of Systems:    All systems were reviewed and negative except for:  Constitution:  positive for fatigue  ENT:  positive for sore throat  Cardiovascular: positive for  elevated bp  Musculoskeletal: positive for  back pain and neck pain    Objective     Vital Signs  Temp:  [97.5 °F (36.4 °C)-98.5 °F (36.9 °C)] 97.9 °F (36.6 °C)  Heart Rate:  [75-84] 83  Resp:  [16-18] 18  BP: (163-178)/(85-97) 163/97    Labs  Lab Results (last 24 hours)     Procedure Component Value Units Date/Time    Blood Culture [[id number]]  (Normal) Collected:  07/12/17 1502    Specimen:  Blood from Arm, Left Updated:  07/14/17 1601     Blood Culture No growth at 2 days          Lab Results:  CBC:   Results from last 7 days  Lab Units 07/12/17  1501 07/11/17  1426   WBC 10*3/mm3 11.77* 8.54   HEMOGLOBIN g/dL 13.0 13.4   HEMATOCRIT % 39.1 40.6   PLATELETS 10*3/mm3 267 280     BMP:  Results from last 7 days  Lab Units 07/12/17  1501 07/11/17  1426   SODIUM mmol/L 134* 135   POTASSIUM mmol/L 3.9 4.2   CHLORIDE mmol/L 95* 98   CO2 mmol/L 26.0 26.0   BUN mg/dL 7 11   CREATININE mg/dL 0.65 0.59   GLUCOSE mg/dL 111* 112*   CALCIUM mg/dL 9.1 8.9   ALT (SGPT) U/L 23 34     Culture Results:   Blood Culture   Date Value Ref Range Status   07/12/2017 No growth at 2 days  Preliminary     Radiology: None  Additional Studies Reviewed: None    Physical Exam:   General Appearance alert, appears stated age and cooperative  Eyes lids and lashes normal, conjunctivae and sclerae normal, no icterus, no pallor, corneas clear  and PERRLA  Lungs clear to auscultation, respirations regular, respirations even and respirations unlabored  Heart regular rhythm & normal rate, normal S1, S2, no murmur, no tala, no rub and no click  Chest Wall no abnormalities ovserved  Abdomen normal bowel sounds, no masses, no hepatomegaly, no splenomegaly, soft non-tender, no guarding and no rebound tenderness  Neurologic Mental Status orientated to person, place, time and situation, alertness awake, orientation date, person and place, mood/affect flat and thought content exhibits organized     Results Review:     I reviewed the patient's new clinical results.  I reviewed the patient's new imaging results and agree with the interpretation.  I reviewed the patient's other test results and agree with the interpretation  Discussed with patient    Medication Review: med list reviewed and necessary changes made    Assessment/Plan     Principal Problem:    Sore throat  Active Problems:    Chronic neck pain    Chronic back pain    Inability to swallow    Conversion disorder with abnormal movement    Arytenoid cyst    Functional dysphonia    Reflux laryngitis    Gait difficulty    Weakness of both legs    Elevated bp-asx    hypokalemia    Rx-reviewed; increase IV K  Labs reviewed and needed labs ordered: daily  Diet reviewed; not able  Fluids reviewed-maintance  Increase activity-if/as able  Discussed possible/future discharge plans-see expects home  Available for POA/caregiver      Josafat Gonzales MD  07/15/17  9:32 AM       Electronically signed by Josafat Gonzales MD at 7/17/2017  9:16 PM      Mariano Pérez MD at 7/15/2017  4:53 PM  Version 1 of 1             [facility] Medicine Rapid Response Note       Date of Encounter: 7/15/2017    Attending Physician: Dr Gonzales    Description of Events: Rapid response to call to room 352. Pt was in respiratory distress and hypertensive.  Patient presented like choking episode.   Patient  try multiple attempts to cough, until she cough up a bottle cap.  Reevaluate patient's lung was clear.  Blood pressure remained high, 210/110.  Patient was given labetalol 10 mg IV.  Discussed with nurse to recheck blood pressure in 10 minutes.  O2 sats 97%.  Patient is alert and awake after she cough up the bottle.  No sign of confusion.  Patient was very thankful.    Mariano Pérez MD  7/15/2017  4:54 PM         Electronically signed by Mariano Pérez MD at 7/15/2017  4:59 PM      Man Choudhury MD at 7/16/2017  8:58 AM  Version 1 of 1          LOS: 4 days   Patient Care Team:  Josafat Gonzales MD as PCP - General  Josafat Gonzales MD as PCP - Family Medicine  Pawel Abrams MD as PCP - Cardiology (Cardiology)    Chief Complaint:  Dysphagia    Subjective     Interval History:   Bita Croft is a 61 y.o. female admitted for dysphagia and numbness and weakness of her lower extremities.  A consult was obtained from Dr. Man Kunz for the dysphagia.  I am cross covering for him this weekend.  Dr. Kunz had performed flexible laryngoscopy and saw edema but no signs of mass lesion and specifically no overt signs of foreign body.  Patient had no history of foreign body aspiration but evidently yesterday had a choking spell in which a rapid response was called.  She eventually coughed out a water bottle lid.  She recalls no memory of swallowing this.  Since that time, she is much better with improved throat pain, improved ability to tolerate ice chips and no airway complaints.    Review of Systems:   Review of Systems   Constitutional: Negative for chills and fever.   HENT: Positive for sore throat and trouble swallowing (Improving). Negative for voice change.         As per HPI   Gastrointestinal: Negative for nausea and vomiting.   Neurological: Positive for weakness.       Objective     Vital Signs  Temp:  [97.7 °F (36.5 °C)-98.4 °F (36.9 °C)] 98.1 °F (36.7 °C)  Heart Rate:   [71-90] 76  Resp:  [18-20] 18  BP: (154-213)/() 166/96    Physical Exam:  CONSTITUTIONAL: well nourished, well-developed, alert, oriented, in no acute distress  COMMUNICATION AND VOICE: able to communicate normally for age, normal voice quality  HEAD: normocephalic, no lesions, atraumatic, no tenderness, no masses  FACE: appearance normal, no lesions, no tenderness, no deformities, facial motion symmetric  EYES: ocular motility normal, eyelids normal, orbits normal, no proptosis, conjunctiva normal , pupils equal, round  EARS:  Hearing: response to conversational voice normal bilaterally  External Ears: auricles without lesions  NOSE:  External Nose: structure normal, no tenderness on palpation, no nasal discharge, no lesions, no evidence of trauma, nostrils patent  ORAL:  Lips: upper and lower lips without lesion  Oral cavity: No mass or lesion seen  Oropharynx no mass or lesion, no postnasal drip  Laryngeal: See flexible scope exam, there is right sided vocal cord erythema and right arytenoid edema with good mobility bilaterally.  There is some excoriation in the post cricoid region  NECK: neck appearance normal  CHEST/RESPIRATORY: respiratory effort normal, normal chest excursion  CARDIOVASCULAR: extremities without cyanosis or edema  NEUROLOGIC/PSYCHIATRIC: oriented appropriately, mood normal, affect appropriate, CN II-XII intact grossly       Results Review:    Lab Results (last 24 hours)     Procedure Component Value Units Date/Time    CBC & Differential [[id number]] Collected:  07/15/17 1251    Specimen:  Blood Updated:  07/15/17 1314    Narrative:       The following orders were created for panel order CBC & Differential.  Procedure                               Abnormality         Status                     ---------                               -----------         ------                     CBC Auto Differential[[id number]]        Abnormal            Final result                 Please view results  for these tests on the individual orders.    CBC Auto Differential [[id number]]  (Abnormal) Collected:  07/15/17 1251    Specimen:  Blood Updated:  07/15/17 1314     WBC 7.06 10*3/mm3      RBC 4.34 10*6/mm3      Hemoglobin 12.4 g/dL      Hematocrit 37.6 %      MCV 86.6 fL      MCH 28.6 pg      MCHC 33.0 g/dL      RDW 13.5 %      RDW-SD 43.0 fl      MPV 9.6 fL      Platelets 275 10*3/mm3      Neutrophil % 75.9 %      Lymphocyte % 14.0 (L) %      Monocyte % 8.6 %      Eosinophil % 1.1 %      Basophil % 0.3 %      Immature Grans % 0.1 %      Neutrophils, Absolute 5.35 10*3/mm3      Lymphocytes, Absolute 0.99 10*3/mm3      Monocytes, Absolute 0.61 10*3/mm3      Eosinophils, Absolute 0.08 10*3/mm3      Basophils, Absolute 0.02 10*3/mm3      Immature Grans, Absolute 0.01 10*3/mm3     Basic Metabolic Panel [[id number]]  (Abnormal) Collected:  07/15/17 1251    Specimen:  Blood Updated:  07/15/17 1329     Glucose 93 mg/dL      BUN 12 mg/dL      Creatinine 0.53 mg/dL      Sodium 138 mmol/L      Potassium 2.7 (C) mmol/L      Chloride 98 mmol/L      CO2 28.0 mmol/L      Calcium 9.3 mg/dL      eGFR Non African Amer 117 mL/min/1.73      BUN/Creatinine Ratio 22.6     Anion Gap 12.0 mmol/L     Narrative:       GFR Normal >60  Chronic Kidney Disease <60  Kidney Failure <15    Blood Culture [[id number]]  (Normal) Collected:  07/12/17 1502    Specimen:  Blood from Arm, Left Updated:  07/15/17 1601     Blood Culture No growth at 3 days        Imaging Results (last 24 hours)     ** No results found for the last 24 hours. **            Medications, Allergies and Past History Reviewed with the following changes:  No significant changes    Assessment/Plan     1. Inability to swallow -Improving    2. Dysphagia, unspecified type -Improving    3. Difficulty in walking, not elsewhere classified    4. Impaired mobility    5.      Status post foreign body ingestion      Assessment:    Condition: Improving.   (Improving status post expression of her  foreign body of throat.).     Plan:   Advance diet as tolerated.  (I discussed with her the options of obtaining another swallow study to make sure there was no perforation versus advancing her diet.  She has been able to tolerate her ice chips without difficulty and I feel no evidence of subcutaneous emphysema.  She was to go ahead and advance her diet and get a swallow study if she is showing signs of an inability to swallow or signs of perforation.  She is on adequate antibiotic coverage in case of this.  If she tolerates her diet without difficulty she will be able to be discharged from an ENT standpoint).         Man Choudhury MD  07/16/17  8:58 AM           Electronically signed by Man Choudhury MD at 7/16/2017  9:06 AM      Jr Bates MD at 7/16/2017  9:15 AM  Version 1 of 1              LOS: 4 days   Patient Care Team:  Josafat Gonzales MD as PCP - General  Josafat Gonzales MD as PCP - Family Medicine  Pawel Abrams MD as PCP - Cardiology (Cardiology)    Chief Complaint: Paraparesis    Subjective     Interval History: Yesterday she had an acute respiratory event and eventually coughed up a water bottle cap.  She feels that she is swallowing much better since then.  His been followed by physical therapy and does feel somewhat stronger with regards to her lower extremities        Review of Systems:     unchanged    Objective     Vital Signs  Temp:  [97.7 °F (36.5 °C)-98.4 °F (36.9 °C)] 98.1 °F (36.7 °C)  Heart Rate:  [71-90] 76  Resp:  [18-20] 18  BP: (154-213)/() 166/96see above    Physical Exam: Her generalized weakness in the lower extremities may be slightly improved.  She has continued sensation to pain of the lower extremities and absent position sense.  Awake alert and oriented properly        Results Review:    I reviewed the patient's new clinical results.  I reviewed the patient's new imaging results and agree with the interpretation.  I  reviewed the patient's other test results and agree with the interpretation        Assessment/Plan 's  Principal Problem:    Sore throat  Active Problems:    Chronic neck pain    Chronic back pain    Conversion disorder with abnormal movement    Reflux laryngitis    Gait difficulty    Weakness of both legs      Neurological condition is stable    Plan for disposition: As per ENT    Jr Bates MD  07/16/17  9:16 AM      Time:      Electronically signed by Jr Bates MD at 7/16/2017  9:19 AM      Josafat Gonzales MD at 7/16/2017 11:05 AM  Version 1 of 1              LOS: 4 days   Patient Care Team:  Josafat Gonzales MD as PCP - General  Josafat Gonzales MD as PCP - Family Medicine  Pawel Abrams MD as PCP - Cardiology (Cardiology)    Chief Complaint:  I cannot swallow    Clifton Sunshine is a 61-year-old  female resident from [place], [place].  Her  called the day of admission and indicated his wife had not had oral intake for 3-4 days; that she could not swallow.  She was having a very severe sore throat.  I was aware she been in [facility] ER the day before; the call from Dr. Boothe at that time seemed to indicate that she was having more of her back problems and having difficulty getting around.  She is a very chronic neck and back problem.  So we had her sent back to the ER; this story was confirmed it was decided she needed to be admitted for IV antibiotics IV fluids to preclude dehydration and have ENT see her.    Interval History:  Coughed up top of water bottle; feeling a lot better.  Resser examined; nothing major now.     Patient Complaints: tired  Patient Denies:  Chest pain, SOB  History taken from: patient chart RN    Review of Systems:    All systems were reviewed and negative except for:  Constitution:  positive for fatigue  Cardiovascular: positive for  elevated bp  Musculoskeletal: positive for usual LE weakness  Neurological: positive  for  difficulty walking and weakness    Objective     Vital Signs  Temp:  [97.7 °F (36.5 °C)-98.4 °F (36.9 °C)] 98.1 °F (36.7 °C)  Heart Rate:  [71-90] 76  Resp:  [18-20] 18  BP: (154-213)/() 166/96    Labs  Lab Results (last 24 hours)     Procedure Component Value Units Date/Time    CBC & Differential [[id number]] Collected:  07/15/17 1251    Specimen:  Blood Updated:  07/15/17 1314    Narrative:       The following orders were created for panel order CBC & Differential.  Procedure                               Abnormality         Status                     ---------                               -----------         ------                     CBC Auto Differential[[id number]]        Abnormal            Final result                 Please view results for these tests on the individual orders.    CBC Auto Differential [[id number]]  (Abnormal) Collected:  07/15/17 1251    Specimen:  Blood Updated:  07/15/17 1314     WBC 7.06 10*3/mm3      RBC 4.34 10*6/mm3      Hemoglobin 12.4 g/dL      Hematocrit 37.6 %      MCV 86.6 fL      MCH 28.6 pg      MCHC 33.0 g/dL      RDW 13.5 %      RDW-SD 43.0 fl      MPV 9.6 fL      Platelets 275 10*3/mm3      Neutrophil % 75.9 %      Lymphocyte % 14.0 (L) %      Monocyte % 8.6 %      Eosinophil % 1.1 %      Basophil % 0.3 %      Immature Grans % 0.1 %      Neutrophils, Absolute 5.35 10*3/mm3      Lymphocytes, Absolute 0.99 10*3/mm3      Monocytes, Absolute 0.61 10*3/mm3      Eosinophils, Absolute 0.08 10*3/mm3      Basophils, Absolute 0.02 10*3/mm3      Immature Grans, Absolute 0.01 10*3/mm3     Basic Metabolic Panel [[id number]]  (Abnormal) Collected:  07/15/17 1251    Specimen:  Blood Updated:  07/15/17 1329     Glucose 93 mg/dL      BUN 12 mg/dL      Creatinine 0.53 mg/dL      Sodium 138 mmol/L      Potassium 2.7 (C) mmol/L      Chloride 98 mmol/L      CO2 28.0 mmol/L      Calcium 9.3 mg/dL      eGFR Non African Amer 117 mL/min/1.73      BUN/Creatinine Ratio 22.6     Anion Gap 12.0  mmol/L     Narrative:       GFR Normal >60  Chronic Kidney Disease <60  Kidney Failure <15    Blood Culture [[id number]]  (Normal) Collected:  07/12/17 1502    Specimen:  Blood from Arm, Left Updated:  07/15/17 1601     Blood Culture No growth at 3 days    Basic Metabolic Panel [[id number]]  (Abnormal) Collected:  07/16/17 0951    Specimen:  Blood Updated:  07/16/17 1036     Glucose 86 mg/dL      BUN 9 mg/dL      Creatinine 0.50 mg/dL      Sodium 137 mmol/L      Potassium 2.7 (C) mmol/L      Chloride 100 mmol/L      CO2 27.0 mmol/L      Calcium 8.5 mg/dL      eGFR Non African Amer 125 mL/min/1.73      BUN/Creatinine Ratio 18.0     Anion Gap 10.0 mmol/L     Narrative:       GFR Normal >60  Chronic Kidney Disease <60  Kidney Failure <15          Lab Results:  CBC:   Results from last 7 days  Lab Units 07/15/17  1251 07/12/17  1501 07/11/17  1426   WBC 10*3/mm3 7.06 11.77* 8.54   HEMOGLOBIN g/dL 12.4 13.0 13.4   HEMATOCRIT % 37.6 39.1 40.6   PLATELETS 10*3/mm3 275 267 280     BMP:  Results from last 7 days  Lab Units 07/16/17  0951 07/15/17  1251 07/12/17  1501 07/11/17  1426   SODIUM mmol/L 137 138 134* 135   POTASSIUM mmol/L 2.7* 2.7* 3.9 4.2   CHLORIDE mmol/L 100 98 95* 98   CO2 mmol/L 27.0 28.0 26.0 26.0   BUN mg/dL 9 12 7 11   CREATININE mg/dL 0.50 0.53 0.65 0.59   GLUCOSE mg/dL 86 93 111* 112*   CALCIUM mg/dL 8.5 9.3 9.1 8.9   ALT (SGPT) U/L  --   --  23 34     Culture Results:   Blood Culture   Date Value Ref Range Status   07/12/2017 No growth at 3 days  Preliminary     Radiology: None  Additional Studies Reviewed: None    Physical Exam:   General Appearance alert, appears stated age and cooperative  Eyes lids and lashes normal, conjunctivae and sclerae normal, no icterus, no pallor, corneas clear and PERRLA  Lungs clear to auscultation, respirations regular, respirations even and respirations unlabored  Heart regular rhythm & normal rate, normal S1, S2, no murmur, no tala, no rub and no click  Chest Wall no  abnormalities ovserved  Abdomen normal bowel sounds, no masses, no hepatomegaly, no splenomegaly, soft non-tender, no guarding and no rebound tenderness  Neurologic Mental Status orientated to person, place, time and situation, alertness alert, orientation date, person and place, mood/affect flat and thought content exhibits organized     Results Review:     I reviewed the patient's new clinical results.  I reviewed the patient's new imaging results and agree with the interpretation.  I reviewed the patient's other test results and agree with the interpretation  Discussed with brother/patient    Medication Review: med list reviewed and necessary changes made    Assessment/Plan     Principal Problem:    Foreign body upper airway  Active Problems:    Sore throat    Chronic neck pain    Chronic back pain    Reflux laryngitis    Gait difficulty    Weakness of both legs    hypokalemia    Rx-reviewed; same but expect abx can be stopped.   Labs reviewed and needed labs ordered: daily  Diet reviewed-advance  Fluids reviewed-wean  Increase activity-as able  Discussed possible/future discharge plans-she wants home  Available for POA/caregiver        Josafat Gonzales MD  07/16/17  11:06 AM         Electronically signed by Josafat Gonzales MD at 7/17/2017  9:13 PM      Jr Bates MD at 7/17/2017  8:16 AM  Version 1 of 1              LOS: 5 days   Patient Care Team:  Josafat Gonzales MD as PCP - General  Josafat Gonzales MD as PCP - Family Medicine  Pawel Abrams MD as PCP - Cardiology (Cardiology)    Chief Complaint: paraparesis    Subjective     Interval History: She is feeling better.  She has started a diet and is swallowing better.  She also relates moving her lower extremities better.  Potassium remains slow in spite of some mild and her intravenous fluids I will order an oral potassium supplement        Review of Systems:     unchanged    Objective     Vital Signs  Temp:  [97.5  °F (36.4 °C)-98.1 °F (36.7 °C)] 97.7 °F (36.5 °C)  Heart Rate:  [72-78] 72  Resp:  [18] 18  BP: (133-166)/(74-96) 160/88see above    Physical Exam:  Awake alert and oriented properly better spirits.  She is moving her lower extremities with improved strength lifting her legs off the bed easily now.  As before it was quite a struggle.  Sensation remains the same.        Results Review:    I reviewed the patient's new clinical results.  I reviewed the patient's new imaging results and agree with the interpretation.  I reviewed the patient's other test results and agree with the interpretation        Assessment/Plan 's  Principal Problem:    Sore throat  Active Problems:    Chronic neck pain    Chronic back pain    Conversion disorder with abnormal movement    Reflux laryngitis    Gait difficulty    Weakness of both legs      She appears stronger in both lower extremities and is now eating    Plan for disposition: As per ENT and primary physician follow-up with Dr. Jose Alberto Bates MD  07/17/17  8:17 AM      Time:      Electronically signed by Jr Bates MD at 7/17/2017  8:22 AM      DEBORAH Cobos at 7/17/2017 10:16 AM  Version 1 of 1    Attestation signed by Wilton Michele MD at 7/17/2017  2:17 PM        I have reviewed the documentation above and agree.                                 [facility]  Inpatient Progress Note  Bita WRIGHT Lang  1955 7/17/2017  Reason for Follow Up:  Difficulty swallowing sore throat    Subjective     Subjective:   Pt is awake alert oriented sitting up at bedside. She is ready to go home. She says that her speech is better. Her swallowing is back to normal and her throat is feeling better. No nausea or vomiting.     Current Facility-Administered Medications:   •  amLODIPine (NORVASC) tablet 5 mg, 5 mg, Oral, Q12H, Josafat Gonzales MD, 5 mg at 07/17/17 0949  •  clonazePAM (KlonoPIN) tablet 1 mg, 1 mg,  Oral, TID PRN, Josafat Gonzales MD  •  CloNIDine (CATAPRES) tablet 0.1 mg, 0.1 mg, Oral, Q12H, Josafat Gonzales MD, 0.1 mg at 07/17/17 0948  •  DULoxetine (CYMBALTA) DR capsule 30 mg, 30 mg, Oral, Daily, Josafat Gonzales MD, 30 mg at 07/17/17 0949  •  enoxaparin (LOVENOX) syringe 40 mg, 40 mg, Subcutaneous, Daily, Josafat Gonzales MD, 40 mg at 07/17/17 0956  •  famotidine (PEPCID) injection 40 mg, 40 mg, Intravenous, Q12H, Man Kunz MD, 40 mg at 07/17/17 0957  •  gabapentin (NEURONTIN) capsule 300 mg, 300 mg, Oral, Q8H, Josafat Gonzales MD, 300 mg at 07/17/17 0537  •  levETIRAcetam (KEPPRA) tablet 1,000 mg, 1,000 mg, Oral, Q12H, Josafat Gonzales MD, 1,000 mg at 07/17/17 0948  •  LORazepam (ATIVAN) injection 0.5 mg, 0.5 mg, Intravenous, Q2H PRN, Josafat Gonzales MD, 0.5 mg at 07/16/17 0430  •  Morphine sulfate (PF) injection 2 mg, 2 mg, Intravenous, Q2H PRN, Zeeshan Hester MD, 2 mg at 07/16/17 1903  •  ondansetron (ZOFRAN) injection 4 mg, 4 mg, Intravenous, Q6H PRN, Zeeshan Hester MD, 4 mg at 07/16/17 0431  •  oxyCODONE (ROXICODONE) immediate release tablet 10 mg, 10 mg, Oral, Q4H PRN, Josafat Gonzales MD, 10 mg at 07/17/17 0956  •  phenytoin (DILANTIN) ER capsule 100 mg, 100 mg, Oral, Daily, Josafat Gonzales MD, 100 mg at 07/17/17 0948  •  phenytoin (DILANTIN) ER capsule 200 mg, 200 mg, Oral, Nightly, Josafat Gonzales MD, 200 mg at 07/16/17 2114  •  potassium chloride (MICRO-K) CR capsule 20 mEq, 20 mEq, Oral, BID With Meals, Josafat Gonzales MD, 20 mEq at 07/17/17 0949  •  potassium chloride (MICRO-K) CR capsule 20 mEq, 20 mEq, Oral, BID With Meals, Jr Bates MD  •  QUEtiapine (SEROquel) tablet 100 mg, 100 mg, Oral, Q12H, Josafat Gonzales MD, 100 mg at 07/17/17 0949  •  sodium chloride 0.9 % bolus 1,000 mL, 1,000 mL, Intravenous, Once, Juanjose Last PA-C, 1,000 mL at 07/12/17 1721  •  sodium chloride 0.9 % infusion, 100  mL/hr, Intravenous, Continuous, Zeeshan Hester MD, Stopped at 07/15/17 1530  •  traZODone (DESYREL) tablet 50 mg, 50 mg, Oral, Nightly, Josafat Gonzales MD, 50 mg at 07/16/17 6067    Review of Systems:    Review of Systems   Constitutional: Negative for activity change, appetite change, chills, diaphoresis, fatigue, fever and unexpected weight change.   HENT: Negative for ear pain, hearing loss, mouth sores, sore throat, trouble swallowing and voice change.    Eyes: Negative.    Respiratory: Negative for cough, choking, shortness of breath and wheezing.    Cardiovascular: Negative for chest pain and palpitations.   Gastrointestinal: Negative for abdominal pain, blood in stool, constipation, diarrhea, nausea and vomiting.   Endocrine: Negative for cold intolerance and heat intolerance.   Genitourinary: Negative for decreased urine volume, dysuria, frequency, hematuria and urgency.   Musculoskeletal: Negative for back pain, gait problem and myalgias.   Skin: Negative for color change, pallor and rash.   Allergic/Immunologic: Negative for food allergies and immunocompromised state.   Neurological: Negative for dizziness, tremors, seizures, syncope, weakness, light-headedness, numbness and headaches.   Hematological: Negative for adenopathy. Does not bruise/bleed easily.   Psychiatric/Behavioral: Negative for agitation and confusion. The patient is not nervous/anxious.    All other systems reviewed and are negative.       Objective     Vital Signs  Temp:  [97.5 °F (36.4 °C)-97.9 °F (36.6 °C)] 97.9 °F (36.6 °C)  Heart Rate:  [72-90] 90  Resp:  [18] 18  BP: (133-160)/(74-93) 149/88  Body mass index is 30.27 kg/(m^2).    Intake/Output Summary (Last 24 hours) at 07/17/17 1016  Last data filed at 07/17/17 0438   Gross per 24 hour   Intake          2704.58 ml   Output                0 ml   Net          2704.58 ml             Physical Exam:   General: patient awake, alert and cooperative, no acute distress   Eyes:  Normal lids and lashes, no scleral icterus   Neck: supple, no JVD   Skin: warm and dry   Cardiovascular: regular rhythm and rate, no murmurs auscultated   Pulm: clear to auscultation bilaterally, regular and unlabored   Abdomen: soft, nontender, nondistended; normal bowel sounds   Psychiatric: Normal mood and behavior; converses appropriately     Results Review:    I have reviewed all of the patients current test results      Results from last 7 days  Lab Units 07/15/17  1251 07/12/17  1501 07/11/17  1426   WBC 10*3/mm3 7.06 11.77* 8.54   HEMOGLOBIN g/dL 12.4 13.0 13.4   HEMATOCRIT % 37.6 39.1 40.6   PLATELETS 10*3/mm3 275 267 280         Results from last 7 days  Lab Units 07/17/17  0606 07/16/17  0951 07/15/17  1251 07/12/17  1501 07/11/17  1426   SODIUM mmol/L 138 137 138 134* 135   POTASSIUM mmol/L 2.9* 2.7* 2.7* 3.9 4.2   CHLORIDE mmol/L 103 100 98 95* 98   CO2 mmol/L 26.0 27.0 28.0 26.0 26.0   BUN mg/dL 5 9 12 7 11   CREATININE mg/dL 0.56 0.50 0.53 0.65 0.59   CALCIUM mg/dL 8.3* 8.5 9.3 9.1 8.9   BILIRUBIN mg/dL 0.3  --   --  0.7 0.4   ALK PHOS U/L 105  --   --  163* 187*   ALT (SGPT) U/L 28  --   --  23 34   AST (SGOT) U/L 27  --   --  26 24   GLUCOSE mg/dL 81 86 93 111* 112*               0  Lab Value Date/Time   LIPASE 24 05/28/2015 0540   LIPASE 60 05/26/2015 1410       Radiology:    Imaging Results (last 24 hours)     ** No results found for the last 24 hours. **            Assessment/Plan     Patient Active Problem List   Diagnosis Code   • Confusion R41.0   • Depression F32.9   • Anxiety F41.9   • Hyponatremia E87.1   • Seizure disorder G40.909   • Chronic neck pain M54.2, G89.29   • Chronic back pain M54.9, G89.29   • Hypothyroidism E03.9   • Abnormal thyroid blood test R94.6   • Hypertension I10   • Wellness examination-done Z00.00   • Elevated fasting glucose R73.01   • Anemia D64.9   • Iron deficiency E61.1   • Degenerative joint disease of spine M47.9   • Conversion disorder with abnormal movement  F44.4   • Reflux laryngitis J04.0, K21.9   • Gait difficulty R26.9   • Weakness of both legs M62.81   • Sore throat J02.9       1. Sore throat difficulty swallowing better after coughing up a water bottle lid symptoms are resolving to follow up with us as needed. Will sign off.     EMR Dragon/transcription disclaimer: Much of this encounter note is electronic transcription/translation of spoken language to printed text. The electronic translation of spoken language may be erroneous, or at times, nonsensical words or phrases may be inadvertently transcribed. Although I have reviewed the note for such errors, some may still exist.    Almita Byers, APRN  07/17/17  10:16 AM                 Electronically signed by Wilton Michele MD at 7/17/2017  2:17 PM      Milagro Arnett, APRN at 7/17/2017  1:16 PM  Version 1 of 1          LOS: 5 days   Patient Care Team:  Josafat Gonzales MD as PCP - General  Josafat Gonzales MD as PCP - Family Medicine  Pawel Abrams MD as PCP - Cardiology (Cardiology)    Chief Complaint:  Dysphagia, sore throat    Subjective     Interval History: She reports she is much better and continues to improve. She does report mild, but improving throat pain. She tolerated soft diet for lunch without difficulty. Denies any dysphagia or SOB. No airway complaints. She is hoping to be discharged home today.     Review of Systems:   Review of Systems   Constitutional: Negative for chills and fever.   HENT: Positive for sore throat. Negative for trouble swallowing and voice change.         As per HPI   Gastrointestinal: Negative for nausea and vomiting.   Neurological: Positive for weakness.       Objective     Vital Signs  Temp:  [97.5 °F (36.4 °C)-98 °F (36.7 °C)] 98 °F (36.7 °C)  Heart Rate:  [72-90] 75  Resp:  [18] 18  BP: (133-160)/(74-93) 150/92    Physical Exam:  CONSTITUTIONAL: well nourished, well-developed, alert, oriented, in no acute distress  COMMUNICATION AND VOICE: able  to communicate normally for age, normal voice quality  HEAD: normocephalic, no lesions, atraumatic, no tenderness, no masses  FACE: appearance normal, no lesions, no tenderness, no deformities, facial motion symmetric  EYES: ocular motility normal, eyelids normal, orbits normal, no proptosis, conjunctiva normal , pupils equal, round  EARS:  Hearing: response to conversational voice normal bilaterally  External Ears: auricles without lesions  NOSE:  External Nose: structure normal, no tenderness on palpation, no nasal discharge, no lesions, no evidence of trauma, nostrils patent  ORAL:  Lips: upper and lower lips without lesion  Oral cavity: No mass or lesion seen  Oropharynx: no mass or lesion, no postnasal drip, no erythema  NECK: neck appearance normal  CHEST/RESPIRATORY: respiratory effort normal, normal chest excursion  CARDIOVASCULAR: extremities without cyanosis or edema  NEUROLOGIC/PSYCHIATRIC: oriented appropriately, mood normal, affect appropriate, CN II-XII intact grossly       Results Review:    Lab Results (last 24 hours)     Procedure Component Value Units Date/Time    Blood Culture [[id number]]  (Normal) Collected:  07/12/17 1502    Specimen:  Blood from Arm, Left Updated:  07/16/17 1601     Blood Culture No growth at 4 days    Comprehensive Metabolic Panel [[id number]]  (Abnormal) Collected:  07/17/17 0606    Specimen:  Blood Updated:  07/17/17 0637     Glucose 81 mg/dL      BUN 5 mg/dL      Creatinine 0.56 mg/dL      Sodium 138 mmol/L      Potassium 2.9 (C) mmol/L      Chloride 103 mmol/L      CO2 26.0 mmol/L      Calcium 8.3 (L) mg/dL      Total Protein 5.7 (L) g/dL      Albumin 3.20 (L) g/dL      ALT (SGPT) 28 U/L      AST (SGOT) 27 U/L      Alkaline Phosphatase 105 U/L      Total Bilirubin 0.3 mg/dL      eGFR Non African Amer 110 mL/min/1.73      Globulin 2.5 gm/dL      A/G Ratio 1.3 g/dL      BUN/Creatinine Ratio 8.9     Anion Gap 9.0 mmol/L         Imaging Results (last 24 hours)     ** No results  "found for the last 24 hours. **            Medications, Allergies and Past History Reviewed with the following changes:  No significant changes    Assessment/Plan     1. Inability to swallow - Improving   2. Dysphagia, unspecified type -Improving    3. Difficulty in walking, not elsewhere classified    4. Impaired mobility    5.      Status post foreign body ingestion      Assessment:    Condition: Improving.   (Improving status post expression of her foreign body of throat.).     Plan:   Advance diet as tolerated.  (She has tolerated a soft diet without difficulty. OK to be discharged home from an ENT standpoint. ).         DEBORAH Edwards  07/17/17  1:16 PM           Electronically signed by DEBORAH Burkett at 7/17/2017  1:23 PM        Consult Notes (last 24 hours) (Notes from 7/17/2017  6:56 AM through 7/18/2017  6:56 AM)     No notes of this type exist for this encounter.           Discharge Summary      Josafat Gonzales MD at 7/17/2017  6:52 PM          ADMIT: 7.12.17  DISCHARGE: 7.17.17  CC:  Man Bates MD    PATIENT PROFILE: The patient is a 60 y/o white  female; she was cooperative.       CHIEF COMPLAINT: \"I cannot swallow\"      HISTORY OF PRESENT ILLNESS:  Bita is a 61-year-old  female resident from [place], [place]. Her  called the day of admission and indicated his wife had not had oral intake for 3-4 days; that she could not swallow. She was having a very severe sore throat. I was aware she been in [facility] ER the day before; the call from Dr. Boothe at that time seemed to indicate that she was having more of her back problems and having difficulty getting around. She is a very chronic neck and back problem. So we had her sent back to the ER; this story was confirmed it was decided she needed to be admitted for IV antibiotics IV fluids to preclude dehydration and have ENT/GI  see her.     PAST HISTORY:   CHILDHOOD: unremarkable. "       ALLERGIES: Cymbalta 30 mg (neurology opinion of [facility]), Emsam (rash), Lyrica (skin crawls), Neurotin (skin crawls), and Ziac 2.5 (SIADH).      HOME MEDICATIONS:  Klonopin 1 mg 3 times a day as needed  Balter 30 mg once a day as needed  Neurontin 300 mg 3 times a day  Keppra thousand milligrams twice a day  Roxicodone 10 mg every 4 hours as needed  Dilantin 100 mg in the morning and 200 at night  K-Dur 20 milliequivalents twice a day  Desyrel 50 mg at night     HOSPITALIZATION/SURGERY/PROCEDURES:  She is  1, Para 1, AB 0. She had a colonoscopy with a tortuous colon at [facility], Dr. Michele, 2014; this was followed by a colonoscopy.  She had T and A at age 11.  Right knee surgery with Dr. Dodie Rodriguez at [facility] at age 13.  SANDRA and BSO with Dr. Mckeon, [facility], at age 32.  T12 surgery with Dr. Park, [place], in .   Gastric bypass in [place] in .  Left knee surgery with Dr. Martinez in [place] in .  C-spine surgery with Dr. Mandel at [place] 2006.  C-spine  surgery with Dr. Mandel at [place] 2008.  Gallbladder at [facility],  Dr. Roberts, 2010.  Previous left knee arthroscopy, right laparoscopic  colectomy, Dr. Piedra, 2014.        MEDICAL ADMISSIONS: Last admit [facility] October-2016. Other admits include [facility] 2015 through 2015 for  hyponatremia; [facility] 2008 through 2008 for hyponatremia;  2010 through 2010, left flank pain; 2014 through 03/15/2014  Dr. Hester for TIA.  Additional [facility] admissions include 1995 through  1995 for abdominal pain; 2008 through 2008 for DVT;  2010 through 2010 for abdominal pain, intercostal; 2012  through 2012 Dr. Hester for abdominal pain and SIADH; 2015 through  2015 for diarrhea.      FAMILY HISTORY:  Hypertension mother, maternal aunt, father, paternal  grandfather; heart disease in mother, father, maternal  grandfather, maternal  grandmother, paternal grandmother; diabetes in maternal uncle.      HABITS:  Never smoked, only limited second-hand smoke exposure.  No alcohol or  drugs.      SOCIAL HISTORY:   in 1987 to her current , has 1 son.  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      REVIEW OF SYSTEMS:   GENERAL:  There has been no recent injuries or skin wounds.  INTEGUMENT:  No mention of any fever or rash prior to this.  HEENT:  No rhinorrhea.  NECK:  No mention of pain or mass.  CHEST:  Denies cough or wheeze.  CARDIOVASCULAR:  No mention of chest pain or ankle edema.    GI:  Frequent loose stools, nonbloody.  :  No mention of dysuria.  MUSCULOSKELETAL AND NEUROLOGIC:  Same mention of weakness of extremities; to point of not being able to walk..  PSYCHIATRIC:  She chronically sees Dr. Samaniego for anxiety and depression and  has a significant amount of stress      PHYSICAL EXAMINATION:  Vitals: T 98.6, HR 85, /99, RR 20, W 217 Ht 71in  ..GENERAL: Well nourished/developed in no acute distress. Obese  SKIN: Turgor excellent, without wound, rash, lesion.  HEENT: Normal cephalic without trauma. Pupils equal round reactive to light. Extraocular motions full without nystagmus. External canals nonobstructive nontender without reddness. Tymphatic membranes chris with jaskaran structures intact. Oral cavity without growths, exudates, and moist. Posterior pharnyx without mass, obstruction, reddness. No thyroidmegaly, mass, tenderness, lymphadenopathy and supple.  CV: Regular rhythm. No murmur, gallop, edema. Posterior pulses intact. No carotid bruits.   CHEST: No chest wall tenderness or mass.   LUNGS: Symmetric motion with clear to auscultation. No dullness to percussion  ABD: Soft, nontender without mass.   ORTHO: Symmetric extremities without swelling/point tenderness. Full gross range of motion.   NEURO: CN 2-12 grossly intact. Symmetric facies. 1/4 x bicep  knee equal reflexes. UE/LE 3/5 strength throughout. Nonfocal use extremities. Speech clear.   PSYCH: Oriented x 3. Pleasant calm, well kept. Purposeful/directed conservation with intact short/long gross memory.      ASSESSMENT/PROBLEM LIST:   A 61-year-old white female:  1.  Allergy/intolerances:  See above.  2.  Procedural history:  See above.  3.  Family history:  See above.  4.  Obesity.  This is status post gastric bypass initial failure and regain of  weight.    5.   1 para 1 AB 0.    6.  Surgical menopause.  7.  Osteoporosis on previous wrist study.    8.  Hypertension.    9.  Hypothyroidism.    10.  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    11.  History of DVT - left peroneal popliteal, 2008, treated  successfully with anticoagulation.   12.  Brief anticoagulation.  13.  Chronic insomnia.  14.  Spinal spondylosis; this is primarily cervical with multiple surgeries.  15.  Chronic neck pain.    16.  Chronic narcotics.  17.  Chronic depression - followed by Dr. Samaniego.    18.  Degenerative joint disease that is diffuse.    19.  History of gastric bypass and metabolic risk it carries.    20.  Colon cancer history with previous surgery and chemo, no obvious  recurrence.  21.  Gastroesophageal reflux.  22.  Documented neuropathy by EMG nerve conduction  23. Seizure disorder (2016)  24. antieliptics-Keppra/Dilantin  25. Gait decline     REASON FOR ADMISSION:    Dysphagia  Sore throat  NPO status  Gait decline-acute    HOSPITAL COARSE: She was kept on IV fluids for most of the stay;  for approximately 3 days  speech continued to say that she was not safe to swallow or eat.  Laryngoscopy was initially performed by Dr. Kunz and swelling around the arytenoid area was seen raising the question of a cyst there.  She was placed on antibiotics; and did not improve.  Dr. Pang did not think an EGD was  necessary.  CT of the area showed only postsurgical changes from cervical spine surgery.  She had a coughing episode on July 15 and coughed up a water bottle cap.  She immediately improved after that.  Dr. Magaña cover the following day and examine the area and found no significant issues except some swelling.  She was started back on a diet; and tolerated this.  Speech came back the next day and thought it was okay for her to continue to eat.  Along with this her gait was improving with the days of therapy that she had one at this juncture her  thinks he can handle her at home.  While she was nothing by mouth several her medications could not be given; she seemed as she tolerate a reduction of medicines like Seroquel.  Her blood pressure did run high while here but was never symptomatic; we try to treat it with topical blood pressure patches.  Her potassium did drop and was replaced IV and then oral: He was improving by discharge.  Mono rapid strep beta culture were all negative.  He had repeats of her MRIs of her neck and lumbar spine with only degenerative and postsurgical changes.  CXR was normal.    DISCHARGE ASSESSMENT:  Accidental injection water bottle cap  Dysphagia  Sore throat  NPO/decreased oral intake (several days)  Hypokalemia  Elevated blood pressure (asymptomatic)  hypertension    PLAN:   See AVS    Discharge Disposition:  Home or Self Care    Discharge Medications:   Bita Croft   Home Medication Instructions DESHAWN:[id number]    Printed on:07/17/17 2056   Medication Information                      amLODIPine (NORVASC) 5 MG tablet  Take 1 tablet by mouth Every 12 (Twelve) Hours.             clonazePAM (KlonoPIN) 1 MG tablet  Take 1 mg by mouth 3 (Three) Times a Day As Needed for Anxiety.             CloNIDine (CATAPRES) 0.1 MG tablet  Take 1 tablet by mouth Every 12 (Twelve) Hours.             DULoxetine (CYMBALTA) 30 MG capsule  Take 30 mg by mouth Daily As Needed (for depression.).              gabapentin (NEURONTIN) 300 MG capsule  Take 300 mg by mouth 3 (Three) Times a Day.             levETIRAcetam (KEPPRA) 1000 MG tablet  Take 1,000 mg by mouth Every 12 (Twelve) Hours.             oxyCODONE (ROXICODONE) 10 MG tablet  Take 10 mg by mouth Every 4 (Four) Hours As Needed for Moderate Pain .             phenytoin (DILANTIN) 100 MG ER capsule  Take 100 mg by mouth Daily.             phenytoin (DILANTIN) 100 MG ER capsule  Take 200 mg by mouth Every Night.             potassium chloride (K-DUR,KLOR-CON) 20 MEQ CR tablet  Take 20 mEq by mouth Every 12 (Twelve) Hours.             QUEtiapine (SEROquel) 100 MG tablet  Take 1 tablet by mouth 2 (Two) Times a Day.             traZODone (DESYREL) 50 MG tablet  Take 50 mg by mouth Every Night.                 No driving  Gradually increase as able activity  Healthy heart diet; soft consistency  Fluid restrict 1500 mL/24 hr (dont drink too much water)  Try to resume your physicial therapy OR call Dr Gonzales office if you decide you want to do it with home health     Follow-up Appointments:   Dr Gonzales 4-7 days; to call for appointment.     CONDITION: stable/improved    PROGNOSIS: good     Electronically signed by Josafat Gonzales MD at 7/17/2017  9:07 PM

## 2017-07-18 NOTE — TELEPHONE ENCOUNTER
Carlito called and needed to get [facility] set up for nursing and therapy order done and f/u apt scheduled     [facility] called and they do not take pt's insurance so referral needs to be sent to [facility] aware and order fixed

## 2017-07-18 NOTE — THERAPY DISCHARGE NOTE
Acute Care - Physical Therapy Discharge Summary  [facility]       Patient Name: Bita Croft  : 1955  MRN: [id number]    Today's Date: 2017  Onset of Illness/Injury or Date of Surgery Date: 17    Date of Referral to PT: 17  Referring Physician: Josafat Gonzales MD      Admit Date: 2017      PT Recommendation and Plan    Visit Dx:    ICD-10-CM ICD-9-CM   1. Inability to swallow R13.0 787.20   2. Dysphagia, unspecified type R13.10 787.20   3. Difficulty in walking, not elsewhere classified R26.2 719.7   4. Impaired mobility Z74.09 799.89             Outcome Measures       17 0940          How much help from another person do you currently need...    Turning from your back to your side while in flat bed without using bedrails? 4  -KJ      Moving from lying on back to sitting on the side of a flat bed without bedrails? 3  -KJ      Moving to and from a bed to a chair (including a wheelchair)? 3  -KJ      Standing up from a chair using your arms (e.g., wheelchair, bedside chair)? 3  -KJ      Climbing 3-5 steps with a railing? 2  -KJ      To walk in hospital room? 3  -KJ      AM-PAC 6 Clicks Score 18  -KJ      Functional Assessment    Outcome Measure Options AM-PAC 6 Clicks Basic Mobility (PT)  -KJ        User Key  (r) = Recorded By, (t) = Taken By, (c) = Cosigned By    Initials Name Provider Type    CHRISTI Trinidad PTA Physical Therapy Assistant                      IP PT Goals       17 1453 17 0919       Transfer Training PT LTG    Transfer Training PT LTG, Date Established  17  -MS (r) RZ (t) MS (c)     Transfer Training PT LTG, Time to Achieve  by discharge  -MS (r) RZ (t) MS (c)     Transfer Training PT LTG, Activity Type  all transfers  -MS (r) RZ (t) MS (c)     Transfer Training PT LTG, [place] Level  contact guard assist  -MS (r) RZ (t) MS (c)     Transfer Training PT LTG, Assist Device  walker, rolling  -MS (r) RZ (t) MS (c)     Transfer Training PT   LTG, Date Goal Reviewed 07/18/17  [facility]      Transfer Training PT LTG, Outcome goal met  [facility]      Gait Training PT LTG    Gait Training Goal PT LTG, Date Established  07/14/17  -MS (r) RZ (t) MS (c)     Gait Training Goal PT LTG, Time to Achieve  by discharge  -MS (r) RZ (t) MS (c)     Gait Training Goal PT LTG, [place] Level  minimum assist (75% patient effort)  -MS (r) RZ (t) MS (c)     Gait Training Goal PT LTG, Assist Device  walker, rolling platform  -MS (r) RZ (t) MS (c)     Gait Training Goal PT LTG, Distance to Achieve  50 ft  -MS (r) RZ (t) MS (c)     Gait Training Goal PT LTG, Date Goal Reviewed 07/18/17  [facility]      Gait Training Goal PT LTG, Outcome goal met  [facility]      Strength Goal PT LTG    Strength Goal PT LTG, Date Established  07/14/17  -MS (r) RZ (t) MS (c)     Strength Goal PT LTG, Time to Achieve  by discharge  -MS (r) RZ (t) MS (c)     Strength Goal PT LTG, Measure to Achieve  Pt to increase all major LE musculaure to 4+/5  -MS (r) RZ (t) MS (c)     Strength Goal PT LTG, Date Goal Reviewed 07/18/17  [facility]      Strength Goal PT LTG, Outcome goal not met  [facility]      Strength Goal PT LTG, Reason Goal Not Met discharged from [facility]      Caregiver Training PT LTG    Caregiver Training PT LTG, Date Established  07/14/17  -MS (r) RZ (t) MS (c)     Caregiver Training PT LTG, Time to Achieve  by discharge  -MS (r) RZ (t) MS (c)     Caregiver Training PT LTG, Activity Type  Pts spouse will demonstrate proper body mechanics and technique for all transfers 100% of the time.   -MS (r) RZ (t) MS (c)     Caregiver Training PT LTG, [place] Level  able to assist adequately  -MS (r) RZ (t) MS (c)     Caregiver Training PT LTG, Date Goal Reviewed 07/18/17  [facility]      Caregiver Training PT LTG, Outcome goal not met  [facility]      Caregiver Training PT LTG, Reason Goal Not Met discharged from [facility]        User Key  (r) = Recorded By, (t) = Taken By, (c) = Cosigned By    Initials Name Provider Type    AH  Hailey Hernandez, PTA Physical Therapy Assistant    MS Belkis Esteban, PT DPT Physical Therapist    NINA Rae, PT Student PT Student              PT Discharge Summary  Reason for Discharge: Discharge from facility  Outcomes Achieved: Refer to plan of care for updates on goals achieved  Discharge Destination: Home with assist      Hailey Hernandez PTA   7/18/2017

## 2017-07-18 NOTE — THERAPY DISCHARGE NOTE
Acute Care - Speech Language Pathology Discharge Summary  [facility]       Patient Name: Bita Croft  : 1955  MRN: [id number]    Today's Date: 2017  Onset of Illness/Injury or Date of Surgery Date: 17       Referring Physician: Dr. Gonzales         Admit Date: 2017      SLP Recommendation and Plan  Mechanical soft solids and thin liquids    Visit Dx:    ICD-10-CM ICD-9-CM   1. Inability to swallow R13.0 787.20   2. Dysphagia, unspecified type R13.10 787.20   3. Difficulty in walking, not elsewhere classified R26.2 719.7   4. Impaired mobility Z74.09 799.89                     IP SLP Goals       17 1457 17 0949 17 1350    Begin to Take Some PO Safely    Begin to Take Some PO Safely- SLP, Date Goal Reviewed  17  -TM 17  -KW    Begin to Take Some PO Safely- SLP, Outcome goal partially met  -MB      Begin to Take Some PO Safely- SLP, Reason Goal Not Met discharged from facility  -MB        07/15/17 1537 07/15/17 1452 17 1018    Begin to Take Some PO Safely    Begin to Take Some PO Safely- SLP, Date Established 07/15/17  -KW      Begin to Take Some PO Safely- SLP, Time to Achieve by discharge  -KW  by discharge  -EA    Begin to Take Some PO Safely- SLP, Activity Level Patient will improve laryngeal elevation for safer, more efficient swallow;Patient will improve hyolaryngeal excursion for safer, more efficient swallow;Patient will improve tongue base/pharyngeal wall squeeze for safer, more efficient swallow  -KW  Patient will improve timing of pharyngeal response for safer, more efficient swallow;Patient will improve laryngeal elevation for safer, more efficient swallow;Patient will improve tongue base/pharyngeal wall squeeze for safer, more efficient swallow  -EA    Begin to Take Some PO Safely- SLP, Date Goal Reviewed 07/15/17  -KW 07/15/17  -KW 17  -EA    Begin to Take Some PO Safely- SLP, Outcome   goal ongoing  -EA      17 1531          Begin  to Take Some PO Safely    Begin to Take Some PO Safely- SLP, Date Established 07/13/17  -TM      Begin to Take Some PO Safely- SLP, Time to Achieve by discharge  -TM      Begin to Take Some PO Safely- SLP, Activity Level Patient will improve timing of pharyngeal response for safer, more efficient swallow;Patient will improve laryngeal elevation for safer, more efficient swallow;Patient will improve tongue base/pharyngeal wall squeeze for safer, more efficient swallow  -TM      Begin to Take Some PO Safely- SLP, Additional Goal Pt will tolerate NMES tx for dysphagia without s/s of distress and Pt will tolerate PO trials with SLP without overt s/s of aspiration.  -TM      Begin to Take Some PO Safely- SLP, Date Goal Reviewed 07/13/17  -TM      Begin to Take Some PO Safely- SLP, Outcome goal ongoing  -TM        User Key  (r) = Recorded By, (t) = Taken By, (c) = Cosigned By    Initials Name Provider Type    GEO Adamson, CCC-SLP Speech and Language Pathologist    TM Marisabel Faye, CCC-SLP Speech and Language Pathologist    PEG Breewr, MS CCC-SLP Speech and Language Pathologist    STEPHANIE [place], MS, CF-SLP Speech and Language Pathologist                  SLP Discharge Summary  Anticipated Discharge Disposition: home with home health  Reason for Discharge: Discharge from facility  Outcomes Achieved: Patient able to partially acheive established goals  Discharge Destination: Home      Susanna Adamson CCC-SLP  7/18/2017

## 2017-07-20 ENCOUNTER — TELEPHONE (OUTPATIENT)
Dept: FAMILY MEDICINE CLINIC | Facility: CLINIC | Age: 62
End: 2017-07-20

## 2017-07-20 NOTE — TELEPHONE ENCOUNTER
Called to let us know that Bita does not want to be admitted to their services until this Saturday.Is this ok?

## 2017-07-23 DIAGNOSIS — I10 ESSENTIAL HYPERTENSION: ICD-10-CM

## 2017-07-24 ENCOUNTER — OFFICE VISIT (OUTPATIENT)
Dept: FAMILY MEDICINE CLINIC | Facility: CLINIC | Age: 62
End: 2017-07-24

## 2017-07-24 VITALS
TEMPERATURE: 98.8 F | RESPIRATION RATE: 18 BRPM | WEIGHT: 220 LBS | DIASTOLIC BLOOD PRESSURE: 88 MMHG | HEIGHT: 70 IN | BODY MASS INDEX: 31.5 KG/M2 | HEART RATE: 100 BPM | SYSTOLIC BLOOD PRESSURE: 132 MMHG | OXYGEN SATURATION: 96 %

## 2017-07-24 DIAGNOSIS — G89.29 CHRONIC BACK PAIN, UNSPECIFIED BACK LOCATION, UNSPECIFIED BACK PAIN LATERALITY: Chronic | ICD-10-CM

## 2017-07-24 DIAGNOSIS — R53.1 WEAKNESS: ICD-10-CM

## 2017-07-24 DIAGNOSIS — M54.2 CHRONIC NECK PAIN: Chronic | ICD-10-CM

## 2017-07-24 DIAGNOSIS — G89.29 CHRONIC NECK PAIN: Chronic | ICD-10-CM

## 2017-07-24 DIAGNOSIS — F32.A DEPRESSION, UNSPECIFIED DEPRESSION TYPE: Chronic | ICD-10-CM

## 2017-07-24 DIAGNOSIS — J02.9 SORE THROAT: ICD-10-CM

## 2017-07-24 DIAGNOSIS — E87.1 HYPONATREMIA: Primary | ICD-10-CM

## 2017-07-24 DIAGNOSIS — T17.900D: ICD-10-CM

## 2017-07-24 DIAGNOSIS — M54.9 CHRONIC BACK PAIN, UNSPECIFIED BACK LOCATION, UNSPECIFIED BACK PAIN LATERALITY: Chronic | ICD-10-CM

## 2017-07-24 DIAGNOSIS — R63.1 POLYDIPSIA: ICD-10-CM

## 2017-07-24 DIAGNOSIS — F41.9 ANXIETY: Chronic | ICD-10-CM

## 2017-07-24 DIAGNOSIS — R26.9 GAIT DIFFICULTY: ICD-10-CM

## 2017-07-24 PROCEDURE — 99213 OFFICE O/P EST LOW 20 MIN: CPT | Performed by: FAMILY MEDICINE

## 2017-07-24 NOTE — PATIENT INSTRUCTIONS
Go home and call [facility] and ask for appointment.   Go home and call [facility] office and schedule appointment next week

## 2017-07-24 NOTE — PROGRESS NOTES
"Subjective   Bita Croft is a 61 y.o. female presenting with chief complaint of:   Chief Complaint   Patient presents with   • Sore Throat     \"I couldnt swallow, I swallowed a water bottle cap, I dont know how or when\"   • Extremity Weakness     \"my legs give out, I cant stand on them\"       History of Present Illness :  With .  Has an acute issue today: LH f/u  Has multiple chronic problems.     7.12.17-7.17.17   stay:   Accidental injection water bottle cap-over; coughed up  Dysphagia-still sore throat  Sore throat-same  NPO/decreased oral intake (several days)-eating/drinking carefully  Hypokalemia-watching  Elevated blood pressure (asymptomatic)-resolved  hypertension     Other chronic problem/s to consider:  HTN.  The HTN has been present for years/it is chronic.  The HTN is assumed essential/without testing needed to look for other.  The HTN is controlled manifest by todays blood pressure and no home monitoring.   Anxiety: This has been present for years/over a year.  It is chronic.  It is active/chronic.  It is associated with stressors; better than past.  Medications being used help; sees psych.   Depression: This has been present for years/over a year.  It is chronic.  It is stable/chronic.  It is associated with stressors; better than past.  Medications being used help. No suicide ideation/intent.   Chronic neck pain:  The pain has been present for years/over a year.   It is chronic.   The pain is stable. 3-4 /10 pain most of time and usually is worse after activities.  The pain limits activities.  The problem has been evaulated and the patient saw Jana on admit; he referred back to [facility] (neuro)  [facility] had referred to [facility]/neurosurgery and he \"told us off\".   Chronic back pain:  The pain has been present for years/over a year.   It is chronic.   The pain is stable.  3-4 /10 pain most of time and usually is worse after activities.  The pain limits activities.  The problem has been " evaulated and the patient has issues same as neck.   Gait decline: This has been present for years/over a year.  It is chronic.  It is influenced by DDD/DJD spinal; and worsening to where more/more using wheelchair.     The following portions of the patient's history were reviewed and updated as appropriate: allergies, current medications, past family history, past medical history, past social history, past surgical history and problem list.  TCC migrated if needed/reviewed.      Current Outpatient Prescriptions:   •  amLODIPine (NORVASC) 5 MG tablet, Take 1 tablet by mouth Every 12 (Twelve) Hours., Disp: , Rfl:   •  clonazePAM (KlonoPIN) 1 MG tablet, Take 1 mg by mouth 3 (Three) Times a Day As Needed for Anxiety., Disp: , Rfl:   •  CloNIDine (CATAPRES) 0.1 MG tablet, Take 1 tablet by mouth Every 12 (Twelve) Hours., Disp: , Rfl:   •  DULoxetine (CYMBALTA) 30 MG capsule, Take 30 mg by mouth Daily., Disp: , Rfl:   •  gabapentin (NEURONTIN) 300 MG capsule, Take 300 mg by mouth 3 (Three) Times a Day., Disp: , Rfl:   •  levETIRAcetam (KEPPRA) 1000 MG tablet, Take 1,000 mg by mouth Every 12 (Twelve) Hours., Disp: , Rfl:   •  oxyCODONE (ROXICODONE) 10 MG tablet, Take 10 mg by mouth Every 4 (Four) Hours As Needed for Moderate Pain ., Disp: , Rfl:   •  phenytoin (DILANTIN) 100 MG ER capsule, Take 100 mg by mouth Daily., Disp: , Rfl:   •  phenytoin (DILANTIN) 100 MG ER capsule, Take 200 mg by mouth Every Night., Disp: , Rfl:   •  potassium chloride (K-DUR,KLOR-CON) 20 MEQ CR tablet, Take 20 mEq by mouth Every 12 (Twelve) Hours., Disp: , Rfl:   •  QUEtiapine (SEROquel) 100 MG tablet, Take 1 tablet by mouth 2 (Two) Times a Day., Disp: , Rfl:   •  traZODone (DESYREL) 50 MG tablet, Take 50 mg by mouth Every Night., Disp: , Rfl:     No Known Allergies    Review of Systems  GENERAL:  Inactive/slower with limits, speed, samni for age and gait. Sleep is ok. No fever now.  ENDO:  No syncope, near or diaphoretic sweaty spells.  HEENT:  No head injury or headache,  No vision change, No significant hearing loss.  Ears without pain/drainage.  Still sore throat.  No significant nasal/sinus congestion/drainage. No epistaxis.  CHEST: No chest wall tenderness or mass. No change occ cough,  without wheeze, SOB; no hemoptysis.  CV: No chest pain, palpatations, ankle edema.  GI: No heartburn, mild without choking dysphagia.  No abdominal pain, diarrhea, constipation, rectal bleeding, or melena.    :  Voids without dysuria, or incontience to completion.  ORTHO: No painful/swollen joints but various on /off sore.  No change daily sore neck or back.  No acute neck or back pain without recent injury.   NEURO: No dizziness, weakness of extremities.  No change UE/LE numbness/parethesias.   PSYCH: Mild memory loss.  Mood good; daily anxious, depressed but/and not suicidal.  Tolerated stress.     Results for orders placed or performed during the hospital encounter of 07/12/17   Rapid Strep A Screen   Result Value Ref Range    Strep A Ag Negative Negative   Blood Culture   Result Value Ref Range    Blood Culture No growth at 5 days    Beta Strep Culture, Throat   Result Value Ref Range    Throat Culture, Beta Strep No Beta Hemolytic Streptococcus Isolated at 2 days    Mononucleosis Screen   Result Value Ref Range    Monospot Negative Negative   Comprehensive Metabolic Panel   Result Value Ref Range    Glucose 111 (H) 70 - 100 mg/dL    BUN 7 5 - 21 mg/dL    Creatinine 0.65 0.50 - 1.40 mg/dL    Sodium 134 (L) 135 - 145 mmol/L    Potassium 3.9 3.5 - 5.3 mmol/L    Chloride 95 (L) 98 - 110 mmol/L    CO2 26.0 24.0 - 31.0 mmol/L    Calcium 9.1 8.4 - 10.4 mg/dL    Total Protein 7.2 6.3 - 8.7 g/dL    Albumin 4.10 3.50 - 5.00 g/dL    ALT (SGPT) 23 0 - 54 U/L    AST (SGOT) 26 7 - 45 U/L    Alkaline Phosphatase 163 (H) 24 - 120 U/L    Total Bilirubin 0.7 0.1 - 1.0 mg/dL    eGFR Non African Amer 93 >60 mL/min/1.73    Globulin 3.1 gm/dL    A/G Ratio 1.3 1.1 - 2.5 g/dL     BUN/Creatinine Ratio 10.8 7.0 - 25.0    Anion Gap 13.0 4.0 - 13.0 mmol/L   Lactic Acid, Plasma   Result Value Ref Range    Lactate 2.0 0.5 - 2.0 mmol/L   CBC Auto Differential   Result Value Ref Range    WBC 11.77 (H) 4.80 - 10.80 10*3/mm3    RBC 4.48 4.20 - 5.40 10*6/mm3    Hemoglobin 13.0 12.0 - 16.0 g/dL    Hematocrit 39.1 37.0 - 47.0 %    MCV 87.3 82.0 - 98.0 fL    MCH 29.0 28.0 - 32.0 pg    MCHC 33.2 33.0 - 36.0 g/dL    RDW 13.8 12.0 - 15.0 %    RDW-SD 44.0 40.0 - 54.0 fl    MPV 9.8 6.0 - 12.0 fL    Platelets 267 130 - 400 10*3/mm3    Neutrophil % 81.2 (H) 39.0 - 78.0 %    Lymphocyte % 9.9 (L) 15.0 - 45.0 %    Monocyte % 8.2 4.0 - 12.0 %    Eosinophil % 0.4 0.0 - 4.0 %    Basophil % 0.1 0.0 - 2.0 %    Immature Grans % 0.2 0.0 - 5.0 %    Neutrophils, Absolute 9.56 (H) 1.87 - 8.40 10*3/mm3    Lymphocytes, Absolute 1.17 0.72 - 4.86 10*3/mm3    Monocytes, Absolute 0.96 0.19 - 1.30 10*3/mm3    Eosinophils, Absolute 0.05 0.00 - 0.70 10*3/mm3    Basophils, Absolute 0.01 0.00 - 0.20 10*3/mm3    Immature Grans, Absolute 0.02 0.00 - 0.03 10*3/mm3   Basic Metabolic Panel   Result Value Ref Range    Glucose 93 70 - 100 mg/dL    BUN 12 5 - 21 mg/dL    Creatinine 0.53 0.50 - 1.40 mg/dL    Sodium 138 135 - 145 mmol/L    Potassium 2.7 (C) 3.5 - 5.3 mmol/L    Chloride 98 98 - 110 mmol/L    CO2 28.0 24.0 - 31.0 mmol/L    Calcium 9.3 8.4 - 10.4 mg/dL    eGFR Non African Amer 117 >60 mL/min/1.73    BUN/Creatinine Ratio 22.6 7.0 - 25.0    Anion Gap 12.0 4.0 - 13.0 mmol/L   CBC Auto Differential   Result Value Ref Range    WBC 7.06 4.80 - 10.80 10*3/mm3    RBC 4.34 4.20 - 5.40 10*6/mm3    Hemoglobin 12.4 12.0 - 16.0 g/dL    Hematocrit 37.6 37.0 - 47.0 %    MCV 86.6 82.0 - 98.0 fL    MCH 28.6 28.0 - 32.0 pg    MCHC 33.0 33.0 - 36.0 g/dL    RDW 13.5 12.0 - 15.0 %    RDW-SD 43.0 40.0 - 54.0 fl    MPV 9.6 6.0 - 12.0 fL    Platelets 275 130 - 400 10*3/mm3    Neutrophil % 75.9 39.0 - 78.0 %    Lymphocyte % 14.0 (L) 15.0 - 45.0 %     Monocyte % 8.6 4.0 - 12.0 %    Eosinophil % 1.1 0.0 - 4.0 %    Basophil % 0.3 0.0 - 2.0 %    Immature Grans % 0.1 0.0 - 5.0 %    Neutrophils, Absolute 5.35 1.87 - 8.40 10*3/mm3    Lymphocytes, Absolute 0.99 0.72 - 4.86 10*3/mm3    Monocytes, Absolute 0.61 0.19 - 1.30 10*3/mm3    Eosinophils, Absolute 0.08 0.00 - 0.70 10*3/mm3    Basophils, Absolute 0.02 0.00 - 0.20 10*3/mm3    Immature Grans, Absolute 0.01 0.00 - 0.03 10*3/mm3   Basic Metabolic Panel   Result Value Ref Range    Glucose 86 70 - 100 mg/dL    BUN 9 5 - 21 mg/dL    Creatinine 0.50 0.50 - 1.40 mg/dL    Sodium 137 135 - 145 mmol/L    Potassium 2.7 (C) 3.5 - 5.3 mmol/L    Chloride 100 98 - 110 mmol/L    CO2 27.0 24.0 - 31.0 mmol/L    Calcium 8.5 8.4 - 10.4 mg/dL    eGFR Non African Amer 125 >60 mL/min/1.73    BUN/Creatinine Ratio 18.0 7.0 - 25.0    Anion Gap 10.0 4.0 - 13.0 mmol/L   Comprehensive Metabolic Panel   Result Value Ref Range    Glucose 81 70 - 100 mg/dL    BUN 5 5 - 21 mg/dL    Creatinine 0.56 0.50 - 1.40 mg/dL    Sodium 138 135 - 145 mmol/L    Potassium 2.9 (C) 3.5 - 5.3 mmol/L    Chloride 103 98 - 110 mmol/L    CO2 26.0 24.0 - 31.0 mmol/L    Calcium 8.3 (L) 8.4 - 10.4 mg/dL    Total Protein 5.7 (L) 6.3 - 8.7 g/dL    Albumin 3.20 (L) 3.50 - 5.00 g/dL    ALT (SGPT) 28 0 - 54 U/L    AST (SGOT) 27 7 - 45 U/L    Alkaline Phosphatase 105 24 - 120 U/L    Total Bilirubin 0.3 0.1 - 1.0 mg/dL    eGFR Non African Amer 110 >60 mL/min/1.73    Globulin 2.5 gm/dL    A/G Ratio 1.3 1.1 - 2.5 g/dL    BUN/Creatinine Ratio 8.9 7.0 - 25.0    Anion Gap 9.0 4.0 - 13.0 mmol/L       No results found for: HGBA1C    Lab Results   Component Value Date     07/17/2017     07/16/2017     07/15/2017    K 2.9 (C) 07/17/2017    K 2.7 (C) 07/16/2017    K 2.7 (C) 07/15/2017     07/17/2017     07/16/2017    CL 98 07/15/2017    CO2 26.0 07/17/2017    CO2 27.0 07/16/2017    CO2 28.0 07/15/2017    GLUCOSE 81 07/17/2017    GLUCOSE 86 07/16/2017     "GLUCOSE 93 07/15/2017    BUN 5 07/17/2017    BUN 9 07/16/2017    BUN 12 07/15/2017    CREATININE 0.56 07/17/2017    CREATININE 0.50 07/16/2017    CREATININE 0.53 07/15/2017    CALCIUM 8.3 (L) 07/17/2017    CALCIUM 8.5 07/16/2017    CALCIUM 9.3 07/15/2017    EGFRCLEARA 55 01/04/2016       No results found for: PSA     Lab Results:  CBC:    Lab Results - Last 18 Months  Lab Units 07/15/17  1251 07/12/17  1501 07/11/17  1426 06/08/17  1352 12/05/16  1353   WBC 10*3/mm3 7.06 11.77* 8.54 11.07* 5.85   HEMATOCRIT % 37.6 39.1 40.6 39.1 35.2*     CMP:    Lab Results - Last 18 Months  Lab Units 07/17/17  0606 07/16/17  0951 07/15/17  1251 07/12/17  1501 07/11/17  1426 06/08/17  1352 12/05/16  1353   SODIUM mmol/L 138 137 138 134* 135 131* 131*   CHLORIDE mmol/L 103 100 98 95* 98 94* 93*   CO2 mmol/L 26.0 27.0 28.0 26.0 26.0 27.0  --    TOTAL CO2, ARTERIAL mmol/L  --   --   --   --   --   --  28.0   BUN mg/dL 5 9 12 7 11 10 10   CREATININE mg/dL 0.56 0.50 0.53 0.65 0.59 0.77 0.69   EGFR IF NONAFRICN AM mL/min/1.73 110 125 117 93 104 76 86   EGFR IF AFRICN AM mL/min/1.73  --   --   --   --   --   --  105   CALCIUM mg/dL 8.3* 8.5 9.3 9.1 8.9 8.7 9.1     THYROID:    Lab Results - Last 18 Months  Lab Units 12/05/16  1353   TSH mIU/mL 10.600*     A1C:No results for input(s): HGBA1C in the last 37107 hours.    Objective   /88 (BP Location: Left arm, Patient Position: Sitting, Cuff Size: Large Adult)  Pulse 100  Temp 98.8 °F (37.1 °C) (Oral)   Resp 18  Ht 70\" (177.8 cm)  Wt 220 lb (99.8 kg)  LMP  (LMP Unknown)  SpO2 96%  BMI 31.57 kg/m2    Physical Exam  GENERAL:  Well nourished/developed in no acute distress. Obese.   SKIN: Turgor excellent, without wound, rash, lesion other than L hand (site of tape/IV)  HEENT: Normal cephalic without trauma.  Pupils equal round reactive to light. Extraocular motions full without nystagmus.   External canals nonobstructive nontender without reddness. Tymphatic membranes chris with " jaskaran structures intact.   Oral cavity without growths, exudates, and moist.  Posterior pharnyx without mass, obstruction, reddness.  No thyroidmegaly, mass, tenderness, lymphadenopathy and supple.  CV: Regular rhythm.  No murmur, gallop,  edema. Posterior pulses intact.  No carotid bruits.  CHEST: No chest wall tenderness or mass.   LUNGS: Symmetric motion with clear to auscultation.   ABD: Soft, nontender without mass.   ORTHO: Symmetric extremities without swelling/point tenderness.  Full gross range of motion.  Wheelchair.  NEURO: CN 2-12 grossly intact.  Symmetric facies. 0/4 x knees/ankles  equal reflexes.  UE/LE   2/5 strength throughout.  Nonfocal use extremities. Speech clear/shallow.  Reduced light touch with monofilament, vibratory sensation with tuning fork; equal toes/distal feet.    PSYCH: Oriented x 3.  Pleasant calm, well kept.  Slow but purposeful/directed conservation with intact short/long gross memory.     Assessment/Plan     1. Hyponatremia  Component of polydipasia; (had water bottle with her today)  - Comprehensive Metabolic Panel    2. Weakness  Coming back; since hospistal worsening  - CBC & Differential    3. Sore throat  Post injection bottle cap/water    4. Gait difficulty  Progressively worse    5. Chronic neck pain  DDD/DJD    6. Chronic back pain, unspecified back location, unspecified back pain laterality  DDD/DJD    7. Depression, unspecified depression type  chronic    8. Anxiety  chronic    9. Polydipsia    10. Choking due to foreign body, subsequent encounter      Rx: reviewed.  Any other changes above and:   LAB: reviewed/above.  Orders above and:     Patient Instructions   Go home and call [facility] and ask for appointment.   Go home and call [facility] office and schedule appointment next week      Follow up: Return in about 3 months (around 10/24/2017).

## 2017-07-25 ENCOUNTER — TELEPHONE (OUTPATIENT)
Dept: FAMILY MEDICINE CLINIC | Facility: CLINIC | Age: 62
End: 2017-07-25

## 2017-07-25 DIAGNOSIS — E87.1 LOW SODIUM LEVELS: Primary | ICD-10-CM

## 2017-07-25 LAB
ALBUMIN SERPL-MCNC: 3.9 G/DL (ref 3.5–5)
ALBUMIN/GLOB SERPL: 1.4 G/DL (ref 1.1–2.5)
ALP SERPL-CCNC: 180 U/L (ref 24–120)
ALT SERPL-CCNC: 33 U/L (ref 0–54)
AST SERPL-CCNC: 19 U/L (ref 7–45)
BASOPHILS # BLD AUTO: 0.03 10*3/MM3 (ref 0–0.2)
BASOPHILS NFR BLD AUTO: 0.2 % (ref 0–2)
BILIRUB SERPL-MCNC: 0.5 MG/DL (ref 0.1–1)
BUN SERPL-MCNC: 8 MG/DL (ref 5–21)
BUN/CREAT SERPL: 14.5 (ref 7–25)
CALCIUM SERPL-MCNC: 9.1 MG/DL (ref 8.4–10.4)
CHLORIDE SERPL-SCNC: 92 MMOL/L (ref 98–110)
CO2 SERPL-SCNC: 26 MMOL/L (ref 24–31)
CREAT SERPL-MCNC: 0.55 MG/DL (ref 0.5–1.4)
EOSINOPHIL # BLD AUTO: 0.11 10*3/MM3 (ref 0–0.7)
EOSINOPHIL NFR BLD AUTO: 0.9 % (ref 0–4)
ERYTHROCYTE [DISTWIDTH] IN BLOOD BY AUTOMATED COUNT: 14.5 % (ref 12–15)
GLOBULIN SER CALC-MCNC: 2.7 GM/DL
GLUCOSE SERPL-MCNC: 95 MG/DL (ref 70–100)
HCT VFR BLD AUTO: 40.1 % (ref 37–47)
HGB BLD-MCNC: 12.5 G/DL (ref 12–16)
IMM GRANULOCYTES # BLD: 0.05 10*3/MM3 (ref 0–0.03)
IMM GRANULOCYTES NFR BLD: 0.4 % (ref 0–5)
LYMPHOCYTES # BLD AUTO: 1.08 10*3/MM3 (ref 0.72–4.86)
LYMPHOCYTES NFR BLD AUTO: 8.5 % (ref 15–45)
MCH RBC QN AUTO: 28.6 PG (ref 28–32)
MCHC RBC AUTO-ENTMCNC: 31.2 G/DL (ref 33–36)
MCV RBC AUTO: 91.8 FL (ref 82–98)
MONOCYTES # BLD AUTO: 0.96 10*3/MM3 (ref 0.19–1.3)
MONOCYTES NFR BLD AUTO: 7.5 % (ref 4–12)
NEUTROPHILS # BLD AUTO: 10.55 10*3/MM3 (ref 1.87–8.4)
NEUTROPHILS NFR BLD AUTO: 82.5 % (ref 39–78)
PLATELET # BLD AUTO: 400 10*3/MM3 (ref 130–400)
POTASSIUM SERPL-SCNC: 5.1 MMOL/L (ref 3.5–5.3)
PROT SERPL-MCNC: 6.6 G/DL (ref 6.3–8.7)
RBC # BLD AUTO: 4.37 10*6/MM3 (ref 4.2–5.4)
SODIUM SERPL-SCNC: 130 MMOL/L (ref 135–145)
WBC # BLD AUTO: 12.78 10*3/MM3 (ref 4.8–10.8)

## 2017-07-25 RX ORDER — AMLODIPINE BESYLATE 10 MG/1
TABLET ORAL
Qty: 30 TABLET | Refills: 5 | Status: SHIPPED | OUTPATIENT
Start: 2017-07-25 | End: 2017-12-26 | Stop reason: SDUPTHER

## 2017-07-25 NOTE — TELEPHONE ENCOUNTER
Bita called and states when she seen Dr Gonzales yesterday she forgot to talk to Dr Gonzales about how sore her throat is and would like something for it  KOBE Ruiz

## 2017-07-25 NOTE — PROGRESS NOTES
Pt informed sodium is low again has to drop fluid intake to 1000cc/24 hr qd and shantal BMP in 1 wk. Needs to talk to her Psychiatrist to see if meds are causing this and needs to be adjusted

## 2017-08-01 LAB
ANION GAP SERPL CALCULATED.3IONS-SCNC: 18 MMOL/L (ref 7–19)
BUN BLDV-MCNC: 8 MG/DL (ref 8–23)
CALCIUM SERPL-MCNC: 8.9 MG/DL (ref 8.8–10.2)
CHLORIDE BLD-SCNC: 96 MMOL/L (ref 98–111)
CO2: 23 MMOL/L (ref 22–29)
CREAT SERPL-MCNC: 0.6 MG/DL (ref 0.5–0.9)
GFR NON-AFRICAN AMERICAN: >60
GLUCOSE BLD-MCNC: 81 MG/DL (ref 74–109)
POTASSIUM SERPL-SCNC: 3.9 MMOL/L (ref 3.5–5)
SODIUM BLD-SCNC: 137 MMOL/L (ref 136–145)

## 2017-08-02 ENCOUNTER — RESULTS ENCOUNTER (OUTPATIENT)
Dept: FAMILY MEDICINE CLINIC | Facility: CLINIC | Age: 62
End: 2017-08-02

## 2017-08-02 DIAGNOSIS — E87.1 LOW SODIUM LEVELS: ICD-10-CM

## 2017-08-04 ENCOUNTER — TELEPHONE (OUTPATIENT)
Dept: NEUROLOGY | Age: 62
End: 2017-08-04

## 2017-08-09 ENCOUNTER — OUTSIDE FACILITY SERVICE (OUTPATIENT)
Dept: FAMILY MEDICINE CLINIC | Facility: CLINIC | Age: 62
End: 2017-08-09

## 2017-08-09 PROCEDURE — G0180 MD CERTIFICATION HHA PATIENT: HCPCS | Performed by: FAMILY MEDICINE

## 2017-08-15 ENCOUNTER — OFFICE VISIT (OUTPATIENT)
Dept: UROLOGY | Facility: CLINIC | Age: 62
End: 2017-08-15

## 2017-08-15 VITALS
TEMPERATURE: 97.4 F | BODY MASS INDEX: 31.5 KG/M2 | SYSTOLIC BLOOD PRESSURE: 118 MMHG | DIASTOLIC BLOOD PRESSURE: 62 MMHG | HEIGHT: 70 IN | WEIGHT: 220 LBS

## 2017-08-15 DIAGNOSIS — R35.0 FREQUENCY OF URINATION: ICD-10-CM

## 2017-08-15 DIAGNOSIS — N39.41 URGE INCONTINENCE: ICD-10-CM

## 2017-08-15 DIAGNOSIS — R35.1 NOCTURIA: Primary | ICD-10-CM

## 2017-08-15 LAB
ANION GAP SERPL CALCULATED.3IONS-SCNC: 16 MMOL/L (ref 7–19)
BUN BLDV-MCNC: 6 MG/DL (ref 8–23)
CALCIUM SERPL-MCNC: 9 MG/DL (ref 8.8–10.2)
CHLORIDE BLD-SCNC: 93 MMOL/L (ref 98–111)
CO2: 25 MMOL/L (ref 22–29)
CREAT SERPL-MCNC: 0.5 MG/DL (ref 0.5–0.9)
GFR NON-AFRICAN AMERICAN: >60
GLUCOSE BLD-MCNC: 82 MG/DL (ref 74–109)
POTASSIUM SERPL-SCNC: 3.7 MMOL/L (ref 3.5–5)
SODIUM BLD-SCNC: 134 MMOL/L (ref 136–145)

## 2017-08-15 PROCEDURE — 99204 OFFICE O/P NEW MOD 45 MIN: CPT | Performed by: UROLOGY

## 2017-08-15 PROCEDURE — 51798 US URINE CAPACITY MEASURE: CPT | Performed by: UROLOGY

## 2017-08-15 RX ORDER — SOLIFENACIN SUCCINATE 5 MG/1
5 TABLET, FILM COATED ORAL DAILY
Qty: 90 TABLET | Refills: 5 | Status: SHIPPED | OUTPATIENT
Start: 2017-08-15 | End: 2019-01-01 | Stop reason: DRUGHIGH

## 2017-08-15 NOTE — PROGRESS NOTES
Subjective    Ms. Croft is 61 y.o. female    CHIEF COMPLAINT: Urgency    The patient comes in today because of symptoms of overactive irritable bladder she has urgency frequency nocturia urge incontinence she is not taking any medications for this before she is not any gross hematuria she is had no recurrent urinary tract infection she is had no significant  operations no stone disease etc.    This started about a year ago along with a peripheral neuropathy which is caused her to be wheelchair bound now.  She had chemotherapy for her colon cancer and she is been seeing Dr. Rubin And he feels from her history that this may be the underlying cause of the peripheral neuropathy.    This is becoming worse and worse she cannot make it to the bathroom at night much more varus in for her etc. her  is doing an excellent job of trying to take care of her she also might be noted has bowel incontinence as well      History of Present Illness      The following portions of the patient's history were reviewed and updated as appropriate: allergies, current medications, past family history, past medical history, past social history, past surgical history and problem list.    Review of Systems   Constitutional: Negative for appetite change, chills, fatigue, fever and unexpected weight change.   HENT: Negative for congestion, dental problem, ear pain, hearing loss, nosebleeds, sinus pressure and trouble swallowing.    Eyes: Negative for pain, discharge, redness and itching.   Respiratory: Negative for apnea, cough, choking and shortness of breath.    Cardiovascular: Negative for chest pain and palpitations.   Gastrointestinal: Negative for abdominal distention, abdominal pain, blood in stool, constipation, diarrhea, nausea and vomiting.   Endocrine: Negative for cold intolerance and heat intolerance.   Genitourinary: Positive for difficulty urinating, frequency and urgency. Negative for dysuria, flank pain and hematuria.    Musculoskeletal: Positive for gait problem. Negative for arthralgias and back pain.   Skin: Negative for pallor, rash and wound.   Allergic/Immunologic: Negative for immunocompromised state.   Neurological: Positive for weakness. Negative for dizziness, tremors, seizures, numbness and headaches.   Hematological: Negative for adenopathy. Does not bruise/bleed easily.   Psychiatric/Behavioral: Negative for agitation, behavioral problems, hallucinations, self-injury and suicidal ideas.         Current Outpatient Prescriptions:   •  amLODIPine (NORVASC) 10 MG tablet, TAKE 1/2 TABLET BY MOUTH TWICE DAILY, Disp: 30 tablet, Rfl: 5  •  amLODIPine (NORVASC) 5 MG tablet, Take 1 tablet by mouth Every 12 (Twelve) Hours., Disp: , Rfl:   •  clonazePAM (KlonoPIN) 1 MG tablet, Take 1 mg by mouth 3 (Three) Times a Day As Needed for Anxiety., Disp: , Rfl:   •  CloNIDine (CATAPRES) 0.1 MG tablet, Take 1 tablet by mouth Every 12 (Twelve) Hours., Disp: , Rfl:   •  DULoxetine (CYMBALTA) 30 MG capsule, Take 30 mg by mouth Daily., Disp: , Rfl:   •  gabapentin (NEURONTIN) 300 MG capsule, Take 300 mg by mouth 3 (Three) Times a Day., Disp: , Rfl:   •  levETIRAcetam (KEPPRA) 1000 MG tablet, Take 1,000 mg by mouth Every 12 (Twelve) Hours., Disp: , Rfl:   •  oxyCODONE (ROXICODONE) 10 MG tablet, Take 10 mg by mouth Every 4 (Four) Hours As Needed for Moderate Pain ., Disp: , Rfl:   •  phenytoin (DILANTIN) 100 MG ER capsule, Take 100 mg by mouth Daily., Disp: , Rfl:   •  phenytoin (DILANTIN) 100 MG ER capsule, Take 200 mg by mouth Every Night., Disp: , Rfl:   •  potassium chloride (K-DUR,KLOR-CON) 20 MEQ CR tablet, Take 20 mEq by mouth Every 12 (Twelve) Hours., Disp: , Rfl:   •  QUEtiapine (SEROquel) 100 MG tablet, Take 1 tablet by mouth 2 (Two) Times a Day., Disp: , Rfl:   •  traZODone (DESYREL) 50 MG tablet, Take 50 mg by mouth Every Night., Disp: , Rfl:   •  solifenacin (VESICARE) 5 MG tablet, Take 1 tablet by mouth Daily., Disp: 90 tablet,  "Rfl: 5    Past Medical History:   Diagnosis Date   • Asthma    • Cancer     colon cancer   • Depression    • Disease of thyroid gland    • Hypertension    • Insomnia    • Seizure    • Sleep apnea        Past Surgical History:   Procedure Laterality Date   • BACK SURGERY     • COLON SURGERY     • GASTRIC BYPASS     • HYSTERECTOMY     • KNEE ARTHROSCOPY      bilateral   • NECK SURGERY     • ORIF WRIST FRACTURE Right 6/9/2017    Procedure: WRIST OPEN REDUCTION INTERNAL FIXATION, RIGHT;  Surgeon: Alec Cardoza MD;  Location: [facility] OR;  Service:    • TONSILLECTOMY     • WRIST FRACTURE SURGERY         Social History     Social History   • Marital status:      Spouse name: Carlito   • Number of children: 1   • Years of education: 18+     Occupational History   • retired      [employer]     Social History Main Topics   • Smoking status: Never Smoker   • Smokeless tobacco: Never Used   • Alcohol use No   • Drug use: No   • Sexual activity: Defer     Other Topics Concern   • None     Social History Narrative       Family History   Problem Relation Age of Onset   • No Known Problems Father    • No Known Problems Mother        Objective    /62  Temp 97.4 °F (36.3 °C)  Ht 70\" (177.8 cm)  Wt 220 lb (99.8 kg)  LMP  (LMP Unknown)  BMI 31.57 kg/m2    Physical Exam   Constitutional: She is oriented to person, place, and time. She appears well-developed and well-nourished. No distress.   HENT:   Head: Normocephalic and atraumatic.   Right Ear: Ear canal normal.   Left Ear: Ear canal normal.   Nose: Nose normal. No nasal deformity. No epistaxis.   Mouth/Throat: Mucous membranes are not pale, not dry and not cyanotic. Normal dentition. No oropharyngeal exudate.   Eyes: Conjunctivae are normal. No scleral icterus.   Neck: Trachea normal. No tracheal tenderness present. No tracheal deviation present. No thyroid mass and no thyromegaly present.   Cardiovascular: Normal rate and regular rhythm.  "   Pulmonary/Chest: Effort normal. No accessory muscle usage. No respiratory distress. Chest wall is not dull to percussion (No flatness or hyperresonance). She exhibits no mass and no tenderness.   On palpation, no tactile fremitus. All movements are symmetric. No intercostal retraction noted.    Abdominal: Soft. Normal appearance. She exhibits no distension and no mass. There is no hepatosplenomegaly. There is no tenderness. No hernia.   Rectal examination or stool specimen is not indicated.    Musculoskeletal:   Normal gait and station. The spine, ribs, and pelvis are examined. No obvious misalignment or asymmetry. ROM is reasonable for age. No instability. No obvious atrophy, flaccidity or spasticity.    Lymphadenopathy:     She has no cervical adenopathy.        Right: No inguinal adenopathy present.        Left: No inguinal adenopathy present.   Neurological: She is alert and oriented to person, place, and time.   She is a peripheral neuropathy is in a wheelchair   Skin: Skin is warm, dry and intact. No lesion and no rash noted. She is not diaphoretic. No cyanosis. No pallor. Nails show no clubbing.   On palpation, there were no induration, subcutaneous nodules, or tightening   Psychiatric: Her speech is normal and behavior is normal. Judgment and thought content normal. Her mood appears not anxious. Her affect is not labile. She does not exhibit a depressed mood.   Upset a depressed because of her peripheral neuropathy crying   Vitals reviewed.        Office Visit on 07/24/2017   Component Date Value Ref Range Status   • WBC 07/24/2017 12.78* 4.80 - 10.80 10*3/mm3 Final   • RBC 07/24/2017 4.37  4.20 - 5.40 10*6/mm3 Final   • Hemoglobin 07/24/2017 12.5  12.0 - 16.0 g/dL Final   • Hematocrit 07/24/2017 40.1  37.0 - 47.0 % Final   • MCV 07/24/2017 91.8  82.0 - 98.0 fL Final   • MCH 07/24/2017 28.6  28.0 - 32.0 pg Final   • MCHC 07/24/2017 31.2* 33.0 - 36.0 g/dL Final   • RDW 07/24/2017 14.5  12.0 - 15.0 % Final    • Platelets 07/24/2017 400  130 - 400 10*3/mm3 Final   • Neutrophil Rel % 07/24/2017 82.5* 39.0 - 78.0 % Final   • Lymphocyte Rel % 07/24/2017 8.5* 15.0 - 45.0 % Final   • Monocyte Rel % 07/24/2017 7.5  4.0 - 12.0 % Final   • Eosinophil Rel % 07/24/2017 0.9  0.0 - 4.0 % Final   • Basophil Rel % 07/24/2017 0.2  0.0 - 2.0 % Final   • Neutrophils Absolute 07/24/2017 10.55* 1.87 - 8.40 10*3/mm3 Final   • Lymphocytes Absolute 07/24/2017 1.08  0.72 - 4.86 10*3/mm3 Final   • Monocytes Absolute 07/24/2017 0.96  0.19 - 1.30 10*3/mm3 Final   • Eosinophils Absolute 07/24/2017 0.11  0.00 - 0.70 10*3/mm3 Final   • Basophils Absolute 07/24/2017 0.03  0.00 - 0.20 10*3/mm3 Final   • Immature Granulocyte Rel % 07/24/2017 0.4  0.0 - 5.0 % Final   • Immature Grans Absolute 07/24/2017 0.05* 0.00 - 0.03 10*3/mm3 Final   • Glucose 07/24/2017 95  70 - 100 mg/dL Final   • BUN 07/24/2017 8  5 - 21 mg/dL Final   • Creatinine 07/24/2017 0.55  0.50 - 1.40 mg/dL Final   • eGFR Non  Am 07/24/2017 112  >60 mL/min/1.73 Final   • eGFR African Am 07/24/2017 136  >60 mL/min/1.73 Final   • BUN/Creatinine Ratio 07/24/2017 14.5  7.0 - 25.0 Final   • Sodium 07/24/2017 130* 135 - 145 mmol/L Final   • Potassium 07/24/2017 5.1  3.5 - 5.3 mmol/L Final   • Chloride 07/24/2017 92* 98 - 110 mmol/L Final   • Total CO2 07/24/2017 26.0  24.0 - 31.0 mmol/L Final   • Calcium 07/24/2017 9.1  8.4 - 10.4 mg/dL Final   • Total Protein 07/24/2017 6.6  6.3 - 8.7 g/dL Final   • Albumin 07/24/2017 3.90  3.50 - 5.00 g/dL Final   • Globulin 07/24/2017 2.7  gm/dL Final   • A/G Ratio 07/24/2017 1.4  1.1 - 2.5 g/dL Final   • Total Bilirubin 07/24/2017 0.5  0.1 - 1.0 mg/dL Final   • Alkaline Phosphatase 07/24/2017 180* 24 - 120 U/L Final   • AST (SGOT) 07/24/2017 19  7 - 45 U/L Final   • ALT (SGPT) 07/24/2017 33  0 - 54 U/L Final       Results for orders placed or performed in visit on 07/24/17   Comprehensive Metabolic Panel   Result Value Ref Range    Glucose 95 70 -  100 mg/dL    BUN 8 5 - 21 mg/dL    Creatinine 0.55 0.50 - 1.40 mg/dL    eGFR Non African Am 112 >60 mL/min/1.73    eGFR African Am 136 >60 mL/min/1.73    BUN/Creatinine Ratio 14.5 7.0 - 25.0    Sodium 130 (L) 135 - 145 mmol/L    Potassium 5.1 3.5 - 5.3 mmol/L    Chloride 92 (L) 98 - 110 mmol/L    Total CO2 26.0 24.0 - 31.0 mmol/L    Calcium 9.1 8.4 - 10.4 mg/dL    Total Protein 6.6 6.3 - 8.7 g/dL    Albumin 3.90 3.50 - 5.00 g/dL    Globulin 2.7 gm/dL    A/G Ratio 1.4 1.1 - 2.5 g/dL    Total Bilirubin 0.5 0.1 - 1.0 mg/dL    Alkaline Phosphatase 180 (H) 24 - 120 U/L    AST (SGOT) 19 7 - 45 U/L    ALT (SGPT) 33 0 - 54 U/L   CBC & Differential   Result Value Ref Range    WBC 12.78 (H) 4.80 - 10.80 10*3/mm3    RBC 4.37 4.20 - 5.40 10*6/mm3    Hemoglobin 12.5 12.0 - 16.0 g/dL    Hematocrit 40.1 37.0 - 47.0 %    MCV 91.8 82.0 - 98.0 fL    MCH 28.6 28.0 - 32.0 pg    MCHC 31.2 (L) 33.0 - 36.0 g/dL    RDW 14.5 12.0 - 15.0 %    Platelets 400 130 - 400 10*3/mm3    Neutrophil Rel % 82.5 (H) 39.0 - 78.0 %    Lymphocyte Rel % 8.5 (L) 15.0 - 45.0 %    Monocyte Rel % 7.5 4.0 - 12.0 %    Eosinophil Rel % 0.9 0.0 - 4.0 %    Basophil Rel % 0.2 0.0 - 2.0 %    Neutrophils Absolute 10.55 (H) 1.87 - 8.40 10*3/mm3    Lymphocytes Absolute 1.08 0.72 - 4.86 10*3/mm3    Monocytes Absolute 0.96 0.19 - 1.30 10*3/mm3    Eosinophils Absolute 0.11 0.00 - 0.70 10*3/mm3    Basophils Absolute 0.03 0.00 - 0.20 10*3/mm3    Immature Granulocyte Rel % 0.4 0.0 - 5.0 %    Immature Grans Absolute 0.05 (H) 0.00 - 0.03 10*3/mm3       Estimation of residual urine via abdominal ultrasound  Residual Urine: 31 ml  Indication: oab  Position: Supine  Examination: Incremental scanning of the suprapubic area using 3 MHz transducer using copious amounts of acoustic gel.   Findings: An anechoic area was demonstrated which represented the bladder, with measurement of residual urine as noted. I inspected this myself. In that the residual urine was stable or insignificant,  no treatment will be necessary at this time.           Assessment and Plan    Bita was seen today for overactive bladder.    Diagnoses and all orders for this visit:    Nocturia    Frequency of urination    Urge incontinence  -     solifenacin (VESICARE) 5 MG tablet; Take 1 tablet by mouth Daily.  Plan--her PVR is relatively low she cannot produce urine force today but again for her history and her exam it looks like she is probably got an uninhibited bladder probably from the peripheral neuropathy as well.    I suggested we try her on some Vesicare 5 mg daily I gave her some samples for to 3 weeks in the week I will prescription in for her.    I did discuss the side effects that she her  if she has problems with that drug she will let me know we will see her back in about 6 weeks make sure she does have some improvement they had no further questions today

## 2017-08-16 ENCOUNTER — TELEPHONE (OUTPATIENT)
Dept: FAMILY MEDICINE CLINIC | Facility: CLINIC | Age: 62
End: 2017-08-16

## 2017-08-16 NOTE — TELEPHONE ENCOUNTER
[facility] called and wanted to know if we had received pt's BMP results from yesterday they are under the care every tab for Dr Gonzales to review [facility]

## 2017-08-16 NOTE — TELEPHONE ENCOUNTER
Let him know   A. Thanks for call  B. Looks ok;   C. If able; still seeing patient at that time repeat 2 week  D. Continue to help patient no use excessive wather

## 2017-08-18 ENCOUNTER — TELEPHONE (OUTPATIENT)
Dept: FAMILY MEDICINE CLINIC | Facility: CLINIC | Age: 62
End: 2017-08-18

## 2017-08-29 LAB
ANION GAP SERPL CALCULATED.3IONS-SCNC: 16 MMOL/L (ref 7–19)
BUN BLDV-MCNC: 11 MG/DL (ref 8–23)
CALCIUM SERPL-MCNC: 9 MG/DL (ref 8.8–10.2)
CHLORIDE BLD-SCNC: 94 MMOL/L (ref 98–111)
CO2: 24 MMOL/L (ref 22–29)
CREAT SERPL-MCNC: 0.6 MG/DL (ref 0.5–0.9)
GFR NON-AFRICAN AMERICAN: >60
GLUCOSE BLD-MCNC: 111 MG/DL (ref 74–109)
POTASSIUM SERPL-SCNC: 3.8 MMOL/L (ref 3.5–5)
SODIUM BLD-SCNC: 134 MMOL/L (ref 136–145)

## 2017-08-31 PROBLEM — Z01.89 LABORATORY TEST: Status: ACTIVE | Noted: 2017-08-31

## 2017-09-07 ENCOUNTER — TELEPHONE (OUTPATIENT)
Dept: FAMILY MEDICINE CLINIC | Facility: CLINIC | Age: 62
End: 2017-09-07

## 2017-09-07 NOTE — TELEPHONE ENCOUNTER
"Spouse called Curt \"she cant move her legs and she drank 15 to 16 bottles of water yesterday and he cant lift her and doesn't want to have to take her to the hospital, whatever they can do at home? Pt is on fluid restriction. Curt advised she had a BMP on 8-29-17 and next lab was due 2 months CBC, Iron level, CMP.   [facility] wants to know if Dr Gonzales wants any labs today, or urine? He will be able to go around 10-10:30 this morning  "

## 2017-09-07 NOTE — TELEPHONE ENCOUNTER
She has got to fluid restrict 1200 cc next couple days  BMP tomorrow  (/lisa) has got to control her drinking

## 2017-09-08 LAB
ANION GAP SERPL CALCULATED.3IONS-SCNC: 12 MMOL/L (ref 7–19)
BUN BLDV-MCNC: 11 MG/DL (ref 8–23)
CALCIUM SERPL-MCNC: 9.2 MG/DL (ref 8.8–10.2)
CHLORIDE BLD-SCNC: 91 MMOL/L (ref 98–111)
CO2: 27 MMOL/L (ref 22–29)
CREAT SERPL-MCNC: 0.6 MG/DL (ref 0.5–0.9)
GFR NON-AFRICAN AMERICAN: >60
GLUCOSE BLD-MCNC: 94 MG/DL (ref 74–109)
POTASSIUM SERPL-SCNC: 4.5 MMOL/L (ref 3.5–5)
SODIUM BLD-SCNC: 130 MMOL/L (ref 136–145)

## 2017-09-12 DIAGNOSIS — R26.9 GAIT DIFFICULTY: Primary | ICD-10-CM

## 2017-09-12 DIAGNOSIS — M47.9 OSTEOARTHRITIS OF SPINE, UNSPECIFIED SPINAL OSTEOARTHRITIS COMPLICATION STATUS, UNSPECIFIED SPINAL REGION: Chronic | ICD-10-CM

## 2017-11-01 ENCOUNTER — HOSPITAL ENCOUNTER (OUTPATIENT)
Dept: HOSPITAL 58 - LAB | Age: 62
Discharge: HOME | End: 2017-11-01
Attending: PAIN MEDICINE

## 2017-11-01 VITALS — BODY MASS INDEX: 32.3 KG/M2

## 2017-11-01 DIAGNOSIS — Z79.891: Primary | ICD-10-CM

## 2017-11-01 PROCEDURE — 36415 COLL VENOUS BLD VENIPUNCTURE: CPT

## 2017-11-03 ENCOUNTER — TELEPHONE (OUTPATIENT)
Dept: FAMILY MEDICINE CLINIC | Facility: CLINIC | Age: 62
End: 2017-11-03

## 2017-11-03 DIAGNOSIS — G40.909 SEIZURE DISORDER (HCC): ICD-10-CM

## 2017-11-03 DIAGNOSIS — F44.4 CONVERSION DISORDER WITH ABNORMAL MOVEMENT: Primary | ICD-10-CM

## 2017-11-03 DIAGNOSIS — R41.0 CONFUSION: ICD-10-CM

## 2017-11-03 DIAGNOSIS — G62.9 NEUROPATHY: ICD-10-CM

## 2017-11-03 NOTE — TELEPHONE ENCOUNTER
Carlito called and states they are having trouble with Dr Salomon and they can not get a straight answer and they are wanting a referral to [facility] In [place] for pt's Neuropathy and  they have been there before and they are wanting to go back Carlito [phone]  [facility] in [place] Fax # [phone] Phone

## 2017-11-09 DIAGNOSIS — R26.9 GAIT DIFFICULTY: Primary | ICD-10-CM

## 2017-11-09 NOTE — TELEPHONE ENCOUNTER
I talked to the [facility] in [place] and I need an order to go with the information that I fax for a referral.

## 2017-11-28 RX ORDER — LEVETIRACETAM 1000 MG/1
TABLET ORAL
Qty: 60 TABLET | Refills: 5 | Status: SHIPPED | OUTPATIENT
Start: 2017-11-28 | End: 2018-04-27 | Stop reason: SDUPTHER

## 2017-12-25 ENCOUNTER — HOSPITAL ENCOUNTER (INPATIENT)
Dept: HOSPITAL 58 - ED | Age: 62
LOS: 2 days | Discharge: HOME | DRG: 153 | End: 2017-12-27
Attending: FAMILY MEDICINE | Admitting: FAMILY MEDICINE

## 2017-12-25 VITALS — BODY MASS INDEX: 31 KG/M2

## 2017-12-25 DIAGNOSIS — J11.1: Primary | ICD-10-CM

## 2017-12-25 DIAGNOSIS — Z79.891: ICD-10-CM

## 2017-12-25 DIAGNOSIS — M54.2: ICD-10-CM

## 2017-12-25 DIAGNOSIS — R26.89: ICD-10-CM

## 2017-12-25 DIAGNOSIS — D70.9: ICD-10-CM

## 2017-12-25 DIAGNOSIS — J20.9: ICD-10-CM

## 2017-12-25 DIAGNOSIS — E86.0: ICD-10-CM

## 2017-12-25 DIAGNOSIS — G89.29: ICD-10-CM

## 2017-12-25 DIAGNOSIS — Z99.3: ICD-10-CM

## 2017-12-25 DIAGNOSIS — R91.1: ICD-10-CM

## 2017-12-25 DIAGNOSIS — Z86.718: ICD-10-CM

## 2017-12-25 DIAGNOSIS — Z79.899: ICD-10-CM

## 2017-12-25 DIAGNOSIS — E87.1: ICD-10-CM

## 2017-12-25 DIAGNOSIS — R06.02: ICD-10-CM

## 2017-12-25 DIAGNOSIS — F50.9: ICD-10-CM

## 2017-12-25 PROCEDURE — 85025 COMPLETE CBC W/AUTO DIFF WBC: CPT

## 2017-12-25 PROCEDURE — 82803 BLOOD GASES ANY COMBINATION: CPT

## 2017-12-25 PROCEDURE — 87651 STREP A DNA AMP PROBE: CPT

## 2017-12-25 PROCEDURE — 99284 EMERGENCY DEPT VISIT MOD MDM: CPT

## 2017-12-25 PROCEDURE — 87880 STREP A ASSAY W/OPTIC: CPT

## 2017-12-25 PROCEDURE — 87040 BLOOD CULTURE FOR BACTERIA: CPT

## 2017-12-25 PROCEDURE — 94640 AIRWAY INHALATION TREATMENT: CPT

## 2017-12-25 PROCEDURE — 87502 INFLUENZA DNA AMP PROBE: CPT

## 2017-12-25 PROCEDURE — 36415 COLL VENOUS BLD VENIPUNCTURE: CPT

## 2017-12-25 PROCEDURE — 96365 THER/PROPH/DIAG IV INF INIT: CPT

## 2017-12-25 PROCEDURE — 80053 COMPREHEN METABOLIC PANEL: CPT

## 2017-12-25 RX ADMIN — OSELTAMIVIR PHOSPHATE SCH MG: 75 CAPSULE ORAL at 23:49

## 2017-12-25 NOTE — CT
Exam:  CT of the chest without contrast 

  

History:  Cough and congestion 

  

Technique:  5 mm CT of the chest without intravascular contrast 

  

FINDINGS:  The lung windows show no infiltrative opacities.  There is a 11 mm nodule in the posterior
 left upper lobe abutting the pleura.  No pleural fluid.  No additional nodules.  The heart, great ve
ssels and pericardium are unremarkable by noncontrast CT.  No pathologic lymph node enlargement media
stinum.  No acute findings of the chest wall soft tissues or bony thorax.  No acute findings of the u
pper abdomen.  Prior gastric surgery. 

  

Impression: 

1.  Left upper lobe spiculated nodule could represent nodular consolidation or neoplasm with the latt
er favored. In the setting of pneumonia symptoms, post-treatment follow-up recommended.  Otherwise, t
issue sampling recommended.

## 2017-12-25 NOTE — ED.PDOC
General


ED Provider: 


Dr. NICOLA TORREZ-ER





Chief Complaint: Respiratory Complaint


Stated Complaint: justen had cough, fever, and im sob --im not eating or drinking


Time Seen by Physician: 21:55


Mode of Arrival: Walk-In


Information Source: Patient


Exam Limitations: No limitations


Primary Care Provider: 


PASCALE VERDIN





Nursing and Triage Documentation Reviewed and Agree: Yes


Reviewed sepsis parameters & appropriate labs ordered?: Yes


System Inflammatory Response Syndrome: Not Applicable


Sepsis Protocol: 


For patient's 13 years and over:





Temp is 96.8 and below  and greater


Pulse >90 BPM


Resp >20/minute


Acutely Altered Mental Status





Are patient's symptoms suggestive of a new infection, such as:


   -Pneumonia


   -Skin, Soft Tissue


   -Endocarditis


   -UTI


   -Bone, Joint Infection


   -Implantable Device


   -Acute Abdominal Infection


   -Wound Infection


   -Meningitis


   -Blood Stream Catheter Infection


   -Unknown








Respiratory Complaint Exam





- Respiratory Complaint/Exam


Onset/Duration: 4 days


Symptoms Are: Still present


Timing: Constant


Initial Severity: Mild


Current Severity: Moderate


Location: Nose, Chest


Character: Reports: Non-productive cough


Aggravating: Reports: URI


Alleviating: Reports: None


Associated Signs and Symptoms: Reports: Dyspnea, Fever, Chills, URI, Nasal 

congestion, Decreased oral intake.  Denies: Rapid breathing, Chest pain, 

Pleuritic chest pain, Wheezing, Hemoptysis, Dizziness, Calf pain, Calf swelling

, Edema, Hoarseness, Sinus discomfort, Vomiting, Sore throat, Weight loss, 

Increased thirst, Increased appetite


History of Healthcare-Acquired Pneumonia: No


Related Surgical History: Reports: None


Pulmonary Embolism Risk Factors: None


Cardiac Risk Factors: Reports: None


Pseudomonas Risk Factors: Reports: None


Tuberculosis Risk Factors: Reports: None


Status Asthmaticus Risk Factors: Reports: None


Home Oxygen Use: No


Home Peak Flow: Recent personal best


Recent Stress Test: No


Recent Echo/LV Function: No


Current Antibiotic Use: Yes


Current Asthma Medication Use: No


Respiratory Distress: None


Inadequate Respiratory Effort: No


Dysphagia Present: No


Stridor Present: No


JVD Present: No


Accessory Muscle Use: No


Retractions: Not Present


Diminished Breath Sounds: No


Sinus Tenderness: None


Grunting Respirations: No


Kussmaul Respirations: No


Differential Diagnoses: Pneumonia, Influenza





Review of Systems





- Review Of Systems


Constitutional: Reports: Chills, Fever, Weakness, Loss of appetite


Eyes: Reports: No symptoms


Ears, Nose, Mouth, Throat: Reports: No symptoms


Respiratory: Reports: Cough, Short of air


Cardiac: Reports: No symptoms


GI: Reports: No symptoms


: Reports: No symptoms


Musculoskeletal: Reports: No symptoms


Skin: Reports: No symptoms


Neurological: Reports: No symptoms


Endocrine: Reports: No symptoms


Hematologic/Lymphatic: Reports: No symptoms


All Other Systems: Reviewed and Negative





Past Medical History





- Past Medical History


Previously Healthy: No


Endocrine: Reports: None


Cardiovascular: Reports: Hypertension


Respiratory: Reports: None


Hematological: Reports: Anemia


Gastrointestinal: Reports: None


Genitourinary: Reports: None


Neuro/Psych: Reports: Seizure, Anxiety, Depression


Musculoskeletal: Reports: Back Pain, Joint Pain


Cancer: Reports: None


Last Menstrual Period: hyst 1990





- Surgical History


General Surgical History: Reports: Tonsillectomy, Back Surgery ( x 2 )





- Family History


Family History: Reports: Unknown





- Social History


Smoking Status: Former smoker


Hx Substance Use: No


Alcohol Screening: None


Lives: With family





- Immunizations


Tetanus Shot up to Date: Yes





Physical Exam





- Physical Exam


Appearance: Well-appearing, No pain distress, Well-nourished


Eyes: ANGELA, EOMI, Conjunctiva clear


ENT: Rhinorrhea


Neck: Supple


Respiratory: Crackles, Rhonchi


Cardiovascular: RRR, Pulses normal, No rub, No murmur


GI/: Soft, Nontender, No masses, Bowel sounds normal, No Organomegaly


Musculoskeletal: Normal strength, ROM intact, No edema, No calf tenderness


Skin: Warm, Dry, Normal color


Neurological: Sensation intact, Motor intact, Reflexes intact, Cranial nerves 

intact, Alert, Oriented


Psychiatric: Affect appropriate, Mood appropriate, Anxious





Interpretation





- Radiology Interpretation


Radiology Interpretation By: Radiologist


Radiology Results: Positive


Exam Interpreted: CT Scan (Pulmonary nodule)





Physician Notification





- Case Discussed


Physician Notified: dr verdin


Time of Notification: 22:51





Critical Care Note





- Critical Care Note


Total Time (mins): 0





Course





- Course


Hematology/Chemistry: 


 12/25/17 22:25





 12/25/17 22:25


Orders, Labs, Meds: 


Lab Review











  12/25/17 12/25/17 12/25/17





  22:06 22:18 22:25


 


WBC    3.82 L


 


RBC    4.45


 


Hgb    12.8


 


Hct    37.6


 


MCV    84.5


 


MCH    28.8


 


MCHC    34.0


 


RDW Coeff of Mayela    14.7


 


Plt Count    198


 


Immature Gran % (Auto)    0.5


 


Neut % (Auto)    73.7


 


Lymph % (Auto)    15.4


 


Mono % (Auto)    8.6


 


Eos % (Auto)    1.3


 


Baso % (Auto)    0.5


 


Immature Gran # (Auto)    0.0


 


Neut #    2.8


 


Lymph #    0.6


 


Mono #    0.3 L


 


Eos #    0.1


 


Baso #    0.0


 


Puncture Site   Lb 


 


O2 Saturation   95.0 


 


ABG pH   7.446 


 


ABG pCO2   33.6 L 


 


ABG pO2   70.0 L 


 


ABG HCO3   23.2 


 


ABG Total CO2   24 


 


ABG Base Excess   -1 


 


Satya Test   + 


 


FiO2 %   21.0 


 


Sodium   


 


Potassium   


 


Chloride   


 


Carbon Dioxide   


 


Anion Gap   


 


BUN   


 


Creatinine   


 


Estimated GFR (MDRD)   


 


BUN/Creatinine Ratio   


 


Glucose   


 


Calcium   


 


Total Bilirubin   


 


AST   


 


ALT   


 


Alkaline Phosphatase   


 


Total Protein   


 


Albumin   


 


Globulin   


 


Albumin/Globulin Ratio   


 


Influenza A (Rapid)  Negative by naat  


 


Influenza B (Rapid)  Positive by naat H  














  12/25/17





  22:25


 


WBC 


 


RBC 


 


Hgb 


 


Hct 


 


MCV 


 


MCH 


 


MCHC 


 


RDW Coeff of Mayela 


 


Plt Count 


 


Immature Gran % (Auto) 


 


Neut % (Auto) 


 


Lymph % (Auto) 


 


Mono % (Auto) 


 


Eos % (Auto) 


 


Baso % (Auto) 


 


Immature Gran # (Auto) 


 


Neut # 


 


Lymph # 


 


Mono # 


 


Eos # 


 


Baso # 


 


Puncture Site 


 


O2 Saturation 


 


ABG pH 


 


ABG pCO2 


 


ABG pO2 


 


ABG HCO3 


 


ABG Total CO2 


 


ABG Base Excess 


 


Satya Test 


 


FiO2 % 


 


Sodium  128 L


 


Potassium  3.7


 


Chloride  92 L


 


Carbon Dioxide  25


 


Anion Gap  14.7


 


BUN  9


 


Creatinine  0.75


 


Estimated GFR (MDRD)  78.00


 


BUN/Creatinine Ratio  12.00


 


Glucose  90


 


Calcium  8.4


 


Total Bilirubin  0.4


 


AST  21


 


ALT  17


 


Alkaline Phosphatase  166 H


 


Total Protein  6.9


 


Albumin  3.1 L


 


Globulin  3.8


 


Albumin/Globulin Ratio  0.82


 


Influenza A (Rapid) 


 


Influenza B (Rapid) 








Orders











 Category Date Time Status


 


 ABG DRAW REQUEST Stat CARDIO  12/25/17 22:18 Completed


 


 ABG Stat LAB  12/25/17 22:18 Completed


 


 BLOOD CULTURE (ED ONLY) Stat LAB  12/25/17 22:25 Received


 


 CBC W/ AUTO DIFF Stat LAB  12/25/17 22:25 Completed


 


 COMPREHENSIVE METABOLIC PANEL Stat LAB  12/25/17 22:25 Completed


 


 MOLECULAR GROUP A STREP Stat LAB  12/25/17 22:06 Results


 


 RAPID FLU A/B Stat LAB  12/25/17 22:06 Completed


 


 STREP SCREEN Stat LAB  12/25/17 22:06 Results


 


 Hydrocodone Bit/Acetaminophen [[place] 7.5-325] MEDS  12/25/17 22:47 Discontinued





 1 tab PO ONCE STA   


 


 CT CHEST W/O CONTRAST Stat RADS  12/25/17 21:59 Completed








Medications














Discontinued Medications














Generic Name Dose Route Start Last Admin





  Trade Name Carolyn  PRN Reason Stop Dose Admin


 


Acetaminophen/Hydrocodone Bitart  1 tab  12/25/17 22:47  





  [place] 7.5-325  PO  12/25/17 22:48  





  ONCE STA   











Vital Signs: 


 











  Temp Pulse Resp BP Pulse Ox


 


 12/25/17 21:57  100.5 F H  96 H  20  143/95 H  97














Departure





- Departure


Time of Disposition: 22:52


Disposition: ADMITTED AS INPATIENT


Discharge Problem: 


 Dehydration, Influenza B





Instructions:  Influenza (ED)


Condition: Fair


Pt referred to PMD for follow-up: Yes


Allergies/Adverse Reactions: 


Allergies





No Known Allergies Allergy (Verified 12/25/17 22:00)


 








Home Medications: 


Ambulatory Orders





Levothyroxine Sodium [Synthroid] 100 mcg PO QDAC 03/13/14 


Losartan Potassium 100 mg PO DAILY 03/13/14 


Metoprolol Tartrate [Lopressor] 50 mg PO BID 03/13/14 


Oxycodone HCl/Acetaminophen [Oxycodone-Acetaminophen ] 2 tab PO Q6HR 03/13 /14 


Quetiapine Fumarate [Seroquel] 50 mg PO BEDTIME 03/13/14 


Venlafaxine HCl [Venlafaxine HCl ER] 2 tab PO BID 03/13/14 


Clonazepam [Clonazepam] 0.5 tab PO QID 08/04/14 


Quetiapine Fumarate [Seroquel] 25 mg PO DAILY 12/25/17 








Disposition Discussed With: Patient

## 2017-12-26 ENCOUNTER — TELEPHONE (OUTPATIENT)
Dept: FAMILY MEDICINE CLINIC | Facility: CLINIC | Age: 62
End: 2017-12-26

## 2017-12-26 ENCOUNTER — OUTSIDE FACILITY SERVICE (OUTPATIENT)
Dept: FAMILY MEDICINE CLINIC | Facility: CLINIC | Age: 62
End: 2017-12-26

## 2017-12-26 ENCOUNTER — DOCUMENTATION (OUTPATIENT)
Dept: FAMILY MEDICINE CLINIC | Facility: CLINIC | Age: 62
End: 2017-12-26

## 2017-12-26 PROBLEM — F11.90 CHRONIC NARCOTIC USE: Status: ACTIVE | Noted: 2017-12-26

## 2017-12-26 PROCEDURE — 99222 1ST HOSP IP/OBS MODERATE 55: CPT | Performed by: FAMILY MEDICINE

## 2017-12-26 RX ORDER — PHENYTOIN SODIUM 100 MG/1
CAPSULE, EXTENDED RELEASE ORAL
Start: 2017-12-26 | End: 2019-01-01 | Stop reason: HOSPADM

## 2017-12-26 RX ADMIN — METOPROLOL TARTRATE SCH MG: 50 TABLET, FILM COATED ORAL at 21:59

## 2017-12-26 RX ADMIN — CLONAZEPAM SCH: 0.5 TABLET ORAL at 17:32

## 2017-12-26 RX ADMIN — BENZONATATE PRN MG: 100 CAPSULE ORAL at 20:46

## 2017-12-26 RX ADMIN — LEVOTHYROXINE SODIUM SCH MCG: 100 TABLET ORAL at 06:16

## 2017-12-26 RX ADMIN — IPRATROPIUM BROMIDE AND ALBUTEROL SULFATE SCH VIAL: .5; 3 SOLUTION RESPIRATORY (INHALATION) at 18:16

## 2017-12-26 RX ADMIN — BENZONATATE PRN MG: 100 CAPSULE ORAL at 00:32

## 2017-12-26 RX ADMIN — OXYCODONE AND ACETAMINOPHEN PRN TAB: 10; 325 TABLET ORAL at 17:37

## 2017-12-26 RX ADMIN — QUETIAPINE FUMARATE SCH MG: 100 TABLET ORAL at 02:48

## 2017-12-26 RX ADMIN — SODIUM CHLORIDE SCH MLS/HR: 0.9 INJECTION, SOLUTION INTRAVENOUS at 02:00

## 2017-12-26 RX ADMIN — CLONAZEPAM SCH: 0.5 TABLET ORAL at 17:37

## 2017-12-26 RX ADMIN — IPRATROPIUM BROMIDE AND ALBUTEROL SULFATE SCH VIAL: .5; 3 SOLUTION RESPIRATORY (INHALATION) at 06:08

## 2017-12-26 RX ADMIN — OSELTAMIVIR PHOSPHATE SCH MG: 75 CAPSULE ORAL at 09:46

## 2017-12-26 RX ADMIN — LOSARTAN POTASSIUM SCH MG: 100 TABLET, FILM COATED ORAL at 09:43

## 2017-12-26 RX ADMIN — QUETIAPINE FUMARATE SCH MG: 100 TABLET ORAL at 20:45

## 2017-12-26 RX ADMIN — ENOXAPARIN SODIUM SCH: 40 INJECTION SUBCUTANEOUS at 17:38

## 2017-12-26 RX ADMIN — IPRATROPIUM BROMIDE AND ALBUTEROL SULFATE SCH VIAL: .5; 3 SOLUTION RESPIRATORY (INHALATION) at 01:04

## 2017-12-26 RX ADMIN — CLONAZEPAM SCH MG: 0.5 TABLET ORAL at 20:44

## 2017-12-26 RX ADMIN — IPRATROPIUM BROMIDE AND ALBUTEROL SULFATE SCH VIAL: .5; 3 SOLUTION RESPIRATORY (INHALATION) at 11:03

## 2017-12-26 RX ADMIN — CLONAZEPAM SCH MG: 0.5 TABLET ORAL at 17:37

## 2017-12-26 RX ADMIN — CLONAZEPAM SCH MG: 0.5 TABLET ORAL at 10:42

## 2017-12-26 RX ADMIN — IPRATROPIUM BROMIDE AND ALBUTEROL SULFATE SCH VIAL: .5; 3 SOLUTION RESPIRATORY (INHALATION) at 21:43

## 2017-12-26 RX ADMIN — METOPROLOL TARTRATE SCH MG: 50 TABLET, FILM COATED ORAL at 09:44

## 2017-12-26 RX ADMIN — OXYCODONE AND ACETAMINOPHEN PRN TAB: 10; 325 TABLET ORAL at 13:01

## 2017-12-26 RX ADMIN — OXYCODONE AND ACETAMINOPHEN PRN TAB: 10; 325 TABLET ORAL at 21:59

## 2017-12-26 RX ADMIN — QUETIAPINE FUMARATE SCH MG: 25 TABLET ORAL at 09:46

## 2017-12-26 RX ADMIN — ENOXAPARIN SODIUM SCH MG: 40 INJECTION SUBCUTANEOUS at 00:30

## 2017-12-26 RX ADMIN — METOPROLOL TARTRATE SCH MG: 50 TABLET, FILM COATED ORAL at 00:30

## 2017-12-26 RX ADMIN — OSELTAMIVIR PHOSPHATE SCH MG: 75 CAPSULE ORAL at 20:45

## 2017-12-26 RX ADMIN — SODIUM CHLORIDE SCH MLS/HR: 0.9 INJECTION, SOLUTION INTRAVENOUS at 17:38

## 2017-12-26 RX ADMIN — VENLAFAXINE HYDROCHLORIDE SCH MG: 75 CAPSULE, EXTENDED RELEASE ORAL at 09:44

## 2017-12-27 ENCOUNTER — TELEPHONE (OUTPATIENT)
Dept: FAMILY MEDICINE CLINIC | Facility: CLINIC | Age: 62
End: 2017-12-27

## 2017-12-27 VITALS — SYSTOLIC BLOOD PRESSURE: 134 MMHG | TEMPERATURE: 97.5 F | DIASTOLIC BLOOD PRESSURE: 72 MMHG

## 2017-12-27 PROCEDURE — 99238 HOSP IP/OBS DSCHRG MGMT 30/<: CPT | Performed by: FAMILY MEDICINE

## 2017-12-27 RX ORDER — OSELTAMIVIR PHOSPHATE 75 MG/1
75 CAPSULE ORAL 2 TIMES DAILY
Qty: 8 CAPSULE | Refills: 0 | OUTPATIENT
Start: 2017-12-27 | End: 2018-02-13

## 2017-12-27 RX ORDER — CEFDINIR 300 MG/1
300 CAPSULE ORAL 2 TIMES DAILY
Qty: 18 CAPSULE | Refills: 0 | Status: SHIPPED | OUTPATIENT
Start: 2017-12-27 | End: 2018-02-13

## 2017-12-27 RX ORDER — METHYLPREDNISOLONE 4 MG/1
TABLET ORAL
Qty: 21 EACH | Refills: 0 | Status: SHIPPED | OUTPATIENT
Start: 2017-12-27 | End: 2018-02-13

## 2017-12-27 RX ORDER — ALBUTEROL SULFATE 90 UG/1
1 AEROSOL, METERED RESPIRATORY (INHALATION)
Qty: 1 INHALER | Refills: 0 | Status: SHIPPED | OUTPATIENT
Start: 2017-12-27 | End: 2018-02-12 | Stop reason: SDUPTHER

## 2017-12-27 RX ADMIN — OXYCODONE AND ACETAMINOPHEN PRN TAB: 10; 325 TABLET ORAL at 05:37

## 2017-12-27 RX ADMIN — VENLAFAXINE HYDROCHLORIDE SCH MG: 75 CAPSULE, EXTENDED RELEASE ORAL at 08:42

## 2017-12-27 RX ADMIN — OSELTAMIVIR PHOSPHATE SCH MG: 75 CAPSULE ORAL at 08:43

## 2017-12-27 RX ADMIN — LEVOTHYROXINE SODIUM SCH MCG: 100 TABLET ORAL at 05:37

## 2017-12-27 RX ADMIN — LOSARTAN POTASSIUM SCH MG: 100 TABLET, FILM COATED ORAL at 08:43

## 2017-12-27 RX ADMIN — SODIUM CHLORIDE SCH MLS/HR: 0.9 INJECTION, SOLUTION INTRAVENOUS at 07:07

## 2017-12-27 RX ADMIN — CLONAZEPAM SCH MG: 0.5 TABLET ORAL at 08:43

## 2017-12-27 RX ADMIN — OXYCODONE AND ACETAMINOPHEN PRN TAB: 10; 325 TABLET ORAL at 01:38

## 2017-12-27 RX ADMIN — METOPROLOL TARTRATE SCH MG: 50 TABLET, FILM COATED ORAL at 08:43

## 2017-12-27 RX ADMIN — IPRATROPIUM BROMIDE AND ALBUTEROL SULFATE SCH VIAL: .5; 3 SOLUTION RESPIRATORY (INHALATION) at 11:06

## 2017-12-27 RX ADMIN — QUETIAPINE FUMARATE SCH MG: 25 TABLET ORAL at 08:43

## 2017-12-27 RX ADMIN — OXYCODONE AND ACETAMINOPHEN PRN TAB: 10; 325 TABLET ORAL at 13:43

## 2017-12-27 RX ADMIN — IPRATROPIUM BROMIDE AND ALBUTEROL SULFATE SCH VIAL: .5; 3 SOLUTION RESPIRATORY (INHALATION) at 05:12

## 2017-12-27 RX ADMIN — CLONAZEPAM SCH MG: 0.5 TABLET ORAL at 13:42

## 2017-12-27 RX ADMIN — OXYCODONE AND ACETAMINOPHEN PRN TAB: 10; 325 TABLET ORAL at 09:44

## 2017-12-27 NOTE — PROGRESS NOTES
"[facility]  HISTORY AND PHYSICIAL    Patient ID: Bita Croft  MRN: [id number]     Acct:  [id number]  Admit Date: 12.26.2017  Date of service: 12.26.2017    SOURCE: The source of this information is prior knowledge of the patient, review of her office records, her current chart, as well as discussion with ED/she; she is considered reliable.      PATIENT PROFILE: The patient is a 61 y/o white  female resident of [place]; she was cooperative.      CHIEF COMPLAINT: \"I have the flu\"     HISTORY OF PRESENT ILLNESS: Onset 12.22.17 sinus congestion, sore throat, diffuse headache, some body ache and cough; dry without wheeze, sob, chronic lung disease.  Came to ED/ in hospital with flu B; she was proven + same. CT chest showed AGUILAR spiculated nodule.  Admit suggested ED/arranged.     CHILDHOOD: unremarkable.      ALLERGIES:   No Known Allergies    HOME MEDICATIONS:  Prior to Admission medications    Medication Sig Start Date End Date Taking? Authorizing Provider   amLODIPine (NORVASC) 5 MG tablet Take 1 tablet by mouth Every 12 (Twelve) Hours. 7/17/17   Zeeshan Hester MD   clonazePAM (KlonoPIN) 1 MG tablet Take 1 mg by mouth 3 (Three) Times a Day As Needed for Anxiety.    Historical Provider, MD   CloNIDine (CATAPRES) 0.1 MG tablet Take 1 tablet by mouth Every 12 (Twelve) Hours. 7/17/17   Zeeshan Hester MD   DULoxetine (CYMBALTA) 30 MG capsule Take 30 mg by mouth Daily.    Historical Provider, MD   gabapentin (NEURONTIN) 300 MG capsule Take 300 mg by mouth 3 (Three) Times a Day.    Historical Provider, MD   levETIRAcetam (KEPPRA) 1000 MG tablet TAKE 1 TABLET BY MOUTH TWICE DAILY 11/28/17   Josafat Gonzales MD   oxyCODONE (ROXICODONE) 10 MG tablet Take 10 mg by mouth Every 4 (Four) Hours As Needed for Moderate Pain .    Historical Provider, MD   phenytoin (DILANTIN) 100 MG ER capsule 100 mg in am and 200 mg in pm 12/26/17   Josafat Gonzales MD   potassium chloride " (K-DUR,KLOR-CON) 20 MEQ CR tablet Take 20 mEq by mouth Every 12 (Twelve) Hours.    Historical Provider, MD   QUEtiapine (SEROquel) 100 MG tablet Take 1 tablet by mouth 2 (Two) Times a Day. 17   Josafat Gonzales MD   solifenacin (VESICARE) 5 MG tablet Take 1 tablet by mouth Daily. 8/15/17   Pawel Ho MD   traZODone (DESYREL) 50 MG tablet Take 50 mg by mouth Every Night.    Historical Provider, MD   amLODIPine (NORVASC) 10 MG tablet TAKE 1/2 TABLET BY MOUTH TWICE DAILY 17  Josafat Gonzales MD   phenytoin (DILANTIN) 100 MG ER capsule Take 100 mg by mouth Daily. 16  Historical Provider, MD   phenytoin (DILANTIN) 100 MG ER capsule Take 200 mg by mouth Every Night.  17  Historical Provider, MD     HOSPITALIZATION/SURGERY/PROCEDURES:   PROCEDURES:   SCANNED  [id number]  Colonoscopy+tort-mass/[facility]//8..14/BE  EGD+nl/[facility]//15/UGI  Colonoscopy+polyp/[facility]//9.21.15/3y    SURGERIES:  T&A/age 11  R knee/[facility]//age 13  SANDRA+BSO/[facility]/age 32  T12/Mary/Lorena/  Gastric by-pass//  L knee/Martinez/Lorena/  C surgery/Tequila/Dorothea/06  C surgery/Tequila/Dorothea/-  Lap GB/[facility]/Armando/3-18-10  L arthroscope/  R lap colectomy/[facility]ellows/.14   ORIF L wrist/Nj//.16     MEDICAL ADMISSIONS:  MMH:   11.8.08-11.12.08-hyponatremia  2.8.10-2.9.10-L flank pain  3.13.14-3.15.14 hever/TIA    BH:   7.21-7.23.95 abdominal pain  2.19.08-2.21.08-DVT  2.9.10-2.11.10-abdominal pain/intercostal  3.11.10-3.19.10- hever-abdominal pain+SIADH  3.24.15-3.30.15 diarrhea  5.26.15-5.31.15 nausea/vomiting/diarrhea  7.12.17-7.17.17 accidential injection water bottle cap    LH:   10.26.16-11.6.16 seizure    FAMILY HISTORY:  Hypertension mother, maternal aunt, father, paternal  grandfather; heart disease in mother, father, maternal grandfather, maternal  grandmother, paternal grandmother; diabetes in maternal uncle.      HABITS:  Never smoked, only limited  second-hand smoke exposure.  No alcohol or  drugs.      SOCIAL HISTORY:   in 1987 to her current , has 1 son.  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    Review of Systems    GENERAL:  Inactive/slower with limits, speed, stamina for age and LE weakness; most of time in wheelchair and assistance with tranfer. Sleep is restless with this cough. No fever now.  ENDO:  No syncope, near or diaphoretic sweaty spells..  HEENT: No head injury diffuse headache,  No vision change, No significant hearing loss.  Ears without pain/drainage.  No sore throat.   Increased nasal/sinus congestion/drainage. No epistaxis.  CHEST: No chest wall tenderness or mass.  Increased as no usual cough; dry cough, with occ wheeze.  No SOB; no hemoptysis.  CV: No chest pain, palpitations, ankle edema.  GI: No heartburn, dysphagia.  No abdominal pain, diarrhea, constipation.  No rectal bleeding, or melena.    :  Voids without dysuria, or incontinence to completion.  ORTHO: No painful/swollen joints but various on /off sore.  No change daily sore neck or back.  No acute neck or back pain without recent injury.   NEURO: No dizziness; same marked multi-speciality reviewed weakness of extremities.  Same LE numbness/paresthesias.   PSYCH: No memory loss.  Mood good; usually anxious, depressed but/and not suicidal.  Sees psych.    PHYSICAL EXAMINATION:  Wt 216, Ht 5’ 10”  /83  P 99  R 18  T 97    GENERAL:  Well nourished/developed in no acute distress. Obese   SKIN: Turgor excellent, without wound, rash, lesion  HEENT: Normal cephalic without trauma.  Pupils equal round reactive to light. Extraocular motions full without nystagmus.   External canals nonobstructive nontender without reddness. Tymphatic membranes chris with jaskaran structures intact.   Oral cavity without growths, exudates, and moist.  Posterior pharynx without mass, obstruction, redness.  No thyromegaly, mass, tenderness,  lymphadenopathy and supple.  CV: Regular rhythm.  No murmur, gallop,  edema. Posterior pulses intact.  No carotid bruits.  CHEST: Diffuse chest wall tenderness or mass.   LUNGS: Symmetric motion with clear to auscultation.  No dullness to percussion  ABD: Soft, nontender without mass.   ORTHO: Symmetric extremities without swelling/point tenderness.  Full gross range of motion; weakly especially LE.  NEURO: CN 2-12 grossly intact.  Symmetric facies. 1/4 x bicep 0/4 LE equal reflexes.  UE/LE   2-3/5 strength throughout.  Nonfocal use extremities. Speech clear.     PSYCH: Oriented x 3.  Pleasant calm, well kept.  Purposeful/directed conservation with intact short/long gross memory.      ASSESSMENT/PROBLEM LIST:   A 62-year-old white female:  Allergy/intolerances:  See above.  Procedural history:  See above.  Family history:  See above.  Obesity.  This is status post gastric bypass initial failure and regain of  weight.     1 para 1 AB 0.    Surgical menopause.  Osteoporosis on previous wrist study.    Hypertension.    Hypothyroidism.    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    History of DVT - left peroneal popliteal, 2008, treated  successfully with anticoagulation.   Brief anticoagulation.  Chronic insomnia.  Spinal spondylosis; this is primarily cervical with multiple surgeries.  Chronic neck pain.    Chronic narcotics.  Chronic depression - followed by Dr. Samaniego.    Degenerative joint disease that is diffuse.    History of gastric bypass and metabolic risk it carries.    Colon cancer history with previous surgery and chemo, no obvious  recurrence.  Gastroesophageal reflux.  Documented neuropathy by EMG nerve conduction  Seizure disorder (2016)  antieliptics-Keppra/Dilantin  Gait decline      REASON FOR ADMISSION:    Shortness of breath  Cough  Flu B  Bronchitis-acute  Abnormal CT chest-AGUILAR  spiculated nodule  Chest wall pain  Hyponatremia 128  Neutropenia 2.54    PLANS:   Rx-reviewed; ordered as needed and will review daily/as needed.   Includes anticoagulation for DVT prevention.   LAB-reviewed; ordered as needed and will review daily/as needed  Imaging-reviewed; ordered as needed and will review daily/as needed  Consults none  DC planning: home when better  Code status: full

## 2018-01-01 ENCOUNTER — TELEPHONE (OUTPATIENT)
Dept: FAMILY MEDICINE CLINIC | Facility: CLINIC | Age: 63
End: 2018-01-01

## 2018-01-01 ENCOUNTER — HOSPITAL ENCOUNTER (OUTPATIENT)
Dept: RADIATION ONCOLOGY | Facility: HOSPITAL | Age: 63
Discharge: HOME | End: 2018-12-04

## 2018-01-01 ENCOUNTER — TELEPHONE (OUTPATIENT)
Dept: CARDIAC SURGERY | Facility: CLINIC | Age: 63
End: 2018-01-01

## 2018-01-01 ENCOUNTER — HOSPITAL ENCOUNTER (OUTPATIENT)
Dept: RADIATION ONCOLOGY | Facility: HOSPITAL | Age: 63
Setting detail: RADIATION/ONCOLOGY SERIES
End: 2018-01-01

## 2018-01-01 ENCOUNTER — HOSPITAL ENCOUNTER (OUTPATIENT)
Dept: RADIATION ONCOLOGY | Facility: HOSPITAL | Age: 63
Discharge: HOME | End: 2018-12-12

## 2018-01-01 ENCOUNTER — OFFICE VISIT (OUTPATIENT)
Dept: FAMILY MEDICINE CLINIC | Facility: CLINIC | Age: 63
End: 2018-01-01

## 2018-01-01 ENCOUNTER — CONSULT (OUTPATIENT)
Dept: RADIATION ONCOLOGY | Facility: HOSPITAL | Age: 63
End: 2018-01-01

## 2018-01-01 ENCOUNTER — HOSPITAL ENCOUNTER (OUTPATIENT)
Dept: RADIATION ONCOLOGY | Facility: HOSPITAL | Age: 63
Discharge: HOME | End: 2018-12-07

## 2018-01-01 ENCOUNTER — HOSPITAL ENCOUNTER (OUTPATIENT)
Dept: RADIATION ONCOLOGY | Facility: HOSPITAL | Age: 63
Discharge: HOME | End: 2018-11-28

## 2018-01-01 VITALS
TEMPERATURE: 98.4 F | HEART RATE: 100 BPM | DIASTOLIC BLOOD PRESSURE: 94 MMHG | OXYGEN SATURATION: 98 % | RESPIRATION RATE: 18 BRPM | SYSTOLIC BLOOD PRESSURE: 130 MMHG

## 2018-01-01 VITALS
HEIGHT: 70 IN | DIASTOLIC BLOOD PRESSURE: 84 MMHG | BODY MASS INDEX: 32.21 KG/M2 | WEIGHT: 225 LBS | SYSTOLIC BLOOD PRESSURE: 133 MMHG | OXYGEN SATURATION: 96 %

## 2018-01-01 DIAGNOSIS — M54.9 CHRONIC BACK PAIN, UNSPECIFIED BACK LOCATION, UNSPECIFIED BACK PAIN LATERALITY: Chronic | ICD-10-CM

## 2018-01-01 DIAGNOSIS — R26.9 GAIT DISORDER: Primary | ICD-10-CM

## 2018-01-01 DIAGNOSIS — Z78.9 NON-SMOKER: ICD-10-CM

## 2018-01-01 DIAGNOSIS — F44.4 CONVERSION DISORDER WITH ABNORMAL MOVEMENT: ICD-10-CM

## 2018-01-01 DIAGNOSIS — Z71.9 ENCOUNTER FOR CONSULTATION: ICD-10-CM

## 2018-01-01 DIAGNOSIS — E03.9 HYPOTHYROIDISM, UNSPECIFIED TYPE: Chronic | ICD-10-CM

## 2018-01-01 DIAGNOSIS — R21 RASH: ICD-10-CM

## 2018-01-01 DIAGNOSIS — I10 ESSENTIAL HYPERTENSION: ICD-10-CM

## 2018-01-01 DIAGNOSIS — G89.29 CHRONIC BACK PAIN, UNSPECIFIED BACK LOCATION, UNSPECIFIED BACK PAIN LATERALITY: Chronic | ICD-10-CM

## 2018-01-01 DIAGNOSIS — Z85.038 HX OF COLON CANCER, STAGE IV: ICD-10-CM

## 2018-01-01 DIAGNOSIS — I10 HYPERTENSION, UNSPECIFIED TYPE: ICD-10-CM

## 2018-01-01 DIAGNOSIS — R91.1 LUNG NODULE, SOLITARY: Primary | ICD-10-CM

## 2018-01-01 DIAGNOSIS — R73.01 ELEVATED FASTING GLUCOSE: Chronic | ICD-10-CM

## 2018-01-01 DIAGNOSIS — R91.1 LUNG NODULE, SOLITARY: ICD-10-CM

## 2018-01-01 DIAGNOSIS — F41.9 ANXIETY: Primary | Chronic | ICD-10-CM

## 2018-01-01 PROCEDURE — 96372 THER/PROPH/DIAG INJ SC/IM: CPT | Performed by: FAMILY MEDICINE

## 2018-01-01 PROCEDURE — 77290 THER RAD SIMULAJ FIELD CPLX: CPT | Performed by: RADIOLOGY

## 2018-01-01 PROCEDURE — 77300 RADIATION THERAPY DOSE PLAN: CPT | Performed by: RADIOLOGY

## 2018-01-01 PROCEDURE — 77373 STRTCTC BDY RAD THER TX DLVR: CPT | Performed by: RADIOLOGY

## 2018-01-01 PROCEDURE — 77334 RADIATION TREATMENT AID(S): CPT | Performed by: RADIOLOGY

## 2018-01-01 PROCEDURE — 99214 OFFICE O/P EST MOD 30 MIN: CPT | Performed by: FAMILY MEDICINE

## 2018-01-01 PROCEDURE — 77336 RADIATION PHYSICS CONSULT: CPT | Performed by: RADIOLOGY

## 2018-01-01 PROCEDURE — 77470 SPECIAL RADIATION TREATMENT: CPT | Performed by: RADIOLOGY

## 2018-01-01 PROCEDURE — 77293 RESPIRATOR MOTION MGMT SIMUL: CPT | Performed by: RADIOLOGY

## 2018-01-01 PROCEDURE — 77295 3-D RADIOTHERAPY PLAN: CPT | Performed by: RADIOLOGY

## 2018-01-01 RX ORDER — CLONAZEPAM 1 MG/1
1 TABLET ORAL 3 TIMES DAILY PRN
Qty: 45 TABLET | Refills: 0 | Status: SHIPPED | OUTPATIENT
Start: 2018-01-01 | End: 2018-01-01 | Stop reason: SDUPTHER

## 2018-01-01 RX ORDER — OXYCODONE HYDROCHLORIDE 10 MG/1
10 TABLET ORAL EVERY 4 HOURS PRN
Qty: 30 TABLET | Refills: 0 | Status: ON HOLD | OUTPATIENT
Start: 2018-01-01 | End: 2019-01-01 | Stop reason: SDUPTHER

## 2018-01-01 RX ORDER — DEXAMETHASONE SODIUM PHOSPHATE 10 MG/ML
10 INJECTION INTRAMUSCULAR; INTRAVENOUS ONCE
Status: DISCONTINUED | OUTPATIENT
Start: 2018-01-01 | End: 2018-01-01

## 2018-01-01 RX ORDER — MORPHINE SULFATE 15 MG/1
1 TABLET, EXTENDED RELEASE ORAL NIGHTLY
Qty: 15 EACH | Refills: 0 | Status: ON HOLD | OUTPATIENT
Start: 2018-01-01 | End: 2019-01-01 | Stop reason: SDUPTHER

## 2018-01-01 RX ORDER — OXYCODONE HYDROCHLORIDE 10 MG/1
10 TABLET ORAL EVERY 4 HOURS PRN
Qty: 90 TABLET | Refills: 0 | Status: SHIPPED | OUTPATIENT
Start: 2018-01-01 | End: 2018-01-01 | Stop reason: SDUPTHER

## 2018-01-01 RX ORDER — OXYCODONE HYDROCHLORIDE AND ACETAMINOPHEN 5; 325 MG/1; MG/1
2 TABLET ORAL EVERY 4 HOURS PRN
Qty: 180 TABLET | Refills: 0 | Status: ON HOLD | OUTPATIENT
Start: 2018-01-01 | End: 2019-01-01 | Stop reason: SDUPTHER

## 2018-01-01 RX ORDER — METOPROLOL TARTRATE 50 MG/1
50 TABLET, FILM COATED ORAL DAILY
Qty: 30 TABLET | Refills: 5 | Status: SHIPPED | OUTPATIENT
Start: 2018-01-01 | End: 2019-01-01 | Stop reason: HOSPADM

## 2018-01-01 RX ORDER — CYCLOBENZAPRINE HCL 10 MG
1 TABLET ORAL 3 TIMES DAILY
Refills: 0 | Status: ON HOLD | COMMUNITY
Start: 2018-01-01 | End: 2019-01-01 | Stop reason: SDUPTHER

## 2018-01-01 RX ORDER — OXYCODONE HYDROCHLORIDE 10 MG/1
1 TABLET ORAL EVERY 4 HOURS PRN
Refills: 0 | COMMUNITY
Start: 2018-10-13 | End: 2018-01-01 | Stop reason: SDUPTHER

## 2018-01-01 RX ORDER — WATER / MINERAL OIL / WHITE PETROLATUM 16 OZ
CREAM TOPICAL 2 TIMES DAILY
Qty: 240 G | Refills: 1 | Status: ON HOLD | OUTPATIENT
Start: 2018-01-01 | End: 2019-01-01

## 2018-01-01 RX ORDER — TRAZODONE HYDROCHLORIDE 50 MG/1
50 TABLET ORAL NIGHTLY
Qty: 15 TABLET | Refills: 0 | Status: SHIPPED | OUTPATIENT
Start: 2018-01-01 | End: 2019-01-01 | Stop reason: HOSPADM

## 2018-01-01 RX ORDER — MORPHINE SULFATE 15 MG/1
1 TABLET, EXTENDED RELEASE ORAL NIGHTLY
Refills: 0 | COMMUNITY
Start: 2018-01-01 | End: 2018-01-01 | Stop reason: SDUPTHER

## 2018-01-01 RX ORDER — METHYLPREDNISOLONE 4 MG/1
TABLET ORAL
Qty: 1 EACH | Refills: 0 | Status: SHIPPED | OUTPATIENT
Start: 2018-01-01 | End: 2018-01-01

## 2018-01-01 RX ORDER — OXYCODONE HYDROCHLORIDE 10 MG/1
10 TABLET ORAL EVERY 4 HOURS PRN
Qty: 30 TABLET | Refills: 0 | Status: SHIPPED | OUTPATIENT
Start: 2018-01-01 | End: 2018-01-01 | Stop reason: SDUPTHER

## 2018-01-01 RX ORDER — DEXAMETHASONE SODIUM PHOSPHATE 4 MG/ML
10 INJECTION, SOLUTION INTRA-ARTICULAR; INTRALESIONAL; INTRAMUSCULAR; INTRAVENOUS; SOFT TISSUE ONCE
Status: COMPLETED | OUTPATIENT
Start: 2018-01-01 | End: 2018-01-01

## 2018-01-01 RX ORDER — CLONAZEPAM 1 MG/1
1 TABLET ORAL 3 TIMES DAILY PRN
Qty: 12 TABLET | Refills: 0 | Status: ON HOLD | OUTPATIENT
Start: 2018-01-01 | End: 2019-01-01 | Stop reason: SDUPTHER

## 2018-01-01 RX ADMIN — DEXAMETHASONE SODIUM PHOSPHATE 10 MG: 4 INJECTION, SOLUTION INTRA-ARTICULAR; INTRALESIONAL; INTRAMUSCULAR; INTRAVENOUS; SOFT TISSUE at 15:23

## 2018-01-02 RX ORDER — AMLODIPINE BESYLATE 10 MG/1
TABLET ORAL
Qty: 30 TABLET | Refills: 5 | Status: SHIPPED | OUTPATIENT
Start: 2018-01-02 | End: 2018-02-13 | Stop reason: DRUGHIGH

## 2018-01-03 ENCOUNTER — DOCUMENTATION (OUTPATIENT)
Dept: FAMILY MEDICINE CLINIC | Facility: CLINIC | Age: 63
End: 2018-01-03

## 2018-01-04 ENCOUNTER — TELEPHONE (OUTPATIENT)
Dept: FAMILY MEDICINE CLINIC | Facility: CLINIC | Age: 63
End: 2018-01-04

## 2018-01-04 NOTE — PROGRESS NOTES
"[facility]  HISTORY AND PHYSICIAL     Patient ID: Bita Croft  MRN: [id number]                                                                Acct:  [id number]  Admit Date: 12.26.2017  Date of discharge: 12.27.2017  Date of service: 12.27.2017     SOURCE: The source of this information is prior knowledge of the patient, review of her office records, her current chart, as well as discussion with ED/she; she is considered reliable.       PATIENT PROFILE: The patient is a 61 y/o white  female resident of [place]; she was cooperative.       CHIEF COMPLAINT: \"I have the flu\"      HISTORY OF PRESENT ILLNESS: Onset 12.22.17 sinus congestion, sore throat, diffuse headache, some body ache and cough; dry without wheeze, sob, chronic lung disease.  Came to ED/ in hospital with flu B; she was proven + same. CT chest showed AGUILAR spiculated nodule.  Admit suggested ED/arranged.      CHILDHOOD: unremarkable.       ALLERGIES:   No Known Allergies     HOME MEDICATIONS:          Prior to Admission medications    Medication Sig Start Date End Date Taking? Authorizing Provider   amLODIPine (NORVASC) 5 MG tablet Take 1 tablet by mouth Every 12 (Twelve) Hours. 7/17/17     Zeeshan Hester MD   clonazePAM (KlonoPIN) 1 MG tablet Take 1 mg by mouth 3 (Three) Times a Day As Needed for Anxiety.       Historical Provider, MD   CloNIDine (CATAPRES) 0.1 MG tablet Take 1 tablet by mouth Every 12 (Twelve) Hours. 7/17/17     Zeeshan Hester MD   DULoxetine (CYMBALTA) 30 MG capsule Take 30 mg by mouth Daily.       Historical Provider, MD   gabapentin (NEURONTIN) 300 MG capsule Take 300 mg by mouth 3 (Three) Times a Day.       Historical Provider, MD   levETIRAcetam (KEPPRA) 1000 MG tablet TAKE 1 TABLET BY MOUTH TWICE DAILY 11/28/17     Josafat Gonzales MD   oxyCODONE (ROXICODONE) 10 MG tablet Take 10 mg by mouth Every 4 (Four) Hours As Needed for Moderate Pain .       Historical Provider, MD   phenytoin " (DILANTIN) 100 MG ER capsule 100 mg in am and 200 mg in pm 17     Josafat Gonzales MD   potassium chloride (K-DUR,KLOR-CON) 20 MEQ CR tablet Take 20 mEq by mouth Every 12 (Twelve) Hours.       Historical Provider, MD   QUEtiapine (SEROquel) 100 MG tablet Take 1 tablet by mouth 2 (Two) Times a Day. 17     Josafat Gonzales MD   solifenacin (VESICARE) 5 MG tablet Take 1 tablet by mouth Daily. 8/15/17     Pawel Ho MD   traZODone (DESYREL) 50 MG tablet Take 50 mg by mouth Every Night.       Historical Provider, MD   amLODIPine (NORVASC) 10 MG tablet TAKE 1/2 TABLET BY MOUTH TWICE DAILY 17   Josafat Gonzales MD   phenytoin (DILANTIN) 100 MG ER capsule Take 100 mg by mouth Daily. 16   Historical Provider, MD   phenytoin (DILANTIN) 100 MG ER capsule Take 200 mg by mouth Every Night.   17   Historical Provider, MD      HOSPITALIZATION/SURGERY/PROCEDURES:   PROCEDURES:   SCANNED  [id number]  Colonoscopy+tort-mass/[facility]//.14/BE  EGD+nl/P//5.28.15/UGI  Colonoscopy+polyp/[facility]//9.15/3y     SURGERIES:  T&A/age 11  R knee/H Hunt/LH/age 13  SANDRA+BSO/[facility]/age 32  T12/Mary//  Gastric by-pass//  L knee/Martinez/Lorena/  C surgery/Tequila/Dorothea/06  C surgery/Tequila/Dorothea/08  Lap GB/WB/Armando/3-18-10  L arthroscope/  R lap colectomy/Dorothea/.   ORIF L wrist/Nj//.16     MEDICAL ADMISSIONS:  MM:   11.8.08-11.12.08-hyponatremia  2.8.10-2.9.10-L flank pain  3.13.14-3.15.14 hever/TIA     BH:   7.21-7.23.95 abdominal pain  2.19.08-2.21.08-DVT  2.9.10-2.11.10-abdominal pain/intercostal  3.11.10-3.19.10- hever-abdominal pain+SIADH  3.24.15-3.30.15 diarrhea  5.26.15-5.31.15 nausea/vomiting/diarrhea  7.12.17-7.17.17 accidential injection water bottle cap     LH:   10.26.16-11.6.16 seizure     FAMILY HISTORY:  Hypertension mother, maternal aunt, father, paternal  grandfather; heart disease in mother, father,  maternal grandfather, maternal  grandmother, paternal grandmother; diabetes in maternal uncle.      HABITS:  Never smoked, only limited second-hand smoke exposure.  No alcohol or  drugs.      SOCIAL HISTORY:   in 1987 to her current , has 1 son.  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     Review of Systems     GENERAL:  Inactive/slower with limits, speed, stamina for age and LE weakness; most of time in wheelchair and assistance with tranfer. Sleep is restless with this cough. No fever now.  ENDO:  No syncope, near or diaphoretic sweaty spells..  HEENT: No head injury diffuse headache,  No vision change, No significant hearing loss.  Ears without pain/drainage.  No sore throat.   Increased nasal/sinus congestion/drainage. No epistaxis.  CHEST: No chest wall tenderness or mass.  Increased as no usual cough; dry cough, with occ wheeze.  No SOB; no hemoptysis.  CV: No chest pain, palpitations, ankle edema.  GI: No heartburn, dysphagia.  No abdominal pain, diarrhea, constipation.  No rectal bleeding, or melena.    :  Voids without dysuria, or incontinence to completion.  ORTHO: No painful/swollen joints but various on /off sore.  No change daily sore neck or back.  No acute neck or back pain without recent injury.   NEURO: No dizziness; same marked multi-speciality reviewed weakness of extremities.  Same LE numbness/paresthesias.   PSYCH: No memory loss.  Mood good; usually anxious, depressed but/and not suicidal.  Sees psych.     PHYSICAL EXAMINATION:  Wt 216, Ht 5’ 10”  /83  P 99  R 18  T 97     GENERAL:  Well nourished/developed in no acute distress. Obese   SKIN: Turgor excellent, without wound, rash, lesion  HEENT: Normal cephalic without trauma.  Pupils equal round reactive to light. Extraocular motions full without nystagmus.   External canals nonobstructive nontender without reddness. Tymphatic membranes chris with jaskaran structures intact.    Oral cavity without growths, exudates, and moist.  Posterior pharynx without mass, obstruction, redness.  No thyromegaly, mass, tenderness, lymphadenopathy and supple.  CV: Regular rhythm.  No murmur, gallop,  edema. Posterior pulses intact.  No carotid bruits.  CHEST: Diffuse chest wall tenderness or mass.   LUNGS: Symmetric motion with clear to auscultation.  No dullness to percussion  ABD: Soft, nontender without mass.   ORTHO: Symmetric extremities without swelling/point tenderness.  Full gross range of motion; weakly especially LE.  NEURO: CN 2-12 grossly intact.  Symmetric facies. 1/4 x bicep 0/4 LE equal reflexes.  UE/LE   2-3/5 strength throughout.  Nonfocal use extremities. Speech clear.     PSYCH: Oriented x 3.  Pleasant calm, well kept.  Purposeful/directed conservation with intact short/long gross memory.       ASSESSMENT/PROBLEM LIST:   A 62-year-old white female:  Allergy/intolerances:  See above.  Procedural history:  See above.  Family history:  See above.  Obesity.  This is status post gastric bypass initial failure and regain of  weight.     1 para 1 AB 0.    Surgical menopause.  Osteoporosis on previous wrist study.    Hypertension.    Hypothyroidism.    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    History of DVT - left peroneal popliteal, 2008, treated  successfully with anticoagulation.   Brief anticoagulation.  Chronic insomnia.  Spinal spondylosis; this is primarily cervical with multiple surgeries.  Chronic neck pain.    Chronic narcotics.  Chronic depression - followed by Dr. Samaniego.    Degenerative joint disease that is diffuse.    History of gastric bypass and metabolic risk it carries.    Colon cancer history with previous surgery and chemo, no obvious  recurrence.  Gastroesophageal reflux.  Documented neuropathy by EMG nerve conduction  Seizure disorder  (2016)  antieliptics-Keppra/Dilantin  Gait decline      REASON FOR ADMISSION:    Shortness of breath  Cough  Flu B  Bronchitis-acute  Abnormal CT chest-AGUILAR spiculated nodule  Chest wall pain  Hyponatremia 128  Neutropenia 2.54    HOSPITAL COARSE: Treated with fluids, Rocephin, azithromycin, neb treatments; she rapidly improved with less cough.   She with fluids and controlled fluid restriction; had Na improve.  She did so well; we felt she could more quickly be followed by outpatient mode.  BC neg. WBC started 3.82 and stayed low.  Hb 12.8 without bleeding but fluids went to 11.9.  Na started 128 and went to 135.  Alk phos 166 and to 143.     DISCHARGE ASSESSMENT:   Flu B  Acute bronchitis  Pneumonia  Abnormal xray-AGUILAR  Hyponatremia 125  Psychogenic polydyspia  Gait decline-chronic    PLAN:  1 discharged  2. F/u Dr Gonzales 1.3.17-call for time  3. Diet  A. Fluid restrict 1500cc/24 hr  B. No excessive water  4. Gradually increase as able  5  Avoid smokers  6. Rx.   A. Tessalon perles 200 tid prn  B. Klonopin 0.5 qid   C. Synthroid 100 mcg qd  D. Cozaar 100 qd  E. Lopressor 50 bid  F. Tamiflu 75 bid #8  G. Percocet 10/325 one every 4 hours as needed  H. Seroquel 50 mg AM, 100 HS  I.   Effexor 150 mg qd  J.  Proair 1 qid #1 NR  K. Omnicef 300 two a day #18  L.  Medrol dose pack #1  Rx needed called to [facility].     Prognosis: Guarded    Condition: Stable/improved.

## 2018-01-04 NOTE — TELEPHONE ENCOUNTER
"----- Message from Josafat Gonzales MD sent at 1/3/2018  9:20 PM CST -----  Missed 1.3.17 hospital Fu  VERY important; CXR was abnormal and needs to be followed out/      1/4/18 8:40 am - Called pt and left message to schedule hosp followup and it was very important to schedule.    Pt  called back and said to schedule him \"Bita says she can't get out\"  Told him Dr. Gonzales stated it was very important to followup due to CXR.  stated \" I know.  Just schedule me for right now and I will try to get her in there this week\"  "

## 2018-01-05 ENCOUNTER — DOCUMENTATION (OUTPATIENT)
Dept: FAMILY MEDICINE CLINIC | Facility: CLINIC | Age: 63
End: 2018-01-05

## 2018-01-05 RX ORDER — POTASSIUM CHLORIDE 20 MEQ/1
TABLET, EXTENDED RELEASE ORAL
Qty: 60 TABLET | Refills: 5 | Status: SHIPPED | OUTPATIENT
Start: 2018-01-05 | End: 2018-06-07

## 2018-01-05 NOTE — PROGRESS NOTES
Told  on his appointment about CT chest; and plans CXR one month and CT chest 2m; if things go well

## 2018-02-12 DIAGNOSIS — R93.89 ABNORMAL X-RAY: Primary | ICD-10-CM

## 2018-02-12 NOTE — TELEPHONE ENCOUNTER
appointment; she is not able to keep apt here yet  Importance of getting repeat CT chest emphasized so  End of Februrary; CT chest with contrast

## 2018-02-13 ENCOUNTER — TELEPHONE (OUTPATIENT)
Dept: FAMILY MEDICINE CLINIC | Facility: CLINIC | Age: 63
End: 2018-02-13

## 2018-04-25 ENCOUNTER — TELEPHONE (OUTPATIENT)
Dept: FAMILY MEDICINE CLINIC | Facility: CLINIC | Age: 63
End: 2018-04-25

## 2018-04-25 DIAGNOSIS — R93.89 ABNORMAL FINDING ON CHEST XRAY: Primary | ICD-10-CM

## 2018-04-25 NOTE — TELEPHONE ENCOUNTER
Call   [facility]  Reminded myself that patient badly needs CT chest for the reasons I spoke with the  recently  This has to be repeated.

## 2018-04-27 RX ORDER — LEVETIRACETAM 1000 MG/1
TABLET ORAL
Qty: 60 TABLET | Refills: 5 | Status: SHIPPED | OUTPATIENT
Start: 2018-04-27 | End: 2019-01-01 | Stop reason: HOSPADM

## 2018-05-22 ENCOUNTER — HOSPITAL ENCOUNTER (OUTPATIENT)
Dept: HOSPITAL 58 - RAD | Age: 63
Discharge: HOME | End: 2018-05-22
Attending: FAMILY MEDICINE

## 2018-05-22 VITALS — BODY MASS INDEX: 31 KG/M2

## 2018-05-22 DIAGNOSIS — R93.8: Primary | ICD-10-CM

## 2018-05-22 DIAGNOSIS — R93.89 ABNORMAL FINDING ON CHEST XRAY: ICD-10-CM

## 2018-05-22 PROCEDURE — 82565 ASSAY OF CREATININE: CPT

## 2018-05-22 PROCEDURE — 36415 COLL VENOUS BLD VENIPUNCTURE: CPT

## 2018-05-22 NOTE — CT
EXAM:  CT chest with contrast. 

  

HISTORY:  Abnormal chest imaging. 

  

COMPARISON:  Chest CT 12/25/2017.  Radiograph 03/13/2014. 

  

TECHNIQUE:  Multiple axial images of the chest were obtained following intravenous administration of 
75 mL of Omnipaque 350, low osmolar.  Images were reformatted in the sagittal and coronal planes. 

  

FINDINGS:  Nonenlarged mediastinal and hilar lymph nodes are present measuring up to 0.8 cm in the le
ft hilum and 0.9 cm in the right hilum.  Heart size is normal.  There is no pericardial effusion. 

  

Ground-glass opacities and ground-glass nodules seen throughout both lungs which are new.  There is a
 spiculated nodule in the left upper lobe measuring approximately 1.3 cm on axial image 35 which abut
s the left lung fissure.  This has mildly increased in size since prior study when it measured 1.1 cm
. No pleural effusion or pneumothorax identified. 

  

Limited images of the upper abdomen demonstrate postsurgical changes of the stomach and nonspecific f
luid distension of a left upper quadrant small bowel.  There has been previous ACDF.  There is redemo
nstration of posterior and lateral fusion from T11-L1 with corpectomy changes at T12.  Mild superior 
endplate compression deformities of T4 and T5 noted.  Old sternal body fracture is seen. 

  

--------------------------- 

IMPRESSION: 

1.  Slight increase in size of spiculated left upper lobe nodule, highly suspicious for neoplasm.  Pe
rcutaneous sampling recommended. 

2.  Diffuse bilateral ground-glass opacities and ground-glass nodules most likely due to infectious o
r inflammatory process.

## 2018-05-23 ENCOUNTER — OFFICE VISIT (OUTPATIENT)
Dept: FAMILY MEDICINE CLINIC | Facility: CLINIC | Age: 63
End: 2018-05-23

## 2018-05-23 VITALS
RESPIRATION RATE: 16 BRPM | DIASTOLIC BLOOD PRESSURE: 82 MMHG | HEART RATE: 84 BPM | SYSTOLIC BLOOD PRESSURE: 122 MMHG | HEIGHT: 70 IN | TEMPERATURE: 98.5 F | OXYGEN SATURATION: 98 %

## 2018-05-23 DIAGNOSIS — R73.01 ELEVATED FASTING GLUCOSE: Chronic | ICD-10-CM

## 2018-05-23 DIAGNOSIS — R93.89 ABNORMAL X-RAY: Primary | ICD-10-CM

## 2018-05-23 DIAGNOSIS — F32.A DEPRESSION, UNSPECIFIED DEPRESSION TYPE: Chronic | ICD-10-CM

## 2018-05-23 DIAGNOSIS — R26.9 GAIT DIFFICULTY: ICD-10-CM

## 2018-05-23 DIAGNOSIS — F41.9 ANXIETY: Chronic | ICD-10-CM

## 2018-05-23 DIAGNOSIS — Z91.199 NONCOMPLIANCE: ICD-10-CM

## 2018-05-23 PROCEDURE — 99213 OFFICE O/P EST LOW 20 MIN: CPT | Performed by: FAMILY MEDICINE

## 2018-05-23 RX ORDER — LEVOTHYROXINE SODIUM 0.1 MG/1
100 TABLET ORAL DAILY
COMMUNITY

## 2018-05-24 NOTE — PROGRESS NOTES
Subjective   Bita Croft is a 62 y.o. female presenting with chief complaint of:   Chief Complaint   Patient presents with   • Follow-up     test       History of Present Illness :  With .  Here for primarily an acute issue today; had [facility] admit with pneumonia/flu this winter.  Was told had abnormals on CT and needed f/u.  We had to stay on her  to finally get her CT repeated; lesion AGUILAR is spiculated and larger; and hilar lymphadenopathy likely.  History colon cancer.  No mammogram for years.  Non-smoker but a lot of 2nd smoking/exposure.   Has multiple chronic problems to consider that might have a bearing on today's issues; not an interval appointment.       Chronic/acute problems to review today:   1. Abnormal x-ray -acute/as above   2. Anxiety : chronic for years.  Severe with conversion disorder/pseudoseizures and continued f/u with Dr Larose/psych.  Does not do that well.  Spends most of day in bed.    3. Depression, unspecified depression type : chronic/same.    4. Elevated fasting glucose : chronic/advised minimum 6m f/u.    5. Gait difficulty : chronic/with chronic DDD/DJD and UE/LE weakness.      Has an/another acute issue today: none.    The following portions of the patient's history were reviewed and updated as appropriate: allergies, current medications, past family history, past medical history, past social history, past surgical history and problem list.  Records acquired and reviewed; TCC migrated.      Current Outpatient Prescriptions:   •  amLODIPine (NORVASC) 5 MG tablet, Take 1 tablet by mouth Every 12 (Twelve) Hours., Disp: , Rfl:   •  clonazePAM (KlonoPIN) 1 MG tablet, Take 1 mg by mouth 3 (Three) Times a Day As Needed for Anxiety., Disp: , Rfl:   •  CloNIDine (CATAPRES) 0.1 MG tablet, Take 1 tablet by mouth Every 12 (Twelve) Hours., Disp: , Rfl:   •  DULoxetine (CYMBALTA) 30 MG capsule, Take 30 mg by mouth Daily., Disp: , Rfl:   •  gabapentin (NEURONTIN) 300 MG capsule, Take  300 mg by mouth 3 (Three) Times a Day., Disp: , Rfl:   •  levETIRAcetam (KEPPRA) 1000 MG tablet, TAKE 1 TABLET BY MOUTH TWICE DAILY, Disp: 60 tablet, Rfl: 5  •  levothyroxine (SYNTHROID, LEVOTHROID) 100 MCG tablet, Take 1 tablet by mouth Daily., Disp: , Rfl:   •  oxyCODONE (ROXICODONE) 10 MG tablet, Take 10 mg by mouth Every 4 (Four) Hours As Needed for Moderate Pain ., Disp: , Rfl:   •  phenytoin (DILANTIN) 100 MG ER capsule, 100 mg in am and 200 mg in pm, Disp: , Rfl:   •  potassium chloride (K-DUR,KLOR-CON) 20 MEQ CR tablet, TAKE 1 TABLET BY MOUTH TWICE DAILY, Disp: 60 tablet, Rfl: 5  •  QUEtiapine (SEROquel) 100 MG tablet, Take 1 tablet by mouth 2 (Two) Times a Day., Disp: , Rfl:   •  solifenacin (VESICARE) 5 MG tablet, Take 1 tablet by mouth Daily., Disp: 90 tablet, Rfl: 5  •  traZODone (DESYREL) 50 MG tablet, Take 50 mg by mouth Every Night., Disp: , Rfl:   •  PROAIR  (90 Base) MCG/ACT inhaler, INHALE 1 PUFF BY MOUTH FOUR TIMES DAILY, Disp: 8.5 g, Rfl: 5     No problems with medications.  Refills if needed done    No Known Allergies    Review of Systems  GENERAL:  Inactive/slower with limits, speed, stamina for age and. Sleep is restless/difficult. No fever now.  SKIN: No  rash/skin lesion of concern:   ENDO:  No syncope, near or diaphoretic sweaty spells.  HEENT: No recent head injury; frequent/same headache,  No vision change.  Same hearing loss.  Ears without pain/drainage.  No sore throat.   Usual  nasal/sinus congestion/drainage. No epistaxis.  CHEST: No chest wall tenderness or mass. No cough, without wheeze.  No SOB; no hemoptysis.  CV: No chest pain, palpitations, ankle edema.  GI: No heartburn, occ dysphagia.  No abdominal pain, diarrhea, constipation.  No rectal bleeding, or melena.    :  Voids without dysuria, or  incontinence to completion.  ORTHO: No painful/swollen joints but various on /off sore.  No change daily sore neck or back.  No acute neck or back pain without recent  injury.  NEURO: No dizziness, weakness of extremities.  UE/LE numbness/paresthesias.   PSYCH: Mild memory loss.  Mood good; usually anxious, depressed but/and not suicidal.  Tries to tolerate stress but son's drug use, problems with law create chronic problem. .     Results for orders placed or performed in visit on 07/24/17   Comprehensive Metabolic Panel   Result Value Ref Range    Glucose 95 70 - 100 mg/dL    BUN 8 5 - 21 mg/dL    Creatinine 0.55 0.50 - 1.40 mg/dL    eGFR Non African Am 112 >60 mL/min/1.73    eGFR African Am 136 >60 mL/min/1.73    BUN/Creatinine Ratio 14.5 7.0 - 25.0    Sodium 130 (L) 135 - 145 mmol/L    Potassium 5.1 3.5 - 5.3 mmol/L    Chloride 92 (L) 98 - 110 mmol/L    Total CO2 26.0 24.0 - 31.0 mmol/L    Calcium 9.1 8.4 - 10.4 mg/dL    Total Protein 6.6 6.3 - 8.7 g/dL    Albumin 3.90 3.50 - 5.00 g/dL    Globulin 2.7 gm/dL    A/G Ratio 1.4 1.1 - 2.5 g/dL    Total Bilirubin 0.5 0.1 - 1.0 mg/dL    Alkaline Phosphatase 180 (H) 24 - 120 U/L    AST (SGOT) 19 7 - 45 U/L    ALT (SGPT) 33 0 - 54 U/L   CBC & Differential   Result Value Ref Range    WBC 12.78 (H) 4.80 - 10.80 10*3/mm3    RBC 4.37 4.20 - 5.40 10*6/mm3    Hemoglobin 12.5 12.0 - 16.0 g/dL    Hematocrit 40.1 37.0 - 47.0 %    MCV 91.8 82.0 - 98.0 fL    MCH 28.6 28.0 - 32.0 pg    MCHC 31.2 (L) 33.0 - 36.0 g/dL    RDW 14.5 12.0 - 15.0 %    Platelets 400 130 - 400 10*3/mm3    Neutrophil Rel % 82.5 (H) 39.0 - 78.0 %    Lymphocyte Rel % 8.5 (L) 15.0 - 45.0 %    Monocyte Rel % 7.5 4.0 - 12.0 %    Eosinophil Rel % 0.9 0.0 - 4.0 %    Basophil Rel % 0.2 0.0 - 2.0 %    Neutrophils Absolute 10.55 (H) 1.87 - 8.40 10*3/mm3    Lymphocytes Absolute 1.08 0.72 - 4.86 10*3/mm3    Monocytes Absolute 0.96 0.19 - 1.30 10*3/mm3    Eosinophils Absolute 0.11 0.00 - 0.70 10*3/mm3    Basophils Absolute 0.03 0.00 - 0.20 10*3/mm3    Immature Granulocyte Rel % 0.4 0.0 - 5.0 %    Immature Grans Absolute 0.05 (H) 0.00 - 0.03 10*3/mm3       No results found for: PSA  "    Lab Results:  CBC:    Lab Results - Last 18 Months  Lab Units 07/24/17  1126 07/15/17  1251 07/12/17  1501 07/11/17  1426 06/08/17  1352 12/05/16  1353   WBC 10*3/mm3 12.78* 7.06 11.77* 8.54 11.07* 5.85   HEMOGLOBIN g/dL 12.5 12.4 13.0 13.4 12.8 11.1*   HEMATOCRIT % 40.1 37.6 39.1 40.6 39.1 35.2*   PLATELETS 10*3/mm3 400 275 267 280 322 369      BMP/CMP:    Lab Results - Last 18 Months  Lab Units 07/24/17  1126 07/17/17  0606 07/16/17  0951 07/15/17  1251 07/12/17  1501 07/11/17  1426 06/08/17  1352 12/05/16  1353   SODIUM mmol/L 130* 138 137 138 134* 135 131* 131*   POTASSIUM mmol/L 5.1 2.9* 2.7* 2.7* 3.9 4.2 4.3 4.5   CHLORIDE mmol/L 92* 103 100 98 95* 98 94* 93*   CO2 mmol/L  --  26.0 27.0 28.0 26.0 26.0 27.0  --    TOTAL CO2, ARTERIAL mmol/L 26.0  --   --   --   --   --   --  28.0   GLUCOSE mg/dL 95  --   --   --   --   --   --  86   BUN mg/dL 8 5 9 12 7 11 10 10   CREATININE mg/dL 0.55 0.56 0.50 0.53 0.65 0.59 0.77 0.69   EGFR IF NONAFRICN AM mL/min/1.73 112 110 125 117 93 104 76 86   EGFR IF AFRICN AM mL/min/1.73 136  --   --   --   --   --   --  105   CALCIUM mg/dL 9.1 8.3* 8.5 9.3 9.1 8.9 8.7 9.1     HEPATIC:    Lab Results - Last 18 Months  Lab Units 07/24/17  1126 07/17/17  0606 07/12/17  1501 07/11/17  1426 12/05/16  1353   ALT (SGPT) U/L 33 28 23 34 35   AST (SGOT) U/L 19 27 26 24 28   ALK PHOS U/L 180* 105 163* 187* 197*     THYROID:    Lab Results - Last 18 Months  Lab Units 12/05/16  1353   TSH mIU/mL 10.600*     A1C:No results for input(s): HGBA1C in the last 17691 hours.  PSA:No results for input(s): PSA in the last 79814 hours.    Objective   /82 (BP Location: Right arm, Patient Position: Sitting, Cuff Size: Large Adult)   Pulse 84   Temp 98.5 °F (36.9 °C) (Oral)   Resp 16   Ht 177.8 cm (70\")   LMP  (LMP Unknown)   SpO2 98%   There is no height or weight on file to calculate BMI.    Physical Exam  GENERAL:  Well nourished/developed in no acute distress.   SKIN: Turgor excellent, " without wound, rash, lesion.  HEENT: Normal cephalic without trauma.  Pupils equal round reactive to light. Extraocular motions full without nystagmus.   External canals nonobstructive nontender without reddness. Tymphatic membranes chris with jaskaran structures intact.   Oral cavity without growths, exudates, and moist.  Posterior pharynx without mass, obstruction, redness.  No thyromegaly, mass, tenderness, lymphadenopathy and supple.  CV: Regular rhythm.  No murmur, gallop,  edema. Posterior pulses intact.  No carotid bruits.  CHEST: No chest wall tenderness or mass.   LUNGS: Symmetric motion with clear to auscultation.   ABD: Soft, nontender without mass.   ORTHO: Symmetric extremities without swelling/point tenderness.  Full gross range of motion.  Wheelchair.  NEURO: CN 2-12 grossly intact.  Symmetric facies and UE/LE. 2/5 strength throughout. 1/4 x bicep equal reflexes.  Nonfocal use extremities. Speech clear.  Reduced light touch with monofilament, vibratory sensation with tuning fork; equal toes/distal feet.    PSYCH: Oriented x 3.  Pleasant calm, well kept.  Shallow but purposeful/directed conservation with intact short/long gross memory.     Assessment/Plan     1. Abnormal x-ray -very likely malignant   2. Anxiety -chronic/severe   3. Depression, unspecified depression type -chronic/severe   4. Elevated fasting glucose -lost to f/u   5. Gait difficulty -marked from UE/LE weakness   6. Noncompliance      Rx: reviewed/changes:    LAB/Testing/Referrals: reviewed/orders:   Today:   Orders Placed This Encounter   Procedures   • Ambulatory Referral to Cardiothoracic Surgery     Usual:   2m CBC, CMP, iron  6m CBC, BMP, TSH, T4. iron, ferritin  12m CBC, CMP, LIPID, TSH, T4, Vit D, B12, folate, Fe, ferritin, % sat, retic count    Discussions:   There is no height or weight on file to calculate BMI.   Patient's There is no height or weight on file to calculate BMI. BMI is within normal parameters. No follow-up  required.  Non-smoker      There are no Patient Instructions on file for this visit.    Follow up: Return for lab;, Dr Gonzales-, will see how testing/or treatment goes and decide;.  No future appointments.

## 2018-05-25 ENCOUNTER — TELEPHONE (OUTPATIENT)
Dept: FAMILY MEDICINE CLINIC | Facility: CLINIC | Age: 63
End: 2018-05-25

## 2018-05-25 NOTE — TELEPHONE ENCOUNTER
Pt called and states the Dr that Dr Gonzales recommended in [place] for her possible lung cancer can not see her till 7/11/18 and she does not thinks she can wait that long and wants to know what is Dr Gonzales's opinion on the [facility] in [facility] where she had her colon surgery states the Dr name was Dr Mendez [phone]

## 2018-05-26 ENCOUNTER — HOSPITAL ENCOUNTER (INPATIENT)
Dept: HOSPITAL 58 - ED | Age: 63
LOS: 5 days | Discharge: NURSING, REHAB OR LONG-TERM CARE | DRG: 918 | End: 2018-05-31
Attending: FAMILY MEDICINE | Admitting: FAMILY MEDICINE

## 2018-05-26 VITALS — BODY MASS INDEX: 31.6 KG/M2

## 2018-05-26 DIAGNOSIS — M47.9: ICD-10-CM

## 2018-05-26 DIAGNOSIS — Z98.84: ICD-10-CM

## 2018-05-26 DIAGNOSIS — R91.1: ICD-10-CM

## 2018-05-26 DIAGNOSIS — B95.7: ICD-10-CM

## 2018-05-26 DIAGNOSIS — Z85.038: ICD-10-CM

## 2018-05-26 DIAGNOSIS — K21.9: ICD-10-CM

## 2018-05-26 DIAGNOSIS — E66.9: ICD-10-CM

## 2018-05-26 DIAGNOSIS — F51.04: ICD-10-CM

## 2018-05-26 DIAGNOSIS — R03.1: ICD-10-CM

## 2018-05-26 DIAGNOSIS — R26.9: ICD-10-CM

## 2018-05-26 DIAGNOSIS — E87.1: ICD-10-CM

## 2018-05-26 DIAGNOSIS — G62.9: ICD-10-CM
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
## 2018-05-26 DIAGNOSIS — Z86.718: ICD-10-CM

## 2018-05-26 DIAGNOSIS — I10: ICD-10-CM

## 2018-05-26 DIAGNOSIS — N39.0: ICD-10-CM

## 2018-05-26 DIAGNOSIS — M19.90: ICD-10-CM

## 2018-05-26 DIAGNOSIS — K13.0: ICD-10-CM

## 2018-05-26 DIAGNOSIS — R41.0: ICD-10-CM

## 2018-05-26 DIAGNOSIS — F33.2: ICD-10-CM

## 2018-05-26 DIAGNOSIS — G40.909: ICD-10-CM

## 2018-05-26 DIAGNOSIS — R40.4: ICD-10-CM

## 2018-05-26 DIAGNOSIS — M81.0: ICD-10-CM

## 2018-05-26 DIAGNOSIS — J98.11: ICD-10-CM

## 2018-05-26 DIAGNOSIS — M50.80: ICD-10-CM

## 2018-05-26 DIAGNOSIS — T50.902A: Primary | ICD-10-CM

## 2018-05-26 DIAGNOSIS — B96.20: ICD-10-CM

## 2018-05-26 DIAGNOSIS — E03.9: ICD-10-CM

## 2018-05-26 DIAGNOSIS — R53.83: ICD-10-CM

## 2018-05-26 DIAGNOSIS — T47.6X2A: ICD-10-CM

## 2018-05-26 DIAGNOSIS — R78.81: ICD-10-CM

## 2018-05-26 PROCEDURE — 80306 DRUG TEST PRSMV INSTRMNT: CPT

## 2018-05-26 PROCEDURE — 94003 VENT MGMT INPAT SUBQ DAY: CPT

## 2018-05-26 PROCEDURE — 81001 URINALYSIS AUTO W/SCOPE: CPT

## 2018-05-26 PROCEDURE — 99285 EMERGENCY DEPT VISIT HI MDM: CPT

## 2018-05-26 PROCEDURE — 83605 ASSAY OF LACTIC ACID: CPT

## 2018-05-26 PROCEDURE — 87070 CULTURE OTHR SPECIMN AEROBIC: CPT

## 2018-05-26 PROCEDURE — 94640 AIRWAY INHALATION TREATMENT: CPT

## 2018-05-26 PROCEDURE — 87040 BLOOD CULTURE FOR BACTERIA: CPT

## 2018-05-26 PROCEDURE — 80053 COMPREHEN METABOLIC PANEL: CPT

## 2018-05-26 PROCEDURE — 87086 URINE CULTURE/COLONY COUNT: CPT

## 2018-05-26 PROCEDURE — 87081 CULTURE SCREEN ONLY: CPT

## 2018-05-26 PROCEDURE — 85008 BL SMEAR W/O DIFF WBC COUNT: CPT

## 2018-05-26 PROCEDURE — 87186 SC STD MICRODIL/AGAR DIL: CPT

## 2018-05-26 PROCEDURE — 82746 ASSAY OF FOLIC ACID SERUM: CPT

## 2018-05-26 PROCEDURE — 96365 THER/PROPH/DIAG IV INF INIT: CPT

## 2018-05-26 PROCEDURE — 84145 PROCALCITONIN (PCT): CPT

## 2018-05-26 PROCEDURE — 31500 INSERT EMERGENCY AIRWAY: CPT

## 2018-05-26 PROCEDURE — 80307 DRUG TEST PRSMV CHEM ANLYZR: CPT

## 2018-05-26 PROCEDURE — 82607 VITAMIN B-12: CPT

## 2018-05-26 PROCEDURE — 85025 COMPLETE CBC W/AUTO DIFF WBC: CPT

## 2018-05-26 PROCEDURE — 97802 MEDICAL NUTRITION INDIV IN: CPT

## 2018-05-26 PROCEDURE — 96366 THER/PROPH/DIAG IV INF ADDON: CPT

## 2018-05-26 PROCEDURE — 93010 ELECTROCARDIOGRAM REPORT: CPT

## 2018-05-26 PROCEDURE — 96375 TX/PRO/DX INJ NEW DRUG ADDON: CPT

## 2018-05-26 PROCEDURE — 93005 ELECTROCARDIOGRAM TRACING: CPT

## 2018-05-26 PROCEDURE — 36415 COLL VENOUS BLD VENIPUNCTURE: CPT

## 2018-05-26 PROCEDURE — 96361 HYDRATE IV INFUSION ADD-ON: CPT

## 2018-05-26 PROCEDURE — 82803 BLOOD GASES ANY COMBINATION: CPT

## 2018-05-26 RX ADMIN — PROPOFOL SCH MLS/HR: 10 INJECTION, EMULSION INTRAVENOUS at 23:20

## 2018-05-26 NOTE — ED.PDOC
General


ED Provider: 


Dr. JITENDRA MINAYA





Chief Complaint: Non-specific Complaint


Stated Complaint: Patient is a 62 year old female who is Brought by EMS. Pt. 

unresponsive upon EMS arrival at home. She had fixed gaze and the initial resp 

rate was 6/min. ETCO2 was 70. she had no improvement with nasal airway, non-

rebreather, or assisted BVM. Decision to intubate pt. per EMS with #7.5 tube, #

26 at lip. Saturation was increased to 99%. Per  Patient has been under 

alot of stress and has been depressed to to her son being on drugs and in and 

out of retirement. patient has been sleepy more than usual. Apparently had access to 

her loperomide which she took about 100 tab of 2 mg.


Time Seen by Physician: 22:00


Mode of Arrival: Ambulance


Information Source: Patient, EMT


Exam Limitations: Other (intubated )


Primary Care Provider: 


PASCALE VAZQUEZ





Nursing and Triage Documentation Reviewed and Agree: Yes


Reviewed sepsis parameters & appropriate labs ordered?: Yes


System Inflammatory Response Syndrome: Acutely Altered Mental Status


Sepsis Protocol: 


For patient's 13 years and over:





Temp is 96.8 and below  and greater


Pulse >90 BPM


Resp >20/minute


Acutely Altered Mental Status





Are patient's symptoms suggestive of a new infection, such as:


   -Pneumonia


   -Skin, Soft Tissue


   -Endocarditis


   -UTI


   -Bone, Joint Infection


   -Implantable Device


   -Acute Abdominal Infection


   -Wound Infection


   -Meningitis


   -Blood Stream Catheter Infection


   -Unknown





System Inflammatory Response Syndrome: Not Applicable





Neurological Complaint Exam





- Altered Mental Status Complaint/Exam


Current Mental Status: Unresponsiveness


Last Known Well: This morning 


Onset: Gradual


Duration: 1 hour


Symptoms Are: Still present


Timing: Constant


Initial Severity: Severe


Current Severity: Severe


Eye Deviation Present: No


Aggravating: Reports: Drug abuse (loperamide overdose. )


Associated Signs and Symptoms: Reports: Recently depressed (due to family 

issues )


Related History: Reports: Seizure


Cardiac Risk Factors: Reports: Hypertension


CVA Risk Factors: Reports: Hypertension


Carotid Bruit Present: No


Glascow Coma Scale (see protocol): 4


Nystagmus Present: No


Gag Reflex Present: Yes (prior to sedation with versed)


Focal Weakness: Present: None


Focal Sensory Loss: Present: None


Gait: Unable


Signs of Injury: Present: Normal findings


Thrombolytics Considered: No


Differential Diagnoses: Intoxication, Overdose (with Loperamide )





Review of Systems





- Review Of Systems


Constitutional: Reports: Other (unable due to unresponsiveness )


All Other Systems: Other (limited due to unresponsivness)





Past Medical History





- Past Medical History


Previously Healthy: No


Endocrine: Reports: None


Cardiovascular: Reports: Hypertension


Respiratory: Reports: None


Hematological: Reports: Anemia


Gastrointestinal: Reports: None


Genitourinary: Reports: None


Neuro/Psych: Reports: Seizure, Anxiety, Depression


Musculoskeletal: Reports: Back Pain, Joint Pain


Cancer: Reports: Colon (Diagnosed today )


Last Menstrual Period: hysterectomy 1990





- Surgical History


General Surgical History: Reports: Tonsillectomy, Back Surgery ( x 2 )





- Family History


Family History: Reports: Unknown





- Social History


Smoking Status: Former smoker


Hx Substance Use: No


Alcohol Screening: None





- Immunizations


Tetanus Shot up to Date: No (unknown)





Physical Exam





- Physical Exam


Appearance: Ill-appearing, Obese


Ill-appearing: Severe


Eyes: Right pupil size (3mm reactive ), Left pupil size (3mm reactive )


Neck: Supple


Respiratory: Rhonchi (mild scattared, )


Cardiovascular: RRR, Pulses normal


GI/: Soft


Skin: Pale


Neurological: Unresponsive





Interpretation





- Radiology Interpretation


Radiology Interpretation By: ED Physician


Exam Interpreted: Portable CXR, Other (Et tube at the Ellie - needs to be 

pulled 2 cm.  diffuse infiltrae ? Atelectasis VS Pneumonia.)


Radiology Interpretation By: Radiologist


Radiology Results: Negative


Exam Interpreted: CT Scan (head )





Physician Notification





- Case Discussed


Physician Notified: Dr. Vazquez 


Time of Notification: 23:10 (Admit to [place], keep on the Ventilator.)





Critical Care Note





- Critical Care Note


Total Time (mins): 65


Comments: 





Poison controlled notified


   Recommended Close monitoring for respiratory depression, Qt Prolongation, 

Arrhythmias 


   Recommended supportive care. No Charcoal recommended. 





Course





- Course


Hematology/Chemistry: 


 05/27/18 06:45





 05/27/18 06:45


Orders, Labs, Meds: 


Lab Review











  05/26/18 05/26/18 05/26/18





  20:30 20:30 21:50


 


WBC   


 


RBC   


 


Hgb   


 


Hct   


 


MCV   


 


MCH   


 


MCHC   


 


RDW Coeff of Mayela   


 


Plt Count   


 


Immature Gran % (Auto)   


 


Neut % (Auto)   


 


Lymph % (Auto)   


 


Mono % (Auto)   


 


Eos % (Auto)   


 


Baso % (Auto)   


 


Immature Gran # (Auto)   


 


Neut # (Auto)   


 


Lymph # (Auto)   


 


Mono # (Auto)   


 


Eos # (Auto)   


 


Baso # (Auto)   


 


Plt Morphology Comment   


 


Anisocytosis   


 


RBC Morph Comment   


 


Puncture Site    Rbrach


 


O2 Saturation    100.0


 


ABG pH    7.416


 


ABG pCO2    45.3 H


 


ABG pO2    392.0 H


 


ABG HCO3    29.2 H


 


ABG Total CO2    31 H


 


ABG Base Excess    5 H


 


Satya Test    Pending


 


O2 Delivery Device    Ambu


 


Oxygen Liter Flow    15.00


 


FiO2 %    100.0


 


Sodium   


 


Potassium   


 


Chloride   


 


Carbon Dioxide   


 


Anion Gap   


 


BUN   


 


Creatinine   


 


Estimated GFR (MDRD)   


 


BUN/Creatinine Ratio   


 


Glucose   


 


Lactic Acid   


 


Calcium   


 


Total Bilirubin   


 


AST   


 


ALT   


 


Alkaline Phosphatase   


 


Total Protein   


 


Albumin   


 


Globulin   


 


Albumin/Globulin Ratio   


 


Procalcitonin   


 


Urine Color   


 


Urine Clarity   


 


Urine pH   


 


Ur Specific Gravity   


 


Urine Protein   


 


Urine Glucose (UA)   


 


Urine Ketones   


 


Urine Blood   


 


Urine Nitrite   


 


Urine Bilirubin   


 


Urine Urobilinogen   


 


Ur Leukocyte Esterase   


 


Urine Microscopic RBC   


 


Urine Microscopic WBC   


 


Ur Squamous Epith Cells   


 


Urine Bacteria   


 


Hyaline Casts   


 


Salicylate Level mg/dL  < 5.0  


 


Urine Opiates Screen   


 


Ur Oxycodone Screen   


 


Urine Methadone Screen   


 


Ur Propoxyphene Screen   


 


Acetaminophen  < 3 L  


 


Ur Barbiturates Screen   


 


U Tricyclic Antidepress   


 


Ur Phencyclidine Scrn   


 


Ur Amphetamine Screen   


 


U Methamphetamines Scrn   


 


U Benzodiazepines Scrn   


 


Urine Cocaine Screen   


 


U Cannabinoids Screen   


 


Plasma/Serum Alcohol   < 10.0 














  05/26/18 05/26/18 05/26/18





  22:30 22:30 22:30


 


WBC  12.79 H  


 


RBC  4.16 L  


 


Hgb  12.3  


 


Hct  37.9  


 


MCV  91.1  


 


MCH  29.6  


 


MCHC  32.5  


 


RDW Coeff of Mayela  14.9 H  


 


Plt Count  349  


 


Immature Gran % (Auto)  0.6  


 


Neut % (Auto)  89.2  


 


Lymph % (Auto)  5.6 L  


 


Mono % (Auto)  3.7  


 


Eos % (Auto)  0.4  


 


Baso % (Auto)  0.5  


 


Immature Gran # (Auto)  0.1  


 


Neut # (Auto)  11.4 H  


 


Lymph # (Auto)  0.7  


 


Mono # (Auto)  0.5  


 


Eos # (Auto)  0.1  


 


Baso # (Auto)  0.1  


 


Plt Morphology Comment  Occ giant plt  


 


Anisocytosis  Not present  


 


RBC Morph Comment  Normal  


 


Puncture Site   


 


O2 Saturation   


 


ABG pH   


 


ABG pCO2   


 


ABG pO2   


 


ABG HCO3   


 


ABG Total CO2   


 


ABG Base Excess   


 


Satya Test   


 


O2 Delivery Device   


 


Oxygen Liter Flow   


 


FiO2 %   


 


Sodium   131 L 


 


Potassium   5.3 H 


 


Chloride   95 L 


 


Carbon Dioxide   25 


 


Anion Gap   16.3 


 


BUN   11 


 


Creatinine   0.92 


 


Estimated GFR (MDRD)   62.00 


 


BUN/Creatinine Ratio   11.95 


 


Glucose   114 


 


Lactic Acid   


 


Calcium   8.7 


 


Total Bilirubin   0.3 


 


AST   10 L 


 


ALT   9 L 


 


Alkaline Phosphatase   157 H 


 


Total Protein   6.5 


 


Albumin   2.4 L 


 


Globulin   4.1 


 


Albumin/Globulin Ratio   0.59 


 


Procalcitonin    < 0.05


 


Urine Color   


 


Urine Clarity   


 


Urine pH   


 


Ur Specific Gravity   


 


Urine Protein   


 


Urine Glucose (UA)   


 


Urine Ketones   


 


Urine Blood   


 


Urine Nitrite   


 


Urine Bilirubin   


 


Urine Urobilinogen   


 


Ur Leukocyte Esterase   


 


Urine Microscopic RBC   


 


Urine Microscopic WBC   


 


Ur Squamous Epith Cells   


 


Urine Bacteria   


 


Hyaline Casts   


 


Salicylate Level mg/dL   


 


Urine Opiates Screen   


 


Ur Oxycodone Screen   


 


Urine Methadone Screen   


 


Ur Propoxyphene Screen   


 


Acetaminophen   


 


Ur Barbiturates Screen   


 


U Tricyclic Antidepress   


 


Ur Phencyclidine Scrn   


 


Ur Amphetamine Screen   


 


U Methamphetamines Scrn   


 


U Benzodiazepines Scrn   


 


Urine Cocaine Screen   


 


U Cannabinoids Screen   


 


Plasma/Serum Alcohol   














  05/26/18 05/26/18 05/27/18





  22:30 23:30 00:00


 


WBC   


 


RBC   


 


Hgb   


 


Hct   


 


MCV   


 


MCH   


 


MCHC   


 


RDW Coeff of Mayela   


 


Plt Count   


 


Immature Gran % (Auto)   


 


Neut % (Auto)   


 


Lymph % (Auto)   


 


Mono % (Auto)   


 


Eos % (Auto)   


 


Baso % (Auto)   


 


Immature Gran # (Auto)   


 


Neut # (Auto)   


 


Lymph # (Auto)   


 


Mono # (Auto)   


 


Eos # (Auto)   


 


Baso # (Auto)   


 


Plt Morphology Comment   


 


Anisocytosis   


 


RBC Morph Comment   


 


Puncture Site   Rbrach 


 


O2 Saturation   100.0 


 


ABG pH   7.472 H 


 


ABG pCO2   35.9 


 


ABG pO2   184.0 H 


 


ABG HCO3   26.2 H 


 


ABG Total CO2   27 


 


ABG Base Excess   3 H 


 


Satya Test   Pending 


 


O2 Delivery Device   Ventilator 


 


Oxygen Liter Flow   


 


FiO2 %   50.0 


 


Sodium   


 


Potassium   


 


Chloride   


 


Carbon Dioxide   


 


Anion Gap   


 


BUN   


 


Creatinine   


 


Estimated GFR (MDRD)   


 


BUN/Creatinine Ratio   


 


Glucose   


 


Lactic Acid  13.8  


 


Calcium   


 


Total Bilirubin   


 


AST   


 


ALT   


 


Alkaline Phosphatase   


 


Total Protein   


 


Albumin   


 


Globulin   


 


Albumin/Globulin Ratio   


 


Procalcitonin   


 


Urine Color    Yellow


 


Urine Clarity    Slightly


 


Urine pH    5.5


 


Ur Specific Gravity    >=1.030


 


Urine Protein    Negative


 


Urine Glucose (UA)    Negative


 


Urine Ketones    Trace


 


Urine Blood    Negative


 


Urine Nitrite    Positive


 


Urine Bilirubin    1+


 


Urine Urobilinogen    1.0


 


Ur Leukocyte Esterase    Negative


 


Urine Microscopic RBC    0-2


 


Urine Microscopic WBC    5-10


 


Ur Squamous Epith Cells    0-2


 


Urine Bacteria    3+


 


Hyaline Casts    2-5


 


Salicylate Level mg/dL   


 


Urine Opiates Screen   


 


Ur Oxycodone Screen   


 


Urine Methadone Screen   


 


Ur Propoxyphene Screen   


 


Acetaminophen   


 


Ur Barbiturates Screen   


 


U Tricyclic Antidepress   


 


Ur Phencyclidine Scrn   


 


Ur Amphetamine Screen   


 


U Methamphetamines Scrn   


 


U Benzodiazepines Scrn   


 


Urine Cocaine Screen   


 


U Cannabinoids Screen   


 


Plasma/Serum Alcohol   














  05/27/18





  00:00


 


WBC 


 


RBC 


 


Hgb 


 


Hct 


 


MCV 


 


MCH 


 


MCHC 


 


RDW Coeff of Mayela 


 


Plt Count 


 


Immature Gran % (Auto) 


 


Neut % (Auto) 


 


Lymph % (Auto) 


 


Mono % (Auto) 


 


Eos % (Auto) 


 


Baso % (Auto) 


 


Immature Gran # (Auto) 


 


Neut # (Auto) 


 


Lymph # (Auto) 


 


Mono # (Auto) 


 


Eos # (Auto) 


 


Baso # (Auto) 


 


Plt Morphology Comment 


 


Anisocytosis 


 


RBC Morph Comment 


 


Puncture Site 


 


O2 Saturation 


 


ABG pH 


 


ABG pCO2 


 


ABG pO2 


 


ABG HCO3 


 


ABG Total CO2 


 


ABG Base Excess 


 


Satya Test 


 


O2 Delivery Device 


 


Oxygen Liter Flow 


 


FiO2 % 


 


Sodium 


 


Potassium 


 


Chloride 


 


Carbon Dioxide 


 


Anion Gap 


 


BUN 


 


Creatinine 


 


Estimated GFR (MDRD) 


 


BUN/Creatinine Ratio 


 


Glucose 


 


Lactic Acid 


 


Calcium 


 


Total Bilirubin 


 


AST 


 


ALT 


 


Alkaline Phosphatase 


 


Total Protein 


 


Albumin 


 


Globulin 


 


Albumin/Globulin Ratio 


 


Procalcitonin 


 


Urine Color 


 


Urine Clarity 


 


Urine pH 


 


Ur Specific Gravity 


 


Urine Protein 


 


Urine Glucose (UA) 


 


Urine Ketones 


 


Urine Blood 


 


Urine Nitrite 


 


Urine Bilirubin 


 


Urine Urobilinogen 


 


Ur Leukocyte Esterase 


 


Urine Microscopic RBC 


 


Urine Microscopic WBC 


 


Ur Squamous Epith Cells 


 


Urine Bacteria 


 


Hyaline Casts 


 


Salicylate Level mg/dL 


 


Urine Opiates Screen  Negative


 


Ur Oxycodone Screen  Negative


 


Urine Methadone Screen  Negative


 


Ur Propoxyphene Screen  Negative


 


Acetaminophen 


 


Ur Barbiturates Screen  Negative


 


U Tricyclic Antidepress  Positive


 


Ur Phencyclidine Scrn  Negative


 


Ur Amphetamine Screen  Negative


 


U Methamphetamines Scrn  Negative


 


U Benzodiazepines Scrn  Negative


 


Urine Cocaine Screen  Negative


 


U Cannabinoids Screen  Negative


 


Plasma/Serum Alcohol 








Orders











 Category Date Time Status


 


 ABG DRAW REQUEST Routine CARDIO  05/26/18 22:31 Completed


 


 ABG DRAW REQUEST Routine CARDIO  05/26/18 23:46 Completed


 


 ABG DRAW REQUEST Stat CARDIO  05/27/18 00:28 Completed


 


 NEBULIZER TREATMENT Routine CARDIO  05/27/18 00:28 Active


 


 OXYGEN Routine CARDIO  05/27/18 00:12 Active


 


 VENTILATOR Routine CARDIO  05/26/18 22:29 Active


 


 ACTIVITY .Up ad Kelsi CARE  05/27/18 00:13 Active


 


 CATHETER INSERTION AND CARE Q8HR CARE  05/27/18 00:12 Active


 


 INTAKE & OUTPUT Q8HR CARE  05/27/18 00:13 Active


 


 IV ACCESS ONCE CARE  05/26/18 22:26 Active


 


 REMINDER: Ask MD to d/c flores DAILY CARE  05/27/18 00:14 Active


 


 VITAL SIGNS Q4HR CARE  05/27/18 00:13 Completed


 


 NOTHING BY MOUTH DIETARY  05/27/18 Breakfast Ordered


 


 ED APPLY O2 .ONCE EMERGENCY  05/26/18 22:26 Active


 


 ED CARDIAC MONITOR APPLIED .ONCE EMERGENCY  05/26/18 22:26 Active


 


 ED CATHETER INSERTION AND CARE .ONCE EMERGENCY  05/26/18 22:55 Active


 


 ED VITAL SIGNS Q1HR EMERGENCY  05/26/18 22:26 Completed


 


 ABG Stat LAB  05/26/18 21:50 Results


 


 ABG Stat LAB  05/26/18 23:30 Results


 


 ABG Stat LAB  05/27/18 04:45 Completed


 


 ACETAMINOPHEN Stat LAB  05/26/18 20:30 Completed


 


 BLOOD CULTURE (ED ONLY) Stat LAB  05/26/18 20:30 Received


 


 CBC W/ AUTO DIFF DAILY@0600 LAB  05/27/18 06:45 Completed


 


 CBC W/ AUTO DIFF DAILY@0600 LAB  05/28/18 06:00 Ordered


 


 CBC W/ AUTO DIFF Stat LAB  05/26/18 22:30 Completed


 


 COMPREHENSIVE METABOLIC PANEL DAILY@0600 LAB  05/27/18 06:45 Completed


 


 COMPREHENSIVE METABOLIC PANEL DAILY@0600 LAB  05/28/18 06:00 Ordered


 


 COMPREHENSIVE METABOLIC PANEL Stat LAB  05/26/18 22:30 Completed


 


 ETOH LEVEL [BLOOD ALCOHOL] Stat LAB  05/26/18 20:30 Completed


 


 LACTIC ACID Stat LAB  05/26/18 22:30 Completed


 


 PROCALCITONIN Stat LAB  05/26/18 22:30 Completed


 


 RBC MORPHOLOGY Stat LAB  05/26/18 22:30 Completed


 


 SALICYLATE Stat LAB  05/26/18 20:30 Completed


 


 URINALYSIS C & S IF INDICATED Stat LAB  05/27/18 00:00 Completed


 


 URINE CULTURE Stat LAB  05/27/18 00:00 Received


 


 URINE DRUG SCREEN (RAPID FOR ED) [DRUG SCREEN, URINE, LAB  05/27/18 00:00 

Completed





 RAPID] Stat   


 


 Enoxaparin Sodium [Lovenox] MEDS  05/27/18 00:30 Active





 40 mg SUBCUT DAILY   


 


 Fentanyl Vial [Sublimaze] MEDS  05/26/18 22:55 Discontinued





 50 mcg IVP ONCE STA   


 


 Ipratropium/Albuterol Neb [Duoneb] MEDS  05/27/18 06:00 Active





 1 vial NEB RTQID   


 


 Lidocaine HCl [Uro-Jet] MEDS  05/26/18 22:55 Discontinued





 10 ml MUCOUSMEMB ONCE STA   


 


 Midazolam HCl Inj [Versed] MEDS  05/26/18 21:50 Discontinued





 5 mg IVP ONCE STA   


 


 Premix Vial [Premix Infusion 100 ml Vial] 1 vial MEDS  05/26/18 23:30 Active





 Propofol Inj [Diprivan 100 ml Vial] 1,000 mg   





 IV 30 mcg/kg/min   


 


 Propofol Inj [Diprivan 100 ml Vial] 100 ml MEDS  05/26/18 23:15 Discontinued





 IV .STK-MED   


 


 Sodium Chloride 0.9% [Sodium Chloride] 1,000 ml MEDS  05/27/18 00:30 Active





  mls/hr   


 


 Sodium Chloride 0.9% [Sodium Chloride] 1,000 ml MEDS  05/26/18 22:58 

Discontinued





 IV BOLUS   


 


 Succinylcholine Chloride [Anectine] MEDS  05/26/18 22:27 Discontinued





 50 mg IVP ONCE STA   


 


 Succinylcholine Chloride [Anectine] MEDS  05/26/18 22:30 Discontinued





 50 mg IVP ONCE STA   


 


 Succinylcholine Chloride [Anectine] MEDS  05/26/18 22:30 Discontinued





 50 mg IVP ONCE STA   


 


 Vecuronium Bromide [Norcuron] MEDS  05/26/18 22:45 Discontinued





 10 mg .ROUTE .STK-MED ONE   


 


 Vecuronium Bromide [Norcuron] MEDS  05/26/18 22:27 Discontinued





 8 mg IVP ONCE STA   


 


 RESUSCITATION STATUS Routine OTHERS  05/27/18 00:12 Ordered


 


 CHEST, 1V AP ONLY Stat RADS  05/26/18 22:24 Completed


 


 CT HEAD W/O CONTRAST Stat RADS  05/26/18 22:28 Completed








Medications











Generic Name Dose Route Start Last Admin





  Trade Name Freq  PRN Reason Stop Dose Admin


 


Albuterol/Ipratropium  1 vial  05/27/18 06:00  05/27/18 05:41





  Duoneb  NEB   1 vial





  RTQID IGNACIO   Administration





     





     





     





     


 


Enoxaparin Sodium  40 mg  05/27/18 00:30  05/27/18 02:23





  Lovenox  SUBCUT   40 mg





  DAILY IGNACIO   Administration





     





     





     





     


 


Propofol 1,000 mg/ Sterile  100 mls @ 19.06 mls/hr  05/26/18 23:30  05/27/18 02:

37





  Water  IV   39.34 mcg/kg/min





  .Q5H15M IGNACIO   25 mls/hr





     Administration





     





  Protocol   





  30 MCG/KG/MIN   


 


Sodium Chloride  1,000 mls @ 125 mls/hr  05/27/18 00:30  05/27/18 02:25





  Sodium Chloride  IV   125 mls/hr





  .Q8H IGNACIO   Administration





     





     





     





     


 


Sodium Chloride  1 syr  05/27/18 00:47  





  Saline Flush  IVF   





  PRN PRN   





  To flush IV   





     





     





     














Discontinued Medications














Generic Name Dose Route Start Last Admin





  Trade Name Freq  PRN Reason Stop Dose Admin


 


Fentanyl Citrate  50 mcg  05/26/18 22:55  05/26/18 23:07





  Sublimaze  IVP  05/26/18 22:56  50 mcg





  ONCE STA   Administration





     





     





     





     


 


Sodium Chloride  1,000 mls @ 1,000 mls/hr  05/26/18 22:58  05/26/18 22:00





  Sodium Chloride  IV  05/26/18 23:57  1,000 mls/hr





  BOLUS STA   Administration





     





     





     





     


 


Lidocaine HCl  10 ml  05/26/18 22:55  05/27/18 02:39





  Uro-Jet  MUCOUSMEMB  05/26/18 22:56  Not Given





  ONCE STA   





     





     





     





     


 


Midazolam HCl  5 mg  05/26/18 21:50  05/26/18 22:00





  Versed  IVP  05/26/18 21:51  5 mg





  ONCE STA   Administration





     





     





     





     


 


Succinylcholine Chloride  50 mg  05/26/18 22:27  05/26/18 22:10





  Anectine  IVP  05/26/18 22:28  50 mg





  ONCE STA   Administration





     





     





     





     


 


Succinylcholine Chloride  50 mg  05/26/18 22:30  05/26/18 22:00





  Anectine  IVP  05/26/18 22:31  50 mg





  ONCE STA   Administration





     





     





     





     


 


Succinylcholine Chloride  50 mg  05/26/18 22:30  05/26/18 22:21





  Anectine  IVP  05/26/18 22:31  50 mg





  ONCE STA   Administration





     





     





     





     


 


Vecuronium Bromide  8 mg  05/26/18 22:27  05/26/18 22:23





  Norcuron  IVP  05/26/18 22:28  8 mg





  ONCE STA   Administration





     





     





     





     











Vital Signs: 


 











  Temp Pulse Resp BP Pulse Ox


 


 05/27/18 00:00   95 H  14  153/92 H  100


 


 05/26/18 23:44    14  


 


 05/26/18 23:20   95 H  12  153/95 H 


 


 05/26/18 22:55    12  


 


 05/26/18 21:58  97.1 F L  95 H  10 L  107/71  100














Departure





- Departure


Time of Disposition: 02:00


Disposition: ADMITTED AS INPATIENT


Discharge Problem: 


 Atelectasis





Drug overdose, intentional


Qualifiers:


 Encounter type: initial encounter Qualified Code(s): T50.902A - Poisoning by 

unspecified drugs, medicaments and biological substances, intentional self-harm

, initial encounter





Respiratory failure


Qualifiers:


 Chronicity: acute Respiratory failure complication: unspecified whether with 

hypoxia or hypercapnia Qualified Code(s): J96.00 - Acute respiratory failure, 

unspecified whether with hypoxia or hypercapnia





Condition: Stable


Pt referred to PMD for follow-up: No (admitted )


IPMP verified?: No


Allergies/Adverse Reactions: 


Allergies





No Known Allergies Allergy (Verified 12/25/17 22:00)


 








Home Medications: 


Ambulatory Orders





Levothyroxine Sodium [Synthroid] 100 mcg PO QDAC 03/13/14 


Losartan Potassium 100 mg PO DAILY 03/13/14 


Metoprolol Tartrate [Lopressor] 100 mg PO DAILY 03/13/14 


Oxycodone HCl/Acetaminophen [Oxycodone-Acetaminophen ] 10 - 650 tab PO 

Q6HR 03/13/14 


Clonazepam 1 mg PO TID 08/04/14 


Benzonatate [Tessalon Perle] 200 mg PO TID PRN 12/27/17 


Clonidine HCl [Catapres] 1 tab PO Q12H 12/27/17 


Duloxetine HCl [Cymbalta] 30 mg PO DAILY 12/27/17 


Gabapentin [Neurontin] 600 mg PO TID 12/27/17 


Levetiracetam [Keppra] 1,000 mg PO BID 12/27/17 


Phenytoin Cap [Dilantin] 200 mg PO BEDTIME 12/27/17 


Phenytoin Sodium Extended [Dilantin] 100 mg PO DAILY 12/27/17 


Potassium Chloride [K-Dur] 20 meq PO BID 12/27/17 


Quetiapine Fumarate [Seroquel] 1 tab PO BEDTIME 12/27/17 


Quetiapine Fumarate [Seroquel] 1 tab PO DAILY 12/27/17 


Solifenacin Succinate [Vesicare] 5 mg PO DAILY 12/27/17 


Trazodone HCl [Desyrel] 50 mg PO BEDTIME 12/27/17 


Amlodipine Besylate 5 mg PO BID 05/26/18 


Venlafaxine HCl [Venlafaxine HCl ER] 300 mg PO DAILY 05/26/18

## 2018-05-26 NOTE — CT
EXAM:  CT head without contrast 05/26/2018.  Sagittal and coronal reformatted images obtained 

  

HISTORY:  Unresponsive 

  

COMPARISON:  03/13/2014 

  

FINDINGS: There is no evidence of intracranial hemorrhage.  The midline is maintained.  There is no h
ydrocephalus.  Generalized atrophy.  Chronic small vessel ischemic changes.  No cerebellar tonsillar 
ectopia.  Evaluation of the calvarium shows no fracture.  The mastoid air cells are normally pneumati
zed. 

  

IMPRESSION: No acute intracranial abnormality.

## 2018-05-26 NOTE — DI
EXAM:  Chest, single view, 05/26/2018 

  

HISTORY:  Tube placement 

  

COMPARISON:  05/22/2018 

  

FINDINGS / IMPRESSION:  The heart size appears stable.  Interstitial infiltrates throughout the lower
 aspect of both lungs.  This may represent a combination of atelectasis and/or pneumonia. 

  

There is no pleural effusion or pneumothorax. 

  

Endotracheal tube terminates at the level of the abril.  This should be withdrawn approximately 2 cm
.  I personally discussed this finding and recommendation with Dr. Husain, 05/26/2018, 11:27 p.m.

## 2018-05-27 ENCOUNTER — DOCUMENTATION (OUTPATIENT)
Dept: FAMILY MEDICINE CLINIC | Facility: CLINIC | Age: 63
End: 2018-05-27

## 2018-05-27 PROCEDURE — 0BH17EZ INSERTION OF ENDOTRACHEAL AIRWAY INTO TRACHEA, VIA NATURAL OR ARTIFICIAL OPENING: ICD-10-PCS

## 2018-05-27 PROCEDURE — 99222 1ST HOSP IP/OBS MODERATE 55: CPT | Performed by: FAMILY MEDICINE

## 2018-05-27 RX ADMIN — AMLODIPINE BESYLATE SCH: 5 TABLET ORAL at 16:08

## 2018-05-27 RX ADMIN — IPRATROPIUM BROMIDE AND ALBUTEROL SULFATE SCH VIAL: .5; 3 SOLUTION RESPIRATORY (INHALATION) at 13:45

## 2018-05-27 RX ADMIN — PROPOFOL SCH MLS/HR: 10 INJECTION, EMULSION INTRAVENOUS at 06:38

## 2018-05-27 RX ADMIN — IPRATROPIUM BROMIDE AND ALBUTEROL SULFATE SCH VIAL: .5; 3 SOLUTION RESPIRATORY (INHALATION) at 05:41

## 2018-05-27 RX ADMIN — SODIUM CHLORIDE SCH MLS/HR: 0.9 INJECTION, SOLUTION INTRAVENOUS at 02:25

## 2018-05-27 RX ADMIN — SODIUM CHLORIDE SCH: 0.9 INJECTION, SOLUTION INTRAVENOUS at 18:37

## 2018-05-27 RX ADMIN — DULOXETINE HYDROCHLORIDE SCH MG: 30 CAPSULE, DELAYED RELEASE ORAL at 12:50

## 2018-05-27 RX ADMIN — QUETIAPINE FUMARATE SCH MG: 100 TABLET ORAL at 12:48

## 2018-05-27 RX ADMIN — ENOXAPARIN SODIUM SCH MG: 40 INJECTION SUBCUTANEOUS at 02:23

## 2018-05-27 RX ADMIN — VENLAFAXINE HYDROCHLORIDE SCH MG: 75 CAPSULE, EXTENDED RELEASE ORAL at 12:47

## 2018-05-27 RX ADMIN — LOSARTAN POTASSIUM SCH: 100 TABLET, FILM COATED ORAL at 16:07

## 2018-05-27 RX ADMIN — PHENYTOIN SODIUM SCH MG: 100 CAPSULE, EXTENDED RELEASE ORAL at 20:54

## 2018-05-27 RX ADMIN — Medication SCH SYR: at 20:54

## 2018-05-27 RX ADMIN — GABAPENTIN SCH MG: 300 CAPSULE ORAL at 16:10

## 2018-05-27 RX ADMIN — QUETIAPINE FUMARATE SCH MG: 100 TABLET ORAL at 20:53

## 2018-05-27 RX ADMIN — METOPROLOL TARTRATE SCH: 50 TABLET, FILM COATED ORAL at 16:08

## 2018-05-27 RX ADMIN — LEVETIRACETAM SCH MG: 500 TABLET, FILM COATED ORAL at 12:49

## 2018-05-27 RX ADMIN — IPRATROPIUM BROMIDE AND ALBUTEROL SULFATE SCH VIAL: .5; 3 SOLUTION RESPIRATORY (INHALATION) at 20:15

## 2018-05-27 RX ADMIN — SODIUM CHLORIDE SCH MLS/HR: 9 INJECTION, SOLUTION INTRAVENOUS at 18:16

## 2018-05-27 RX ADMIN — SODIUM CHLORIDE SCH MLS/HR: 9 INJECTION, SOLUTION INTRAVENOUS at 12:18

## 2018-05-27 RX ADMIN — NYSTATIN SCH APPLIC: 100000 POWDER TOPICAL at 20:52

## 2018-05-27 RX ADMIN — SODIUM CHLORIDE SCH MLS/HR: 0.9 INJECTION, SOLUTION INTRAVENOUS at 10:44

## 2018-05-27 RX ADMIN — LEVETIRACETAM SCH MG: 500 TABLET, FILM COATED ORAL at 20:52

## 2018-05-27 RX ADMIN — PROPOFOL SCH: 10 INJECTION, EMULSION INTRAVENOUS at 09:15

## 2018-05-27 RX ADMIN — GABAPENTIN SCH MG: 300 CAPSULE ORAL at 20:52

## 2018-05-27 RX ADMIN — PROPOFOL SCH: 10 INJECTION, EMULSION INTRAVENOUS at 15:52

## 2018-05-27 RX ADMIN — PROPOFOL SCH MLS/HR: 10 INJECTION, EMULSION INTRAVENOUS at 02:37

## 2018-05-27 RX ADMIN — LEVOTHYROXINE SODIUM SCH MCG: 100 TABLET ORAL at 12:48

## 2018-05-27 RX ADMIN — TRAZODONE HYDROCHLORIDE SCH MG: 50 TABLET ORAL at 20:54

## 2018-05-27 RX ADMIN — SOLIFENACIN SUCCINATE SCH MG: 5 TABLET, FILM COATED ORAL at 12:48

## 2018-05-27 RX ADMIN — PHENYTOIN SODIUM SCH MG: 100 CAPSULE, EXTENDED RELEASE ORAL at 12:50

## 2018-05-27 RX ADMIN — ENOXAPARIN SODIUM SCH MG: 40 INJECTION SUBCUTANEOUS at 09:07

## 2018-05-27 RX ADMIN — SODIUM CHLORIDE SCH MLS/HR: 9 INJECTION, SOLUTION INTRAVENOUS at 09:07

## 2018-05-27 RX ADMIN — IPRATROPIUM BROMIDE AND ALBUTEROL SULFATE SCH VIAL: .5; 3 SOLUTION RESPIRATORY (INHALATION) at 09:50

## 2018-05-27 RX ADMIN — SODIUM CHLORIDE AND POTASSIUM CHLORIDE SCH MLS/HR: .9; .15 SOLUTION INTRAVENOUS at 20:13

## 2018-05-27 NOTE — DI
EXAM:  Single view of the chest. 

  

History:  Cough. 

  

Comparison:  Chest radiograph 05/26/2018 

  

Findings:  Heart size is within normal limits.  Endotracheal tube tip is 2.2 cm above the abril.  No
 change in the bibasilar lung infiltrates.  No appreciable pleural fluid and no pneumothorax.  No acu
te osseous abnormalities.  Visualized osseous structures unchanged. 

  

Impression:  Bibasilar pneumonia not significantly changed compared to the prior study.

## 2018-05-28 PROCEDURE — 99232 SBSQ HOSP IP/OBS MODERATE 35: CPT | Performed by: FAMILY MEDICINE

## 2018-05-28 RX ADMIN — QUETIAPINE FUMARATE SCH MG: 100 TABLET ORAL at 09:24

## 2018-05-28 RX ADMIN — Medication SCH SYR: at 21:06

## 2018-05-28 RX ADMIN — DULOXETINE HYDROCHLORIDE SCH MG: 30 CAPSULE, DELAYED RELEASE ORAL at 09:32

## 2018-05-28 RX ADMIN — LEVOTHYROXINE SODIUM SCH MCG: 100 TABLET ORAL at 05:50

## 2018-05-28 RX ADMIN — SODIUM CHLORIDE SCH MLS/HR: 9 INJECTION, SOLUTION INTRAVENOUS at 02:09

## 2018-05-28 RX ADMIN — NYSTATIN SCH APPLIC: 100000 POWDER TOPICAL at 21:11

## 2018-05-28 RX ADMIN — AMLODIPINE BESYLATE SCH: 5 TABLET ORAL at 02:08

## 2018-05-28 RX ADMIN — IPRATROPIUM BROMIDE AND ALBUTEROL SULFATE SCH VIAL: .5; 3 SOLUTION RESPIRATORY (INHALATION) at 04:35

## 2018-05-28 RX ADMIN — SODIUM CHLORIDE SCH MLS/HR: 9 INJECTION, SOLUTION INTRAVENOUS at 13:32

## 2018-05-28 RX ADMIN — QUETIAPINE FUMARATE SCH MG: 100 TABLET ORAL at 21:07

## 2018-05-28 RX ADMIN — LOSARTAN POTASSIUM SCH: 100 TABLET, FILM COATED ORAL at 09:30

## 2018-05-28 RX ADMIN — SODIUM CHLORIDE SCH MLS/HR: 9 INJECTION, SOLUTION INTRAVENOUS at 23:16

## 2018-05-28 RX ADMIN — AMLODIPINE BESYLATE SCH: 5 TABLET ORAL at 09:30

## 2018-05-28 RX ADMIN — IPRATROPIUM BROMIDE AND ALBUTEROL SULFATE SCH VIAL: .5; 3 SOLUTION RESPIRATORY (INHALATION) at 20:35

## 2018-05-28 RX ADMIN — SOLIFENACIN SUCCINATE SCH MG: 5 TABLET, FILM COATED ORAL at 09:23

## 2018-05-28 RX ADMIN — TRAZODONE HYDROCHLORIDE SCH MG: 50 TABLET ORAL at 21:07

## 2018-05-28 RX ADMIN — NYSTATIN AND TRIAMCINOLONE ACETONIDE SCH APPLIC: 100000; 1 CREAM TOPICAL at 21:10

## 2018-05-28 RX ADMIN — NYSTATIN AND TRIAMCINOLONE ACETONIDE SCH APPLIC: 100000; 1 CREAM TOPICAL at 11:12

## 2018-05-28 RX ADMIN — PHENYTOIN SODIUM SCH MG: 100 CAPSULE, EXTENDED RELEASE ORAL at 09:23

## 2018-05-28 RX ADMIN — SODIUM CHLORIDE AND POTASSIUM CHLORIDE SCH MLS/HR: .9; .15 SOLUTION INTRAVENOUS at 23:16

## 2018-05-28 RX ADMIN — Medication SCH SYR: at 13:32

## 2018-05-28 RX ADMIN — ENOXAPARIN SODIUM SCH MG: 40 INJECTION SUBCUTANEOUS at 09:29

## 2018-05-28 RX ADMIN — METOPROLOL TARTRATE SCH MG: 50 TABLET, FILM COATED ORAL at 11:07

## 2018-05-28 RX ADMIN — VENLAFAXINE HYDROCHLORIDE SCH MG: 75 CAPSULE, EXTENDED RELEASE ORAL at 09:24

## 2018-05-28 RX ADMIN — SODIUM CHLORIDE SCH MLS/HR: 9 INJECTION, SOLUTION INTRAVENOUS at 17:22

## 2018-05-28 RX ADMIN — NYSTATIN AND TRIAMCINOLONE ACETONIDE SCH APPLIC: 100000; 1 CREAM TOPICAL at 14:57

## 2018-05-28 RX ADMIN — SODIUM CHLORIDE SCH MLS/HR: 9 INJECTION, SOLUTION INTRAVENOUS at 06:15

## 2018-05-28 RX ADMIN — IPRATROPIUM BROMIDE AND ALBUTEROL SULFATE SCH VIAL: .5; 3 SOLUTION RESPIRATORY (INHALATION) at 09:59

## 2018-05-28 RX ADMIN — LEVETIRACETAM SCH MG: 500 TABLET, FILM COATED ORAL at 21:06

## 2018-05-28 RX ADMIN — GABAPENTIN SCH MG: 300 CAPSULE ORAL at 14:57

## 2018-05-28 RX ADMIN — SODIUM CHLORIDE AND POTASSIUM CHLORIDE SCH MLS/HR: .9; .15 SOLUTION INTRAVENOUS at 13:32

## 2018-05-28 RX ADMIN — GABAPENTIN SCH MG: 300 CAPSULE ORAL at 09:24

## 2018-05-28 RX ADMIN — PHENYTOIN SODIUM SCH MG: 100 CAPSULE, EXTENDED RELEASE ORAL at 21:07

## 2018-05-28 RX ADMIN — NYSTATIN SCH APPLIC: 100000 POWDER TOPICAL at 09:31

## 2018-05-28 RX ADMIN — SODIUM CHLORIDE AND POTASSIUM CHLORIDE SCH MLS/HR: .9; .15 SOLUTION INTRAVENOUS at 05:05

## 2018-05-28 RX ADMIN — LEVETIRACETAM SCH MG: 500 TABLET, FILM COATED ORAL at 09:23

## 2018-05-28 RX ADMIN — IPRATROPIUM BROMIDE AND ALBUTEROL SULFATE SCH VIAL: .5; 3 SOLUTION RESPIRATORY (INHALATION) at 14:11

## 2018-05-28 RX ADMIN — GABAPENTIN SCH MG: 300 CAPSULE ORAL at 21:06

## 2018-05-28 RX ADMIN — Medication SCH SYR: at 04:30

## 2018-05-28 RX ADMIN — METOPROLOL TARTRATE SCH: 50 TABLET, FILM COATED ORAL at 09:29

## 2018-05-28 NOTE — PROGRESS NOTES
"[facility]  HISTORY AND PHYSICIAL    Patient ID: Bita Croft  MRN: [id number]    Acct:  [id number]   Admit Date: 5.26.18   Date of service: 5.27.18    SOURCE: The source of this information is prior knowledge of the patient, review of her office records, her current chart, as well as discussion with ED personal and brother/patient; the patient is not considered reliable.      PATIENT PROFILE: The patient is a 63 y/o  female resident of [place]; she was cooperative.      CHIEF COMPLAINT: \"overdose imodium OTC\"     HISTORY OF PRESENT ILLNESS: she was brought to the ED after taking around 100 imodium.  She was lethargic; and therefore sedated/intubated to protect her airway.  I was advised and also told her CT head, labs, vitals were stable.  She had pending on admit ASA, tylenol levels (and ED was to call if a problem).  Poison control had no suggestions beyond supportive care.  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 She was supported with the vent during the night; weaned and since extubated this morning.  She says she did not take the pills to try to kill herself.     No Known Allergies    HOME MEDICATIONS:  Prior to Admission medications    Medication Sig Start Date End Date Taking? Authorizing Provider   amLODIPine (NORVASC) 5 MG tablet Take 1 tablet by mouth Every 12 (Twelve) Hours. 7/17/17   Zeeshan Hester MD   clonazePAM (KlonoPIN) 1 MG tablet Take 1 mg by mouth 3 (Three) Times a Day As Needed for Anxiety.    Historical Provider, MD   CloNIDine (CATAPRES) 0.1 MG tablet Take 1 tablet by mouth Every 12 (Twelve) Hours. 7/17/17   Zeeshan Hester MD   DULoxetine (CYMBALTA) 30 MG capsule Take 30 mg by mouth Daily.    Historical Provider, MD   gabapentin (NEURONTIN) 300 MG capsule Take 300 mg by mouth 3 (Three) Times a Day.    Historical Provider, " MD   levETIRAcetam (KEPPRA) 1000 MG tablet TAKE 1 TABLET BY MOUTH TWICE DAILY 18   Josafat Gonzales MD   levothyroxine (SYNTHROID, LEVOTHROID) 100 MCG tablet Take 1 tablet by mouth Daily.    Historical Provider, MD   oxyCODONE (ROXICODONE) 10 MG tablet Take 10 mg by mouth Every 4 (Four) Hours As Needed for Moderate Pain .    Historical Provider, MD   phenytoin (DILANTIN) 100 MG ER capsule 100 mg in am and 200 mg in pm 17   Josafat Gonzales MD   potassium chloride (K-DUR,KLOR-CON) 20 MEQ CR tablet TAKE 1 TABLET BY MOUTH TWICE DAILY 18   Josafat Gonzales MD   PROAIR  (90 Base) MCG/ACT inhaler INHALE 1 PUFF BY MOUTH FOUR TIMES DAILY 18   Josafat Gonzales MD   QUEtiapine (SEROquel) 100 MG tablet Take 1 tablet by mouth 2 (Two) Times a Day. 17   Josafat Gonzales MD   solifenacin (VESICARE) 5 MG tablet Take 1 tablet by mouth Daily. 8/15/17   Pawel Ho MD   traZODone (DESYREL) 50 MG tablet Take 50 mg by mouth Every Night.    Historical Provider, MD     HOSPITALIZATION/SURGERY/PROCEDURES:   PROCEDURES:   SCANNED  [id number]  Colonoscopy+tort-mass/[facility]//14/BE  EGD+nl/[facility]//15/UGI  Colonoscopy+polyp/[facility]//9..15/3y     SURGERIES:  T&A/age 11  R knee/[facility]//age 13  SANDRA+BSO/[place]/[facility]/age 32  T12/G[place]mezrnidia/[place]/  Gastric by-pass/[place]/  L knee/Martinez/[place]na/  C surgery/[place]/06  C surgery/[place]/08  Lap GB/[facility]/[place]/3-18-10  L arthroscope/  R lap colectomy/[place]a/.14   ORIF L wrist/[place]//.16     MEDICAL ADMISSIONS:  [facility]:   11.8.08-11.12.08-hyponatremia  2.8.10-2.9.10-L flank pain  3.13.14-3.15.14 hever/TIA  12.26.17-12.27.17    Shortness of breath  Cough  Flu B  Bronchitis-acute  Abnormal CT chest-AGUILAR spiculated nodule  Chest wall pain  Hyponatremia 128  Neutropenia 2.54     BH:   7.21-7.23.95 abdominal pain  2.19.08-2.21.08-DVT  2.9.10-2.11.10-abdominal pain/intercostal  3.11.10-3.19.10-  "hever-abdominal pain+SIADH  3.24.15-3.30.15 diarrhea  5.26.15-5.31.15 nausea/vomiting/diarrhea  7.12.17-7.17.17 accidential injection water bottle cap     LH:   10.26.16-11.6.16 seizure     FAMILY HISTORY:  Hypertension mother, maternal aunt, father, paternal  grandfather; heart disease in mother, father, maternal grandfather, maternal  grandmother, paternal grandmother; diabetes in maternal uncle.      HABITS:  Never smoked, only limited second-hand smoke exposure.  No alcohol or  drugs.      SOCIAL HISTORY:   in 1987 to her current , has 1 son.  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     Review of Systems     GENERAL:  Inactive/slower with limits, speed, stamina for age and LE weakness; most of time in wheelchair and assistance with tranfer. Sleep is often restless.  No fever now.  ENDO:  No syncope, near or diaphoretic sweaty spells..  HEENT: No head injury diffuse headache,  No vision change, No significant hearing loss.  Ears without pain/drainage.  No sore throat.   Increased nasal/sinus congestion/drainage. No epistaxis.  Rash around lips.   CHEST: No chest wall tenderness or mass.  Occ cough; dry cough, without wheeze.  No SOB; no hemoptysis.  CV: No chest pain, palpitations, ankle edema.  GI: No heartburn, dysphagia.  No abdominal pain, diarrhea, constipation.  No rectal bleeding, or melena.    :  Voids without dysuria, or incontinence to completion.  ORTHO: No painful/swollen joints but various on /off sore.  No change daily sore neck or back.  No acute neck or back pain without recent injury.   NEURO: No dizziness; same marked multi-speciality reviewed weakness of extremities.  Same LE numbness/paresthesias.   PSYCH: No memory loss.  Mood bad; son in custodial/just got out and her CT showing lung mass;  usually anxious, depressed but/and even now denies suicidal.  Sees psych.    PHYSICAL EXAMINATION:  T 98.6  R 88 R 22 /90; 5.23.18 Ht 5\" " "10\" Wt 220  GENERAL:  Well nourished/developed in no acute distress. Obese   SKIN: Turgor excellent, without wound, rash, lesion; except circumoral.   HEENT: Normal cephalic without trauma.  Pupils equal round reactive to light. Extraocular motions full without nystagmus.     Oral cavity without growths, exudates, and moist.  Posterior pharynx without mass, obstruction, redness.  No thyromegaly, mass, tenderness, lymphadenopathy and supple.  CV: Regular rhythm.  No murmur, gallop,  edema. Posterior pulses intact.  No carotid bruits.   CHEST: No chest wall tenderness or mass.   LUNGS: Symmetric motion with clear to auscultation.  No dullness to percussion  ABD: Soft, nontender without mass.   ORTHO: Symmetric extremities without swelling/point tenderness.  Full gross range of motion.    NEURO: CN 2-12 grossly intact.  Symmetric facies. 1/4 x bicep knee equal reflexes.  UE/LE   2-3/5 strength throughout.  Nonfocal use extremities. Speech clear.    PSYCH: Oriented x 3.  Pleasant calm, well kept.  Shallow not always purposeful/directed conservation with deficiets in short/long gross memory.    Labs WBC 12.79 to 9.18. Hb 12.3 to 9.9 with fluids and no signs bleeding. Diff unremarkable. Initial pH 7.41/pC02 45, p)x 392, bicarb 31.  Chemistry unremarkable noting GFR 62 and Alk phosp 157H, Na 131L (normal for her/her Rx). Procalcitonin < 0.05 and lactic acid 13.8N.  UA > 1.030 3+bact, + nitrate and drug screen + tricyclic and neg all others. Tylenol < 3 and salicylate < 5.0.       ASSESSMENT/PROBLEM LIST:   A 62-year-old white female:  Allergy/intolerances:  See above.  Procedural history:  See above.  Family history:  See above.  Obesity.  This is status post gastric bypass initial failure and regain of  weight.     1 para 1 AB 0.    Surgical menopause.  Osteoporosis on previous wrist study.    Hypertension.    Hypothyroidism.    History of recurrent hyponatremia felt to be syndrome of inappropriate  antidiuretic " hormone, as least from Ziac or Cymbalta and question others.    History of DVT - left peroneal popliteal, 11/08/2008, treated  successfully with anticoagulation.   Brief anticoagulation.  Chronic insomnia.  Spinal spondylosis; this is primarily cervical with multiple surgeries.  Chronic neck pain.    Chronic narcotics-from pain management/[facility]  Chronic depression - followed by Dr. Samaniego.    Degenerative joint disease that is diffuse.    History of gastric bypass and metabolic risk it carries.    Colon cancer history with previous surgery and chemo, no obvious  recurrence.  Gastroesophageal reflux.  Documented neuropathy by EMG nerve conduction  Seizure disorder (2016)  antieliptics-Keppra/Dilantin  Gait decline-chronic  Abnormal CT chest  polypharmacy    REASON FOR ADMISSION:    Lethargy  Overdose imodium (despite her denial likely suicide attempt)  Intubation-brief  Confusion: doubt more than baseline  Anxiety-acute  Depression-acute  chelosis    PLANS:   Rx-reviewed; ordered as needed and will review daily/as needed.   Includes anticoagulation for DVT prevention.   LAB-reviewed; ordered as needed and will review daily.  Particular:  Daily chemistry, hematology.   Imaging-reviewed; ordered as needed and will review daily.  No others.  Consults mental health  Diet: liquids; advance from there  Fluids: initial/then slow  Special issues: mental health placement?   DC planning: she will expect eventually home.   Code status: full

## 2018-05-29 ENCOUNTER — TELEPHONE (OUTPATIENT)
Dept: FAMILY MEDICINE CLINIC | Facility: CLINIC | Age: 63
End: 2018-05-29

## 2018-05-29 PROBLEM — M25.519 SHOULDER PAIN: Status: ACTIVE | Noted: 2018-05-29

## 2018-05-29 PROCEDURE — 99232 SBSQ HOSP IP/OBS MODERATE 35: CPT | Performed by: FAMILY MEDICINE

## 2018-05-29 RX ADMIN — Medication SCH SYR: at 12:08

## 2018-05-29 RX ADMIN — OXYCODONE HYDROCHLORIDE AND ACETAMINOPHEN PRN TAB: 5; 325 TABLET ORAL at 11:31

## 2018-05-29 RX ADMIN — METOPROLOL TARTRATE SCH MG: 50 TABLET, FILM COATED ORAL at 08:29

## 2018-05-29 RX ADMIN — PHENYTOIN SODIUM SCH MG: 100 CAPSULE, EXTENDED RELEASE ORAL at 22:13

## 2018-05-29 RX ADMIN — OXYCODONE HYDROCHLORIDE AND ACETAMINOPHEN PRN TAB: 5; 325 TABLET ORAL at 03:43

## 2018-05-29 RX ADMIN — TRAZODONE HYDROCHLORIDE SCH MG: 50 TABLET ORAL at 22:14

## 2018-05-29 RX ADMIN — ENOXAPARIN SODIUM SCH MG: 40 INJECTION SUBCUTANEOUS at 08:32

## 2018-05-29 RX ADMIN — LOSARTAN POTASSIUM SCH MG: 100 TABLET, FILM COATED ORAL at 08:29

## 2018-05-29 RX ADMIN — NYSTATIN AND TRIAMCINOLONE ACETONIDE SCH APPLIC: 100000; 1 CREAM TOPICAL at 15:09

## 2018-05-29 RX ADMIN — PHENYTOIN SODIUM SCH MG: 100 CAPSULE, EXTENDED RELEASE ORAL at 08:40

## 2018-05-29 RX ADMIN — DULOXETINE HYDROCHLORIDE SCH MG: 30 CAPSULE, DELAYED RELEASE ORAL at 08:29

## 2018-05-29 RX ADMIN — LEVETIRACETAM SCH MG: 500 TABLET, FILM COATED ORAL at 08:30

## 2018-05-29 RX ADMIN — OXYCODONE HYDROCHLORIDE AND ACETAMINOPHEN PRN TAB: 5; 325 TABLET ORAL at 17:55

## 2018-05-29 RX ADMIN — SODIUM CHLORIDE SCH MLS/HR: 9 INJECTION, SOLUTION INTRAVENOUS at 05:40

## 2018-05-29 RX ADMIN — GABAPENTIN SCH MG: 300 CAPSULE ORAL at 08:27

## 2018-05-29 RX ADMIN — SODIUM CHLORIDE AND POTASSIUM CHLORIDE SCH: .9; .15 SOLUTION INTRAVENOUS at 03:47

## 2018-05-29 RX ADMIN — GABAPENTIN SCH MG: 300 CAPSULE ORAL at 15:09

## 2018-05-29 RX ADMIN — GABAPENTIN SCH MG: 300 CAPSULE ORAL at 22:13

## 2018-05-29 RX ADMIN — QUETIAPINE FUMARATE SCH MG: 100 TABLET ORAL at 22:13

## 2018-05-29 RX ADMIN — NYSTATIN AND TRIAMCINOLONE ACETONIDE SCH APPLIC: 100000; 1 CREAM TOPICAL at 22:14

## 2018-05-29 RX ADMIN — SOLIFENACIN SUCCINATE SCH MG: 5 TABLET, FILM COATED ORAL at 08:29

## 2018-05-29 RX ADMIN — SODIUM CHLORIDE AND POTASSIUM CHLORIDE SCH MLS/HR: .9; .15 SOLUTION INTRAVENOUS at 22:12

## 2018-05-29 RX ADMIN — LEVOTHYROXINE SODIUM SCH MCG: 100 TABLET ORAL at 05:40

## 2018-05-29 RX ADMIN — QUETIAPINE FUMARATE SCH MG: 100 TABLET ORAL at 08:29

## 2018-05-29 RX ADMIN — IPRATROPIUM BROMIDE AND ALBUTEROL SULFATE SCH VIAL: .5; 3 SOLUTION RESPIRATORY (INHALATION) at 04:50

## 2018-05-29 RX ADMIN — IPRATROPIUM BROMIDE AND ALBUTEROL SULFATE SCH VIAL: .5; 3 SOLUTION RESPIRATORY (INHALATION) at 20:45

## 2018-05-29 RX ADMIN — SODIUM CHLORIDE SCH MLS/HR: 9 INJECTION, SOLUTION INTRAVENOUS at 12:08

## 2018-05-29 RX ADMIN — NYSTATIN SCH APPLIC: 100000 POWDER TOPICAL at 22:14

## 2018-05-29 RX ADMIN — SODIUM CHLORIDE AND POTASSIUM CHLORIDE SCH MLS/HR: .9; .15 SOLUTION INTRAVENOUS at 11:12

## 2018-05-29 RX ADMIN — NYSTATIN AND TRIAMCINOLONE ACETONIDE SCH APPLIC: 100000; 1 CREAM TOPICAL at 08:36

## 2018-05-29 RX ADMIN — VENLAFAXINE HYDROCHLORIDE SCH MG: 75 CAPSULE, EXTENDED RELEASE ORAL at 08:30

## 2018-05-29 RX ADMIN — Medication SCH SYR: at 05:41

## 2018-05-29 RX ADMIN — SODIUM CHLORIDE SCH MLS/HR: 9 INJECTION, SOLUTION INTRAVENOUS at 17:54

## 2018-05-29 RX ADMIN — LEVETIRACETAM SCH MG: 500 TABLET, FILM COATED ORAL at 22:13

## 2018-05-29 RX ADMIN — IPRATROPIUM BROMIDE AND ALBUTEROL SULFATE SCH VIAL: .5; 3 SOLUTION RESPIRATORY (INHALATION) at 14:50

## 2018-05-29 RX ADMIN — NYSTATIN SCH APPLIC: 100000 POWDER TOPICAL at 08:36

## 2018-05-29 RX ADMIN — IPRATROPIUM BROMIDE AND ALBUTEROL SULFATE SCH VIAL: .5; 3 SOLUTION RESPIRATORY (INHALATION) at 10:13

## 2018-05-29 NOTE — RS.OTINEVL
Subjective





- Patient information


Date of Evaluation: 05/29/18


Date of Arrival on Unit: 05/26/18


Admitted From:: Home


Usual Living Arrangement: With Spouse


Living Arrangement Comments: Pt lives at home with her . Pt is in a WC 

and has not walked in 9 months. Pt is maximum assistance to transfer from bed 

to chair stand pivot.


Home Environment: Mobile Home, Ramp


Medical History: Hypertension


Medical History Comments:: anxiety, depression


Surgical History: Lumbar Spine (x 2 )


Medications: see chart


Subjective Information/ Patient Comments:: "I am a wreck." "I have to brush 

this hair."





- Level of function


Prior to this admission, the patient could do the following:: Partially 

Dependent Ambulation


Abilities prior to this admission: 





Pt was in a WC at home. Pt was transferred by her  grabbing her hands 

and pulling her up. Pt reports he helped her transfer to the shower chair and 

she could bath herself. 


Current Equipment Used at Home: w/c, shower chair





Pain Assessment





- Pain


Pain Score: 9


Side: bilateral


Pain Location Body Site: Back


Pain Aggravating Factors: Changing Position


Pain Alleviating Factors: Medication





Interventions





- Objective


Patient Orientation: Person, Place, Situation


Current Interventions: IV's, Telemetry, Timmons Catheter


Observation: Pt is confused intermittently. Pt is emotional. Pt appears 

depressed. Pt is weak.





Interventions





- ROM


Right Upper Extremity AROM: WFL's


Left Upper Extremity AROM: WFL's





- Strength


Right Upper Extremity Strength: Mild Weakness


Left Upper Extremity Strength: Mild Weakness





- Sensation


Right Upper Extremity Sensation: Intact/Normal


Left Upper Extremity Sensation: Intact/Normal





Balance





- Sitting Balance


Static Sitting Balance: Fair


Dynamic Sitting Balance: Fair





- Standing Balance


Static Standing Balance: Poor


Dynamic Standing Balance: Poor





- Comments


Balance Assessment Comments: Poor





ADL Skills





- Self Feeding


Self Feeding: Independent





- Grooming


Grooming: Set Up Only





- Bathing


Bathing UE: Set Up Only


Bathing LE: Min Assist





- Dressing


Dressing UE: Min Assist


Dressing LE: Max Assist





- Toilet Management


Toileting Management: 2 person assist





Functional Mobility





- Bed Mobility


Rolling R/L: Min Assist


Scooting: Mod Assist


Supine to Sit: Mod Assist, 2 person assist


Sit to Supine: Mod Assist, 2 person assist





- Transfers


Sit to Stand: Max Assist


Stand to Sit: Max Assist


Stand Pivot Transfers: Max Assist





- Ambulation


Weight Bearing Status: FWB


Assistive Device Used: Gait belt


Assistance needed with Ambulation: Max Assist, 2 person assist





- Safety Awareness


Safety Awareness: Good


BARTHEL INDEX SCORE: .





Additional Treatment Performed





- Time with patient


Total treatment time: 20





Activities


Patient Interests:: Watching Television





Patient Education


Patient Education: Education of diagnosis, Home Exercise Program, Education of 

Plan of Care


Teaching Recipient: Patient


Teaching Methods: Discussion





Assessment


Problem List:: Decreased level of function, Requires training/education, 

Decreased safety/Risk of falls, Weakness


Rehab Potential: Good


Further Therapy Indicated?: Yes


Evaluation Complexity: HISTORY: Medium, EXAM OF BODY SYSTEMS: Medium, CLINICAL 

DECISION MAKING: Medium





Short Term Goals





- Goals


GOAL 1: Pt to increase sit to stand to moderate assistance.


Goal to be met by: 06/05/18


GOAL 2: Pt to increase BUE strength to 4/5.


Goal to be met by: 06/05/18


GOAL 3: Pt to increase stand pivot transfer to minimal assistance.


Goal to be met by: 06/05/18





Long Term Goals


GOAL 1: Pt to increase sit to stand to minimal assistance.


Goal to be met by: 06/08/18


GOAL 2: Pt to increase BUE strength to 4+/5.


Goal to be met by: 06/08/18


GOAL 3: Pt to increase stand pivot transfer to CGA assistance.


Goal to be met by: 06/08/18





Plan


Plan of Care: Therapeutic EX, Neuromuscular Re-Educ, Therapeutic Activity, Self-

Care/Home Management


Frequency of Treatment: 1-2 X day, as tolerated


Duration of Treatment: 2 Weeks


Anticipated Discharge Destination: Mental health


Treatment Diagnosis  (ICD 10 Codes): M62.81 muscle weakness, Z74.0 Reduced 

mobility, Z74.1 Need for assistance with personal care.


Has the Physician been added for Co-signature?: Yes

## 2018-05-29 NOTE — RS.PTINEVL
Subjective





- Patient information


Date of Evaluation: 05/29/18


Date of Arrival on Unit: 05/26/18


Admitted From:: Home


Diagnosis: drug overdose, atelectasis,


Usual Living Arrangement: With Spouse


Home Environment: Mobile Home, Ramp


Medical History: Hypertension


Medical History Comments:: anxiety, depression


Surgical History: Lumbar Spine (x 2 )


Medications: see chart


Subjective Information/ Patient Comments:: pt states that she doesn't want to 

be a burden to her family. pt tearful and emotional during treatment.





- Level of function


Abilities prior to this admission: 





pt required assist of  to transfer, and is w/c bound. States she has not 

amb for 9 months. She states that she is able to perform bathing after  

assists getting onto shower chair. 


Current Level of Function: Dependent


Current Equipment Used at Home: w/c, shower chair





Pain Assessement





- Location


  ** back 


Description: Sharp, Aching, Chronic


Intensity: 8


Pain Behavior: Moaning, Crying, Facial Grimacing


Pain Aggravating Factors: ADL's, Changing Position, Exercise/Activity, Sitting


Pain Alleviating Factors: Medication





Interventions





- Objective


Patient Orientation: Person, Place


Current Interventions: IV's, Telemetry, Timmons Catheter





Range of Motion





- ROM


Right Upper Extremity AROM: WFL's


Left Upper Extremity AROM: WFL's


Right Lower Extremity AROM: WFL's


Left Lower Extremity AROM: WFL's





Muscle Strength





- Muscle Strength


Right Upper Extremity Strength: Mild Weakness (shld flex 4-/5, elbow flex/ext 4/

5, decreased  strength)


Left Upper Extremity Strength: Mild Weakness (shld flex 4-/5, elbow flex/ext 4/5

, decreased  strength)


Right Lower Extremity Strength: Mild Weakness (hip flex 3/5, knee flex/ext 3+/5 

ankle DF/PF 3+/5)


Left Lower Extremity Strength: Mild Weakness (hip flex 3/5, knee flex/ext 3+/5 

ankle DF/PF 3+/5)





Sensation





- Sensation


Right Upper Extremity Sensation: Intact/Normal


Left Upper Extremity Sensation: Intact/Normal


Right Lower Extremity Sensation: Intact/Normal


Left Lower Extremity Sensation: Intact/Normal





Palpation


Palpation Findings: Tenderness, Muscle Guarding


Comments:: lumbar paraspinals





Balance





- Sitting Balance and Reactions


Static Sitting Balance: Fair


Dynamic Sitting Balance: Poor


Sitting Equilibrium Reactions: Absent Left, Absent Right


Sitting Protective Reactions: Absent Left, Absent Right





- Standing Balance and Reactions


Static Standing Balance: Poor


Dynamic Standing Balance: Zero


Standing Equilibrium Reactions: Absent Left, Absent Right


Standing Protective Reactions: Absent Left, Absent Right





- Comments


Balance Assessment Comments: pt able to maintain static sitting balance, pt 

unable to maintain sitting balance with reaching, challenges. pt requires max 

assist, unable to maintain stand without max assist.





Functional Mobility





- Bed Mobility


Rolling R/L: Mod Assist, 1 person assist


Scooting: Max Assist, 2 person assist


Supine to Sit: Mod Assist, Max Assist, 2 person assist


Sit to Supine: Mod Assist, Max Assist, 2 person assist





- Transfers


Sit to Stand: Max Assist, 2 person assist


Stand Pivot Transfers: Max Assist, 2 person assist


Comments:: pt requires max assist of 2 for stand pivot with difficulty 

advancing LE's





- Safety Awareness


Safety Awareness: Poor


BARTHEL INDEX SCORE: n/a





Treatment time





- Time with patient


Total treatment time: 26





Patient Education





- Education


Patient Education: Activity Modification, Education of Plan of Care


Teaching Recipient: Patient


Teaching Methods: Discussion (discussion regarding POC as well as safety with 

transfers. )





Assessment





- Assessment


Problem List:: Decreased level of function, Requires training/education, 

Decreased safety/Risk of falls, Weakness, Pain limits previous level of function

, Cognitive status limits abilities


Rehab Potential: Fair


Further Therapy Indicated?: Yes


Evaluation Complexity: HISTORY: Medium (HTN, depression, overdose, back sx), 

EXAM OF BODY SYSTEMS: Medium (pain, strength, balance, transfers, endurance), 

CLINICAL PRESENTATION: Medium (evolving), CLINICAL DECISION MAKING: Medium





Short Term Goals


GOAL #1: Rolling with bedrails with mod to min x 1


Goal to be met by: 05/31/18


GOAL #2: Sup to/from sit mod x 1, sit to/from stand with mod x 2


Goal to be met by: 05/31/18


GOAL #3: Stand pivot from bed to/from chair mod x 2


Goal to be met by: 05/31/18


GOAL #4: pt able to sit at side of bed for 1-2 mins with min challenges 


Goal to be met by: 05/31/18





Long Term Goals


GOAL #1: pt able to sit at side of bed x 5mins with no LOB with reaching


Goal to be met by: 06/02/18


GOAL #2: Sup to/from sit min x 1, sit to/from stand mod x 1


Goal to be met by: 06/02/18


GOAL #3: stand pivot bed to/from chair mod x 1


Goal to be met by: 06/02/18





Plan


Plan of Care: Therapeutic EX, Therapeutic Activity


Frequency of Treatment: 1-2 X day, as tolerated


Duration of Treatment: 5-6 days


Anticipated Discharge Destination: Home


Treatment Diagnosis  (ICD 10 Codes): M 62.81 general weakness, M 54.4 low back 

pain, R 26.81 balance impaired


Has the Physician been added for Co-signature?: Yes

## 2018-05-29 NOTE — TELEPHONE ENCOUNTER
She said to let Dr. Gonzales know that she still has her garcia catheter. Did not know if he knew the blood cultures she had done in the ER was positive for mod growth GPC 2 of 2. She is on Zosyn IV q 6hrs

## 2018-05-29 NOTE — PN
DATE OF SERVICE:  05/28/18



CHIEF COMPLAINT:

"She overdosed."



BRIEF HISTORY OF PRESENT ILLNESS: 

She took a large bottle of Imodium.  This made her lethargic. She had to be 
intubated and was briefly on the vent from the evening of 5/26 and extubated 
early the 27th. Mental Health saw her yesterday and thought she was suicidal 
but we were not sure she was medically stable.  I called this morning and she 
was still on empiric oxygen and we have taken that away. She has not been 
eating very well and has not been out of bed. Blood pressure has been running 
somewhat low. She did have negative Aspirin and Tylenol levels. She has seen 
her psychiatrist Dr. Papi Blackwell. We were glad she had that appointment the 
same day and I told her she likely had a malignant nodule in the lung. 



PHYSICAL EXAMINATION: 

V/S: Temperature 98.8, pulse 94, /78, respiratory rate 17. 

GENERAL: Older than stated age white female in no obvious distress. 

INTEGUMENT: She had drying and redness circumorally around the edge of her 
lips. There is no rash on the oral cavity. Eyegrounds are slightly pale. No 
ankle edema. Mucous membranes are moist.

NECK: No mass or thyromegaly. 

CHEST:  Clear. 

CARDIOVASCULAR: Regular without murmur or peripheral edema. 

GI:  Nontender. 



LABS/X-RAYS:

White count 7 compared to an initial of 12, hemoglobin 9.9 compared to initial 
12; there has been no sign of bleeding. Sodium is now normal at 138; previously 
131, slight diminished liver enzymes. Urine came back positive nitrite, 3+ 
bacteria but specific gravity greater than 1.030. 



ASSESSMENT:

1.  Lethargy - resolved. It was probably secondary to overdose of Imodium

2.  Overdose of Imodium

3.  Brief remote intubation - 26th through the morning of the 27th

4.  Confusion - back to her baseline

5.  Anxiety - acute and back to baseline

6.  Depression - acute and back to baseline

7.  Suicidal - possible

8.  Cheilosis

9.  Lower blood pressures



PLAN:

1.  We will continue Cozaar to help lower blood pressures

2.  Try to increase activity

3.  Stay off oxygen and document if there is any need for giving her Mycolog 
cream to go around her mouth

4.  Maybe placement tomorrow

MTDD

## 2018-05-30 ENCOUNTER — TELEPHONE (OUTPATIENT)
Dept: CARDIAC SURGERY | Facility: CLINIC | Age: 63
End: 2018-05-30

## 2018-05-30 PROCEDURE — 99231 SBSQ HOSP IP/OBS SF/LOW 25: CPT | Performed by: FAMILY MEDICINE

## 2018-05-30 RX ADMIN — GABAPENTIN SCH MG: 300 CAPSULE ORAL at 08:14

## 2018-05-30 RX ADMIN — TRAZODONE HYDROCHLORIDE SCH MG: 50 TABLET ORAL at 20:58

## 2018-05-30 RX ADMIN — SODIUM CHLORIDE SCH MLS/HR: 9 INJECTION, SOLUTION INTRAVENOUS at 00:36

## 2018-05-30 RX ADMIN — METOPROLOL TARTRATE SCH MG: 50 TABLET, FILM COATED ORAL at 08:16

## 2018-05-30 RX ADMIN — LEVETIRACETAM SCH MG: 500 TABLET, FILM COATED ORAL at 08:16

## 2018-05-30 RX ADMIN — PHENYTOIN SODIUM SCH MG: 100 CAPSULE, EXTENDED RELEASE ORAL at 20:58

## 2018-05-30 RX ADMIN — QUETIAPINE FUMARATE SCH MG: 100 TABLET ORAL at 08:15

## 2018-05-30 RX ADMIN — LOSARTAN POTASSIUM SCH MG: 100 TABLET, FILM COATED ORAL at 08:15

## 2018-05-30 RX ADMIN — OXYCODONE HYDROCHLORIDE AND ACETAMINOPHEN PRN TAB: 5; 325 TABLET ORAL at 08:13

## 2018-05-30 RX ADMIN — NYSTATIN AND TRIAMCINOLONE ACETONIDE SCH APPLIC: 100000; 1 CREAM TOPICAL at 20:59

## 2018-05-30 RX ADMIN — VENLAFAXINE HYDROCHLORIDE SCH MG: 75 CAPSULE, EXTENDED RELEASE ORAL at 08:14

## 2018-05-30 RX ADMIN — IPRATROPIUM BROMIDE AND ALBUTEROL SULFATE SCH VIAL: .5; 3 SOLUTION RESPIRATORY (INHALATION) at 14:08

## 2018-05-30 RX ADMIN — IPRATROPIUM BROMIDE AND ALBUTEROL SULFATE SCH VIAL: .5; 3 SOLUTION RESPIRATORY (INHALATION) at 04:30

## 2018-05-30 RX ADMIN — NYSTATIN AND TRIAMCINOLONE ACETONIDE SCH APPLIC: 100000; 1 CREAM TOPICAL at 08:19

## 2018-05-30 RX ADMIN — LEVOTHYROXINE SODIUM SCH MCG: 100 TABLET ORAL at 05:59

## 2018-05-30 RX ADMIN — AMOXICILLIN SCH MG: 500 CAPSULE ORAL at 20:57

## 2018-05-30 RX ADMIN — OXYCODONE HYDROCHLORIDE AND ACETAMINOPHEN PRN TAB: 5; 325 TABLET ORAL at 00:36

## 2018-05-30 RX ADMIN — PHENYTOIN SODIUM SCH MG: 100 CAPSULE, EXTENDED RELEASE ORAL at 08:13

## 2018-05-30 RX ADMIN — NYSTATIN AND TRIAMCINOLONE ACETONIDE SCH APPLIC: 100000; 1 CREAM TOPICAL at 15:16

## 2018-05-30 RX ADMIN — DULOXETINE HYDROCHLORIDE SCH MG: 30 CAPSULE, DELAYED RELEASE ORAL at 08:13

## 2018-05-30 RX ADMIN — SODIUM CHLORIDE AND POTASSIUM CHLORIDE SCH MLS/HR: .9; .15 SOLUTION INTRAVENOUS at 09:54

## 2018-05-30 RX ADMIN — NYSTATIN SCH APPLIC: 100000 POWDER TOPICAL at 08:19

## 2018-05-30 RX ADMIN — SODIUM CHLORIDE SCH MLS/HR: 9 INJECTION, SOLUTION INTRAVENOUS at 05:59

## 2018-05-30 RX ADMIN — LEVETIRACETAM SCH MG: 500 TABLET, FILM COATED ORAL at 20:59

## 2018-05-30 RX ADMIN — IPRATROPIUM BROMIDE AND ALBUTEROL SULFATE SCH VIAL: .5; 3 SOLUTION RESPIRATORY (INHALATION) at 21:00

## 2018-05-30 RX ADMIN — SODIUM CHLORIDE SCH: 9 INJECTION, SOLUTION INTRAVENOUS at 16:27

## 2018-05-30 RX ADMIN — OXYCODONE HYDROCHLORIDE AND ACETAMINOPHEN PRN TAB: 5; 325 TABLET ORAL at 15:14

## 2018-05-30 RX ADMIN — QUETIAPINE FUMARATE SCH MG: 100 TABLET ORAL at 21:00

## 2018-05-30 RX ADMIN — SOLIFENACIN SUCCINATE SCH MG: 5 TABLET, FILM COATED ORAL at 08:16

## 2018-05-30 RX ADMIN — NYSTATIN SCH APPLIC: 100000 POWDER TOPICAL at 20:59

## 2018-05-30 RX ADMIN — ENOXAPARIN SODIUM SCH MG: 40 INJECTION SUBCUTANEOUS at 08:17

## 2018-05-30 RX ADMIN — GABAPENTIN SCH MG: 300 CAPSULE ORAL at 21:01

## 2018-05-30 RX ADMIN — SODIUM CHLORIDE AND POTASSIUM CHLORIDE SCH: .9; .15 SOLUTION INTRAVENOUS at 16:29

## 2018-05-30 RX ADMIN — GABAPENTIN SCH MG: 300 CAPSULE ORAL at 16:26

## 2018-05-30 RX ADMIN — IPRATROPIUM BROMIDE AND ALBUTEROL SULFATE SCH VIAL: .5; 3 SOLUTION RESPIRATORY (INHALATION) at 10:10

## 2018-05-30 NOTE — TELEPHONE ENCOUNTER
This patient has been placed on the will call list.  Usually seen earlier than scheduled time.  In the meantime, please obtain CT scan of the chest imaging so that I can review.

## 2018-05-30 NOTE — TELEPHONE ENCOUNTER
Spoke with radiology department at [facility] and requesting a disk of all imaging to be mailed to us.  Patient is unable to bring it herself due to being ill.

## 2018-05-30 NOTE — TELEPHONE ENCOUNTER
Delvis Bowman, the  of [facility] emailed Najma because Dr Gonzales feels that the cancer patient needs to be seen sooner than July 10th with Dr. Mello.  He would like to know if it is possible to get the appointment moved up to mid June even if it is with DEBORAH Mooney.  Delvis is asking for a call back on this matter. [phone]    Patient is on the call list.      I spoke with Radiology at [facility] yesterday about mailing a disk of her imaging test.

## 2018-05-30 NOTE — TELEPHONE ENCOUNTER
I spoke with the manager of Dr. Mello and Sylvester's office.  According to her after your conversation with Dr. Phan yesterday, they called to move her appointment up.  Patient's  informed them that patient is in the hospital and will not make it much longer and will call them after she gets out of the hospital to schedule an appointment.  They have been pro-active and have reviewed her scans. They are just waiting to hear back from the  for an appointment.

## 2018-05-31 ENCOUNTER — OUTSIDE FACILITY SERVICE (OUTPATIENT)
Dept: FAMILY MEDICINE CLINIC | Facility: CLINIC | Age: 63
End: 2018-05-31

## 2018-05-31 ENCOUNTER — DOCUMENTATION (OUTPATIENT)
Dept: CARDIAC SURGERY | Facility: CLINIC | Age: 63
End: 2018-05-31

## 2018-05-31 ENCOUNTER — DOCUMENTATION (OUTPATIENT)
Dept: FAMILY MEDICINE CLINIC | Facility: CLINIC | Age: 63
End: 2018-05-31

## 2018-05-31 VITALS — TEMPERATURE: 97.7 F | SYSTOLIC BLOOD PRESSURE: 140 MMHG | DIASTOLIC BLOOD PRESSURE: 92 MMHG

## 2018-05-31 PROCEDURE — 99238 HOSP IP/OBS DSCHRG MGMT 30/<: CPT | Performed by: FAMILY MEDICINE

## 2018-05-31 RX ORDER — OXYCODONE HYDROCHLORIDE AND ACETAMINOPHEN 5; 325 MG/1; MG/1
2 TABLET ORAL EVERY 6 HOURS PRN
Qty: 80 TABLET | Refills: 0 | Status: SHIPPED | OUTPATIENT
Start: 2018-05-31 | End: 2018-06-07 | Stop reason: SDUPTHER

## 2018-05-31 RX ORDER — GABAPENTIN 600 MG/1
600 TABLET ORAL 3 TIMES DAILY
Qty: 90 TABLET | Refills: 0 | Status: ON HOLD | OUTPATIENT
Start: 2018-05-31 | End: 2019-01-01 | Stop reason: SDUPTHER

## 2018-05-31 RX ADMIN — SOLIFENACIN SUCCINATE SCH MG: 5 TABLET, FILM COATED ORAL at 08:07

## 2018-05-31 RX ADMIN — QUETIAPINE FUMARATE SCH MG: 100 TABLET ORAL at 08:10

## 2018-05-31 RX ADMIN — PHENYTOIN SODIUM SCH MG: 100 CAPSULE, EXTENDED RELEASE ORAL at 08:09

## 2018-05-31 RX ADMIN — LOSARTAN POTASSIUM SCH MG: 100 TABLET, FILM COATED ORAL at 08:09

## 2018-05-31 RX ADMIN — DULOXETINE HYDROCHLORIDE SCH MG: 30 CAPSULE, DELAYED RELEASE ORAL at 08:09

## 2018-05-31 RX ADMIN — OXYCODONE HYDROCHLORIDE AND ACETAMINOPHEN PRN TAB: 5; 325 TABLET ORAL at 15:16

## 2018-05-31 RX ADMIN — GABAPENTIN SCH MG: 300 CAPSULE ORAL at 15:16

## 2018-05-31 RX ADMIN — VENLAFAXINE HYDROCHLORIDE SCH MG: 75 CAPSULE, EXTENDED RELEASE ORAL at 08:08

## 2018-05-31 RX ADMIN — SODIUM CHLORIDE AND POTASSIUM CHLORIDE SCH: .9; .15 SOLUTION INTRAVENOUS at 10:40

## 2018-05-31 RX ADMIN — LEVETIRACETAM SCH MG: 500 TABLET, FILM COATED ORAL at 08:08

## 2018-05-31 RX ADMIN — IPRATROPIUM BROMIDE AND ALBUTEROL SULFATE SCH VIAL: .5; 3 SOLUTION RESPIRATORY (INHALATION) at 10:39

## 2018-05-31 RX ADMIN — GABAPENTIN SCH MG: 300 CAPSULE ORAL at 08:09

## 2018-05-31 RX ADMIN — LEVOTHYROXINE SODIUM SCH MCG: 100 TABLET ORAL at 05:41

## 2018-05-31 RX ADMIN — AMOXICILLIN SCH MG: 500 CAPSULE ORAL at 05:41

## 2018-05-31 RX ADMIN — NYSTATIN AND TRIAMCINOLONE ACETONIDE SCH APPLIC: 100000; 1 CREAM TOPICAL at 15:17

## 2018-05-31 RX ADMIN — NYSTATIN SCH APPLIC: 100000 POWDER TOPICAL at 09:15

## 2018-05-31 RX ADMIN — NYSTATIN AND TRIAMCINOLONE ACETONIDE SCH APPLIC: 100000; 1 CREAM TOPICAL at 09:15

## 2018-05-31 RX ADMIN — OXYCODONE HYDROCHLORIDE AND ACETAMINOPHEN PRN TAB: 5; 325 TABLET ORAL at 02:43

## 2018-05-31 RX ADMIN — ENOXAPARIN SODIUM SCH MG: 40 INJECTION SUBCUTANEOUS at 08:10

## 2018-05-31 RX ADMIN — OXYCODONE HYDROCHLORIDE AND ACETAMINOPHEN PRN TAB: 5; 325 TABLET ORAL at 09:13

## 2018-05-31 RX ADMIN — METOPROLOL TARTRATE SCH MG: 50 TABLET, FILM COATED ORAL at 08:09

## 2018-05-31 RX ADMIN — IPRATROPIUM BROMIDE AND ALBUTEROL SULFATE SCH VIAL: .5; 3 SOLUTION RESPIRATORY (INHALATION) at 14:12

## 2018-05-31 RX ADMIN — IPRATROPIUM BROMIDE AND ALBUTEROL SULFATE SCH VIAL: .5; 3 SOLUTION RESPIRATORY (INHALATION) at 05:12

## 2018-05-31 RX ADMIN — AMOXICILLIN SCH MG: 500 CAPSULE ORAL at 13:08

## 2018-05-31 NOTE — DI
Exam:  Single view of the chest. 

  

Comparison:  05/27/2018. 

  

Reason for exam:  Drug ingestion unresponsive. 

  

FINDINGS:  No pneumothorax, pleural effusion, or focal consolidation.  The cardiac silhouette is not 
enlarged.  The imaged osseous structures appear grossly unremarkable without acute fracture.  Operati
ve changes are seen after anterior cervical discectomy and fusion.  Partially imaged operative change
s are seen in the lumbar spine. 

  

Impression:  No acute cardiopulmonary process.

## 2018-05-31 NOTE — TELEPHONE ENCOUNTER
Pt being d/c from [facility] today and going to [facility] and needs new Rx sent to [facility]

## 2018-05-31 NOTE — TELEPHONE ENCOUNTER
Imaging is on the way via mail. Will await arrival. Office attempted to give patient appt today as well, but she is hospitalized still. Will await for her discharge.

## 2018-06-01 RX ORDER — CLONAZEPAM 1 MG/1
1 TABLET ORAL 3 TIMES DAILY PRN
Qty: 30 TABLET | Refills: 0 | Status: SHIPPED | OUTPATIENT
Start: 2018-06-01 | End: 2018-06-07 | Stop reason: SDUPTHER

## 2018-06-01 NOTE — PROGRESS NOTES
"[facility]  DISCHARGE SUMMARY    Patient ID: Bita Croft  MRN: [id number]                       Acct:  [id number]              Admit Date: 5.26.18   Date of discharge: 5.31.18    Date of service: 5.31.18    Consults:    None    PATIENT PROFILE: The patient is a 63 y/o  female resident of [place]; she was cooperative.      CHIEF COMPLAINT: \"overdose imodium OTC\"     HISTORY OF PRESENT ILLNESS: she was brought to the ED after taking around 100 imodium.  She was lethargic; and therefore sedated/intubated to protect her airway.  I was advised and also told her CT head, labs, vitals were stable.  She had pending on admit ASA, tylenol levels (and ED was to call if a problem).  Poison control had no suggestions beyond supportive care.  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 She was supported with the vent during the night; weaned and since extubated this morning.  She says she did not take the pills to try to kill herself.      No Known Allergies     HOME MEDICATIONS:          Prior to Admission medications    Medication Sig Start Date End Date Taking? Authorizing Provider   amLODIPine (NORVASC) 5 MG tablet Take 1 tablet by mouth Every 12 (Twelve) Hours. 7/17/17     Zeeshan Hester MD   clonazePAM (KlonoPIN) 1 MG tablet Take 1 mg by mouth 3 (Three) Times a Day As Needed for Anxiety.       Historical Provider, MD   CloNIDine (CATAPRES) 0.1 MG tablet Take 1 tablet by mouth Every 12 (Twelve) Hours. 7/17/17     Zeeshan Hester MD   DULoxetine (CYMBALTA) 30 MG capsule Take 30 mg by mouth Daily.       Historical Provider, MD   gabapentin (NEURONTIN) 300 MG capsule Take 300 mg by mouth 3 (Three) Times a Day.       Historical Provider, MD   levETIRAcetam (KEPPRA) 1000 MG tablet TAKE 1 TABLET BY MOUTH TWICE DAILY 4/27/18     Josafat Gonzales MD "   levothyroxine (SYNTHROID, LEVOTHROID) 100 MCG tablet Take 1 tablet by mouth Daily.       Historical Provider, MD   oxyCODONE (ROXICODONE) 10 MG tablet Take 10 mg by mouth Every 4 (Four) Hours As Needed for Moderate Pain .       Historical Provider, MD   phenytoin (DILANTIN) 100 MG ER capsule 100 mg in am and 200 mg in pm 17     Josafat Gonzales MD   potassium chloride (K-DUR,KLOR-CON) 20 MEQ CR tablet TAKE 1 TABLET BY MOUTH TWICE DAILY 18     Josafat Gonzales MD   PROAIR  (90 Base) MCG/ACT inhaler INHALE 1 PUFF BY MOUTH FOUR TIMES DAILY 18     Josafat Gonzales MD   QUEtiapine (SEROquel) 100 MG tablet Take 1 tablet by mouth 2 (Two) Times a Day. 17     Josafat Gonzales MD   solifenacin (VESICARE) 5 MG tablet Take 1 tablet by mouth Daily. 8/15/17     Pawel Ho MD   traZODone (DESYREL) 50 MG tablet Take 50 mg by mouth Every Night.       Historical Provider, MD      HOSPITALIZATION/SURGERY/PROCEDURES:   PROCEDURES:   SCANNED  [id number]  Colonoscopy+tort-mass/[facility]//.14/BE  EGD+nl/P//5.28.15/UGI  Colonoscopy+polyp/[facility]//9..15/3y     SURGERIES:  T&A/age 11  R knee/[facility]//age 13  SANDRA+BSO/[place]/[facility]/age 32  T12/Gómezrnidia//  Gastric by-pass//  L knee/Martinez/Lorena/  C surgery/[place]/Dorothea/-  C surgery/[place]/Dorothea/08  Lap GB/WB/Armando/3-18-10  L arthroscope/  R lap colectomy/Dorothea/.14   ORIF L wrist/N[place]//.16     MEDICAL ADMISSIONS:  MMH:   11.8.08-11.12.08-hyponatremia  2.8.10-2.9.10-L flank pain  3.13.14-3.15.14 hever/TIA  12.26.17-12.27.17    Shortness of breath  Cough  Flu B  Bronchitis-acute  Abnormal CT chest-AGUILAR spiculated nodule  Chest wall pain  Hyponatremia 128  Neutropenia 2.54     BH:   7.21-7.23.95 abdominal pain  2.19.08-2.21.08-DVT  2.9.10-2.11.10-abdominal pain/intercostal  3.11.10-3.19.10- hever-abdominal pain+SIADH  3.24.15-3.30.15 diarrhea  5.26.15-5.31.15  "nausea/vomiting/diarrhea  7.12.17-7.17.17 accidential injection water bottle cap     LH:   10.26.16-11.6.16 seizure     FAMILY HISTORY:  Hypertension mother, maternal aunt, father, paternal  grandfather; heart disease in mother, father, maternal grandfather, maternal  grandmother, paternal grandmother; diabetes in maternal uncle.      HABITS:  Never smoked, only limited second-hand smoke exposure.  No alcohol or  drugs.      SOCIAL HISTORY:   in 1987 to her current , has 1 son.  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     Review of Systems     GENERAL:  Inactive/slower with limits, speed, stamina for age and LE weakness; most of time in wheelchair and assistance with tranfer. Sleep is often restless.  No fever now.  ENDO:  No syncope, near or diaphoretic sweaty spells..  HEENT: No head injury diffuse headache,  No vision change, No significant hearing loss.  Ears without pain/drainage.  No sore throat.   Increased nasal/sinus congestion/drainage. No epistaxis.  Rash around lips.   CHEST: No chest wall tenderness or mass.  Occ cough; dry cough, without wheeze.  No SOB; no hemoptysis.  CV: No chest pain, palpitations, ankle edema.  GI: No heartburn, dysphagia.  No abdominal pain, diarrhea, constipation.  No rectal bleeding, or melena.    :  Voids without dysuria, or incontinence to completion.  ORTHO: No painful/swollen joints but various on /off sore.  No change daily sore neck or back.  No acute neck or back pain without recent injury.   NEURO: No dizziness; same marked multi-speciality reviewed weakness of extremities.  Same LE numbness/paresthesias.   PSYCH: No memory loss.  Mood bad; son in group home/just got out and her CT showing lung mass;  usually anxious, depressed but/and even now denies suicidal.  Sees psych.     PHYSICAL EXAMINATION:  T 98.6  R 88 R 22 /90; 5.23.18 Ht 5\" 10\" Wt 220  GENERAL:  Well nourished/developed in no acute distress. " Obese   SKIN: Turgor excellent, without wound, rash, lesion; except circumoral.   HEENT: Normal cephalic without trauma.  Pupils equal round reactive to light. Extraocular motions full without nystagmus.     Oral cavity without growths, exudates, and moist.  Posterior pharynx without mass, obstruction, redness.  No thyromegaly, mass, tenderness, lymphadenopathy and supple.  CV: Regular rhythm.  No murmur, gallop,  edema. Posterior pulses intact.  No carotid bruits.   CHEST: No chest wall tenderness or mass.   LUNGS: Symmetric motion with clear to auscultation.  No dullness to percussion  ABD: Soft, nontender without mass.   ORTHO: Symmetric extremities without swelling/point tenderness.  Full gross range of motion.    NEURO: CN 2-12 grossly intact.  Symmetric facies. 1/4 x bicep knee equal reflexes.  UE/LE   2-3/5 strength throughout.  Nonfocal use extremities. Speech clear.    PSYCH: Oriented x 3.  Pleasant calm, well kept.  Shallow not always purposeful/directed conservation with deficiets in short/long gross memory.     Labs WBC 12.79 to 9.18. Hb 12.3 to 9.9 with fluids and no signs bleeding. Diff unremarkable. Initial pH 7.41/pC02 45, p)x 392, bicarb 31.  Chemistry unremarkable noting GFR 62 and Alk phosp 157H, Na 131L (normal for her/her Rx). Procalcitonin < 0.05 and lactic acid 13.8N.  UA > 1.030 3+bact, + nitrate and drug screen + tricyclic and neg all others. Tylenol < 3 and salicylate < 5.0.       ASSESSMENT/PROBLEM LIST:   A 62-year-old white female:  Allergy/intolerances:  See above.  Procedural history:  See above.  Family history:  See above.  Obesity.  This is status post gastric bypass initial failure and regain of  weight.     1 para 1 AB 0.    Surgical menopause.  Osteoporosis on previous wrist study.    Hypertension.    Hypothyroidism.    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     History of DVT - left peroneal popliteal, 11/08/2008, treated  successfully with anticoagulation.   Brief anticoagulation.  Chronic insomnia.  Spinal spondylosis; this is primarily cervical with multiple surgeries.  Chronic neck pain.    Chronic narcotics-from pain management/[facility]  Chronic depression - followed by Dr. Samaniego.    Degenerative joint disease that is diffuse.    History of gastric bypass and metabolic risk it carries.    Colon cancer history with previous surgery and chemo, no obvious  recurrence.  Gastroesophageal reflux.  Documented neuropathy by EMG nerve conduction  Seizure disorder (2016)  antieliptics-Keppra/Dilantin  Gait decline-chronic  Abnormal CT chest  polypharmacy     REASON FOR ADMISSION:    Lethargy  Overdose imodium (despite her denial likely suicide attempt)  Intubation-brief  Confusion: doubt more than baseline  Anxiety-acute  Depression-acute  chelosis     HOSPITAL COARSE: she required brief ventilator support to protect her airway.  She was treated for ? Pneumonia due to a few areas of infiltrate; which resolved on subsequent CXRs.  Her initial psych review was concerning for severe depression and suicide risks; psych admit was advised.  Waiting for medical stability this resolved.  Her  asked for consideration of skilled care and PT for a longer period of time; she agreed to placement at [facility] to have such.  Her prior/initial and even subsquent gait is poor.  She had chelosis that was treated with topical antifungal; and improved.  All initial lethargy resolved.  Her back/neck pain that is chronic persisted.  Multiple Rx changes were made considering her initial lethargy and her bp/vitals and situtation seemed to tolerate this; these will be followed.     Labs included: initial WBC 12.79 and fell to normal.  Hb started 12.3 and fell to near 10 without bleeding evidence. Folate 3.7, B12 269 were both normal.  Chemistries showed usual low Na 131L  K 5.3, Alk P 157; these all  went to normal. Note lactic acid and procalcitonin were normal.     DISCHARGE ASSESSMENT:  Reasons for admit/problems address while here:   Lethargy-likely 2nd overdose Imodium-resolved  Confusion-same-resolved  Brief intubation (airway protection) 26-27th  Abnormal CXR-? Pneumonia/atelectasis  Overdose Imodium; initial ? suicide jesture refuted  Depression-acute/chronic  Anxiety-acute/chronic  Hypotension-brief and decrease Rx  Cheilosis-improved empiric monistat  Gait decline-acute/chronic  Anemia-component phlebotomy, (substrate neg)  +BC/staph homnis/epidermitis (contaminet)  E coli bacteruria-likely UTI  UTI-    Chronic problems affecting stay:   See above    PLAN:   See AVS    Discharge Disposition:  To [facility]    Discharge Medications:   Bita Croft   Home Medication Instructions DESHAWN:    Printed on:05/31/18 2006   Medication Information                      DULoxetine (CYMBALTA) 30 MG capsule  Take 30 mg by mouth Daily.             Mycolog apply tid to lip area till rash resolved           gabapentin (NEURONTIN) 600 MG tablet  Take 1 tablet by mouth 3 (Three) Times a Day.-e-Rx to [facility]           levETIRAcetam (KEPPRA) 1000 MG tablet  TAKE 1 TABLET BY MOUTH TWICE DAILY             levothyroxine (SYNTHROID, LEVOTHROID) 100 MCG tablet  Take 1 tablet by mouth Daily.             oxyCODONE-acetaminophen (PERCOCET) 5-325 MG per tablet  Take 2 tablets by mouth Every 6 (Six) Hours As Needed for Moderate Pain -eRx to [facility]             phenytoin (DILANTIN) 100 MG ER capsule  100 mg in am and 200 mg in pm             Lopressor 50 mg one a day           Nystatin powder apply bid           Lovonex 40 mg SQ q d           Amoxicillin 500 tid for 5 days           Venlafaxine 300 mg each day           QUEtiapine (SEROquel) 100 MG tablet  Take 1 tablet by mouth 2 (Two) Times a Day.             solifenacin (VESICARE) 5 MG tablet  Take 1 tablet by mouth Daily.             traZODone (DESYREL) 50 MG tablet  Take 50 mg by  mouth Every Night.                 Discharge Care Plan/Instructions:   Routine NH admit orders  PT,OT referrals  Up with assistance until cleared by PT  Diet general    Follow-up Appointments:   Dr Gonzales will see on regular NH rounds  Dr Mello office working to move up 7.10.18 appointment for review of abnormal  CT chest-NH to work with patient//Dr Mello office to make this  happen.     Future Appointments  Date Time Provider Department Center   7/10/2018 1:00 PM Tyrel Mello MD [facility] CTS  PAD None       CONDITION: stable/improved    PROGNOSIS: guarded

## 2018-06-01 NOTE — TELEPHONE ENCOUNTER
"Per Dr Gonzales \"have spouse to have disc in his hands in case the specialist calls for appt\" advised pt spouse of this and stated \"im on my way to town and ill stop and get it  "

## 2018-06-04 ENCOUNTER — TELEPHONE (OUTPATIENT)
Dept: FAMILY MEDICINE CLINIC | Facility: CLINIC | Age: 63
End: 2018-06-04

## 2018-06-04 ENCOUNTER — TELEPHONE (OUTPATIENT)
Dept: CARDIAC SURGERY | Facility: CLINIC | Age: 63
End: 2018-06-04

## 2018-06-04 NOTE — TELEPHONE ENCOUNTER
Almita Mello office called and the office has called several times to move up apt and some man assume Carlito answerers and tells them they are not worried about moving up apt and they have almost lost her several times and then hangs up and they are wanting to know what to do keep trying to move apt up or just leave apt as is Almita

## 2018-06-04 NOTE — TELEPHONE ENCOUNTER
Find out why lisa is not letting Riaz work on this apt  She can be taken from [facility] where she is getting PT and see Riaz  They dont have to wait till she gets out of [place]

## 2018-06-04 NOTE — TELEPHONE ENCOUNTER
"Called Carlito, stated \"I haven't gotten any calls?\" questioned if has good service now, and was told yes. Also advised will have almita at Dr Mello office call him to schedule this appt. Spouse also advised that he has the disc  Called Almita at Dr Mello office and advised    "

## 2018-06-04 NOTE — TELEPHONE ENCOUNTER
Ok.  As per our earlier conversation, Dr. Gonzales and I spoke last Friday.  Plan for OV today or Thursday.  Let's plan for 2p on Thursday as an alternative if that works.  OW will need to see when next will call OV.

## 2018-06-04 NOTE — TELEPHONE ENCOUNTER
Nani from Dr. Gonzales's office called back and told me patient's  denied every receiving a call from CT Surgery to move appt.  He told Nani we could call him now.  Waiting to see what day and time Dr. Mello now has open.

## 2018-06-04 NOTE — TELEPHONE ENCOUNTER
Spoke with Elba from Dr. Gonzales's office to inform him that Vesna has reached out to patient's family several times to try and work patient in sooner.  Her  tells Vesna he is not worried about the appt with Dr. Mello and told her to leave the appt date for July 10th.

## 2018-06-05 NOTE — PN
DATE OF SERVICE:  05/29/18



CHIEF COMPLAINT:

"She overdosed."



BRIEF HISTORY OF PRESENT ILLNESS: 

She took a large bottle of Imodium.  This made her lethargic. She had to be 
intubated and was briefly on the vent from the evening of 5/26 and extubated 
early the 27th. Mental Health saw her yesterday and thought she was suicidal 
but we were not sure she was medically stable.  I called this morning and she 
was still on empiric oxygen and we have taken that away. She has not been 
eating very well and has not been out of bed. Blood pressure has been running 
somewhat low. She did have negative Aspirin and Tylenol levels. She has seen 
her psychiatrist Dr. Papi Blackwell. We were glad she had that appointment the 
same day and I told her she likely had a malignant nodule in the lung. 



INTERVAL CHANGES:

She has continued to become more alert. She is eating better and having stools. 
She is drinking and voiding. She says she is not a suicidal risk. Her , 
who is here tonight, agrees. He wants her to seek out rehab as her gait is 
markedly diminished at home and he would like to see if it could improve a 
little bit. I made a phone call to Dr. Phoenix Jeffries's office and they are 
working on moving up her appointment. 



PHYSICAL EXAMINATION: 

V/S: Temperature 96, pulse 85, /80, pulse 18.

GENERAL: Older than stated age, no obvious distress. 

INTEGUMENT: Less redness around her mouth and the corners of her mouth. 

NECK:  No mass or thyroid. 

CHEST: Diminished, clear. 

CARDIOVASCULAR: Regular without murmur or peripheral edema. 

GI:  Nontender. 



LABS/X-RAYS:

Urine is showing E. coli, sensitive to everything. Blood cultures showing a 
gram positive out of two.  



ASSESSMENT:

1.  Initial lethargy - probably secondary to overdose of Imodium - resolved.

2.  Overdose of Imodium.

3.  Brief remote intubation - 26th through the morning of the 27th.

4.  Confusion - back to her baseline.

5.  Anxiety - acute and back to baseline.

6.  Depression - acute and back to baseline.

7.  Suicidal - now refuted.

8.  Chelosis.

9.  Labile blood pressure - overall improving.

10. Positive blood cultures - gram positive.

11. Bacturia - E. coli possibly UTI. 

12. Abnormal chest x-ray - possible pneumonia though I suspect this is more 
atelectasis related to her recent intubation. 



PLAN:

1.  Medications - reviewed and continue same. 

2.  Laboratories reviewed.

3.  Imaging - will ask for a chest x-ray before discharge.

4.  Diet - encouraged.

5.  Activity - PT and potential transfer to [facility] if 
accepted.

6.  Discharge planning - note above and she expects eventually home. Also will 
need to make it to cardiothoracic surgery and/or pulmonary to help deal with 
the issues of her abnormal recent CT.

7.  Code status remains full. 

MTDD

## 2018-06-05 NOTE — PN
DATE OF SERVICE:  05/30/18



CHIEF COMPLAINT:

"She took a large dose of Imodium."



BRIEF HISTORY OF PRESENT ILLNESS: 

She took a large bottle of Imodium.  This made her lethargic. She had to be 
intubated and was briefly on the vent from the evening of 5/26 and extubated 
early the 27th. Mental Health saw her yesterday and thought she was suicidal 
but we were not sure she was medically stable.  I called this morning and she 
was still on empiric oxygen and we have taken that away. She has not been 
eating very well and has not been out of bed. Blood pressure has been running 
somewhat low. She did have negative Aspirin and Tylenol levels. She has seen 
her psychiatrist Dr. Papi Blackwell. We were glad she had that appointment the 
same day and I told her she likely had a malignant nodule in the lung. 



INTERVAL CHANGES:

She continues to feel stronger. She is eating better and had a bowel movement 
today that was without diarrhea. She sat up in the chair but is doing very 
little walking. She is quite agreeable to transfer to [facility] as soon as she can be accepted. A special screening has to be done due to 
her psychiatric history. Mental Health came back today and said in their 
opinion she is now nonsuicidal. 



PHYSICAL EXAMINATION: 

V/S: Temperature 97.7, pulse 83, /85, respirations 20. There is a pattern 
of this normalacy.  Her blood culture is now growing one Staph Hominis and one 
growing Staph Epidermis (probably a contaminant). Her E. coli in the urine is 
sensitive to everything. 



ASSESSMENT:

1.  Lethargy - after her initial dose of Imodium it resolved.

2.  Confusion - same and back to baseline.

3.  Remote brief intubation the 26th through the 27th (primarily to protect 
airways).

4.  Overdose of Imodium.

5.  Anxiety - chronic (possibly an acute component but back to baseline).

6.  Depression - acute with probable chronic component and back to baseline.

7.  Initial concern of suicide ideation - now resolved. 

8.  Cheilosis - improving with empiric topical antifungal.

9.  Labile blood pressure with some tendencies toward low - resolved. 

10. Positive blood culture - gram positive one Staph Hominis and one Staph 
Epidermidis suggestive for contaminants.

11. Bacteruria and probably UTI - E. coli sensitive to everything.



PLAN:

1.  Medications reviewed - we are stopping Azactam and Vancomycin and 
transitioning over to simply Amoxicillin 500 mg t.i.d. 

2.  Labs - repeat in the morning.

3.  Chest x-ray to repeat in the morning.

4.  Diet - same and encouraged. 

5.  Full code status.

6.  Discharge planning - eventually home but it is anticipated that she will 
complete her screening her transfer to [facility] tomorrow. 

7.  Activity - encouraged what she can accomplish here. 

ANKUSH

## 2018-06-07 ENCOUNTER — TELEPHONE (OUTPATIENT)
Dept: FAMILY MEDICINE CLINIC | Facility: CLINIC | Age: 63
End: 2018-06-07

## 2018-06-07 RX ORDER — CLONAZEPAM 1 MG/1
1 TABLET ORAL 3 TIMES DAILY PRN
Qty: 45 TABLET | Refills: 0 | Status: SHIPPED | OUTPATIENT
Start: 2018-06-07 | End: 2018-01-01 | Stop reason: SDUPTHER

## 2018-06-07 RX ORDER — OXYCODONE HYDROCHLORIDE AND ACETAMINOPHEN 5; 325 MG/1; MG/1
2 TABLET ORAL EVERY 4 HOURS PRN
Qty: 180 TABLET | Refills: 0 | Status: SHIPPED | OUTPATIENT
Start: 2018-06-07 | End: 2018-01-01 | Stop reason: SDUPTHER

## 2018-06-07 RX ORDER — NYSTATIN AND TRIAMCINOLONE ACETONIDE 100000; 1 [USP'U]/G; MG/G
OINTMENT TOPICAL 3 TIMES DAILY
Start: 2018-06-07 | End: 2018-07-16

## 2018-06-07 RX ORDER — LOPERAMIDE HYDROCHLORIDE 2 MG/1
2 TABLET ORAL 4 TIMES DAILY PRN
Start: 2018-06-07 | End: 2019-01-01 | Stop reason: HOSPADM

## 2018-06-07 RX ORDER — NYSTATIN 100000 [USP'U]/G
POWDER TOPICAL 2 TIMES DAILY
Start: 2018-06-07 | End: 2018-07-16 | Stop reason: SDUPTHER

## 2018-06-07 RX ORDER — METOPROLOL TARTRATE 50 MG/1
50 TABLET, FILM COATED ORAL DAILY
Start: 2018-06-07 | End: 2018-01-01 | Stop reason: SDUPTHER

## 2018-06-07 RX ORDER — ACETAMINOPHEN 325 MG/1
650 TABLET ORAL EVERY 4 HOURS PRN
Start: 2018-06-07

## 2018-06-07 RX ORDER — VENLAFAXINE HYDROCHLORIDE 150 MG/1
300 CAPSULE, EXTENDED RELEASE ORAL DAILY
Status: ON HOLD
Start: 2018-06-07 | End: 2019-01-01 | Stop reason: SDUPTHER

## 2018-06-07 NOTE — TELEPHONE ENCOUNTER
Tell  I getting ready to be gone a few days  Just checking on them  If she comes to discharge before I get back: office/Dr kathleen will make it happen

## 2018-06-07 NOTE — TELEPHONE ENCOUNTER
Notes recorded by Josafat Gonzales MD on 6/6/2018 at 8:39 PM CDT  Clinical update from   A. How is she doing?   B. Eating?   C. Is PT helping  D. When is her appointment with [facility] 6.8.18?   E. Are they thinking going home soon?     Kizzy states she has talked to PT and pt is still a 1-2 man transfer pt was able to walk 20 feet with PT assist, pt is eating well, apt is tomorrow 6/8/18 and Kizzy is finding out from  about d/c also med list updated for NH admit and needs refill of narc, Kizzy has also spoke with  and Radha and stefani has only approved pt through the end of next week and  hoping pt will get to go home by then

## 2018-06-07 NOTE — TELEPHONE ENCOUNTER
Kizzy [facility] called and states that pt is c/o itching all over no redness or rash noticed pt is requesting benadryl [phone] Kizzy

## 2018-06-08 ENCOUNTER — OFFICE VISIT (OUTPATIENT)
Dept: CARDIAC SURGERY | Facility: CLINIC | Age: 63
End: 2018-06-08

## 2018-06-08 VITALS
TEMPERATURE: 98.2 F | DIASTOLIC BLOOD PRESSURE: 66 MMHG | SYSTOLIC BLOOD PRESSURE: 122 MMHG | HEART RATE: 74 BPM | OXYGEN SATURATION: 98 %

## 2018-06-08 DIAGNOSIS — R91.1 LUNG NODULE, SOLITARY: ICD-10-CM

## 2018-06-08 DIAGNOSIS — R29.898 WEAKNESS OF BOTH LEGS: Primary | ICD-10-CM

## 2018-06-08 PROCEDURE — 99205 OFFICE O/P NEW HI 60 MIN: CPT | Performed by: THORACIC SURGERY (CARDIOTHORACIC VASCULAR SURGERY)

## 2018-06-21 ENCOUNTER — TELEPHONE (OUTPATIENT)
Dept: FAMILY MEDICINE CLINIC | Facility: CLINIC | Age: 63
End: 2018-06-21

## 2018-06-21 PROBLEM — R91.1 LUNG NODULE, SOLITARY: Status: ACTIVE | Noted: 2018-06-21

## 2018-06-21 NOTE — PROGRESS NOTES
Chief Complaint   Patient presents with   • Follow-up     Patiend was referred by Dr. Gonzales for abnormal CXR on 5/31/18         Subjective     History of Present Illness    63 yo female with a salient history of life long non smoking status, previous history of colon cancer to her knowledge FLEX, seizure hx, chronic pain, and progressive BLE weakness over at least one year slowly but more obviously the last six months presents for further evaluation of an identified AGUILAR lung nodule.  She denies unintended weight loss, hoarseness of voice, chest pain, hemoptysis, history of pneumonia, or cough.  This was found when she presented for flu like symptoms which was confirmed positive.  Repeat imaging has demonstrated persistence.  A referral was made.  In the interim, care has been complicated by an admission for altered mental status.  She has been quite distressed by this finding and as such Dr. Gonzales has requested a more prompt evaluation.  She is at [facility] facility rehabilitating.  With the aforementioned, she presents for further evaluation.      Review of Systems   Constitutional: Positive for activity change and fatigue. Negative for appetite change, chills, diaphoresis, fever and unexpected weight change.   HENT: Negative for dental problem, hearing loss, nosebleeds, sore throat, trouble swallowing and voice change.    Eyes: Negative for photophobia, redness and visual disturbance.   Respiratory: Negative for apnea, cough, chest tightness, shortness of breath, wheezing and stridor.    Cardiovascular: Positive for leg swelling. Negative for chest pain and palpitations.   Gastrointestinal: Negative for abdominal distention, abdominal pain, blood in stool, constipation, diarrhea, nausea and vomiting.   Endocrine: Negative for cold intolerance, heat intolerance, polyphagia and polyuria.   Genitourinary: Negative for decreased urine volume, difficulty urinating, dysuria, flank pain, frequency, hematuria and urgency.    Musculoskeletal: Positive for arthralgias, back pain and gait problem. Negative for joint swelling, myalgias and neck pain.   Skin: Negative for pallor, rash and wound.   Allergic/Immunologic: Negative for immunocompromised state.   Neurological: Positive for dizziness, seizures and weakness. Negative for tremors, syncope, speech difficulty, light-headedness, numbness and headaches.   Hematological: Does not bruise/bleed easily.   Psychiatric/Behavioral: Negative for confusion, sleep disturbance and suicidal ideas. The patient is not nervous/anxious.           Past Medical History:   Diagnosis Date   • Asthma    • Cancer     colon cancer   • Depression    • Disease of thyroid gland    • Hypertension    • Insomnia    • Seizure    • Sleep apnea      Past Surgical History:   Procedure Laterality Date   • BACK SURGERY     • COLON SURGERY     • GASTRIC BYPASS     • HYSTERECTOMY     • KNEE ARTHROSCOPY      bilateral   • NECK SURGERY     • ORIF WRIST FRACTURE Right 6/9/2017    Procedure: WRIST OPEN REDUCTION INTERNAL FIXATION, RIGHT;  Surgeon: Alec Cardoza MD;  Location: [facility];  Service:    • TONSILLECTOMY     • WRIST FRACTURE SURGERY       Family History   Problem Relation Age of Onset   • Heart attack Father    • Stroke Father    • Arthritis Father    • Hypertension Father    • Heart attack Mother    • Stroke Mother    • Arthritis Mother    • Hypertension Mother      Social History   Substance Use Topics   • Smoking status: Never Smoker   • Smokeless tobacco: Never Used   • Alcohol use No     Current Outpatient Prescriptions   Medication Sig Dispense Refill   • clonazePAM (KlonoPIN) 1 MG tablet Take 1 tablet by mouth 3 (Three) Times a Day As Needed for Anxiety. 45 tablet 0   • DULoxetine (CYMBALTA) 30 MG capsule Take 30 mg by mouth Daily.     • enoxaparin (LOVENOX) 40 MG/0.4ML solution syringe Inject 0.4 mL under the skin Daily.     • gabapentin (NEURONTIN) 600 MG tablet Take 1 tablet by mouth 3 (Three) Times  a Day. 90 tablet 0   • levETIRAcetam (KEPPRA) 1000 MG tablet TAKE 1 TABLET BY MOUTH TWICE DAILY 60 tablet 5   • levothyroxine (SYNTHROID, LEVOTHROID) 100 MCG tablet Take 1 tablet by mouth Daily.     • loperamide (IMODIUM A-D) 2 MG tablet Take 1 tablet by mouth 4 (Four) Times a Day As Needed for Diarrhea.     • metoprolol tartrate (LOPRESSOR) 50 MG tablet Take 1 tablet by mouth Daily.     • nystatin (MYCOSTATIN) 356923 UNIT/GM powder Apply  topically 2 (Two) Times a Day.     • oxyCODONE-acetaminophen (PERCOCET) 5-325 MG per tablet Take 2 tablets by mouth Every 4 (Four) Hours As Needed for Moderate Pain . 180 tablet 0   • phenytoin (DILANTIN) 100 MG ER capsule 100 mg in am and 200 mg in pm     • QUEtiapine (SEROquel) 100 MG tablet Take 1 tablet by mouth 2 (Two) Times a Day.     • solifenacin (VESICARE) 5 MG tablet Take 1 tablet by mouth Daily. 90 tablet 5   • traZODone (DESYREL) 50 MG tablet Take 50 mg by mouth Every Night.     • venlafaxine XR (EFFEXOR-XR) 150 MG 24 hr capsule Take 2 capsules by mouth Daily.     • acetaminophen (TYLENOL) 325 MG tablet Take 2 tablets by mouth Every 4 (Four) Hours As Needed for Mild Pain  or Fever.     • nystatin-triamcinolone (MYCOLOG) 603262-2.1 UNIT/GM-% ointment Apply  topically 3 (Three) Times a Day.       No current facility-administered medications for this visit.      Allergies:  Patient has no known allergies.    Objective      Vital Signs  Visit Vitals  /66 (BP Location: Right arm, Patient Position: Sitting, Cuff Size: Adult)   Pulse 74   Temp 98.2 °F (36.8 °C) (Oral)   LMP  (LMP Unknown)   SpO2 98%         Physical Exam   Constitutional: She is oriented to person, place, and time. She appears well-developed. She appears distressed (she became tearful and required much consolution during this assessment.  ).   HENT:   Head: Normocephalic and atraumatic.   Mouth/Throat: Oropharynx is clear and moist.   Eyes: EOM are normal. Pupils are equal, round, and reactive to light.    Neck: Normal range of motion. Neck supple. No JVD present. No tracheal deviation present. No thyromegaly present.   Cardiovascular: Normal rate, regular rhythm, normal heart sounds and intact distal pulses.  Exam reveals no gallop and no friction rub.    No murmur heard.  Pulmonary/Chest: Effort normal and breath sounds normal. No respiratory distress. She has no wheezes. She has no rales. She exhibits no tenderness.   Abdominal: Soft. She exhibits no distension. There is no tenderness.   Musculoskeletal: She exhibits no edema.   Lymphadenopathy:     She has no cervical adenopathy.   Neurological: She is alert and oriented to person, place, and time. No cranial nerve deficit.   LE weakness.  marked   Skin: Skin is warm and dry.   Psychiatric: She has a normal mood and affect.       Results Review:     CT scan of the chest performed in May of this year is reviewed by me.  There is a left upper lobe lung nodule measuring 1.3 cm in size.  I do not have December's imaging 2017.  At that time a report documents an obtained measurement of 1.1 cm.  No significant lymphadenopathy.         I reviewed the patient's new clinical results.  Discussed with patient and       Assessment/Plan       Bita was seen today for follow-up.    Diagnoses and all orders for this visit:    Weakness of both legs  -     Ambulatory Referral to Neurology          I discussed the patients findings and my recommendations with patient and .      Difficult case.  From an isolated lung nodule point of view We discussed the differential diagnoses possible with malignancy high in the differential.  We discussed this could be a lung primary verses a metastatic lesion.  We discussed the importance of staging such that best therapy can be selected and portend prognosis accurately.  We discussed options for care including continued surveillance verses efforts towards diagnosis and treatment.  We discussed options for diagnosis including  bronchoscopy, CT-guided needle biopsy, or surgical biopsy with either cervical mediastinoscopy or Thoracoscopy with resection.We discussed the value of clinical staging with a CT/PET scan.  The pros and cons of each option were discussed at length.  I believe further efforts towards diagnosis are warranted.  To select the best option a CT/PET would be helpful and we discussed its purpose.  Additionally an assessment of lung function status is warranted to evaluate her candidacy for a resection.  All questions have been answered to the best of my ability and she is agreeable to the aforementioned plan.  I would anticipate resection would be reasonable given a history of non smoking but PFT's are not available.  Problematic is the fact that she has had a marked decline in her neurologic status.  She had seen Dr. Abrams and Dr. Gomez as well to my understanding.  She and her  were less than pleased with their last encounter.  From the documents a comment of a [facility] neurology consultation in [place] was considered.  She reports the traveling will be a significant hardship.  I have suggested our neurologists which she would be happy to see.  Ortho pain reports she has an idiopathic progressive neuropathy.  We discussed that her current state physically would put her at great risk of pulmonary and post surgical complications.  We discussed that perhaps needle biopsy and chemo/radio therapy as indicated would be reasonable.  We discussed that continued following could be reasonable. Lastly we discussed Stereotactic radiation would be possible as well without a diagnosis.  She would like surgical therapy if she is a candidate and echos concerns as to how well she will do.    I have recommended a neurology consultation and pending their input, we can begin workup as would be indicated by decision making at that time.  I have spent much time discussing the pros/cons of the options as well as provided reasurrance  where possible.  All questions have been asnwered to the best of my ability.      She has been congratulated as to a lifetime history of non smoking status.    We discussed the importance of durable lifestyle changes to accomplish an acceptable weight since her weight is greater than acceptable for her age and height.   I have recommended she speak with her PCP.      RTC 1 month post neuro eval.     Total time is 55 min with greater than 50% time counseling Mr. And Mrs. Croft.  I also spoke with Dr. Limon to facilitate an expedited evaluation which he has kindly opened his services to us.

## 2018-06-21 NOTE — TELEPHONE ENCOUNTER
Opal [facility] called and states that pt is wanting to dc tomorrow and they need a order for pt to be dc with med's and order fro [facility] for PT,OT Opal [phone]

## 2018-06-21 NOTE — TELEPHONE ENCOUNTER
More informed ok for discharge. She says hse is actually going home Saturday. [facility] She did not know if she had seen Dr. Holt yet. Is going to check with another nurse  And get back with us.

## 2018-06-22 ENCOUNTER — TELEPHONE (OUTPATIENT)
Dept: FAMILY MEDICINE CLINIC | Facility: CLINIC | Age: 63
End: 2018-06-22

## 2018-06-22 NOTE — TELEPHONE ENCOUNTER
Kristen [facility] called and states pt is being dc tomorrow and pt is still on Lovenox 40 mg daily and they want to know if pt needs to go home on that or if could be dc [phone]

## 2018-06-25 ENCOUNTER — TELEPHONE (OUTPATIENT)
Dept: FAMILY MEDICINE CLINIC | Facility: CLINIC | Age: 63
End: 2018-06-25

## 2018-06-26 ENCOUNTER — TELEPHONE (OUTPATIENT)
Dept: CARDIAC SURGERY | Facility: CLINIC | Age: 63
End: 2018-06-26

## 2018-06-26 NOTE — TELEPHONE ENCOUNTER
Called patients  twice to let him know that we are needing to push back her appointment scheduled for 7/18 until she has seen Neurology. No answer and mail box to leave voicemail was full each time.

## 2018-06-28 ENCOUNTER — TELEPHONE (OUTPATIENT)
Dept: FAMILY MEDICINE CLINIC | Facility: CLINIC | Age: 63
End: 2018-06-28

## 2018-07-02 DIAGNOSIS — I10 ESSENTIAL HYPERTENSION: ICD-10-CM

## 2018-07-02 RX ORDER — AMLODIPINE BESYLATE 10 MG/1
TABLET ORAL
Qty: 30 TABLET | Refills: 0 | OUTPATIENT
Start: 2018-07-02

## 2018-07-03 ENCOUNTER — TELEPHONE (OUTPATIENT)
Dept: FAMILY MEDICINE CLINIC | Facility: CLINIC | Age: 63
End: 2018-07-03

## 2018-07-03 RX ORDER — AZITHROMYCIN 250 MG/1
TABLET, FILM COATED ORAL
Qty: 6 TABLET | Refills: 0 | Status: SHIPPED | OUTPATIENT
Start: 2018-07-03 | End: 2018-08-17

## 2018-07-03 RX ORDER — METHYLPREDNISOLONE 4 MG/1
TABLET ORAL
Qty: 21 EACH | Refills: 0 | Status: SHIPPED | OUTPATIENT
Start: 2018-07-03 | End: 2018-08-17

## 2018-07-03 NOTE — TELEPHONE ENCOUNTER
Pt called and states she has the same thing as her  and would emely to have something called into [facility] [phone]

## 2018-07-16 ENCOUNTER — TELEPHONE (OUTPATIENT)
Dept: FAMILY MEDICINE CLINIC | Facility: CLINIC | Age: 63
End: 2018-07-16

## 2018-07-16 RX ORDER — NYSTATIN 100000 [USP'U]/G
POWDER TOPICAL
Qty: 45 G | Refills: 0 | Status: ON HOLD | OUTPATIENT
Start: 2018-07-16 | End: 2019-01-01

## 2018-07-16 RX ORDER — CLOTRIMAZOLE 1 G/ML
SOLUTION TOPICAL
Qty: 30 ML | Refills: 0 | Status: ON HOLD | OUTPATIENT
Start: 2018-07-16 | End: 2019-01-01

## 2018-07-16 NOTE — TELEPHONE ENCOUNTER
Pt called and states she is really gaulded in between her legs and would like to have something called into [facility] [phone]

## 2018-07-16 NOTE — TELEPHONE ENCOUNTER
Wash/dry well three times a day  After dry; apply light coating of clotrimazole cream for one week  After that; nystatin powder bid

## 2018-07-27 ENCOUNTER — TELEPHONE (OUTPATIENT)
Dept: CARDIAC SURGERY | Facility: CLINIC | Age: 63
End: 2018-07-27

## 2018-08-17 ENCOUNTER — OFFICE VISIT (OUTPATIENT)
Dept: NEUROLOGY | Facility: CLINIC | Age: 63
End: 2018-08-17

## 2018-08-17 VITALS
SYSTOLIC BLOOD PRESSURE: 116 MMHG | HEART RATE: 78 BPM | DIASTOLIC BLOOD PRESSURE: 74 MMHG | WEIGHT: 220 LBS | HEIGHT: 70 IN | BODY MASS INDEX: 31.5 KG/M2

## 2018-08-17 DIAGNOSIS — R91.1 LUNG NODULE, SOLITARY: ICD-10-CM

## 2018-08-17 DIAGNOSIS — E03.9 HYPOTHYROIDISM, UNSPECIFIED TYPE: Chronic | ICD-10-CM

## 2018-08-17 DIAGNOSIS — I10 ESSENTIAL HYPERTENSION: ICD-10-CM

## 2018-08-17 DIAGNOSIS — M47.9 OSTEOARTHRITIS OF SPINE, UNSPECIFIED SPINAL OSTEOARTHRITIS COMPLICATION STATUS, UNSPECIFIED SPINAL REGION: Chronic | ICD-10-CM

## 2018-08-17 DIAGNOSIS — R29.898 WEAKNESS OF BOTH LEGS: Primary | ICD-10-CM

## 2018-08-17 PROCEDURE — 99203 OFFICE O/P NEW LOW 30 MIN: CPT | Performed by: CLINICAL NURSE SPECIALIST

## 2018-08-17 NOTE — PROGRESS NOTES
Subjective     Chief Complaint   Patient presents with   • Extremity Weakness       Bita Croft is a 62 y.o. female right handed . She is accompanied by her . We are her 4th neurologist. She is here today for LE weakness. She is in a wheel chair. Patient becomes tearful as I enter the room. She was referred by Dr. WILIAN Mello who was seeing her for AGUILAR nodule. She had seen Dr. Mitchell (9/2016), Dr. JOVANI Abrams (1/2018), and Dr. YOBANI Olivo (6/2017). I have reviewed their notes. Patient states she was treated poorly by then and they would not help her. She does also see pain management at [facility]. She has completed PT She has had extensive work up in over the last 6-12 months including LP which was normal CSF work up per office note and myelogram, CT of lumbar, thoracic, cervical spine, MRI brain that could not explain her symptoms of LE weakness. She did present to [facility] ED 7/2017 for the same complaint. MRI lumbar and CS was done. Patient does report incontinence of bowel and bladder. Patient had requested by [facility] neurology team for referral to [facility] in [place], and they were going to arrange, then patient declined stating she would not be able to tolerate the journey.   Patient had previous EMG/NCV that showed polyneuropathy.     Patient also has hx of depression (5/2018 took 100 imodium but per note no intention of harming herself), thyroid disorder, lung nodule, nonepileptic seizures, HTN, hyponatremia.     Fell in 1999 and had cervical surgery in 2000, T12/L1 fracture. Dr. Park in [place], and Dr. Rosario.    Saw Dr. Mitchell in 2016 began to have imbalance and LE weakness was able to walk without assistive device. Progressively worsening. Saw Dr. Abrams.and by this time was in a wheelchair with extensive work up that could explain her symptoms.   Started seeing pain management in 2000. She had done PT and is able to walk short distances from 5-30 feet.   Pain level 7/10.       Extremity Weakness     Pain location: all over pain and back pain. This is a chronic problem. Episode onset: 2016. The problem occurs daily. The problem has been gradually worsening. Pain scale: 7-8/10. The pain is moderate. Associated symptoms include an inability to bear weight and numbness. Associated symptoms comments: LE weakness. She has tried oral narcotics and rest (PT) for the symptoms. The treatment provided mild relief. depression, CS surgery, HTN, hyponatremia, lung nodule        Current Outpatient Prescriptions   Medication Sig Dispense Refill   • acetaminophen (TYLENOL) 325 MG tablet Take 2 tablets by mouth Every 4 (Four) Hours As Needed for Mild Pain  or Fever.     • clonazePAM (KlonoPIN) 1 MG tablet Take 1 tablet by mouth 3 (Three) Times a Day As Needed for Anxiety. 45 tablet 0   • clotrimazole (LOTRIMIN) 1 % external solution Wash/dry well three times a day After dry; apply light coating of clotrimazole cream for one week 30 mL 0   • DULoxetine (CYMBALTA) 30 MG capsule Take 30 mg by mouth Daily.     • enoxaparin (LOVENOX) 40 MG/0.4ML solution syringe Inject 0.4 mL under the skin Daily.     • gabapentin (NEURONTIN) 600 MG tablet Take 1 tablet by mouth 3 (Three) Times a Day. 90 tablet 0   • levETIRAcetam (KEPPRA) 1000 MG tablet TAKE 1 TABLET BY MOUTH TWICE DAILY 60 tablet 5   • levothyroxine (SYNTHROID, LEVOTHROID) 100 MCG tablet Take 1 tablet by mouth Daily.     • loperamide (IMODIUM A-D) 2 MG tablet Take 1 tablet by mouth 4 (Four) Times a Day As Needed for Diarrhea.     • metoprolol tartrate (LOPRESSOR) 50 MG tablet Take 1 tablet by mouth Daily.     • nystatin (MYCOSTATIN) 965748 UNIT/GM powder Wash/dry well three times a day After dry;apply nystatin powder bid 45 g 0   • oxyCODONE-acetaminophen (PERCOCET) 5-325 MG per tablet Take 2 tablets by mouth Every 4 (Four) Hours As Needed for Moderate Pain . 180 tablet 0   • phenytoin (DILANTIN) 100 MG ER capsule 100 mg in am and 200 mg in pm     • QUEtiapine (SEROquel) 100 MG  tablet Take 1 tablet by mouth 2 (Two) Times a Day.     • solifenacin (VESICARE) 5 MG tablet Take 1 tablet by mouth Daily. 90 tablet 5   • traZODone (DESYREL) 50 MG tablet Take 50 mg by mouth Every Night.     • venlafaxine XR (EFFEXOR-XR) 150 MG 24 hr capsule Take 2 capsules by mouth Daily.       No current facility-administered medications for this visit.        Past Medical History:   Diagnosis Date   • Asthma    • Cancer (CMS/HCC)     colon cancer   • Depression    • Disease of thyroid gland    • Hypertension    • Insomnia    • Seizure (CMS/HCC)    • Sleep apnea        Past Surgical History:   Procedure Laterality Date   • BACK SURGERY     • COLON SURGERY     • GASTRIC BYPASS     • HYSTERECTOMY     • KNEE ARTHROSCOPY      bilateral   • NECK SURGERY     • ORIF WRIST FRACTURE Right 6/9/2017    Procedure: WRIST OPEN REDUCTION INTERNAL FIXATION, RIGHT;  Surgeon: Alec Cardoza MD;  Location: [facility];  Service:    • TONSILLECTOMY     • WRIST FRACTURE SURGERY         family history includes Arthritis in her father and mother; Heart attack in her father and mother; Hypertension in her father and mother; Stroke in her father and mother.    Social History   Substance Use Topics   • Smoking status: Never Smoker   • Smokeless tobacco: Never Used   • Alcohol use No       Review of Systems   Constitutional: Positive for fatigue.   HENT: Negative.    Eyes: Negative.  Negative for visual disturbance.   Respiratory: Negative.  Negative for apnea and cough.    Cardiovascular: Negative.    Gastrointestinal: Positive for diarrhea. Negative for blood in stool, constipation, nausea and vomiting.        Reports incontinent of bowel at times   Endocrine: Negative.    Genitourinary: Positive for dysuria.        Reports urinary incontinence   Musculoskeletal: Positive for arthralgias, back pain, extremity weakness, gait problem and myalgias.   Skin: Negative.    Allergic/Immunologic: Negative.    Neurological: Positive for weakness  "and numbness. Negative for dizziness and headaches.   Hematological: Negative.    Psychiatric/Behavioral: Negative.  Negative for self-injury.        Depression   All other systems reviewed and are negative.      Objective     /74   Pulse 78   Ht 177.8 cm (70\")   Wt 99.8 kg (220 lb)   LMP  (LMP Unknown)   BMI 31.57 kg/m² , Body mass index is 31.57 kg/m².    Physical Exam   Constitutional: She is oriented to person, place, and time. Vital signs are normal. She appears well-developed and well-nourished. She is cooperative.   HENT:   Head: Normocephalic and atraumatic.   Right Ear: Hearing and external ear normal.   Left Ear: Hearing and external ear normal.   Nose: Nose normal.   Mouth/Throat: Oropharynx is clear and moist.   Eyes: Pupils are equal, round, and reactive to light. Conjunctivae, EOM and lids are normal. Right eye exhibits normal extraocular motion and no nystagmus. Left eye exhibits normal extraocular motion and no nystagmus. Right pupil is round and reactive. Left pupil is round and reactive. Pupils are equal.   Neck: Normal range of motion. Neck supple. Carotid bruit is not present.   Cardiovascular: Normal rate, regular rhythm and normal heart sounds.    No murmur heard.  Pulses:       Dorsalis pedis pulses are 1+ on the right side, and 1+ on the left side.        Posterior tibial pulses are 1+ on the right side, and 1+ on the left side.   Pulmonary/Chest: Effort normal and breath sounds normal. She has no decreased breath sounds. She has no rhonchi.   Abdominal: Soft. Bowel sounds are normal.   Musculoskeletal: Normal range of motion.       Neurological Sensory Findings -  Altered sharp/dull right ankle/foot discrimination and altered sharp/dull left ankle/foot discrimination.  Neurological: She is alert and oriented to person, place, and time. She has normal strength. She displays no tremor. No cranial nerve deficit or sensory deficit. She exhibits normal muscle tone. She displays a " negative Romberg sign. Coordination (no ataxia, FTN, HTS intact bilateral) and gait abnormal.   Reflex Scores:       Tricep reflexes are 2+ on the right side and 2+ on the left side.       Bicep reflexes are 2+ on the right side and 2+ on the left side.       Brachioradialis reflexes are 2+ on the right side and 2+ on the left side.       Patellar reflexes are 3+ on the right side and 3+ on the left side.       Achilles reflexes are 2+ on the right side and 2+ on the left side.  Awake, alert. No aphasia, no dysarthria  Completes simple and complex commands    CN II:  Visual fields full.  Pupils equally reactive to light  CN III, IV, VI:  Extraocular Muscles full with no signs of nystagmus  CN V:  Facial sensory is symmetric with no asymetries.  CN VII:  Facial motor symmetric  CN VIII:  Gross hearing intact bilaterally  CN IX:  Palate elevates symmetrically  CN X:  Palate elevates symmetrically  CN XI:  Shoulder shrug symmetric  CN XII:  Tongue is midline on protrusion    Full and symmetric strength bilateral upper extremities.    Hip flexion 4+/5 right with give way weakness, 5/5 on left  Patient in wheel chair and able to extend bilateral lower leg off floor and hold at least 12 inches from the floor for 20 seconds.  Right plantar flexion/dorsiflexion 5-/5 on right  Left plantar flexion/dorsiflexion 5/5   No clonus noted upper and lower extremities.    Patient states she cannot stand unassisted. Attempted to have patient take 2 steps to the exam table. Her  placed her left foot on the stool and me, another provider, and  attempted to assist patient on to table. Patient suddenly fell forward, crying that her back hurts. She was assisted back to wheel chair for exam.      Skin: Skin is warm and dry.   Psychiatric: She has a normal mood and affect. Her speech is normal and behavior is normal. Cognition and memory are normal.   Nursing note and vitals reviewed.      Results for orders placed or performed  in visit on 07/24/17   Comprehensive Metabolic Panel   Result Value Ref Range    Glucose 95 70 - 100 mg/dL    BUN 8 5 - 21 mg/dL    Creatinine 0.55 0.50 - 1.40 mg/dL    eGFR Non African Am 112 >60 mL/min/1.73    eGFR African Am 136 >60 mL/min/1.73    BUN/Creatinine Ratio 14.5 7.0 - 25.0    Sodium 130 (L) 135 - 145 mmol/L    Potassium 5.1 3.5 - 5.3 mmol/L    Chloride 92 (L) 98 - 110 mmol/L    Total CO2 26.0 24.0 - 31.0 mmol/L    Calcium 9.1 8.4 - 10.4 mg/dL    Total Protein 6.6 6.3 - 8.7 g/dL    Albumin 3.90 3.50 - 5.00 g/dL    Globulin 2.7 gm/dL    A/G Ratio 1.4 1.1 - 2.5 g/dL    Total Bilirubin 0.5 0.1 - 1.0 mg/dL    Alkaline Phosphatase 180 (H) 24 - 120 U/L    AST (SGOT) 19 7 - 45 U/L    ALT (SGPT) 33 0 - 54 U/L   CBC & Differential   Result Value Ref Range    WBC 12.78 (H) 4.80 - 10.80 10*3/mm3    RBC 4.37 4.20 - 5.40 10*6/mm3    Hemoglobin 12.5 12.0 - 16.0 g/dL    Hematocrit 40.1 37.0 - 47.0 %    MCV 91.8 82.0 - 98.0 fL    MCH 28.6 28.0 - 32.0 pg    MCHC 31.2 (L) 33.0 - 36.0 g/dL    RDW 14.5 12.0 - 15.0 %    Platelets 400 130 - 400 10*3/mm3    Neutrophil Rel % 82.5 (H) 39.0 - 78.0 %    Lymphocyte Rel % 8.5 (L) 15.0 - 45.0 %    Monocyte Rel % 7.5 4.0 - 12.0 %    Eosinophil Rel % 0.9 0.0 - 4.0 %    Basophil Rel % 0.2 0.0 - 2.0 %    Neutrophils Absolute 10.55 (H) 1.87 - 8.40 10*3/mm3    Lymphocytes Absolute 1.08 0.72 - 4.86 10*3/mm3    Monocytes Absolute 0.96 0.19 - 1.30 10*3/mm3    Eosinophils Absolute 0.11 0.00 - 0.70 10*3/mm3    Basophils Absolute 0.03 0.00 - 0.20 10*3/mm3    Immature Granulocyte Rel % 0.4 0.0 - 5.0 %    Immature Grans Absolute 0.05 (H) 0.00 - 0.03 10*3/mm3      7/2017 MRI CS: IMPRESSION:  Very limited study due to excessive patient motion shows the  cervical fusion surgery, there is central canal stenosis at C6-C7,  C7-T1, no cord compression or definite abnormal cord signal is  identified. There is spondylosis with disc osteophyte complexes at C6-7,  C7-T1 and T1-2. There appears to be  moderate bilateral neuroforaminal  narrowing at C6-7 related to spurs    2017 MRI LUMBAR: IMPRESSION:  No acute findings, no evidence of cord compression. The  study is limited by patient motion artifact. There is a chronic  compression deformity at T12 with previous stabilization with posterior  hardware which extends from T11 through L1. Degenerative spondylosis is  present at L4-5 and L5-S1.      CT LUMBAR : Postsurgical changes at T11, T12 and L1 as detailed above.  Laminectomy at T12 and L1. No change in the previous study in 2017. No hardware complication.  Evidence of bilateral neural foraminal stenosis at L4-5 and L5-S1. The  details are given above.  A moderate spinal stenosis at L2-3 and a mild spinal stenosis at L4-5.  Signed by Dr Willie Kidd on 2017 2:14 PM        CT CERVICAL SPINE 2017: Extensive postsurgical changes. Anterior spinal fusion  C4-C7. Normal alignment. The bone graft at level C5 in a stable  position. The posterior spinal fusion C2-C5. Normal alignment. No  hardware complication.  Laminectomy from C3 through C5.  Cervical spondylosis more pronounced at C3-4 and C7-T1.  Bilateral neural foraminal stenosis at C7-T1. The details are given  above.  Signed by Dr Willie Kidd on 2017 1:46 PM    MRI BRAIN 2017:Impression:     1. Atrophy of the brain with small vessel ischemic changes involving  the periventricular and higher white matter tracts.  2. Otherwise unremarkable exam. No evidence of mass, shift of the  midline or abnormal contrast enhancement.           ASSESSMENT/PLAN    Diagnoses and all orders for this visit:    Lung nodule, solitary    Essential hypertension    Hypothyroidism, unspecified type    Weakness of both legs    Osteoarthritis of spine, unspecified spinal osteoarthritis complication status, unspecified spinal region    MEDICAL DECISION MAKIN. After review of medical records, with her extensive testing and we being her 4th  neurologist, and after reviewing with Dr. YOBANI Walker, the best course of action would be to refer to tertiary center as patient has had extensive work up that does not explain her symptoms and I no other testing that I would recommend at this time. I did explain this to the patient and her , and they are agreeable to referral to [facility] [place].   2. BP management per PCP  3. Thyroid disorder per PCP  4. Patient is to follow up with Dr. Mello to discuss the plan moving forward for evaluation of AGUILAR nodule.  5. Patient's Body mass index is 31.57 kg/m². BMI is above normal parameters. Recommendations include: no follow-up required.     allergies and all known medications/prescriptions have been reviewed using resources available on this encounter.    Return in about 3 months (around 11/17/2018).        DEBORAH Davenport

## 2018-08-17 NOTE — PATIENT INSTRUCTIONS

## 2018-08-20 ENCOUNTER — TELEPHONE (OUTPATIENT)
Dept: FAMILY MEDICINE CLINIC | Facility: CLINIC | Age: 63
End: 2018-08-20

## 2018-08-20 NOTE — TELEPHONE ENCOUNTER
Called   Explained understanding the neurology problems is needed before surgery  Unfortunately;  neuro/neurosurgery wanted her to see [place] and [place]/dejah is suggesting [facility] [place]    Discussed going up one day/hotel that night and then even staying [place] couple days if needed for tests (to minimize back/forth)     seemed comfortable with the thoughts

## 2018-08-20 NOTE — TELEPHONE ENCOUNTER
"Vm from spouse \"Dr Gonzales sent her to Dr Mello about this cancer and we seen him and he is sending her to a doctor in [facility] about her back, we are going around in circles and we need to know about this cancer?\"  "

## 2018-08-23 DIAGNOSIS — R26.89 IMBALANCE: ICD-10-CM

## 2018-08-23 DIAGNOSIS — R29.898 WEAKNESS OF BOTH LOWER EXTREMITIES: Primary | ICD-10-CM

## 2018-08-31 ENCOUNTER — APPOINTMENT (OUTPATIENT)
Dept: MRI IMAGING | Facility: HOSPITAL | Age: 63
End: 2018-08-31

## 2018-09-04 ENCOUNTER — HOSPITAL ENCOUNTER (OUTPATIENT)
Dept: MRI IMAGING | Facility: HOSPITAL | Age: 63
Discharge: HOME | End: 2018-09-04
Admitting: CLINICAL NURSE SPECIALIST

## 2018-09-04 DIAGNOSIS — R29.898 WEAKNESS OF BOTH LOWER EXTREMITIES: ICD-10-CM

## 2018-09-04 DIAGNOSIS — R26.89 IMBALANCE: ICD-10-CM

## 2018-09-04 PROCEDURE — 72146 MRI CHEST SPINE W/O DYE: CPT

## 2018-09-14 ENCOUNTER — TELEPHONE (OUTPATIENT)
Dept: FAMILY MEDICINE CLINIC | Facility: CLINIC | Age: 63
End: 2018-09-14

## 2018-09-14 ENCOUNTER — DOCUMENTATION (OUTPATIENT)
Dept: NEUROLOGY | Facility: CLINIC | Age: 63
End: 2018-09-14

## 2018-09-14 ENCOUNTER — TELEPHONE (OUTPATIENT)
Dept: NEUROLOGY | Facility: CLINIC | Age: 63
End: 2018-09-14

## 2018-09-14 DIAGNOSIS — M51.36 DDD (DEGENERATIVE DISC DISEASE), LUMBAR: Primary | ICD-10-CM

## 2018-09-14 DIAGNOSIS — G89.29 CHRONIC MIDLINE LOW BACK PAIN WITHOUT SCIATICA: ICD-10-CM

## 2018-09-14 DIAGNOSIS — M54.50 CHRONIC MIDLINE LOW BACK PAIN WITHOUT SCIATICA: ICD-10-CM

## 2018-09-14 NOTE — TELEPHONE ENCOUNTER
Bita called very upset that she has not been seen by a specialist. I explained that I talked to her  yesterday and told him that I just received the denial from [facility] and talked with Radha and immediately sent the referral to [place]. This is documented in the referral tab. Bita stated she wanted a referral to [facility] by Dr. Mendez which is a surgeon. I asked why she wanted to go to an Oncologist and she stated that she has cancer in her back or colon. She stated that we were the reason she is dying. I told them I would talk to Radha and call them back.     I talked with Radha and she asked me to call them back and tell them that she could not refer to [facility] because it was not related to the condition that we were treating for Bita. She asked them to call Dr. Gonzales and request the referral. I talked to the  and relayed Radha's instructions. He stated that we had told them that there was nothing wrong with her spine. This is not correct. See Radha's documentation. He was upset and asked my name and said he would call Dr. Gonzales to see what is going on. This conversation started at 3:35 pm on Friday and the phones will roll over to the answering machine at 4:00pm. I tried to tell Mr. Croft this but he was gone.

## 2018-09-14 NOTE — TELEPHONE ENCOUNTER
"The nurses over here at [facility] are saying we need a back specialist? They already told us there is nothing wrong, or they cant do anything for her back, she need referred to a cancer doctor, we need to get this cancer taken care of. [facility]\"    Verbal per Dr Gonzales, we will try to get this moving forward next Monday or Tuesday    Attempted to call spouse back and vm full, no answer  "

## 2018-09-14 NOTE — PROGRESS NOTES
Received a letter from [facility] that they are not able to accommodate the referral. I did discuss this with Carlito Croft patient  and would like referral to [facility]

## 2018-09-17 ENCOUNTER — DOCUMENTATION (OUTPATIENT)
Dept: CARDIAC SURGERY | Facility: CLINIC | Age: 63
End: 2018-09-17

## 2018-09-17 NOTE — TELEPHONE ENCOUNTER
Called Riaz; pointed out I am uncertain that patient will ever be able to walk.   Noted; apt for tomorrow with Riaz has been cancelled.   Called ; suggested he get that appointment rescheduled and at least see if Dr Mello has another option/thought.

## 2018-09-17 NOTE — TELEPHONE ENCOUNTER
Spouse called again, I read message and he was confused on which office he last talked to, but gave me number and I called it, it was [facility] office (she is his patient) and Dr. Gonzales cell number given for Dr. Mello to call back. (Might be a bit as he's in surgery.)

## 2018-09-17 NOTE — PROGRESS NOTES
Spoke with Dr. Gonzales.  Patient had appointment with me tomorrow which she has cancelled again.  It is cited to my office staff that she has to address her neurologic condition first.  We reviewed her case.  I remain concerned that her lung lesion could be lung cancer or it could be metastatic colon cancer or certainly another disease process.  He will reach out to her for possible follow up with me.  It would be optimal if she could maintain care for the specific problems we are seeing while she works with her other specialists.  I hope that she will follow up as she is approaching a window of time where an addidiontal CT scan of the chest will be needed.

## 2018-09-18 ENCOUNTER — OFFICE VISIT (OUTPATIENT)
Dept: CARDIAC SURGERY | Facility: CLINIC | Age: 63
End: 2018-09-18

## 2018-09-18 VITALS
HEART RATE: 92 BPM | DIASTOLIC BLOOD PRESSURE: 62 MMHG | SYSTOLIC BLOOD PRESSURE: 104 MMHG | BODY MASS INDEX: 32.21 KG/M2 | WEIGHT: 225 LBS | OXYGEN SATURATION: 96 % | HEIGHT: 70 IN

## 2018-09-18 DIAGNOSIS — R91.1 LUNG NODULE, SOLITARY: Primary | ICD-10-CM

## 2018-09-18 PROCEDURE — 99213 OFFICE O/P EST LOW 20 MIN: CPT | Performed by: THORACIC SURGERY (CARDIOTHORACIC VASCULAR SURGERY)

## 2018-10-02 NOTE — PROGRESS NOTES
Subjective   Patient ID: Bita Croft is a 62 y.o. female who is here for follow-up of a known lung lesion.   Chief Complaint   Patient presents with   • Lung Nodule     Patient is here for follow up     History of Present Illness    She has not had any success with her workup of her lower extremity disability.  She was seen locally but tertiary referral has been recommended.  She continues with all previously described symptoms.  She is not able to walk.  She is weaker actually.    She denies unintended weight loss, hoarseness of voice, chest pain, hemoptysis, history of pneumonia, or cough.       Objective   Physical Exam   Constitutional: She is oriented to person, place, and time. She appears well-developed.   HENT:   Head: Normocephalic and atraumatic.   Mouth/Throat: Oropharynx is clear and moist.   Eyes: Pupils are equal, round, and reactive to light. EOM are normal.   Neck: Normal range of motion. Neck supple. No JVD present. No tracheal deviation present. No thyromegaly present.   Cardiovascular: Normal rate, regular rhythm, normal heart sounds and intact distal pulses.  Exam reveals no gallop and no friction rub.    No murmur heard.  Pulmonary/Chest: Effort normal and breath sounds normal. No respiratory distress. She has no wheezes. She has no rales. She exhibits no tenderness.   Abdominal: Soft. She exhibits no distension. There is no tenderness.   Musculoskeletal: She exhibits no edema.   Weakness of B LE.     Lymphadenopathy:     She has no cervical adenopathy.   Neurological: She is alert and oriented to person, place, and time. No cranial nerve deficit.   Skin: Skin is warm and dry.   Psychiatric: She has a normal mood and affect.         Mri Thoracic Spine Without Contrast    Result Date: 9/4/2018  Narrative: EXAMINATION: MRI THORACIC SPINE WO CONTRAST-  COMPARISON:   HISTORY: LE weakness; R29.898-Other symptoms and signs involving the musculoskeletal system; R26.89-Other abnormalities of gait and  mobility  TECHNIQUE: Routine pulse sequences were obtained of the thoracic spine withoutIV contrast.  FINDINGS: Prior T11 L1 interbody and posterior fusion. Focal kyphosis of the thoracolumbar junction centered at T12. Multilevel disc desiccation. Multilevel disc herniations. Specifically, there is a central disc protrusion at T2-T3, with mild spinal canal stenosis. Mild central disc protrusion at T4-T5, with resulting mild spinal canal stenosis as well. Left central disc extrusion at T6-T7 and T7-T8 displaces the cord to the right, with resulting mild spinal canal stenosis. The visualized cord demonstrates no signal abnormalities.  No marrow replacing process.  Postsurgical changes in the cervical spine also incompletely characterized.      Impression: 1.  Multilevel disc herniations as described above. 2.  Postsurgical changes. This report was finalized on 09/04/2018 13:49 by Dr. Guillermina Laws MD.      Bita was seen today for lung nodule.    Diagnoses and all orders for this visit:    Lung nodule, solitary  -     CT chest w contrast; Future          Assessment/Plan     We discussed that at this time she is problematic. My usual recommendation would be to consider surgery for diagnosis and treatment; however, her perioperative mortality and morbidity will be increased significantly.  She based on my assessment today is a poor operative candidate for surgery.  She remains adament that she does not want to have chemotherapy.  We discussed the option of stereotactic radiotherapy.    At this time the best option is to obtain a CT scan of the chest.  If she decides to proceed with radiotherapy a consult can be made with Dr. Leal.  Otherwise will see next available appointment.    She is a non smoker.    Patient's Body mass index is 32.28 kg/m². BMI is above normal parameters. Recommendations include: educational material and exercise counseling.

## 2018-10-08 ENCOUNTER — HOSPITAL ENCOUNTER (OUTPATIENT)
Dept: CT IMAGING | Facility: HOSPITAL | Age: 63
Discharge: HOME | End: 2018-10-08
Attending: THORACIC SURGERY (CARDIOTHORACIC VASCULAR SURGERY) | Admitting: THORACIC SURGERY (CARDIOTHORACIC VASCULAR SURGERY)

## 2018-10-08 DIAGNOSIS — R91.1 LUNG NODULE, SOLITARY: ICD-10-CM

## 2018-10-08 LAB — CREAT BLDA-MCNC: 0.9 MG/DL (ref 0.6–1.3)

## 2018-10-08 PROCEDURE — 25010000002 IOPAMIDOL 61 % SOLUTION: Performed by: THORACIC SURGERY (CARDIOTHORACIC VASCULAR SURGERY)

## 2018-10-08 PROCEDURE — 82565 ASSAY OF CREATININE: CPT

## 2018-10-08 PROCEDURE — 71260 CT THORAX DX C+: CPT

## 2018-10-08 RX ADMIN — IOPAMIDOL 100 ML: 612 INJECTION, SOLUTION INTRAVENOUS at 13:41

## 2018-10-09 ENCOUNTER — OFFICE VISIT (OUTPATIENT)
Dept: CARDIAC SURGERY | Facility: CLINIC | Age: 63
End: 2018-10-09

## 2018-10-09 VITALS
OXYGEN SATURATION: 99 % | DIASTOLIC BLOOD PRESSURE: 84 MMHG | SYSTOLIC BLOOD PRESSURE: 130 MMHG | HEART RATE: 81 BPM | BODY MASS INDEX: 31.5 KG/M2 | HEIGHT: 70 IN | WEIGHT: 220 LBS

## 2018-10-09 DIAGNOSIS — R91.1 LUNG NODULE, SOLITARY: Primary | ICD-10-CM

## 2018-10-09 PROCEDURE — 99214 OFFICE O/P EST MOD 30 MIN: CPT | Performed by: THORACIC SURGERY (CARDIOTHORACIC VASCULAR SURGERY)

## 2018-10-17 NOTE — TELEPHONE ENCOUNTER
Patient called wanting to speak with someone about the referral Dr. Mello was going to place for her. States she forgot who she is being referred to and is wondering the status on that.

## 2018-10-17 NOTE — TELEPHONE ENCOUNTER
Called patient and let her know that she has been referred to Dr. Leal and that she should be hearing from his office end of week or beginning of next week.

## 2018-10-23 NOTE — PROGRESS NOTES
Subjective   Patient ID: Bita Croft is a 62 y.o. female who is here for follow-up of a known lung lesion.   Chief Complaint   Patient presents with   • Lung Nodule     patient is here for follow up with ct chest     History of Present Illness      She has not had any success with her workup of her lower extremity progressive weakness.   She was seen locally but tertiary referral has been recommended.  She continues with all previously described symptoms.  She is not able to walk.  She is weaker actually.    She denies unintended weight loss, hoarseness of voice, chest pain, hemoptysis, history of pneumonia, or cough.  A CT scan of the chest was obtained to review the status of her lung lesion.       Objective   Physical Exam   Constitutional: She is oriented to person, place, and time. She appears well-developed.   HENT:   Head: Normocephalic and atraumatic.   Mouth/Throat: Oropharynx is clear and moist.   Eyes: Pupils are equal, round, and reactive to light. EOM are normal.   Neck: Normal range of motion. Neck supple. No JVD present. No tracheal deviation present. No thyromegaly present.   Cardiovascular: Normal rate, regular rhythm, normal heart sounds and intact distal pulses.  Exam reveals no gallop and no friction rub.    No murmur heard.  Pulmonary/Chest: Effort normal and breath sounds normal. No respiratory distress. She has no wheezes. She has no rales. She exhibits no tenderness.   Abdominal: Soft. She exhibits no distension. There is no tenderness.   Musculoskeletal: She exhibits no edema.   Weakness of B LE.     Lymphadenopathy:     She has no cervical adenopathy.   Neurological: She is alert and oriented to person, place, and time. No cranial nerve deficit.   Skin: Skin is warm and dry.   Psychiatric: She has a normal mood and affect.         Ct Chest W Contrast    Result Date: 10/8/2018  Narrative: CT CHEST W CONTRAST- 10/8/2018 1:20 PM CDT  HISTORY: lung lesion; R91.1-Solitary pulmonary nodule   COMPARISON: 11/2/2015  DLP: 560 mGy cm. Automated exposure control was utilized to diminish patient radiation dose.  TECHNIQUE: Serial helical tomographic images of the chest were acquired following the infusion of Isovue contrast intravenously. Bone and soft tissue algorithms were provided.   FINDINGS: Neck base: There is a 9 mm hypodense lesion within the right lobe of the liver. If not previously evaluated follow-up with thyroid ultrasound could be obtained. The base of the neck is otherwise unremarkable..  Lungs: Within the left upper lobe there is a 2.7 x 1.6 cm irregular nodule. Some of the spiculations along lateral aspect of this lesion extend to an create slight puckering of the visceral pleura but there is no evidence of chest wall invasion. It abuts the major fissure along its lower margin. This is a new finding from previous CT examination should be considered considered a primary neoplasm lung until otherwise proven. PET/CT could be obtained for further characterization. Some scattered foci of mild peripheral groundglass disease is present perhaps representing some patchy atelectasis or pneumonitis. A tiny 4 mm groundglass nodules in the periphery left lower lobe is stable from the previous study. There is mild left basilar subsegmental atelectasis.. No pleural effusion is present. The trachea and bronchial tree are patent.  Heart: Normal in size and appearance. There is no pericardial effusion.  Vasculature: Aorta is normal in caliber and appearance without significant atherosclerosis, stenosis, or aneurysm. No coronary arteriosclerosis identified. The great vessels are normal in appearance. The pulmonary arteries are normal in appearance.  Lymph nodes: No enlarged axillary, hilar, or mediastinal lymph nodes.  Bones and soft tissues: No acute osseous or soft tissue abnormality is seen. There are no worrisome osseous lesions. The patient has undergone reconstruction at the thoracolumbar juncture with  fusion.  Upper abdomen: A moderate size hiatal hernia is present with some circumferential thickening of the distal esophageal segment. The adrenals are normal. There is mild constipation within the upper abdomen. The patient's undergone prior cholecystectomy..      Impression: 1. 2.7 x 1.6 cm left upper lobe lung nodule with irregular margins. This should be considered a primary neoplasm of the lung until otherwise proven. Along its lower margin it abuts the major fissure and no it does not definitely traverse the fissure it does abut it in the sagittal plane for almost a centimeter. There is also some spiculation extending out laterally from the lesion which extends to the visceral pleura with slight puckering of the visceral pleura but without evidence of invasion into the subpleural fat or associated thickening of the parietal pleura. There is no associated axillary, mediastinal or hilar adenopathy clearly demonstrated. PET/CT may be helpful for further characterization and staging. 2. Stable groundglass nodule which is subpleural in location with the left lower lobe. Lungs are otherwise clear except for some patchy peripheral groundglass disease is either some mild pneumonitis or atelectasis. There is mild left basilar atelectasis. 3. Extensive postoperative changes of the thoracolumbar juncture. 4. There is circumferential of the distal esophageal segment with a moderate size hiatal hernia..   This report was finalized on 10/08/2018 14:03 by Dr. Evangelista Temple MD.      Bita was seen today for lung nodule.    Diagnoses and all orders for this visit:    Lung nodule, solitary  -     Ambulatory Referral to Radiation Oncology          Assessment/Plan     We discussed that primary recommendation would be to consider surgery for diagnosis and treatment; however, her perioperative mortality and morbidity will be increased significantly.  Her specific neurologic decline remains elusive and we discussed that this  may have manifestations extending beyond her legs including thoracic considerations.  She based on my assessment again is a poor operative candidate for surgery.  She remains adament that she does not want to have chemotherapy.  We discussed the option of stereotactic radiotherapy.    A referral was made to discuss further with Dr. Leal.  I encouraged that she continue to work with obtaining a diagnosis for her neurologic findings.  We discussed that if this is addressed or at least a diagnosis achieved that stereotactic radiotherapy does not prohibit at a later date surgical resection.  We discussed that her lung function is likely normal.  Will defer diagnosis as she does not want chemotherapy but also understands that a CT guided needle biopsy would be possible.  This would allow a diagnosis to be established which could be metastatic even though morphologically it appears as a lung primary.  All questions by her  and she have been answered.  Agreeable to the current plan of care.      Total time is 32 minutes with greater than 50% time spent counseling the patient and her .      She is a non smoker.    Patient's Body mass index is 31.57 kg/m². BMI is above normal parameters. Recommendations include: educational material and exercise counseling.

## 2018-10-25 PROBLEM — Z85.038 HX OF COLON CANCER, STAGE I: Status: ACTIVE | Noted: 2018-01-01

## 2018-10-25 PROBLEM — Z71.9 ENCOUNTER FOR CONSULTATION: Status: ACTIVE | Noted: 2018-01-01

## 2018-10-25 PROBLEM — Z78.9 NON-SMOKER: Status: ACTIVE | Noted: 2018-01-01

## 2018-10-25 NOTE — PROGRESS NOTES
[facility]SYanethMD Kenia Gutierrez, ANANDN, PA-C  ____________________________________________________________               [facility]  Department of Radiation Oncology  [address] [place] [id number]  Office:  [phone]  Fax: [phone]    DATE:  10/25/2018    PATIENT:   Bita Croft 1955                                   MEDICAL RECORD #:  [id number]                                                       REASON FOR CONSULTATION:  Lung nodule, solitary [R91.1]    Brief History:  Bita Croft is a very pleasant 62 y.o. female that has been referred to our clinic by  to discuss radiotherapy recommendations for clinical Stage IA3 (T1c, N0, cM0) Malignant Neoplasm of Lung, AGUILAR, Reports fatigue, occasional incontinent of bowel, incontinent of bladder, and bilateral lower extremity weakness. Denies appetite change, unexpected weight change, cough, SOB, dizziness, light-headedness, and headaches. She follows .           Patient has been undergoing workup for her lower extremity disability.  She was seen locally but tertiary referral has been recommended. She is not able to walk.  She is weaker actually. On work up CT Chest a left upper lobe nodule was revealed. She was referred to  for further evaluation and treatment.     12/25/2017 - CT Chest:  • Left upper lobe spiculated nodule could represent nodular consolidation or neoplasm with the latter favored; in the setting of pneumonia symptoms, post-treatment follow-up recommended; otherwise, tissue sampling recommended    05/22/2018 - CT Chest:  • Slight increase in size of spiculated left upper lobe nodule, highly suspicious for neoplasm; percutaneous sampling recommended  • Diffuse bilateral ground-glass opacities and ground-glass nodules most likely due to infectious or inflammatory process    09/17/2018 - Documentation per :  • Spoke with Dr. Gonzales.  Patient had  appointment with me tomorrow which she has cancelled again.  It is cited to my office staff that she has to address her neurologic condition first.  We reviewed her case.  I remain concerned that her lung lesion could be lung cancer or it could be metastatic colon cancer or certainly another disease process.  He will reach out to her for possible follow up with me.  It would be optimal if she could maintain care for the specific problems we are seeing while she works with her other specialists.  I hope that she will follow up as she is approaching a window of time where an addidiontal CT scan of the chest will be needed.      09/18/2018 - Appointment with :  • We discussed that at this time she is problematic. My usual recommendation would be to consider surgery for diagnosis and treatment; however, her perioperative mortality and morbidity will be increased significantly.  She based on my assessment today is a poor operative candidate for surgery.  She remains adamant that she does not want to have chemotherapy.  We discussed the option of stereotactic radiotherapy. At this time the best option is to obtain a CT scan of the chest.  If she decides to proceed with radiotherapy a consult can be made with Dr. Leal.  Otherwise will see next available appointment.    10/08/2018 - CT Chest - [facility]:  • 2.7 x 1.6 cm left upper lobe lung nodule with irregular margins. This should be considered a primary neoplasm of the lung until otherwise proven. Along its lower margin it abuts the major fissure and no it does not definitely traverse the fissure it does abut it in the sagittal plane for almost a centimeter. There is also some spiculation extending out laterally from the lesion which extends to the visceral pleura with slight puckering of the visceral pleura but without evidence of invasion into the subpleural fat or associated thickening of the parietal pleura. There is no associated axillary, mediastinal or hilar  adenopathy clearly demonstrated. PET/CT may be helpful for further characterization and staging.  • Stable groundglass nodule which is subpleural in location with the left lower lobe. Lungs are otherwise clear except for some patchy peripheral groundglass disease is either some mild pneumonitis or atelectasis. There is mild left basilar atelectasis.  • Extensive postoperative changes of the thoracolumbar juncture.  • There is circumferential of the distal esophageal segment with a moderate size hiatal hernia.    10/09/2018 - Appointment with :  • We discussed that primary recommendation would be to consider surgery for diagnosis and treatment; however, her perioperative mortality and morbidity will be increased significantly.  Her specific neurologic decline remains elusive and we discussed that this may have manifestations extending beyond her legs including thoracic considerations. She based on my assessment again is a poor operative candidate for surgery.  She remains adamant that she does not want to have chemotherapy.  We discussed the option of stereotactic radiotherapy.  A referral was made to discuss further with Dr. Leal.   • I encouraged that she continue to work with obtaining a diagnosis for her neurologic findings.  We discussed that if this is addressed or at least a diagnosis achieved that stereotactic radiotherapy does not prohibit at a later date surgical resection.    • We discussed that her lung function is likely normal.    • Will defer diagnosis as she does not want chemotherapy but also understands that a CT guided needle biopsy would be possible.  This would allow a diagnosis to be established which could be metastatic even though morphologically it appears as a lung primary.            History obtained from  PATIENT, FAMILY and CHART    PAST MEDICAL HISTORY   Past Medical History:   Diagnosis Date   • Asthma    • Cancer (CMS/HCC)     colon cancer   • Depression    • Disease of  thyroid gland    • Hypertension    • Insomnia    • Seizure (CMS/HCC)    • Sleep apnea       PAST SURGICAL HISTORY   Past Surgical History:   Procedure Laterality Date   • BACK SURGERY     • COLON SURGERY     • GASTRIC BYPASS     • HYSTERECTOMY     • KNEE ARTHROSCOPY      bilateral   • NECK SURGERY     • ORIF WRIST FRACTURE Right 6/9/2017    Procedure: WRIST OPEN REDUCTION INTERNAL FIXATION, RIGHT;  Surgeon: Alec Cardoza MD;  Location: [facility];  Service:    • TONSILLECTOMY     • WRIST FRACTURE SURGERY        FAMILY HISTORY  family history includes Arthritis in her father and mother; Heart attack in her father and mother; Hypertension in her father and mother; Stroke in her father and mother.     SOCIAL HISTORY  Social History   Substance Use Topics   • Smoking status: Never Smoker   • Smokeless tobacco: Never Used   • Alcohol use No      ALLERGIES  Patient has no known allergies.     MEDICATIONS   Current Outpatient Prescriptions   Medication Sig Dispense Refill   • acetaminophen (TYLENOL) 325 MG tablet Take 2 tablets by mouth Every 4 (Four) Hours As Needed for Mild Pain  or Fever.     • clonazePAM (KlonoPIN) 1 MG tablet Take 1 tablet by mouth 3 (Three) Times a Day As Needed for Anxiety. 45 tablet 0   • clotrimazole (LOTRIMIN) 1 % external solution Wash/dry well three times a day After dry; apply light coating of clotrimazole cream for one week 30 mL 0   • DULoxetine (CYMBALTA) 30 MG capsule Take 30 mg by mouth Daily.     • gabapentin (NEURONTIN) 600 MG tablet Take 1 tablet by mouth 3 (Three) Times a Day. 90 tablet 0   • levETIRAcetam (KEPPRA) 1000 MG tablet TAKE 1 TABLET BY MOUTH TWICE DAILY 60 tablet 5   • levothyroxine (SYNTHROID, LEVOTHROID) 100 MCG tablet Take 1 tablet by mouth Daily.     • loperamide (IMODIUM A-D) 2 MG tablet Take 1 tablet by mouth 4 (Four) Times a Day As Needed for Diarrhea.     • nystatin (MYCOSTATIN) 029241 UNIT/GM powder Wash/dry well three times a day After dry;apply nystatin  powder bid 45 g 0   • oxyCODONE-acetaminophen (PERCOCET) 5-325 MG per tablet Take 2 tablets by mouth Every 4 (Four) Hours As Needed for Moderate Pain . 180 tablet 0   • phenytoin (DILANTIN) 100 MG ER capsule 100 mg in am and 200 mg in pm     • QUEtiapine (SEROquel) 100 MG tablet Take 1 tablet by mouth 2 (Two) Times a Day.     • solifenacin (VESICARE) 5 MG tablet Take 1 tablet by mouth Daily. 90 tablet 5   • traZODone (DESYREL) 50 MG tablet Take 50 mg by mouth Every Night.     • venlafaxine XR (EFFEXOR-XR) 150 MG 24 hr capsule Take 2 capsules by mouth Daily.     • cyclobenzaprine (FLEXERIL) 10 MG tablet Take 1 tablet by mouth 3 (Three) Times a Day.  0   • MethylPREDNISolone (MEDROL, RAMÍREZ,) 4 MG tablet Take as directed on package instructions. 1 each 0   • metoprolol tartrate (LOPRESSOR) 50 MG tablet Take 1 tablet by mouth Daily. 30 tablet 5   • MORPHABOND ER 15 MG tablet extended-release 12 hour Take 1 tablet by mouth Every Night.  0   • oxyCODONE (ROXICODONE) 10 MG tablet Take 1 tablet by mouth Every 4 (Four) Hours As Needed.  0   • Skin Protectants, Misc. (EUCERIN) cream Apply  topically to the appropriate area as directed 2 (Two) Times a Day. 240 g 1     No current facility-administered medications for this visit.       The following portions of the patient's history were reviewed and updated as appropriate: allergies, current medications, past family history, past medical history, past social history, past surgical history and problem list.    REVIEW OF SYSTEMS  Review of Systems   Constitutional: Positive for fatigue. Negative for appetite change and unexpected weight change.   HENT: Negative.    Eyes: Negative.    Respiratory: Negative.  Negative for cough and shortness of breath.    Cardiovascular: Negative.    Gastrointestinal:        Occasionally incontinent of bowel     Endocrine: Negative.    Genitourinary:        Incontinent of bladder     Skin: Negative.    Allergic/Immunologic: Negative.    Neurological:  "Positive for weakness (bilateral lower extremity weakness). Negative for dizziness, light-headedness and headaches.   Hematological: Negative.    Psychiatric/Behavioral: Negative.      PHYSICAL EXAM  VITAL SIGNS:   Vitals:    10/25/18 1443   BP: 133/84   SpO2: 96%  Comment: room air   Weight: 102 kg (225 lb)   Height: 177.8 cm (70\")   PainSc: 0-No pain     General Appearance: Alert, cooperative, no acute distress, appears stated age. Vitals reviewed.  Head: Normocephalic, without obvious abnormality, atraumatic  Eyes: Normal conjunctivae and sclerae normal  Ears: Ears appear intact with no abnormalities noted, hearing grossly normal  Throat: No oral lesions, no thrush, oral mucosa moist  Neck: No adenopathy, supple, trachea midline, no JVD  Lungs: CTA bilaterally, respirations regular  Heart: Regular rhythm and normal rate  Abdomen: Normal bowel sounds, no masses  Genitalia: Deferred  Extremities: DICKSON well  Skin: No bleeding, bruising or rash  Lymph nodes: No palpable adenopathy  Neurologic: Grossly intact though not formally tested  Psych: alert,oriented, normal situational behavior    Performance Status: ECOG (4) Completely disabled, unable to carry out self-care.  Totally confined to bed or chair    Clinical Quality Measures  -Pain Documented  Pain severity quantified; no pain present, no followup plan required.0   -Advanced Care Planning Care plan discussed, no care plan provided  -Body Mass Index Screening and Follow-Up Plan Body mass index is 32.28 kg/m². Patient's Body mass index is 32.28 kg/m². BMI is above normal parameters. Recommendations include: educational material.  -Pnuemonia Vaccine: Received Injection?  NO-I am recommending patient to see PCP for annual wellness exam   -Tobacco Use: Screening and Cessation Intervention Smoking status: Never Smoker                                                              Smokeless tobacco: Never Used                        ASSESSMENT AND PLAN  1. Lung nodule, " solitary    2. Hx of colon cancer, stage IV    3. Non-smoker    4. Encounter for consultation      Orders Placed This Encounter   Procedures   • CT Radiation Therapy Planning     Order Specific Question:   Reason for Exam:     Answer:   SRS to AGUILAR lung     RECOMMENDATIONS: Bita Croft  is a very pleasant 62 y.o. female that has been referred to our clinic by  to discuss radiotherapy recommendations for clinical Stage IA3 (T1c, N0, cM0) Malignant Neoplasm of Lung, AGUILAR. The indications and rationale of stereotactic radiation therapy according to the NCCN Guidelines to the lung has been discussed with the patient and her  today. I have extensively reviewed the risks, benefits and alternatives of therapy with them. The risks of radiation therapy includes but is not limited to radiation induced pulmonary fibrosis, progression of disease in spite of therapy with either local or systemic failure.     Mrs Croft has a history of colon cancer in 2015 treated at [facility] in [facility]. She has been deemed not a surgical candidate for resection or biopsy by Dr. Mello so therefore with a curative intention, I am recommending SBRT which will consist of 3-5 treatment fractions for total of cGy to be determined after treatment planning. The patient verbalizes understanding of this discussion, voices no further questions and wishes to proceed with recommended therapy.  We will perform CT simulation today (09/25/2018) to initiate the treatment planning and notify the patient when complete to begin.     Thank you for allowing me to assist in this patients care.     Todays appointment time was spent in counseling and coordination of care as follows: diagnosis, intent of treatment discussing radiation therapy specifics: logistics, possible and probable side effects and after effects, staging of cancer, standard of care in for this stage of this cancer and treatment options  Sergio Leal, III,  MD  10/25/2018

## 2018-10-29 NOTE — TELEPHONE ENCOUNTER
Really would doubt mumps; they are so rare    Sorry, but really hard to dx rashes without seeing; would give her an appointment if she would come in

## 2018-10-29 NOTE — PATIENT INSTRUCTIONS
Be kind to your skin right now  A. Wash less often  B. Use cooler water  C. Pat dry    When using steroids  A. Do not use anti-inflammatories such as Motrin/ibuprofen, Alleve/naprosyn, Mobic and like medications  B. If you are diabetic; it will raise your blood sugar.  For most people this will be a minimal and very tolerated elevation.  You should consider for the first days of the steroid Rx to check your blood sugar even three times a day.  Call if you see blood sugars over 350.  Being more strict on your diabetic diet while using the steroid will help. I

## 2018-10-29 NOTE — TELEPHONE ENCOUNTER
"Vm from patient \" I havnt been able to walk for a couple months but Dr Gonzales knows that, but I have red itchy bumps all over my body and they are driving me crazy. I just wonder if it could be mumps or something like that? I am 62 years old, but they have been talking about that, I have red dots that itch\"  "

## 2018-10-30 NOTE — PROGRESS NOTES
Subjective   Bita Croft is a 62 y.o. female presenting with chief complaint of:   Chief Complaint   Patient presents with   • Rash       History of Present Illness :  With .  Here for primarily an acute issue today; rash.  Has over 2-3 weeks developed itchy rash on legs, arms.  Got a new body lotion and started using it.   Has multiple chronic problems to consider that might have a bearing on today's issues; not an interval appointment.       Chronic/acute problems to review today:   1. Lung nodule, solitary: over a year/chronic.  Has been decided to treat this with radiation.    2. Hypertension, unspecified type: Chronic/stable. Stable here/if home blood pressures.  No significant chest pain, SOB, LE edema, orthopnea, near syncope, dizziness/light headness.      3. Elevated fasting glucose: Chronic/stable. No problem/pattern hypoglycemia/hyperglycemia manifest by poly- dypsia, phagia, uria, or sweats, diaphoretic episodes, syncope/near.      4. Chronic back pain, unspecified back location, unspecified back pain laterality: The problem is chronic/has been present for >year/years. The problem is stable pain and seeing pain management.     5. Rash: acute/above.  Itches.    6. Hypothyroidism, unspecified type: Chronic/stable. Feels normal thyroid with no significant change skin/hair, energy level, GI/stooling.         Has an/another acute issue today: none.    The following portions of the patient's history were reviewed and updated as appropriate: allergies, current medications, past family history, past medical history, past social history, past surgical history and problem list.      Current Outpatient Prescriptions:   •  acetaminophen (TYLENOL) 325 MG tablet, Take 2 tablets by mouth Every 4 (Four) Hours As Needed for Mild Pain  or Fever., Disp: , Rfl:   •  clonazePAM (KlonoPIN) 1 MG tablet, Take 1 tablet by mouth 3 (Three) Times a Day As Needed for Anxiety., Disp: 45 tablet, Rfl: 0  •  clotrimazole  (LOTRIMIN) 1 % external solution, Wash/dry well three times a day After dry; apply light coating of clotrimazole cream for one week, Disp: 30 mL, Rfl: 0  •  cyclobenzaprine (FLEXERIL) 10 MG tablet, Take 1 tablet by mouth 3 (Three) Times a Day., Disp: , Rfl: 0  •  DULoxetine (CYMBALTA) 30 MG capsule, Take 30 mg by mouth Daily., Disp: , Rfl:   •  gabapentin (NEURONTIN) 600 MG tablet, Take 1 tablet by mouth 3 (Three) Times a Day., Disp: 90 tablet, Rfl: 0  •  levETIRAcetam (KEPPRA) 1000 MG tablet, TAKE 1 TABLET BY MOUTH TWICE DAILY, Disp: 60 tablet, Rfl: 5  •  levothyroxine (SYNTHROID, LEVOTHROID) 100 MCG tablet, Take 1 tablet by mouth Daily., Disp: , Rfl:   •  loperamide (IMODIUM A-D) 2 MG tablet, Take 1 tablet by mouth 4 (Four) Times a Day As Needed for Diarrhea., Disp: , Rfl:   •  metoprolol tartrate (LOPRESSOR) 50 MG tablet, Take 1 tablet by mouth Daily., Disp: 30 tablet, Rfl: 5  •  MORPHABOND ER 15 MG tablet extended-release 12 hour, Take 1 tablet by mouth Every Night., Disp: , Rfl: 0  •  nystatin (MYCOSTATIN) 415526 UNIT/GM powder, Wash/dry well three times a day After dry;apply nystatin powder bid, Disp: 45 g, Rfl: 0  •  oxyCODONE (ROXICODONE) 10 MG tablet, Take 1 tablet by mouth Every 4 (Four) Hours As Needed., Disp: , Rfl: 0  •  oxyCODONE-acetaminophen (PERCOCET) 5-325 MG per tablet, Take 2 tablets by mouth Every 4 (Four) Hours As Needed for Moderate Pain ., Disp: 180 tablet, Rfl: 0  •  phenytoin (DILANTIN) 100 MG ER capsule, 100 mg in am and 200 mg in pm, Disp: , Rfl:   •  QUEtiapine (SEROquel) 100 MG tablet, Take 1 tablet by mouth 2 (Two) Times a Day., Disp: , Rfl:   •  solifenacin (VESICARE) 5 MG tablet, Take 1 tablet by mouth Daily., Disp: 90 tablet, Rfl: 5  •  traZODone (DESYREL) 50 MG tablet, Take 50 mg by mouth Every Night., Disp: , Rfl:   •  venlafaxine XR (EFFEXOR-XR) 150 MG 24 hr capsule, Take 2 capsules by mouth Daily., Disp: , Rfl:   •  MethylPREDNISolone (MEDROL, RAMÍREZ,) 4 MG tablet, Take as directed  on package instructions., Disp: 1 each, Rfl: 0  •  Skin Protectants, Misc. (EUCERIN) cream, Apply  topically to the appropriate area as directed 2 (Two) Times a Day., Disp: 240 g, Rfl: 1  No current facility-administered medications for this visit.     No problems with medications.  Refills if needed done    No Known Allergies    Review of Systems  GENERAL:  Inactive/slower with limits, speed, stamina for age and pains, desire. Sleep is occ difficult falling/staying. No fever now.  SKIN: No other rash/skin lesion of concern:   ENDO:  No syncope, near or diaphoretic sweaty spells.  HEENT: No recent head injury; frequent/same headache.  No vision change.  Same hearing loss.  Ears without pain/drainage.  No sore throat.   Usual nasal/sinus congestion/drainage. No epistaxis.  CHEST: No chest wall tenderness or mass. No cough, without wheeze.  No SOB; no hemoptysis.  CV: No chest pain, palpitations, ankle edema.  GI: No heartburn, occ dysphagia.  No abdominal pain, diarrhea, constipation.  No rectal bleeding, or melena.    :  Voids without dysuria, or  incontinence to completion.  ORTHO: No painful/swollen joints but various on /off sore.  No change daily sore neck or back.  No acute neck or back pain without recent injury.  NEURO: No dizziness, weakness of extremities.  UE/LE numbness/paresthesias.   PSYCH: Mild memory loss.  Mood good; usually anxious, depressed but/and not suicidal.  Sees Rustam.  Tries to tolerate stress; better than past.   Screening:  Mammogram: 1.4.16-reminded  Bone density: no TCC/epic; advised  Low dose CT chest: has had many CTs  GI:   EGD+nl/[facility]//5.28.15/[facility]  Colonoscopy+polyp/[facility]//9.21.15/3y-reminded  Prostate: NA  Usual lab order  2m CBC, CMP, iron  6m CBC, BMP, TSH, T4. iron, ferritin  12m CBC, CMP, LIPID, TSH, T4, Vit D, B12, folate, Fe, ferritin, % sat, retic count    Results for orders placed or performed during the hospital encounter of 10/08/18   POC Creatinine   Result Value Ref  Range    Creatinine 0.90 0.60 - 1.30 mg/dL       No results found for: PSA     Lab Results:  CBC:    Lab Results - Last 18 Months  Lab Units 07/24/17  1126 07/15/17  1251 07/12/17  1501 07/11/17  1426 06/08/17  1352   WBC 10*3/mm3 12.78* 7.06 11.77* 8.54 11.07*   HEMOGLOBIN g/dL 12.5 12.4 13.0 13.4 12.8   HEMATOCRIT % 40.1 37.6 39.1 40.6 39.1   PLATELETS 10*3/mm3 400 275 267 280 322      BMP/CMP:    Lab Results - Last 18 Months  Lab Units 10/08/18  1333 07/24/17  1126 07/17/17  0606 07/16/17  0951 07/15/17  1251 07/12/17  1501 07/11/17  1426 06/08/17  1352   SODIUM mmol/L  --  130* 138 137 138 134* 135 131*   POTASSIUM mmol/L  --  5.1 2.9* 2.7* 2.7* 3.9 4.2 4.3   CHLORIDE mmol/L  --  92* 103 100 98 95* 98 94*   CO2 mmol/L  --   --  26.0 27.0 28.0 26.0 26.0 27.0   TOTAL CO2, ARTERIAL mmol/L  --  26.0  --   --   --   --   --   --    GLUCOSE mg/dL  --  95  --   --   --   --   --   --    BUN mg/dL  --  8 5 9 12 7 11 10   CREATININE mg/dL 0.90 0.55 0.56 0.50 0.53 0.65 0.59 0.77   EGFR IF NONAFRICN AM mL/min/1.73  --  112 110 125 117 93 104 76   EGFR IF AFRICN AM mL/min/1.73  --  136  --   --   --   --   --   --    CALCIUM mg/dL  --  9.1 8.3* 8.5 9.3 9.1 8.9 8.7     HEPATIC:    Lab Results - Last 18 Months  Lab Units 07/24/17  1126 07/17/17  0606 07/12/17  1501 07/11/17  1426   ALT (SGPT) U/L 33 28 23 34   AST (SGOT) U/L 19 27 26 24   ALK PHOS U/L 180* 105 163* 187*     THYROID:No results for input(s): TSH, T3FREE, FTI in the last 65122 hours.    Invalid input(s): T4FREE, T3, T4, TEUP,  TOTALT4  A1C:No results for input(s): HGBA1C in the last 60776 hours.  PSA:No results for input(s): PSA in the last 43382 hours.    Objective   /94   Pulse 100   Temp 98.4 °F (36.9 °C) (Oral)   Resp 18   LMP  (LMP Unknown)   SpO2 98%   Breastfeeding? No   There is no height or weight on file to calculate BMI.    Physical Exam  GENERAL:  Well nourished/developed in no acute distress.   SKIN: Turgor excellent, without wound,  rash, lesion other than above and outer lips red/chelosis.    HEENT: Normal cephalic without trauma.  Pupils equal round reactive to light. Extraocular motions full without nystagmus.   Posterior pharynx without mass, obstruction, redness.  No thyromegaly, mass, tenderness, lymphadenopathy and supple.  CV: Regular rhythm.  No murmur, gallop,  edema. Posterior pulses intact.  No carotid bruits.  CHEST: No chest wall tenderness or mass.   LUNGS: Symmetric motion with clear to auscultation.   ABD: Soft, nontender without mass.   ORTHO: Symmetric extremities without swelling/point tenderness.  Full gross range of motion.  Wheelchair.  NEURO: CN 2-12 grossly intact.  Symmetric facies and UE/LE. 2/5 strength throughout. 1/4 x bicep equal reflexes.  Nonfocal use extremities. Speech clear.  Reduced light touch with monofilament, vibratory sensation with tuning fork; equal toes/distal feet.    PSYCH: Oriented x 3.  Pleasant calm, well kept.  Shallow but purposeful/directed conservation with intact short/long gross memory.     Assessment/Plan     1. Lung nodule, solitary    2. Hypertension, unspecified type    3. Elevated fasting glucose    4. Chronic back pain, unspecified back location, unspecified back pain laterality    5. Rash    6. Hypothyroidism, unspecified type        Rx: reviewed/changes:  Kenalog cream; hot spots  Decadron 10 mg IM  Medrol dose pack    LAB/Testing/Referrals: reviewed/orders:   Today:   No orders of the defined types were placed in this encounter.      Discussions:   Stop using the cream mentioned  There is no height or weight on file to calculate BMI.   Patient's There is no height or weight on file to calculate BMI. BMI is within normal parameters. No follow-up required.  Non-smoker      Patient Instructions   Be kind to your skin right now  A. Wash less often  B. Use cooler water  C. Pat dry    When using steroids  A. Do not use anti-inflammatories such as Motrin/ibuprofen, Alleve/naprosyn, Mobic  and like medications  B. If you are diabetic; it will raise your blood sugar.  For most people this will be a minimal and very tolerated elevation.  You should consider for the first days of the steroid Rx to check your blood sugar even three times a day.  Call if you see blood sugars over 350.  Being more strict on your diabetic diet while using the steroid will help. I      Follow up: Return for lab;, Dr Gonzales-, 6 m;.  Future Appointments  Date Time Provider Department Center   11/1/2018 7:20 AM PAD RAD ONC BILLING ONLY [facility]   11/20/2018 1:00 PM Krystal Dorman APRN [facility] PAD None   12/3/2018 6:40 AM PAD RAD ONC BILLING ONLY [facility]   5/3/2019 1:30 PM Josafat Gonzales MD [facility] None

## 2018-11-06 NOTE — TELEPHONE ENCOUNTER
He wants to know if they can get a referral to [facility]. He says he talked to them yesterday. If they can get the referral so there insurance will pay, they are willing to pay when the insurance runs out. If the Rehab helps then would like to keep her there for 3-4 mos.

## 2018-11-14 NOTE — TELEPHONE ENCOUNTER
Pt is being admitted to [facility] [place] and needs new Rx sent to [facility] for 15 days all controlled RX

## 2018-11-16 NOTE — TELEPHONE ENCOUNTER
She was reporting that she is real lethargic and talking to herself. Also talking to people that are not there. Pupils are dilated.

## 2018-11-19 NOTE — TELEPHONE ENCOUNTER
The pt rash is the same, not itching, no fever, and no orders for any creams, pt was given Kenalog at the office

## 2018-11-19 NOTE — TELEPHONE ENCOUNTER
"Vm from [facility] \"she has a rash on both of her legs, she said she was seen for the same thing in the office on the 6th. But I wanted to let Dr Gonzales know\"  "

## 2018-11-19 NOTE — TELEPHONE ENCOUNTER
More information  A. Is it better since last time I saw (ie what does  think)  B. Does she scratch it?   C. Any fever  D. Are they using any creams on it that we ordered?

## 2018-11-20 NOTE — TELEPHONE ENCOUNTER
"Nurse issa called that pt is complaining about the itching and that she want steriods, now scratching till raw    Pt called \"I want a shot or something this rash is driving me crazy. Call me to have it done here, im at [facility]\"  "

## 2018-11-21 NOTE — TELEPHONE ENCOUNTER
Called nurse working; she agreed patient is often sedated.     Told her she could give patient decadron 10 mg IM

## 2018-11-29 NOTE — TELEPHONE ENCOUNTER
"Vm from patient \"I am scratching myself to death with this rash, it is up my back, my legs, I need that medication shot and pills that he gave me to get some relief, im in the nursing home\"  '    Vm  From spouse \"she needs something, she is scratching herself bloody, she needs something before it gets worse\"  "

## 2018-12-17 NOTE — TELEPHONE ENCOUNTER
"Vm \"she only has one klonopin left and will not be leaving until Thursday\"    rx to nursing home pharmacy   "

## 2019-01-01 ENCOUNTER — TELEPHONE (OUTPATIENT)
Dept: FAMILY MEDICINE CLINIC | Facility: CLINIC | Age: 64
End: 2019-01-01

## 2019-01-01 ENCOUNTER — APPOINTMENT (OUTPATIENT)
Dept: CT IMAGING | Facility: HOSPITAL | Age: 64
End: 2019-01-01

## 2019-01-01 ENCOUNTER — APPOINTMENT (OUTPATIENT)
Dept: GENERAL RADIOLOGY | Facility: HOSPITAL | Age: 64
End: 2019-01-01

## 2019-01-01 ENCOUNTER — OFFICE VISIT (OUTPATIENT)
Dept: FAMILY MEDICINE CLINIC | Facility: CLINIC | Age: 64
End: 2019-01-01

## 2019-01-01 ENCOUNTER — APPOINTMENT (OUTPATIENT)
Dept: NEUROLOGY | Facility: HOSPITAL | Age: 64
End: 2019-01-01

## 2019-01-01 ENCOUNTER — HOSPITAL ENCOUNTER (INPATIENT)
Facility: HOSPITAL | Age: 64
LOS: 7 days | Discharge: NURSING, REHAB OR LONG-TERM CARE | End: 2019-09-13
Attending: FAMILY MEDICINE | Admitting: FAMILY MEDICINE

## 2019-01-01 ENCOUNTER — APPOINTMENT (OUTPATIENT)
Dept: WOUND CARE | Facility: HOSPITAL | Age: 64
End: 2019-01-01

## 2019-01-01 ENCOUNTER — ANESTHESIA EVENT (OUTPATIENT)
Dept: PERIOP | Facility: HOSPITAL | Age: 64
End: 2019-01-01

## 2019-01-01 ENCOUNTER — HOSPITAL ENCOUNTER (INPATIENT)
Facility: HOSPITAL | Age: 64
LOS: 4 days | Discharge: NURSING, REHAB OR LONG-TERM CARE | End: 2019-03-14
Attending: EMERGENCY MEDICINE | Admitting: FAMILY MEDICINE

## 2019-01-01 ENCOUNTER — APPOINTMENT (OUTPATIENT)
Dept: ULTRASOUND IMAGING | Facility: HOSPITAL | Age: 64
End: 2019-01-01

## 2019-01-01 ENCOUNTER — HOSPITAL ENCOUNTER (INPATIENT)
Facility: HOSPITAL | Age: 64
LOS: 7 days | Discharge: NURSING, REHAB OR LONG-TERM CARE | End: 2019-08-06
Attending: FAMILY MEDICINE | Admitting: FAMILY MEDICINE

## 2019-01-01 ENCOUNTER — HOSPITAL ENCOUNTER (INPATIENT)
Facility: HOSPITAL | Age: 64
LOS: 4 days | Discharge: NURSING, REHAB OR LONG-TERM CARE | End: 2019-04-01
Attending: EMERGENCY MEDICINE | Admitting: FAMILY MEDICINE

## 2019-01-01 ENCOUNTER — HOSPITAL ENCOUNTER (OUTPATIENT)
Dept: RADIATION ONCOLOGY | Facility: HOSPITAL | Age: 64
Setting detail: RADIATION/ONCOLOGY SERIES
End: 2019-01-01

## 2019-01-01 ENCOUNTER — HOSPITAL ENCOUNTER (EMERGENCY)
Facility: HOSPITAL | Age: 64
Discharge: HOME | End: 2019-03-18
Admitting: EMERGENCY MEDICINE

## 2019-01-01 ENCOUNTER — DOCUMENTATION (OUTPATIENT)
Dept: RADIATION ONCOLOGY | Facility: HOSPITAL | Age: 64
End: 2019-01-01

## 2019-01-01 ENCOUNTER — ANESTHESIA (OUTPATIENT)
Dept: PERIOP | Facility: HOSPITAL | Age: 64
End: 2019-01-01

## 2019-01-01 ENCOUNTER — APPOINTMENT (OUTPATIENT)
Dept: MRI IMAGING | Facility: HOSPITAL | Age: 64
End: 2019-01-01

## 2019-01-01 ENCOUNTER — OFFICE VISIT (OUTPATIENT)
Dept: RADIATION ONCOLOGY | Facility: HOSPITAL | Age: 64
End: 2019-01-01

## 2019-01-01 VITALS
HEART RATE: 105 BPM | TEMPERATURE: 97.8 F | SYSTOLIC BLOOD PRESSURE: 100 MMHG | BODY MASS INDEX: 28.44 KG/M2 | WEIGHT: 210 LBS | DIASTOLIC BLOOD PRESSURE: 70 MMHG | HEIGHT: 72 IN | OXYGEN SATURATION: 91 % | RESPIRATION RATE: 14 BRPM

## 2019-01-01 VITALS
OXYGEN SATURATION: 96 % | BODY MASS INDEX: 31.48 KG/M2 | DIASTOLIC BLOOD PRESSURE: 94 MMHG | TEMPERATURE: 98.1 F | HEART RATE: 82 BPM | RESPIRATION RATE: 14 BRPM | WEIGHT: 224.9 LBS | HEIGHT: 71 IN | SYSTOLIC BLOOD PRESSURE: 145 MMHG

## 2019-01-01 VITALS
OXYGEN SATURATION: 95 % | TEMPERATURE: 97.5 F | SYSTOLIC BLOOD PRESSURE: 148 MMHG | WEIGHT: 215.6 LBS | HEIGHT: 70 IN | HEART RATE: 71 BPM | BODY MASS INDEX: 30.86 KG/M2 | DIASTOLIC BLOOD PRESSURE: 86 MMHG | RESPIRATION RATE: 16 BRPM

## 2019-01-01 VITALS
BODY MASS INDEX: 26.25 KG/M2 | WEIGHT: 193.8 LBS | OXYGEN SATURATION: 93 % | HEIGHT: 72 IN | SYSTOLIC BLOOD PRESSURE: 105 MMHG | TEMPERATURE: 98.2 F | RESPIRATION RATE: 16 BRPM | DIASTOLIC BLOOD PRESSURE: 61 MMHG | HEART RATE: 101 BPM

## 2019-01-01 VITALS
BODY MASS INDEX: 31.08 KG/M2 | HEIGHT: 71 IN | SYSTOLIC BLOOD PRESSURE: 98 MMHG | WEIGHT: 222 LBS | DIASTOLIC BLOOD PRESSURE: 62 MMHG

## 2019-01-01 VITALS
TEMPERATURE: 98.1 F | BODY MASS INDEX: 30.8 KG/M2 | SYSTOLIC BLOOD PRESSURE: 92 MMHG | HEIGHT: 71 IN | DIASTOLIC BLOOD PRESSURE: 56 MMHG | OXYGEN SATURATION: 97 % | WEIGHT: 220 LBS | RESPIRATION RATE: 18 BRPM | HEART RATE: 106 BPM

## 2019-01-01 VITALS
HEART RATE: 84 BPM | OXYGEN SATURATION: 97 % | RESPIRATION RATE: 16 BRPM | BODY MASS INDEX: 30.1 KG/M2 | WEIGHT: 215 LBS | TEMPERATURE: 98 F | DIASTOLIC BLOOD PRESSURE: 62 MMHG | SYSTOLIC BLOOD PRESSURE: 98 MMHG | HEIGHT: 71 IN

## 2019-01-01 DIAGNOSIS — R26.9 GAIT DIFFICULTY: ICD-10-CM

## 2019-01-01 DIAGNOSIS — Z74.09 IMPAIRED MOBILITY AND ADLS: ICD-10-CM

## 2019-01-01 DIAGNOSIS — Z85.038 HX OF COLON CANCER, STAGE I: ICD-10-CM

## 2019-01-01 DIAGNOSIS — G89.29 CHRONIC BACK PAIN, UNSPECIFIED BACK LOCATION, UNSPECIFIED BACK PAIN LATERALITY: ICD-10-CM

## 2019-01-01 DIAGNOSIS — R26.9 GAIT DISORDER: ICD-10-CM

## 2019-01-01 DIAGNOSIS — M54.2 CHRONIC NECK PAIN: Primary | ICD-10-CM

## 2019-01-01 DIAGNOSIS — L03.115 CELLULITIS OF RIGHT LOWER EXTREMITY: Primary | ICD-10-CM

## 2019-01-01 DIAGNOSIS — F41.9 ANXIETY: Chronic | ICD-10-CM

## 2019-01-01 DIAGNOSIS — G40.909 SEIZURE DISORDER (HCC): ICD-10-CM

## 2019-01-01 DIAGNOSIS — J18.9 PNEUMONIA OF LEFT LUNG DUE TO INFECTIOUS ORGANISM, UNSPECIFIED PART OF LUNG: ICD-10-CM

## 2019-01-01 DIAGNOSIS — N39.0 ACUTE URINARY TRACT INFECTION: ICD-10-CM

## 2019-01-01 DIAGNOSIS — L89.610 PRESSURE INJURY OF RIGHT HEEL, UNSTAGEABLE (HCC): ICD-10-CM

## 2019-01-01 DIAGNOSIS — R65.20 SEVERE SEPSIS (HCC): ICD-10-CM

## 2019-01-01 DIAGNOSIS — L98.499 SKIN ULCER, UNSPECIFIED ULCER STAGE (HCC): Primary | ICD-10-CM

## 2019-01-01 DIAGNOSIS — R29.898 WEAKNESS OF BOTH LEGS: Primary | ICD-10-CM

## 2019-01-01 DIAGNOSIS — I10 HYPERTENSION, UNSPECIFIED TYPE: ICD-10-CM

## 2019-01-01 DIAGNOSIS — L89.90 PRESSURE INJURY OF SKIN, UNSPECIFIED INJURY STAGE, UNSPECIFIED LOCATION: ICD-10-CM

## 2019-01-01 DIAGNOSIS — Z74.09 IMPAIRED MOBILITY: ICD-10-CM

## 2019-01-01 DIAGNOSIS — Z78.9 IMPAIRED MOBILITY AND ADLS: ICD-10-CM

## 2019-01-01 DIAGNOSIS — G89.29 CHRONIC BACK PAIN, UNSPECIFIED BACK LOCATION, UNSPECIFIED BACK PAIN LATERALITY: Primary | ICD-10-CM

## 2019-01-01 DIAGNOSIS — C34.11 MALIGNANT NEOPLASM OF UPPER LOBE OF RIGHT LUNG (HCC): Primary | ICD-10-CM

## 2019-01-01 DIAGNOSIS — Z74.09 IMPAIRED FUNCTIONAL MOBILITY AND ACTIVITY TOLERANCE: ICD-10-CM

## 2019-01-01 DIAGNOSIS — R32 URINARY INCONTINENCE, UNSPECIFIED TYPE: ICD-10-CM

## 2019-01-01 DIAGNOSIS — G62.9 NEUROPATHY: ICD-10-CM

## 2019-01-01 DIAGNOSIS — G89.29 CHRONIC NECK PAIN: Primary | ICD-10-CM

## 2019-01-01 DIAGNOSIS — M54.9 CHRONIC BACK PAIN, UNSPECIFIED BACK LOCATION, UNSPECIFIED BACK PAIN LATERALITY: ICD-10-CM

## 2019-01-01 DIAGNOSIS — R41.82 ALTERED MENTAL STATUS, UNSPECIFIED ALTERED MENTAL STATUS TYPE: ICD-10-CM

## 2019-01-01 DIAGNOSIS — M47.9 OSTEOARTHRITIS OF SPINE, UNSPECIFIED SPINAL OSTEOARTHRITIS COMPLICATION STATUS, UNSPECIFIED SPINAL REGION: ICD-10-CM

## 2019-01-01 DIAGNOSIS — C34.32 MALIGNANT NEOPLASM OF LOWER LOBE OF LEFT LUNG (HCC): Primary | ICD-10-CM

## 2019-01-01 DIAGNOSIS — E87.1 HYPONATREMIA: ICD-10-CM

## 2019-01-01 DIAGNOSIS — M54.2 CHRONIC NECK PAIN: Chronic | ICD-10-CM

## 2019-01-01 DIAGNOSIS — R62.7 FAILURE TO THRIVE IN ADULT: ICD-10-CM

## 2019-01-01 DIAGNOSIS — L89.150 PRESSURE INJURY OF SACRAL REGION, UNSTAGEABLE (HCC): Primary | ICD-10-CM

## 2019-01-01 DIAGNOSIS — Z92.3 HISTORY OF RADIATION THERAPY: ICD-10-CM

## 2019-01-01 DIAGNOSIS — R13.10 DYSPHAGIA, UNSPECIFIED TYPE: ICD-10-CM

## 2019-01-01 DIAGNOSIS — G89.29 CHRONIC MIDLINE LOW BACK PAIN WITH RIGHT-SIDED SCIATICA: Chronic | ICD-10-CM

## 2019-01-01 DIAGNOSIS — A41.9 SEVERE SEPSIS (HCC): ICD-10-CM

## 2019-01-01 DIAGNOSIS — Z78.9 NON-SMOKER: ICD-10-CM

## 2019-01-01 DIAGNOSIS — G89.29 CHRONIC NECK PAIN: Chronic | ICD-10-CM

## 2019-01-01 DIAGNOSIS — R21 RASH: ICD-10-CM

## 2019-01-01 DIAGNOSIS — I10 ESSENTIAL HYPERTENSION: ICD-10-CM

## 2019-01-01 DIAGNOSIS — D64.9 ANEMIA, UNSPECIFIED TYPE: Chronic | ICD-10-CM

## 2019-01-01 DIAGNOSIS — R73.01 ELEVATED FASTING GLUCOSE: Chronic | ICD-10-CM

## 2019-01-01 DIAGNOSIS — R41.89 IMPAIRED COGNITION: ICD-10-CM

## 2019-01-01 DIAGNOSIS — M54.41 CHRONIC MIDLINE LOW BACK PAIN WITH RIGHT-SIDED SCIATICA: Chronic | ICD-10-CM

## 2019-01-01 DIAGNOSIS — L03.116 CELLULITIS OF LEFT LOWER EXTREMITY: Primary | ICD-10-CM

## 2019-01-01 DIAGNOSIS — S72.001A CLOSED RIGHT HIP FRACTURE, INITIAL ENCOUNTER (HCC): Primary | ICD-10-CM

## 2019-01-01 DIAGNOSIS — R41.82 ALTERED MENTAL STATUS, UNSPECIFIED ALTERED MENTAL STATUS TYPE: Primary | ICD-10-CM

## 2019-01-01 DIAGNOSIS — M54.9 CHRONIC BACK PAIN, UNSPECIFIED BACK LOCATION, UNSPECIFIED BACK PAIN LATERALITY: Primary | ICD-10-CM

## 2019-01-01 DIAGNOSIS — R29.898 WEAKNESS OF BOTH LEGS: ICD-10-CM

## 2019-01-01 DIAGNOSIS — Z79.01 ANTICOAGULATED: ICD-10-CM

## 2019-01-01 DIAGNOSIS — Z79.01 ANTICOAGULATED: Primary | ICD-10-CM

## 2019-01-01 DIAGNOSIS — J18.9 PNEUMONIA DUE TO INFECTIOUS ORGANISM, UNSPECIFIED LATERALITY, UNSPECIFIED PART OF LUNG: Primary | ICD-10-CM

## 2019-01-01 LAB
25(OH)D3 SERPL-MCNC: <12.8 NG/ML (ref 30–100)
ALBUMIN SERPL-MCNC: 2 G/DL (ref 3.5–5)
ALBUMIN SERPL-MCNC: 2.1 G/DL (ref 3.5–5.2)
ALBUMIN SERPL-MCNC: 2.3 G/DL (ref 3.5–5)
ALBUMIN SERPL-MCNC: 2.5 G/DL (ref 3.5–5)
ALBUMIN SERPL-MCNC: 2.5 G/DL (ref 3.5–5)
ALBUMIN SERPL-MCNC: 3 G/DL (ref 3.5–5)
ALBUMIN SERPL-MCNC: 3.1 G/DL (ref 3.5–5)
ALBUMIN/GLOB SERPL: 0.6 G/DL (ref 1.1–2.5)
ALBUMIN/GLOB SERPL: 0.7 G/DL
ALBUMIN/GLOB SERPL: 0.7 G/DL (ref 1.1–2.5)
ALBUMIN/GLOB SERPL: 1 G/DL (ref 1.1–2.5)
ALP SERPL-CCNC: 104 U/L (ref 24–120)
ALP SERPL-CCNC: 108 U/L (ref 24–120)
ALP SERPL-CCNC: 128 U/L (ref 24–120)
ALP SERPL-CCNC: 129 U/L (ref 24–120)
ALP SERPL-CCNC: 86 U/L (ref 24–120)
ALP SERPL-CCNC: 97 U/L (ref 24–120)
ALP SERPL-CCNC: 99 U/L (ref 39–117)
ALT SERPL W P-5'-P-CCNC: 17 U/L (ref 0–54)
ALT SERPL W P-5'-P-CCNC: 17 U/L (ref 0–54)
ALT SERPL W P-5'-P-CCNC: 18 U/L (ref 0–54)
ALT SERPL W P-5'-P-CCNC: 20 U/L (ref 0–54)
ALT SERPL W P-5'-P-CCNC: 25 U/L (ref 0–54)
ALT SERPL W P-5'-P-CCNC: 33 U/L (ref 0–54)
ALT SERPL-CCNC: 13 U/L (ref 1–33)
AMMONIA BLD-SCNC: <9 UMOL/L (ref 9–33)
AMORPH URATE CRY URNS QL MICRO: ABNORMAL /HPF
AMPHET+METHAMPHET UR QL: NEGATIVE
AMPHET+METHAMPHET UR QL: NEGATIVE
ANION GAP SERPL CALCULATED.3IONS-SCNC: 0 MMOL/L (ref 4–13)
ANION GAP SERPL CALCULATED.3IONS-SCNC: 1 MMOL/L (ref 4–13)
ANION GAP SERPL CALCULATED.3IONS-SCNC: 12 MMOL/L (ref 4–13)
ANION GAP SERPL CALCULATED.3IONS-SCNC: 2 MMOL/L (ref 4–13)
ANION GAP SERPL CALCULATED.3IONS-SCNC: 3 MMOL/L (ref 4–13)
ANION GAP SERPL CALCULATED.3IONS-SCNC: 3 MMOL/L (ref 4–13)
ANION GAP SERPL CALCULATED.3IONS-SCNC: 4 MMOL/L (ref 4–13)
ANION GAP SERPL CALCULATED.3IONS-SCNC: 5 MMOL/L (ref 4–13)
ANION GAP SERPL CALCULATED.3IONS-SCNC: 5 MMOL/L (ref 5–15)
ANION GAP SERPL CALCULATED.3IONS-SCNC: 6 MMOL/L (ref 4–13)
ANION GAP SERPL CALCULATED.3IONS-SCNC: 6 MMOL/L (ref 4–13)
ANION GAP SERPL CALCULATED.3IONS-SCNC: 7 MMOL/L (ref 4–13)
APAP SERPL-MCNC: <10 MCG/ML (ref 10–30)
APTT PPP: 24.6 SECONDS (ref 24.1–35)
APTT PPP: 25.2 SECONDS (ref 24.1–34.8)
APTT PPP: 31.9 SECONDS (ref 24.1–35)
ARTERIAL PATENCY WRIST A: ABNORMAL
AST SERPL-CCNC: 18 U/L (ref 1–32)
AST SERPL-CCNC: 22 U/L (ref 7–45)
AST SERPL-CCNC: 24 U/L (ref 7–45)
AST SERPL-CCNC: 25 U/L (ref 7–45)
AST SERPL-CCNC: 25 U/L (ref 7–45)
AST SERPL-CCNC: 29 U/L (ref 7–45)
AST SERPL-CCNC: 36 U/L (ref 7–45)
ATMOSPHERIC PRESS: 750 MMHG
BACTERIA SPEC AEROBE CULT: ABNORMAL
BACTERIA SPEC AEROBE CULT: NORMAL
BACTERIA UR QL AUTO: ABNORMAL /HPF
BARBITURATES UR QL SCN: NEGATIVE
BARBITURATES UR QL SCN: NEGATIVE
BASE EXCESS BLDA CALC-SCNC: 1 MMOL/L (ref 0–2)
BASOPHILS # BLD AUTO: 0.02 10*3/MM3 (ref 0–0.2)
BASOPHILS # BLD AUTO: 0.03 10*3/MM3 (ref 0–0.2)
BASOPHILS # BLD AUTO: 0.04 10*3/MM3 (ref 0–0.2)
BASOPHILS # BLD AUTO: 0.05 10*3/MM3 (ref 0–0.2)
BASOPHILS # BLD AUTO: 0.06 10*3/MM3 (ref 0–0.2)
BASOPHILS # BLD AUTO: 0.06 10*3/MM3 (ref 0–0.2)
BASOPHILS NFR BLD AUTO: 0.2 % (ref 0–1.5)
BASOPHILS NFR BLD AUTO: 0.3 % (ref 0–2)
BASOPHILS NFR BLD AUTO: 0.3 % (ref 0–2)
BASOPHILS NFR BLD AUTO: 0.4 % (ref 0–1.5)
BASOPHILS NFR BLD AUTO: 0.4 % (ref 0–2)
BASOPHILS NFR BLD AUTO: 0.5 % (ref 0–2)
BASOPHILS NFR BLD AUTO: 0.6 % (ref 0–1.5)
BASOPHILS NFR BLD AUTO: 0.6 % (ref 0–1.5)
BASOPHILS NFR BLD AUTO: 0.6 % (ref 0–2)
BASOPHILS NFR BLD AUTO: 0.7 % (ref 0–1.5)
BASOPHILS NFR BLD AUTO: 0.7 % (ref 0–2)
BASOPHILS NFR BLD AUTO: 0.8 % (ref 0–1.5)
BASOPHILS NFR BLD AUTO: 0.8 % (ref 0–2)
BASOPHILS NFR BLD AUTO: 0.8 % (ref 0–2)
BASOPHILS NFR BLD AUTO: 0.9 % (ref 0–2)
BDY SITE: ABNORMAL
BENZODIAZ UR QL SCN: NEGATIVE
BENZODIAZ UR QL SCN: NEGATIVE
BILIRUB SERPL-MCNC: 0.2 MG/DL (ref 0.1–1)
BILIRUB SERPL-MCNC: 0.3 MG/DL (ref 0.1–1)
BILIRUB SERPL-MCNC: 0.3 MG/DL (ref 0.2–1.2)
BILIRUB SERPL-MCNC: 0.5 MG/DL (ref 0.1–1)
BILIRUB SERPL-MCNC: 0.6 MG/DL (ref 0.1–1)
BILIRUB SERPL-MCNC: 0.6 MG/DL (ref 0.1–1)
BILIRUB SERPL-MCNC: 0.9 MG/DL (ref 0.1–1)
BILIRUB UR QL STRIP: ABNORMAL
BODY TEMPERATURE: 37 C
BUN BLD-MCNC: 10 MG/DL (ref 5–21)
BUN BLD-MCNC: 10 MG/DL (ref 5–21)
BUN BLD-MCNC: 10 MG/DL (ref 8–23)
BUN BLD-MCNC: 11 MG/DL (ref 5–21)
BUN BLD-MCNC: 13 MG/DL (ref 5–21)
BUN BLD-MCNC: 15 MG/DL (ref 5–21)
BUN BLD-MCNC: 15 MG/DL (ref 5–21)
BUN BLD-MCNC: 17 MG/DL (ref 5–21)
BUN BLD-MCNC: 21 MG/DL (ref 5–21)
BUN BLD-MCNC: 28 MG/DL (ref 5–21)
BUN BLD-MCNC: 28 MG/DL (ref 5–21)
BUN BLD-MCNC: 3 MG/DL (ref 5–21)
BUN BLD-MCNC: 3 MG/DL (ref 5–21)
BUN BLD-MCNC: 4 MG/DL (ref 5–21)
BUN BLD-MCNC: 5 MG/DL (ref 5–21)
BUN BLD-MCNC: 5 MG/DL (ref 5–21)
BUN BLD-MCNC: 8 MG/DL (ref 5–21)
BUN BLD-MCNC: 9 MG/DL (ref 5–21)
BUN SERPL-MCNC: 16 MG/DL (ref 8–23)
BUN/CREAT SERPL: 10 (ref 7–25)
BUN/CREAT SERPL: 11.4 (ref 7–25)
BUN/CREAT SERPL: 11.9 (ref 7–25)
BUN/CREAT SERPL: 12.2 (ref 7–25)
BUN/CREAT SERPL: 12.9 (ref 7–25)
BUN/CREAT SERPL: 13.6 (ref 7–25)
BUN/CREAT SERPL: 13.8 (ref 7–25)
BUN/CREAT SERPL: 17.2 (ref 7–25)
BUN/CREAT SERPL: 17.5 (ref 7–25)
BUN/CREAT SERPL: 17.5 (ref 7–25)
BUN/CREAT SERPL: 24.6 (ref 7–25)
BUN/CREAT SERPL: 25.4 (ref 7–25)
BUN/CREAT SERPL: 26 (ref 7–25)
BUN/CREAT SERPL: 29.4 (ref 7–25)
BUN/CREAT SERPL: 30.9 (ref 7–25)
BUN/CREAT SERPL: 32.3 (ref 7–25)
BUN/CREAT SERPL: 42 (ref 7–25)
BUN/CREAT SERPL: 5.6 (ref 7–25)
BUN/CREAT SERPL: 5.8 (ref 7–25)
BUN/CREAT SERPL: 50 (ref 7–25)
BUN/CREAT SERPL: 58.3 (ref 7–25)
BUN/CREAT SERPL: 8 (ref 7–25)
BUN/CREAT SERPL: 8.9 (ref 7–25)
BUN/CREAT SERPL: 9.3 (ref 7–25)
CALCIUM SERPL-MCNC: 7.8 MG/DL (ref 8.6–10.5)
CALCIUM SPEC-SCNC: 6.9 MG/DL (ref 8.4–10.4)
CALCIUM SPEC-SCNC: 7 MG/DL (ref 8.4–10.4)
CALCIUM SPEC-SCNC: 7 MG/DL (ref 8.4–10.4)
CALCIUM SPEC-SCNC: 7 MG/DL (ref 8.6–10.5)
CALCIUM SPEC-SCNC: 7.1 MG/DL (ref 8.4–10.4)
CALCIUM SPEC-SCNC: 7.2 MG/DL (ref 8.4–10.4)
CALCIUM SPEC-SCNC: 7.3 MG/DL (ref 8.4–10.4)
CALCIUM SPEC-SCNC: 7.4 MG/DL (ref 8.4–10.4)
CALCIUM SPEC-SCNC: 7.5 MG/DL (ref 8.4–10.4)
CALCIUM SPEC-SCNC: 7.6 MG/DL (ref 8.4–10.4)
CALCIUM SPEC-SCNC: 7.9 MG/DL (ref 8.4–10.4)
CALCIUM SPEC-SCNC: 8 MG/DL (ref 8.4–10.4)
CALCIUM SPEC-SCNC: 8 MG/DL (ref 8.4–10.4)
CALCIUM SPEC-SCNC: 8.1 MG/DL (ref 8.4–10.4)
CALCIUM SPEC-SCNC: 8.1 MG/DL (ref 8.4–10.4)
CALCIUM SPEC-SCNC: 8.5 MG/DL (ref 8.4–10.4)
CANNABINOIDS SERPL QL: NEGATIVE
CANNABINOIDS SERPL QL: POSITIVE
CHLORIDE SERPL-SCNC: 100 MMOL/L (ref 98–110)
CHLORIDE SERPL-SCNC: 101 MMOL/L (ref 98–110)
CHLORIDE SERPL-SCNC: 102 MMOL/L (ref 98–110)
CHLORIDE SERPL-SCNC: 102 MMOL/L (ref 98–110)
CHLORIDE SERPL-SCNC: 103 MMOL/L (ref 98–110)
CHLORIDE SERPL-SCNC: 103 MMOL/L (ref 98–110)
CHLORIDE SERPL-SCNC: 104 MMOL/L (ref 98–110)
CHLORIDE SERPL-SCNC: 104 MMOL/L (ref 98–110)
CHLORIDE SERPL-SCNC: 105 MMOL/L (ref 98–110)
CHLORIDE SERPL-SCNC: 106 MMOL/L (ref 98–110)
CHLORIDE SERPL-SCNC: 107 MMOL/L (ref 98–110)
CHLORIDE SERPL-SCNC: 107 MMOL/L (ref 98–110)
CHLORIDE SERPL-SCNC: 108 MMOL/L (ref 98–110)
CHLORIDE SERPL-SCNC: 109 MMOL/L (ref 98–107)
CHLORIDE SERPL-SCNC: 109 MMOL/L (ref 98–110)
CHLORIDE SERPL-SCNC: 110 MMOL/L (ref 98–110)
CHLORIDE SERPL-SCNC: 110 MMOL/L (ref 98–110)
CHLORIDE SERPL-SCNC: 111 MMOL/L (ref 98–110)
CHLORIDE SERPL-SCNC: 111 MMOL/L (ref 98–110)
CHLORIDE SERPL-SCNC: 112 MMOL/L (ref 98–110)
CHLORIDE SERPL-SCNC: 95 MMOL/L (ref 98–110)
CHLORIDE SERPL-SCNC: 96 MMOL/L (ref 98–107)
CHLORIDE SERPL-SCNC: 96 MMOL/L (ref 98–110)
CHLORIDE SERPL-SCNC: 98 MMOL/L (ref 98–110)
CHOLEST SERPL-MCNC: 132 MG/DL (ref 0–200)
CK SERPL-CCNC: 26 U/L (ref 0–203)
CK SERPL-CCNC: 39 U/L (ref 0–203)
CK SERPL-CCNC: 42 U/L (ref 0–203)
CLARITY UR: ABNORMAL
CLARITY UR: ABNORMAL
CLARITY UR: CLEAR
CO2 SERPL-SCNC: 22 MMOL/L (ref 24–31)
CO2 SERPL-SCNC: 24 MMOL/L (ref 24–31)
CO2 SERPL-SCNC: 25 MMOL/L (ref 22–29)
CO2 SERPL-SCNC: 25 MMOL/L (ref 24–31)
CO2 SERPL-SCNC: 26.6 MMOL/L (ref 22–29)
CO2 SERPL-SCNC: 27 MMOL/L (ref 24–31)
CO2 SERPL-SCNC: 28 MMOL/L (ref 24–31)
CO2 SERPL-SCNC: 29 MMOL/L (ref 24–31)
CO2 SERPL-SCNC: 29 MMOL/L (ref 24–31)
CO2 SERPL-SCNC: 30 MMOL/L (ref 24–31)
CO2 SERPL-SCNC: 31 MMOL/L (ref 24–31)
COCAINE UR QL: NEGATIVE
COCAINE UR QL: NEGATIVE
COLOR UR: ABNORMAL
CREAT BLD-MCNC: 0.31 MG/DL (ref 0.57–1)
CREAT BLD-MCNC: 0.4 MG/DL (ref 0.5–1.4)
CREAT BLD-MCNC: 0.41 MG/DL (ref 0.5–1.4)
CREAT BLD-MCNC: 0.42 MG/DL (ref 0.5–1.4)
CREAT BLD-MCNC: 0.43 MG/DL (ref 0.5–1.4)
CREAT BLD-MCNC: 0.45 MG/DL (ref 0.5–1.4)
CREAT BLD-MCNC: 0.48 MG/DL (ref 0.5–1.4)
CREAT BLD-MCNC: 0.5 MG/DL (ref 0.5–1.4)
CREAT BLD-MCNC: 0.51 MG/DL (ref 0.5–1.4)
CREAT BLD-MCNC: 0.52 MG/DL (ref 0.5–1.4)
CREAT BLD-MCNC: 0.54 MG/DL (ref 0.5–1.4)
CREAT BLD-MCNC: 0.55 MG/DL (ref 0.5–1.4)
CREAT BLD-MCNC: 0.56 MG/DL (ref 0.5–1.4)
CREAT BLD-MCNC: 0.57 MG/DL (ref 0.5–1.4)
CREAT BLD-MCNC: 0.58 MG/DL (ref 0.5–1.4)
CREAT BLD-MCNC: 0.59 MG/DL (ref 0.5–1.4)
CREAT BLD-MCNC: 0.59 MG/DL (ref 0.5–1.4)
CREAT BLD-MCNC: 0.63 MG/DL (ref 0.5–1.4)
CREAT BLD-MCNC: 0.65 MG/DL (ref 0.5–1.4)
CREAT BLD-MCNC: 0.7 MG/DL (ref 0.5–1.4)
CREAT BLD-MCNC: 0.79 MG/DL (ref 0.5–1.4)
CREAT SERPL-MCNC: 0.65 MG/DL (ref 0.57–1)
D-LACTATE SERPL-SCNC: 1.4 MMOL/L (ref 0.5–2)
D-LACTATE SERPL-SCNC: 1.6 MMOL/L (ref 0.5–2)
D-LACTATE SERPL-SCNC: 2 MMOL/L (ref 0.5–2)
D-LACTATE SERPL-SCNC: 2.5 MMOL/L (ref 0.5–2)
D-LACTATE SERPL-SCNC: 2.9 MMOL/L (ref 0.5–2)
D-LACTATE SERPL-SCNC: 4.1 MMOL/L (ref 0.5–2)
D-LACTATE SERPL-SCNC: 4.2 MMOL/L (ref 0.5–2)
D-LACTATE SERPL-SCNC: 6 MMOL/L (ref 0.5–2)
DEPRECATED RDW RBC AUTO: 45.9 FL (ref 40–54)
DEPRECATED RDW RBC AUTO: 47.1 FL (ref 40–54)
DEPRECATED RDW RBC AUTO: 47.3 FL (ref 40–54)
DEPRECATED RDW RBC AUTO: 47.5 FL (ref 40–54)
DEPRECATED RDW RBC AUTO: 48.4 FL (ref 40–54)
DEPRECATED RDW RBC AUTO: 49.1 FL (ref 40–54)
DEPRECATED RDW RBC AUTO: 49.2 FL (ref 37–54)
DEPRECATED RDW RBC AUTO: 50 FL (ref 40–54)
DEPRECATED RDW RBC AUTO: 50.4 FL (ref 40–54)
DEPRECATED RDW RBC AUTO: 50.8 FL (ref 40–54)
DEPRECATED RDW RBC AUTO: 51 FL (ref 40–54)
DEPRECATED RDW RBC AUTO: 51.1 FL (ref 40–54)
DEPRECATED RDW RBC AUTO: 51.2 FL (ref 40–54)
DEPRECATED RDW RBC AUTO: 51.4 FL (ref 40–54)
DEPRECATED RDW RBC AUTO: 52.6 FL (ref 40–54)
DEPRECATED RDW RBC AUTO: 54.8 FL (ref 37–54)
DEPRECATED RDW RBC AUTO: 55.3 FL (ref 37–54)
DEPRECATED RDW RBC AUTO: 55.9 FL (ref 37–54)
DEPRECATED RDW RBC AUTO: 56.1 FL (ref 37–54)
DEPRECATED RDW RBC AUTO: 57.1 FL (ref 37–54)
DEPRECATED RDW RBC AUTO: 57.1 FL (ref 37–54)
EOSINOPHIL # BLD AUTO: 0.01 10*3/MM3 (ref 0–0.7)
EOSINOPHIL # BLD AUTO: 0.01 10*3/MM3 (ref 0–0.7)
EOSINOPHIL # BLD AUTO: 0.07 10*3/MM3 (ref 0–0.7)
EOSINOPHIL # BLD AUTO: 0.08 10*3/MM3 (ref 0–0.4)
EOSINOPHIL # BLD AUTO: 0.09 10*3/MM3 (ref 0–0.4)
EOSINOPHIL # BLD AUTO: 0.09 10*3/MM3 (ref 0–0.7)
EOSINOPHIL # BLD AUTO: 0.11 10*3/MM3 (ref 0–0.4)
EOSINOPHIL # BLD AUTO: 0.11 10*3/MM3 (ref 0–0.7)
EOSINOPHIL # BLD AUTO: 0.12 10*3/MM3 (ref 0–0.7)
EOSINOPHIL # BLD AUTO: 0.13 10*3/MM3 (ref 0–0.7)
EOSINOPHIL # BLD AUTO: 0.19 10*3/MM3 (ref 0–0.4)
EOSINOPHIL # BLD AUTO: 0.19 10*3/MM3 (ref 0–0.7)
EOSINOPHIL # BLD AUTO: 0.21 10*3/MM3 (ref 0–0.4)
EOSINOPHIL # BLD AUTO: 0.21 10*3/MM3 (ref 0–0.7)
EOSINOPHIL # BLD AUTO: 0.22 10*3/MM3 (ref 0–0.7)
EOSINOPHIL # BLD AUTO: 0.3 10*3/MM3 (ref 0–0.4)
EOSINOPHIL # BLD AUTO: 0.3 10*3/MM3 (ref 0–0.7)
EOSINOPHIL # BLD AUTO: 0.32 10*3/MM3 (ref 0–0.7)
EOSINOPHIL # BLD AUTO: 0.38 10*3/MM3 (ref 0–0.7)
EOSINOPHIL # BLD AUTO: 0.41 10*3/MM3 (ref 0–0.4)
EOSINOPHIL # BLD AUTO: 0.47 10*3/MM3 (ref 0–0.7)
EOSINOPHIL # BLD AUTO: 0.49 10*3/MM3 (ref 0–0.4)
EOSINOPHIL # BLD AUTO: 0.56 10*3/MM3 (ref 0–0.7)
EOSINOPHIL NFR BLD AUTO: 0.1 % (ref 0–4)
EOSINOPHIL NFR BLD AUTO: 0.2 % (ref 0–4)
EOSINOPHIL NFR BLD AUTO: 0.7 % (ref 0.3–6.2)
EOSINOPHIL NFR BLD AUTO: 0.8 % (ref 0.3–6.2)
EOSINOPHIL NFR BLD AUTO: 0.8 % (ref 0–4)
EOSINOPHIL NFR BLD AUTO: 1.2 % (ref 0.3–6.2)
EOSINOPHIL NFR BLD AUTO: 1.2 % (ref 0–4)
EOSINOPHIL NFR BLD AUTO: 1.7 % (ref 0.3–6.2)
EOSINOPHIL NFR BLD AUTO: 2 % (ref 0–4)
EOSINOPHIL NFR BLD AUTO: 2.3 % (ref 0–4)
EOSINOPHIL NFR BLD AUTO: 2.5 % (ref 0.3–6.2)
EOSINOPHIL NFR BLD AUTO: 2.5 % (ref 0–4)
EOSINOPHIL NFR BLD AUTO: 3.1 % (ref 0–4)
EOSINOPHIL NFR BLD AUTO: 3.2 % (ref 0–4)
EOSINOPHIL NFR BLD AUTO: 3.6 % (ref 0–4)
EOSINOPHIL NFR BLD AUTO: 3.7 % (ref 0–4)
EOSINOPHIL NFR BLD AUTO: 4.2 % (ref 0–4)
EOSINOPHIL NFR BLD AUTO: 4.6 % (ref 0.3–6.2)
EOSINOPHIL NFR BLD AUTO: 5 % (ref 0.3–6.2)
EOSINOPHIL NFR BLD AUTO: 5.2 % (ref 0–4)
EOSINOPHIL NFR BLD AUTO: 6 % (ref 0.3–6.2)
EOSINOPHIL NFR BLD AUTO: 7 % (ref 0–4)
EOSINOPHIL NFR BLD AUTO: 7.2 % (ref 0–4)
ERYTHROCYTE [DISTWIDTH] IN BLOOD BY AUTOMATED COUNT: 13.7 % (ref 12–15)
ERYTHROCYTE [DISTWIDTH] IN BLOOD BY AUTOMATED COUNT: 14 % (ref 12–15)
ERYTHROCYTE [DISTWIDTH] IN BLOOD BY AUTOMATED COUNT: 14.2 % (ref 12–15)
ERYTHROCYTE [DISTWIDTH] IN BLOOD BY AUTOMATED COUNT: 14.6 % (ref 12–15)
ERYTHROCYTE [DISTWIDTH] IN BLOOD BY AUTOMATED COUNT: 14.7 % (ref 12–15)
ERYTHROCYTE [DISTWIDTH] IN BLOOD BY AUTOMATED COUNT: 14.8 % (ref 12–15)
ERYTHROCYTE [DISTWIDTH] IN BLOOD BY AUTOMATED COUNT: 14.9 % (ref 12–15)
ERYTHROCYTE [DISTWIDTH] IN BLOOD BY AUTOMATED COUNT: 15.1 % (ref 12–15)
ERYTHROCYTE [DISTWIDTH] IN BLOOD BY AUTOMATED COUNT: 15.1 % (ref 12–15)
ERYTHROCYTE [DISTWIDTH] IN BLOOD BY AUTOMATED COUNT: 15.2 % (ref 12–15)
ERYTHROCYTE [DISTWIDTH] IN BLOOD BY AUTOMATED COUNT: 15.5 % (ref 12–15)
ERYTHROCYTE [DISTWIDTH] IN BLOOD BY AUTOMATED COUNT: 15.6 % (ref 12.3–15.4)
ERYTHROCYTE [DISTWIDTH] IN BLOOD BY AUTOMATED COUNT: 15.6 % (ref 12–15)
ERYTHROCYTE [DISTWIDTH] IN BLOOD BY AUTOMATED COUNT: 15.9 % (ref 12–15)
ERYTHROCYTE [DISTWIDTH] IN BLOOD BY AUTOMATED COUNT: 15.9 % (ref 12–15)
ERYTHROCYTE [DISTWIDTH] IN BLOOD BY AUTOMATED COUNT: 16 % (ref 12.3–15.4)
ERYTHROCYTE [DISTWIDTH] IN BLOOD BY AUTOMATED COUNT: 17.6 % (ref 12.3–15.4)
ERYTHROCYTE [DISTWIDTH] IN BLOOD BY AUTOMATED COUNT: 17.7 % (ref 12.3–15.4)
ERYTHROCYTE [DISTWIDTH] IN BLOOD BY AUTOMATED COUNT: 17.7 % (ref 12.3–15.4)
ERYTHROCYTE [DISTWIDTH] IN BLOOD BY AUTOMATED COUNT: 17.8 % (ref 12.3–15.4)
ERYTHROCYTE [DISTWIDTH] IN BLOOD BY AUTOMATED COUNT: 17.9 % (ref 12.3–15.4)
ERYTHROCYTE [DISTWIDTH] IN BLOOD BY AUTOMATED COUNT: 18 % (ref 12.3–15.4)
ETHANOL UR QL: <0.01 %
FOLATE SERPL-MCNC: 6.36 NG/ML (ref 4.78–24.2)
GFR SERPL CREATININE-BSD FRML MDRD: 103 ML/MIN/1.73
GFR SERPL CREATININE-BSD FRML MDRD: 103 ML/MIN/1.73
GFR SERPL CREATININE-BSD FRML MDRD: 105 ML/MIN/1.73
GFR SERPL CREATININE-BSD FRML MDRD: 107 ML/MIN/1.73
GFR SERPL CREATININE-BSD FRML MDRD: 109 ML/MIN/1.73
GFR SERPL CREATININE-BSD FRML MDRD: 112 ML/MIN/1.73
GFR SERPL CREATININE-BSD FRML MDRD: 114 ML/MIN/1.73
GFR SERPL CREATININE-BSD FRML MDRD: 119 ML/MIN/1.73
GFR SERPL CREATININE-BSD FRML MDRD: 122 ML/MIN/1.73
GFR SERPL CREATININE-BSD FRML MDRD: 125 ML/MIN/1.73
GFR SERPL CREATININE-BSD FRML MDRD: 131 ML/MIN/1.73
GFR SERPL CREATININE-BSD FRML MDRD: 141 ML/MIN/1.73
GFR SERPL CREATININE-BSD FRML MDRD: 148 ML/MIN/1.73
GFR SERPL CREATININE-BSD FRML MDRD: 74 ML/MIN/1.73
GFR SERPL CREATININE-BSD FRML MDRD: 85 ML/MIN/1.73
GFR SERPL CREATININE-BSD FRML MDRD: 92 ML/MIN/1.73
GFR SERPL CREATININE-BSD FRML MDRD: 95 ML/MIN/1.73
GFR SERPL CREATININE-BSD FRML MDRD: >150 ML/MIN/1.73
GLOBULIN SER CALC-MCNC: 3.1 GM/DL
GLOBULIN UR ELPH-MCNC: 2.4 GM/DL
GLOBULIN UR ELPH-MCNC: 2.6 GM/DL
GLOBULIN UR ELPH-MCNC: 3 GM/DL
GLOBULIN UR ELPH-MCNC: 3.1 GM/DL
GLOBULIN UR ELPH-MCNC: 3.2 GM/DL
GLOBULIN UR ELPH-MCNC: 3.2 GM/DL
GLUCOSE BLD-MCNC: 100 MG/DL (ref 70–100)
GLUCOSE BLD-MCNC: 101 MG/DL (ref 70–100)
GLUCOSE BLD-MCNC: 53 MG/DL (ref 70–100)
GLUCOSE BLD-MCNC: 58 MG/DL (ref 70–100)
GLUCOSE BLD-MCNC: 59 MG/DL (ref 70–100)
GLUCOSE BLD-MCNC: 59 MG/DL (ref 70–100)
GLUCOSE BLD-MCNC: 64 MG/DL (ref 70–100)
GLUCOSE BLD-MCNC: 65 MG/DL (ref 70–100)
GLUCOSE BLD-MCNC: 66 MG/DL (ref 70–100)
GLUCOSE BLD-MCNC: 67 MG/DL (ref 70–100)
GLUCOSE BLD-MCNC: 69 MG/DL (ref 65–99)
GLUCOSE BLD-MCNC: 69 MG/DL (ref 70–100)
GLUCOSE BLD-MCNC: 73 MG/DL (ref 70–100)
GLUCOSE BLD-MCNC: 74 MG/DL (ref 70–100)
GLUCOSE BLD-MCNC: 77 MG/DL (ref 70–100)
GLUCOSE BLD-MCNC: 79 MG/DL (ref 70–100)
GLUCOSE BLD-MCNC: 83 MG/DL (ref 70–100)
GLUCOSE BLD-MCNC: 83 MG/DL (ref 70–100)
GLUCOSE BLD-MCNC: 85 MG/DL (ref 70–100)
GLUCOSE BLD-MCNC: 85 MG/DL (ref 70–100)
GLUCOSE BLD-MCNC: 90 MG/DL (ref 70–100)
GLUCOSE BLDC GLUCOMTR-MCNC: 103 MG/DL (ref 70–130)
GLUCOSE BLDC GLUCOMTR-MCNC: 81 MG/DL (ref 70–130)
GLUCOSE BLDC GLUCOMTR-MCNC: 88 MG/DL (ref 70–130)
GLUCOSE BLDC GLUCOMTR-MCNC: 99 MG/DL (ref 70–130)
GLUCOSE SERPL-MCNC: 84 MG/DL (ref 65–99)
GLUCOSE UR STRIP-MCNC: NEGATIVE MG/DL
GRAM STN SPEC: ABNORMAL
HCO3 BLDA-SCNC: 25.3 MMOL/L (ref 20–26)
HCT VFR BLD AUTO: 27.4 % (ref 37–47)
HCT VFR BLD AUTO: 27.5 % (ref 37–47)
HCT VFR BLD AUTO: 27.9 % (ref 37–47)
HCT VFR BLD AUTO: 28 % (ref 34–46.6)
HCT VFR BLD AUTO: 28.5 % (ref 37–47)
HCT VFR BLD AUTO: 28.9 % (ref 37–47)
HCT VFR BLD AUTO: 29 % (ref 34–46.6)
HCT VFR BLD AUTO: 29 % (ref 34–46.6)
HCT VFR BLD AUTO: 29.4 % (ref 34–46.6)
HCT VFR BLD AUTO: 29.7 % (ref 37–47)
HCT VFR BLD AUTO: 30.6 % (ref 34–46.6)
HCT VFR BLD AUTO: 30.6 % (ref 37–47)
HCT VFR BLD AUTO: 31.4 % (ref 37–47)
HCT VFR BLD AUTO: 31.8 % (ref 34–46.6)
HCT VFR BLD AUTO: 32.1 % (ref 37–47)
HCT VFR BLD AUTO: 32.3 % (ref 37–47)
HCT VFR BLD AUTO: 32.5 % (ref 37–47)
HCT VFR BLD AUTO: 32.9 % (ref 37–47)
HCT VFR BLD AUTO: 33.4 % (ref 37–47)
HCT VFR BLD AUTO: 34.5 % (ref 34–46.6)
HCT VFR BLD AUTO: 35.7 % (ref 37–47)
HCT VFR BLD AUTO: 37.3 % (ref 37–47)
HCT VFR BLD AUTO: 38.5 % (ref 37–47)
HCT VFR BLD AUTO: 40.4 % (ref 34–46.6)
HDLC SERPL-MCNC: 62 MG/DL (ref 40–60)
HGB BLD-MCNC: 10.2 G/DL (ref 12–15.9)
HGB BLD-MCNC: 10.4 G/DL (ref 12–15.9)
HGB BLD-MCNC: 10.5 G/DL (ref 12–16)
HGB BLD-MCNC: 10.7 G/DL (ref 12–16)
HGB BLD-MCNC: 10.8 G/DL (ref 12–16)
HGB BLD-MCNC: 10.9 G/DL (ref 12–16)
HGB BLD-MCNC: 11.1 G/DL (ref 12–16)
HGB BLD-MCNC: 11.1 G/DL (ref 12–16)
HGB BLD-MCNC: 11.4 G/DL (ref 12–15.9)
HGB BLD-MCNC: 11.6 G/DL (ref 12–16)
HGB BLD-MCNC: 12.1 G/DL (ref 12–15.9)
HGB BLD-MCNC: 12.4 G/DL (ref 12–16)
HGB BLD-MCNC: 13.3 G/DL (ref 12–16)
HGB BLD-MCNC: 8.9 G/DL (ref 12–16)
HGB BLD-MCNC: 9.1 G/DL (ref 12–16)
HGB BLD-MCNC: 9.2 G/DL (ref 12–15.9)
HGB BLD-MCNC: 9.2 G/DL (ref 12–16)
HGB BLD-MCNC: 9.5 G/DL (ref 12–16)
HGB BLD-MCNC: 9.5 G/DL (ref 12–16)
HGB BLD-MCNC: 9.7 G/DL (ref 12–15.9)
HGB BLD-MCNC: 9.7 G/DL (ref 12–15.9)
HGB BLD-MCNC: 9.8 G/DL (ref 12–15.9)
HGB BLD-MCNC: 9.9 G/DL (ref 12–16)
HGB BLD-MCNC: 9.9 G/DL (ref 12–16)
HGB UR QL STRIP.AUTO: ABNORMAL
HOLD SPECIMEN: NORMAL
HYALINE CASTS UR QL AUTO: ABNORMAL /LPF
IMM GRANULOCYTES # BLD AUTO: 0.02 10*3/MM3 (ref 0–0.05)
IMM GRANULOCYTES # BLD AUTO: 0.03 10*3/MM3 (ref 0–0.05)
IMM GRANULOCYTES # BLD AUTO: 0.04 10*3/MM3 (ref 0–0.05)
IMM GRANULOCYTES # BLD AUTO: 0.05 10*3/MM3 (ref 0–0.05)
IMM GRANULOCYTES # BLD AUTO: 0.06 10*3/MM3 (ref 0–0.05)
IMM GRANULOCYTES NFR BLD AUTO: 0.3 % (ref 0–5)
IMM GRANULOCYTES NFR BLD AUTO: 0.4 % (ref 0–0.5)
IMM GRANULOCYTES NFR BLD AUTO: 0.4 % (ref 0–0.5)
IMM GRANULOCYTES NFR BLD AUTO: 0.4 % (ref 0–5)
IMM GRANULOCYTES NFR BLD AUTO: 0.5 % (ref 0–0.5)
IMM GRANULOCYTES NFR BLD AUTO: 0.5 % (ref 0–5)
IMM GRANULOCYTES NFR BLD AUTO: 0.6 % (ref 0–5)
IMM GRANULOCYTES NFR BLD AUTO: 0.7 % (ref 0–0.5)
IMM GRANULOCYTES NFR BLD AUTO: 0.7 % (ref 0–0.5)
IMM GRANULOCYTES NFR BLD AUTO: 0.7 % (ref 0–5)
IMM GRANULOCYTES NFR BLD AUTO: 0.8 % (ref 0–5)
INR PPP: 0.82 (ref 0.91–1.09)
INR PPP: 0.85 (ref 0.91–1.09)
INR PPP: 0.95 (ref 0.91–1.09)
INR PPP: 0.98 (ref 0.91–1.09)
KETONES UR QL STRIP: ABNORMAL
LDLC SERPL CALC-MCNC: 46 MG/DL (ref 0–100)
LDLC/HDLC SERPL: 0.74 {RATIO}
LEUKOCYTE ESTERASE UR QL STRIP.AUTO: ABNORMAL
LIPASE SERPL-CCNC: <10 U/L (ref 23–203)
LYMPHOCYTES # BLD AUTO: 0.68 10*3/MM3 (ref 0.72–4.86)
LYMPHOCYTES # BLD AUTO: 0.7 10*3/MM3 (ref 0.72–4.86)
LYMPHOCYTES # BLD AUTO: 0.77 10*3/MM3 (ref 0.7–3.1)
LYMPHOCYTES # BLD AUTO: 0.82 10*3/MM3 (ref 0.72–4.86)
LYMPHOCYTES # BLD AUTO: 0.83 10*3/MM3 (ref 0.72–4.86)
LYMPHOCYTES # BLD AUTO: 0.84 10*3/MM3 (ref 0.72–4.86)
LYMPHOCYTES # BLD AUTO: 0.85 10*3/MM3 (ref 0.72–4.86)
LYMPHOCYTES # BLD AUTO: 0.87 10*3/MM3 (ref 0.7–3.1)
LYMPHOCYTES # BLD AUTO: 0.97 10*3/MM3 (ref 0.72–4.86)
LYMPHOCYTES # BLD AUTO: 0.97 10*3/MM3 (ref 0.72–4.86)
LYMPHOCYTES # BLD AUTO: 0.98 10*3/MM3 (ref 0.72–4.86)
LYMPHOCYTES # BLD AUTO: 0.99 10*3/MM3 (ref 0.72–4.86)
LYMPHOCYTES # BLD AUTO: 1.04 10*3/MM3 (ref 0.72–4.86)
LYMPHOCYTES # BLD AUTO: 1.07 10*3/MM3 (ref 0.72–4.86)
LYMPHOCYTES # BLD AUTO: 1.09 10*3/MM3 (ref 0.7–3.1)
LYMPHOCYTES # BLD AUTO: 1.12 10*3/MM3 (ref 0.72–4.86)
LYMPHOCYTES # BLD AUTO: 1.12 10*3/MM3 (ref 0.72–4.86)
LYMPHOCYTES # BLD AUTO: 1.18 10*3/MM3 (ref 0.72–4.86)
LYMPHOCYTES # BLD AUTO: 1.18 10*3/MM3 (ref 0.7–3.1)
LYMPHOCYTES # BLD AUTO: 1.21 10*3/MM3 (ref 0.7–3.1)
LYMPHOCYTES # BLD AUTO: 1.24 10*3/MM3 (ref 0.7–3.1)
LYMPHOCYTES # BLD AUTO: 1.26 10*3/MM3 (ref 0.7–3.1)
LYMPHOCYTES # BLD AUTO: 1.36 10*3/MM3 (ref 0.7–3.1)
LYMPHOCYTES NFR BLD AUTO: 10.6 % (ref 19.6–45.3)
LYMPHOCYTES NFR BLD AUTO: 11.4 % (ref 15–45)
LYMPHOCYTES NFR BLD AUTO: 13.6 % (ref 19.6–45.3)
LYMPHOCYTES NFR BLD AUTO: 13.9 % (ref 19.6–45.3)
LYMPHOCYTES NFR BLD AUTO: 14 % (ref 15–45)
LYMPHOCYTES NFR BLD AUTO: 14.4 % (ref 15–45)
LYMPHOCYTES NFR BLD AUTO: 14.5 % (ref 19.6–45.3)
LYMPHOCYTES NFR BLD AUTO: 14.8 % (ref 15–45)
LYMPHOCYTES NFR BLD AUTO: 15.1 % (ref 15–45)
LYMPHOCYTES NFR BLD AUTO: 15.2 % (ref 15–45)
LYMPHOCYTES NFR BLD AUTO: 15.9 % (ref 15–45)
LYMPHOCYTES NFR BLD AUTO: 16 % (ref 15–45)
LYMPHOCYTES NFR BLD AUTO: 16.7 % (ref 15–45)
LYMPHOCYTES NFR BLD AUTO: 17.4 % (ref 15–45)
LYMPHOCYTES NFR BLD AUTO: 17.6 % (ref 15–45)
LYMPHOCYTES NFR BLD AUTO: 20.9 % (ref 15–45)
LYMPHOCYTES NFR BLD AUTO: 22.7 % (ref 19.6–45.3)
LYMPHOCYTES NFR BLD AUTO: 7.4 % (ref 15–45)
LYMPHOCYTES NFR BLD AUTO: 7.8 % (ref 19.6–45.3)
LYMPHOCYTES NFR BLD AUTO: 8.7 % (ref 19.6–45.3)
LYMPHOCYTES NFR BLD AUTO: 9 % (ref 15–45)
LYMPHOCYTES NFR BLD AUTO: 9.4 % (ref 19.6–45.3)
LYMPHOCYTES NFR BLD AUTO: 9.5 % (ref 15–45)
Lab: ABNORMAL
MAGNESIUM SERPL-MCNC: 1.8 MG/DL (ref 1.4–2.2)
MAGNESIUM SERPL-MCNC: 1.8 MG/DL (ref 1.4–2.2)
MCH RBC QN AUTO: 28.1 PG (ref 28–32)
MCH RBC QN AUTO: 28.3 PG (ref 28–32)
MCH RBC QN AUTO: 28.7 PG (ref 26.6–33)
MCH RBC QN AUTO: 28.7 PG (ref 28–32)
MCH RBC QN AUTO: 28.7 PG (ref 28–32)
MCH RBC QN AUTO: 28.8 PG (ref 26.6–33)
MCH RBC QN AUTO: 28.8 PG (ref 28–32)
MCH RBC QN AUTO: 28.8 PG (ref 28–32)
MCH RBC QN AUTO: 29 PG (ref 26.6–33)
MCH RBC QN AUTO: 29 PG (ref 28–32)
MCH RBC QN AUTO: 29.3 PG (ref 26.6–33)
MCH RBC QN AUTO: 29.8 PG (ref 26.6–33)
MCH RBC QN AUTO: 30.7 PG (ref 28–32)
MCH RBC QN AUTO: 31.1 PG (ref 28–32)
MCH RBC QN AUTO: 31.3 PG (ref 28–32)
MCH RBC QN AUTO: 31.4 PG (ref 28–32)
MCH RBC QN AUTO: 31.6 PG (ref 28–32)
MCH RBC QN AUTO: 31.6 PG (ref 28–32)
MCH RBC QN AUTO: 31.8 PG (ref 28–32)
MCH RBC QN AUTO: 32 PG (ref 28–32)
MCH RBC QN AUTO: 32.1 PG (ref 28–32)
MCHC RBC AUTO-ENTMCNC: 30 G/DL (ref 31.5–35.7)
MCHC RBC AUTO-ENTMCNC: 32.3 G/DL (ref 33–36)
MCHC RBC AUTO-ENTMCNC: 32.4 G/DL (ref 33–36)
MCHC RBC AUTO-ENTMCNC: 32.4 G/DL (ref 33–36)
MCHC RBC AUTO-ENTMCNC: 32.5 G/DL (ref 33–36)
MCHC RBC AUTO-ENTMCNC: 32.7 G/DL (ref 31.5–35.7)
MCHC RBC AUTO-ENTMCNC: 32.9 G/DL (ref 31.5–35.7)
MCHC RBC AUTO-ENTMCNC: 32.9 G/DL (ref 33–36)
MCHC RBC AUTO-ENTMCNC: 33 G/DL (ref 31.5–35.7)
MCHC RBC AUTO-ENTMCNC: 33.1 G/DL (ref 33–36)
MCHC RBC AUTO-ENTMCNC: 33.2 G/DL (ref 33–36)
MCHC RBC AUTO-ENTMCNC: 33.3 G/DL (ref 31.5–35.7)
MCHC RBC AUTO-ENTMCNC: 33.3 G/DL (ref 31.5–35.7)
MCHC RBC AUTO-ENTMCNC: 33.3 G/DL (ref 33–36)
MCHC RBC AUTO-ENTMCNC: 33.4 G/DL (ref 31.5–35.7)
MCHC RBC AUTO-ENTMCNC: 33.4 G/DL (ref 31.5–35.7)
MCHC RBC AUTO-ENTMCNC: 33.4 G/DL (ref 33–36)
MCHC RBC AUTO-ENTMCNC: 33.7 G/DL (ref 33–36)
MCHC RBC AUTO-ENTMCNC: 34 G/DL (ref 33–36)
MCHC RBC AUTO-ENTMCNC: 34.1 G/DL (ref 33–36)
MCHC RBC AUTO-ENTMCNC: 34.5 G/DL (ref 33–36)
MCV RBC AUTO: 86.1 FL (ref 82–98)
MCV RBC AUTO: 86.3 FL (ref 82–98)
MCV RBC AUTO: 86.7 FL (ref 82–98)
MCV RBC AUTO: 87.1 FL (ref 82–98)
MCV RBC AUTO: 87.2 FL (ref 79–97)
MCV RBC AUTO: 87.2 FL (ref 82–98)
MCV RBC AUTO: 87.3 FL (ref 82–98)
MCV RBC AUTO: 87.6 FL (ref 79–97)
MCV RBC AUTO: 87.6 FL (ref 82–98)
MCV RBC AUTO: 87.8 FL (ref 79–97)
MCV RBC AUTO: 87.8 FL (ref 79–97)
MCV RBC AUTO: 87.9 FL (ref 79–97)
MCV RBC AUTO: 89 FL (ref 79–97)
MCV RBC AUTO: 89.6 FL (ref 79–97)
MCV RBC AUTO: 91.4 FL (ref 82–98)
MCV RBC AUTO: 91.8 FL (ref 82–98)
MCV RBC AUTO: 93.7 FL (ref 82–98)
MCV RBC AUTO: 94.3 FL (ref 82–98)
MCV RBC AUTO: 94.4 FL (ref 82–98)
MCV RBC AUTO: 94.7 FL (ref 82–98)
MCV RBC AUTO: 95 FL (ref 82–98)
MCV RBC AUTO: 96.2 FL (ref 79–97)
MCV RBC AUTO: 96.3 FL (ref 82–98)
MCV RBC AUTO: 97.3 FL (ref 82–98)
METHADONE UR QL SCN: NEGATIVE
METHADONE UR QL SCN: NEGATIVE
MODALITY: ABNORMAL
MONOCYTES # BLD AUTO: 0.33 10*3/MM3 (ref 0.19–1.3)
MONOCYTES # BLD AUTO: 0.41 10*3/MM3 (ref 0.19–1.3)
MONOCYTES # BLD AUTO: 0.43 10*3/MM3 (ref 0.19–1.3)
MONOCYTES # BLD AUTO: 0.45 10*3/MM3 (ref 0.19–1.3)
MONOCYTES # BLD AUTO: 0.45 10*3/MM3 (ref 0.19–1.3)
MONOCYTES # BLD AUTO: 0.46 10*3/MM3 (ref 0.19–1.3)
MONOCYTES # BLD AUTO: 0.46 10*3/MM3 (ref 0.19–1.3)
MONOCYTES # BLD AUTO: 0.49 10*3/MM3 (ref 0.19–1.3)
MONOCYTES # BLD AUTO: 0.5 10*3/MM3 (ref 0.1–0.9)
MONOCYTES # BLD AUTO: 0.51 10*3/MM3 (ref 0.19–1.3)
MONOCYTES # BLD AUTO: 0.51 10*3/MM3 (ref 0.19–1.3)
MONOCYTES # BLD AUTO: 0.53 10*3/MM3 (ref 0.19–1.3)
MONOCYTES # BLD AUTO: 0.54 10*3/MM3 (ref 0.1–0.9)
MONOCYTES # BLD AUTO: 0.62 10*3/MM3 (ref 0.19–1.3)
MONOCYTES # BLD AUTO: 0.63 10*3/MM3 (ref 0.19–1.3)
MONOCYTES # BLD AUTO: 0.64 10*3/MM3 (ref 0.1–0.9)
MONOCYTES # BLD AUTO: 0.64 10*3/MM3 (ref 0.1–0.9)
MONOCYTES # BLD AUTO: 0.65 10*3/MM3 (ref 0.19–1.3)
MONOCYTES # BLD AUTO: 0.66 10*3/MM3 (ref 0.1–0.9)
MONOCYTES # BLD AUTO: 0.67 10*3/MM3 (ref 0.1–0.9)
MONOCYTES # BLD AUTO: 0.67 10*3/MM3 (ref 0.1–0.9)
MONOCYTES # BLD AUTO: 0.72 10*3/MM3 (ref 0.19–1.3)
MONOCYTES # BLD AUTO: 0.74 10*3/MM3 (ref 0.1–0.9)
MONOCYTES NFR BLD AUTO: 5 % (ref 5–12)
MONOCYTES NFR BLD AUTO: 5.3 % (ref 4–12)
MONOCYTES NFR BLD AUTO: 5.3 % (ref 5–12)
MONOCYTES NFR BLD AUTO: 5.7 % (ref 4–12)
MONOCYTES NFR BLD AUTO: 5.8 % (ref 4–12)
MONOCYTES NFR BLD AUTO: 5.8 % (ref 5–12)
MONOCYTES NFR BLD AUTO: 6.3 % (ref 4–12)
MONOCYTES NFR BLD AUTO: 6.4 % (ref 4–12)
MONOCYTES NFR BLD AUTO: 7.4 % (ref 5–12)
MONOCYTES NFR BLD AUTO: 7.4 % (ref 5–12)
MONOCYTES NFR BLD AUTO: 7.5 % (ref 5–12)
MONOCYTES NFR BLD AUTO: 7.6 % (ref 4–12)
MONOCYTES NFR BLD AUTO: 7.8 % (ref 4–12)
MONOCYTES NFR BLD AUTO: 8 % (ref 4–12)
MONOCYTES NFR BLD AUTO: 8 % (ref 4–12)
MONOCYTES NFR BLD AUTO: 8.2 % (ref 4–12)
MONOCYTES NFR BLD AUTO: 8.3 % (ref 4–12)
MONOCYTES NFR BLD AUTO: 8.3 % (ref 5–12)
MONOCYTES NFR BLD AUTO: 8.5 % (ref 4–12)
MONOCYTES NFR BLD AUTO: 8.7 % (ref 4–12)
MONOCYTES NFR BLD AUTO: 9 % (ref 5–12)
MONOCYTES NFR BLD AUTO: 9.2 % (ref 4–12)
MONOCYTES NFR BLD AUTO: 9.5 % (ref 4–12)
MYOGLOBIN SERPL-MCNC: 47.3 NG/ML (ref 0–110)
MYOGLOBIN SERPL-MCNC: 56.6 NG/ML (ref 0–110)
NEUTROPHILS # BLD AUTO: 11.46 10*3/MM3 (ref 1.7–7)
NEUTROPHILS # BLD AUTO: 3.14 10*3/MM3 (ref 1.87–8.4)
NEUTROPHILS # BLD AUTO: 3.74 10*3/MM3 (ref 1.7–7)
NEUTROPHILS # BLD AUTO: 3.75 10*3/MM3 (ref 1.87–8.4)
NEUTROPHILS # BLD AUTO: 4 10*3/MM3 (ref 1.87–8.4)
NEUTROPHILS # BLD AUTO: 4.12 10*3/MM3 (ref 1.87–8.4)
NEUTROPHILS # BLD AUTO: 4.15 10*3/MM3 (ref 1.87–8.4)
NEUTROPHILS # BLD AUTO: 4.3 10*3/MM3 (ref 1.87–8.4)
NEUTROPHILS # BLD AUTO: 4.58 10*3/MM3 (ref 1.87–8.4)
NEUTROPHILS # BLD AUTO: 4.69 10*3/MM3 (ref 1.87–8.4)
NEUTROPHILS # BLD AUTO: 5.06 10*3/MM3 (ref 1.87–8.4)
NEUTROPHILS # BLD AUTO: 5.08 10*3/MM3 (ref 1.87–8.4)
NEUTROPHILS # BLD AUTO: 5.32 10*3/MM3 (ref 1.87–8.4)
NEUTROPHILS # BLD AUTO: 6.14 10*3/MM3 (ref 1.7–7)
NEUTROPHILS # BLD AUTO: 6.39 10*3/MM3 (ref 1.7–7)
NEUTROPHILS # BLD AUTO: 6.49 10*3/MM3 (ref 1.87–8.4)
NEUTROPHILS # BLD AUTO: 6.52 10*3/MM3 (ref 1.7–7)
NEUTROPHILS # BLD AUTO: 6.92 10*3/MM3 (ref 1.7–7)
NEUTROPHILS # BLD AUTO: 6.97 10*3/MM3 (ref 1.87–8.4)
NEUTROPHILS # BLD AUTO: 7.69 10*3/MM3 (ref 1.87–8.4)
NEUTROPHILS # BLD AUTO: 7.84 10*3/MM3 (ref 1.87–8.4)
NEUTROPHILS # BLD AUTO: 8.45 10*3/MM3 (ref 1.7–7)
NEUTROPHILS # BLD AUTO: 9.29 10*3/MM3 (ref 1.7–7)
NEUTROPHILS NFR BLD AUTO: 62.5 % (ref 42.7–76)
NEUTROPHILS NFR BLD AUTO: 66.3 % (ref 39–78)
NEUTROPHILS NFR BLD AUTO: 68.1 % (ref 39–78)
NEUTROPHILS NFR BLD AUTO: 68.4 % (ref 39–78)
NEUTROPHILS NFR BLD AUTO: 70.8 % (ref 39–78)
NEUTROPHILS NFR BLD AUTO: 71 % (ref 39–78)
NEUTROPHILS NFR BLD AUTO: 71.2 % (ref 39–78)
NEUTROPHILS NFR BLD AUTO: 73 % (ref 42.7–76)
NEUTROPHILS NFR BLD AUTO: 73.3 % (ref 39–78)
NEUTROPHILS NFR BLD AUTO: 73.4 % (ref 39–78)
NEUTROPHILS NFR BLD AUTO: 73.8 % (ref 39–78)
NEUTROPHILS NFR BLD AUTO: 74.4 % (ref 42.7–76)
NEUTROPHILS NFR BLD AUTO: 74.5 % (ref 39–78)
NEUTROPHILS NFR BLD AUTO: 74.7 % (ref 42.7–76)
NEUTROPHILS NFR BLD AUTO: 75.1 % (ref 39–78)
NEUTROPHILS NFR BLD AUTO: 76.5 % (ref 42.7–76)
NEUTROPHILS NFR BLD AUTO: 77.6 % (ref 39–78)
NEUTROPHILS NFR BLD AUTO: 78.1 % (ref 39–78)
NEUTROPHILS NFR BLD AUTO: 82.4 % (ref 42.7–76)
NEUTROPHILS NFR BLD AUTO: 83.4 % (ref 39–78)
NEUTROPHILS NFR BLD AUTO: 83.8 % (ref 42.7–76)
NEUTROPHILS NFR BLD AUTO: 85 % (ref 42.7–76)
NEUTROPHILS NFR BLD AUTO: 85.2 % (ref 39–78)
NITRITE UR QL STRIP: NEGATIVE
NITRITE UR QL STRIP: POSITIVE
NITRITE UR QL STRIP: POSITIVE
NRBC BLD AUTO-RTO: 0 /100 WBC (ref 0–0)
NRBC BLD AUTO-RTO: 0 /100 WBC (ref 0–0.2)
NT-PROBNP SERPL-MCNC: 149 PG/ML (ref 0–900)
OPIATES UR QL: NEGATIVE
OPIATES UR QL: POSITIVE
PCO2 BLDA: 38.6 MM HG (ref 35–45)
PCP UR QL SCN: NEGATIVE
PCP UR QL SCN: NEGATIVE
PH BLDA: 7.43 PH UNITS (ref 7.35–7.45)
PH UR STRIP.AUTO: 5.5 [PH] (ref 5–8)
PH UR STRIP.AUTO: 5.5 [PH] (ref 5–8)
PH UR STRIP.AUTO: 7.5 [PH] (ref 5–8)
PHENYTOIN SERPL-MCNC: <3 MCG/ML (ref 10–20)
PLATELET # BLD AUTO: 204 10*3/MM3 (ref 130–400)
PLATELET # BLD AUTO: 206 10*3/MM3 (ref 130–400)
PLATELET # BLD AUTO: 208 10*3/MM3 (ref 130–400)
PLATELET # BLD AUTO: 222 10*3/MM3 (ref 130–400)
PLATELET # BLD AUTO: 243 10*3/MM3 (ref 130–400)
PLATELET # BLD AUTO: 243 10*3/MM3 (ref 130–400)
PLATELET # BLD AUTO: 254 10*3/MM3 (ref 130–400)
PLATELET # BLD AUTO: 255 10*3/MM3 (ref 130–400)
PLATELET # BLD AUTO: 259 10*3/MM3 (ref 130–400)
PLATELET # BLD AUTO: 266 10*3/MM3 (ref 140–450)
PLATELET # BLD AUTO: 269 10*3/MM3 (ref 130–400)
PLATELET # BLD AUTO: 270 10*3/MM3 (ref 130–400)
PLATELET # BLD AUTO: 271 10*3/MM3 (ref 130–400)
PLATELET # BLD AUTO: 278 10*3/MM3 (ref 130–400)
PLATELET # BLD AUTO: 302 10*3/MM3 (ref 140–450)
PLATELET # BLD AUTO: 302 10*3/MM3 (ref 140–450)
PLATELET # BLD AUTO: 309 10*3/MM3 (ref 130–400)
PLATELET # BLD AUTO: 322 10*3/MM3 (ref 140–450)
PLATELET # BLD AUTO: 324 10*3/MM3 (ref 140–450)
PLATELET # BLD AUTO: 326 10*3/MM3 (ref 130–400)
PLATELET # BLD AUTO: 328 10*3/MM3 (ref 140–450)
PLATELET # BLD AUTO: 330 10*3/MM3 (ref 140–450)
PLATELET # BLD AUTO: 350 10*3/MM3 (ref 130–400)
PLATELET # BLD AUTO: 508 10*3/MM3 (ref 140–450)
PMV BLD AUTO: 10 FL (ref 6–12)
PMV BLD AUTO: 10.1 FL (ref 6–12)
PMV BLD AUTO: 10.2 FL (ref 6–12)
PMV BLD AUTO: 10.3 FL (ref 6–12)
PMV BLD AUTO: 10.4 FL (ref 6–12)
PMV BLD AUTO: 10.4 FL (ref 6–12)
PMV BLD AUTO: 10.6 FL (ref 6–12)
PMV BLD AUTO: 10.6 FL (ref 6–12)
PMV BLD AUTO: 10.7 FL (ref 6–12)
PMV BLD AUTO: 10.7 FL (ref 6–12)
PMV BLD AUTO: 10.8 FL (ref 6–12)
PMV BLD AUTO: 10.8 FL (ref 6–12)
PMV BLD AUTO: 10.9 FL (ref 6–12)
PMV BLD AUTO: 11 FL (ref 6–12)
PMV BLD AUTO: 11.1 FL (ref 6–12)
PMV BLD AUTO: 11.3 FL (ref 6–12)
PMV BLD AUTO: 11.4 FL (ref 6–12)
PMV BLD AUTO: 11.4 FL (ref 6–12)
PMV BLD AUTO: 11.6 FL (ref 6–12)
PMV BLD AUTO: 9.7 FL (ref 6–12)
PO2 BLDA: 71.9 MM HG (ref 83–108)
POTASSIUM BLD-SCNC: 2.5 MMOL/L (ref 3.5–5.3)
POTASSIUM BLD-SCNC: 2.9 MMOL/L (ref 3.5–5.3)
POTASSIUM BLD-SCNC: 3.1 MMOL/L (ref 3.5–5.3)
POTASSIUM BLD-SCNC: 3.1 MMOL/L (ref 3.5–5.3)
POTASSIUM BLD-SCNC: 3.2 MMOL/L (ref 3.5–5.3)
POTASSIUM BLD-SCNC: 3.2 MMOL/L (ref 3.5–5.3)
POTASSIUM BLD-SCNC: 3.3 MMOL/L (ref 3.5–5.3)
POTASSIUM BLD-SCNC: 3.3 MMOL/L (ref 3.5–5.3)
POTASSIUM BLD-SCNC: 3.4 MMOL/L (ref 3.5–5.2)
POTASSIUM BLD-SCNC: 3.6 MMOL/L (ref 3.5–5.3)
POTASSIUM BLD-SCNC: 3.6 MMOL/L (ref 3.5–5.3)
POTASSIUM BLD-SCNC: 3.7 MMOL/L (ref 3.5–5.3)
POTASSIUM BLD-SCNC: 3.7 MMOL/L (ref 3.5–5.3)
POTASSIUM BLD-SCNC: 3.8 MMOL/L (ref 3.5–5.3)
POTASSIUM BLD-SCNC: 3.9 MMOL/L (ref 3.5–5.3)
POTASSIUM BLD-SCNC: 4 MMOL/L (ref 3.5–5.3)
POTASSIUM BLD-SCNC: 4.1 MMOL/L (ref 3.5–5.3)
POTASSIUM BLD-SCNC: 4.2 MMOL/L (ref 3.5–5.3)
POTASSIUM BLD-SCNC: 4.3 MMOL/L (ref 3.5–5.3)
POTASSIUM BLD-SCNC: 4.5 MMOL/L (ref 3.5–5.3)
POTASSIUM BLD-SCNC: 4.5 MMOL/L (ref 3.5–5.3)
POTASSIUM BLD-SCNC: 4.7 MMOL/L (ref 3.5–5.3)
POTASSIUM BLD-SCNC: 5 MMOL/L (ref 3.5–5.3)
POTASSIUM SERPL-SCNC: 4.4 MMOL/L (ref 3.5–5.2)
PROCALCITONIN SERPL-MCNC: <0.25 NG/ML
PROCALCITONIN SERPL-MCNC: <0.25 NG/ML
PROT SERPL-MCNC: 4.9 G/DL (ref 6.3–8.7)
PROT SERPL-MCNC: 5.1 G/DL (ref 6.3–8.7)
PROT SERPL-MCNC: 5.1 G/DL (ref 6.3–8.7)
PROT SERPL-MCNC: 5.2 G/DL (ref 6–8.5)
PROT SERPL-MCNC: 5.5 G/DL (ref 6.3–8.7)
PROT SERPL-MCNC: 6 G/DL (ref 6.3–8.7)
PROT SERPL-MCNC: 6.3 G/DL (ref 6.3–8.7)
PROT UR QL STRIP: ABNORMAL
PROTHROMBIN TIME: 11.5 SECONDS (ref 11.9–14.6)
PROTHROMBIN TIME: 11.9 SECONDS (ref 11.9–14.6)
PROTHROMBIN TIME: 13 SECONDS (ref 11.9–14.6)
PROTHROMBIN TIME: 13.3 SECONDS (ref 11.9–14.6)
RBC # BLD AUTO: 3.04 10*6/MM3 (ref 4.2–5.4)
RBC # BLD AUTO: 3.14 10*6/MM3 (ref 4.2–5.4)
RBC # BLD AUTO: 3.16 10*6/MM3 (ref 4.2–5.4)
RBC # BLD AUTO: 3.21 10*6/MM3 (ref 3.77–5.28)
RBC # BLD AUTO: 3.22 10*6/MM3 (ref 4.2–5.4)
RBC # BLD AUTO: 3.26 10*6/MM3 (ref 3.77–5.28)
RBC # BLD AUTO: 3.27 10*6/MM3 (ref 4.2–5.4)
RBC # BLD AUTO: 3.31 10*6/MM3 (ref 3.77–5.28)
RBC # BLD AUTO: 3.31 10*6/MM3 (ref 4.2–5.4)
RBC # BLD AUTO: 3.35 10*6/MM3 (ref 3.77–5.28)
RBC # BLD AUTO: 3.4 10*6/MM3 (ref 4.2–5.4)
RBC # BLD AUTO: 3.41 10*6/MM3 (ref 4.2–5.4)
RBC # BLD AUTO: 3.45 10*6/MM3 (ref 4.2–5.4)
RBC # BLD AUTO: 3.47 10*6/MM3 (ref 4.2–5.4)
RBC # BLD AUTO: 3.47 10*6/MM3 (ref 4.2–5.4)
RBC # BLD AUTO: 3.48 10*6/MM3 (ref 3.77–5.28)
RBC # BLD AUTO: 3.49 10*6/MM3 (ref 4.2–5.4)
RBC # BLD AUTO: 3.55 10*6/MM3 (ref 3.77–5.28)
RBC # BLD AUTO: 3.64 10*6/MM3 (ref 4.2–5.4)
RBC # BLD AUTO: 3.67 10*6/MM3 (ref 4.2–5.4)
RBC # BLD AUTO: 3.93 10*6/MM3 (ref 3.77–5.28)
RBC # BLD AUTO: 4.2 10*6/MM3 (ref 3.77–5.28)
RBC # BLD AUTO: 4.21 10*6/MM3 (ref 4.2–5.4)
RBC # BLD AUTO: 4.28 10*6/MM3 (ref 4.2–5.4)
RBC # UR: ABNORMAL /HPF
REF LAB TEST METHOD: ABNORMAL
SALICYLATES SERPL-MCNC: <1 MG/DL (ref 15–30)
SAO2 % BLDCOA: 92.9 % (ref 94–99)
SODIUM BLD-SCNC: 127 MMOL/L (ref 135–145)
SODIUM BLD-SCNC: 131 MMOL/L (ref 135–145)
SODIUM BLD-SCNC: 132 MMOL/L (ref 135–145)
SODIUM BLD-SCNC: 133 MMOL/L (ref 135–145)
SODIUM BLD-SCNC: 134 MMOL/L (ref 135–145)
SODIUM BLD-SCNC: 135 MMOL/L (ref 135–145)
SODIUM BLD-SCNC: 135 MMOL/L (ref 135–145)
SODIUM BLD-SCNC: 136 MMOL/L (ref 135–145)
SODIUM BLD-SCNC: 138 MMOL/L (ref 135–145)
SODIUM BLD-SCNC: 139 MMOL/L (ref 135–145)
SODIUM BLD-SCNC: 139 MMOL/L (ref 136–145)
SODIUM BLD-SCNC: 140 MMOL/L (ref 135–145)
SODIUM BLD-SCNC: 140 MMOL/L (ref 135–145)
SODIUM BLD-SCNC: 142 MMOL/L (ref 135–145)
SODIUM SERPL-SCNC: 134 MMOL/L (ref 136–145)
SP GR UR STRIP: 1.02 (ref 1–1.03)
SP GR UR STRIP: 1.03 (ref 1–1.03)
SP GR UR STRIP: >1.03 (ref 1–1.03)
SQUAMOUS #/AREA URNS HPF: ABNORMAL /HPF
T4 FREE SERPL-MCNC: 0.94 NG/DL (ref 0.78–2.19)
TRANS CELLS #/AREA URNS HPF: ABNORMAL /HPF
TRIGL SERPL-MCNC: 120 MG/DL (ref 0–150)
TROPONIN I SERPL-MCNC: <0.012 NG/ML (ref 0–0.03)
TSH SERPL DL<=0.005 MIU/L-ACNC: 2.8 MIU/ML (ref 0.27–4.2)
TSH SERPL DL<=0.05 MIU/L-ACNC: 4.07 UIU/ML (ref 0.47–4.68)
TSH SERPL DL<=0.05 MIU/L-ACNC: 5.23 MIU/ML (ref 0.47–4.68)
UROBILINOGEN UR QL STRIP: ABNORMAL
VANCOMYCIN TROUGH SERPL-MCNC: 23.03 MCG/ML (ref 10–20)
VANCOMYCIN TROUGH SERPL-MCNC: 23.14 MCG/ML (ref 10–20)
VANCOMYCIN TROUGH SERPL-MCNC: 25.77 MCG/ML (ref 10–20)
VENTILATOR MODE: ABNORMAL
VIT B12 BLD-MCNC: 251 PG/ML (ref 239–931)
VLDLC SERPL CALC-MCNC: 24 MG/DL
WBC # BLD AUTO: 11.09 10*3/MM3 (ref 3.4–10.8)
WBC NRBC COR # BLD: 10.25 10*3/MM3 (ref 3.4–10.8)
WBC NRBC COR # BLD: 13.49 10*3/MM3 (ref 3.4–10.8)
WBC NRBC COR # BLD: 4.74 10*3/MM3 (ref 4.8–10.8)
WBC NRBC COR # BLD: 5.29 10*3/MM3 (ref 4.8–10.8)
WBC NRBC COR # BLD: 5.59 10*3/MM3 (ref 4.8–10.8)
WBC NRBC COR # BLD: 5.63 10*3/MM3 (ref 4.8–10.8)
WBC NRBC COR # BLD: 5.82 10*3/MM3 (ref 4.8–10.8)
WBC NRBC COR # BLD: 5.87 10*3/MM3 (ref 4.8–10.8)
WBC NRBC COR # BLD: 5.99 10*3/MM3 (ref 3.4–10.8)
WBC NRBC COR # BLD: 6.38 10*3/MM3 (ref 4.8–10.8)
WBC NRBC COR # BLD: 6.7 10*3/MM3 (ref 4.8–10.8)
WBC NRBC COR # BLD: 6.74 10*3/MM3 (ref 4.8–10.8)
WBC NRBC COR # BLD: 6.83 10*3/MM3 (ref 4.8–10.8)
WBC NRBC COR # BLD: 7.73 10*3/MM3 (ref 4.8–10.8)
WBC NRBC COR # BLD: 7.77 10*3/MM3 (ref 4.8–10.8)
WBC NRBC COR # BLD: 7.81 10*3/MM3 (ref 4.8–10.8)
WBC NRBC COR # BLD: 8.21 10*3/MM3 (ref 3.4–10.8)
WBC NRBC COR # BLD: 8.57 10*3/MM3 (ref 3.4–10.8)
WBC NRBC COR # BLD: 8.92 10*3/MM3 (ref 3.4–10.8)
WBC NRBC COR # BLD: 8.99 10*3/MM3 (ref 4.8–10.8)
WBC NRBC COR # BLD: 9.05 10*3/MM3 (ref 3.4–10.8)
WBC NRBC COR # BLD: 9.2 10*3/MM3 (ref 4.8–10.8)
WBC NRBC COR # BLD: 9.85 10*3/MM3 (ref 4.8–10.8)
WBC UR QL AUTO: ABNORMAL /HPF
WHOLE BLOOD HOLD SPECIMEN: NORMAL
WHOLE BLOOD HOLD SPECIMEN: NORMAL

## 2019-01-01 PROCEDURE — 84484 ASSAY OF TROPONIN QUANT: CPT | Performed by: EMERGENCY MEDICINE

## 2019-01-01 PROCEDURE — G0463 HOSPITAL OUTPT CLINIC VISIT: HCPCS | Performed by: RADIOLOGY

## 2019-01-01 PROCEDURE — 25810000003 DEXTROSE 5 % WITH KCL 20 MEQ 20-5 MEQ/L-% SOLUTION: Performed by: FAMILY MEDICINE

## 2019-01-01 PROCEDURE — 97116 GAIT TRAINING THERAPY: CPT

## 2019-01-01 PROCEDURE — 25010000002 PIPERACILLIN SOD-TAZOBACTAM PER 1 G: Performed by: FAMILY MEDICINE

## 2019-01-01 PROCEDURE — 94640 AIRWAY INHALATION TREATMENT: CPT

## 2019-01-01 PROCEDURE — 97530 THERAPEUTIC ACTIVITIES: CPT

## 2019-01-01 PROCEDURE — 25010000003 LEVETIRACETAM IN NACL 0.75% 1000 MG/100ML SOLUTION: Performed by: FAMILY MEDICINE

## 2019-01-01 PROCEDURE — 87040 BLOOD CULTURE FOR BACTERIA: CPT | Performed by: NURSE PRACTITIONER

## 2019-01-01 PROCEDURE — 92526 ORAL FUNCTION THERAPY: CPT

## 2019-01-01 PROCEDURE — 94760 N-INVAS EAR/PLS OXIMETRY 1: CPT

## 2019-01-01 PROCEDURE — 94799 UNLISTED PULMONARY SVC/PX: CPT

## 2019-01-01 PROCEDURE — 99232 SBSQ HOSP IP/OBS MODERATE 35: CPT | Performed by: FAMILY MEDICINE

## 2019-01-01 PROCEDURE — 97110 THERAPEUTIC EXERCISES: CPT

## 2019-01-01 PROCEDURE — 93005 ELECTROCARDIOGRAM TRACING: CPT | Performed by: PHYSICIAN ASSISTANT

## 2019-01-01 PROCEDURE — 85025 COMPLETE CBC W/AUTO DIFF WBC: CPT | Performed by: FAMILY MEDICINE

## 2019-01-01 PROCEDURE — 25010000002 CEFTRIAXONE PER 250 MG: Performed by: FAMILY MEDICINE

## 2019-01-01 PROCEDURE — 25010000002 ROPIVACAINE PER 1 MG: Performed by: ANESTHESIOLOGY

## 2019-01-01 PROCEDURE — 25010000003 CEFAZOLIN PER 500 MG: Performed by: NURSE ANESTHETIST, CERTIFIED REGISTERED

## 2019-01-01 PROCEDURE — 93010 ELECTROCARDIOGRAM REPORT: CPT | Performed by: INTERNAL MEDICINE

## 2019-01-01 PROCEDURE — 99284 EMERGENCY DEPT VISIT MOD MDM: CPT

## 2019-01-01 PROCEDURE — 80048 BASIC METABOLIC PNL TOTAL CA: CPT | Performed by: FAMILY MEDICINE

## 2019-01-01 PROCEDURE — 99285 EMERGENCY DEPT VISIT HI MDM: CPT

## 2019-01-01 PROCEDURE — 80053 COMPREHEN METABOLIC PANEL: CPT | Performed by: NURSE PRACTITIONER

## 2019-01-01 PROCEDURE — 76775 US EXAM ABDO BACK WALL LIM: CPT

## 2019-01-01 PROCEDURE — 87147 CULTURE TYPE IMMUNOLOGIC: CPT | Performed by: EMERGENCY MEDICINE

## 2019-01-01 PROCEDURE — 25010000002 FOSPHENYTOIN 100 MG PE/2ML SOLUTION: Performed by: FAMILY MEDICINE

## 2019-01-01 PROCEDURE — 25010000002 KETOROLAC TROMETHAMINE PER 15 MG: Performed by: NURSE ANESTHETIST, CERTIFIED REGISTERED

## 2019-01-01 PROCEDURE — 25810000003 SODIUM CHLORIDE 0.9 % WITH KCL 20 MEQ 20-0.9 MEQ/L-% SOLUTION: Performed by: FAMILY MEDICINE

## 2019-01-01 PROCEDURE — 84439 ASSAY OF FREE THYROXINE: CPT | Performed by: EMERGENCY MEDICINE

## 2019-01-01 PROCEDURE — 99222 1ST HOSP IP/OBS MODERATE 55: CPT | Performed by: FAMILY MEDICINE

## 2019-01-01 PROCEDURE — 36415 COLL VENOUS BLD VENIPUNCTURE: CPT | Performed by: FAMILY MEDICINE

## 2019-01-01 PROCEDURE — 95816 EEG AWAKE AND DROWSY: CPT

## 2019-01-01 PROCEDURE — 85027 COMPLETE CBC AUTOMATED: CPT | Performed by: ORTHOPAEDIC SURGERY

## 2019-01-01 PROCEDURE — 97535 SELF CARE MNGMENT TRAINING: CPT

## 2019-01-01 PROCEDURE — 84443 ASSAY THYROID STIM HORMONE: CPT | Performed by: PHYSICIAN ASSISTANT

## 2019-01-01 PROCEDURE — 99233 SBSQ HOSP IP/OBS HIGH 50: CPT | Performed by: PSYCHIATRY & NEUROLOGY

## 2019-01-01 PROCEDURE — 80202 ASSAY OF VANCOMYCIN: CPT | Performed by: FAMILY MEDICINE

## 2019-01-01 PROCEDURE — 82962 GLUCOSE BLOOD TEST: CPT

## 2019-01-01 PROCEDURE — 93971 EXTREMITY STUDY: CPT

## 2019-01-01 PROCEDURE — 25010000002 LORAZEPAM PER 2 MG: Performed by: FAMILY MEDICINE

## 2019-01-01 PROCEDURE — 85610 PROTHROMBIN TIME: CPT | Performed by: EMERGENCY MEDICINE

## 2019-01-01 PROCEDURE — 25010000002 DEXAMETHASONE PER 1 MG: Performed by: NURSE ANESTHETIST, CERTIFIED REGISTERED

## 2019-01-01 PROCEDURE — 25010000002 MIDAZOLAM PER 1 MG: Performed by: ANESTHESIOLOGY

## 2019-01-01 PROCEDURE — 83605 ASSAY OF LACTIC ACID: CPT | Performed by: PHYSICIAN ASSISTANT

## 2019-01-01 PROCEDURE — 82746 ASSAY OF FOLIC ACID SERUM: CPT | Performed by: PSYCHIATRY & NEUROLOGY

## 2019-01-01 PROCEDURE — 70553 MRI BRAIN STEM W/O & W/DYE: CPT

## 2019-01-01 PROCEDURE — 25010000002 VANCOMYCIN 10 G RECONSTITUTED SOLUTION: Performed by: FAMILY MEDICINE

## 2019-01-01 PROCEDURE — 87086 URINE CULTURE/COLONY COUNT: CPT | Performed by: PHYSICIAN ASSISTANT

## 2019-01-01 PROCEDURE — 80307 DRUG TEST PRSMV CHEM ANLYZR: CPT | Performed by: EMERGENCY MEDICINE

## 2019-01-01 PROCEDURE — 85025 COMPLETE CBC W/AUTO DIFF WBC: CPT | Performed by: NURSE PRACTITIONER

## 2019-01-01 PROCEDURE — 70450 CT HEAD/BRAIN W/O DYE: CPT

## 2019-01-01 PROCEDURE — 25010000002 CYANOCOBALAMIN PER 1000 MCG: Performed by: PSYCHIATRY & NEUROLOGY

## 2019-01-01 PROCEDURE — C1776 JOINT DEVICE (IMPLANTABLE): HCPCS | Performed by: ORTHOPAEDIC SURGERY

## 2019-01-01 PROCEDURE — 25010000002 MORPHINE PER 10 MG: Performed by: FAMILY MEDICINE

## 2019-01-01 PROCEDURE — 71046 X-RAY EXAM CHEST 2 VIEWS: CPT

## 2019-01-01 PROCEDURE — 82306 VITAMIN D 25 HYDROXY: CPT | Performed by: PSYCHIATRY & NEUROLOGY

## 2019-01-01 PROCEDURE — 85610 PROTHROMBIN TIME: CPT | Performed by: NURSE PRACTITIONER

## 2019-01-01 PROCEDURE — 71045 X-RAY EXAM CHEST 1 VIEW: CPT

## 2019-01-01 PROCEDURE — 93005 ELECTROCARDIOGRAM TRACING: CPT | Performed by: EMERGENCY MEDICINE

## 2019-01-01 PROCEDURE — 87186 SC STD MICRODIL/AGAR DIL: CPT | Performed by: PHYSICIAN ASSISTANT

## 2019-01-01 PROCEDURE — 87040 BLOOD CULTURE FOR BACTERIA: CPT | Performed by: PHYSICIAN ASSISTANT

## 2019-01-01 PROCEDURE — 85730 THROMBOPLASTIN TIME PARTIAL: CPT | Performed by: NURSE PRACTITIONER

## 2019-01-01 PROCEDURE — 36415 COLL VENOUS BLD VENIPUNCTURE: CPT | Performed by: NURSE PRACTITIONER

## 2019-01-01 PROCEDURE — 80307 DRUG TEST PRSMV CHEM ANLYZR: CPT | Performed by: PHYSICIAN ASSISTANT

## 2019-01-01 PROCEDURE — 99239 HOSP IP/OBS DSCHRG MGMT >30: CPT | Performed by: FAMILY MEDICINE

## 2019-01-01 PROCEDURE — 82550 ASSAY OF CK (CPK): CPT | Performed by: EMERGENCY MEDICINE

## 2019-01-01 PROCEDURE — 92523 SPEECH SOUND LANG COMPREHEN: CPT

## 2019-01-01 PROCEDURE — 25010000002 AZITHROMYCIN PER 500 MG: Performed by: FAMILY MEDICINE

## 2019-01-01 PROCEDURE — 83874 ASSAY OF MYOGLOBIN: CPT | Performed by: NURSE PRACTITIONER

## 2019-01-01 PROCEDURE — 83735 ASSAY OF MAGNESIUM: CPT | Performed by: PHYSICIAN ASSISTANT

## 2019-01-01 PROCEDURE — 84145 PROCALCITONIN (PCT): CPT | Performed by: PHYSICIAN ASSISTANT

## 2019-01-01 PROCEDURE — 82550 ASSAY OF CK (CPK): CPT | Performed by: NURSE PRACTITIONER

## 2019-01-01 PROCEDURE — 92610 EVALUATE SWALLOWING FUNCTION: CPT

## 2019-01-01 PROCEDURE — 0SRR0JZ REPLACEMENT OF RIGHT HIP JOINT, FEMORAL SURFACE WITH SYNTHETIC SUBSTITUTE, OPEN APPROACH: ICD-10-PCS | Performed by: ORTHOPAEDIC SURGERY

## 2019-01-01 PROCEDURE — 25010000002 ENOXAPARIN PER 10 MG: Performed by: FAMILY MEDICINE

## 2019-01-01 PROCEDURE — 85610 PROTHROMBIN TIME: CPT | Performed by: PHYSICIAN ASSISTANT

## 2019-01-01 PROCEDURE — 25010000003 CEFAZOLIN PER 500 MG: Performed by: ORTHOPAEDIC SURGERY

## 2019-01-01 PROCEDURE — A9577 INJ MULTIHANCE: HCPCS | Performed by: FAMILY MEDICINE

## 2019-01-01 PROCEDURE — 82550 ASSAY OF CK (CPK): CPT | Performed by: PHYSICIAN ASSISTANT

## 2019-01-01 PROCEDURE — 80048 BASIC METABOLIC PNL TOTAL CA: CPT | Performed by: ORTHOPAEDIC SURGERY

## 2019-01-01 PROCEDURE — 99238 HOSP IP/OBS DSCHRG MGMT 30/<: CPT | Performed by: FAMILY MEDICINE

## 2019-01-01 PROCEDURE — 81001 URINALYSIS AUTO W/SCOPE: CPT | Performed by: NURSE PRACTITIONER

## 2019-01-01 PROCEDURE — 94762 N-INVAS EAR/PLS OXIMTRY CONT: CPT

## 2019-01-01 PROCEDURE — 85025 COMPLETE CBC W/AUTO DIFF WBC: CPT | Performed by: EMERGENCY MEDICINE

## 2019-01-01 PROCEDURE — 25010000002 PIPERACILLIN SOD-TAZOBACTAM PER 1 G: Performed by: PHYSICIAN ASSISTANT

## 2019-01-01 PROCEDURE — 87077 CULTURE AEROBIC IDENTIFY: CPT | Performed by: PHYSICIAN ASSISTANT

## 2019-01-01 PROCEDURE — 36415 COLL VENOUS BLD VENIPUNCTURE: CPT | Performed by: PHYSICIAN ASSISTANT

## 2019-01-01 PROCEDURE — 82803 BLOOD GASES ANY COMBINATION: CPT

## 2019-01-01 PROCEDURE — 25010000002 PIPERACILLIN-TAZOBACTAM: Performed by: NURSE PRACTITIONER

## 2019-01-01 PROCEDURE — 87040 BLOOD CULTURE FOR BACTERIA: CPT | Performed by: EMERGENCY MEDICINE

## 2019-01-01 PROCEDURE — 83735 ASSAY OF MAGNESIUM: CPT | Performed by: EMERGENCY MEDICINE

## 2019-01-01 PROCEDURE — 95816 EEG AWAKE AND DROWSY: CPT | Performed by: PSYCHIATRY & NEUROLOGY

## 2019-01-01 PROCEDURE — 83605 ASSAY OF LACTIC ACID: CPT | Performed by: NURSE PRACTITIONER

## 2019-01-01 PROCEDURE — 97162 PT EVAL MOD COMPLEX 30 MIN: CPT

## 2019-01-01 PROCEDURE — 83874 ASSAY OF MYOGLOBIN: CPT | Performed by: EMERGENCY MEDICINE

## 2019-01-01 PROCEDURE — 25010000003 POTASSIUM CHLORIDE 10 MEQ/100ML SOLUTION: Performed by: FAMILY MEDICINE

## 2019-01-01 PROCEDURE — 25010000002 PROPOFOL 10 MG/ML EMULSION: Performed by: NURSE ANESTHETIST, CERTIFIED REGISTERED

## 2019-01-01 PROCEDURE — 0JB70ZZ EXCISION OF BACK SUBCUTANEOUS TISSUE AND FASCIA, OPEN APPROACH: ICD-10-PCS | Performed by: PLASTIC SURGERY

## 2019-01-01 PROCEDURE — 87186 SC STD MICRODIL/AGAR DIL: CPT | Performed by: NURSE PRACTITIONER

## 2019-01-01 PROCEDURE — 80053 COMPREHEN METABOLIC PANEL: CPT | Performed by: FAMILY MEDICINE

## 2019-01-01 PROCEDURE — 82140 ASSAY OF AMMONIA: CPT | Performed by: EMERGENCY MEDICINE

## 2019-01-01 PROCEDURE — 25010000002 VANCOMYCIN 1 G RECONSTITUTED SOLUTION: Performed by: NURSE PRACTITIONER

## 2019-01-01 PROCEDURE — 80185 ASSAY OF PHENYTOIN TOTAL: CPT | Performed by: EMERGENCY MEDICINE

## 2019-01-01 PROCEDURE — 83605 ASSAY OF LACTIC ACID: CPT | Performed by: FAMILY MEDICINE

## 2019-01-01 PROCEDURE — 87077 CULTURE AEROBIC IDENTIFY: CPT | Performed by: NURSE PRACTITIONER

## 2019-01-01 PROCEDURE — 85730 THROMBOPLASTIN TIME PARTIAL: CPT | Performed by: PHYSICIAN ASSISTANT

## 2019-01-01 PROCEDURE — 83605 ASSAY OF LACTIC ACID: CPT | Performed by: EMERGENCY MEDICINE

## 2019-01-01 PROCEDURE — 99254 IP/OBS CNSLTJ NEW/EST MOD 60: CPT | Performed by: PSYCHIATRY & NEUROLOGY

## 2019-01-01 PROCEDURE — 90715 TDAP VACCINE 7 YRS/> IM: CPT | Performed by: NURSE PRACTITIONER

## 2019-01-01 PROCEDURE — 87205 SMEAR GRAM STAIN: CPT | Performed by: NURSE PRACTITIONER

## 2019-01-01 PROCEDURE — 25010000002 MORPHINE PER 10 MG: Performed by: EMERGENCY MEDICINE

## 2019-01-01 PROCEDURE — 81001 URINALYSIS AUTO W/SCOPE: CPT | Performed by: EMERGENCY MEDICINE

## 2019-01-01 PROCEDURE — 99223 1ST HOSP IP/OBS HIGH 75: CPT | Performed by: FAMILY MEDICINE

## 2019-01-01 PROCEDURE — 0 GADOBENATE DIMEGLUMINE 529 MG/ML SOLUTION: Performed by: FAMILY MEDICINE

## 2019-01-01 PROCEDURE — 84484 ASSAY OF TROPONIN QUANT: CPT | Performed by: NURSE PRACTITIONER

## 2019-01-01 PROCEDURE — 25010000002 VANCOMYCIN PER 500 MG: Performed by: FAMILY MEDICINE

## 2019-01-01 PROCEDURE — 73590 X-RAY EXAM OF LOWER LEG: CPT

## 2019-01-01 PROCEDURE — 87086 URINE CULTURE/COLONY COUNT: CPT | Performed by: NURSE PRACTITIONER

## 2019-01-01 PROCEDURE — 82607 VITAMIN B-12: CPT | Performed by: PSYCHIATRY & NEUROLOGY

## 2019-01-01 PROCEDURE — 97166 OT EVAL MOD COMPLEX 45 MIN: CPT

## 2019-01-01 PROCEDURE — 80053 COMPREHEN METABOLIC PANEL: CPT | Performed by: PHYSICIAN ASSISTANT

## 2019-01-01 PROCEDURE — 25010000002 CEFTRIAXONE PER 250 MG: Performed by: EMERGENCY MEDICINE

## 2019-01-01 PROCEDURE — 25010000002 AZITHROMYCIN PER 500 MG: Performed by: EMERGENCY MEDICINE

## 2019-01-01 PROCEDURE — 73502 X-RAY EXAM HIP UNI 2-3 VIEWS: CPT

## 2019-01-01 PROCEDURE — 25010000002 TDAP 5-2.5-18.5 LF-MCG/0.5 SUSPENSION: Performed by: NURSE PRACTITIONER

## 2019-01-01 PROCEDURE — 25010000002 ONDANSETRON PER 1 MG: Performed by: NURSE ANESTHETIST, CERTIFIED REGISTERED

## 2019-01-01 PROCEDURE — 97161 PT EVAL LOW COMPLEX 20 MIN: CPT | Performed by: PHYSICAL THERAPIST

## 2019-01-01 PROCEDURE — 80053 COMPREHEN METABOLIC PANEL: CPT | Performed by: EMERGENCY MEDICINE

## 2019-01-01 PROCEDURE — 25010000002 FUROSEMIDE PER 20 MG: Performed by: FAMILY MEDICINE

## 2019-01-01 PROCEDURE — 25010000002 FENTANYL CITRATE (PF) 100 MCG/2ML SOLUTION: Performed by: EMERGENCY MEDICINE

## 2019-01-01 PROCEDURE — 25010000002 FENTANYL CITRATE (PF) 100 MCG/2ML SOLUTION: Performed by: ANESTHESIOLOGY

## 2019-01-01 PROCEDURE — 87086 URINE CULTURE/COLONY COUNT: CPT | Performed by: EMERGENCY MEDICINE

## 2019-01-01 PROCEDURE — 93005 ELECTROCARDIOGRAM TRACING: CPT | Performed by: NURSE PRACTITIONER

## 2019-01-01 PROCEDURE — 0JBQ0ZZ EXCISION OF RIGHT FOOT SUBCUTANEOUS TISSUE AND FASCIA, OPEN APPROACH: ICD-10-PCS | Performed by: PLASTIC SURGERY

## 2019-01-01 PROCEDURE — 83690 ASSAY OF LIPASE: CPT | Performed by: PHYSICIAN ASSISTANT

## 2019-01-01 PROCEDURE — 84443 ASSAY THYROID STIM HORMONE: CPT | Performed by: EMERGENCY MEDICINE

## 2019-01-01 PROCEDURE — 25010000002 ONDANSETRON PER 1 MG: Performed by: ORTHOPAEDIC SURGERY

## 2019-01-01 PROCEDURE — 87040 BLOOD CULTURE FOR BACTERIA: CPT | Performed by: FAMILY MEDICINE

## 2019-01-01 PROCEDURE — 90471 IMMUNIZATION ADMIN: CPT | Performed by: NURSE PRACTITIONER

## 2019-01-01 PROCEDURE — 25010000002 VANCOMYCIN 1 G RECONSTITUTED SOLUTION: Performed by: PHYSICIAN ASSISTANT

## 2019-01-01 PROCEDURE — 87070 CULTURE OTHR SPECIMN AEROBIC: CPT | Performed by: NURSE PRACTITIONER

## 2019-01-01 PROCEDURE — 81001 URINALYSIS AUTO W/SCOPE: CPT | Performed by: PHYSICIAN ASSISTANT

## 2019-01-01 PROCEDURE — 84145 PROCALCITONIN (PCT): CPT | Performed by: NURSE PRACTITIONER

## 2019-01-01 PROCEDURE — 36600 WITHDRAWAL OF ARTERIAL BLOOD: CPT

## 2019-01-01 PROCEDURE — 93971 EXTREMITY STUDY: CPT | Performed by: SURGERY

## 2019-01-01 PROCEDURE — 99213 OFFICE O/P EST LOW 20 MIN: CPT | Performed by: FAMILY MEDICINE

## 2019-01-01 PROCEDURE — 85025 COMPLETE CBC W/AUTO DIFF WBC: CPT | Performed by: PHYSICIAN ASSISTANT

## 2019-01-01 PROCEDURE — 25010000002 POTASSIUM CHLORIDE PER 2 MEQ: Performed by: FAMILY MEDICINE

## 2019-01-01 PROCEDURE — 83880 ASSAY OF NATRIURETIC PEPTIDE: CPT | Performed by: PHYSICIAN ASSISTANT

## 2019-01-01 PROCEDURE — 84484 ASSAY OF TROPONIN QUANT: CPT | Performed by: PHYSICIAN ASSISTANT

## 2019-01-01 DEVICE — TRI-LOCK BPS FEMORAL STEM 12/14 TAPER TRI-LOCK BPS W/GRIPTION SIZE 10 STD 115MM
Type: IMPLANTABLE DEVICE | Site: HIP | Status: FUNCTIONAL
Brand: TRI-LOCK GRIPTION

## 2019-01-01 DEVICE — SELF CENTERING BI-POLAR HEAD 28MM ID 53MM OD
Type: IMPLANTABLE DEVICE | Site: HIP | Status: FUNCTIONAL
Brand: SELF CENTERING

## 2019-01-01 DEVICE — PRT HIP BIPOL PRIM DEPUY 9525109/9525107: Type: IMPLANTABLE DEVICE | Site: HIP | Status: FUNCTIONAL

## 2019-01-01 DEVICE — ARTICUL/EZE FEMORAL HEAD DIAMETER 28MM +1.5 12/14 TAPER
Type: IMPLANTABLE DEVICE | Site: HIP | Status: FUNCTIONAL
Brand: ARTICUL/EZE

## 2019-01-01 RX ORDER — SODIUM CHLORIDE AND POTASSIUM CHLORIDE 150; 900 MG/100ML; MG/100ML
100 INJECTION, SOLUTION INTRAVENOUS CONTINUOUS
Status: DISCONTINUED | OUTPATIENT
Start: 2019-01-01 | End: 2019-01-01

## 2019-01-01 RX ORDER — ALBUTEROL SULFATE 2.5 MG/3ML
2.5 SOLUTION RESPIRATORY (INHALATION) EVERY 4 HOURS PRN
Status: DISCONTINUED | OUTPATIENT
Start: 2019-01-01 | End: 2019-01-01 | Stop reason: HOSPADM

## 2019-01-01 RX ORDER — OXYCODONE HYDROCHLORIDE 5 MG/1
10 TABLET ORAL EVERY 4 HOURS PRN
Status: DISCONTINUED | OUTPATIENT
Start: 2019-01-01 | End: 2019-01-01 | Stop reason: HOSPADM

## 2019-01-01 RX ORDER — POTASSIUM CHLORIDE 750 MG/1
20 CAPSULE, EXTENDED RELEASE ORAL 2 TIMES DAILY WITH MEALS
Status: DISCONTINUED | OUTPATIENT
Start: 2019-01-01 | End: 2019-01-01 | Stop reason: HOSPADM

## 2019-01-01 RX ORDER — SODIUM CHLORIDE 0.9 % (FLUSH) 0.9 %
3-10 SYRINGE (ML) INJECTION AS NEEDED
Status: DISCONTINUED | OUTPATIENT
Start: 2019-01-01 | End: 2019-01-01 | Stop reason: HOSPADM

## 2019-01-01 RX ORDER — MORPHINE SULFATE 15 MG/1
1 TABLET, EXTENDED RELEASE ORAL NIGHTLY
Qty: 15 EACH | Refills: 0 | Status: SHIPPED | OUTPATIENT
Start: 2019-01-01 | End: 2019-01-01 | Stop reason: SDUPTHER

## 2019-01-01 RX ORDER — MORPHINE SULFATE 15 MG/1
1 TABLET, EXTENDED RELEASE ORAL NIGHTLY
Qty: 15 EACH | Refills: 0 | Status: SHIPPED | OUTPATIENT
Start: 2019-01-01 | End: 2019-01-01

## 2019-01-01 RX ORDER — VENLAFAXINE HYDROCHLORIDE 75 MG/1
300 CAPSULE, EXTENDED RELEASE ORAL DAILY
Status: DISCONTINUED | OUTPATIENT
Start: 2019-01-01 | End: 2019-01-01 | Stop reason: HOSPADM

## 2019-01-01 RX ORDER — LEVETIRACETAM 500 MG/1
1000 TABLET, EXTENDED RELEASE ORAL EVERY 12 HOURS
Status: DISCONTINUED | OUTPATIENT
Start: 2019-01-01 | End: 2019-01-01 | Stop reason: HOSPADM

## 2019-01-01 RX ORDER — CYCLOBENZAPRINE HCL 5 MG
5 TABLET ORAL 2 TIMES DAILY
Status: DISCONTINUED | OUTPATIENT
Start: 2019-01-01 | End: 2019-01-01

## 2019-01-01 RX ORDER — SODIUM CHLORIDE 9 MG/ML
30 INJECTION, SOLUTION INTRAVENOUS CONTINUOUS
Status: DISCONTINUED | OUTPATIENT
Start: 2019-01-01 | End: 2019-01-01 | Stop reason: HOSPADM

## 2019-01-01 RX ORDER — POTASSIUM CHLORIDE 750 MG/1
20 CAPSULE, EXTENDED RELEASE ORAL
Status: DISCONTINUED | OUTPATIENT
Start: 2019-01-01 | End: 2019-01-01 | Stop reason: HOSPADM

## 2019-01-01 RX ORDER — ACETAMINOPHEN 325 MG/1
650 TABLET ORAL EVERY 4 HOURS PRN
Status: DISCONTINUED | OUTPATIENT
Start: 2019-01-01 | End: 2019-01-01 | Stop reason: HOSPADM

## 2019-01-01 RX ORDER — BISACODYL 10 MG
10 SUPPOSITORY, RECTAL RECTAL
COMMUNITY

## 2019-01-01 RX ORDER — FENTANYL CITRATE 50 UG/ML
25 INJECTION, SOLUTION INTRAMUSCULAR; INTRAVENOUS
Status: DISCONTINUED | OUTPATIENT
Start: 2019-01-01 | End: 2019-01-01 | Stop reason: HOSPADM

## 2019-01-01 RX ORDER — HYDRALAZINE HYDROCHLORIDE 20 MG/ML
5 INJECTION INTRAMUSCULAR; INTRAVENOUS
Status: DISCONTINUED | OUTPATIENT
Start: 2019-01-01 | End: 2019-01-01 | Stop reason: HOSPADM

## 2019-01-01 RX ORDER — FENTANYL CITRATE 50 UG/ML
25 INJECTION, SOLUTION INTRAMUSCULAR; INTRAVENOUS AS NEEDED
Status: DISCONTINUED | OUTPATIENT
Start: 2019-01-01 | End: 2019-01-01 | Stop reason: HOSPADM

## 2019-01-01 RX ORDER — NYSTATIN 100000 [USP'U]/G
POWDER TOPICAL
Qty: 60 G | Refills: 0 | Status: ON HOLD | OUTPATIENT
Start: 2019-01-01 | End: 2019-01-01

## 2019-01-01 RX ORDER — POTASSIUM CHLORIDE 7.45 MG/ML
10 INJECTION INTRAVENOUS
Status: DISCONTINUED | OUTPATIENT
Start: 2019-01-01 | End: 2019-01-01

## 2019-01-01 RX ORDER — TOLTERODINE 4 MG/1
4 CAPSULE, EXTENDED RELEASE ORAL DAILY
COMMUNITY
End: 2019-01-01 | Stop reason: HOSPADM

## 2019-01-01 RX ORDER — MIDAZOLAM HYDROCHLORIDE 1 MG/ML
2 INJECTION INTRAMUSCULAR; INTRAVENOUS
Status: DISCONTINUED | OUTPATIENT
Start: 2019-01-01 | End: 2019-01-01 | Stop reason: HOSPADM

## 2019-01-01 RX ORDER — VENLAFAXINE HYDROCHLORIDE 37.5 MG/1
37.5 CAPSULE, EXTENDED RELEASE ORAL NIGHTLY
Start: 2019-01-01

## 2019-01-01 RX ORDER — SODIUM CHLORIDE 0.9 % (FLUSH) 0.9 %
10 SYRINGE (ML) INJECTION EVERY 12 HOURS SCHEDULED
Status: DISCONTINUED | OUTPATIENT
Start: 2019-01-01 | End: 2019-01-01 | Stop reason: HOSPADM

## 2019-01-01 RX ORDER — OXYCODONE HYDROCHLORIDE 10 MG/1
10 TABLET ORAL EVERY 4 HOURS PRN
Qty: 30 TABLET | Refills: 0 | Status: SHIPPED | OUTPATIENT
Start: 2019-01-01 | End: 2019-01-01 | Stop reason: SDUPTHER

## 2019-01-01 RX ORDER — LIDOCAINE HYDROCHLORIDE 40 MG/ML
SOLUTION TOPICAL AS NEEDED
Status: DISCONTINUED | OUTPATIENT
Start: 2019-01-01 | End: 2019-01-01 | Stop reason: SURG

## 2019-01-01 RX ORDER — DULOXETIN HYDROCHLORIDE 30 MG/1
30 CAPSULE, DELAYED RELEASE ORAL DAILY
Status: DISCONTINUED | OUTPATIENT
Start: 2019-01-01 | End: 2019-01-01 | Stop reason: HOSPADM

## 2019-01-01 RX ORDER — GLYCOPYRROLATE 0.2 MG/ML
INJECTION INTRAMUSCULAR; INTRAVENOUS AS NEEDED
Status: DISCONTINUED | OUTPATIENT
Start: 2019-01-01 | End: 2019-01-01

## 2019-01-01 RX ORDER — SODIUM CHLORIDE 0.9 % (FLUSH) 0.9 %
3 SYRINGE (ML) INJECTION EVERY 12 HOURS SCHEDULED
Status: DISCONTINUED | OUTPATIENT
Start: 2019-01-01 | End: 2019-01-01 | Stop reason: HOSPADM

## 2019-01-01 RX ORDER — IPRATROPIUM BROMIDE AND ALBUTEROL SULFATE 2.5; .5 MG/3ML; MG/3ML
3 SOLUTION RESPIRATORY (INHALATION)
Status: DISCONTINUED | OUTPATIENT
Start: 2019-01-01 | End: 2019-01-01 | Stop reason: HOSPADM

## 2019-01-01 RX ORDER — OXYBUTYNIN CHLORIDE 5 MG/1
5 TABLET, EXTENDED RELEASE ORAL NIGHTLY
Status: DISCONTINUED | OUTPATIENT
Start: 2019-01-01 | End: 2019-01-01 | Stop reason: HOSPADM

## 2019-01-01 RX ORDER — POTASSIUM CHLORIDE 7.45 MG/ML
10 INJECTION INTRAVENOUS
Status: DISCONTINUED | OUTPATIENT
Start: 2019-01-01 | End: 2019-01-01 | Stop reason: HOSPADM

## 2019-01-01 RX ORDER — LOPERAMIDE HYDROCHLORIDE 2 MG/1
2 CAPSULE ORAL DAILY PRN
COMMUNITY
End: 2019-01-01 | Stop reason: HOSPADM

## 2019-01-01 RX ORDER — GABAPENTIN 100 MG/1
400 CAPSULE ORAL EVERY 12 HOURS SCHEDULED
Status: DISCONTINUED | OUTPATIENT
Start: 2019-01-01 | End: 2019-01-01

## 2019-01-01 RX ORDER — IPRATROPIUM BROMIDE AND ALBUTEROL SULFATE 2.5; .5 MG/3ML; MG/3ML
3 SOLUTION RESPIRATORY (INHALATION) ONCE AS NEEDED
Status: DISCONTINUED | OUTPATIENT
Start: 2019-01-01 | End: 2019-01-01 | Stop reason: HOSPADM

## 2019-01-01 RX ORDER — LEVETIRACETAM 500 MG/1
1000 TABLET, EXTENDED RELEASE ORAL DAILY
Status: DISCONTINUED | OUTPATIENT
Start: 2019-01-01 | End: 2019-01-01 | Stop reason: HOSPADM

## 2019-01-01 RX ORDER — LEVETIRACETAM 10 MG/ML
1000 INJECTION INTRAVASCULAR 2 TIMES DAILY
Status: DISCONTINUED | OUTPATIENT
Start: 2019-01-01 | End: 2019-01-01

## 2019-01-01 RX ORDER — OXYCODONE HYDROCHLORIDE AND ACETAMINOPHEN 5; 325 MG/1; MG/1
2 TABLET ORAL EVERY 4 HOURS PRN
Qty: 180 TABLET | Refills: 0 | Status: SHIPPED | OUTPATIENT
Start: 2019-01-01 | End: 2019-01-01 | Stop reason: SDUPTHER

## 2019-01-01 RX ORDER — METOPROLOL TARTRATE 50 MG/1
50 TABLET, FILM COATED ORAL DAILY
Qty: 30 TABLET | Refills: 0 | Status: SHIPPED | OUTPATIENT
Start: 2019-01-01

## 2019-01-01 RX ORDER — QUETIAPINE FUMARATE 100 MG/1
100 TABLET, FILM COATED ORAL EVERY 12 HOURS SCHEDULED
Status: DISCONTINUED | OUTPATIENT
Start: 2019-01-01 | End: 2019-01-01 | Stop reason: HOSPADM

## 2019-01-01 RX ORDER — CEPHALEXIN 500 MG/1
500 CAPSULE ORAL 3 TIMES DAILY
Qty: 21 CAPSULE | Refills: 0 | Status: SHIPPED | OUTPATIENT
Start: 2019-01-01 | End: 2019-01-01 | Stop reason: SDUPTHER

## 2019-01-01 RX ORDER — VENLAFAXINE HYDROCHLORIDE 75 MG/1
150 CAPSULE, EXTENDED RELEASE ORAL
Status: DISCONTINUED | OUTPATIENT
Start: 2019-01-01 | End: 2019-01-01 | Stop reason: HOSPADM

## 2019-01-01 RX ORDER — CLONAZEPAM 1 MG/1
1 TABLET ORAL 3 TIMES DAILY PRN
Status: DISCONTINUED | OUTPATIENT
Start: 2019-01-01 | End: 2019-01-01 | Stop reason: HOSPADM

## 2019-01-01 RX ORDER — OXYCODONE HYDROCHLORIDE 10 MG/1
5 TABLET ORAL EVERY 4 HOURS PRN
Qty: 90 TABLET | Refills: 0
Start: 2019-01-01 | End: 2019-01-01 | Stop reason: SDUPTHER

## 2019-01-01 RX ORDER — ALBUTEROL SULFATE 2.5 MG/3ML
2.5 SOLUTION RESPIRATORY (INHALATION)
Status: DISCONTINUED | OUTPATIENT
Start: 2019-01-01 | End: 2019-01-01 | Stop reason: HOSPADM

## 2019-01-01 RX ORDER — CLONAZEPAM 1 MG/1
1 TABLET ORAL 3 TIMES DAILY PRN
Qty: 42 TABLET | Refills: 0 | Status: ON HOLD | OUTPATIENT
Start: 2019-01-01 | End: 2019-01-01 | Stop reason: SDUPTHER

## 2019-01-01 RX ORDER — CYCLOBENZAPRINE HCL 10 MG
10 TABLET ORAL 3 TIMES DAILY
Status: DISCONTINUED | OUTPATIENT
Start: 2019-01-01 | End: 2019-01-01 | Stop reason: HOSPADM

## 2019-01-01 RX ORDER — ERGOCALCIFEROL 1.25 MG/1
50000 CAPSULE ORAL WEEKLY
COMMUNITY

## 2019-01-01 RX ORDER — PHENYTOIN SODIUM 50 MG/ML
100 INJECTION, SOLUTION INTRAMUSCULAR; INTRAVENOUS EVERY 12 HOURS
Status: DISCONTINUED | OUTPATIENT
Start: 2019-01-01 | End: 2019-01-01

## 2019-01-01 RX ORDER — LABETALOL HYDROCHLORIDE 5 MG/ML
5 INJECTION, SOLUTION INTRAVENOUS
Status: DISCONTINUED | OUTPATIENT
Start: 2019-01-01 | End: 2019-01-01 | Stop reason: HOSPADM

## 2019-01-01 RX ORDER — BISACODYL 10 MG
10 SUPPOSITORY, RECTAL RECTAL
Status: DISCONTINUED | OUTPATIENT
Start: 2019-01-01 | End: 2019-01-01 | Stop reason: HOSPADM

## 2019-01-01 RX ORDER — QUETIAPINE FUMARATE 25 MG/1
25 TABLET, FILM COATED ORAL NIGHTLY
Status: DISCONTINUED | OUTPATIENT
Start: 2019-01-01 | End: 2019-01-01 | Stop reason: HOSPADM

## 2019-01-01 RX ORDER — LORAZEPAM 2 MG/ML
1 INJECTION INTRAMUSCULAR EVERY 4 HOURS PRN
Status: DISCONTINUED | OUTPATIENT
Start: 2019-01-01 | End: 2019-01-01 | Stop reason: HOSPADM

## 2019-01-01 RX ORDER — ONDANSETRON 2 MG/ML
4 INJECTION INTRAMUSCULAR; INTRAVENOUS AS NEEDED
Status: DISCONTINUED | OUTPATIENT
Start: 2019-01-01 | End: 2019-01-01 | Stop reason: HOSPADM

## 2019-01-01 RX ORDER — OXYCODONE HYDROCHLORIDE AND ACETAMINOPHEN 5; 325 MG/1; MG/1
2 TABLET ORAL EVERY 4 HOURS PRN
Qty: 180 TABLET | Refills: 0 | Status: SHIPPED | OUTPATIENT
Start: 2019-01-01 | End: 2019-01-01

## 2019-01-01 RX ORDER — METHYLPREDNISOLONE 4 MG/1
TABLET ORAL
Qty: 1 EACH | Refills: 0 | Status: SHIPPED | OUTPATIENT
Start: 2019-01-01 | End: 2019-01-01

## 2019-01-01 RX ORDER — NALOXONE HCL 0.4 MG/ML
0.4 VIAL (ML) INJECTION
Status: DISCONTINUED | OUTPATIENT
Start: 2019-01-01 | End: 2019-01-01 | Stop reason: HOSPADM

## 2019-01-01 RX ORDER — QUETIAPINE FUMARATE 25 MG/1
50 TABLET, FILM COATED ORAL EVERY 12 HOURS SCHEDULED
Status: DISCONTINUED | OUTPATIENT
Start: 2019-01-01 | End: 2019-01-01

## 2019-01-01 RX ORDER — PHENYTOIN SODIUM 100 MG/1
100 CAPSULE, EXTENDED RELEASE ORAL EVERY 12 HOURS SCHEDULED
Status: DISCONTINUED | OUTPATIENT
Start: 2019-01-01 | End: 2019-01-01

## 2019-01-01 RX ORDER — NALOXONE HCL 0.4 MG/ML
0.4 VIAL (ML) INJECTION
Status: DISCONTINUED | OUTPATIENT
Start: 2019-01-01 | End: 2019-01-01

## 2019-01-01 RX ORDER — POTASSIUM CHLORIDE 14.9 MG/ML
20 INJECTION INTRAVENOUS
Status: COMPLETED | OUTPATIENT
Start: 2019-01-01 | End: 2019-01-01

## 2019-01-01 RX ORDER — CLONAZEPAM 1 MG/1
0.5 TABLET ORAL 2 TIMES DAILY PRN
Qty: 42 TABLET | Refills: 0
Start: 2019-01-01 | End: 2019-01-01 | Stop reason: ALTCHOICE

## 2019-01-01 RX ORDER — NYSTATIN 100000 [USP'U]/G
POWDER TOPICAL EVERY 8 HOURS SCHEDULED
Status: DISCONTINUED | OUTPATIENT
Start: 2019-01-01 | End: 2019-01-01 | Stop reason: HOSPADM

## 2019-01-01 RX ORDER — OXYCODONE HYDROCHLORIDE 10 MG/1
10 TABLET ORAL EVERY 4 HOURS PRN
Qty: 90 TABLET | Refills: 0 | Status: ON HOLD | OUTPATIENT
Start: 2019-01-01 | End: 2019-01-01 | Stop reason: SDUPTHER

## 2019-01-01 RX ORDER — ROPIVACAINE HYDROCHLORIDE 2 MG/ML
INJECTION, SOLUTION EPIDURAL; INFILTRATION; PERINEURAL
Status: COMPLETED | OUTPATIENT
Start: 2019-01-01 | End: 2019-01-01

## 2019-01-01 RX ORDER — DEXAMETHASONE SODIUM PHOSPHATE 4 MG/ML
INJECTION, SOLUTION INTRA-ARTICULAR; INTRALESIONAL; INTRAMUSCULAR; INTRAVENOUS; SOFT TISSUE AS NEEDED
Status: DISCONTINUED | OUTPATIENT
Start: 2019-01-01 | End: 2019-01-01 | Stop reason: SURG

## 2019-01-01 RX ORDER — FOSPHENYTOIN SODIUM 50 MG/ML
100 INJECTION, SOLUTION INTRAMUSCULAR; INTRAVENOUS EVERY 12 HOURS SCHEDULED
Status: DISCONTINUED | OUTPATIENT
Start: 2019-01-01 | End: 2019-01-01

## 2019-01-01 RX ORDER — MEPERIDINE HYDROCHLORIDE 25 MG/ML
12.5 INJECTION INTRAMUSCULAR; INTRAVENOUS; SUBCUTANEOUS
Status: DISCONTINUED | OUTPATIENT
Start: 2019-01-01 | End: 2019-01-01 | Stop reason: HOSPADM

## 2019-01-01 RX ORDER — TRAZODONE HYDROCHLORIDE 50 MG/1
50 TABLET ORAL NIGHTLY
Status: DISCONTINUED | OUTPATIENT
Start: 2019-01-01 | End: 2019-01-01 | Stop reason: HOSPADM

## 2019-01-01 RX ORDER — PROPOFOL 10 MG/ML
VIAL (ML) INTRAVENOUS AS NEEDED
Status: DISCONTINUED | OUTPATIENT
Start: 2019-01-01 | End: 2019-01-01 | Stop reason: SURG

## 2019-01-01 RX ORDER — FLUMAZENIL 0.1 MG/ML
0.2 INJECTION INTRAVENOUS AS NEEDED
Status: DISCONTINUED | OUTPATIENT
Start: 2019-01-01 | End: 2019-01-01 | Stop reason: HOSPADM

## 2019-01-01 RX ORDER — ERGOCALCIFEROL 1.25 MG/1
50000 CAPSULE ORAL ONCE
Status: COMPLETED | OUTPATIENT
Start: 2019-01-01 | End: 2019-01-01

## 2019-01-01 RX ORDER — NYSTATIN 100000 [USP'U]/G
POWDER TOPICAL
Qty: 60 G | Refills: 0 | Status: SHIPPED | OUTPATIENT
Start: 2019-01-01 | End: 2019-01-01 | Stop reason: SDUPTHER

## 2019-01-01 RX ORDER — NYSTATIN 100000 [USP'U]/G
POWDER TOPICAL 3 TIMES DAILY
Status: DISCONTINUED | OUTPATIENT
Start: 2019-01-01 | End: 2019-01-01 | Stop reason: SDUPTHER

## 2019-01-01 RX ORDER — ALBUTEROL SULFATE 90 UG/1
AEROSOL, METERED RESPIRATORY (INHALATION)
Qty: 8.5 G | Refills: 0 | OUTPATIENT
Start: 2019-01-01

## 2019-01-01 RX ORDER — GABAPENTIN 300 MG/1
300 CAPSULE ORAL EVERY 12 HOURS SCHEDULED
Status: DISCONTINUED | OUTPATIENT
Start: 2019-01-01 | End: 2019-01-01 | Stop reason: HOSPADM

## 2019-01-01 RX ORDER — GABAPENTIN 300 MG/1
600 CAPSULE ORAL EVERY 8 HOURS SCHEDULED
Status: DISCONTINUED | OUTPATIENT
Start: 2019-01-01 | End: 2019-01-01 | Stop reason: HOSPADM

## 2019-01-01 RX ORDER — SODIUM CHLORIDE, SODIUM LACTATE, POTASSIUM CHLORIDE, CALCIUM CHLORIDE 600; 310; 30; 20 MG/100ML; MG/100ML; MG/100ML; MG/100ML
100 INJECTION, SOLUTION INTRAVENOUS CONTINUOUS
Status: DISCONTINUED | OUTPATIENT
Start: 2019-01-01 | End: 2019-01-01 | Stop reason: HOSPADM

## 2019-01-01 RX ORDER — POTASSIUM CHLORIDE 20 MEQ/1
20 TABLET, EXTENDED RELEASE ORAL 3 TIMES DAILY
COMMUNITY

## 2019-01-01 RX ORDER — BISACODYL 10 MG
10 SUPPOSITORY, RECTAL RECTAL DAILY PRN
Status: DISCONTINUED | OUTPATIENT
Start: 2019-01-01 | End: 2019-01-01 | Stop reason: HOSPADM

## 2019-01-01 RX ORDER — SODIUM CHLORIDE 0.9 % (FLUSH) 0.9 %
10 SYRINGE (ML) INJECTION AS NEEDED
Status: DISCONTINUED | OUTPATIENT
Start: 2019-01-01 | End: 2019-01-01 | Stop reason: HOSPADM

## 2019-01-01 RX ORDER — METOCLOPRAMIDE HYDROCHLORIDE 5 MG/ML
5 INJECTION INTRAMUSCULAR; INTRAVENOUS
Status: DISCONTINUED | OUTPATIENT
Start: 2019-01-01 | End: 2019-01-01 | Stop reason: HOSPADM

## 2019-01-01 RX ORDER — CLONAZEPAM 0.5 MG/1
0.5 TABLET ORAL 3 TIMES DAILY PRN
Status: DISCONTINUED | OUTPATIENT
Start: 2019-01-01 | End: 2019-01-01 | Stop reason: HOSPADM

## 2019-01-01 RX ORDER — OXYCODONE HYDROCHLORIDE 5 MG/1
5 TABLET ORAL EVERY 4 HOURS PRN
Status: DISCONTINUED | OUTPATIENT
Start: 2019-01-01 | End: 2019-01-01 | Stop reason: HOSPADM

## 2019-01-01 RX ORDER — KETOROLAC TROMETHAMINE 30 MG/ML
INJECTION, SOLUTION INTRAMUSCULAR; INTRAVENOUS AS NEEDED
Status: DISCONTINUED | OUTPATIENT
Start: 2019-01-01 | End: 2019-01-01 | Stop reason: SURG

## 2019-01-01 RX ORDER — LEVETIRACETAM 500 MG/1
1000 TABLET ORAL 2 TIMES DAILY
Status: DISCONTINUED | OUTPATIENT
Start: 2019-01-01 | End: 2019-01-01 | Stop reason: HOSPADM

## 2019-01-01 RX ORDER — GABAPENTIN 600 MG/1
600 TABLET ORAL 3 TIMES DAILY
Qty: 90 TABLET | Refills: 1 | Status: ON HOLD | OUTPATIENT
Start: 2019-01-01 | End: 2019-01-01 | Stop reason: SDUPTHER

## 2019-01-01 RX ORDER — POTASSIUM CHLORIDE 750 MG/1
20 CAPSULE, EXTENDED RELEASE ORAL 2 TIMES DAILY WITH MEALS
Start: 2019-01-01 | End: 2019-01-01 | Stop reason: DRUGHIGH

## 2019-01-01 RX ORDER — VENLAFAXINE HYDROCHLORIDE 37.5 MG/1
37.5 CAPSULE, EXTENDED RELEASE ORAL DAILY
Status: DISCONTINUED | OUTPATIENT
Start: 2019-01-01 | End: 2019-01-01 | Stop reason: HOSPADM

## 2019-01-01 RX ORDER — SODIUM CHLORIDE 9 MG/ML
100 INJECTION, SOLUTION INTRAVENOUS CONTINUOUS
Status: DISCONTINUED | OUTPATIENT
Start: 2019-01-01 | End: 2019-01-01

## 2019-01-01 RX ORDER — SODIUM CHLORIDE 9 MG/ML
100 INJECTION, SOLUTION INTRAVENOUS CONTINUOUS
Status: DISCONTINUED | OUTPATIENT
Start: 2019-01-01 | End: 2019-01-01 | Stop reason: HOSPADM

## 2019-01-01 RX ORDER — MORPHINE SULFATE 2 MG/ML
2 INJECTION, SOLUTION INTRAMUSCULAR; INTRAVENOUS
Status: DISCONTINUED | OUTPATIENT
Start: 2019-01-01 | End: 2019-01-01 | Stop reason: HOSPADM

## 2019-01-01 RX ORDER — ROCURONIUM BROMIDE 10 MG/ML
INJECTION, SOLUTION INTRAVENOUS AS NEEDED
Status: DISCONTINUED | OUTPATIENT
Start: 2019-01-01 | End: 2019-01-01 | Stop reason: SURG

## 2019-01-01 RX ORDER — LEVOTHYROXINE SODIUM 0.1 MG/1
100 TABLET ORAL
Status: DISCONTINUED | OUTPATIENT
Start: 2019-01-01 | End: 2019-01-01 | Stop reason: HOSPADM

## 2019-01-01 RX ORDER — CLONAZEPAM 0.5 MG/1
0.5 TABLET ORAL 2 TIMES DAILY
Qty: 30 TABLET | Refills: 0 | Status: SHIPPED | OUTPATIENT
Start: 2019-01-01 | End: 2019-10-22

## 2019-01-01 RX ORDER — LEVETIRACETAM 500 MG/1
1000 TABLET, EXTENDED RELEASE ORAL EVERY 12 HOURS
Status: ON HOLD
Start: 2019-01-01 | End: 2019-01-01 | Stop reason: SDUPTHER

## 2019-01-01 RX ORDER — QUETIAPINE FUMARATE 25 MG/1
25 TABLET, FILM COATED ORAL EVERY 12 HOURS SCHEDULED
Status: DISCONTINUED | OUTPATIENT
Start: 2019-01-01 | End: 2019-01-01

## 2019-01-01 RX ORDER — OXYCODONE HYDROCHLORIDE 5 MG/1
5 TABLET ORAL EVERY 4 HOURS PRN
Qty: 90 TABLET | Refills: 0 | Status: SHIPPED | OUTPATIENT
Start: 2019-01-01 | End: 2019-10-22

## 2019-01-01 RX ORDER — LIDOCAINE HYDROCHLORIDE 20 MG/ML
INJECTION, SOLUTION INFILTRATION; PERINEURAL AS NEEDED
Status: DISCONTINUED | OUTPATIENT
Start: 2019-01-01 | End: 2019-01-01 | Stop reason: SURG

## 2019-01-01 RX ORDER — SOLIFENACIN SUCCINATE 10 MG/1
10 TABLET, FILM COATED ORAL DAILY
Qty: 30 TABLET | Refills: 6 | Status: ON HOLD | OUTPATIENT
Start: 2019-01-01 | End: 2019-01-01

## 2019-01-01 RX ORDER — CLONAZEPAM 1 MG/1
1 TABLET ORAL 3 TIMES DAILY PRN
Qty: 12 TABLET | Refills: 0 | Status: SHIPPED | OUTPATIENT
Start: 2019-01-01 | End: 2019-01-01 | Stop reason: SDUPTHER

## 2019-01-01 RX ORDER — PHENAZOPYRIDINE HYDROCHLORIDE 100 MG/1
100 TABLET, FILM COATED ORAL 2 TIMES DAILY
Qty: 60 TABLET | Refills: 2 | Status: ON HOLD | OUTPATIENT
Start: 2019-01-01 | End: 2019-01-01

## 2019-01-01 RX ORDER — MAGNESIUM SULFATE HEPTAHYDRATE 40 MG/ML
2 INJECTION, SOLUTION INTRAVENOUS AS NEEDED
Status: DISCONTINUED | OUTPATIENT
Start: 2019-01-01 | End: 2019-01-01 | Stop reason: HOSPADM

## 2019-01-01 RX ORDER — OXYCODONE HYDROCHLORIDE 10 MG/1
10 TABLET ORAL EVERY 4 HOURS PRN
Qty: 60 TABLET | Refills: 0 | Status: SHIPPED | OUTPATIENT
Start: 2019-01-01 | End: 2019-01-01 | Stop reason: SDUPTHER

## 2019-01-01 RX ORDER — ALBUTEROL SULFATE 2.5 MG/3ML
2.5 SOLUTION RESPIRATORY (INHALATION)
Status: DISCONTINUED | OUTPATIENT
Start: 2019-01-01 | End: 2019-01-01

## 2019-01-01 RX ORDER — GABAPENTIN 300 MG/1
600 CAPSULE ORAL 3 TIMES DAILY
Status: DISCONTINUED | OUTPATIENT
Start: 2019-01-01 | End: 2019-01-01 | Stop reason: HOSPADM

## 2019-01-01 RX ORDER — OXYBUTYNIN CHLORIDE 5 MG/1
5 TABLET, EXTENDED RELEASE ORAL DAILY
Status: DISCONTINUED | OUTPATIENT
Start: 2019-01-01 | End: 2019-01-01 | Stop reason: HOSPADM

## 2019-01-01 RX ORDER — MULTIVIT WITH MINERALS/LUTEIN
250 TABLET ORAL 2 TIMES DAILY
COMMUNITY

## 2019-01-01 RX ORDER — MAGNESIUM HYDROXIDE 1200 MG/15ML
LIQUID ORAL AS NEEDED
Status: DISCONTINUED | OUTPATIENT
Start: 2019-01-01 | End: 2019-01-01 | Stop reason: HOSPADM

## 2019-01-01 RX ORDER — CEPHALEXIN 500 MG/1
500 CAPSULE ORAL 2 TIMES DAILY
Qty: 14 CAPSULE | Refills: 0 | Status: ON HOLD | OUTPATIENT
Start: 2019-01-01 | End: 2019-01-01

## 2019-01-01 RX ORDER — GABAPENTIN 250 MG/5ML
600 SOLUTION ORAL 3 TIMES DAILY
Status: DISCONTINUED | OUTPATIENT
Start: 2019-01-01 | End: 2019-01-01 | Stop reason: HOSPADM

## 2019-01-01 RX ORDER — NITROGLYCERIN 0.4 MG/1
0.4 TABLET SUBLINGUAL
Status: DISCONTINUED | OUTPATIENT
Start: 2019-01-01 | End: 2019-01-01 | Stop reason: HOSPADM

## 2019-01-01 RX ORDER — CEFAZOLIN SODIUM 1 G/3ML
INJECTION, POWDER, FOR SOLUTION INTRAMUSCULAR; INTRAVENOUS AS NEEDED
Status: DISCONTINUED | OUTPATIENT
Start: 2019-01-01 | End: 2019-01-01 | Stop reason: SURG

## 2019-01-01 RX ORDER — METOPROLOL TARTRATE 50 MG/1
50 TABLET, FILM COATED ORAL DAILY
Qty: 30 TABLET | Refills: 0 | Status: ON HOLD | OUTPATIENT
Start: 2019-01-01 | End: 2019-01-01

## 2019-01-01 RX ORDER — CYANOCOBALAMIN 1000 UG/ML
1000 INJECTION, SOLUTION INTRAMUSCULAR; SUBCUTANEOUS ONCE
Status: COMPLETED | OUTPATIENT
Start: 2019-01-01 | End: 2019-01-01

## 2019-01-01 RX ORDER — ONDANSETRON 2 MG/ML
4 INJECTION INTRAMUSCULAR; INTRAVENOUS EVERY 6 HOURS PRN
Status: DISCONTINUED | OUTPATIENT
Start: 2019-01-01 | End: 2019-01-01 | Stop reason: HOSPADM

## 2019-01-01 RX ORDER — CLONAZEPAM 1 MG/1
1 TABLET ORAL 3 TIMES DAILY PRN
Qty: 42 TABLET | Refills: 0 | Status: SHIPPED | OUTPATIENT
Start: 2019-01-01 | End: 2019-01-01 | Stop reason: SDUPTHER

## 2019-01-01 RX ORDER — DEXTROSE AND SODIUM CHLORIDE 5; .9 G/100ML; G/100ML
50 INJECTION, SOLUTION INTRAVENOUS CONTINUOUS
Status: DISCONTINUED | OUTPATIENT
Start: 2019-01-01 | End: 2019-01-01

## 2019-01-01 RX ORDER — OXYCODONE HYDROCHLORIDE AND ACETAMINOPHEN 5; 325 MG/1; MG/1
1 TABLET ORAL EVERY 4 HOURS PRN
Status: DISCONTINUED | OUTPATIENT
Start: 2019-01-01 | End: 2019-01-01 | Stop reason: HOSPADM

## 2019-01-01 RX ORDER — METOPROLOL TARTRATE 50 MG/1
50 TABLET, FILM COATED ORAL DAILY
Status: DISCONTINUED | OUTPATIENT
Start: 2019-01-01 | End: 2019-01-01 | Stop reason: HOSPADM

## 2019-01-01 RX ORDER — CYCLOBENZAPRINE HCL 10 MG
5 TABLET ORAL 3 TIMES DAILY
Status: DISCONTINUED | OUTPATIENT
Start: 2019-01-01 | End: 2019-01-01

## 2019-01-01 RX ORDER — QUETIAPINE FUMARATE 300 MG/1
300 TABLET, FILM COATED ORAL NIGHTLY
COMMUNITY
End: 2019-01-01 | Stop reason: HOSPADM

## 2019-01-01 RX ORDER — VENLAFAXINE HYDROCHLORIDE 75 MG/1
150 CAPSULE, EXTENDED RELEASE ORAL DAILY
Status: DISCONTINUED | OUTPATIENT
Start: 2019-01-01 | End: 2019-01-01 | Stop reason: HOSPADM

## 2019-01-01 RX ORDER — FENTANYL CITRATE 50 UG/ML
25 INJECTION, SOLUTION INTRAMUSCULAR; INTRAVENOUS ONCE
Status: COMPLETED | OUTPATIENT
Start: 2019-01-01 | End: 2019-01-01

## 2019-01-01 RX ORDER — GABAPENTIN 600 MG/1
300 TABLET ORAL 2 TIMES DAILY
Qty: 90 TABLET | Refills: 1
Start: 2019-01-01

## 2019-01-01 RX ORDER — VENLAFAXINE HYDROCHLORIDE 75 MG/1
75 CAPSULE, EXTENDED RELEASE ORAL DAILY
Status: DISCONTINUED | OUTPATIENT
Start: 2019-01-01 | End: 2019-01-01

## 2019-01-01 RX ORDER — ROPIVACAINE HYDROCHLORIDE 5 MG/ML
INJECTION, SOLUTION EPIDURAL; INFILTRATION; PERINEURAL
Status: COMPLETED | OUTPATIENT
Start: 2019-01-01 | End: 2019-01-01

## 2019-01-01 RX ORDER — ONDANSETRON 4 MG/1
4 TABLET, FILM COATED ORAL EVERY 6 HOURS PRN
Status: DISCONTINUED | OUTPATIENT
Start: 2019-01-01 | End: 2019-01-01 | Stop reason: HOSPADM

## 2019-01-01 RX ORDER — NALOXONE HCL 0.4 MG/ML
0.04 VIAL (ML) INJECTION AS NEEDED
Status: DISCONTINUED | OUTPATIENT
Start: 2019-01-01 | End: 2019-01-01 | Stop reason: HOSPADM

## 2019-01-01 RX ORDER — ALBUTEROL SULFATE 90 UG/1
2 AEROSOL, METERED RESPIRATORY (INHALATION) 4 TIMES DAILY
Status: ON HOLD | COMMUNITY
End: 2019-01-01 | Stop reason: SDDI

## 2019-01-01 RX ORDER — ONDANSETRON 2 MG/ML
INJECTION INTRAMUSCULAR; INTRAVENOUS AS NEEDED
Status: DISCONTINUED | OUTPATIENT
Start: 2019-01-01 | End: 2019-01-01 | Stop reason: SURG

## 2019-01-01 RX ORDER — MIDAZOLAM HYDROCHLORIDE 1 MG/ML
1 INJECTION INTRAMUSCULAR; INTRAVENOUS
Status: DISCONTINUED | OUTPATIENT
Start: 2019-01-01 | End: 2019-01-01 | Stop reason: HOSPADM

## 2019-01-01 RX ORDER — LEVETIRACETAM 500 MG/1
1000 TABLET ORAL 2 TIMES DAILY
Status: DISCONTINUED | OUTPATIENT
Start: 2019-01-01 | End: 2019-01-01

## 2019-01-01 RX ORDER — MORPHINE SULFATE 15 MG/1
15 TABLET, FILM COATED, EXTENDED RELEASE ORAL NIGHTLY
Status: DISCONTINUED | OUTPATIENT
Start: 2019-01-01 | End: 2019-01-01 | Stop reason: HOSPADM

## 2019-01-01 RX ORDER — MAGNESIUM SULFATE HEPTAHYDRATE 40 MG/ML
4 INJECTION, SOLUTION INTRAVENOUS AS NEEDED
Status: DISCONTINUED | OUTPATIENT
Start: 2019-01-01 | End: 2019-01-01 | Stop reason: HOSPADM

## 2019-01-01 RX ORDER — CYCLOBENZAPRINE HCL 10 MG
10 TABLET ORAL 3 TIMES DAILY
Qty: 90 TABLET | Refills: 0 | Status: SHIPPED | OUTPATIENT
Start: 2019-01-01 | End: 2019-01-01 | Stop reason: HOSPADM

## 2019-01-01 RX ORDER — FUROSEMIDE 10 MG/ML
20 INJECTION INTRAMUSCULAR; INTRAVENOUS ONCE
Status: COMPLETED | OUTPATIENT
Start: 2019-01-01 | End: 2019-01-01

## 2019-01-01 RX ORDER — HYDROMORPHONE HYDROCHLORIDE 2 MG/1
1 TABLET ORAL
Status: DISCONTINUED | OUTPATIENT
Start: 2019-01-01 | End: 2019-01-01

## 2019-01-01 RX ORDER — OXYCODONE HYDROCHLORIDE 10 MG/1
10 TABLET ORAL EVERY 4 HOURS PRN
Qty: 60 TABLET | Refills: 0 | Status: ON HOLD | OUTPATIENT
Start: 2019-01-01 | End: 2019-01-01 | Stop reason: SDUPTHER

## 2019-01-01 RX ORDER — DIAZEPAM 5 MG/1
5 TABLET ORAL EVERY 6 HOURS PRN
Status: DISCONTINUED | OUTPATIENT
Start: 2019-01-01 | End: 2019-01-01 | Stop reason: HOSPADM

## 2019-01-01 RX ORDER — POTASSIUM CHLORIDE 750 MG/1
20 CAPSULE, EXTENDED RELEASE ORAL
Status: DISCONTINUED | OUTPATIENT
Start: 2019-01-01 | End: 2019-01-01

## 2019-01-01 RX ORDER — VANCOMYCIN HYDROCHLORIDE 1 G/200ML
1000 INJECTION, SOLUTION INTRAVENOUS EVERY 12 HOURS
Status: DISCONTINUED | OUTPATIENT
Start: 2019-01-01 | End: 2019-01-01

## 2019-01-01 RX ORDER — POTASSIUM CHLORIDE 7.45 MG/ML
10 INJECTION INTRAVENOUS
Status: COMPLETED | OUTPATIENT
Start: 2019-01-01 | End: 2019-01-01

## 2019-01-01 RX ORDER — METOPROLOL TARTRATE 50 MG/1
50 TABLET, FILM COATED ORAL DAILY
Status: DISCONTINUED | OUTPATIENT
Start: 2019-01-01 | End: 2019-01-01

## 2019-01-01 RX ORDER — SODIUM CHLORIDE 0.9 % (FLUSH) 0.9 %
1-10 SYRINGE (ML) INJECTION AS NEEDED
Status: DISCONTINUED | OUTPATIENT
Start: 2019-01-01 | End: 2019-01-01 | Stop reason: HOSPADM

## 2019-01-01 RX ORDER — LOPERAMIDE HYDROCHLORIDE 2 MG/1
2 CAPSULE ORAL 4 TIMES DAILY PRN
Status: DISCONTINUED | OUTPATIENT
Start: 2019-01-01 | End: 2019-01-01 | Stop reason: HOSPADM

## 2019-01-01 RX ORDER — CEPHALEXIN 500 MG/1
500 CAPSULE ORAL 3 TIMES DAILY
Qty: 21 CAPSULE | Refills: 0 | Status: SHIPPED | OUTPATIENT
Start: 2019-01-01 | End: 2019-01-01

## 2019-01-01 RX ORDER — NYSTATIN 100000 [USP'U]/G
POWDER TOPICAL 2 TIMES DAILY
COMMUNITY

## 2019-01-01 RX ORDER — ACETAMINOPHEN 500 MG
1000 TABLET ORAL ONCE
Status: COMPLETED | OUTPATIENT
Start: 2019-01-01 | End: 2019-01-01

## 2019-01-01 RX ORDER — NYSTATIN 100000 [USP'U]/G
POWDER TOPICAL
Qty: 60 G | Refills: 0 | Status: SHIPPED | OUTPATIENT
Start: 2019-01-01

## 2019-01-01 RX ORDER — OXYCODONE HYDROCHLORIDE AND ACETAMINOPHEN 5; 325 MG/1; MG/1
2 TABLET ORAL EVERY 4 HOURS PRN
Status: DISCONTINUED | OUTPATIENT
Start: 2019-01-01 | End: 2019-01-01 | Stop reason: HOSPADM

## 2019-01-01 RX ORDER — POTASSIUM CHLORIDE 7.45 MG/ML
10 INJECTION INTRAVENOUS
Status: DISPENSED | OUTPATIENT
Start: 2019-01-01 | End: 2019-01-01

## 2019-01-01 RX ORDER — OXYCODONE AND ACETAMINOPHEN 10; 325 MG/1; MG/1
1 TABLET ORAL EVERY 4 HOURS PRN
Status: DISCONTINUED | OUTPATIENT
Start: 2019-01-01 | End: 2019-01-01 | Stop reason: HOSPADM

## 2019-01-01 RX ORDER — OMEGA-3S/DHA/EPA/FISH OIL/D3 300MG-1000
50000 CAPSULE ORAL ONCE
Status: DISCONTINUED | OUTPATIENT
Start: 2019-01-01 | End: 2019-01-01

## 2019-01-01 RX ORDER — OXYCODONE HYDROCHLORIDE AND ACETAMINOPHEN 5; 325 MG/1; MG/1
2 TABLET ORAL ONCE
Status: COMPLETED | OUTPATIENT
Start: 2019-01-01 | End: 2019-01-01

## 2019-01-01 RX ORDER — LEVETIRACETAM 500 MG/1
1000 TABLET, EXTENDED RELEASE ORAL DAILY
Start: 2019-01-01

## 2019-01-01 RX ORDER — ACETAMINOPHEN 325 MG/1
650 TABLET ORAL EVERY 6 HOURS PRN
Status: DISCONTINUED | OUTPATIENT
Start: 2019-01-01 | End: 2019-01-01 | Stop reason: HOSPADM

## 2019-01-01 RX ORDER — TOLTERODINE 2 MG/1
2 CAPSULE, EXTENDED RELEASE ORAL DAILY
COMMUNITY
End: 2019-01-01 | Stop reason: CLARIF

## 2019-01-01 RX ORDER — OXYCODONE HYDROCHLORIDE 10 MG/1
5 TABLET ORAL EVERY 4 HOURS PRN
Qty: 90 TABLET | Refills: 0
Start: 2019-01-01 | End: 2019-01-01 | Stop reason: DRUGHIGH

## 2019-01-01 RX ORDER — VANCOMYCIN HYDROCHLORIDE 1 G/200ML
1000 INJECTION, SOLUTION INTRAVENOUS EVERY 12 HOURS
Status: COMPLETED | OUTPATIENT
Start: 2019-01-01 | End: 2019-01-01

## 2019-01-01 RX ORDER — QUETIAPINE FUMARATE 100 MG/1
25 TABLET, FILM COATED ORAL NIGHTLY
Start: 2019-01-01

## 2019-01-01 RX ORDER — OXYCODONE AND ACETAMINOPHEN 10; 325 MG/1; MG/1
1 TABLET ORAL ONCE AS NEEDED
Status: COMPLETED | OUTPATIENT
Start: 2019-01-01 | End: 2019-01-01

## 2019-01-01 RX ORDER — MULTIVITAMIN
1 TABLET ORAL DAILY
COMMUNITY

## 2019-01-01 RX ORDER — CETIRIZINE HYDROCHLORIDE 10 MG/1
10 TABLET ORAL DAILY
Status: DISCONTINUED | OUTPATIENT
Start: 2019-01-01 | End: 2019-01-01 | Stop reason: HOSPADM

## 2019-01-01 RX ORDER — POTASSIUM CHLORIDE, DEXTROSE MONOHYDRATE 150; 5 MG/100ML; G/100ML
30 INJECTION, SOLUTION INTRAVENOUS CONTINUOUS
Status: DISCONTINUED | OUTPATIENT
Start: 2019-01-01 | End: 2019-01-01 | Stop reason: HOSPADM

## 2019-01-01 RX ORDER — MORPHINE SULFATE 15 MG/1
15 TABLET, FILM COATED, EXTENDED RELEASE ORAL EVERY 12 HOURS SCHEDULED
Status: DISCONTINUED | OUTPATIENT
Start: 2019-01-01 | End: 2019-01-01 | Stop reason: HOSPADM

## 2019-01-01 RX ORDER — CEFTRIAXONE 1 G/1
1 INJECTION, POWDER, FOR SOLUTION INTRAMUSCULAR; INTRAVENOUS EVERY 24 HOURS
Start: 2019-01-01 | End: 2019-01-01 | Stop reason: HOSPADM

## 2019-01-01 RX ADMIN — OXYCODONE AND ACETAMINOPHEN 1 TABLET: 5; 325 TABLET ORAL at 04:06

## 2019-01-01 RX ADMIN — ACETAMINOPHEN 1000 MG: 500 TABLET, FILM COATED ORAL at 11:04

## 2019-01-01 RX ADMIN — TRAZODONE HYDROCHLORIDE 50 MG: 50 TABLET ORAL at 21:01

## 2019-01-01 RX ADMIN — POTASSIUM CHLORIDE AND SODIUM CHLORIDE 100 ML/HR: 900; 150 INJECTION, SOLUTION INTRAVENOUS at 04:13

## 2019-01-01 RX ADMIN — NYSTATIN: 100000 POWDER TOPICAL at 11:49

## 2019-01-01 RX ADMIN — POTASSIUM CHLORIDE 10 MEQ: 7.46 INJECTION, SOLUTION INTRAVENOUS at 15:02

## 2019-01-01 RX ADMIN — OXYCODONE HYDROCHLORIDE 10 MG: 5 TABLET ORAL at 04:24

## 2019-01-01 RX ADMIN — POTASSIUM CHLORIDE 20 MEQ: 750 CAPSULE, EXTENDED RELEASE ORAL at 12:16

## 2019-01-01 RX ADMIN — METOPROLOL TARTRATE 50 MG: 50 TABLET ORAL at 10:22

## 2019-01-01 RX ADMIN — GABAPENTIN 600 MG: 300 CAPSULE ORAL at 21:48

## 2019-01-01 RX ADMIN — SODIUM CHLORIDE, PRESERVATIVE FREE 10 ML: 5 INJECTION INTRAVENOUS at 10:04

## 2019-01-01 RX ADMIN — GABAPENTIN 600 MG: 300 CAPSULE ORAL at 23:34

## 2019-01-01 RX ADMIN — POTASSIUM CHLORIDE 20 MEQ: 750 CAPSULE, EXTENDED RELEASE ORAL at 08:30

## 2019-01-01 RX ADMIN — LEVOTHYROXINE SODIUM 100 MCG: 100 TABLET ORAL at 06:07

## 2019-01-01 RX ADMIN — OXYCODONE AND ACETAMINOPHEN 1 TABLET: 5; 325 TABLET ORAL at 20:06

## 2019-01-01 RX ADMIN — TAZOBACTAM SODIUM AND PIPERACILLIN SODIUM 3.38 G: 375; 3 INJECTION, SOLUTION INTRAVENOUS at 14:03

## 2019-01-01 RX ADMIN — FENTANYL CITRATE 25 MCG: 50 INJECTION, SOLUTION INTRAMUSCULAR; INTRAVENOUS at 13:54

## 2019-01-01 RX ADMIN — NYSTATIN: 100000 POWDER TOPICAL at 21:15

## 2019-01-01 RX ADMIN — POTASSIUM CHLORIDE 20 MEQ: 750 CAPSULE, EXTENDED RELEASE ORAL at 09:14

## 2019-01-01 RX ADMIN — NYSTATIN: 100000 POWDER TOPICAL at 06:53

## 2019-01-01 RX ADMIN — COLLAGENASE SANTYL: 250 OINTMENT TOPICAL at 16:00

## 2019-01-01 RX ADMIN — DULOXETINE HYDROCHLORIDE 30 MG: 30 CAPSULE, DELAYED RELEASE ORAL at 08:31

## 2019-01-01 RX ADMIN — MORPHINE SULFATE 2 MG: 2 INJECTION, SOLUTION INTRAMUSCULAR; INTRAVENOUS at 17:51

## 2019-01-01 RX ADMIN — HYDROMORPHONE HYDROCHLORIDE 1 MG: 1 INJECTION, SOLUTION INTRAMUSCULAR; INTRAVENOUS; SUBCUTANEOUS at 02:17

## 2019-01-01 RX ADMIN — POTASSIUM CHLORIDE 20 MEQ: 750 CAPSULE, EXTENDED RELEASE ORAL at 08:34

## 2019-01-01 RX ADMIN — OXYCODONE HYDROCHLORIDE 10 MG: 5 TABLET ORAL at 16:36

## 2019-01-01 RX ADMIN — OXYCODONE HYDROCHLORIDE 10 MG: 5 TABLET ORAL at 20:49

## 2019-01-01 RX ADMIN — OXYCODONE HYDROCHLORIDE 10 MG: 5 TABLET ORAL at 00:26

## 2019-01-01 RX ADMIN — VANCOMYCIN HYDROCHLORIDE 1000 MG: 1 INJECTION, POWDER, LYOPHILIZED, FOR SOLUTION INTRAVENOUS at 20:32

## 2019-01-01 RX ADMIN — NYSTATIN: 100000 POWDER TOPICAL at 05:58

## 2019-01-01 RX ADMIN — LORAZEPAM 1 MG: 2 INJECTION INTRAMUSCULAR; INTRAVENOUS at 06:01

## 2019-01-01 RX ADMIN — MORPHINE SULFATE 2 MG: 2 INJECTION, SOLUTION INTRAMUSCULAR; INTRAVENOUS at 20:59

## 2019-01-01 RX ADMIN — RIVAROXABAN 10 MG: 20 TABLET, FILM COATED ORAL at 12:20

## 2019-01-01 RX ADMIN — RIVAROXABAN 10 MG: 10 TABLET, FILM COATED ORAL at 10:52

## 2019-01-01 RX ADMIN — OXYCODONE HYDROCHLORIDE 10 MG: 5 TABLET ORAL at 04:31

## 2019-01-01 RX ADMIN — VANCOMYCIN HYDROCHLORIDE 1500 MG: 10 INJECTION, POWDER, LYOPHILIZED, FOR SOLUTION INTRAVENOUS at 06:38

## 2019-01-01 RX ADMIN — POTASSIUM CHLORIDE AND DEXTROSE MONOHYDRATE 75 ML/HR: 150; 5 INJECTION, SOLUTION INTRAVENOUS at 12:40

## 2019-01-01 RX ADMIN — SODIUM CHLORIDE, PRESERVATIVE FREE 10 ML: 5 INJECTION INTRAVENOUS at 21:26

## 2019-01-01 RX ADMIN — DULOXETINE HYDROCHLORIDE 30 MG: 30 CAPSULE, DELAYED RELEASE ORAL at 16:20

## 2019-01-01 RX ADMIN — OXYCODONE HYDROCHLORIDE 5 MG: 5 TABLET ORAL at 21:55

## 2019-01-01 RX ADMIN — TAZOBACTAM SODIUM AND PIPERACILLIN SODIUM 3.38 G: 375; 3 INJECTION, SOLUTION INTRAVENOUS at 17:08

## 2019-01-01 RX ADMIN — ALBUTEROL SULFATE 2.5 MG: 2.5 SOLUTION RESPIRATORY (INHALATION) at 18:03

## 2019-01-01 RX ADMIN — NYSTATIN: 100000 POWDER TOPICAL at 06:46

## 2019-01-01 RX ADMIN — OXYCODONE AND ACETAMINOPHEN 1 TABLET: 5; 325 TABLET ORAL at 08:34

## 2019-01-01 RX ADMIN — MORPHINE SULFATE 4 MG: 4 INJECTION INTRAVENOUS at 14:56

## 2019-01-01 RX ADMIN — LEVETIRACETAM 1000 MG: 500 TABLET, EXTENDED RELEASE ORAL at 20:22

## 2019-01-01 RX ADMIN — SODIUM CHLORIDE 2124 ML: 9 INJECTION, SOLUTION INTRAVENOUS at 12:20

## 2019-01-01 RX ADMIN — IPRATROPIUM BROMIDE AND ALBUTEROL SULFATE 3 ML: 2.5; .5 SOLUTION RESPIRATORY (INHALATION) at 20:35

## 2019-01-01 RX ADMIN — GABAPENTIN 300 MG: 300 CAPSULE ORAL at 20:19

## 2019-01-01 RX ADMIN — SODIUM CHLORIDE 100 ML/HR: 9 INJECTION, SOLUTION INTRAVENOUS at 19:43

## 2019-01-01 RX ADMIN — LIDOCAINE HYDROCHLORIDE 100 MG: 20 INJECTION, SOLUTION INFILTRATION; PERINEURAL at 11:50

## 2019-01-01 RX ADMIN — VENLAFAXINE HYDROCHLORIDE 300 MG: 75 CAPSULE, EXTENDED RELEASE ORAL at 08:58

## 2019-01-01 RX ADMIN — LEVETIRACETAM 1000 MG: 500 TABLET, EXTENDED RELEASE ORAL at 20:41

## 2019-01-01 RX ADMIN — Medication 3 ML: at 23:00

## 2019-01-01 RX ADMIN — QUETIAPINE FUMARATE 50 MG: 25 TABLET ORAL at 11:53

## 2019-01-01 RX ADMIN — GABAPENTIN 600 MG: 250 SOLUTION ORAL at 20:47

## 2019-01-01 RX ADMIN — MORPHINE SULFATE 2 MG: 2 INJECTION, SOLUTION INTRAMUSCULAR; INTRAVENOUS at 19:11

## 2019-01-01 RX ADMIN — SODIUM CHLORIDE 100 ML/HR: 9 INJECTION, SOLUTION INTRAVENOUS at 03:28

## 2019-01-01 RX ADMIN — FENTANYL CITRATE 25 MCG: 50 INJECTION INTRAMUSCULAR; INTRAVENOUS at 14:02

## 2019-01-01 RX ADMIN — POTASSIUM CHLORIDE 20 MEQ: 10 CAPSULE, COATED, EXTENDED RELEASE ORAL at 10:02

## 2019-01-01 RX ADMIN — GABAPENTIN 600 MG: 250 SOLUTION ORAL at 08:47

## 2019-01-01 RX ADMIN — OXYBUTYNIN CHLORIDE 5 MG: 5 TABLET, EXTENDED RELEASE ORAL at 21:06

## 2019-01-01 RX ADMIN — CLONAZEPAM 1 MG: 1 TABLET ORAL at 04:05

## 2019-01-01 RX ADMIN — LEVOTHYROXINE SODIUM 100 MCG: 100 TABLET ORAL at 06:34

## 2019-01-01 RX ADMIN — TAZOBACTAM SODIUM AND PIPERACILLIN SODIUM 3.38 G: 375; 3 INJECTION, SOLUTION INTRAVENOUS at 23:21

## 2019-01-01 RX ADMIN — GABAPENTIN 600 MG: 300 CAPSULE ORAL at 16:20

## 2019-01-01 RX ADMIN — FENTANYL CITRATE 25 MCG: 50 INJECTION INTRAMUSCULAR; INTRAVENOUS at 14:00

## 2019-01-01 RX ADMIN — RIVAROXABAN 10 MG: 10 TABLET, FILM COATED ORAL at 09:36

## 2019-01-01 RX ADMIN — CEFTRIAXONE SODIUM 1 G: 1 INJECTION, POWDER, FOR SOLUTION INTRAMUSCULAR; INTRAVENOUS at 15:02

## 2019-01-01 RX ADMIN — QUETIAPINE FUMARATE 100 MG: 100 TABLET ORAL at 09:49

## 2019-01-01 RX ADMIN — CLONAZEPAM 1 MG: 1 TABLET ORAL at 11:12

## 2019-01-01 RX ADMIN — SODIUM CHLORIDE, PRESERVATIVE FREE 10 ML: 5 INJECTION INTRAVENOUS at 23:21

## 2019-01-01 RX ADMIN — NYSTATIN: 100000 POWDER TOPICAL at 07:01

## 2019-01-01 RX ADMIN — NYSTATIN: 100000 POWDER TOPICAL at 14:55

## 2019-01-01 RX ADMIN — POTASSIUM CHLORIDE AND SODIUM CHLORIDE 125 ML/HR: 900; 150 INJECTION, SOLUTION INTRAVENOUS at 10:06

## 2019-01-01 RX ADMIN — NYSTATIN: 100000 POWDER TOPICAL at 13:22

## 2019-01-01 RX ADMIN — VENLAFAXINE HYDROCHLORIDE 300 MG: 75 CAPSULE, EXTENDED RELEASE ORAL at 08:34

## 2019-01-01 RX ADMIN — NYSTATIN: 100000 POWDER TOPICAL at 17:35

## 2019-01-01 RX ADMIN — CYANOCOBALAMIN 1000 MCG: 1000 INJECTION, SOLUTION INTRAMUSCULAR at 12:02

## 2019-01-01 RX ADMIN — CLONAZEPAM 1 MG: 1 TABLET ORAL at 21:51

## 2019-01-01 RX ADMIN — METOPROLOL TARTRATE 50 MG: 50 TABLET ORAL at 08:21

## 2019-01-01 RX ADMIN — METOPROLOL TARTRATE 50 MG: 50 TABLET ORAL at 16:20

## 2019-01-01 RX ADMIN — OXYCODONE HYDROCHLORIDE AND ACETAMINOPHEN 1 TABLET: 10; 325 TABLET ORAL at 00:53

## 2019-01-01 RX ADMIN — HYDROMORPHONE HYDROCHLORIDE 1 MG: 1 INJECTION, SOLUTION INTRAMUSCULAR; INTRAVENOUS; SUBCUTANEOUS at 03:55

## 2019-01-01 RX ADMIN — VANCOMYCIN HYDROCHLORIDE 1000 MG: 1 INJECTION, POWDER, LYOPHILIZED, FOR SOLUTION INTRAVENOUS at 17:38

## 2019-01-01 RX ADMIN — DULOXETINE HYDROCHLORIDE 30 MG: 30 CAPSULE, DELAYED RELEASE ORAL at 09:50

## 2019-01-01 RX ADMIN — OXYCODONE AND ACETAMINOPHEN 2 TABLET: 5; 325 TABLET ORAL at 10:16

## 2019-01-01 RX ADMIN — SODIUM CHLORIDE, PRESERVATIVE FREE 3 ML: 5 INJECTION INTRAVENOUS at 20:12

## 2019-01-01 RX ADMIN — VENLAFAXINE HYDROCHLORIDE 300 MG: 75 CAPSULE, EXTENDED RELEASE ORAL at 08:20

## 2019-01-01 RX ADMIN — POTASSIUM CHLORIDE 20 MEQ: 10 CAPSULE, COATED, EXTENDED RELEASE ORAL at 08:59

## 2019-01-01 RX ADMIN — LEVETIRACETAM 1000 MG: 500 TABLET, EXTENDED RELEASE ORAL at 09:55

## 2019-01-01 RX ADMIN — LIDOCAINE HYDROCHLORIDE 1 G: 10 INJECTION, SOLUTION EPIDURAL; INFILTRATION; INTRACAUDAL; PERINEURAL at 12:23

## 2019-01-01 RX ADMIN — QUETIAPINE FUMARATE 25 MG: 25 TABLET ORAL at 20:05

## 2019-01-01 RX ADMIN — LEVOTHYROXINE SODIUM 100 MCG: 100 TABLET ORAL at 05:36

## 2019-01-01 RX ADMIN — OXYCODONE HYDROCHLORIDE 10 MG: 5 TABLET ORAL at 18:07

## 2019-01-01 RX ADMIN — ENOXAPARIN SODIUM 40 MG: 40 INJECTION SUBCUTANEOUS at 17:29

## 2019-01-01 RX ADMIN — DEXTROSE AND SODIUM CHLORIDE 50 ML/HR: 5; 900 INJECTION, SOLUTION INTRAVENOUS at 09:49

## 2019-01-01 RX ADMIN — POTASSIUM CHLORIDE 10 MEQ: 7.46 INJECTION, SOLUTION INTRAVENOUS at 12:02

## 2019-01-01 RX ADMIN — DIAZEPAM 5 MG: 5 TABLET ORAL at 04:00

## 2019-01-01 RX ADMIN — POTASSIUM CHLORIDE 10 MEQ: 7.46 INJECTION, SOLUTION INTRAVENOUS at 16:36

## 2019-01-01 RX ADMIN — HYDROMORPHONE HYDROCHLORIDE 1 MG: 1 INJECTION, SOLUTION INTRAMUSCULAR; INTRAVENOUS; SUBCUTANEOUS at 06:31

## 2019-01-01 RX ADMIN — POTASSIUM CHLORIDE 10 MEQ: 7.46 INJECTION, SOLUTION INTRAVENOUS at 18:34

## 2019-01-01 RX ADMIN — TAZOBACTAM SODIUM AND PIPERACILLIN SODIUM 3.38 G: 375; 3 INJECTION, SOLUTION INTRAVENOUS at 06:34

## 2019-01-01 RX ADMIN — GABAPENTIN 600 MG: 250 SOLUTION ORAL at 15:15

## 2019-01-01 RX ADMIN — CEFTRIAXONE SODIUM 1 G: 1 INJECTION, POWDER, FOR SOLUTION INTRAMUSCULAR; INTRAVENOUS at 09:00

## 2019-01-01 RX ADMIN — KETOROLAC TROMETHAMINE 15 MG: 30 INJECTION, SOLUTION INTRAMUSCULAR at 12:51

## 2019-01-01 RX ADMIN — IPRATROPIUM BROMIDE AND ALBUTEROL SULFATE 3 ML: 2.5; .5 SOLUTION RESPIRATORY (INHALATION) at 15:20

## 2019-01-01 RX ADMIN — FENTANYL CITRATE 25 MCG: 50 INJECTION INTRAMUSCULAR; INTRAVENOUS at 14:05

## 2019-01-01 RX ADMIN — RIVAROXABAN 10 MG: 10 TABLET, FILM COATED ORAL at 08:21

## 2019-01-01 RX ADMIN — GADOBENATE DIMEGLUMINE 20 ML: 529 INJECTION, SOLUTION INTRAVENOUS at 17:24

## 2019-01-01 RX ADMIN — CEFTRIAXONE SODIUM 1 G: 1 INJECTION, POWDER, FOR SOLUTION INTRAMUSCULAR; INTRAVENOUS at 08:17

## 2019-01-01 RX ADMIN — LEVETIRACETAM 1000 MG: 500 TABLET, EXTENDED RELEASE ORAL at 11:52

## 2019-01-01 RX ADMIN — POTASSIUM CHLORIDE 10 MEQ: 7.46 INJECTION, SOLUTION INTRAVENOUS at 20:53

## 2019-01-01 RX ADMIN — POTASSIUM CHLORIDE 20 MEQ: 750 CAPSULE, EXTENDED RELEASE ORAL at 09:50

## 2019-01-01 RX ADMIN — TRAZODONE HYDROCHLORIDE 50 MG: 50 TABLET ORAL at 20:48

## 2019-01-01 RX ADMIN — ALBUTEROL SULFATE 2.5 MG: 2.5 SOLUTION RESPIRATORY (INHALATION) at 14:00

## 2019-01-01 RX ADMIN — DULOXETINE HYDROCHLORIDE 30 MG: 30 CAPSULE, DELAYED RELEASE ORAL at 10:12

## 2019-01-01 RX ADMIN — LEVETIRACETAM 1000 MG: 10 INJECTION INTRAVENOUS at 08:58

## 2019-01-01 RX ADMIN — GABAPENTIN 600 MG: 300 CAPSULE ORAL at 18:03

## 2019-01-01 RX ADMIN — OXYCODONE HYDROCHLORIDE 10 MG: 5 TABLET ORAL at 19:56

## 2019-01-01 RX ADMIN — LEVETIRACETAM 1000 MG: 500 TABLET, FILM COATED ORAL at 10:11

## 2019-01-01 RX ADMIN — TAZOBACTAM SODIUM AND PIPERACILLIN SODIUM 3.38 G: 375; 3 INJECTION, SOLUTION INTRAVENOUS at 21:56

## 2019-01-01 RX ADMIN — COLLAGENASE SANTYL: 250 OINTMENT TOPICAL at 13:22

## 2019-01-01 RX ADMIN — LEVETIRACETAM 1000 MG: 500 TABLET, FILM COATED ORAL at 21:48

## 2019-01-01 RX ADMIN — GABAPENTIN 600 MG: 300 CAPSULE ORAL at 08:34

## 2019-01-01 RX ADMIN — GABAPENTIN 600 MG: 250 SOLUTION ORAL at 08:35

## 2019-01-01 RX ADMIN — VANCOMYCIN HYDROCHLORIDE 1250 MG: 10 INJECTION, POWDER, LYOPHILIZED, FOR SOLUTION INTRAVENOUS at 23:56

## 2019-01-01 RX ADMIN — IPRATROPIUM BROMIDE AND ALBUTEROL SULFATE 3 ML: 2.5; .5 SOLUTION RESPIRATORY (INHALATION) at 10:43

## 2019-01-01 RX ADMIN — SODIUM CHLORIDE, PRESERVATIVE FREE 10 ML: 5 INJECTION INTRAVENOUS at 20:19

## 2019-01-01 RX ADMIN — CLONAZEPAM 1 MG: 1 TABLET ORAL at 17:50

## 2019-01-01 RX ADMIN — GABAPENTIN 600 MG: 250 SOLUTION ORAL at 08:22

## 2019-01-01 RX ADMIN — RIVAROXABAN 10 MG: 10 TABLET, FILM COATED ORAL at 08:47

## 2019-01-01 RX ADMIN — COLLAGENASE SANTYL: 250 OINTMENT TOPICAL at 21:03

## 2019-01-01 RX ADMIN — OXYCODONE HYDROCHLORIDE 5 MG: 5 TABLET ORAL at 17:59

## 2019-01-01 RX ADMIN — SODIUM CHLORIDE, PRESERVATIVE FREE 3 ML: 5 INJECTION INTRAVENOUS at 20:59

## 2019-01-01 RX ADMIN — LEVETIRACETAM 1000 MG: 500 TABLET, EXTENDED RELEASE ORAL at 20:47

## 2019-01-01 RX ADMIN — SODIUM CHLORIDE, POTASSIUM CHLORIDE, SODIUM LACTATE AND CALCIUM CHLORIDE: 600; 310; 30; 20 INJECTION, SOLUTION INTRAVENOUS at 11:39

## 2019-01-01 RX ADMIN — QUETIAPINE FUMARATE 100 MG: 100 TABLET ORAL at 09:26

## 2019-01-01 RX ADMIN — ALBUTEROL SULFATE 2.5 MG: 2.5 SOLUTION RESPIRATORY (INHALATION) at 11:32

## 2019-01-01 RX ADMIN — QUETIAPINE FUMARATE 25 MG: 25 TABLET ORAL at 20:53

## 2019-01-01 RX ADMIN — NYSTATIN: 100000 POWDER TOPICAL at 06:39

## 2019-01-01 RX ADMIN — DULOXETINE HYDROCHLORIDE 30 MG: 30 CAPSULE, DELAYED RELEASE ORAL at 10:02

## 2019-01-01 RX ADMIN — VENLAFAXINE HYDROCHLORIDE 300 MG: 75 CAPSULE, EXTENDED RELEASE ORAL at 09:34

## 2019-01-01 RX ADMIN — QUETIAPINE FUMARATE 25 MG: 25 TABLET ORAL at 09:54

## 2019-01-01 RX ADMIN — LEVETIRACETAM 1000 MG: 500 TABLET, FILM COATED ORAL at 21:02

## 2019-01-01 RX ADMIN — OXYCODONE HYDROCHLORIDE 5 MG: 5 TABLET ORAL at 06:08

## 2019-01-01 RX ADMIN — OXYCODONE HYDROCHLORIDE 10 MG: 5 TABLET ORAL at 16:20

## 2019-01-01 RX ADMIN — IPRATROPIUM BROMIDE AND ALBUTEROL SULFATE 3 ML: 2.5; .5 SOLUTION RESPIRATORY (INHALATION) at 11:26

## 2019-01-01 RX ADMIN — QUETIAPINE FUMARATE 300 MG: 200 TABLET ORAL at 20:48

## 2019-01-01 RX ADMIN — POTASSIUM CHLORIDE 20 MEQ: 750 CAPSULE, EXTENDED RELEASE ORAL at 17:13

## 2019-01-01 RX ADMIN — COLLAGENASE SANTYL: 250 OINTMENT TOPICAL at 09:42

## 2019-01-01 RX ADMIN — NYSTATIN: 100000 POWDER TOPICAL at 01:24

## 2019-01-01 RX ADMIN — IPRATROPIUM BROMIDE AND ALBUTEROL SULFATE 3 ML: 2.5; .5 SOLUTION RESPIRATORY (INHALATION) at 19:29

## 2019-01-01 RX ADMIN — GABAPENTIN 600 MG: 250 SOLUTION ORAL at 20:46

## 2019-01-01 RX ADMIN — DULOXETINE HYDROCHLORIDE 30 MG: 30 CAPSULE, DELAYED RELEASE ORAL at 11:54

## 2019-01-01 RX ADMIN — VENLAFAXINE HYDROCHLORIDE 75 MG: 75 CAPSULE, EXTENDED RELEASE ORAL at 11:55

## 2019-01-01 RX ADMIN — TRAZODONE HYDROCHLORIDE 50 MG: 50 TABLET ORAL at 20:50

## 2019-01-01 RX ADMIN — LEVETIRACETAM 1000 MG: 500 TABLET, EXTENDED RELEASE ORAL at 21:06

## 2019-01-01 RX ADMIN — GABAPENTIN 300 MG: 300 CAPSULE ORAL at 20:58

## 2019-01-01 RX ADMIN — NYSTATIN: 100000 POWDER TOPICAL at 13:31

## 2019-01-01 RX ADMIN — LEVOTHYROXINE SODIUM 100 MCG: 100 TABLET ORAL at 05:58

## 2019-01-01 RX ADMIN — POTASSIUM CHLORIDE 20 MEQ: 750 CAPSULE, EXTENDED RELEASE ORAL at 18:01

## 2019-01-01 RX ADMIN — CLONAZEPAM 0.5 MG: 0.5 TABLET ORAL at 20:05

## 2019-01-01 RX ADMIN — POTASSIUM CHLORIDE 10 MEQ: 7.46 INJECTION, SOLUTION INTRAVENOUS at 14:18

## 2019-01-01 RX ADMIN — SODIUM CHLORIDE 100 ML/HR: 9 INJECTION, SOLUTION INTRAVENOUS at 05:15

## 2019-01-01 RX ADMIN — IPRATROPIUM BROMIDE AND ALBUTEROL SULFATE 3 ML: 2.5; .5 SOLUTION RESPIRATORY (INHALATION) at 20:59

## 2019-01-01 RX ADMIN — GABAPENTIN 300 MG: 300 CAPSULE ORAL at 08:59

## 2019-01-01 RX ADMIN — DULOXETINE HYDROCHLORIDE 30 MG: 30 CAPSULE, DELAYED RELEASE ORAL at 09:35

## 2019-01-01 RX ADMIN — QUETIAPINE FUMARATE 100 MG: 100 TABLET ORAL at 08:36

## 2019-01-01 RX ADMIN — LEVETIRACETAM 1000 MG: 10 INJECTION INTRAVENOUS at 08:28

## 2019-01-01 RX ADMIN — PROPOFOL 50 MG: 10 INJECTION, EMULSION INTRAVENOUS at 11:53

## 2019-01-01 RX ADMIN — TAZOBACTAM SODIUM AND PIPERACILLIN SODIUM 3.38 G: 375; 3 INJECTION, SOLUTION INTRAVENOUS at 00:52

## 2019-01-01 RX ADMIN — SODIUM CHLORIDE 30 ML/HR: 9 INJECTION, SOLUTION INTRAVENOUS at 04:06

## 2019-01-01 RX ADMIN — GABAPENTIN 300 MG: 300 CAPSULE ORAL at 10:03

## 2019-01-01 RX ADMIN — VANCOMYCIN HYDROCHLORIDE 1250 MG: 10 INJECTION, POWDER, LYOPHILIZED, FOR SOLUTION INTRAVENOUS at 11:59

## 2019-01-01 RX ADMIN — MORPHINE SULFATE 2 MG: 2 INJECTION, SOLUTION INTRAMUSCULAR; INTRAVENOUS at 01:22

## 2019-01-01 RX ADMIN — MORPHINE SULFATE 15 MG: 15 TABLET, FILM COATED, EXTENDED RELEASE ORAL at 21:05

## 2019-01-01 RX ADMIN — LEVETIRACETAM 1000 MG: 500 TABLET, EXTENDED RELEASE ORAL at 09:35

## 2019-01-01 RX ADMIN — SODIUM CHLORIDE, PRESERVATIVE FREE 3 ML: 5 INJECTION INTRAVENOUS at 08:32

## 2019-01-01 RX ADMIN — NYSTATIN: 100000 POWDER TOPICAL at 20:40

## 2019-01-01 RX ADMIN — QUETIAPINE FUMARATE 100 MG: 100 TABLET ORAL at 21:01

## 2019-01-01 RX ADMIN — ENOXAPARIN SODIUM 40 MG: 40 INJECTION SUBCUTANEOUS at 19:11

## 2019-01-01 RX ADMIN — GABAPENTIN 300 MG: 300 CAPSULE ORAL at 12:19

## 2019-01-01 RX ADMIN — OXYBUTYNIN CHLORIDE 5 MG: 5 TABLET, EXTENDED RELEASE ORAL at 08:31

## 2019-01-01 RX ADMIN — FOSPHENYTOIN SODIUM 100 MG PE: 50 INJECTION, SOLUTION INTRAMUSCULAR; INTRAVENOUS at 23:34

## 2019-01-01 RX ADMIN — VANCOMYCIN HYDROCHLORIDE 1250 MG: 10 INJECTION, POWDER, LYOPHILIZED, FOR SOLUTION INTRAVENOUS at 12:14

## 2019-01-01 RX ADMIN — ALBUTEROL SULFATE 2.5 MG: 2.5 SOLUTION RESPIRATORY (INHALATION) at 06:30

## 2019-01-01 RX ADMIN — POTASSIUM CHLORIDE 20 MEQ: 750 CAPSULE, EXTENDED RELEASE ORAL at 17:29

## 2019-01-01 RX ADMIN — POTASSIUM CHLORIDE AND SODIUM CHLORIDE 125 ML/HR: 900; 150 INJECTION, SOLUTION INTRAVENOUS at 20:54

## 2019-01-01 RX ADMIN — CYCLOBENZAPRINE 10 MG: 10 TABLET, FILM COATED ORAL at 16:05

## 2019-01-01 RX ADMIN — ROCURONIUM BROMIDE 10 MG: 10 INJECTION INTRAVENOUS at 11:53

## 2019-01-01 RX ADMIN — NYSTATIN: 100000 POWDER TOPICAL at 22:40

## 2019-01-01 RX ADMIN — GABAPENTIN 300 MG: 300 CAPSULE ORAL at 20:05

## 2019-01-01 RX ADMIN — POTASSIUM CHLORIDE 20 MEQ: 10 CAPSULE, COATED, EXTENDED RELEASE ORAL at 19:26

## 2019-01-01 RX ADMIN — SODIUM CHLORIDE 100 ML/HR: 9 INJECTION, SOLUTION INTRAVENOUS at 02:26

## 2019-01-01 RX ADMIN — MEPERIDINE HYDROCHLORIDE 12.5 MG: 25 INJECTION INTRAMUSCULAR; INTRAVENOUS; SUBCUTANEOUS at 14:12

## 2019-01-01 RX ADMIN — NYSTATIN: 100000 POWDER TOPICAL at 14:03

## 2019-01-01 RX ADMIN — ALBUTEROL SULFATE 2.5 MG: 2.5 SOLUTION RESPIRATORY (INHALATION) at 06:26

## 2019-01-01 RX ADMIN — RIVAROXABAN 10 MG: 10 TABLET, FILM COATED ORAL at 09:49

## 2019-01-01 RX ADMIN — POTASSIUM CHLORIDE 20 MEQ: 10 CAPSULE, COATED, EXTENDED RELEASE ORAL at 08:43

## 2019-01-01 RX ADMIN — DULOXETINE HYDROCHLORIDE 30 MG: 30 CAPSULE, DELAYED RELEASE ORAL at 09:43

## 2019-01-01 RX ADMIN — CEFAZOLIN SODIUM 3 G: 1 INJECTION, POWDER, FOR SOLUTION INTRAMUSCULAR; INTRAVENOUS at 18:01

## 2019-01-01 RX ADMIN — CYCLOBENZAPRINE 10 MG: 10 TABLET, FILM COATED ORAL at 17:58

## 2019-01-01 RX ADMIN — LEVOTHYROXINE SODIUM 100 MCG: 100 TABLET ORAL at 06:51

## 2019-01-01 RX ADMIN — POTASSIUM CHLORIDE 20 MEQ: 10 CAPSULE, COATED, EXTENDED RELEASE ORAL at 12:19

## 2019-01-01 RX ADMIN — DIAZEPAM 5 MG: 5 TABLET ORAL at 10:40

## 2019-01-01 RX ADMIN — DULOXETINE HYDROCHLORIDE 30 MG: 30 CAPSULE, DELAYED RELEASE ORAL at 10:11

## 2019-01-01 RX ADMIN — PIPERACILLIN SODIUM AND TAZOBACTAM SODIUM 4.5 G: 4; .5 INJECTION, POWDER, LYOPHILIZED, FOR SOLUTION INTRAVENOUS at 19:08

## 2019-01-01 RX ADMIN — POTASSIUM CHLORIDE 10 MEQ: 7.46 INJECTION, SOLUTION INTRAVENOUS at 10:06

## 2019-01-01 RX ADMIN — METOPROLOL TARTRATE 50 MG: 50 TABLET ORAL at 09:13

## 2019-01-01 RX ADMIN — VANCOMYCIN HYDROCHLORIDE 1000 MG: 1 INJECTION, SOLUTION INTRAVENOUS at 15:07

## 2019-01-01 RX ADMIN — POTASSIUM CHLORIDE 20 MEQ: 750 CAPSULE, EXTENDED RELEASE ORAL at 21:02

## 2019-01-01 RX ADMIN — LEVOTHYROXINE SODIUM 100 MCG: 100 TABLET ORAL at 05:39

## 2019-01-01 RX ADMIN — NYSTATIN 1 APPLICATION: 100000 POWDER TOPICAL at 20:06

## 2019-01-01 RX ADMIN — LORAZEPAM 1 MG: 2 INJECTION INTRAMUSCULAR; INTRAVENOUS at 19:11

## 2019-01-01 RX ADMIN — SODIUM CHLORIDE, PRESERVATIVE FREE 10 ML: 5 INJECTION INTRAVENOUS at 10:00

## 2019-01-01 RX ADMIN — RIVAROXABAN 10 MG: 20 TABLET, FILM COATED ORAL at 10:00

## 2019-01-01 RX ADMIN — NYSTATIN: 100000 CREAM TOPICAL at 23:40

## 2019-01-01 RX ADMIN — POTASSIUM CHLORIDE 20 MEQ: 750 CAPSULE, EXTENDED RELEASE ORAL at 09:34

## 2019-01-01 RX ADMIN — POTASSIUM CHLORIDE AND SODIUM CHLORIDE 100 ML/HR: 900; 150 INJECTION, SOLUTION INTRAVENOUS at 02:52

## 2019-01-01 RX ADMIN — SODIUM CHLORIDE, PRESERVATIVE FREE 3 ML: 5 INJECTION INTRAVENOUS at 19:43

## 2019-01-01 RX ADMIN — POTASSIUM CHLORIDE 20 MEQ: 750 CAPSULE, EXTENDED RELEASE ORAL at 12:10

## 2019-01-01 RX ADMIN — OXYCODONE HYDROCHLORIDE 10 MG: 5 TABLET ORAL at 04:10

## 2019-01-01 RX ADMIN — NYSTATIN: 100000 CREAM TOPICAL at 21:18

## 2019-01-01 RX ADMIN — QUETIAPINE FUMARATE 25 MG: 25 TABLET ORAL at 20:19

## 2019-01-01 RX ADMIN — IPRATROPIUM BROMIDE AND ALBUTEROL SULFATE 3 ML: 2.5; .5 SOLUTION RESPIRATORY (INHALATION) at 03:19

## 2019-01-01 RX ADMIN — CLONAZEPAM 1 MG: 1 TABLET ORAL at 22:12

## 2019-01-01 RX ADMIN — GABAPENTIN 600 MG: 250 SOLUTION ORAL at 16:26

## 2019-01-01 RX ADMIN — ALBUTEROL SULFATE 2.5 MG: 2.5 SOLUTION RESPIRATORY (INHALATION) at 02:25

## 2019-01-01 RX ADMIN — QUETIAPINE FUMARATE 300 MG: 200 TABLET ORAL at 20:51

## 2019-01-01 RX ADMIN — GABAPENTIN 600 MG: 250 SOLUTION ORAL at 09:50

## 2019-01-01 RX ADMIN — GABAPENTIN 400 MG: 100 CAPSULE ORAL at 21:25

## 2019-01-01 RX ADMIN — RIVAROXABAN 10 MG: 10 TABLET, FILM COATED ORAL at 09:26

## 2019-01-01 RX ADMIN — LORAZEPAM 1 MG: 2 INJECTION INTRAMUSCULAR; INTRAVENOUS at 20:42

## 2019-01-01 RX ADMIN — LEVOTHYROXINE SODIUM 100 MCG: 100 TABLET ORAL at 06:15

## 2019-01-01 RX ADMIN — ALBUTEROL SULFATE 2.5 MG: 2.5 SOLUTION RESPIRATORY (INHALATION) at 10:11

## 2019-01-01 RX ADMIN — CYCLOBENZAPRINE HYDROCHLORIDE 5 MG: 10 TABLET, FILM COATED ORAL at 09:52

## 2019-01-01 RX ADMIN — QUETIAPINE FUMARATE 100 MG: 100 TABLET ORAL at 21:06

## 2019-01-01 RX ADMIN — MORPHINE SULFATE 15 MG: 15 TABLET, FILM COATED, EXTENDED RELEASE ORAL at 20:16

## 2019-01-01 RX ADMIN — QUETIAPINE FUMARATE 100 MG: 100 TABLET ORAL at 08:35

## 2019-01-01 RX ADMIN — POTASSIUM CHLORIDE 20 MEQ: 750 CAPSULE, EXTENDED RELEASE ORAL at 18:49

## 2019-01-01 RX ADMIN — VENLAFAXINE HYDROCHLORIDE 37.5 MG: 37.5 CAPSULE, EXTENDED RELEASE ORAL at 10:03

## 2019-01-01 RX ADMIN — POTASSIUM CHLORIDE AND SODIUM CHLORIDE 100 ML/HR: 900; 150 INJECTION, SOLUTION INTRAVENOUS at 23:21

## 2019-01-01 RX ADMIN — TAZOBACTAM SODIUM AND PIPERACILLIN SODIUM 3.38 G: 375; 3 INJECTION, SOLUTION INTRAVENOUS at 15:07

## 2019-01-01 RX ADMIN — IPRATROPIUM BROMIDE AND ALBUTEROL SULFATE 3 ML: 2.5; .5 SOLUTION RESPIRATORY (INHALATION) at 23:45

## 2019-01-01 RX ADMIN — LEVETIRACETAM 1000 MG: 10 INJECTION INTRAVENOUS at 09:43

## 2019-01-01 RX ADMIN — TETANUS TOXOID, REDUCED DIPHTHERIA TOXOID AND ACELLULAR PERTUSSIS VACCINE, ADSORBED 0.5 ML: 5; 2.5; 8; 8; 2.5 SUSPENSION INTRAMUSCULAR at 02:42

## 2019-01-01 RX ADMIN — TAZOBACTAM SODIUM AND PIPERACILLIN SODIUM 3.38 G: 375; 3 INJECTION, SOLUTION INTRAVENOUS at 06:07

## 2019-01-01 RX ADMIN — TAZOBACTAM SODIUM AND PIPERACILLIN SODIUM 3.38 G: 375; 3 INJECTION, SOLUTION INTRAVENOUS at 15:08

## 2019-01-01 RX ADMIN — POTASSIUM CHLORIDE 20 MEQ: 750 CAPSULE, EXTENDED RELEASE ORAL at 08:21

## 2019-01-01 RX ADMIN — QUETIAPINE FUMARATE 100 MG: 100 TABLET ORAL at 08:21

## 2019-01-01 RX ADMIN — OXYCODONE HYDROCHLORIDE 10 MG: 5 TABLET ORAL at 10:26

## 2019-01-01 RX ADMIN — SODIUM CHLORIDE 500 ML: 9 INJECTION, SOLUTION INTRAVENOUS at 11:33

## 2019-01-01 RX ADMIN — VENLAFAXINE HYDROCHLORIDE 37.5 MG: 37.5 CAPSULE, EXTENDED RELEASE ORAL at 10:16

## 2019-01-01 RX ADMIN — CLONAZEPAM 1 MG: 1 TABLET ORAL at 16:20

## 2019-01-01 RX ADMIN — GABAPENTIN 300 MG: 300 CAPSULE ORAL at 21:16

## 2019-01-01 RX ADMIN — DULOXETINE HYDROCHLORIDE 30 MG: 30 CAPSULE, DELAYED RELEASE ORAL at 08:59

## 2019-01-01 RX ADMIN — PROPOFOL 150 MG: 10 INJECTION, EMULSION INTRAVENOUS at 11:50

## 2019-01-01 RX ADMIN — GABAPENTIN 300 MG: 300 CAPSULE ORAL at 10:08

## 2019-01-01 RX ADMIN — POTASSIUM CHLORIDE 10 MEQ: 7.46 INJECTION, SOLUTION INTRAVENOUS at 22:52

## 2019-01-01 RX ADMIN — LEVOTHYROXINE SODIUM 100 MCG: 100 TABLET ORAL at 06:01

## 2019-01-01 RX ADMIN — DULOXETINE HYDROCHLORIDE 30 MG: 30 CAPSULE, DELAYED RELEASE ORAL at 08:47

## 2019-01-01 RX ADMIN — ALBUTEROL SULFATE 2.5 MG: 2.5 SOLUTION RESPIRATORY (INHALATION) at 23:07

## 2019-01-01 RX ADMIN — ALBUTEROL SULFATE 2.5 MG: 2.5 SOLUTION RESPIRATORY (INHALATION) at 19:14

## 2019-01-01 RX ADMIN — QUETIAPINE FUMARATE 100 MG: 100 TABLET ORAL at 20:56

## 2019-01-01 RX ADMIN — CLONAZEPAM 0.5 MG: 0.5 TABLET ORAL at 20:19

## 2019-01-01 RX ADMIN — FENTANYL CITRATE 25 MCG: 50 INJECTION INTRAMUSCULAR; INTRAVENOUS at 14:03

## 2019-01-01 RX ADMIN — GABAPENTIN 600 MG: 300 CAPSULE ORAL at 06:01

## 2019-01-01 RX ADMIN — GABAPENTIN 600 MG: 300 CAPSULE ORAL at 15:01

## 2019-01-01 RX ADMIN — AZITHROMYCIN MONOHYDRATE 500 MG: 500 INJECTION, POWDER, LYOPHILIZED, FOR SOLUTION INTRAVENOUS at 13:38

## 2019-01-01 RX ADMIN — QUETIAPINE FUMARATE 100 MG: 100 TABLET ORAL at 08:47

## 2019-01-01 RX ADMIN — CEFAZOLIN SODIUM 3 G: 1 INJECTION, POWDER, FOR SOLUTION INTRAMUSCULAR; INTRAVENOUS at 00:53

## 2019-01-01 RX ADMIN — OXYCODONE HYDROCHLORIDE 10 MG: 5 TABLET ORAL at 04:39

## 2019-01-01 RX ADMIN — LEVOTHYROXINE SODIUM 100 MCG: 100 TABLET ORAL at 06:59

## 2019-01-01 RX ADMIN — LEVOTHYROXINE SODIUM 100 MCG: 100 TABLET ORAL at 05:29

## 2019-01-01 RX ADMIN — ROPIVACAINE HYDROCHLORIDE 20 ML: 2 INJECTION, SOLUTION EPIDURAL; INFILTRATION at 11:12

## 2019-01-01 RX ADMIN — LEVETIRACETAM 1000 MG: 10 INJECTION INTRAVENOUS at 22:40

## 2019-01-01 RX ADMIN — OXYCODONE HYDROCHLORIDE 10 MG: 5 TABLET ORAL at 06:57

## 2019-01-01 RX ADMIN — LEVOTHYROXINE SODIUM 100 MCG: 100 TABLET ORAL at 07:01

## 2019-01-01 RX ADMIN — SODIUM CHLORIDE 500 ML: 9 INJECTION, SOLUTION INTRAVENOUS at 13:56

## 2019-01-01 RX ADMIN — POTASSIUM CHLORIDE AND SODIUM CHLORIDE 100 ML/HR: 900; 150 INJECTION, SOLUTION INTRAVENOUS at 16:55

## 2019-01-01 RX ADMIN — QUETIAPINE FUMARATE 25 MG: 25 TABLET ORAL at 21:15

## 2019-01-01 RX ADMIN — TAZOBACTAM SODIUM AND PIPERACILLIN SODIUM 3.38 G: 375; 3 INJECTION, SOLUTION INTRAVENOUS at 05:31

## 2019-01-01 RX ADMIN — SODIUM CHLORIDE, PRESERVATIVE FREE 10 ML: 5 INJECTION INTRAVENOUS at 21:14

## 2019-01-01 RX ADMIN — CEFTRIAXONE SODIUM 2 G: 2 INJECTION, POWDER, FOR SOLUTION INTRAMUSCULAR; INTRAVENOUS at 13:02

## 2019-01-01 RX ADMIN — LEVETIRACETAM 1000 MG: 500 TABLET, EXTENDED RELEASE ORAL at 09:50

## 2019-01-01 RX ADMIN — GABAPENTIN 600 MG: 300 CAPSULE ORAL at 05:39

## 2019-01-01 RX ADMIN — MORPHINE SULFATE 2 MG: 2 INJECTION, SOLUTION INTRAMUSCULAR; INTRAVENOUS at 05:08

## 2019-01-01 RX ADMIN — TAZOBACTAM SODIUM AND PIPERACILLIN SODIUM 3.38 G: 375; 3 INJECTION, SOLUTION INTRAVENOUS at 18:01

## 2019-01-01 RX ADMIN — CEFTRIAXONE SODIUM 1 G: 1 INJECTION, POWDER, FOR SOLUTION INTRAMUSCULAR; INTRAVENOUS at 12:10

## 2019-01-01 RX ADMIN — FOSPHENYTOIN SODIUM 100 MG PE: 50 INJECTION, SOLUTION INTRAMUSCULAR; INTRAVENOUS at 20:15

## 2019-01-01 RX ADMIN — CYCLOBENZAPRINE 10 MG: 10 TABLET, FILM COATED ORAL at 08:34

## 2019-01-01 RX ADMIN — OXYCODONE HYDROCHLORIDE 10 MG: 5 TABLET ORAL at 09:14

## 2019-01-01 RX ADMIN — VENLAFAXINE HYDROCHLORIDE 37.5 MG: 37.5 CAPSULE, EXTENDED RELEASE ORAL at 12:21

## 2019-01-01 RX ADMIN — CLONAZEPAM 1 MG: 1 TABLET ORAL at 21:52

## 2019-01-01 RX ADMIN — CETIRIZINE HYDROCHLORIDE 10 MG: 10 TABLET, FILM COATED ORAL at 14:03

## 2019-01-01 RX ADMIN — IPRATROPIUM BROMIDE AND ALBUTEROL SULFATE 3 ML: 2.5; .5 SOLUTION RESPIRATORY (INHALATION) at 15:43

## 2019-01-01 RX ADMIN — MORPHINE SULFATE 2 MG: 2 INJECTION, SOLUTION INTRAMUSCULAR; INTRAVENOUS at 13:40

## 2019-01-01 RX ADMIN — METOPROLOL TARTRATE 50 MG: 50 TABLET ORAL at 08:31

## 2019-01-01 RX ADMIN — LEVETIRACETAM 1000 MG: 500 TABLET, EXTENDED RELEASE ORAL at 12:20

## 2019-01-01 RX ADMIN — GABAPENTIN 600 MG: 300 CAPSULE ORAL at 13:32

## 2019-01-01 RX ADMIN — VANCOMYCIN HYDROCHLORIDE 1250 MG: 10 INJECTION, POWDER, LYOPHILIZED, FOR SOLUTION INTRAVENOUS at 09:07

## 2019-01-01 RX ADMIN — TRAZODONE HYDROCHLORIDE 50 MG: 50 TABLET ORAL at 21:02

## 2019-01-01 RX ADMIN — CYCLOBENZAPRINE HYDROCHLORIDE 10 MG: 10 TABLET, FILM COATED ORAL at 21:06

## 2019-01-01 RX ADMIN — OXYCODONE HYDROCHLORIDE 10 MG: 5 TABLET ORAL at 23:55

## 2019-01-01 RX ADMIN — NYSTATIN: 100000 POWDER TOPICAL at 21:51

## 2019-01-01 RX ADMIN — LEVOTHYROXINE SODIUM 100 MCG: 100 TABLET ORAL at 05:50

## 2019-01-01 RX ADMIN — LEVETIRACETAM 1000 MG: 500 TABLET, EXTENDED RELEASE ORAL at 20:51

## 2019-01-01 RX ADMIN — GABAPENTIN 300 MG: 300 CAPSULE ORAL at 09:53

## 2019-01-01 RX ADMIN — SODIUM CHLORIDE 100 ML/HR: 9 INJECTION, SOLUTION INTRAVENOUS at 23:18

## 2019-01-01 RX ADMIN — SODIUM CHLORIDE 100 ML/HR: 9 INJECTION, SOLUTION INTRAVENOUS at 14:20

## 2019-01-01 RX ADMIN — TAZOBACTAM SODIUM AND PIPERACILLIN SODIUM 3.38 G: 375; 3 INJECTION, SOLUTION INTRAVENOUS at 21:52

## 2019-01-01 RX ADMIN — HYDROMORPHONE HYDROCHLORIDE 1 MG: 1 INJECTION, SOLUTION INTRAMUSCULAR; INTRAVENOUS; SUBCUTANEOUS at 09:27

## 2019-01-01 RX ADMIN — GABAPENTIN 600 MG: 250 SOLUTION ORAL at 20:48

## 2019-01-01 RX ADMIN — VANCOMYCIN HYDROCHLORIDE 1250 MG: 10 INJECTION, POWDER, LYOPHILIZED, FOR SOLUTION INTRAVENOUS at 23:02

## 2019-01-01 RX ADMIN — POTASSIUM CHLORIDE 20 MEQ: 750 CAPSULE, EXTENDED RELEASE ORAL at 17:44

## 2019-01-01 RX ADMIN — DULOXETINE HYDROCHLORIDE 30 MG: 30 CAPSULE, DELAYED RELEASE ORAL at 08:21

## 2019-01-01 RX ADMIN — CYCLOBENZAPRINE 5 MG: 10 TABLET, FILM COATED ORAL at 11:52

## 2019-01-01 RX ADMIN — NYSTATIN: 100000 POWDER TOPICAL at 15:36

## 2019-01-01 RX ADMIN — METOPROLOL TARTRATE 50 MG: 50 TABLET ORAL at 09:43

## 2019-01-01 RX ADMIN — COLLAGENASE SANTYL: 250 OINTMENT TOPICAL at 08:36

## 2019-01-01 RX ADMIN — CETIRIZINE HYDROCHLORIDE 10 MG: 10 TABLET, FILM COATED ORAL at 08:34

## 2019-01-01 RX ADMIN — QUETIAPINE FUMARATE 300 MG: 200 TABLET ORAL at 23:20

## 2019-01-01 RX ADMIN — NYSTATIN: 100000 POWDER TOPICAL at 21:42

## 2019-01-01 RX ADMIN — RIVAROXABAN 10 MG: 10 TABLET, FILM COATED ORAL at 09:14

## 2019-01-01 RX ADMIN — MIDAZOLAM 2 MG: 1 INJECTION INTRAMUSCULAR; INTRAVENOUS at 11:09

## 2019-01-01 RX ADMIN — POTASSIUM CHLORIDE 20 MEQ: 200 INJECTION, SOLUTION INTRAVENOUS at 14:01

## 2019-01-01 RX ADMIN — CETIRIZINE HYDROCHLORIDE 10 MG: 10 TABLET, FILM COATED ORAL at 08:20

## 2019-01-01 RX ADMIN — GABAPENTIN 600 MG: 300 CAPSULE ORAL at 06:59

## 2019-01-01 RX ADMIN — LEVETIRACETAM 1000 MG: 10 INJECTION INTRAVENOUS at 20:05

## 2019-01-01 RX ADMIN — LEVOTHYROXINE SODIUM 100 MCG: 100 TABLET ORAL at 06:39

## 2019-01-01 RX ADMIN — VENLAFAXINE HYDROCHLORIDE 75 MG: 75 CAPSULE, EXTENDED RELEASE ORAL at 09:54

## 2019-01-01 RX ADMIN — ROPIVACAINE HYDROCHLORIDE 20 ML: 5 INJECTION, SOLUTION EPIDURAL; INFILTRATION; PERINEURAL at 11:12

## 2019-01-01 RX ADMIN — POTASSIUM CHLORIDE 20 MEQ: 10 CAPSULE, COATED, EXTENDED RELEASE ORAL at 17:36

## 2019-01-01 RX ADMIN — POTASSIUM CHLORIDE 10 MEQ: 7.46 INJECTION, SOLUTION INTRAVENOUS at 20:13

## 2019-01-01 RX ADMIN — POTASSIUM CHLORIDE 20 MEQ: 10 CAPSULE, COATED, EXTENDED RELEASE ORAL at 17:32

## 2019-01-01 RX ADMIN — QUETIAPINE FUMARATE 100 MG: 100 TABLET ORAL at 20:55

## 2019-01-01 RX ADMIN — LORAZEPAM 1 MG: 2 INJECTION INTRAMUSCULAR; INTRAVENOUS at 03:31

## 2019-01-01 RX ADMIN — FOSPHENYTOIN SODIUM 100 MG PE: 50 INJECTION, SOLUTION INTRAMUSCULAR; INTRAVENOUS at 10:22

## 2019-01-01 RX ADMIN — POTASSIUM CHLORIDE 20 MEQ: 750 CAPSULE, EXTENDED RELEASE ORAL at 18:44

## 2019-01-01 RX ADMIN — LEVOTHYROXINE SODIUM 100 MCG: 100 TABLET ORAL at 06:30

## 2019-01-01 RX ADMIN — OXYCODONE AND ACETAMINOPHEN 2 TABLET: 5; 325 TABLET ORAL at 05:11

## 2019-01-01 RX ADMIN — CYCLOBENZAPRINE 10 MG: 10 TABLET, FILM COATED ORAL at 21:48

## 2019-01-01 RX ADMIN — POTASSIUM CHLORIDE 20 MEQ: 750 CAPSULE, EXTENDED RELEASE ORAL at 12:19

## 2019-01-01 RX ADMIN — VENLAFAXINE HYDROCHLORIDE 300 MG: 75 CAPSULE, EXTENDED RELEASE ORAL at 09:26

## 2019-01-01 RX ADMIN — NYSTATIN: 100000 POWDER TOPICAL at 06:07

## 2019-01-01 RX ADMIN — QUETIAPINE FUMARATE 100 MG: 100 TABLET ORAL at 20:49

## 2019-01-01 RX ADMIN — OXYCODONE HYDROCHLORIDE 10 MG: 5 TABLET ORAL at 21:27

## 2019-01-01 RX ADMIN — DULOXETINE HYDROCHLORIDE 30 MG: 30 CAPSULE, DELAYED RELEASE ORAL at 12:19

## 2019-01-01 RX ADMIN — QUETIAPINE FUMARATE 300 MG: 200 TABLET ORAL at 20:42

## 2019-01-01 RX ADMIN — POTASSIUM CHLORIDE 20 MEQ: 10 CAPSULE, COATED, EXTENDED RELEASE ORAL at 10:16

## 2019-01-01 RX ADMIN — POTASSIUM CHLORIDE 20 MEQ: 750 CAPSULE, EXTENDED RELEASE ORAL at 08:35

## 2019-01-01 RX ADMIN — LORAZEPAM 1 MG: 2 INJECTION INTRAMUSCULAR; INTRAVENOUS at 10:22

## 2019-01-01 RX ADMIN — OXYCODONE HYDROCHLORIDE 5 MG: 5 TABLET ORAL at 05:58

## 2019-01-01 RX ADMIN — LEVETIRACETAM 1000 MG: 10 INJECTION INTRAVENOUS at 22:55

## 2019-01-01 RX ADMIN — TAZOBACTAM SODIUM AND PIPERACILLIN SODIUM 3.38 G: 375; 3 INJECTION, SOLUTION INTRAVENOUS at 22:40

## 2019-01-01 RX ADMIN — ENOXAPARIN SODIUM 40 MG: 40 INJECTION SUBCUTANEOUS at 18:44

## 2019-01-01 RX ADMIN — LEVETIRACETAM 1000 MG: 500 TABLET, EXTENDED RELEASE ORAL at 08:47

## 2019-01-01 RX ADMIN — TAZOBACTAM SODIUM AND PIPERACILLIN SODIUM 3.38 G: 375; 3 INJECTION, SOLUTION INTRAVENOUS at 09:39

## 2019-01-01 RX ADMIN — QUETIAPINE FUMARATE 100 MG: 100 TABLET ORAL at 09:33

## 2019-01-01 RX ADMIN — OXYCODONE HYDROCHLORIDE 5 MG: 5 TABLET ORAL at 11:51

## 2019-01-01 RX ADMIN — HYDROMORPHONE HYDROCHLORIDE 1 MG: 1 INJECTION, SOLUTION INTRAMUSCULAR; INTRAVENOUS; SUBCUTANEOUS at 14:18

## 2019-01-01 RX ADMIN — OXYCODONE HYDROCHLORIDE 5 MG: 5 TABLET ORAL at 19:26

## 2019-01-01 RX ADMIN — GABAPENTIN 600 MG: 250 SOLUTION ORAL at 17:20

## 2019-01-01 RX ADMIN — TRAZODONE HYDROCHLORIDE 50 MG: 50 TABLET ORAL at 23:21

## 2019-01-01 RX ADMIN — VANCOMYCIN HYDROCHLORIDE 1500 MG: 10 INJECTION, POWDER, LYOPHILIZED, FOR SOLUTION INTRAVENOUS at 06:27

## 2019-01-01 RX ADMIN — QUETIAPINE FUMARATE 25 MG: 25 TABLET ORAL at 21:16

## 2019-01-01 RX ADMIN — OXYCODONE HYDROCHLORIDE 10 MG: 5 TABLET ORAL at 12:07

## 2019-01-01 RX ADMIN — METOPROLOL TARTRATE 50 MG: 50 TABLET ORAL at 08:58

## 2019-01-01 RX ADMIN — TAZOBACTAM SODIUM AND PIPERACILLIN SODIUM 3.38 G: 375; 3 INJECTION, SOLUTION INTRAVENOUS at 06:51

## 2019-01-01 RX ADMIN — LEVOTHYROXINE SODIUM 100 MCG: 100 TABLET ORAL at 05:31

## 2019-01-01 RX ADMIN — SODIUM CHLORIDE, PRESERVATIVE FREE 10 ML: 5 INJECTION INTRAVENOUS at 20:05

## 2019-01-01 RX ADMIN — GABAPENTIN 300 MG: 300 CAPSULE ORAL at 08:43

## 2019-01-01 RX ADMIN — OXYCODONE HYDROCHLORIDE 5 MG: 5 TABLET ORAL at 06:05

## 2019-01-01 RX ADMIN — DULOXETINE HYDROCHLORIDE 30 MG: 30 CAPSULE, DELAYED RELEASE ORAL at 08:34

## 2019-01-01 RX ADMIN — ALBUTEROL SULFATE 2.5 MG: 2.5 SOLUTION RESPIRATORY (INHALATION) at 12:10

## 2019-01-01 RX ADMIN — TAZOBACTAM SODIUM AND PIPERACILLIN SODIUM 3.38 G: 375; 3 INJECTION, SOLUTION INTRAVENOUS at 00:03

## 2019-01-01 RX ADMIN — METOPROLOL TARTRATE 50 MG: 50 TABLET ORAL at 10:12

## 2019-01-01 RX ADMIN — FENTANYL CITRATE 50 MCG: 50 INJECTION INTRAMUSCULAR; INTRAVENOUS at 11:12

## 2019-01-01 RX ADMIN — HYDROMORPHONE HYDROCHLORIDE 1 MG: 1 INJECTION, SOLUTION INTRAMUSCULAR; INTRAVENOUS; SUBCUTANEOUS at 16:53

## 2019-01-01 RX ADMIN — ONDANSETRON HYDROCHLORIDE 4 MG: 2 SOLUTION INTRAMUSCULAR; INTRAVENOUS at 12:51

## 2019-01-01 RX ADMIN — FOSPHENYTOIN SODIUM 100 MG PE: 50 INJECTION, SOLUTION INTRAMUSCULAR; INTRAVENOUS at 23:42

## 2019-01-01 RX ADMIN — ROCURONIUM BROMIDE 30 MG: 10 INJECTION INTRAVENOUS at 11:50

## 2019-01-01 RX ADMIN — COLLAGENASE SANTYL: 250 OINTMENT TOPICAL at 20:51

## 2019-01-01 RX ADMIN — QUETIAPINE FUMARATE 100 MG: 100 TABLET ORAL at 10:11

## 2019-01-01 RX ADMIN — IPRATROPIUM BROMIDE AND ALBUTEROL SULFATE 3 ML: 2.5; .5 SOLUTION RESPIRATORY (INHALATION) at 03:37

## 2019-01-01 RX ADMIN — QUETIAPINE FUMARATE 100 MG: 100 TABLET ORAL at 20:41

## 2019-01-01 RX ADMIN — LEVETIRACETAM 1000 MG: 500 TABLET, EXTENDED RELEASE ORAL at 08:30

## 2019-01-01 RX ADMIN — LEVETIRACETAM 1000 MG: 500 TABLET, EXTENDED RELEASE ORAL at 08:59

## 2019-01-01 RX ADMIN — LEVOTHYROXINE SODIUM 100 MCG: 100 TABLET ORAL at 06:05

## 2019-01-01 RX ADMIN — LORAZEPAM 1 MG: 2 INJECTION INTRAMUSCULAR; INTRAVENOUS at 16:49

## 2019-01-01 RX ADMIN — RIVAROXABAN 10 MG: 20 TABLET, FILM COATED ORAL at 09:53

## 2019-01-01 RX ADMIN — MORPHINE SULFATE 15 MG: 15 TABLET, FILM COATED, EXTENDED RELEASE ORAL at 08:58

## 2019-01-01 RX ADMIN — SODIUM CHLORIDE 500 ML: 9 INJECTION, SOLUTION INTRAVENOUS at 16:53

## 2019-01-01 RX ADMIN — NYSTATIN: 100000 POWDER TOPICAL at 05:36

## 2019-01-01 RX ADMIN — TRAZODONE HYDROCHLORIDE 50 MG: 50 TABLET ORAL at 20:51

## 2019-01-01 RX ADMIN — NYSTATIN: 100000 POWDER TOPICAL at 13:20

## 2019-01-01 RX ADMIN — RIVAROXABAN 10 MG: 10 TABLET, FILM COATED ORAL at 08:34

## 2019-01-01 RX ADMIN — TAZOBACTAM SODIUM AND PIPERACILLIN SODIUM 3.38 G: 375; 3 INJECTION, SOLUTION INTRAVENOUS at 14:10

## 2019-01-01 RX ADMIN — METOPROLOL TARTRATE 50 MG: 50 TABLET ORAL at 08:35

## 2019-01-01 RX ADMIN — OXYCODONE AND ACETAMINOPHEN 1 TABLET: 5; 325 TABLET ORAL at 17:51

## 2019-01-01 RX ADMIN — RIVAROXABAN 10 MG: 10 TABLET, FILM COATED ORAL at 08:35

## 2019-01-01 RX ADMIN — NYSTATIN: 100000 POWDER TOPICAL at 14:13

## 2019-01-01 RX ADMIN — MORPHINE SULFATE 15 MG: 15 TABLET, FILM COATED, EXTENDED RELEASE ORAL at 20:22

## 2019-01-01 RX ADMIN — POTASSIUM CHLORIDE 20 MEQ: 750 CAPSULE, EXTENDED RELEASE ORAL at 17:39

## 2019-01-01 RX ADMIN — HYDROMORPHONE HYDROCHLORIDE 1 MG: 1 INJECTION, SOLUTION INTRAMUSCULAR; INTRAVENOUS; SUBCUTANEOUS at 06:00

## 2019-01-01 RX ADMIN — SODIUM CHLORIDE 100 ML/HR: 9 INJECTION, SOLUTION INTRAVENOUS at 16:41

## 2019-01-01 RX ADMIN — POTASSIUM CHLORIDE 20 MEQ: 200 INJECTION, SOLUTION INTRAVENOUS at 17:32

## 2019-01-01 RX ADMIN — OXYCODONE HYDROCHLORIDE 10 MG: 5 TABLET ORAL at 11:12

## 2019-01-01 RX ADMIN — POTASSIUM CHLORIDE 20 MEQ: 750 CAPSULE, EXTENDED RELEASE ORAL at 13:01

## 2019-01-01 RX ADMIN — SODIUM CHLORIDE 1000 ML: 9 INJECTION, SOLUTION INTRAVENOUS at 19:08

## 2019-01-01 RX ADMIN — NYSTATIN: 100000 POWDER TOPICAL at 06:08

## 2019-01-01 RX ADMIN — POTASSIUM CHLORIDE 20 MEQ: 750 CAPSULE, EXTENDED RELEASE ORAL at 12:14

## 2019-01-01 RX ADMIN — GABAPENTIN 600 MG: 250 SOLUTION ORAL at 20:42

## 2019-01-01 RX ADMIN — NYSTATIN: 100000 POWDER TOPICAL at 14:16

## 2019-01-01 RX ADMIN — QUETIAPINE FUMARATE 300 MG: 200 TABLET ORAL at 20:46

## 2019-01-01 RX ADMIN — GABAPENTIN 600 MG: 250 SOLUTION ORAL at 20:51

## 2019-01-01 RX ADMIN — DULOXETINE HYDROCHLORIDE 30 MG: 30 CAPSULE, DELAYED RELEASE ORAL at 08:58

## 2019-01-01 RX ADMIN — OXYCODONE HYDROCHLORIDE 10 MG: 5 TABLET ORAL at 09:25

## 2019-01-01 RX ADMIN — CLONAZEPAM 1 MG: 1 TABLET ORAL at 12:59

## 2019-01-01 RX ADMIN — GABAPENTIN 600 MG: 300 CAPSULE ORAL at 22:39

## 2019-01-01 RX ADMIN — DEXTROSE AND SODIUM CHLORIDE 50 ML/HR: 5; 900 INJECTION, SOLUTION INTRAVENOUS at 09:29

## 2019-01-01 RX ADMIN — POTASSIUM CHLORIDE AND SODIUM CHLORIDE 100 ML/HR: 900; 150 INJECTION, SOLUTION INTRAVENOUS at 18:00

## 2019-01-01 RX ADMIN — FOSPHENYTOIN SODIUM 100 MG PE: 50 INJECTION, SOLUTION INTRAMUSCULAR; INTRAVENOUS at 09:43

## 2019-01-01 RX ADMIN — SODIUM CHLORIDE 100 ML/HR: 9 INJECTION, SOLUTION INTRAVENOUS at 00:53

## 2019-01-01 RX ADMIN — IPRATROPIUM BROMIDE AND ALBUTEROL SULFATE 3 ML: 2.5; .5 SOLUTION RESPIRATORY (INHALATION) at 03:03

## 2019-01-01 RX ADMIN — OXYCODONE HYDROCHLORIDE 10 MG: 5 TABLET ORAL at 20:14

## 2019-01-01 RX ADMIN — NYSTATIN: 100000 POWDER TOPICAL at 14:56

## 2019-01-01 RX ADMIN — OXYCODONE HYDROCHLORIDE 10 MG: 5 TABLET ORAL at 10:22

## 2019-01-01 RX ADMIN — AZITHROMYCIN MONOHYDRATE 500 MG: 500 INJECTION, POWDER, LYOPHILIZED, FOR SOLUTION INTRAVENOUS at 16:36

## 2019-01-01 RX ADMIN — ALBUTEROL SULFATE 2.5 MG: 2.5 SOLUTION RESPIRATORY (INHALATION) at 21:00

## 2019-01-01 RX ADMIN — VANCOMYCIN HYDROCHLORIDE 1250 MG: 10 INJECTION, POWDER, LYOPHILIZED, FOR SOLUTION INTRAVENOUS at 02:28

## 2019-01-01 RX ADMIN — LEVETIRACETAM 1000 MG: 500 TABLET, EXTENDED RELEASE ORAL at 09:13

## 2019-01-01 RX ADMIN — VENLAFAXINE HYDROCHLORIDE 300 MG: 75 CAPSULE, EXTENDED RELEASE ORAL at 09:49

## 2019-01-01 RX ADMIN — OXYCODONE AND ACETAMINOPHEN 1 TABLET: 5; 325 TABLET ORAL at 13:02

## 2019-01-01 RX ADMIN — AZITHROMYCIN MONOHYDRATE 500 MG: 500 INJECTION, POWDER, LYOPHILIZED, FOR SOLUTION INTRAVENOUS at 13:32

## 2019-01-01 RX ADMIN — GABAPENTIN 300 MG: 300 CAPSULE ORAL at 21:14

## 2019-01-01 RX ADMIN — OXYCODONE HYDROCHLORIDE 10 MG: 5 TABLET ORAL at 17:23

## 2019-01-01 RX ADMIN — LORAZEPAM 1 MG: 2 INJECTION INTRAMUSCULAR; INTRAVENOUS at 18:18

## 2019-01-01 RX ADMIN — TAZOBACTAM SODIUM AND PIPERACILLIN SODIUM 3.38 G: 375; 3 INJECTION, SOLUTION INTRAVENOUS at 09:54

## 2019-01-01 RX ADMIN — IPRATROPIUM BROMIDE AND ALBUTEROL SULFATE 3 ML: 2.5; .5 SOLUTION RESPIRATORY (INHALATION) at 19:08

## 2019-01-01 RX ADMIN — OXYCODONE HYDROCHLORIDE 10 MG: 5 TABLET ORAL at 21:06

## 2019-01-01 RX ADMIN — AZITHROMYCIN MONOHYDRATE 500 MG: 500 INJECTION, POWDER, LYOPHILIZED, FOR SOLUTION INTRAVENOUS at 16:57

## 2019-01-01 RX ADMIN — VANCOMYCIN HYDROCHLORIDE 1250 MG: 10 INJECTION, POWDER, LYOPHILIZED, FOR SOLUTION INTRAVENOUS at 22:03

## 2019-01-01 RX ADMIN — ALBUTEROL SULFATE 2.5 MG: 2.5 SOLUTION RESPIRATORY (INHALATION) at 19:23

## 2019-01-01 RX ADMIN — LIDOCAINE HYDROCHLORIDE 1 EACH: 40 SOLUTION TOPICAL at 11:54

## 2019-01-01 RX ADMIN — DULOXETINE HYDROCHLORIDE 30 MG: 30 CAPSULE, DELAYED RELEASE ORAL at 08:43

## 2019-01-01 RX ADMIN — VENLAFAXINE HYDROCHLORIDE 300 MG: 75 CAPSULE, EXTENDED RELEASE ORAL at 08:31

## 2019-01-01 RX ADMIN — SODIUM CHLORIDE, PRESERVATIVE FREE 3 ML: 5 INJECTION INTRAVENOUS at 20:30

## 2019-01-01 RX ADMIN — NYSTATIN: 100000 POWDER TOPICAL at 23:25

## 2019-01-01 RX ADMIN — GABAPENTIN 600 MG: 300 CAPSULE ORAL at 16:05

## 2019-01-01 RX ADMIN — NYSTATIN: 100000 POWDER TOPICAL at 05:32

## 2019-01-01 RX ADMIN — PIPERACILLIN SODIUM,TAZOBACTAM SODIUM 3.38 G: 3; .375 INJECTION, POWDER, FOR SOLUTION INTRAVENOUS at 16:27

## 2019-01-01 RX ADMIN — TAZOBACTAM SODIUM AND PIPERACILLIN SODIUM 3.38 G: 375; 3 INJECTION, SOLUTION INTRAVENOUS at 00:04

## 2019-01-01 RX ADMIN — DULOXETINE HYDROCHLORIDE 30 MG: 30 CAPSULE, DELAYED RELEASE ORAL at 09:13

## 2019-01-01 RX ADMIN — NYSTATIN: 100000 POWDER TOPICAL at 06:34

## 2019-01-01 RX ADMIN — DULOXETINE HYDROCHLORIDE 30 MG: 30 CAPSULE, DELAYED RELEASE ORAL at 09:54

## 2019-01-01 RX ADMIN — FOSPHENYTOIN SODIUM 100 MG PE: 50 INJECTION, SOLUTION INTRAMUSCULAR; INTRAVENOUS at 08:58

## 2019-01-01 RX ADMIN — VENLAFAXINE HYDROCHLORIDE 300 MG: 75 CAPSULE, EXTENDED RELEASE ORAL at 09:42

## 2019-01-01 RX ADMIN — VENLAFAXINE HYDROCHLORIDE 37.5 MG: 37.5 CAPSULE, EXTENDED RELEASE ORAL at 10:11

## 2019-01-01 RX ADMIN — GABAPENTIN 600 MG: 250 SOLUTION ORAL at 23:20

## 2019-01-01 RX ADMIN — OXYCODONE HYDROCHLORIDE 5 MG: 5 TABLET ORAL at 15:02

## 2019-01-01 RX ADMIN — OXYCODONE AND ACETAMINOPHEN 1 TABLET: 5; 325 TABLET ORAL at 00:11

## 2019-01-01 RX ADMIN — POTASSIUM CHLORIDE 20 MEQ: 750 CAPSULE, EXTENDED RELEASE ORAL at 18:03

## 2019-01-01 RX ADMIN — LEVETIRACETAM 1000 MG: 500 TABLET, EXTENDED RELEASE ORAL at 20:48

## 2019-01-01 RX ADMIN — RIVAROXABAN 10 MG: 20 TABLET, FILM COATED ORAL at 10:10

## 2019-01-01 RX ADMIN — VANCOMYCIN HYDROCHLORIDE 1500 MG: 10 INJECTION, POWDER, LYOPHILIZED, FOR SOLUTION INTRAVENOUS at 18:01

## 2019-01-01 RX ADMIN — VENLAFAXINE HYDROCHLORIDE 150 MG: 75 CAPSULE, EXTENDED RELEASE ORAL at 08:35

## 2019-01-01 RX ADMIN — OXYCODONE HYDROCHLORIDE 10 MG: 5 TABLET ORAL at 13:30

## 2019-01-01 RX ADMIN — LEVETIRACETAM 1000 MG: 500 TABLET, EXTENDED RELEASE ORAL at 08:43

## 2019-01-01 RX ADMIN — OXYCODONE HYDROCHLORIDE: 5 TABLET ORAL at 06:59

## 2019-01-01 RX ADMIN — POTASSIUM CHLORIDE AND DEXTROSE MONOHYDRATE 75 ML/HR: 150; 5 INJECTION, SOLUTION INTRAVENOUS at 04:24

## 2019-01-01 RX ADMIN — IPRATROPIUM BROMIDE AND ALBUTEROL SULFATE 3 ML: 2.5; .5 SOLUTION RESPIRATORY (INHALATION) at 06:26

## 2019-01-01 RX ADMIN — POTASSIUM CHLORIDE 20 MEQ: 750 CAPSULE, EXTENDED RELEASE ORAL at 11:19

## 2019-01-01 RX ADMIN — CYCLOBENZAPRINE HYDROCHLORIDE 5 MG: 10 TABLET, FILM COATED ORAL at 21:16

## 2019-01-01 RX ADMIN — COLLAGENASE SANTYL: 250 OINTMENT TOPICAL at 20:41

## 2019-01-01 RX ADMIN — QUETIAPINE FUMARATE 100 MG: 100 TABLET ORAL at 21:04

## 2019-01-01 RX ADMIN — LEVETIRACETAM 1000 MG: 500 TABLET, EXTENDED RELEASE ORAL at 09:27

## 2019-01-01 RX ADMIN — CETIRIZINE HYDROCHLORIDE 10 MG: 10 TABLET, FILM COATED ORAL at 09:27

## 2019-01-01 RX ADMIN — POTASSIUM CHLORIDE 20 MEQ: 750 CAPSULE, EXTENDED RELEASE ORAL at 09:26

## 2019-01-01 RX ADMIN — POTASSIUM CHLORIDE AND SODIUM CHLORIDE 100 ML/HR: 900; 150 INJECTION, SOLUTION INTRAVENOUS at 14:10

## 2019-01-01 RX ADMIN — OXYBUTYNIN CHLORIDE 5 MG: 5 TABLET, EXTENDED RELEASE ORAL at 08:58

## 2019-01-01 RX ADMIN — NYSTATIN: 100000 POWDER TOPICAL at 20:19

## 2019-01-01 RX ADMIN — ONDANSETRON HYDROCHLORIDE 4 MG: 2 SOLUTION INTRAMUSCULAR; INTRAVENOUS at 03:53

## 2019-01-01 RX ADMIN — MORPHINE SULFATE 2 MG: 2 INJECTION, SOLUTION INTRAMUSCULAR; INTRAVENOUS at 08:28

## 2019-01-01 RX ADMIN — POTASSIUM CHLORIDE AND SODIUM CHLORIDE 100 ML/HR: 900; 150 INJECTION, SOLUTION INTRAVENOUS at 08:18

## 2019-01-01 RX ADMIN — QUETIAPINE FUMARATE 100 MG: 100 TABLET ORAL at 21:48

## 2019-01-01 RX ADMIN — ALBUTEROL SULFATE 2.5 MG: 2.5 SOLUTION RESPIRATORY (INHALATION) at 14:08

## 2019-01-01 RX ADMIN — NYSTATIN: 100000 CREAM TOPICAL at 14:54

## 2019-01-01 RX ADMIN — NYSTATIN: 100000 POWDER TOPICAL at 15:07

## 2019-01-01 RX ADMIN — POTASSIUM CHLORIDE AND DEXTROSE MONOHYDRATE 30 ML/HR: 150; 5 INJECTION, SOLUTION INTRAVENOUS at 22:13

## 2019-01-01 RX ADMIN — GABAPENTIN 400 MG: 100 CAPSULE ORAL at 11:50

## 2019-01-01 RX ADMIN — LEVETIRACETAM 1000 MG: 500 TABLET, EXTENDED RELEASE ORAL at 20:49

## 2019-01-01 RX ADMIN — OXYCODONE HYDROCHLORIDE 5 MG: 5 TABLET ORAL at 17:34

## 2019-01-01 RX ADMIN — POTASSIUM CHLORIDE 10 MEQ: 7.46 INJECTION, SOLUTION INTRAVENOUS at 09:43

## 2019-01-01 RX ADMIN — POTASSIUM CHLORIDE 20 MEQ: 750 CAPSULE, EXTENDED RELEASE ORAL at 17:21

## 2019-01-01 RX ADMIN — OXYCODONE HYDROCHLORIDE 10 MG: 5 TABLET ORAL at 10:35

## 2019-01-01 RX ADMIN — DULOXETINE HYDROCHLORIDE 30 MG: 30 CAPSULE, DELAYED RELEASE ORAL at 09:27

## 2019-01-01 RX ADMIN — ERGOCALCIFEROL 50000 UNITS: 1.25 CAPSULE ORAL at 18:51

## 2019-01-01 RX ADMIN — QUETIAPINE FUMARATE 300 MG: 200 TABLET ORAL at 21:02

## 2019-01-01 RX ADMIN — LEVETIRACETAM 1000 MG: 500 TABLET, EXTENDED RELEASE ORAL at 21:03

## 2019-01-01 RX ADMIN — NYSTATIN: 100000 POWDER TOPICAL at 21:25

## 2019-01-01 RX ADMIN — LEVETIRACETAM 1000 MG: 500 TABLET, FILM COATED ORAL at 08:34

## 2019-01-01 RX ADMIN — LEVETIRACETAM 1000 MG: 500 TABLET, EXTENDED RELEASE ORAL at 08:31

## 2019-01-01 RX ADMIN — DEXAMETHASONE SODIUM PHOSPHATE 4 MG: 4 INJECTION, SOLUTION INTRAMUSCULAR; INTRAVENOUS at 12:51

## 2019-01-01 RX ADMIN — NYSTATIN: 100000 POWDER TOPICAL at 21:03

## 2019-01-01 RX ADMIN — SODIUM CHLORIDE, PRESERVATIVE FREE 10 ML: 5 INJECTION INTRAVENOUS at 21:17

## 2019-01-01 RX ADMIN — POTASSIUM CHLORIDE 20 MEQ: 750 CAPSULE, EXTENDED RELEASE ORAL at 08:47

## 2019-01-01 RX ADMIN — POTASSIUM CHLORIDE 20 MEQ: 750 CAPSULE, EXTENDED RELEASE ORAL at 12:07

## 2019-01-01 RX ADMIN — POTASSIUM CHLORIDE 10 MEQ: 7.46 INJECTION, SOLUTION INTRAVENOUS at 13:40

## 2019-01-01 RX ADMIN — SODIUM CHLORIDE 1000 ML: 9 INJECTION, SOLUTION INTRAVENOUS at 17:38

## 2019-01-01 RX ADMIN — OXYCODONE HYDROCHLORIDE 10 MG: 5 TABLET ORAL at 12:59

## 2019-01-01 RX ADMIN — LORAZEPAM 1 MG: 2 INJECTION INTRAMUSCULAR; INTRAVENOUS at 23:18

## 2019-01-01 RX ADMIN — POTASSIUM CHLORIDE 20 MEQ: 750 CAPSULE, EXTENDED RELEASE ORAL at 10:10

## 2019-01-01 RX ADMIN — OXYCODONE HYDROCHLORIDE 5 MG: 5 TABLET ORAL at 12:19

## 2019-01-01 RX ADMIN — POTASSIUM CHLORIDE AND SODIUM CHLORIDE 100 ML/HR: 900; 150 INJECTION, SOLUTION INTRAVENOUS at 04:21

## 2019-01-01 RX ADMIN — VENLAFAXINE HYDROCHLORIDE 300 MG: 75 CAPSULE, EXTENDED RELEASE ORAL at 08:47

## 2019-01-01 RX ADMIN — OXYCODONE HYDROCHLORIDE 10 MG: 5 TABLET ORAL at 05:29

## 2019-01-01 RX ADMIN — TRAZODONE HYDROCHLORIDE 50 MG: 50 TABLET ORAL at 21:48

## 2019-01-01 RX ADMIN — CLONAZEPAM 1 MG: 1 TABLET ORAL at 23:55

## 2019-01-01 RX ADMIN — FUROSEMIDE 20 MG: 10 INJECTION, SOLUTION INTRAMUSCULAR; INTRAVENOUS at 14:01

## 2019-01-01 RX ADMIN — IPRATROPIUM BROMIDE AND ALBUTEROL SULFATE 3 ML: 2.5; .5 SOLUTION RESPIRATORY (INHALATION) at 14:28

## 2019-01-01 RX ADMIN — VENLAFAXINE HYDROCHLORIDE 37.5 MG: 37.5 CAPSULE, EXTENDED RELEASE ORAL at 08:43

## 2019-01-01 RX ADMIN — OXYCODONE HYDROCHLORIDE 10 MG: 5 TABLET ORAL at 17:29

## 2019-01-01 RX ADMIN — COLLAGENASE SANTYL: 250 OINTMENT TOPICAL at 08:21

## 2019-01-01 RX ADMIN — TRAZODONE HYDROCHLORIDE 50 MG: 50 TABLET ORAL at 20:41

## 2019-01-01 RX ADMIN — SODIUM CHLORIDE 100 ML/HR: 9 INJECTION, SOLUTION INTRAVENOUS at 13:15

## 2019-01-01 RX ADMIN — LEVETIRACETAM 1000 MG: 500 TABLET, EXTENDED RELEASE ORAL at 23:21

## 2019-01-01 RX ADMIN — NYSTATIN: 100000 POWDER TOPICAL at 05:29

## 2019-01-01 RX ADMIN — POTASSIUM CHLORIDE AND SODIUM CHLORIDE 125 ML/HR: 900; 150 INJECTION, SOLUTION INTRAVENOUS at 05:11

## 2019-01-01 RX ADMIN — NYSTATIN: 100000 POWDER TOPICAL at 16:03

## 2019-01-01 RX ADMIN — POTASSIUM CHLORIDE 20 MEQ: 10 CAPSULE, COATED, EXTENDED RELEASE ORAL at 18:26

## 2019-01-01 RX ADMIN — POTASSIUM CHLORIDE 20 MEQ: 750 CAPSULE, EXTENDED RELEASE ORAL at 08:58

## 2019-01-01 RX ADMIN — NYSTATIN: 100000 POWDER TOPICAL at 21:17

## 2019-01-01 RX ADMIN — OXYCODONE HYDROCHLORIDE 10 MG: 5 TABLET ORAL at 06:07

## 2019-01-01 RX ADMIN — CETIRIZINE HYDROCHLORIDE 10 MG: 10 TABLET, FILM COATED ORAL at 08:47

## 2019-01-01 RX ADMIN — HYDROMORPHONE HYDROCHLORIDE 1 MG: 1 INJECTION, SOLUTION INTRAMUSCULAR; INTRAVENOUS; SUBCUTANEOUS at 22:13

## 2019-01-01 RX ADMIN — RIVAROXABAN 10 MG: 20 TABLET, FILM COATED ORAL at 12:00

## 2019-01-01 RX ADMIN — NYSTATIN: 100000 POWDER TOPICAL at 02:28

## 2019-01-01 RX ADMIN — GABAPENTIN 600 MG: 300 CAPSULE ORAL at 16:36

## 2019-01-01 RX ADMIN — OXYBUTYNIN CHLORIDE 5 MG: 5 TABLET, EXTENDED RELEASE ORAL at 09:43

## 2019-01-01 RX ADMIN — OXYCODONE HYDROCHLORIDE AND ACETAMINOPHEN 1 TABLET: 10; 325 TABLET ORAL at 07:51

## 2019-01-01 RX ADMIN — VANCOMYCIN HYDROCHLORIDE 1500 MG: 10 INJECTION, POWDER, LYOPHILIZED, FOR SOLUTION INTRAVENOUS at 17:08

## 2019-01-01 RX ADMIN — SODIUM CHLORIDE, PRESERVATIVE FREE 10 ML: 5 INJECTION INTRAVENOUS at 08:59

## 2019-01-01 RX ADMIN — LEVETIRACETAM 1000 MG: 500 TABLET, EXTENDED RELEASE ORAL at 10:03

## 2019-01-01 RX ADMIN — MORPHINE SULFATE 15 MG: 15 TABLET, FILM COATED, EXTENDED RELEASE ORAL at 08:30

## 2019-01-01 RX ADMIN — GABAPENTIN 600 MG: 250 SOLUTION ORAL at 21:02

## 2019-01-01 RX ADMIN — CEFTRIAXONE SODIUM 1 G: 1 INJECTION, POWDER, FOR SOLUTION INTRAMUSCULAR; INTRAVENOUS at 09:55

## 2019-01-01 RX ADMIN — RIVAROXABAN 10 MG: 20 TABLET, FILM COATED ORAL at 08:59

## 2019-01-01 RX ADMIN — CEFTRIAXONE SODIUM 1 G: 1 INJECTION, POWDER, FOR SOLUTION INTRAMUSCULAR; INTRAVENOUS at 10:02

## 2019-01-01 RX ADMIN — CEFAZOLIN 2 G: 1 INJECTION, POWDER, FOR SOLUTION INTRAVENOUS at 12:07

## 2019-01-01 RX ADMIN — MORPHINE SULFATE 15 MG: 15 TABLET, FILM COATED, EXTENDED RELEASE ORAL at 09:42

## 2019-01-01 RX ADMIN — OXYCODONE HYDROCHLORIDE 10 MG: 5 TABLET ORAL at 15:16

## 2019-01-01 RX ADMIN — HYDROMORPHONE HYDROCHLORIDE 1 MG: 1 INJECTION, SOLUTION INTRAMUSCULAR; INTRAVENOUS; SUBCUTANEOUS at 08:20

## 2019-01-01 RX ADMIN — OXYCODONE HYDROCHLORIDE 5 MG: 5 TABLET ORAL at 21:15

## 2019-01-01 RX ADMIN — NYSTATIN: 100000 CREAM TOPICAL at 10:00

## 2019-01-01 RX ADMIN — QUETIAPINE FUMARATE 50 MG: 25 TABLET ORAL at 21:25

## 2019-01-01 RX ADMIN — OXYCODONE HYDROCHLORIDE 5 MG: 5 TABLET ORAL at 10:40

## 2019-01-01 RX ADMIN — LEVETIRACETAM 1000 MG: 500 TABLET, EXTENDED RELEASE ORAL at 08:34

## 2019-01-01 RX ADMIN — CYCLOBENZAPRINE 10 MG: 10 TABLET, FILM COATED ORAL at 20:18

## 2019-01-01 RX ADMIN — COLLAGENASE SANTYL: 250 OINTMENT TOPICAL at 20:48

## 2019-01-01 RX ADMIN — NYSTATIN: 100000 POWDER TOPICAL at 14:10

## 2019-01-01 RX ADMIN — OXYCODONE HYDROCHLORIDE AND ACETAMINOPHEN 1 TABLET: 10; 325 TABLET ORAL at 13:59

## 2019-01-01 RX ADMIN — ALBUTEROL SULFATE 2.5 MG: 2.5 SOLUTION RESPIRATORY (INHALATION) at 07:22

## 2019-01-01 RX ADMIN — RIVAROXABAN 10 MG: 10 TABLET, FILM COATED ORAL at 10:12

## 2019-01-01 RX ADMIN — IPRATROPIUM BROMIDE AND ALBUTEROL SULFATE 3 ML: 2.5; .5 SOLUTION RESPIRATORY (INHALATION) at 23:58

## 2019-01-01 RX ADMIN — TRAZODONE HYDROCHLORIDE 50 MG: 50 TABLET ORAL at 20:47

## 2019-01-01 RX ADMIN — COLLAGENASE SANTYL: 250 OINTMENT TOPICAL at 09:50

## 2019-01-01 RX ADMIN — ALBUTEROL SULFATE 2.5 MG: 2.5 SOLUTION RESPIRATORY (INHALATION) at 06:22

## 2019-01-01 RX ADMIN — VENLAFAXINE HYDROCHLORIDE 150 MG: 75 CAPSULE, EXTENDED RELEASE ORAL at 10:11

## 2019-01-01 RX ADMIN — GABAPENTIN 600 MG: 250 SOLUTION ORAL at 21:06

## 2019-01-01 RX ADMIN — OXYCODONE HYDROCHLORIDE 5 MG: 5 TABLET ORAL at 00:10

## 2019-01-01 RX ADMIN — MEPERIDINE HYDROCHLORIDE 12.5 MG: 25 INJECTION INTRAMUSCULAR; INTRAVENOUS; SUBCUTANEOUS at 14:17

## 2019-01-01 RX ADMIN — CLONAZEPAM 1 MG: 1 TABLET ORAL at 15:17

## 2019-01-01 RX ADMIN — CEFAZOLIN SODIUM 3 G: 1 INJECTION, POWDER, FOR SOLUTION INTRAMUSCULAR; INTRAVENOUS at 10:52

## 2019-01-01 RX ADMIN — OXYCODONE HYDROCHLORIDE 10 MG: 5 TABLET ORAL at 16:26

## 2019-01-01 RX ADMIN — GABAPENTIN 600 MG: 250 SOLUTION ORAL at 09:26

## 2019-01-01 RX ADMIN — CEFTRIAXONE SODIUM 1 G: 1 INJECTION, POWDER, FOR SOLUTION INTRAMUSCULAR; INTRAVENOUS at 13:40

## 2019-01-01 RX ADMIN — POTASSIUM CHLORIDE 20 MEQ: 750 CAPSULE, EXTENDED RELEASE ORAL at 13:22

## 2019-01-01 RX ADMIN — IPRATROPIUM BROMIDE AND ALBUTEROL SULFATE 3 ML: 2.5; .5 SOLUTION RESPIRATORY (INHALATION) at 08:09

## 2019-01-01 RX ADMIN — LEVOTHYROXINE SODIUM 100 MCG: 100 TABLET ORAL at 06:08

## 2019-01-01 RX ADMIN — ENOXAPARIN SODIUM 40 MG: 40 INJECTION SUBCUTANEOUS at 16:36

## 2019-01-01 RX ADMIN — OXYCODONE AND ACETAMINOPHEN 2 TABLET: 5; 325 TABLET ORAL at 15:13

## 2019-01-01 RX ADMIN — GABAPENTIN 600 MG: 300 CAPSULE ORAL at 10:10

## 2019-01-01 RX ADMIN — HYDROMORPHONE HYDROCHLORIDE 1 MG: 1 INJECTION, SOLUTION INTRAMUSCULAR; INTRAVENOUS; SUBCUTANEOUS at 19:42

## 2019-01-01 RX ADMIN — NYSTATIN 1 APPLICATION: 100000 POWDER TOPICAL at 06:56

## 2019-01-01 RX ADMIN — COLLAGENASE SANTYL: 250 OINTMENT TOPICAL at 09:21

## 2019-01-01 RX ADMIN — COLLAGENASE SANTYL: 250 OINTMENT TOPICAL at 20:57

## 2019-01-01 RX ADMIN — RIVAROXABAN 10 MG: 20 TABLET, FILM COATED ORAL at 08:43

## 2019-01-01 RX ADMIN — OXYCODONE HYDROCHLORIDE 10 MG: 5 TABLET ORAL at 03:53

## 2019-01-01 RX ADMIN — GABAPENTIN 600 MG: 250 SOLUTION ORAL at 16:20

## 2019-01-01 RX ADMIN — GABAPENTIN 600 MG: 300 CAPSULE ORAL at 20:18

## 2019-01-01 RX ADMIN — HYDROMORPHONE HYDROCHLORIDE 1 MG: 1 INJECTION, SOLUTION INTRAMUSCULAR; INTRAVENOUS; SUBCUTANEOUS at 16:34

## 2019-01-01 RX ADMIN — OXYCODONE HYDROCHLORIDE 10 MG: 5 TABLET ORAL at 22:12

## 2019-01-01 RX ADMIN — CYCLOBENZAPRINE 10 MG: 10 TABLET, FILM COATED ORAL at 10:11

## 2019-01-01 RX ADMIN — GABAPENTIN 600 MG: 300 CAPSULE ORAL at 20:42

## 2019-01-01 RX ADMIN — CYCLOBENZAPRINE 5 MG: 10 TABLET, FILM COATED ORAL at 17:11

## 2019-01-01 RX ADMIN — LEVETIRACETAM 1000 MG: 500 TABLET, EXTENDED RELEASE ORAL at 10:09

## 2019-01-01 RX ADMIN — NYSTATIN: 100000 POWDER TOPICAL at 20:54

## 2019-01-01 RX ADMIN — IPRATROPIUM BROMIDE AND ALBUTEROL SULFATE 3 ML: 2.5; .5 SOLUTION RESPIRATORY (INHALATION) at 06:59

## 2019-01-01 RX ADMIN — SODIUM CHLORIDE, PRESERVATIVE FREE 10 ML: 5 INJECTION INTRAVENOUS at 20:55

## 2019-01-01 RX ADMIN — CYCLOBENZAPRINE 5 MG: 10 TABLET, FILM COATED ORAL at 21:24

## 2019-03-10 ENCOUNTER — HOSPITAL ENCOUNTER (OUTPATIENT)
Dept: HOSPITAL 58 - AMBL | Age: 64
Discharge: ANOTHER HOSPITAL | End: 2019-03-10
Attending: GENERAL PRACTICE

## 2019-03-10 VITALS — BODY MASS INDEX: 31.6 KG/M2

## 2019-03-10 DIAGNOSIS — M54.9: ICD-10-CM

## 2019-03-10 DIAGNOSIS — R41.82: Primary | ICD-10-CM

## 2019-03-10 DIAGNOSIS — M79.602: ICD-10-CM

## 2019-03-10 DIAGNOSIS — G89.29: ICD-10-CM

## 2019-03-10 DIAGNOSIS — R40.2421: ICD-10-CM

## 2019-03-10 NOTE — PROGRESS NOTES
"Pharmacy Dosing Service  Anticoagulant  Enoxaparin    Assessment/Action/Plan:  Pharmacy to dose Lovenox for VTE ppx. Initiated Lovenox 40 mg q 24hrs.  Pharmacy will continue to monitor and adjust as needed     Subjective:  Bita Croft is a 63 y.o. female on Enoxaparin 40 mg SQ every 24 hours for indication of VTE prophylaxis.  Objective:  [Ht: 180.3 cm (71\"); Wt: 98.7 kg (217 lb 9.6 oz); BMI: Body mass index is 30.35 kg/m².]  Estimated Creatinine Clearance: 106.5 mL/min (by C-G formula based on SCr of 0.7 mg/dL). No results found for: INR, PROTIME   Lab Results   Component Value Date    HGB 13.3 03/10/2019    HGB 12.5 07/24/2017    HGB 12.4 07/15/2017      Lab Results   Component Value Date     03/10/2019     07/24/2017     07/15/2017       JORDYN Rothman RPH  03/10/19 4:37 PM     "

## 2019-03-10 NOTE — PLAN OF CARE
Problem: Patient Care Overview  Goal: Plan of Care Review  Outcome: Ongoing (interventions implemented as appropriate)   03/10/19 3745   Coping/Psychosocial   Plan of Care Reviewed With patient   OTHER   Outcome Summary VSS, alert but confused,  at bedside   Plan of Care Review   Progress no change       Problem: Fall Risk (Adult)  Goal: Identify Related Risk Factors and Signs and Symptoms  Outcome: Ongoing (interventions implemented as appropriate)    Goal: Absence of Fall  Outcome: Ongoing (interventions implemented as appropriate)      Problem: Skin Injury Risk (Adult)  Goal: Identify Related Risk Factors and Signs and Symptoms  Outcome: Ongoing (interventions implemented as appropriate)    Goal: Skin Health and Integrity  Outcome: Ongoing (interventions implemented as appropriate)

## 2019-03-10 NOTE — THERAPY EVALUATION
Acute Care - Speech Language Pathology   Swallow Initial Evaluation [facility]     Patient Name: Bita Croft  : 1955  MRN: [id number]  Today's Date: 3/10/2019               Admit Date: 3/10/2019  Clinical bedside swallow evaluation completed. Attempted to present ice chip via spoon as well as a spoon coated in applesauce. Pt would not accept presentations, pursing her lips and turning her head away. Her family reports that when the pt's  tried to give the pt meds last night, the pt held them in her mouth and did not swallow them.   Recommend:  1. Continuing NPO and meds alternate route.   2. SLP will check back tomorrow.   Susanna Adamson, CCC-SLP 3/10/2019 3:15 PM    Visit Dx:     ICD-10-CM ICD-9-CM   1. Altered mental status, unspecified altered mental status type R41.82 780.97   2. Pneumonia of left lung due to infectious organism, unspecified part of lung J18.9 486   3. Acute urinary tract infection N39.0 599.0   4. Severe sepsis (CMS/HCC) A41.9 038.9    R65.20 995.92   5. Dysphagia, unspecified type R13.10 787.20     Patient Active Problem List   Diagnosis   • Confusion   • Depression   • Anxiety   • Hyponatremia-often polydyspia involved   • Seizure disorder (CMS/HCC)   • Chronic neck pain   • Chronic back pain-Ruxer   • Hypothyroidism   • Abnormal thyroid blood test   • Hypertension   • Wellness examination-done   • Elevated fasting glucose   • Anemia   • Iron deficiency   • Degenerative joint disease of spine   • Conversion disorder with abnormal movement   • Reflux laryngitis   • Gait difficulty   • Weakness of both legs   • Sore throat   • Polydipsia   • Nocturia   • Frequency of urination   • Urge incontinence   • Laboratory test*   • Chronic narcotic use-Ruxer   • Abnormal x-ray   • Noncompliance   • Lung nodule, solitary   • Encounter for consultation   • Non-smoker   • Hx of colon cancer, stage I   • Altered mental status     Past Medical History:   Diagnosis Date   • Asthma    •  Cancer (CMS/HCC)     colon cancer   • Depression    • Disease of thyroid gland    • Hypertension    • Insomnia    • Seizure (CMS/HCC)    • Sleep apnea      Past Surgical History:   Procedure Laterality Date   • BACK SURGERY     • COLON SURGERY     • GASTRIC BYPASS     • HYSTERECTOMY     • KNEE ARTHROSCOPY      bilateral   • NECK SURGERY     • ORIF WRIST FRACTURE Right 6/9/2017    Procedure: WRIST OPEN REDUCTION INTERNAL FIXATION, RIGHT;  Surgeon: Alec Cardoza MD;  Location: [facility];  Service:    • TONSILLECTOMY     • WRIST FRACTURE SURGERY          SWALLOW EVALUATION (last 72 hours)      SLP Adult Swallow Evaluation     Row Name 03/10/19 1434                   Rehab Evaluation    Document Type  evaluation  -MB        Subjective Information  pain moaning in apparent pain/discomfort  -MB        Patient Observations  alert;poorly cooperative  -MB        Patient/Family Observations  Two family member present  -MB           General Information    Patient Profile Reviewed  yes  -MB        Pertinent History Of Current Problem  AMS, CXR: left mid-lung opacities suspicious for pneumonia. Hx of asthma, cancer, HTN, seizure, sleep apnea, colon surgery, neck surgery, tonsillectomy, back surgery.   -MB        Current Method of Nutrition  NPO  -MB        Precautions/Limitations, Vision  WFL;for purposes of eval  -MB        Precautions/Limitations, Hearing  WFL;for purposes of eval  -MB        Prior Level of Function-Swallowing  no diet consistency restrictions  -MB        Plans/Goals Discussed with  patient and family  -MB        Barriers to Rehab  cognitive status;medically complex  -MB        Patient's Goals for Discharge  patient could not state  -MB        Family Goals for Discharge  family did not state  -MB           Pain Assessment    Additional Documentation  Pain Scale: FACES Pre/Post-Treatment (Group)  -MB           Pain Scale: FACES Pre/Post-Treatment    Pain: FACES Scale, Pretreatment  4-->hurts little more   -MB           Oral Motor and Function    Dentition Assessment  natural, present and adequate  -MB        Secretion Management  WNL/WFL  -MB        Mucosal Quality  dry  -MB           Oral Musculature and Cranial Nerve Assessment    Oral Motor General Assessment  unable to assess;other (see comments) unable to follow commands  -MB           General Eating/Swallowing Observations    Positioning During Eating  upright in bed  -MB        Utensils Used  spoon  -MB           Clinical Swallow Eval    Oral Prep Phase  impaired  -MB        Clinical Swallow Evaluation Summary  Attempted to present ice chip via spoon as well as a spoon coated in applesauce. Pt would not accept presentations, pursing her lips and turning her head away. Her family reports that when the pt's  tried to give the pt meds last night, the pt held them in her mouth and did not swallow them.   -MB           Oral Prep Concerns    Oral Prep Concerns  reduced lip opening  -MB        Reduced Lip Opening  pudding;other (see comments) ice  -MB           Clinical Impression    SLP Swallowing Diagnosis  oral dysfunction  -MB        Functional Impact  risk of malnutrition;risk of dehydration  -MB        Rehab Potential/Prognosis, Swallowing  re-evaluate goals as necessary  -MB        Swallow Criteria for Skilled Therapeutic Interventions Met  demonstrates skilled criteria  -MB           Recommendations    Therapy Frequency (Swallow)  PRN  -MB        Predicted Duration Therapy Intervention (Days)  until discharge  -MB        SLP Diet Recommendation  NPO  -MB        Recommended Diagnostics  reassess via clinical swallow evaluation  -MB        SLP Rec. for Method of Medication Administration  meds via alternate route  -MB        Anticipated Dischage Disposition  unknown  -MB           Swallow Goals (SLP)    Oral Nutrition/Hydration Goal Selection (SLP)  oral nutrition/hydration, SLP goal 1  -MB           Oral Nutrition/Hydration Goal 1 (SLP)    Oral  Nutrition/Hydration Goal 1, SLP  Pt will tolerate least restrictive diet with no overt s/s of aspiration.   -MB        Time Frame (Oral Nutrition/Hydration Goal 1, SLP)  by discharge  -MB        Barriers (Oral Nutrition/Hydration Goal 1, SLP)  n/a  -MB        Progress/Outcomes (Oral Nutrition/Hydration Goal 1, SLP)  goal ongoing  -MB          User Key  (r) = Recorded By, (t) = Taken By, (c) = Cosigned By    Initials Name Effective Dates    Susanna Morris, CCC-SLP 08/02/16 -           EDUCATION  The patient has been educated in the following areas:   Dysphagia (Swallowing Impairment).    SLP Recommendation and Plan  SLP Swallowing Diagnosis: oral dysfunction  SLP Diet Recommendation: NPO           Recommended Diagnostics: reassess via clinical swallow evaluation  Swallow Criteria for Skilled Therapeutic Interventions Met: demonstrates skilled criteria  Anticipated Dischage Disposition: unknown  Rehab Potential/Prognosis, Swallowing: re-evaluate goals as necessary  Therapy Frequency (Swallow): PRN  Predicted Duration Therapy Intervention (Days): until discharge       Plan of Care Reviewed With: patient, family  Plan of Care Review  Plan of Care Reviewed With: patient, family  Outcome Summary: Clinical bedside swallow evaluation completed. Attempted to present ice chip via spoon as well as a spoon coated in applesauce. Pt would not accept presentations, pursing her lips and turning her head away. Her family reports that when the pt's  tried to give the pt meds last night, the pt held them in her mouth and did not swallow them. Recommend continuing NPO and meds alternate route. SLP will check back tomorrow.     SLP GOALS     Row Name 03/10/19 1434             Oral Nutrition/Hydration Goal 1 (SLP)    Oral Nutrition/Hydration Goal 1, SLP  Pt will tolerate least restrictive diet with no overt s/s of aspiration.   -MB      Time Frame (Oral Nutrition/Hydration Goal 1, SLP)  by discharge  -MB      Barriers (Oral  Nutrition/Hydration Goal 1, SLP)  n/a  -MB      Progress/Outcomes (Oral Nutrition/Hydration Goal 1, SLP)  goal ongoing  -MB        User Key  (r) = Recorded By, (t) = Taken By, (c) = Cosigned By    Initials Name Provider Type    Susanna Morris CCC-SLP Speech and Language Pathologist             Time Calculation:   Time Calculation- SLP     Row Name 03/10/19 1515             Time Calculation- SLP    SLP Start Time  1434  -MB      SLP Stop Time  1515  -MB      SLP Time Calculation (min)  41 min  -MB      SLP Received On  03/10/19  -MB      SLP Goal Re-Cert Due Date  03/20/19  -MB        User Key  (r) = Recorded By, (t) = Taken By, (c) = Cosigned By    Initials Name Provider Type    Susanna Morris CCC-SLP Speech and Language Pathologist          Therapy Charges for Today     Code Description Service Date Service Provider Modifiers Qty    [id number] HC ST EVAL ORAL PHARYNG SWALLOW 3 3/10/2019 Susanna Adamson CCC-SLP GN 1               LEOLA Farmer  3/10/2019

## 2019-03-10 NOTE — PLAN OF CARE
Problem: Patient Care Overview  Goal: Plan of Care Review  Outcome: Ongoing (interventions implemented as appropriate)   03/10/19 1512   Coping/Psychosocial   Plan of Care Reviewed With patient;family   OTHER   Outcome Summary Clinical bedside swallow evaluation completed. Attempted to present ice chip via spoon as well as a spoon coated in applesauce. Pt would not accept presentations, pursing her lips and turning her head away. Her family reports that when the pt's  tried to give the pt meds last night, the pt held them in her mouth and did not swallow them. Recommend continuing NPO and meds alternate route. SLP will check back tomorrow.

## 2019-03-10 NOTE — ED PROVIDER NOTES
"Subjective   This is a 63-year-old female who has been brought to the emergency department via EMS for evaluation of an altered mental state.  Patient is unable to provide any history or participate in a definitive review of systems.  Her  is at bedside for additional history.   indicates that she has a history of multiple medical issues including chronic back pain.  She is on multiple medications but he is unsure what they are.  He states that she is continuously medicated around the clock and went to bed last night around 6 or 6:30 PM in her normal state of health.  She slept throughout the evening.  When he woke up around 8 AM she had altered mentation and so EMS was contacted.   describes that she was arousable but was perseverating the words \"Oh God\" as she is now.  No fall or recent injury.  He indicates that she has not recently been coughing or exhibiting respiratory distress.  She has not recently had a fever.  She has not recently been complaining of any new chest or abdominal pain.  She has not recently been vomiting or been having stool or urine changes.  She has a history of chronic left upper extremity swelling which has been present for 4 or 5 weeks.  He indicates that she has been seen by her primary care provider for this issue and this is not a new concern today.  He notes that she has been admitted in this state before and has been told that she has seizures.  He did not witness any tonic-clinic seizure-like activity.  Additional review of systems unable to be obtained and a full history is incomplete.             Review of Systems   Unable to perform ROS: Mental status change       Past Medical History:   Diagnosis Date   • Asthma    • Cancer (CMS/HCC)     colon cancer   • Depression    • Disease of thyroid gland    • Hypertension    • Insomnia    • Seizure (CMS/HCC)    • Sleep apnea        No Known Allergies    Past Surgical History:   Procedure Laterality Date   • BACK " "SURGERY     • COLON SURGERY     • GASTRIC BYPASS     • HYSTERECTOMY     • KNEE ARTHROSCOPY      bilateral   • NECK SURGERY     • ORIF WRIST FRACTURE Right 6/9/2017    Procedure: WRIST OPEN REDUCTION INTERNAL FIXATION, RIGHT;  Surgeon: Alec Cardoza MD;  Location: [facility] OR;  Service:    • TONSILLECTOMY     • WRIST FRACTURE SURGERY         Family History   Problem Relation Age of Onset   • Heart attack Father    • Stroke Father    • Arthritis Father    • Hypertension Father    • Heart attack Mother    • Stroke Mother    • Arthritis Mother    • Hypertension Mother        Social History     Socioeconomic History   • Marital status:      Spouse name: Carlito   • Number of children: 1   • Years of education: 18+   • Highest education level: Not on file   Occupational History   • Occupation: retired     Comment: [employer]   Tobacco Use   • Smoking status: Never Smoker   • Smokeless tobacco: Never Used   Substance and Sexual Activity   • Alcohol use: No   • Drug use: No   • Sexual activity: Defer           Objective   Physical Exam   Constitutional: She appears well-developed and well-nourished.   Patient intermittently says \"Oh God\" but nothing else.  Will sometimes shake or nod head.   Eyes: EOM are normal. Pupils are equal, round, and reactive to light.   Pupils 3-4mm and reactive   Neck: Normal range of motion. Neck supple. No JVD present. No tracheal deviation present.   Cardiovascular: Regular rhythm and normal heart sounds.   Tachycardic   Pulmonary/Chest: Effort normal and breath sounds normal. No respiratory distress. She has no wheezes.   Palpation elicits no pain response   Abdominal: Soft. Bowel sounds are normal. She exhibits no distension. There is no guarding.   Palpation elicits no pain response   Musculoskeletal: She exhibits no deformity.   She does have pitting edema to the left upper extremity without deformity or evidence for acute trauma.  Patient has trace bilateral lower " extremity edema.   Neurological:   Patient is moving all extremities and reacts to pain stimulus in all 4 extremities.  No abnormal muscle tone.  She does not consistently follow commands.  Perseverating.  Does not answer questions.  Keeps eyes closed but opens them to any stimulus.   Skin: Skin is warm and dry. Capillary refill takes less than 2 seconds.   Nursing note and vitals reviewed.      Procedures           ED Course      EKG at 10:33 AM shows a sinus rhythm with a rate of 95 bpm.  Intervals within normal limits.  Normal axis.  R wave progression is maintained.  T wave inversions in lead III.  No ST elevation or evidence for acute infarction.    Initially a small fluid bolus was ordered given the mild tachycardia  This was switched to the sepsis bolus given the elevated lactate    Labs reviewed    CT Head Without Contrast   Final Result   1. Study degraded by patient motion artifact. Mild to moderate cerebral   and cerebellar volume loss with chronic vessel vessel ischemia. No acute   intracranial process.   This report was finalized on 03/10/2019 13:14 by Dr. Almita Moreira MD.      XR Chest 1 View   Final Result   1. Patchy left mid lung opacities are suspicious for pneumonia with a   persistent spiculated lingular nodule (present on CT chest exam   10/8/2018 suspicious for malignancy). Continued follow-up recommended   This report was finalized on 03/10/2019 12:13 by Dr. Almita Moreira MD.        Patient family updated on all results  On reassessment her heart rate is improved  She is more awake and alert  She does not answer any specific questions but does appear more interactive  She is now looking around the room  She has good peripheral pulses and perfusion with normal skin turgor    SEPTIC SHOCK FOCUSED EXAM ATTESTATION    I attest that I have reassessed tissue perfusion after the fluid bolus given.    Sergio Templeton,   03/10/19  3:16 PM    Antibiotics ordered  I spoke with Dr. Hester who  accepts admission for Dr. Gonzales   Stable for floor            MDM  Number of Diagnoses or Management Options  Acute urinary tract infection: new and requires workup  Altered mental status, unspecified altered mental status type: new and requires workup  Pneumonia of left lung due to infectious organism, unspecified part of lung: new and requires workup  Severe sepsis (CMS/HCC): new and requires workup     Amount and/or Complexity of Data Reviewed  Clinical lab tests: ordered and reviewed  Tests in the radiology section of CPT®: ordered and reviewed  Tests in the medicine section of CPT®: ordered and reviewed  Discussion of test results with the performing providers: yes  Decide to obtain previous medical records or to obtain history from someone other than the patient: yes  Obtain history from someone other than the patient: yes  Review and summarize past medical records: yes  Discuss the patient with other providers: yes  Independent visualization of images, tracings, or specimens: yes    Risk of Complications, Morbidity, and/or Mortality  Presenting problems: high  Diagnostic procedures: high  Management options: high    Critical Care  Total time providing critical care: 30-74 minutes    Patient Progress  Patient progress: improved    Patient with unclear etiology of altered mentation upon arrival  Labs and imaging reviewed  X-ray showing concern for pneumonia  With an elevated lactate and altered mentation this may represent a septic state  Her blood pressure is stable  She is afebrile  Rocephin/azithromycin ordered    I do have concern for sepsis given pneumonia and UTI found on labs  Screening positive for severe sepsis given AMS    On reassessment after antibiotics and IV fluids she is no longer perseverating and is more awake and interactive  Her and her family have been updated on all results  They are comfortable with the plan of care for admission  Stable vital signs  No need for pressors or a central  line  I spoke with Dr. Hester who accepts admission for Dr. Christian Sauceda for floor      Final diagnoses:   Altered mental status, unspecified altered mental status type   Pneumonia of left lung due to infectious organism, unspecified part of lung   Acute urinary tract infection   Severe sepsis (CMS/[facility])            Sergio Templeton, DO  03/10/19 1516

## 2019-03-11 PROBLEM — Z92.3 HISTORY OF RADIATION THERAPY: Status: ACTIVE | Noted: 2019-01-01

## 2019-03-11 PROBLEM — Z79.01 ANTICOAGULATED: Status: ACTIVE | Noted: 2019-01-01

## 2019-03-11 NOTE — THERAPY EVALUATION
Acute Care - Speech Language Pathology   Swallow Re-Evaluation [facility]     Patient Name: Bita Croft  : 1955  MRN: [id number]  Today's Date: 3/11/2019               Admit Date: 3/10/2019  Clinical bedside swallow re-evaluation complete. Pt alert but disoriented throughout assessment. Perseveration of short phrases noted. Pt exhibited signifcant difficulties following simple one step commands and would often verbally repeat commands provided to her per SLP. With max cues pt did exhibit labial restraction and slight lingual protrusion. She completed trials of pureed, honey thick, nectar thick, and thin consistencies. Max verbal and visual cues required in order for pt to attend to SLP and task of completing intake. Pt bit down on regular solids and took a significant amount of time to begin mastication. Mastication was noted to be poor with regular solids and not functional for meal completion. She did eventually complete a swallow with moderate oral residue present. Given a nectar thick liquid wash pt was able to clear a majority of residue. Pt presented with a tonic bite when presented with a straw and required max cues in order to complete appropriate suction. She completed nectar thick and thin liquid with significant audible swallows and decreased laryngeal movement. Belching and delayed cough noted 2x. SLP cannot r/o aspiration with PO intake. Due to significant confusion and difficulties with PO intake SLP recommends pt remain NPO with exception of meds crushed in pudding/applesauce as well as ice chips post oral care. SLP will continue to follow and treat.  Abran Martinez MS CCC-SLP 3/11/2019 1:06 PM    Visit Dx:     ICD-10-CM ICD-9-CM   1. Altered mental status, unspecified altered mental status type R41.82 780.97   2. Pneumonia of left lung due to infectious organism, unspecified part of lung J18.9 486   3. Acute urinary tract infection N39.0 599.0   4. Severe sepsis (CMS/HCC) A41.9 038.9     R65.20 995.92   5. Dysphagia, unspecified type R13.10 787.20     Patient Active Problem List   Diagnosis   • Confusion   • Depression   • Anxiety   • Hyponatremia-often polydyspia involved   • Seizure disorder (CMS/HCC)   • Chronic neck pain   • Chronic back pain-Ruxer   • Hypothyroidism   • Abnormal thyroid blood test   • Hypertension   • Wellness examination-done   • Elevated fasting glucose   • Anemia   • Iron deficiency   • Degenerative joint disease of spine   • Conversion disorder with abnormal movement   • Reflux laryngitis   • Gait difficulty   • Weakness of both legs   • Sore throat   • Polydipsia   • Nocturia   • Frequency of urination   • Urge incontinence   • Laboratory test*   • Chronic narcotic use-Ruxer   • Abnormal x-ray   • Noncompliance   • Lung nodule, solitary   • Encounter for consultation   • Non-smoker   • Hx of colon cancer, stage I   • Altered mental status   • Anticoagulated: DVT/(?)     Past Medical History:   Diagnosis Date   • Asthma    • Cancer (CMS/HCC)     colon cancer   • Depression    • Disease of thyroid gland    • Hypertension    • Insomnia    • Seizure (CMS/HCC)    • Sleep apnea      Past Surgical History:   Procedure Laterality Date   • BACK SURGERY     • COLON SURGERY     • GASTRIC BYPASS     • HYSTERECTOMY     • KNEE ARTHROSCOPY      bilateral   • NECK SURGERY     • ORIF WRIST FRACTURE Right 6/9/2017    Procedure: WRIST OPEN REDUCTION INTERNAL FIXATION, RIGHT;  Surgeon: Alec Cardoza MD;  Location: [facility];  Service:    • TONSILLECTOMY     • WRIST FRACTURE SURGERY          SWALLOW EVALUATION (last 72 hours)      SLP Adult Swallow Evaluation     Row Name 03/11/19 0930 03/10/19 1434                Rehab Evaluation    Document Type  re-evaluation  -CS  evaluation  -MB       Subjective Information  pain  -CS  pain moaning in apparent pain/discomfort  -MB       Patient Observations  alert;poorly cooperative  -CS  alert;poorly cooperative  -MB       Patient/Family  Observations  No family present  -CS  Two family member present  -MB       Patient Effort  fair  -CS  --          General Information    Patient Profile Reviewed  yes  -CS  yes  -MB       Pertinent History Of Current Problem  AMS, CXR: left mid-lung opacities suspicious for pneumonia. HX of astma, cancer, HTN, seizure, sleep apnea, colon surgery, neck surgery, tonsillectomy, back surgery.  -CS  AMS, CXR: left mid-lung opacities suspicious for pneumonia. Hx of asthma, cancer, HTN, seizure, sleep apnea, colon surgery, neck surgery, tonsillectomy, back surgery.   -MB       Current Method of Nutrition  NPO  -CS  NPO  -MB       Precautions/Limitations, Vision  WFL;for purposes of eval  -CS  WFL;for purposes of eval  -MB       Precautions/Limitations, Hearing  WFL;for purposes of eval  -CS  WFL;for purposes of eval  -MB       Prior Level of Function-Swallowing  no diet consistency restrictions  -CS  no diet consistency restrictions  -MB       Plans/Goals Discussed with  patient and family  -CS  patient and family  -MB       Barriers to Rehab  cognitive status;medically complex  -CS  cognitive status;medically complex  -MB       Patient's Goals for Discharge  patient could not state  -CS  patient could not state  -MB       Family Goals for Discharge  --  family did not state  -MB          Pain Assessment    Additional Documentation  Pain Scale: FACES Pre/Post-Treatment (Group)  -CS  Pain Scale: FACES Pre/Post-Treatment (Group)  -MB          Pain Scale: FACES Pre/Post-Treatment    Pain: FACES Scale, Pretreatment  6-->hurts even more  -CS  4-->hurts little more  -MB          Oral Motor and Function    Dentition Assessment  natural, present and adequate  -CS  natural, present and adequate  -MB       Secretion Management  WNL/WFL  -CS  WNL/WFL  -MB       Mucosal Quality  dry  -CS  dry  -MB          Oral Musculature and Cranial Nerve Assessment    Oral Motor General Assessment  generalized oral motor weakness;other (see  comments) Difficult to assess  -CS  unable to assess;other (see comments) unable to follow commands  -MB          General Eating/Swallowing Observations    Positioning During Eating  upright in bed  -CS  upright in bed  -MB       Utensils Used  spoon  -CS  spoon  -MB          Clinical Swallow Eval    Oral Prep Phase  impaired  -CS  impaired  -MB       Oral Transit  impaired  -CS  --       Pharyngeal Phase  suspected pharyngeal impairment  -CS  --       Esophageal Phase  suspected esophageal impairment  -CS  --       Clinical Swallow Evaluation Summary  Clinical bedside swallow re-evaluation complete. Pt alert but disoriented throughout assessment. Perseveration of short phrases noted. Pt exhibited signifcant difficulties following simple one step commands and would often verbally repeat commands provided to her per SLP. With max cues pt did exhibit labial restraction and slight lingual protrusion. She completed trials of pureed, honey thick, nectar thick, and thin consistencies. Max verbal and visual cues required in order for pt to attend to SLP and task of completing intake. Pt bit down on regular solids and took a significant amount of time to begin mastication. Mastication was noted to be poor with regular solids and not functional for meal completion. She did eventually complete a swallow with moderate oral residue present. Given a nectar thick liquid wash pt was able to clear a majority of residue. Pt presented with a tonic bite when presented with a straw and required max cues in order to complete appropriate suction. She completed nectar thick and thin liquid with significant audible swallows and decreased laryngeal movement. Belching and delayed cough noted 2x. SLP cannot r/o aspiration with PO intake. Due to significant confusion and difficulties with PO intake SLP recommends pt remain NPO with exception of meds crushed in pudding/applesauce as well as ice chips post oral care. SLP will continue to follow  and treat.  -CS  Attempted to present ice chip via spoon as well as a spoon coated in applesauce. Pt would not accept presentations, pursing her lips and turning her head away. Her family reports that when the pt's  tried to give the pt meds last night, the pt held them in her mouth and did not swallow them.   -MB          Oral Prep Concerns    Oral Prep Concerns  --  reduced lip opening  -MB       Reduced Lip Opening  --  pudding;other (see comments) ice  -MB          Clinical Impression    SLP Swallowing Diagnosis  --  oral dysfunction  -MB       Functional Impact  --  risk of malnutrition;risk of dehydration  -MB       Rehab Potential/Prognosis, Swallowing  --  re-evaluate goals as necessary  -MB       Swallow Criteria for Skilled Therapeutic Interventions Met  --  demonstrates skilled criteria  -MB          Recommendations    Therapy Frequency (Swallow)  --  PRN  -MB       Predicted Duration Therapy Intervention (Days)  --  until discharge  -MB       SLP Diet Recommendation  --  NPO  -MB       Recommended Diagnostics  --  reassess via clinical swallow evaluation  -MB       SLP Rec. for Method of Medication Administration  --  meds via alternate route  -MB       Anticipated Dischage Disposition  --  unknown  -MB          Swallow Goals (SLP)    Oral Nutrition/Hydration Goal Selection (SLP)  --  oral nutrition/hydration, SLP goal 1  -MB          Oral Nutrition/Hydration Goal 1 (SLP)    Oral Nutrition/Hydration Goal 1, SLP  --  Pt will tolerate least restrictive diet with no overt s/s of aspiration.   -MB       Time Frame (Oral Nutrition/Hydration Goal 1, SLP)  --  by discharge  -MB       Barriers (Oral Nutrition/Hydration Goal 1, SLP)  --  n/a  -MB       Progress/Outcomes (Oral Nutrition/Hydration Goal 1, SLP)  --  goal ongoing  -MB         User Key  (r) = Recorded By, (t) = Taken By, (c) = Cosigned By    Initials Name Effective Dates    Susanna Morris, CCC-SLP 08/02/16 -     Abran Barrera, MS  CCC-SLP 04/03/18 -           EDUCATION  The patient has been educated in the following areas:   Dysphagia (Swallowing Impairment).    SLP Recommendation and Plan                                        Plan of Care Reviewed With: patient  Plan of Care Review  Plan of Care Reviewed With: patient  Progress: no change  Outcome Summary: Clinical bedside swallow re-evaluation complete. Pt alert but disoriented throughout assessment. Perseveration of short phrases noted. Pt exhibited signifcant difficulties following simple one step commands and would often verbally repeat commands provided to her per SLP. With max cues pt did exhibit labial restraction and slight lingual protrusion. She completed trials of pureed, honey thick, nectar thick, and thin consistencies. Max verbal and visual cues required in order for pt to attend to SLP and task of completing intake. Pt bit down on regular solids and took a significant amount of time to begin mastication. Mastication was noted to be poor with regular solids and not functional for meal completion. She did eventually complete a swallow with moderate oral residue present. Given a nectar thick liquid wash pt was able to clear a majority of residue. Pt presented with a tonic bite when presented with a straw and required max cues in order to complete appropriate suction. She completed nectar thick and thin liquid with significant audible swallows and decreased laryngeal movement. Belching and delayed cough noted 2x. SLP cannot r/o aspiration with PO intake. Due to significant confusion and difficulties with PO intake SLP recommends pt remain NPO with exception of meds crushed in pudding/applesauce as well as ice chips post oral care. SLP will continue to follow and treat.    SLP GOALS     Row Name 03/10/19 3160             Oral Nutrition/Hydration Goal 1 (SLP)    Oral Nutrition/Hydration Goal 1, SLP  Pt will tolerate least restrictive diet with no overt s/s of aspiration.   -MB       Time Frame (Oral Nutrition/Hydration Goal 1, SLP)  by discharge  -MB      Barriers (Oral Nutrition/Hydration Goal 1, SLP)  n/a  -MB      Progress/Outcomes (Oral Nutrition/Hydration Goal 1, SLP)  goal ongoing  -MB        User Key  (r) = Recorded By, (t) = Taken By, (c) = Cosigned By    Initials Name Provider Type    Susanna Morris, CCC-SLP Speech and Language Pathologist             Time Calculation:   Time Calculation- SLP     Row Name 03/11/19 1304             Time Calculation- SLP    SLP Start Time  0930  -      SLP Stop Time  1030  -      SLP Time Calculation (min)  60 min  -      SLP Received On  03/11/19  -        User Key  (r) = Recorded By, (t) = Taken By, (c) = Cosigned By    Initials Name Provider Type     Abran Martinez MS CCC-SLP Speech and Language Pathologist          Therapy Charges for Today     Code Description Service Date Service Provider Modifiers Qty    [id number] HC ST EVAL ORAL PHARYNG SWALLOW 4 3/11/2019 Abran Martinez MS CCC-SLP GN 1               Abran Martinez MS CCC-SLP  3/11/2019

## 2019-03-11 NOTE — CONSULTS
"    Neurology Consult Note    Patient:  Bita Croft   YOB: 1955  MRN:  [id number]  Date of Admission:  3/10/2019 10:26 AM    Date: 3/11/2019    Referring Provider: Josafat Gonzales MD  Reason for Consultation: delirium; ? seizure disorder      History of present illness:     This is a 63 y.o. right handed female with H/O hypertension, seizure, sleep apnea, former smoker, anxiety and depression, colon cancer, and lung cancer s/p radiation treatment which ended in 12/2018, myelopathy evaluated for delirium; and ? seizure disorder    Patient woke up yesterday at 8 AM and was confused and was perseverating stating \"oh god\" and \"help\"   She recalls saying this and states she couldn't help but say it. In past  she has been diagnosed with  Seizures and supposedly is on dilantin and Keppra. However her Dilantin  level was < 3.0  She does have a low albumin of 2.5.      She has a H/O untreated sleep apnea.   states she keeps pulling her CPAP off and so he stopped trying to give it to her. She recalls an episode at [facility] where she was \"in a coma\" and suddenly woke up. She reports they  had stopped all of her pain meds then.  Since then her  gives her the medications.     Review of records from [facility] show she has a diagnosis of intractable generalized idiopathic status epilepticus for which she sees Dr Abrams but the last time seen was in 2017. She denies seeing a neurologist now.     Patient is on a number of medication that may cause or contribute to delirium including oxycodone, Norco, klonopin, ditropan, morphine, flexeril and most of these could certainly worsen sleep apnea  She also take Seroquel 100 mg at night which she states she takes for sleep    She also has been running a low potassium of only 2.5.    June 14, 2017 she underwent a spinal tap at [facility].  The sample was clear and colorless.  25 cc of CSF was obtained.  Her opening pressure was 9.  He had 3 white blood " cells 3 red blood cells 54 of glucose and protein level of 38  She had a normal myelin basic protein at 3.42.  So in short her cerebrospinal fluid was normal.    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 In the past she has had  a cervical spinal fusion, lumbar spinal fusion and lumbar laminectomy. She reports that she has had a history of spine surgery about 6 years ago and that she had some fractures of T6 through L1 but states she cannot recall how she fractured her spine. Records show tat she had a surgery of T 12 and it was following this surgery that she went to inpatient rehab and outpatient home therapy and then over time she mostly is using a wheelchair. Her back pain is the basis of all her chronic opioid use    She has not seen Dr. Abrams since June 2017 but was requesting medications up through January 2018.  Dr Abrams  would not refill those without her scheduling a follow-up appointment which she refused to do    He was hospitalized October 26, 2016 for altered mental status with hyponatremia and a background of diarrhea    She also has a H/o blood clots  Past Medical History:   Diagnosis Date   • Asthma    • Cancer (CMS/HCC)     colon cancer   • Depression    • Disease of thyroid gland    • Hypertension    • Insomnia    • Seizure (CMS/HCC)    • Sleep apnea        Past Surgical History:   Procedure Laterality Date   • BACK SURGERY     • COLON SURGERY     • GASTRIC BYPASS     • HYSTERECTOMY     • KNEE ARTHROSCOPY      bilateral   • NECK SURGERY     • ORIF WRIST FRACTURE Right 6/9/2017    Procedure: WRIST OPEN REDUCTION INTERNAL FIXATION, RIGHT;  Surgeon: Alec Cardoza MD;  Location: [facility];  Service:    • TONSILLECTOMY     • WRIST FRACTURE SURGERY     Surgical history based on  "Dr. Gonzales's notes in 2016   \"she had T and A at age 11. Right knee surgery  with Dr. Dodie Rodriguez at [facility] at age 13. SANDRA and BSO with Dr. Mckeon,  [facility], at age 32. T12 surgery with Dr. Park, [place], in 1993.   Gastric bypass in [place] in 2001. Left knee surgery with Dr. Martinez in [place] in 2005. C-spine surgery with Dr. Mandel at [facility] 05/08/2006. C-spine  surgery with Dr. Mandel at [facility] 02/04/2008. Gallbladder at [facility],  Dr. Roberts, 03/18/2010. Previous left knee arthroscopy, right laparoscopic  colectomy, Dr. Piedra, 08/13/2014. \"            Prior to Admission medications    Medication Sig Start Date End Date Taking? Authorizing Provider   acetaminophen (TYLENOL) 325 MG tablet Take 2 tablets by mouth Every 4 (Four) Hours As Needed for Mild Pain  or Fever. 6/7/18  Yes Josafat Gonzales MD   clonazePAM (KlonoPIN) 1 MG tablet Take 1 tablet by mouth 3 (Three) Times a Day As Needed for Anxiety. 12/17/18   Josafat Gonzales MD   clotrimazole (LOTRIMIN) 1 % external solution Wash/dry well three times a day After dry; apply light coating of clotrimazole cream for one week 7/16/18   Josafat Gonzales MD   cyclobenzaprine (FLEXERIL) 10 MG tablet Take 1 tablet by mouth 3 (Three) Times a Day. 10/21/18   ProviderBeverly MD   DULoxetine (CYMBALTA) 30 MG capsule Take 30 mg by mouth Daily.    ProviderBeverly MD   gabapentin (NEURONTIN) 600 MG tablet Take 1 tablet by mouth 3 (Three) Times a Day. 5/31/18   Josafat Gonzales MD   levETIRAcetam (KEPPRA) 1000 MG tablet TAKE 1 TABLET BY MOUTH TWICE DAILY 4/27/18   Josafat Gonzales MD   levothyroxine (SYNTHROID, LEVOTHROID) 100 MCG tablet Take 1 tablet by mouth Daily.    Beverly Wells MD   loperamide (IMODIUM A-D) 2 MG tablet Take 1 tablet by mouth 4 (Four) Times a Day As Needed for Diarrhea. 6/7/18   Josafat Gonzales MD   metoprolol tartrate (LOPRESSOR) 50 MG tablet Take 1 tablet by mouth Daily. 10/29/18   " Josafat Gonzales MD   MORPHABOND ER 15 MG tablet extended-release 12 hour Take 1 tablet by mouth Every Night. 11/14/18   Josafat Gonzales MD   nystatin (MYCOSTATIN) 211929 UNIT/GM powder Wash/dry well three times a day After dry;apply nystatin powder bid 7/16/18   Josafat Gonzales MD   oxyCODONE (ROXICODONE) 10 MG tablet Take 1 tablet by mouth Every 4 (Four) Hours As Needed for Moderate Pain . 12/19/18   Josafat Gonzales MD   oxyCODONE-acetaminophen (PERCOCET) 5-325 MG per tablet Take 2 tablets by mouth Every 4 (Four) Hours As Needed for Moderate Pain . 11/14/18   Josafat Gonzales MD   phenytoin (DILANTIN) 100 MG ER capsule 100 mg in am and 200 mg in pm 12/26/17   Josafat Gonzales MD   PROAIR  (90 Base) MCG/ACT inhaler INHALE 1 PUFF BY MOUTH FOUR TIMES DAILY 1/25/19   Josafat Gonzales MD   QUEtiapine (SEROquel) 100 MG tablet Take 1 tablet by mouth 2 (Two) Times a Day. 7/17/17   Josafat Gonzales MD   Skin Protectants, Misc. (EUCERIN) cream Apply  topically to the appropriate area as directed 2 (Two) Times a Day. 10/29/18   Josafat Gonzales MD   solifenacin (VESICARE) 5 MG tablet Take 1 tablet by mouth Daily. 8/15/17   Pawel Ho MD   traZODone (DESYREL) 50 MG tablet Take 1 tablet by mouth Every Night. 11/14/18   Josafat Gonzales MD   venlafaxine XR (EFFEXOR-XR) 150 MG 24 hr capsule Take 2 capsules by mouth Daily. 6/7/18   Josafat Gonzales MD       Hospital scheduled medications:     albuterol 2.5 mg Nebulization Q6H - RT   ceftriaxone 1 g Intravenous Q24H   And      azithromycin 500 mg Intravenous Q24H   DULoxetine 30 mg Oral Daily   enoxaparin 40 mg Subcutaneous Q24H   fosphenytoin 100 mg PE Intravenous Q12H   gabapentin 600 mg Oral Q8H   ipratropium-albuterol 3 mL Nebulization Q4H - RT   levETIRAcetam 1,000 mg Intravenous BID   levothyroxine 100 mcg Oral Q AM   metoprolol tartrate 50 mg Oral Daily   Morphine 15 mg Oral Q12H   oxybutynin XL 5 mg  Oral Daily   sodium chloride 3 mL Intravenous Q12H   venlafaxine  mg Oral Daily     Hospital PRN medications:  •  acetaminophen  •  loperamide  •  LORazepam  •  magnesium sulfate **OR** magnesium sulfate **OR** magnesium sulfate  •  Morphine  •  nitroglycerin  •  oxyCODONE  •  Pharmacy to Dose enoxaparin (LOVENOX)  •  potassium chloride  •  [COMPLETED] Insert peripheral IV **AND** sodium chloride  •  sodium chloride    No Known Allergies    Social History     Socioeconomic History   • Marital status:      Spouse name: Carlito   • Number of children: 1   • Years of education: 18+   • Highest education level: Not on file   Social Needs   • Financial resource strain: Not on file   • Food insecurity - worry: Not on file   • Food insecurity - inability: Not on file   • Transportation needs - medical: Not on file   • Transportation needs - non-medical: Not on file   Occupational History   • Occupation: retired     Comment: [employer]   Tobacco Use   • Smoking status: Never Smoker   • Smokeless tobacco: Never Used   Substance and Sexual Activity   • Alcohol use: No   • Drug use: No   • Sexual activity: Defer   Other Topics Concern   • Not on file   Social History Narrative   • Not on file     Family History   Problem Relation Age of Onset   • Heart attack Father    • Stroke Father    • Arthritis Father    • Hypertension Father    • Heart attack Mother    • Stroke Mother    • Arthritis Mother    • Hypertension Mother        Review of Systems  A 14 point review of systems was reviewed and was negative except for chronic pain    Vital Signs   Temp:  [97.5 °F (36.4 °C)-99.4 °F (37.4 °C)] 98.6 °F (37 °C)  Heart Rate:  [76-98] 78  Resp:  [16-26] 18  BP: (145-152)/(76-93) 152/92    General Exam:  Head:  Normal cephalic, atraumatic  HEENT:  Neck supple  Fundoscopic Exam:  No signs of disc edema  CVS:  Regular rate and rhythm.  No murmurs  Carotid Examination:  No bruits  Lungs:  Clear to  auscultation  Abdomen:  Non-tender, Non-distended  Extremities:  No signs of peripheral edema  Skin:  No rashes    Neurologic Exam:    Mental Status:    -Awake, Alert, Oriented but she does not know what meds she is on and she cannot recall why certain procedures have been done in her past.-No word finding difficulties  -No aphasia  -No dysarthria  -Follows simple and complex commands    CN II:  Visual fields full.  Pupils equally reactive to light  CN III, IV, VI:  Extraocular Muscles full with no signs of nystagmus  CN V:  Facial sensory is symmetric   CN VII:  Facial motor symmetric  CN VIII:  Gross hearing intact bilaterally  CN IX/X:  Palate elevates symmetrically  CN XI:  Shoulder shrug symmetric  CN XII:  Tongue is midline on protrusion    Motor: (strength out of 5:  1= minimal movement, 2 = movement in plane of gravity, 3 = movement against gravity, 4 = movement against some resistance, 5 = full strength)    -Right Lower Ext: She is able to raise up her legs I.e.hip flexion from the hip but cannot sustain this for long.  Ankle dorsiflexors are quite strong knee flexors and extensors appear weak but there is suboptimal effort  -Left Lower Ext: She is able to raise up her legs i.e. hip flexion- from the hip but cannot sustain this for long.  Ankle dorsiflexors are quite strong knee flexors and extensors appear weak but there is suboptimal effort    DTR:  -Right   Bicep: 2+ Triceps: 2+ Brachioradialis: 2+   Patella: 2++ Ankle: 2+ Neg Babinski  -Left   Bicep: 2+ Triceps: 2+ Brachioradialis: 2+   Patella: 2++ Ankle: 2+ Neg Babinski    Sensory:  -Intact to light touch    Coordination:  -Finger to nose intact  -Heel to shin intact      Gait  -She is able to stand walk and transfer but this was not observed for safety reasons.      Results Review:  Lab Results (last 7 days)     Procedure Component Value Units Date/Time    Lactic Acid, Plasma [[id number]]  (Abnormal) Collected:  03/11/19 1146    Specimen:  Blood  Updated:  03/11/19 1221     Lactate 2.5 mmol/L     Blood Culture - Blood, Arm, Left [[id number]] Collected:  03/10/19 1115    Specimen:  Blood from Arm, Left Updated:  03/11/19 1215     Blood Culture No growth at 24 hours    Blood Culture - Blood, Arm, Left [[id number]] Collected:  03/10/19 1120    Specimen:  Blood from Arm, Left Updated:  03/11/19 1215     Blood Culture No growth at 24 hours    Urine Culture - Urine, Urine, Catheter [[id number]]  (Normal) Collected:  03/10/19 1318    Specimen:  Urine, Catheter Updated:  03/11/19 0826     Urine Culture Growth present, too young to evaluate    Comprehensive Metabolic Panel [[id number]]  (Abnormal) Collected:  03/11/19 0702    Specimen:  Blood Updated:  03/11/19 0742     Glucose 100 mg/dL      BUN 9 mg/dL      Creatinine 0.79 mg/dL      Sodium 142 mmol/L      Potassium 2.5 mmol/L      Chloride 107 mmol/L      CO2 29.0 mmol/L      Calcium 7.2 mg/dL      Total Protein 5.1 g/dL      Albumin 2.50 g/dL      ALT (SGPT) 25 U/L      AST (SGOT) 36 U/L      Alkaline Phosphatase 97 U/L      Total Bilirubin 0.5 mg/dL      eGFR Non African Amer 74 mL/min/1.73      Globulin 2.6 gm/dL      A/G Ratio 1.0 g/dL      BUN/Creatinine Ratio 11.4     Anion Gap 6.0 mmol/L     Lactic Acid, Plasma [[id number]]  (Normal) Collected:  03/11/19 0702    Specimen:  Blood Updated:  03/11/19 0721     Lactate 2.0 mmol/L     CBC Auto Differential [[id number]]  (Abnormal) Collected:  03/11/19 0702    Specimen:  Blood Updated:  03/11/19 0715     WBC 6.83 10*3/mm3      RBC 3.49 10*6/mm3      Hemoglobin 11.1 g/dL      Hematocrit 32.9 %      MCV 94.3 fL      MCH 31.8 pg      MCHC 33.7 g/dL      RDW 14.7 %      RDW-SD 50.0 fl      MPV 10.4 fL      Platelets 243 10*3/mm3      Neutrophil % 74.5 %      Lymphocyte % 15.2 %      Monocyte % 9.2 %      Eosinophil % 0.1 %      Basophil % 0.4 %      Immature Grans % 0.6 %      Neutrophils, Absolute 5.08 10*3/mm3      Lymphocytes, Absolute 1.04 10*3/mm3      Monocytes,  Absolute 0.63 10*3/mm3      Eosinophils, Absolute 0.01 10*3/mm3      Basophils, Absolute 0.03 10*3/mm3      Immature Grans, Absolute 0.04 10*3/mm3      nRBC 0.0 /100 WBC     Blood Culture - Blood, Hand, Left [[id number]] Collected:  03/10/19 1727    Specimen:  Blood from Hand, Left Updated:  03/11/19 0616     Blood Culture No growth at less than 24 hours    Blood Culture - Blood, Arm, Left [[id number]] Collected:  03/10/19 1727    Specimen:  Blood from Arm, Left Updated:  03/11/19 0616     Blood Culture No growth at less than 24 hours    Lactic Acid, Plasma [[id number]]  (Abnormal) Collected:  03/10/19 2322    Specimen:  Blood Updated:  03/11/19 0006     Lactate 2.9 mmol/L     Lactic Acid, Plasma [[id number]]  (Abnormal) Collected:  03/10/19 1755    Specimen:  Blood Updated:  03/10/19 1820     Lactate 4.1 mmol/L     Lactic Acid, Reflex [[id number]]  (Abnormal) Collected:  03/10/19 1558    Specimen:  Blood Updated:  03/10/19 1619     Lactate 4.2 mmol/L     Lactic Acid, Reflex Timer (This will reflex a repeat order 3-3:15 hours after ordered.) [[id number]] Collected:  03/10/19 1132    Specimen:  Blood Updated:  03/10/19 1500     Extra Tube Hold for add-ons.     Comment: Auto resulted.       Urinalysis, Microscopic Only - Urine, Catheter [[id number]]  (Abnormal) Collected:  03/10/19 1318    Specimen:  Urine, Catheter Updated:  03/10/19 1355     RBC, UA 3-5 /HPF      WBC, UA 21-30 /HPF      Bacteria, UA 2+ /HPF      Squamous Epithelial Cells, UA 0-2 /HPF      Hyaline Casts, UA None Seen /LPF      Methodology Manual Light Microscopy    Urinalysis With Culture If Indicated - Urine, Catheter [[id number]]  (Abnormal) Collected:  03/10/19 1318    Specimen:  Urine, Catheter Updated:  03/10/19 1355     Color, UA Dark Yellow     Appearance, UA Clear     pH, UA 7.5     Specific Gravity, UA 1.022     Glucose, UA Negative     Ketones, UA 80 mg/dL (3+)     Bilirubin, UA Small (1+)     Blood, UA Trace     Protein, UA 30 mg/dL (1+)     Leuk  Esterase, UA Moderate (2+)     Nitrite, UA Negative     Urobilinogen, UA 4.0 E.U./dL    Urine Drug Screen - Urine, Catheter [[id number]]  (Abnormal) Collected:  03/10/19 1318    Specimen:  Urine, Catheter Updated:  03/10/19 1341     Amphetamine Screen, Urine Negative     Barbiturates Screen, Urine Negative     Benzodiazepine Screen, Urine Negative     Cocaine Screen, Urine Negative     Methadone Screen, Urine Negative     Opiate Screen Positive     Phencyclidine (PCP), Urine Negative     THC, Screen, Urine Positive    Narrative:       Negative Thresholds For Drugs Screened in Urine:    Amphetamines          500 ng/ml  Barbiturates          200 ng/ml  Benzodiazepines       200 ng/ml  Cocaine               150 ng/ml  Methadone             150 ng/ml  Opiates               300 ng/ml  Phencyclidine         25 ng/ml  THC                      50 ng/ml    The normal value for all drugs tested is negative. This report includes final unconfirmed screening results.  A positive result by this assay can be, at your request, sent to the Reference Lab for confirmation by gas chromatography. Unconfirmed results must not be used for non-medical purposes, such as employment or legal testing. Clinical consideration should be applied to any drug of abuse test result, particularly when unconfirmed results are used.    TSH [[id number]]  (Abnormal) Collected:  03/10/19 1046    Specimen:  Blood Updated:  03/10/19 1203     TSH 5.230 mIU/mL     Specimen:  Blood Updated:  03/10/19 1200    Lactic Acid, Plasma [[id number]]  (Abnormal) Collected:  03/10/19 1132    Specimen:  Blood Updated:  03/10/19 1153     Lactate 6.0 mmol/L     Ammonia [[id number]]  (Abnormal) Collected:  03/10/19 1132    Specimen:  Blood Updated:  03/10/19 1151     Ammonia <9 umol/L     T4, Free [[id number]]  (Normal) Collected:  03/10/19 1046    Specimen:  Blood Updated:  03/10/19 1149     Free T4 0.94 ng/dL     Troponin [[id number]]  (Normal) Collected:  03/10/19 1046     Specimen:  Blood Updated:  03/10/19 1144     Troponin I <0.012 ng/mL     Myoglobin, Serum [[id number]]  (Normal) Collected:  03/10/19 1046    Specimen:  Blood Updated:  03/10/19 1144     Myoglobin 56.6 ng/mL     Acetaminophen Level [[id number]]  (Abnormal) Collected:  03/10/19 1046    Specimen:  Blood Updated:  03/10/19 1135     Acetaminophen <10.0 mcg/mL     Salicylate Level [[id number]]  (Abnormal) Collected:  03/10/19 1046    Specimen:  Blood Updated:  03/10/19 1135     Salicylate <1.0 mg/dL     Phenytoin Level, Total [[id number]]  (Abnormal) Collected:  03/10/19 1046    Specimen:  Blood Updated:  03/10/19 1135     Phenytoin Level <3.0 mcg/mL     CK [[id number]]  (Normal) Collected:  03/10/19 1046    Specimen:  Blood Updated:  03/10/19 1132     Creatine Kinase 39 U/L     Comprehensive Metabolic Panel [[id number]]  (Abnormal) Collected:  03/10/19 1046    Specimen:  Blood Updated:  03/10/19 1132     Glucose 101 mg/dL      BUN 9 mg/dL      Creatinine 0.70 mg/dL      Sodium 140 mmol/L      Potassium 3.3 mmol/L      Chloride 98 mmol/L      CO2 30.0 mmol/L      Calcium 8.1 mg/dL      Total Protein 6.0 g/dL      Albumin 3.00 g/dL      ALT (SGPT) 17 U/L      AST (SGOT) 24 U/L      Alkaline Phosphatase 128 U/L      Total Bilirubin 0.9 mg/dL      eGFR Non African Amer 85 mL/min/1.73      Globulin 3.0 gm/dL      A/G Ratio 1.0 g/dL      BUN/Creatinine Ratio 12.9     Anion Gap 12.0 mmol/L     Magnesium [[id number]]  (Normal) Collected:  03/10/19 1046    Specimen:  Blood Updated:  03/10/19 1132     Magnesium 1.8 mg/dL     Ethanol [[id number]]  (Normal) Collected:  03/10/19 1046    Specimen:  Blood Updated:  03/10/19 1132     Ethanol % <0.010 %     Narrative:       Not for legal purposes. Chain of Custody not followed.     CBC & Differential [[id number]] Collected:  03/10/19 1046    Specimen:  Blood Updated:  03/10/19 1129    Narrative:       The following orders were created for panel order CBC & Differential.  Procedure                                Abnormality         Status                     ---------                               -----------         ------                     Manual Differential[[id number]]                                                         CBC Auto Differential[[id number]]        Normal              Final result                 Please view results for these tests on the individual orders.    CBC Auto Differential [[id number]]  (Normal) Collected:  03/10/19 1046    Specimen:  Blood Updated:  03/10/19 1129     WBC 6.38 10*3/mm3      RBC 4.21 10*6/mm3      Hemoglobin 13.3 g/dL      Hematocrit 38.5 %      MCV 91.4 fL      MCH 31.6 pg      MCHC 34.5 g/dL      RDW 14.6 %      RDW-SD 48.4 fl      MPV 11.6 fL      Platelets 243 10*3/mm3      Neutrophil % 73.4 %      Lymphocyte % 17.6 %      Monocyte % 8.0 %      Eosinophil % 0.2 %      Basophil % 0.5 %      Neutrophils, Absolute 4.69 10*3/mm3      Lymphocytes, Absolute 1.12 10*3/mm3      Monocytes, Absolute 0.51 10*3/mm3      Eosinophils, Absolute 0.01 10*3/mm3      Basophils, Absolute 0.03 10*3/mm3     POC Glucose Once [[id number]]  (Normal) Collected:  03/10/19 1040    Specimen:  Blood Updated:  03/10/19 1052     Glucose 103 mg/dL      Comment: : [id number] [place] HeatherMeter ID: [id number]             .  Imaging Results (last 24 hours)     Procedure Component Value Units Date/Time    US Renal Limited [[id number]] Collected:  03/11/19 0958     Updated:  03/11/19 1002    Narrative:       HISTORY: Urinary tract infection, sepsis     Renal ultrasound: Sonographic imaging of the kidneys is performed. The  abdominal aorta and IVC are obscured by regional bowel gas. The visible  portion of the IVC does appear patent.     The kidneys are normal in echotexture. Right kidney measures 10.2 x 4.9  x 6.2 cm. Left kidney measures 10.8 x 5.2 x 5.2 cm. There is no solid or  cystic renal mass identified. There are no obstructing intrarenal  stones. There is no hydronephrosis. There is no  "abnormal perinephric  fluid collection.     Images of the mildly distended bladder are unremarkable.       Impression:       1. No acute sonographic pathology of the kidneys.  This report was finalized on 03/11/2019 09:59 by Dr. Almita Moreira MD.        Personal review of Head CT:  No overt abnormalities but there is significant motion artifact      Impression  · Spell where she continued to speak  repetitive words.  This may be seizure but she yhas a recall of doing this so that makes it less likely. However, she has a background history of seizure.  · Obstructive sleep apnea, not treated.  Sleep apnea is notorious for causing confusion upon awakening when not treated and if severe enough.  Also if there is substantial CO2 retention that can cause a seizure.  With the medications she is on she could get central sleep apnea in addition to her obstructive sleep apnea.  · UTI  · Hypokalemia  · H/O gastric bypass surgery--not taking vitamin B 12 or Vitamin D  · H/O \"blood clots\"     Plan  · MRI of brain with and without--if she is capable of holding still--may pedro sedation which will aggravate her confusion  · Vitamin B12, folate, vitamin D, ammonia (resuots now back  · UDS  · EEG  · [email or web address] seizure [facility]-- no she knows if she is on dilantin.  Patient states they just may not have filled it.     I discussed the patients findings and my recommendations with patient, family and nursing staff     ADDENDUM: has B12 deficiency will replace  Await Vitamin D    Tamara Donovan MD  03/11/19  4:38 PM    "

## 2019-03-11 NOTE — PLAN OF CARE
Problem: Patient Care Overview  Goal: Plan of Care Review  Outcome: Ongoing (interventions implemented as appropriate)   03/11/19 1724   Coping/Psychosocial   Plan of Care Reviewed With patient   OTHER   Outcome Summary Clinical bedside swallow re-evaluation complete. Pt alert but disoriented throughout assessment. Perseveration of short phrases noted. Pt exhibited signifcant difficulties following simple one step commands and would often verbally repeat commands provided to her per SLP. With max cues pt did exhibit labial restraction and slight lingual protrusion. She completed trials of pureed, honey thick, nectar thick, and thin consistencies. Max verbal and visual cues required in order for pt to attend to SLP and task of completing intake. Pt bit down on regular solids and took a significant amount of time to begin mastication. Mastication was noted to be poor with regular solids and not functional for meal completion. She did eventually complete a swallow with moderate oral residue present. Given a nectar thick liquid wash pt was able to clear a majority of residue. Pt presented with a tonic bite when presented with a straw and required max cues in order to complete appropriate suction. She completed nectar thick and thin liquid with significant audible swallows and decreased laryngeal movement. Belching and delayed cough noted 2x. SLP cannot r/o aspiration with PO intake. Due to significant confusion and difficulties with PO intake SLP recommends pt remain NPO with exception of meds crushed in pudding/applesauce as well as ice chips post oral care. SLP will continue to follow and treat.   Plan of Care Review   Progress no change

## 2019-03-11 NOTE — PROGRESS NOTES
Discharge Planning Assessment  [facility]     Patient Name: Bita Croft  MRN: [id number]  Today's Date: 3/11/2019    Admit Date: 3/10/2019    Discharge Needs Assessment     Row Name 03/11/19 1156       Living Environment    Lives With  spouse      Name(s) of Who Lives With Patient  Carlito      Current Living Arrangements  home/apartment/condo      Primary Care Provided by  spouse/significant other      Provides Primary Care For  no one      Family Caregiver if Needed  spouse      Quality of Family Relationships  helpful;involved;supportive         Resource/Environmental Concerns    Transportation Concerns  car, none         Discharge Needs Assessment    Readmission Within the Last 30 Days  no previous admission in last 30 days      Concerns to be Addressed  denies needs/concerns at this time     Equipment Currently Used at Home  wheelchair;shower chair;walker, rolling;cane, quad;commode;ramp      Anticipated Changes Related to Illness  none      Equipment Needed After Discharge  none      Discharge Coordination/Progress  Pt's , Fabian, was contacted to answer questions in regards to pt being confused. Fabian states that pt has all DME needed and denies any needs at this time. Pt's  is aware SW is awaiting pt/ot evals in regards to discharge placement. SW will follow and assist if needed.           Discharge Plan    No documentation.       Destination      No service coordination in this encounter.      Durable Medical Equipment      No service coordination in this encounter.      Dialysis/Infusion      No service coordination in this encounter.      Home Medical Care      No service coordination in this encounter.      Community Resources      No service coordination in this encounter.        Expected Discharge Date and Time     Expected Discharge Date Expected Discharge Time    Mar 13, 2019         Demographic Summary    No documentation.       Functional Status    No documentation.       Psychosocial     No documentation.       Abuse/Neglect    No documentation.       Legal    No documentation.       Substance Abuse    No documentation.       Patient Forms    No documentation.           Donya Weldon

## 2019-03-11 NOTE — PLAN OF CARE
"Problem: Patient Care Overview  Goal: Plan of Care Review  Outcome: Ongoing (interventions implemented as appropriate)   03/11/19 0458   Coping/Psychosocial   Plan of Care Reviewed With patient   OTHER   Outcome Summary Pt. confused and unable to follow commands, all night she yelled, \"Help me, help me\", Morphine given PRN with minimal relief, Ativan given for anxiety PRN with minimal change, lactic this AM 2.0, VSS, will continue to monitor.    Plan of Care Review   Progress no change       Problem: Fall Risk (Adult)  Goal: Identify Related Risk Factors and Signs and Symptoms  Outcome: Outcome(s) achieved Date Met: 03/11/19    Goal: Absence of Fall  Outcome: Ongoing (interventions implemented as appropriate)      Problem: Skin Injury Risk (Adult)  Goal: Identify Related Risk Factors and Signs and Symptoms  Outcome: Outcome(s) achieved Date Met: 03/11/19    Goal: Skin Health and Integrity  Outcome: Ongoing (interventions implemented as appropriate)      Problem: Confusion, Acute (Adult)  Goal: Identify Related Risk Factors and Signs and Symptoms  Outcome: Outcome(s) achieved Date Met: 03/11/19    Goal: Cognitive/Functional Impairments Minimized  Outcome: Ongoing (interventions implemented as appropriate)    Goal: Safety  Outcome: Ongoing (interventions implemented as appropriate)      Problem: Infection, Risk/Actual (Adult)  Goal: Identify Related Risk Factors and Signs and Symptoms  Outcome: Outcome(s) achieved Date Met: 03/11/19    Goal: Infection Prevention/Resolution  Outcome: Ongoing (interventions implemented as appropriate)      Problem: Sepsis/Septic Shock (Adult)  Goal: Signs and Symptoms of Listed Potential Problems Will be Absent, Minimized or Managed (Sepsis/Septic Shock)  Outcome: Ongoing (interventions implemented as appropriate)        "

## 2019-03-11 NOTE — PAYOR COMM NOTE
"3/11/19 [facility] [phone]  [phone]    FAXING CLINICAL FOR INPATIENT ADMISSION. ADMIT DATE 3/10/19            Bita Croft (63 y.o. Female)     Date of Birth Social Security Number Address Home Phone MRN    1955  [address]   [place] [address] [phone] [id number]    Mandaen Marital Status          Confucianism        Admission Date Admission Type Admitting Provider Attending Provider Department, Room/Bed    3/10/19 Emergency Josafat Gonzales MD Oliver, Randy Eugene, MD [facility] 3A, 340/1    Discharge Date Discharge Disposition Discharge Destination                       Attending Provider:  Josafat Gonzales MD    Allergies:  No Known Allergies    Isolation:  None   Infection:  None   Code Status:  CPR    Ht:  177.8 cm (70\")   Wt:  94.5 kg (208 lb 6.4 oz)    Admission Cmt:  None   Principal Problem:  None                Active Insurance as of 3/10/2019     Primary Coverage     Payor Plan Insurance Group Employer/Plan Group    [employer]CIAL AETNA [id number]     Payor Plan Address Payor Plan Phone Number Payor Plan Fax Number Effective Dates    [address] [phone]  7/1/2017 - None Entered    [facility] [id number]       Subscriber Name Subscriber Birth Date Member ID       OCHOA CROFT 1955 [id number]                 Emergency Contacts      (Rel.) Home Phone Work Phone Mobile Phone    Carlito Croft (Spouse) [phone] -- --        Sergio Templeton DO   Physician   Emergency Medicine   ED Provider Notes   Signed   Date of Service:  3/10/2019 10:32 AM               Signed        Expand All Collapse All            Show:Clear all  [x]Manual[x]Template[]Copied    Added by:  [x]Sergio Templeton DO      []Genarover for details         Subjective      This is a 63-year-old female who has been brought to the emergency department via EMS for evaluation of an altered mental state.  Patient is unable to provide any " "history or participate in a definitive review of systems.  Her  is at bedside for additional history.   indicates that she has a history of multiple medical issues including chronic back pain.  She is on multiple medications but he is unsure what they are.  He states that she is continuously medicated around the clock and went to bed last night around 6 or 6:30 PM in her normal state of health.  She slept throughout the evening.  When he woke up around 8 AM she had altered mentation and so EMS was contacted.   describes that she was arousable but was perseverating the words \"Oh God\" as she is now.  No fall or recent injury.  He indicates that she has not recently been coughing or exhibiting respiratory distress.  She has not recently had a fever.  She has not recently been complaining of any new chest or abdominal pain.  She has not recently been vomiting or been having stool or urine changes.  She has a history of chronic left upper extremity swelling which has been present for 4 or 5 weeks.  He indicates that she has been seen by her primary care provider for this issue and this is not a new concern today.  He notes that she has been admitted in this state before and has been told that she has seizures.  He did not witness any tonic-clinic seizure-like activity.  Additional review of systems unable to be obtained and a full history is incomplete.                  Review of Systems   Unable to perform ROS: Mental status change         Medical History        Past Medical History:   Diagnosis Date   • Asthma     • Cancer (CMS/HCC)       colon cancer   • Depression     • Disease of thyroid gland     • Hypertension     • Insomnia     • Seizure (CMS/HCC)     • Sleep apnea              No Known Allergies     Surgical History         Past Surgical History:   Procedure Laterality Date   • BACK SURGERY       • COLON SURGERY       • GASTRIC BYPASS       • HYSTERECTOMY       • KNEE ARTHROSCOPY         " "bilateral   • NECK SURGERY       • ORIF WRIST FRACTURE Right 6/9/2017     Procedure: WRIST OPEN REDUCTION INTERNAL FIXATION, RIGHT;  Surgeon: Alec Cardoza MD;  Location: [facility] OR;  Service:    • TONSILLECTOMY       • WRIST FRACTURE SURGERY                      Family History   Problem Relation Age of Onset   • Heart attack Father     • Stroke Father     • Arthritis Father     • Hypertension Father     • Heart attack Mother     • Stroke Mother     • Arthritis Mother     • Hypertension Mother           Social History   Social History            Socioeconomic History   • Marital status:        Spouse name: Carlito   • Number of children: 1   • Years of education: 18+   • Highest education level: Not on file   Occupational History   • Occupation: retired       Comment: [employer]   Tobacco Use   • Smoking status: Never Smoker   • Smokeless tobacco: Never Used   Substance and Sexual Activity   • Alcohol use: No   • Drug use: No   • Sexual activity: Defer                       Objective      Physical Exam   Constitutional: She appears well-developed and well-nourished.   Patient intermittently says \"Oh God\" but nothing else.  Will sometimes shake or nod head.   Eyes: EOM are normal. Pupils are equal, round, and reactive to light.   Pupils 3-4mm and reactive   Neck: Normal range of motion. Neck supple. No JVD present. No tracheal deviation present.   Cardiovascular: Regular rhythm and normal heart sounds.   Tachycardic   Pulmonary/Chest: Effort normal and breath sounds normal. No respiratory distress. She has no wheezes.   Palpation elicits no pain response   Abdominal: Soft. Bowel sounds are normal. She exhibits no distension. There is no guarding.   Palpation elicits no pain response   Musculoskeletal: She exhibits no deformity.   She does have pitting edema to the left upper extremity without deformity or evidence for acute trauma.  Patient has trace bilateral lower extremity edema. "   Neurological:   Patient is moving all extremities and reacts to pain stimulus in all 4 extremities.  No abnormal muscle tone.  She does not consistently follow commands.  Perseverating.  Does not answer questions.  Keeps eyes closed but opens them to any stimulus.   Skin: Skin is warm and dry. Capillary refill takes less than 2 seconds.   Nursing note and vitals reviewed.        Procedures                ED Course      EKG at 10:33 AM shows a sinus rhythm with a rate of 95 bpm.  Intervals within normal limits.  Normal axis.  R wave progression is maintained.  T wave inversions in lead III.  No ST elevation or evidence for acute infarction.     Initially a small fluid bolus was ordered given the mild tachycardia  This was switched to the sepsis bolus given the elevated lactate     Labs reviewed     CT Head Without Contrast   Final Result   1. Study degraded by patient motion artifact. Mild to moderate cerebral   and cerebellar volume loss with chronic vessel vessel ischemia. No acute   intracranial process.   This report was finalized on 03/10/2019 13:14 by Dr. Almita Moreira MD.       XR Chest 1 View   Final Result   1. Patchy left mid lung opacities are suspicious for pneumonia with a   persistent spiculated lingular nodule (present on CT chest exam   10/8/2018 suspicious for malignancy). Continued follow-up recommended   This report was finalized on 03/10/2019 12:13 by Dr. Almita Moreira MD.          Patient family updated on all results  On reassessment her heart rate is improved  She is more awake and alert  She does not answer any specific questions but does appear more interactive  She is now looking around the room  She has good peripheral pulses and perfusion with normal skin turgor     SEPTIC SHOCK FOCUSED EXAM ATTESTATION     I attest that I have reassessed tissue perfusion after the fluid bolus given.     Sergio Templeton,   03/10/19  3:16 PM     Antibiotics ordered  I spoke with Dr. Hester who  accepts admission for Dr. Gonzales   Stable for floor               MDM  Number of Diagnoses or Management Options  Acute urinary tract infection: new and requires workup  Altered mental status, unspecified altered mental status type: new and requires workup  Pneumonia of left lung due to infectious organism, unspecified part of lung: new and requires workup  Severe sepsis (CMS/HCC): new and requires workup     Amount and/or Complexity of Data Reviewed  Clinical lab tests: ordered and reviewed  Tests in the radiology section of CPT®: ordered and reviewed  Tests in the medicine section of CPT®: ordered and reviewed  Discussion of test results with the performing providers: yes  Decide to obtain previous medical records or to obtain history from someone other than the patient: yes  Obtain history from someone other than the patient: yes  Review and summarize past medical records: yes  Discuss the patient with other providers: yes  Independent visualization of images, tracings, or specimens: yes     Risk of Complications, Morbidity, and/or Mortality  Presenting problems: high  Diagnostic procedures: high  Management options: high   Critical Care  Total time providing critical care: 30-74 minutes     Patient Progress  Patient progress: improved     Patient with unclear etiology of altered mentation upon arrival  Labs and imaging reviewed  X-ray showing concern for pneumonia  With an elevated lactate and altered mentation this may represent a septic state  Her blood pressure is stable  She is afebrile  Rocephin/azithromycin ordered     I do have concern for sepsis given pneumonia and UTI found on labs  Screening positive for severe sepsis given AMS     On reassessment after antibiotics and IV fluids she is no longer perseverating and is more awake and interactive  Her and her family have been updated on all results  They are comfortable with the plan of care for admission  Stable vital signs  No need for pressors or a  "central line  I spoke with Dr. Hester who accepts admission for Dr. Christian Sauceda for floor        Final diagnoses:   Altered mental status, unspecified altered mental status type   Pneumonia of left lung due to infectious organism, unspecified part of lung   Acute urinary tract infection   Severe sepsis (CMS/[facility])               Sergio Templeton, DO  03/10/19 1518                     JORDYN Rothman [facility]   Pharmacist   Pharmacy   Progress Notes   Signed   Date of Service:  3/10/2019  4:38 PM               Signed                 Show:Clear all  []Manual[]Template[x]Copied    Added by:  [x]JORDYN Rothman [facility]      []Debra for details      Pharmacy Dosing Service  Anticoagulant  Enoxaparin     Assessment/Action/Plan:  Pharmacy to dose Lovenox for VTE ppx. Initiated Lovenox 40 mg q 24hrs.  Pharmacy will continue to monitor and adjust as needed      Subjective:  Bita Croft is a 63 y.o. female on Enoxaparin 40 mg SQ every 24 hours for indication of VTE prophylaxis.  Objective:  [Ht: 180.3 cm (71\"); Wt: 98.7 kg (217 lb 9.6 oz); BMI: Body mass index is 30.35 kg/m².]  Estimated Creatinine Clearance: 106.5 mL/min (by C-G formula based on SCr of 0.7 mg/dL). No results found for: INR, PROTIME         Lab Results   Component Value Date     HGB 13.3 03/10/2019     HGB 12.5 07/24/2017     HGB 12.4 07/15/2017            Lab Results   Component Value Date      03/10/2019      07/24/2017      07/15/2017         JORDYN Rothman RPH  03/10/19 4:37 PM                           Lesvia Rose, RN   Registered Nurse      Plan of Care   Signed   Date of Service:  3/10/2019  5:11 PM               Signed           Problem: Patient Care Overview  Goal: Plan of Care Review  Outcome: Ongoing (interventions implemented as appropriate)    03/10/19 1710   Coping/Psychosocial   Plan of Care Reviewed With patient   OTHER   Outcome Summary VSS, alert but confused,  at bedside   Plan of Care Review   Progress no change " "        Problem: Fall Risk (Adult)  Goal: Identify Related Risk Factors and Signs and Symptoms  Outcome: Ongoing (interventions implemented as appropriate)     Goal: Absence of Fall  Outcome: Ongoing (interventions implemented as appropriate)        Problem: Skin Injury Risk (Adult)  Goal: Identify Related Risk Factors and Signs and Symptoms  Outcome: Ongoing (interventions implemented as appropriate)     Goal: Skin Health and Integrity  Outcome: Ongoing (interventions implemented as appropriate)                         Josafat Gonzales MD   Physician   Medicine   H&P   Signed   Date of Service:  3/11/2019  8:21 AM               Signed                   Show:Clear all  [x]Manual[x]Template[x]Copied    Added by:  [x]Josafat Gonzales MD      []Debra for details      Patient ID: Bita Croft  MRN: [id number]                               Acct:  [id number]  Admit Date:    3/10/2019   Date of service: 3.11.2019     SOURCE: The source of this information is prior knowledge of the patient, review of her office records, her current chart, as well as discussion with she; she is considered unreliable.  The history was primarily taken from the emergency room doctors notes.     PATIENT PROFILE: The patient is a 64 y/o white  female resident of [place]; she was cooperative.      CHIEF COMPLAINT: \"altered mental state\"     HISTORY OF PRESENT ILLNESS: many chronic illness and many medications.  Went to bed night before admit normal.  Slept through the evening ok.  When she awoke the next morning she was confused and inappropriately mumbling \"oh God\".  There was no recent cough dysuria fever injury vomiting diarrhea; anything.  Though there has been a concern about seizure disorder in the past the presenting complaints did not include any tonic-clonic  activity.  Chest x-ray in the emergency department was suspicious for pneumonia and continued spiculated lingular nodule.  This lingular nodule has been seen by " cardiovascular surgery and considering her multiple medical problems was followed without removal.  Her Dilantin level was less than 3 and low.  Alcohol salicylate and Tylenol levels were all normal.  Chemistry showed low protein and albumin at 6 and 3 respectively; nothing else significant.  Lactic acid was elevated 6 ammonia was less than 9 and urinalysis showed 21-30 WBCs with moderate leukocytes.   CT of her head showed only chronic ischemic changes.  She was felt to be septic with a UTI and was admitted with fluid boluses and appropriate treatments.  Antibiotics started in the ER were Rocephin and azithromycin.  She was given Cerebyx IV in the ED.     No Known Allergies     HOME MEDICATIONS:          Prior to Admission medications    Medication Sig Start Date End Date Taking? Authorizing Provider   acetaminophen (TYLENOL) 325 MG tablet Take 2 tablets by mouth Every 4 (Four) Hours As Needed for Mild Pain  or Fever. 6/7/18   Yes Josafat Gonzales MD   clonazePAM (KlonoPIN) 1 MG tablet Take 1 tablet by mouth 3 (Three) Times a Day As Needed for Anxiety. 12/17/18     Josafat Gonzales MD   clotrimazole (LOTRIMIN) 1 % external solution Wash/dry well three times a day After dry; apply light coating of clotrimazole cream for one week 7/16/18     Josafat Gonzales MD   cyclobenzaprine (FLEXERIL) 10 MG tablet Take 1 tablet by mouth 3 (Three) Times a Day. 10/21/18     Beverly Wells MD   DULoxetine (CYMBALTA) 30 MG capsule Take 30 mg by mouth Daily.       Beverly Wells MD   gabapentin (NEURONTIN) 600 MG tablet Take 1 tablet by mouth 3 (Three) Times a Day. 5/31/18     Josafat Gonzales MD   levETIRAcetam (KEPPRA) 1000 MG tablet TAKE 1 TABLET BY MOUTH TWICE DAILY 4/27/18     Josafat Gonzales MD   levothyroxine (SYNTHROID, LEVOTHROID) 100 MCG tablet Take 1 tablet by mouth Daily.       Beverly Wells MD   loperamide (IMODIUM A-D) 2 MG tablet Take 1 tablet by mouth 4 (Four) Times a  Day As Needed for Diarrhea. 18     Josafat Gonzales MD   metoprolol tartrate (LOPRESSOR) 50 MG tablet Take 1 tablet by mouth Daily. 10/29/18     Josafat Gonzales MD   MORPHABOND ER 15 MG tablet extended-release 12 hour Take 1 tablet by mouth Every Night. 18     Josafat Gonzales MD   nystatin (MYCOSTATIN) 410431 UNIT/GM powder Wash/dry well three times a day After dry;apply nystatin powder bid 18     Josafat Gonzales MD   oxyCODONE (ROXICODONE) 10 MG tablet Take 1 tablet by mouth Every 4 (Four) Hours As Needed for Moderate Pain . 18     Josafat Gonzales MD   oxyCODONE-acetaminophen (PERCOCET) 5-325 MG per tablet Take 2 tablets by mouth Every 4 (Four) Hours As Needed for Moderate Pain . 18     Josafat Gonzales MD   phenytoin (DILANTIN) 100 MG ER capsule 100 mg in am and 200 mg in pm 17     Josafat Gonzales MD   PROAIR  (90 Base) MCG/ACT inhaler INHALE 1 PUFF BY MOUTH FOUR TIMES DAILY 19     Josafat Gonzales MD   QUEtiapine (SEROquel) 100 MG tablet Take 1 tablet by mouth 2 (Two) Times a Day. 17     Josafat Gonzales MD   Skin Protectants, Misc. (EUCERIN) cream Apply  topically to the appropriate area as directed 2 (Two) Times a Day. 10/29/18     Josafat Gonzales MD   solifenacin (VESICARE) 5 MG tablet Take 1 tablet by mouth Daily. 8/15/17     Pawel Ho MD   traZODone (DESYREL) 50 MG tablet Take 1 tablet by mouth Every Night. 18     Josafat Gonzales MD   venlafaxine XR (EFFEXOR-XR) 150 MG 24 hr capsule Take 2 capsules by mouth Daily. 18     Josafat Gonzales MD         PAST HISTORY:  CHILDHOOD: unremarkable.      PROCEDURES:   SCANNED  [id number]  Colonoscopy+tort-mass/MMH/Mc/8.4.14/BE  EGD+nl/BHP/Mc/5.15/UGI  Colonoscopy+polyp/MM//9.15/3y     SURGERIES:  T&A/age 11  R knee/H Hunt/LH/age 13  SANDRA+BSO/Mike/MARS/age 32  T12/Mary/Lorena/  Gastric by-pass//  L  knee/Martinez/Lorena/2005  C surgery/Riew/Dorothea/5-8-06  C surgery/Riew/Dorothea/2-4-08  Lap GB/[facility]/Armando/3-18-10  L arthroscope/  R lap colectomy/Dorothea/8.13.14   ORIF L wrist/Nj//7.28.16     FAMILY HISTORY:  Hypertension mother, maternal aunt, father, paternal  grandfather; heart disease in mother, father, maternal grandfather, maternal  grandmother, paternal grandmother; diabetes in maternal uncle.      HABITS:  Never smoked, only limited second-hand smoke exposure.  No alcohol or  drugs.      SOCIAL HISTORY:   in 1987 to her current , has 1 son.  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     MEDICAL ADMISSIONS:  MMH:   11.8.08-11.12.08-hyponatremia  2.8.10-2.9.10-L flank pain  3.13.14-3.15.14 hever/TIA  12.26.17-12.27.17    Shortness of breath  Cough  Flu B  Bronchitis-acute  Abnormal CT chest-AGUILAR spiculated nodule  Chest wall pain  Hyponatremia 128  Neutropenia 2.54     5.26.18-5.31.18  Lethargy-likely 2nd overdose Imodium-resolved  Confusion-same-resolved  Brief intubation (airway protection) 26-27th  Abnormal CXR-? Pneumonia/atelectasis  Overdose Imodium; initial ? suicide jesture refuted  Depression-acute/chronic  Anxiety-acute/chronic  Hypotension-brief and decrease Rx  Cheilosis-improved empiric monistat  Gait decline-acute/chronic  Anemia-component phlebotomy, (substrate neg)  +BC/staph homnis/epidermitis (contaminet)  E coli bacteruria-likely UTI  UTI-     BH:   7.21-7.23.95 abdominal pain  2.19.08-2.21.08-DVT  2.9.10-2.11.10-abdominal pain/intercostal  3.11.10-3.19.10- hever-abdominal pain+SIADH  3.24.15-3.30.15 diarrhea  5.26.15-5.31.15 nausea/vomiting/diarrhea  7.12.17-7.17.17 accidential injection water bottle cap     LH:   10.26.16-11.6.16 seizure     GENERAL:  Inactive/slower with limits, speed, stamina for age and pains, desire. Sleep is ok usually with Rx.  Has pain management and psych. No fever noted above.  ENDO:  No syncope,  "near or diaphoretic sweaty spells.  BS Ok since here.  HEENT: No head injury or headache.  No vision change.   No significant hearing loss.  Ears without pain/drainage.  No sore throat.  No significant nasal/sinus congestion/drainage. No epistaxis.  CHEST: No chest wall tenderness or mass. No significant cough, wheeze, SOB; no hemoptysis.  CV: No chest pain, palpitations, significant ankle edema.  GI: No heartburn, dysphagia.  No abdominal pain, diarrhea, constipation, rectal bleeding, or melena.  :  Voids without dysuria, or incontinence to completion.  ORTHO: No painful/swollen joints but various on /off sore.  Usually has sore neck or back.  No acute neck or back pain without recent injury.   NEURO: No dizziness, weakness of extremities.  Usually has LE numbness/paresthesias.   PSYCH: Prior memory loss.  Mood always variable; often anxious, depressed but/and not suicidal though has had prior overdose.  Tolerated stress.      PHYSICAL EXAMINATION:  /84 (BP Location: Left arm, Patient Position: Lying)   Pulse 91   Temp 98.6 °F (37 °C) (Oral)   Resp 18   Ht 177.8 cm (70\")   Wt 94.5 kg (208 lb 6.4 oz)   LMP  (LMP Unknown)   SpO2 95%   BMI 29.90 kg/m²      GENERAL:  Well nourished/developed in no acute distress; mumbling incoherently. Obese.  SKIN: Turgor excellent, without wound, rash, lesion other than several LE scabbed abrasions.  HEENT: Normal cephalic without trauma.  Pupils reactive to light; L pupil anisocoria. Extraocular motions full without nystagmus.     External canals nonobstructive nontender without reddness.  Oral cavity without growths, exudates, and moist.  Posterior pharynx without mass, obstruction, redness.  No thyromegaly, mass, tenderness, lymphadenopathy and supple.  CV: Regular rhythm.  No murmur, gallop,  edema. Posterior pulses intact.  No carotid bruits.   CHEST: No chest wall tenderness or mass.   LUNGS: Symmetric motion with clear to auscultation.  ABD: Soft, nontender " without mass.   ORTHO: Symmetric extremities without swelling/point tenderness.  Full gross range of motion.    NEURO: CN 2-12 grossly intact.  Symmetric facies. 1/4 x bicep 0/4 LE.  UE/LE variable; 2-/5 strength UE 0-1/5 LE  Nonfocal use extremities. Speech clear.    PSYCH: Disoriented x 3.  Pleasant calm, well kept.  Non-purposeful/directed conservation with intact short/long gross memory.     ASSESSMENT/PROBLEM LIST:   64 y/o y/o white female   Allergy/intolerance: see above  Procedural history: see above  Family history: see above  Obesity.  This is status post gastric bypass initial failure and regain of  weight.     1 para 1 AB 0.    Surgical menopause.  Osteoporosis on previous wrist study.    Hypertension.    Hypothyroidism.    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    History of DVT - left peroneal popliteal, 2008, treated              successfully with anticoagulation.   History anticoagulation: hx DVT/(coumadin)  Chronic insomnia.  Spinal spondylosis; this is primarily cervical with multiple surgeries.  Chronic neck pain.    Chronic back pain  Chronic narcotics-from pain management/Ruxer  Chronic depression - followed by Dr. Samaniego.    Degenerative joint disease that is diffuse.   LE weakness  Gait difficulty-chronic   History of gastric bypass and metabolic risk it carries.    Colon cancer history with previous surgery and chemo, no obvious              recurrence.  Gastroesophageal reflux.  Recurrent laryngitis  Documented neuropathy by EMG nerve conduction  Seizure disorder (2016)  antieliptics-Keppra/Dilantin  Abnormal CT chest-treating nonsurgically  Polypharmacy   Urinary incontience (history urge incontinence)     REASON FOR ADMISSION:    Confusion-delirium  Abnormal UA-(21-30WBC, mod leuk, 2+ bacteria)  likely UTI vs pneumonia  CXR-infiltrate (note lack cough,  etc)  Sepsis (tachycardia 104, lactic acid 6.0  Low phenytoin level 3.0  Hypopotassemia-3.0  Drug screen THC, opiate     PLANS:   Rx-reviewed; ordered as needed and will review daily/as needed.   Includes anticoagulation           for DVT prevention.   LAB-reviewed; ordered as needed and will review daily.  Particular: daily CBC, chemistry  Imaging-reviewed; ordered as needed and will review daily.  Particular:  ? Neuro will want    MRI: renal US  Consults neuro  Diet: as able; speech  Fluids: maintance till oral adequate  Special issues: seizure?   DC planning: has had to use [facility] before and eventually home  Code status: full                    ED to Hosp-Admission (Current) on 3/10/2019            Detailed Report      Donya Weldon      Case Management   Progress Notes   Cosign Needed   Date of Service:  3/11/2019 11:59 AM               Cosign Needed                 Show:Clear all  []Manual[x]Template[]Copied    Added by:  [x]Donya Weldon      []Debra for details      Discharge Planning Assessment  [facility]     Patient Name: Bita Croft                    MRN: [id number]  Today's Date: 3/11/2019                     Admit Date: 3/10/2019          Discharge Needs Assessment      Row Name 03/11/19 1156           Living Environment     Lives With  spouse       Name(s) of Who Lives With Patient  Carlito       Current Living Arrangements  home/apartment/condo       Primary Care Provided by  spouse/significant other       Provides Primary Care For  no one       Family Caregiver if Needed  spouse       Quality of Family Relationships  helpful;involved;supportive             Resource/Environmental Concerns     Transportation Concerns  car, none             Discharge Needs Assessment     Readmission Within the Last 30 Days  no previous admission in last 30 days       Concerns to be Addressed  denies needs/concerns at this time      Equipment Currently Used at Home  wheelchair;shower  chair;walker, rolling;cane, quad;commode;ramp       Anticipated Changes Related to Illness  none       Equipment Needed After Discharge  none       Discharge Coordination/Progress  Pt's , Fabian, was contacted to answer questions in regards to pt being confused. Fabian states that pt has all DME needed and denies any needs at this time. Pt's  is aware SW is awaiting pt/ot evals in regards to discharge placement. SW will follow and assist if needed.             Discharge Plan    No documentation.             Destination       No service coordination in this encounter.               Durable Medical Equipment          Donya Weldon                                           ED to Hosp-Admission (Current) on 3/10/2019            Detailed Report        Donya Weldon      Case Management   Progress Notes   Cosign Needed   Date of Service:  3/11/2019 11:59 AM                  Lactic Acid, Plasma [LAB95] (Order [id number])   Order   Date: 3/11/2019 Department: [facility] Released By/Authorizing: Zeeshan Hester MD (auto-released)   Results   Lactic Acid, Plasma (Order [id number])   3/11/2019 12:21 PM     Component Value Flag Ref Range Units Status   Lactate 2.5 Critically high   C  0.5 - 2.0 mmol/L Final   Lab and Collection     Lactic Acid, Plasma (Order: [id number]) - 3/11/2019         Lactic Acid, Plasma [LAB95] (Order [id number])   Order   Date: 3/10/2019 Department: [facility] Released By/Authorizing: Zeeshan Hester MD (auto-released)   Reprint Order Requisition     Lactic Acid, Plasma (Order #[id number]) on 3/10/19       Contains critical data Lactic Acid, Plasma   Order: [id number]   Status:  Final result   Visible to patient:  No (Not Released)   Important Suggestion Newer results are available. Click to view them now.    Ref Range & Units 1d ago   Lactate 0.5 - 2.0 mmol/L 4.1 Critically high     Resulting Agency  [facility]         Specimen  Collected: 03/10/19 17:55 Last Resulted: 03/10/19 18:20        Lab Flowsheet      Order Details      View Encounter      Lab and Collection Details      Routing      Result History               3/10/2019  6:20 PM     Component Value Flag Ref Range Units Status   Lactate 4.1 Critically high   C  0.5 - 2.0 mmol/L Final   Lab and Collection     Lactic Acid, Plasma (Order: [id number]) - 3/10/2019   Routing History     Priority Sent On From To Message Type    3/10/2019  3:00 PM Lab, Background User Josafat Gonzales MD Results    3/10/2019  1:17 PM Interface, Rad Results [place] Josafat Gonzales MD Results    3/10/2019 12:17 PM Interface, Rad Results [place] Josafat Gonzales MD Results    3/10/2019 10:52 AM Lab, Background User Josafat Gonzales MD Results   Related Result Highlights         Lactic Acid, Reflex  Final result 3/10/2019            Other Results from 3/10/2019      Lactic Acid, Plasma  Final result 3/11/2019    CBC Auto Differential  Final result 3/11/2019    Comprehensive Metabolic Panel  Final result 3/11/2019    Lactic Acid, Plasma  Final result 3/11/2019    Lactic Acid, Plasma  Final result 3/10/2019    Blood Culture - Blood, Hand, Left  Preliminary result 3/10/2019    Blood Culture - Blood, Arm, Left  Preliminary result 3/10/2019    Urinalysis With Culture If Indicated - Urine, Catheter  Final result 3/10/2019    Urine Drug Screen - Urine, Catheter  Final result 3/10/2019    Urinalysis, Microscopic Only - Urine, Catheter  Final result 3/10/2019    Urine Culture - Urine, Urine, Catheter  Preliminary result 3/10/2019    Lactic Acid, Plasma  Final result 3/10/2019    Ammonia  Final result 3/10/2019    Lactic Acid, Reflex Timer (This will reflex a repeat order 3-3:15 hours after ordered.)  Final result 3/10/2019    Blood Culture - Blood, Arm, Left  Preliminary result 3/10/2019    Blood Culture - Blood, Arm, Left  Preliminary result 3/10/2019    Light Blue Top  Final result  3/10/2019    Green Top (Gel)  Final result 3/10/2019    Lavender Top  Final result 3/10/2019    Red Top  Final result 3/10/2019   Important Suggestion Warning: Additional results from 3/10/2019 are available but are not displayed in this report.       Comprehensive Metabolic Panel [LAB17] (Order [id number])   Order   Date: 3/10/2019 Department: [address] Released By/Authorizing: Sergio Templeton DO (auto-released)   Reprint Order Requisition     Comprehensive Metabolic Panel (Order #[id number]) on 3/10/19       Contains abnormal data Comprehensive Metabolic Panel   Order: [id number]   Status:  Final result   Visible to patient:  No (Not Released)   Important Suggestion Newer results are available. Click to view them now.    Ref Range & Units 1d ago   Glucose 70 - 100 mg/dL 101 Abnormally high     BUN 5 - 21 mg/dL 9    Creatinine 0.50 - 1.40 mg/dL 0.70    Sodium 135 - 145 mmol/L 140    Potassium 3.5 - 5.3 mmol/L 3.3 Abnormally low     Chloride 98 - 110 mmol/L 98    CO2 24.0 - 31.0 mmol/L 30.0    Calcium 8.4 - 10.4 mg/dL 8.1 Abnormally low     Total Protein 6.3 - 8.7 g/dL 6.0 Abnormally low     Albumin 3.50 - 5.00 g/dL 3.00 Abnormally low     ALT (SGPT) 0 - 54 U/L 17    AST (SGOT) 7 - 45 U/L 24    Alkaline Phosphatase 24 - 120 U/L 128 Abnormally high     Total Bilirubin 0.1 - 1.0 mg/dL 0.9    eGFR Non African Amer >60 mL/min/1.73 85    Globulin gm/dL 3.0    A/G Ratio 1.1 - 2.5 g/dL 1.0 Abnormally low     BUN/Creatinine Ratio 7.0 - 25.0 12.9    Anion Gap 4.0 - 13.0 mmol/L 12.0    Resulting Agency  [facility]         Specimen Collected: 03/10/19 10:46 Last Resulted: 03/10/19 11:32        Lab Flowsheet      Order Details      View Encounter      Lab and Collection Details      Routing      Result History               3/10/2019 11:32 AM     Component Value Flag Ref Range Units Status   Glucose 101 Abnormally high   H  70 - 100 mg/dL Final   BUN 9   5 - 21 mg/dL Final   Creatinine 0.70   0.50 - 1.40 mg/dL  Final   Sodium 140   135 - 145 mmol/L Final   Potassium 3.3 Abnormally low   L  3.5 - 5.3 mmol/L Final   Chloride 98   98 - 110 mmol/L Final   CO2 30.0   24.0 - 31.0 mmol/L Final   Calcium 8.1 Abnormally low   L  8.4 - 10.4 mg/dL Final   Total Protein 6.0 Abnormally low   L  6.3 - 8.7 g/dL Final   Albumin 3.00 Abnormally low   L  3.50 - 5.00 g/dL Final   ALT (SGPT) 17   0 - 54 U/L Final   AST (SGOT) 24   7 - 45 U/L Final   Alkaline Phosphatase 128 Abnormally high   H  24 - 120 U/L Final   Total Bilirubin 0.9   0.1 - 1.0 mg/dL Final   eGFR Non African Amer 85   >60 mL/min/1.73 Final   Globulin 3.0    gm/dL Final   A/G Ratio 1.0 Abnormally low   L  1.1 - 2.5 g/dL Final   BUN/Creatinine Ratio 12.9   7.0 - 25.0  Final   Anion Gap 12.0   4.0 - 13.0 mmol/L Final   Lab and Collection     Comprehensive Metabolic Panel (Order: [id number]) - 3/10/2019   Routing History     Priority Sent On From To Message Type    3/10/2019  3:00 PM Lab, Background User Josafat Gonzales MD Results    3/10/2019  1:17 PM Interface, Rad Results [place] Josafat Gonzales MD Results    3/10/2019 12:17 PM Interface, Rad Results [place] Josafat Gonzales MD Results    3/10/2019 10:52 AM Lab, Background User Josafat Gonzales MD Results   Related Result Highlights         Green Top (Gel)  Final result 3/10/2019             Troponin  Final result 3/10/2019             Myoglobin, Serum  Final result 3/10/2019             CK  Final result 3/10/2019             Magnesium  Final result 3/10/2019             Ethanol  Final result 3/10/2019            Other Results from 3/10/2019      Lactic Acid, Plasma  Final result 3/11/2019    CBC Auto Differential  Final result 3/11/2019    Comprehensive Metabolic Panel  Final result 3/11/2019    Lactic Acid, Plasma  Final result 3/11/2019    Lactic Acid, Plasma  Final result 3/10/2019   XR Chest 1 View [[id number]] (Order [id number])   Order   Status: Final result   Patient Location     Patient  Class Location   Inpatient  [facility] 3A, 340, 1     [phone]      Appointment Information     PACS Images      Radiology Images   Study Result     HISTORY: Altered mental status     CXR: A frontal view the chest is obtained.     COMPARISON: 7/11/2017 chest x-ray; CT chest in 2/8/2018. Appears this  patient underwent radiation therapy October/November 2018     Findings.: There are patchy left midlung opacities suspicious for  pneumonia. The irregular spiculated 2 cm spiculated nodule in the  lingula is again visualized, noted on the previous CT exams. Heart is  upper limits of normal in size. There is no pleural effusion or  pneumothorax. Postoperative changes of the cervical and thoracolumbar  spine.     IMPRESSION:  1. Patchy left mid lung opacities are suspicious for pneumonia with a  persistent spiculated lingular nodule (present on CT chest exam  10/8/2018 suspicious for malignancy). Continued follow-up recommended  This report was finalized on 03/10/2019 12:13 by Dr. Almita Moreira MD.   Imaging     XR Chest 1 View (Order: [id number]) - 3/10/2019   Reprint Order Requisition     XR Chest 1 View (Order #[id number]) on 3/10/19   Signed by     Signed Date/Time  Phone Pager   ALMITA MOREIRA 3/10/2019 12:13 [phone]    Exam Information     Status Exam Begun  Exam Ended    Final [99] 3/10/2019 12:04 3/10/2019 12:04   Questions      Reason for Exam: AMS; r/o pneumonia      Portable Yes      External Results Report     Open External Results Report    Encounter     View Encounter          Order-Level Documents - 03/10/2019:       XR Chest 1 View [[id number]] (Order [id number])   Order   Status: Final result   Patient Location     Patient Class Location   Inpatient [facility] 3A, 340, 1     [phone]      Appointment Information     PACS Images      Radiology Images   Study Result     HISTORY: Altered mental status     CXR: A frontal view the chest is obtained.     COMPARISON: 7/11/2017 chest x-ray; CT chest in  2/8/2018. Appears this  patient underwent radiation therapy October/November 2018     Findings.: There are patchy left midlung opacities suspicious for  pneumonia. The irregular spiculated 2 cm spiculated nodule in the  lingula is again visualized, noted on the previous CT exams. Heart is  upper limits of normal in size. There is no pleural effusion or  pneumothorax. Postoperative changes of the cervical and thoracolumbar  spine.     IMPRESSION:  1. Patchy left mid lung opacities are suspicious for pneumonia with a  persistent spiculated lingular nodule (present on CT chest exam  10/8/2018 suspicious for malignancy). Continued follow-up recommended  This report was finalized on 03/10/2019 12:13 by Dr. Almita Moreira MD.   Imaging     XR Chest 1 View (Order: [id number]) - 3/10/2019   Reprint Order Requisition     XR Chest 1 View (Order #[id number]) on 3/10/19   Signed by     Signed Date/Time  Phone Pager   ALMITA MOREIRA 3/10/2019 12:13 [phone]    Exam Information     Status Exam Begun  Exam Ended    Final [99] 3/10/2019 12:04 3/10/2019 12:04   Questions      Reason for Exam: AMS; r/o pneumonia      Portable Yes      External Results Report     Open External Results Report    Encounter     View Encounter          Order-Level Documents - 03/10/2019:     Scan on 3/10/2019 10:33 AM: ECG 12-LEADScan on 3/10/2019 10:33 AM: ECG 12-LEAD   Scan on 3/10/2019: UNCONFIRMED - 03/10/2019Scan on 3/10/2019: UNCONFIRMED - 03/10/2019          Encounter-Level Documents - 03/10/2019:     Electronic signature on 3/10/2019 10:57 AM   Scan on 3/10/2019: EMS RADIO CALL-IN SHEET - 03/10/2019Scan on 3/10/2019: EMS RADIO CALL-IN SHEET - 03/10/2019          Implants     None   Patient Care Timeline     No data selected in time range   Reason For Exam   Priority: STAT   AMS; r/o pneumonia   Results Routing Tracking     View Results Routing Information   Order Report     Order Details                          History & Physical     "  Josafat Gonzales MD at 3/11/2019  8:21 AM          Patient ID: Bita Croft  MRN: [id number]     Acct:  [id number]  Admit Date: 3/10/2019   Date of service: 3.11.2019    SOURCE: The source of this information is prior knowledge of the patient, review of her office records, her current chart, as well as discussion with she; she is considered unreliable.  The history was primarily taken from the emergency room doctors notes.     PATIENT PROFILE: The patient is a 64 y/o white  female resident of [facility]; she was cooperative.      CHIEF COMPLAINT: \"altered mental state\"     HISTORY OF PRESENT ILLNESS: many chronic illness and many medications.  Went to bed night before admit normal.  Slept through the evening ok.  When she awoke the next morning she was confused and inappropriately mumbling \"oh God\".  There was no recent cough dysuria fever injury vomiting diarrhea; anything.  Though there has been a concern about seizure disorder in the past the presenting complaints did not include any tonic-clonic  activity.  Chest x-ray in the emergency department was suspicious for pneumonia and continued spiculated lingular nodule.  This lingular nodule has been seen by cardiovascular surgery and considering her multiple medical problems was followed without removal.  Her Dilantin level was less than 3 and low.  Alcohol salicylate and Tylenol levels were all normal.  Chemistry showed low protein and albumin at 6 and 3 respectively; nothing else significant.  Lactic acid was elevated 6 ammonia was less than 9 and urinalysis showed 21-30 WBCs with moderate leukocytes.   CT of her head showed only chronic ischemic changes.  She was felt to be septic with a UTI and was admitted with fluid boluses and appropriate treatments.  Antibiotics started in the ER were Rocephin and azithromycin.  She was given Cerebyx IV in the ED.    No Known Allergies    HOME MEDICATIONS:  Prior to Admission medications    Medication Sig Start " Date End Date Taking? Authorizing Provider   acetaminophen (TYLENOL) 325 MG tablet Take 2 tablets by mouth Every 4 (Four) Hours As Needed for Mild Pain  or Fever. 6/7/18  Yes Josafat Gonzales MD   clonazePAM (KlonoPIN) 1 MG tablet Take 1 tablet by mouth 3 (Three) Times a Day As Needed for Anxiety. 12/17/18   Josafat Gonzales MD   clotrimazole (LOTRIMIN) 1 % external solution Wash/dry well three times a day After dry; apply light coating of clotrimazole cream for one week 7/16/18   Josafat Gonzales MD   cyclobenzaprine (FLEXERIL) 10 MG tablet Take 1 tablet by mouth 3 (Three) Times a Day. 10/21/18   ProviderBeverly MD   DULoxetine (CYMBALTA) 30 MG capsule Take 30 mg by mouth Daily.    ProviderBeverly MD   gabapentin (NEURONTIN) 600 MG tablet Take 1 tablet by mouth 3 (Three) Times a Day. 5/31/18   Josafat Gonzales MD   levETIRAcetam (KEPPRA) 1000 MG tablet TAKE 1 TABLET BY MOUTH TWICE DAILY 4/27/18   Josafat Gonzales MD   levothyroxine (SYNTHROID, LEVOTHROID) 100 MCG tablet Take 1 tablet by mouth Daily.    ProviderBeverly MD   loperamide (IMODIUM A-D) 2 MG tablet Take 1 tablet by mouth 4 (Four) Times a Day As Needed for Diarrhea. 6/7/18   Josafat Gonzales MD   metoprolol tartrate (LOPRESSOR) 50 MG tablet Take 1 tablet by mouth Daily. 10/29/18   Josafat Gonzales MD   MORPHABOND ER 15 MG tablet extended-release 12 hour Take 1 tablet by mouth Every Night. 11/14/18   Josafat Gonzales MD   nystatin (MYCOSTATIN) 813260 UNIT/GM powder Wash/dry well three times a day After dry;apply nystatin powder bid 7/16/18   Josafat Gonzales MD   oxyCODONE (ROXICODONE) 10 MG tablet Take 1 tablet by mouth Every 4 (Four) Hours As Needed for Moderate Pain . 12/19/18   Josafat Gonzales MD   oxyCODONE-acetaminophen (PERCOCET) 5-325 MG per tablet Take 2 tablets by mouth Every 4 (Four) Hours As Needed for Moderate Pain . 11/14/18   Josafat Gonzales MD   phenytoin (DILANTIN)  100 MG ER capsule 100 mg in am and 200 mg in pm 17   Josafat Gonzales MD   PROAIR  (90 Base) MCG/ACT inhaler INHALE 1 PUFF BY MOUTH FOUR TIMES DAILY 19   Josafat Gonzales MD   QUEtiapine (SEROquel) 100 MG tablet Take 1 tablet by mouth 2 (Two) Times a Day. 17   Josafat Gonzales MD   Skin Protectants, Misc. (EUCERIN) cream Apply  topically to the appropriate area as directed 2 (Two) Times a Day. 10/29/18   Josafat Gonzales MD   solifenacin (VESICARE) 5 MG tablet Take 1 tablet by mouth Daily. 8/15/17   Pawel Ho MD   traZODone (DESYREL) 50 MG tablet Take 1 tablet by mouth Every Night. 18   Josafat Gonzales MD   venlafaxine XR (EFFEXOR-XR) 150 MG 24 hr capsule Take 2 capsules by mouth Daily. 18   Josafat Gonzales MD       PAST HISTORY:  CHILDHOOD: unremarkable.     PROCEDURES:   SCANNED  [id number]  Colonoscopy+tort-mass/[facility]//8.4.14/BE  EGD+nl/[facility]//.15/UGI  Colonoscopy+polyp/[facility]//9.21.15/3y     SURGERIES:  T&A/age 11  R knee/H [facility]/LH/age 13  SANDRA+BSO/[place]/WB/age 32  T12/Mary//  Gastric by-pass//  L knee/Martinez/Lorena/  C surgery/Riew/[place]/06  C surgery/Riew/[place]a/08  Lap GB/[facility]/Armando/3-18-10  L arthroscope/  R lap colectomy/Dorothea/.14   ORIF L wrist/[place]//.16    FAMILY HISTORY:  Hypertension mother, maternal aunt, father, paternal  grandfather; heart disease in mother, father, maternal grandfather, maternal  grandmother, paternal grandmother; diabetes in maternal uncle.      HABITS:  Never smoked, only limited second-hand smoke exposure.  No alcohol or  drugs.      SOCIAL HISTORY:   in  to her current , has 1 son.  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     MEDICAL ADMISSIONS:  [facility]:   11..08-11.08-hyponatremia  2.8.10-2.9.10-L flank pain  3.13.14-3.15.14 hever/TIA  12.26.17-12.27.17    Shortness of  breath  Cough  Flu B  Bronchitis-acute  Abnormal CT chest-AGUILAR spiculated nodule  Chest wall pain  Hyponatremia 128  Neutropenia 2.54    5.26.18-5.31.18  Lethargy-likely 2nd overdose Imodium-resolved  Confusion-same-resolved  Brief intubation (airway protection) 26-27th  Abnormal CXR-? Pneumonia/atelectasis  Overdose Imodium; initial ? suicide jesture refuted  Depression-acute/chronic  Anxiety-acute/chronic  Hypotension-brief and decrease Rx  Cheilosis-improved empiric monistat  Gait decline-acute/chronic  Anemia-component phlebotomy, (substrate neg)  +BC/staph homnis/epidermitis (contaminet)  E coli bacteruria-likely UTI  UTI-     BH:   7.21-7.23.95 abdominal pain  2.19.08-2.21.08-DVT  2.9.10-2.11.10-abdominal pain/intercostal  3.11.10-3.19.10- hever-abdominal pain+SIADH  3.24.15-3.30.15 diarrhea  5.26.15-5.31.15 nausea/vomiting/diarrhea  7.12.17-7.17.17 accidential injection water bottle cap     LH:   10.26.16-11.6.16 seizure     GENERAL:  Inactive/slower with limits, speed, stamina for age and pains, desire. Sleep is ok usually with Rx.  Has pain management and psych. No fever noted above.  ENDO:  No syncope, near or diaphoretic sweaty spells.  BS Ok since here.  HEENT: No head injury or headache.  No vision change.   No significant hearing loss.  Ears without pain/drainage.  No sore throat.  No significant nasal/sinus congestion/drainage. No epistaxis.  CHEST: No chest wall tenderness or mass. No significant cough, wheeze, SOB; no hemoptysis.  CV: No chest pain, palpitations, significant ankle edema.  GI: No heartburn, dysphagia.  No abdominal pain, diarrhea, constipation, rectal bleeding, or melena.  :  Voids without dysuria, or incontinence to completion.  ORTHO: No painful/swollen joints but various on /off sore.  Usually has sore neck or back.  No acute neck or back pain without recent injury.   NEURO: No dizziness, weakness of extremities.  Usually has LE numbness/paresthesias.   PSYCH: Prior memory loss.  " Mood always variable; often anxious, depressed but/and not suicidal though has had prior overdose.  Tolerated stress.     PHYSICAL EXAMINATION:  /84 (BP Location: Left arm, Patient Position: Lying)   Pulse 91   Temp 98.6 °F (37 °C) (Oral)   Resp 18   Ht 177.8 cm (70\")   Wt 94.5 kg (208 lb 6.4 oz)   LMP  (LMP Unknown)   SpO2 95%   BMI 29.90 kg/m²      GENERAL:  Well nourished/developed in no acute distress; mumbling incoherently. Obese.  SKIN: Turgor excellent, without wound, rash, lesion other than several LE scabbed abrasions.  HEENT: Normal cephalic without trauma.  Pupils reactive to light; L pupil anisocoria. Extraocular motions full without nystagmus.     External canals nonobstructive nontender without reddness.  Oral cavity without growths, exudates, and moist.  Posterior pharynx without mass, obstruction, redness.  No thyromegaly, mass, tenderness, lymphadenopathy and supple.  CV: Regular rhythm.  No murmur, gallop,  edema. Posterior pulses intact.  No carotid bruits.   CHEST: No chest wall tenderness or mass.   LUNGS: Symmetric motion with clear to auscultation.  ABD: Soft, nontender without mass.   ORTHO: Symmetric extremities without swelling/point tenderness.  Full gross range of motion.    NEURO: CN 2-12 grossly intact.  Symmetric facies. 1/4 x bicep 0/4 LE.  UE/LE variable; 2-/5 strength UE 0-1/5 LE  Nonfocal use extremities. Speech clear.    PSYCH: Disoriented x 3.  Pleasant calm, well kept.  Non-purposeful/directed conservation with intact short/long gross memory.     ASSESSMENT/PROBLEM LIST:   64 y/o y/o white female   Allergy/intolerance: see above  Procedural history: see above  Family history: see above  Obesity.  This is status post gastric bypass initial failure and regain of  weight.     1 para 1 AB 0.    Surgical menopause.  Osteoporosis on previous wrist study.    Hypertension.    Hypothyroidism.    History of recurrent hyponatremia felt to be syndrome of " inappropriate   antidiuretic hormone, as least from Ziac or Cymbalta and question others.    History of DVT - left peroneal popliteal, 11/08/2008, treated   successfully with anticoagulation.   History anticoagulation: hx DVT/(coumadin)  Chronic insomnia.  Spinal spondylosis; this is primarily cervical with multiple surgeries.  Chronic neck pain.    Chronic back pain  Chronic narcotics-from pain management/[facility]  Chronic depression - followed by Dr. Samaniego.    Degenerative joint disease that is diffuse.   LE weakness  Gait difficulty-chronic   History of gastric bypass and metabolic risk it carries.    Colon cancer history with previous surgery and chemo, no obvious   recurrence.  Gastroesophageal reflux.  Recurrent laryngitis  Documented neuropathy by EMG nerve conduction  Seizure disorder (2016)  antieliptics-Keppra/Dilantin  Abnormal CT chest-treating nonsurgically  Polypharmacy   Urinary incontience (history urge incontinence)    REASON FOR ADMISSION:    Confusion-delirium  Abnormal UA-(21-30WBC, mod leuk, 2+ bacteria)  likely UTI vs pneumonia  CXR-infiltrate (note lack cough, etc)  Sepsis (tachycardia 104, lactic acid 6.0  Low phenytoin level 3.0  Hypopotassemia-3.0  Drug screen THC, opiate    PLANS:   Rx-reviewed; ordered as needed and will review daily/as needed.   Includes anticoagulation  for DVT prevention.   LAB-reviewed; ordered as needed and will review daily.  Particular: daily CBC, chemistry  Imaging-reviewed; ordered as needed and will review daily.  Particular:  ? Neuro will want  MRI: renal US  Consults neuro  Diet: as able; speech  Fluids: maintance till oral adequate  Special issues: seizure?   DC planning: has had to use [place] rehab before and eventually home  Code status: full    Electronically signed by Josafat Gonzales MD at 3/11/2019  8:56 AM       Hospital Medications (active)       Dose Frequency Start End    acetaminophen (TYLENOL) tablet 650 mg 650 mg Every 4 Hours PRN 3/10/2019  "    Sig - Route: Take 2 tablets by mouth Every 4 (Four) Hours As Needed for Mild Pain  or Fever. - Oral    albuterol (PROVENTIL) nebulizer solution 0.083% 2.5 mg/3mL 2.5 mg Every 6 Hours - RT 3/10/2019     Sig - Route: Take 2.5 mg by nebulization Every 6 (Six) Hours. - Nebulization    AZITHROMYCIN 500 MG/250 ML 0.9% NS IVPB (vial-mate) 500 mg Once 3/10/2019 3/10/2019    Sig - Route: Infuse 500 mg into a venous catheter 1 (One) Time. - Intravenous    AZITHROMYCIN 500 MG/250 ML 0.9% NS IVPB (vial-mate) 500 mg Every 24 Hours 3/11/2019 3/16/2019    Sig - Route: Infuse 500 mg into a venous catheter Daily. - Intravenous    Linked Group 1:  \"And\" Linked Group Details        cefTRIAXone (ROCEPHIN) 1 g/100 mL 0.9% NS (MBP) 1 g Every 24 Hours 3/11/2019 3/18/2019    Sig - Route: Infuse 100 mL into a venous catheter Daily. - Intravenous    Linked Group 1:  \"And\" Linked Group Details        cefTRIAXone (ROCEPHIN) 2 g/100 mL 0.9% NS VTB (NÉSTOR) 2 g Once 3/10/2019 3/10/2019    Sig - Route: Infuse 100 mL into a venous catheter 1 (One) Time. - Intravenous    DULoxetine (CYMBALTA) DR capsule 30 mg 30 mg Daily 3/10/2019     Sig - Route: Take 1 capsule by mouth Daily. - Oral    enoxaparin (LOVENOX) syringe 40 mg 40 mg Every 24 Hours 3/10/2019     Sig - Route: Inject 0.4 mL under the skin into the appropriate area as directed Daily. - Subcutaneous    fosphenytoin (Cerebyx) injection 100 mg  mg PE Every 12 Hours Scheduled 3/10/2019     Sig - Route: Infuse 2 mL into a venous catheter Every 12 (Twelve) Hours. - Intravenous    gabapentin (NEURONTIN) capsule 600 mg 600 mg Every 8 Hours Scheduled 3/10/2019     Sig - Route: Take 2 capsules by mouth Every 8 (Eight) Hours. - Oral    ipratropium-albuterol (DUO-NEB) nebulizer solution 3 mL 3 mL Every 4 Hours - RT 3/10/2019     Sig - Route: Take 3 mL by nebulization Every 4 (Four) Hours. - Nebulization    levETIRAcetam in NaCl 0.75% (KEPPRA) IVPB 1,000 mg 1,000 mg 2 Times Daily 3/10/2019     " "Sig - Route: Infuse 100 mL into a venous catheter 2 (Two) Times a Day. - Intravenous    levothyroxine (SYNTHROID, LEVOTHROID) tablet 100 mcg 100 mcg Every Early Morning 3/11/2019     Sig - Route: Take 1 tablet by mouth Every Morning. - Oral    loperamide (IMODIUM) capsule 2 mg 2 mg 4 Times Daily PRN 3/10/2019     Sig - Route: Take 1 capsule by mouth 4 (Four) Times a Day As Needed for Diarrhea. - Oral    LORazepam (ATIVAN) injection 1 mg 1 mg Every 4 Hours PRN 3/10/2019 3/20/2019    Sig - Route: Infuse 0.5 mL into a venous catheter Every 4 (Four) Hours As Needed for Anxiety or Agitation. - Intravenous    Magnesium Sulfate 2 gram / 50mL Infusion (GIVE X 3 BAGS TO EQUAL 6GM TOTAL DOSE) - Mg 1.1 - 1.5 mg/dl 2 g As Needed 3/11/2019     Sig - Route: Infuse 50 mL into a venous catheter As Needed (See Administration Instructions). - Intravenous    Linked Group 2:  \"Or\" Linked Group Details        Magnesium Sulfate 2 gram Bolus, followed by 8 gram infusion (total Mg dose 10 grams)- Mg less than or equal to 1mg/dL 2 g As Needed 3/11/2019     Sig - Route: Infuse 50 mL into a venous catheter As Needed (See Administration Instructions). - Intravenous    Linked Group 2:  \"Or\" Linked Group Details        Magnesium Sulfate 4 gram infusion- Mg 1.6-1.9 mg/dL 4 g As Needed 3/11/2019     Sig - Route: Infuse 100 mL into a venous catheter As Needed (See Administration Instructions). - Intravenous    Linked Group 2:  \"Or\" Linked Group Details        metoprolol tartrate (LOPRESSOR) tablet 50 mg 50 mg Daily 3/10/2019     Sig - Route: Take 1 tablet by mouth Daily. - Oral    Morphine (MS CONTIN) 12 hr tablet 15 mg 15 mg Every 12 Hours Scheduled 3/10/2019     Sig - Route: Take 1 tablet by mouth Every 12 (Twelve) Hours. - Oral    Morphine sulfate (PF) injection 2 mg 2 mg Every 3 Hours PRN 3/10/2019 3/20/2019    Sig - Route: Infuse 1 mL into a venous catheter Every 3 (Three) Hours As Needed for Severe Pain . - Intravenous    nitroglycerin " "(NITROSTAT) SL tablet 0.4 mg 0.4 mg Every 5 Minutes PRN 3/10/2019     Sig - Route: Place 1 tablet under the tongue Every 5 (Five) Minutes As Needed for Chest Pain (Chest Pain With Systolic Blood Pressure Greater Than 100). - Sublingual    oxybutynin XL (DITROPAN-XL) 24 hr tablet 5 mg 5 mg Daily 3/10/2019     Sig - Route: Take 1 tablet by mouth Daily. - Oral    oxyCODONE (ROXICODONE) immediate release tablet 10 mg 10 mg Every 4 Hours PRN 3/10/2019     Sig - Route: Take 2 tablets by mouth Every 4 (Four) Hours As Needed for Moderate Pain . - Oral    Pharmacy to Dose enoxaparin (LOVENOX)  Continuous PRN 3/10/2019     Sig - Route: Continuous As Needed for Consult. - Does not apply    potassium chloride 10 mEq in 100 mL IVPB 10 mEq Every 1 Hour PRN 3/11/2019     Sig - Route: Infuse 100 mL into a venous catheter Every 1 (One) Hour As Needed (See admin Instructions.). - Intravenous    sodium chloride 0.9 % flush 10 mL 10 mL As Needed 3/10/2019     Sig - Route: Infuse 10 mL into a venous catheter As Needed for Line Care. - Intravenous    Linked Group 3:  \"And\" Linked Group Details        sodium chloride 0.9 % flush 3 mL 3 mL Every 12 Hours Scheduled 3/10/2019     Sig - Route: Infuse 3 mL into a venous catheter Every 12 (Twelve) Hours. - Intravenous    sodium chloride 0.9 % flush 3-10 mL 3-10 mL As Needed 3/10/2019     Sig - Route: Infuse 3-10 mL into a venous catheter As Needed for Line Care. - Intravenous    sodium chloride 0.9 % with KCl 20 mEq/L infusion 125 mL/hr Continuous 3/11/2019     Sig - Route: Infuse 125 mL/hr into a venous catheter Continuous. - Intravenous    sodium chloride 0.9% - IBW for BMI > 30 bolus 2,124 mL 30 mL/kg × 70.8 kg (Ideal) Once 3/10/2019 3/10/2019    Sig - Route: Infuse 2,124 mL into a venous catheter 1 (One) Time. - Intravenous    venlafaxine XR (EFFEXOR-XR) 24 hr capsule 300 mg 300 mg Daily 3/10/2019     Sig - Route: Take 4 capsules by mouth Daily. - Oral    levETIRAcetam (KEPPRA) tablet 1,000 " mg (Discontinued) 1,000 mg 2 Times Daily 3/10/2019 3/10/2019    Sig - Route: Take 2 tablets by mouth 2 (Two) Times a Day. - Oral    phenytoin (DILANTIN) ER capsule 100 mg (Discontinued) 100 mg Every 12 Hours Scheduled 3/10/2019 3/10/2019    Sig - Route: Take 1 capsule by mouth Every 12 (Twelve) Hours. - Oral    phenytoin (DILANTIN) injection 100 mg (Discontinued) 100 mg Every 12 Hours 3/10/2019 3/10/2019    Sig - Route: Infuse 2 mL into a venous catheter Every 12 (Twelve) Hours. - Intravenous    sodium chloride 0.9 % infusion (Discontinued) 100 mL/hr Continuous 3/10/2019 3/11/2019    Sig - Route: Infuse 100 mL/hr into a venous catheter Continuous. - Intravenous

## 2019-03-11 NOTE — H&P
"Patient ID: Bita Croft  MRN: [id number]     Acct:  [id number]  Admit Date: 3/10/2019   Date of service: 3.11.2019    SOURCE: The source of this information is prior knowledge of the patient, review of her office records, her current chart, as well as discussion with she; she is considered unreliable.  The history was primarily taken from the emergency room doctors notes.     PATIENT PROFILE: The patient is a 64 y/o white  female resident of [place]; she was cooperative.      CHIEF COMPLAINT: \"altered mental state\"     HISTORY OF PRESENT ILLNESS: many chronic illness and many medications.  Went to bed night before admit normal.  Slept through the evening ok.  When she awoke the next morning she was confused and inappropriately mumbling \"oh God\".  There was no recent cough dysuria fever injury vomiting diarrhea; anything.  Though there has been a concern about seizure disorder in the past the presenting complaints did not include any tonic-clonic  activity.  Chest x-ray in the emergency department was suspicious for pneumonia and continued spiculated lingular nodule.  This lingular nodule has been seen by cardiovascular surgery and considering her multiple medical problems was followed without removal.  Her Dilantin level was less than 3 and low.  Alcohol salicylate and Tylenol levels were all normal.  Chemistry showed low protein and albumin at 6 and 3 respectively; nothing else significant.  Lactic acid was elevated 6 ammonia was less than 9 and urinalysis showed 21-30 WBCs with moderate leukocytes.   CT of her head showed only chronic ischemic changes.  She was felt to be septic with a UTI and was admitted with fluid boluses and appropriate treatments.  Antibiotics started in the ER were Rocephin and azithromycin.  She was given Cerebyx IV in the ED.    No Known Allergies    HOME MEDICATIONS:  Prior to Admission medications    Medication Sig Start Date End Date Taking? Authorizing Provider "   acetaminophen (TYLENOL) 325 MG tablet Take 2 tablets by mouth Every 4 (Four) Hours As Needed for Mild Pain  or Fever. 6/7/18  Yes Josafat Gonzales MD   clonazePAM (KlonoPIN) 1 MG tablet Take 1 tablet by mouth 3 (Three) Times a Day As Needed for Anxiety. 12/17/18   Josafat Gonzales MD   clotrimazole (LOTRIMIN) 1 % external solution Wash/dry well three times a day After dry; apply light coating of clotrimazole cream for one week 7/16/18   Josaaft Gonzales MD   cyclobenzaprine (FLEXERIL) 10 MG tablet Take 1 tablet by mouth 3 (Three) Times a Day. 10/21/18   ProviderBeverly MD   DULoxetine (CYMBALTA) 30 MG capsule Take 30 mg by mouth Daily.    ProviderBeverly MD   gabapentin (NEURONTIN) 600 MG tablet Take 1 tablet by mouth 3 (Three) Times a Day. 5/31/18   Josafat Gonzales MD   levETIRAcetam (KEPPRA) 1000 MG tablet TAKE 1 TABLET BY MOUTH TWICE DAILY 4/27/18   Josafat Gonzales MD   levothyroxine (SYNTHROID, LEVOTHROID) 100 MCG tablet Take 1 tablet by mouth Daily.    ProviderBeverly MD   loperamide (IMODIUM A-D) 2 MG tablet Take 1 tablet by mouth 4 (Four) Times a Day As Needed for Diarrhea. 6/7/18   Josafat Gonzales MD   metoprolol tartrate (LOPRESSOR) 50 MG tablet Take 1 tablet by mouth Daily. 10/29/18   Josafat Gonzales MD   MORPHABOND ER 15 MG tablet extended-release 12 hour Take 1 tablet by mouth Every Night. 11/14/18   Josafat Gonzales MD   nystatin (MYCOSTATIN) 529791 UNIT/GM powder Wash/dry well three times a day After dry;apply nystatin powder bid 7/16/18   Josafat Gonzales MD   oxyCODONE (ROXICODONE) 10 MG tablet Take 1 tablet by mouth Every 4 (Four) Hours As Needed for Moderate Pain . 12/19/18   Josafat Gonzales MD   oxyCODONE-acetaminophen (PERCOCET) 5-325 MG per tablet Take 2 tablets by mouth Every 4 (Four) Hours As Needed for Moderate Pain . 11/14/18   Josafat Gonzales MD   phenytoin (DILANTIN) 100 MG ER capsule 100 mg in am and 200 mg  in pm 17   Josafat Gonzales MD   PROAIR  (90 Base) MCG/ACT inhaler INHALE 1 PUFF BY MOUTH FOUR TIMES DAILY 19   Josafat Gonzales MD   QUEtiapine (SEROquel) 100 MG tablet Take 1 tablet by mouth 2 (Two) Times a Day. 17   Josafat Gonzales MD   Skin Protectants, Misc. (EUCERIN) cream Apply  topically to the appropriate area as directed 2 (Two) Times a Day. 10/29/18   Josafat Gonzales MD   solifenacin (VESICARE) 5 MG tablet Take 1 tablet by mouth Daily. 8/15/17   Pawel Ho MD   traZODone (DESYREL) 50 MG tablet Take 1 tablet by mouth Every Night. 18   Josafat Gonzales MD   venlafaxine XR (EFFEXOR-XR) 150 MG 24 hr capsule Take 2 capsules by mouth Daily. 18   Josafat Gonzales MD       PAST HISTORY:  CHILDHOOD: unremarkable.     PROCEDURES:   SCANNED  D0N9Cp2  Colonoscopy+tort-mass/[facility]//8.4.14/BE  EGD+nl/[facility]//5.15/UGI  Colonoscopy+polyp/[facility]//9.21.15/3y     SURGERIES:  T&A/age 11  R knee/H [place]/LH/age 13  SANDRA+BSO/[place]/WB/age 32  T12/Gómezrnidia/Lorena/  Gastric by-pass//  L knee/Martinez/Lorena/  C surgery/Riew/[place]a/06  C surgery/Riew/Dorothea/08  Lap GB/[facility]/Armando/3-18-10  L arthroscope/  R lap colectomy/Dorothea/.14   ORIF L wrist/[place]//.16    FAMILY HISTORY:  Hypertension mother, maternal aunt, father, paternal  grandfather; heart disease in mother, father, maternal grandfather, maternal  grandmother, paternal grandmother; diabetes in maternal uncle.      HABITS:  Never smoked, only limited second-hand smoke exposure.  No alcohol or  drugs.      SOCIAL HISTORY:   in  to her current , has 1 son.  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     MEDICAL ADMISSIONS:  [facility]:   11.8.08-11.12.08-hyponatremia  2.8.10-2.9.10-L flank pain  3.13.14-3.15.14 hever/TIA  12.26.17-12.27.17    Shortness of breath  Cough  Flu B  Bronchitis-acute  Abnormal CT  chest-AGUILAR spiculated nodule  Chest wall pain  Hyponatremia 128  Neutropenia 2.54    5.26.18-5.31.18  Lethargy-likely 2nd overdose Imodium-resolved  Confusion-same-resolved  Brief intubation (airway protection) 26-27th  Abnormal CXR-? Pneumonia/atelectasis  Overdose Imodium; initial ? suicide jesture refuted  Depression-acute/chronic  Anxiety-acute/chronic  Hypotension-brief and decrease Rx  Cheilosis-improved empiric monistat  Gait decline-acute/chronic  Anemia-component phlebotomy, (substrate neg)  +BC/staph homnis/epidermitis (contaminet)  E coli bacteruria-likely UTI  UTI-     BH:   7.21-7.23.95 abdominal pain  2.19.08-2.21.08-DVT  2.9.10-2.11.10-abdominal pain/intercostal  3.11.10-3.19.10- hever-abdominal pain+SIADH  3.24.15-3.30.15 diarrhea  5.26.15-5.31.15 nausea/vomiting/diarrhea  7.12.17-7.17.17 accidential injection water bottle cap     LH:   10.26.16-11.6.16 seizure     GENERAL:  Inactive/slower with limits, speed, stamina for age and pains, desire. Sleep is ok usually with Rx.  Has pain management and psych. No fever noted above.  ENDO:  No syncope, near or diaphoretic sweaty spells.  BS Ok since here.  HEENT: No head injury or headache.  No vision change.   No significant hearing loss.  Ears without pain/drainage.  No sore throat.  No significant nasal/sinus congestion/drainage. No epistaxis.  CHEST: No chest wall tenderness or mass. No significant cough, wheeze, SOB; no hemoptysis.  CV: No chest pain, palpitations, significant ankle edema.  GI: No heartburn, dysphagia.  No abdominal pain, diarrhea, constipation, rectal bleeding, or melena.  :  Voids without dysuria, or incontinence to completion.  ORTHO: No painful/swollen joints but various on /off sore.  Usually has sore neck or back.  No acute neck or back pain without recent injury.   NEURO: No dizziness, weakness of extremities.  Usually has LE numbness/paresthesias.   PSYCH: Prior memory loss.  Mood always variable; often anxious, depressed  "but/and not suicidal though has had prior overdose.  Tolerated stress.     PHYSICAL EXAMINATION:  /84 (BP Location: Left arm, Patient Position: Lying)   Pulse 91   Temp 98.6 °F (37 °C) (Oral)   Resp 18   Ht 177.8 cm (70\")   Wt 94.5 kg (208 lb 6.4 oz)   LMP  (LMP Unknown)   SpO2 95%   BMI 29.90 kg/m²     GENERAL:  Well nourished/developed in no acute distress; mumbling incoherently. Obese.  SKIN: Turgor excellent, without wound, rash, lesion other than several LE scabbed abrasions.  HEENT: Normal cephalic without trauma.  Pupils reactive to light; L pupil anisocoria. Extraocular motions full without nystagmus.     External canals nonobstructive nontender without reddness.  Oral cavity without growths, exudates, and moist.  Posterior pharynx without mass, obstruction, redness.  No thyromegaly, mass, tenderness, lymphadenopathy and supple.  CV: Regular rhythm.  No murmur, gallop,  edema. Posterior pulses intact.  No carotid bruits.   CHEST: No chest wall tenderness or mass.   LUNGS: Symmetric motion with clear to auscultation.  ABD: Soft, nontender without mass.   ORTHO: Symmetric extremities without swelling/point tenderness.  Full gross range of motion.    NEURO: CN 2-12 grossly intact.  Symmetric facies. 1/4 x bicep 0/4 LE.  UE/LE variable; 2-/5 strength UE 0-1/5 LE  Nonfocal use extremities. Speech clear.    PSYCH: Disoriented x 3.  Pleasant calm, well kept.  Non-purposeful/directed conservation with intact short/long gross memory.     ASSESSMENT/PROBLEM LIST:   62 y/o y/o white female   Allergy/intolerance: see above  Procedural history: see above  Family history: see above  Obesity.  This is status post gastric bypass initial failure and regain of  weight.     1 para 1 AB 0.    Surgical menopause.  Osteoporosis on previous wrist study.    Hypertension.    Hypothyroidism.    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    History of DVT - left peroneal popliteal, 11/08/2008, treated   successfully with anticoagulation.   History anticoagulation: hx DVT/(coumadin)  Chronic insomnia.  Spinal spondylosis; this is primarily cervical with multiple surgeries.  Chronic neck pain.    Chronic back pain  Chronic narcotics-from pain management/[facility]uxer  Chronic depression - followed by Dr. Samaniego.    Degenerative joint disease that is diffuse.   LE weakness  Gait difficulty-chronic   History of gastric bypass and metabolic risk it carries.    Colon cancer history with previous surgery and chemo, no obvious   recurrence.  Gastroesophageal reflux.  Recurrent laryngitis  Documented neuropathy by EMG nerve conduction  Seizure disorder (2016)  antieliptics-Keppra/Dilantin  Abnormal CT chest-treating nonsurgically  Polypharmacy   Urinary incontience (history urge incontinence)    REASON FOR ADMISSION:    Confusion-delirium  Abnormal UA-(21-30WBC, mod leuk, 2+ bacteria)  likely UTI vs pneumonia  CXR-infiltrate (note lack cough, etc)  Sepsis (tachycardia 104, lactic acid 6.0  Low phenytoin level 3.0  Hypopotassemia-3.0  Drug screen THC, opiate    PLANS:   Rx-reviewed; ordered as needed and will review daily/as needed.   Includes anticoagulation  for DVT prevention.   LAB-reviewed; ordered as needed and will review daily.  Particular: daily CBC, chemistry  Imaging-reviewed; ordered as needed and will review daily.  Particular:  ? Neuro will want  MRI: renal US  Consults neuro  Diet: as able; speech  Fluids: maintance till oral adequate  Special issues: seizure?   DC planning: has had to use [facility] before and eventually home  Code status: full

## 2019-03-11 NOTE — PLAN OF CARE
"Problem: Patient Care Overview  Goal: Plan of Care Review  Outcome: Ongoing (interventions implemented as appropriate)   03/11/19 6971   Coping/Psychosocial   Plan of Care Reviewed With patient   OTHER   Outcome Summary patient does not answer questions, follows commands at times, responds to name, very tearful, yelling \"oh God, help me\" throughout most of the day, VSS, safety maintained, turned q2hr. K+ replacement protocol order followed.    Plan of Care Review   Progress improving       Problem: Fall Risk (Adult)  Goal: Absence of Fall  Outcome: Ongoing (interventions implemented as appropriate)      Problem: Skin Injury Risk (Adult)  Goal: Skin Health and Integrity  Outcome: Ongoing (interventions implemented as appropriate)      Problem: Confusion, Acute (Adult)  Goal: Cognitive/Functional Impairments Minimized  Outcome: Ongoing (interventions implemented as appropriate)    Goal: Safety  Outcome: Ongoing (interventions implemented as appropriate)      Problem: Infection, Risk/Actual (Adult)  Goal: Infection Prevention/Resolution  Outcome: Ongoing (interventions implemented as appropriate)      Problem: Sepsis/Septic Shock (Adult)  Goal: Signs and Symptoms of Listed Potential Problems Will be Absent, Minimized or Managed (Sepsis/Septic Shock)  Outcome: Ongoing (interventions implemented as appropriate)        "

## 2019-03-12 NOTE — PROGRESS NOTES
Continued Stay Note   [place]     Patient Name: Bita Croft  MRN: [id number]  Today's Date: 3/12/2019    Admit Date: 3/10/2019    Discharge Plan     Row Name 03/12/19 1550       Plan    Plan Comments  Pt and pt's  are requesting referral for SNF be sent to [facility]. Referral sent and Malou from [facility] is aware. Awaiting acceptance or denial.           Discharge Codes    No documentation.       Expected Discharge Date and Time     Expected Discharge Date Expected Discharge Time    Mar 13, 2019             Donya Weldon

## 2019-03-12 NOTE — THERAPY EVALUATION
Acute Care - Occupational Therapy Initial Evaluation  [facility]     Patient Name: Bita Croft  : 1955  MRN: [id number]  Today's Date: 3/12/2019  Onset of Illness/Injury or Date of Surgery: 03/10/19  Date of Referral to OT: 19  Referring Physician: Dr. Gonzales    Admit Date: 3/10/2019       ICD-10-CM ICD-9-CM   1. Altered mental status, unspecified altered mental status type R41.82 780.97   2. Pneumonia of left lung due to infectious organism, unspecified part of lung J18.9 486   3. Acute urinary tract infection N39.0 599.0   4. Severe sepsis (CMS/HCC) A41.9 038.9    R65.20 995.92   5. Dysphagia, unspecified type R13.10 787.20   6. Impaired functional mobility and activity tolerance Z74.09 V49.89   7. Impaired mobility and ADLs Z74.09 799.89     Patient Active Problem List   Diagnosis   • Confusion   • Depression   • Anxiety   • Hyponatremia-often polydyspia involved   • Seizure disorder (CMS/HCC)   • Chronic neck pain   • Chronic back pain-Ruxer   • Hypothyroidism   • Abnormal thyroid blood test   • Hypertension   • Wellness examination-done   • Elevated fasting glucose   • Anemia   • Iron deficiency   • Degenerative joint disease of spine   • Conversion disorder with abnormal movement   • Reflux laryngitis   • Gait difficulty   • Weakness of both legs   • Sore throat   • Polydipsia   • Nocturia   • Frequency of urination   • Urge incontinence   • Laboratory test*   • Chronic narcotic use-Ruxer   • Abnormal x-ray   • Noncompliance   • Lung nodule, solitary   • Non-smoker   • Hx of colon cancer, stage I   • Altered mental status   • Anticoagulated: DVT/(?)   • History of radiation therapy     Past Medical History:   Diagnosis Date   • Asthma    • Cancer (CMS/HCC)     colon cancer   • Depression    • Disease of thyroid gland    • Hypertension    • Insomnia    • Seizure (CMS/HCC)    • Sleep apnea      Past Surgical History:   Procedure Laterality Date   • BACK SURGERY     • COLON SURGERY     • GASTRIC  "BYPASS     • HYSTERECTOMY     • KNEE ARTHROSCOPY      bilateral   • NECK SURGERY     • ORIF WRIST FRACTURE Right 6/9/2017    Procedure: WRIST OPEN REDUCTION INTERNAL FIXATION, RIGHT;  Surgeon: Alec Cardoza MD;  Location: [facility] OR;  Service:    • TONSILLECTOMY     • WRIST FRACTURE SURGERY            OT ASSESSMENT FLOWSHEET (last 72 hours)      Occupational Therapy Evaluation     Row Name 03/12/19 0800                   OT Evaluation Time/Intention    Subjective Information  no complaints  -MW        Document Type  evaluation  -MW        Mode of Treatment  occupational therapy  -MW           General Information    Patient Profile Reviewed?  yes  -MW        Onset of Illness/Injury or Date of Surgery  03/10/19  -MW        Referring Physician  Dr. Gonzales  -MW        Patient Observations  alert;cooperative;agree to therapy  -MW        Patient/Family Observations  no family present  -MW        General Observations of Patient  pt fowlers in bed, no apparent distress  -MW        Prior Level of Function  min assist:;transfer;independent:;w/c or scooter reports only walking short distances, peddles w/c w feet  -MW        Equipment Currently Used at Home  shower chair;wheelchair;walker, rolling  -MW        Pertinent History of Current Functional Problem  presented with altered mental status. pt woke up confused and inappropriate mumbling \"oh God.\". Dx possible septic UTI, urine positive for opiates and THC  -MW        Existing Precautions/Restrictions  fall  -MW        Limitations/Impairments  safety/cognitive  -MW        Risks Reviewed  patient:;LOB;dizziness;increased discomfort;change in vital signs  -MW        Benefits Reviewed  patient:;improve function;increase independence;increase strength;increase balance;decrease risk of DVT;increase knowledge  -MW        Barriers to Rehab  medically complex;cognitive status;previous functional deficit;physical barrier  -MW           Relationship/Environment    Lives With  " spouse  -MW        Family Caregiver if Needed  spouse  -           Resource/Environmental Concerns    Current Living Arrangements  home/apartment/condo  -           Cognitive Assessment/Interventions    Additional Documentation  Cognitive Assessment/Intervention (Group)  -           Cognitive Assessment/Intervention- PT/OT    Affect/Mental Status (Cognitive)  confused  -        Orientation Status (Cognition)  oriented to;person;place;situation;disoriented to;time  -MW        Follows Commands (Cognition)  follows one step commands;75-90% accuracy;delayed response/completion;increased processing time needed  -        Personal Safety Interventions  elopement precautions initiated;fall prevention program maintained;gait belt;nonskid shoes/slippers when out of bed;supervised activity  -           Safety Issues, Functional Mobility    Safety Issues Affecting Function (Mobility)  ability to follow commands;insight into deficits/self awareness;problem solving;sequencing abilities  -        Impairments Affecting Function (Mobility)  balance;cognition;endurance/activity tolerance;motor planning;strength;range of motion (ROM)  -           Bed Mobility Assessment/Treatment    Bed Mobility Assessment/Treatment  supine-sit;sit-supine  -        Supine-Sit [place] (Bed Mobility)  minimum assist (75% patient effort);verbal cues;nonverbal cues (demo/gesture)  -        Sit-Supine Clinch (Bed Mobility)  minimum assist (75% patient effort) for LE only  -        Bed Mobility, Safety Issues  cognitive deficits limit understanding;decreased use of arms for pushing/pulling;decreased use of legs for bridging/pushing  -        Assistive Device (Bed Mobility)  bed rails  -           Functional Mobility    Functional Mobility- Ind. Level  unable to perform  -        Functional Mobility- Comment  sit<>stand only  -           Transfer Assessment/Treatment    Transfer Assessment/Treatment  sit-stand  transfer;stand-sit transfer  -MW           Sit-Stand Transfer    Sit-Stand [place] (Transfers)  minimum assist (75% patient effort);2 person assist;verbal cues;nonverbal cues (demo/gesture)  -        Assistive Device (Sit-Stand Transfers)  -- HHAx2  -           Stand-Sit Transfer    Stand-Sit [place] (Transfers)  minimum assist (75% patient effort);verbal cues;nonverbal cues (demo/gesture)  -           ADL Assessment/Intervention    BADL Assessment/Intervention  lower body dressing  -MW           Lower Body Dressing Assessment/Training    Lower Body Dressing [place] Level  don;doff;socks;dependent (less than 25% patient effort)  -        Lower Body Dressing Position  edge of bed sitting  -MW        Comment (Lower Body Dressing)  able to ball up sock with BUE, unable to attempt task   -MW           BADL Safety/Performance    Impairments, BADL Safety/Performance  balance;cognition;endurance/activity tolerance;coordination;pain;strength;trunk/postural control  -MW           General ROM    GENERAL ROM COMMENTS  BUE AROM WFL  -MW           MMT (Manual Muscle Testing)    General MMT Comments  BUE grossly 4-/5  -MW           Motor Assessment/Interventions    Additional Documentation  Balance (Group);Fine Motor Testing & Training (Group);Gross Motor Coordination (Group)  -MW           Gross Motor Coordination    Gross Motor Skill, Impairments Detail  LAISHA impaired BUE, requires max vc to follow commands for assessment. Accuracy intact with vc  -MW           Balance    Balance  static sitting balance;static standing balance  -           Static Sitting Balance    Level of [place] (Supported Sitting, Static Balance)  contact guard assist  -        Sitting Position (Supported Sitting, Static Balance)  sitting on edge of bed  -           Static Standing Balance    Level of [place] (Supported Standing, Static Balance)  minimal assist, 75% patient effort;2 person assist  -MW        Time Able  to Maintain Position (Supported Standing, Static Balance)  30 to 45 seconds  -MW        Comment (Supported Standing, Static Balance)  stood x2  -MW           Fine Motor Testing & Training    Comment, Fine Motor Coordination  opposition intact B, manipulates clothing with increased time  -MW           Sensory Assessment/Intervention    Sensory General Assessment  no sensation deficits identified BUE per pt  -MW           Positioning and Restraints    Pre-Treatment Position  in bed  -MW        Post Treatment Position  bed  -MW        In Bed  fowlers;call light within reach;encouraged to call for assist;exit alarm on;side rails up x2  -MW           Pain Scale: Numbers Pre/Post-Treatment    Pain Scale: Numbers, Pretreatment  0/10 - no pain  -MW        Pain Scale: Numbers, Post-Treatment  0/10 - no pain  -MW           Plan of Care Review    Plan of Care Reviewed With  patient  -MW           Clinical Impression (OT)    Date of Referral to OT  03/11/19  -MW        OT Diagnosis  decreased ADL  -MW        Prognosis (OT Eval)  good  -MW        Patient/Family Goals Statement (OT Eval)  increase mobility and adl independence  -MW        Criteria for Skilled Therapeutic Interventions Met (OT Eval)  yes;treatment indicated  -MW        Rehab Potential (OT Eval)  good, to achieve stated therapy goals  -MW        Therapy Frequency (OT Eval)  3 times/wk  -MW        Predicted Duration of Therapy Intervention (Therapy Eval)  until d/c  -MW        Care Plan Review (OT)  evaluation/treatment results reviewed;care plan/treatment goals reviewed;current/potential barriers reviewed;risks/benefits reviewed;patient/other agree to care plan  -MW        Anticipated Discharge Disposition (OT)  skilled nursing facility;transitional care  -MW           Planned OT Interventions    Planned Therapy Interventions (OT Eval)  activity tolerance training;BADL retraining;functional balance retraining;occupation/activity based  interventions;patient/caregiver education/training;strengthening exercise;transfer/mobility retraining  -MW           OT Goals    Transfer Goal Selection (OT)  transfer, OT goal 1  -MW        Dressing Goal Selection (OT)  dressing, OT goal 1  -MW        Toileting Goal Selection (OT)  toileting, OT goal 1  -MW           Transfer Goal 1 (OT)    Activity/Assistive Device (Transfer Goal 1, OT)  sit-to-stand/stand-to-sit;bed-to-chair/chair-to-bed;toilet;commode, bedside without drop arms;tub  -MW        [place] Level/Cues Needed (Transfer Goal 1, OT)  moderate assist (50-74% patient effort)  -MW        Time Frame (Transfer Goal 1, OT)  long term goal (LTG);by discharge  -MW        Progress/Outcome (Transfer Goal 1, OT)  goal ongoing  -MW           Dressing Goal 1 (OT)    Activity/Assistive Device (Dressing Goal 1, OT)  upper body dressing;lower body dressing  -MW        [place]/Cues Needed (Dressing Goal 1, OT)  moderate assist (50-74% patient effort)  -MW        Time Frame (Dressing Goal 1, OT)  long term goal (LTG);by discharge  -MW        Progress/Outcome (Dressing Goal 1, OT)  goal ongoing  -MW           Toileting Goal 1 (OT)    Activity/Device (Toileting Goal 1, OT)  toileting skills, all;commode;commode, bedside without drop arms  -MW        [place] Level/Cues Needed (Toileting Goal 1, OT)  minimum assist (75% or more patient effort)  -MW        Time Frame (Toileting Goal 1, OT)  long term goal (LTG);by discharge  -MW        Progress/Outcome (Toileting Goal 1, OT)  goal ongoing  -MW           Living Environment    Home Accessibility  tub/shower is not walk in  -MW          User Key  (r) = Recorded By, (t) = Taken By, (c) = Cosigned By    Initials Name Effective Dates    MW Stephanie Keenan OTR/JORDYN 08/28/18 -          Occupational Therapy Education     Title: PT OT SLP Therapies (In Progress)     Topic: Occupational Therapy (Done)     Point: ADL training (Done)     Description: Instruct learner(s) on  proper safety adaptation and remediation techniques during self care or transfers.   Instruct in proper use of assistive devices.    Learning Progress Summary           Patient Acceptance, E, VU by BREANNA at 3/12/2019  2:26 PM    Comment:  ADL, bed mobility, transfer, benefit of increased activity and therapy, OT POC                               User Key     Initials Effective Dates Name Provider Type Discipline    BREANNA 08/28/18 -  Stephanie Keenan, OTR/L Occupational Therapist OT                  OT Recommendation and Plan  Outcome Summary/Treatment Plan (OT)  Anticipated Discharge Disposition (OT): skilled nursing facility, transitional care  Planned Therapy Interventions (OT Eval): activity tolerance training, BADL retraining, functional balance retraining, occupation/activity based interventions, patient/caregiver education/training, strengthening exercise, transfer/mobility retraining  Therapy Frequency (OT Eval): 3 times/wk  Plan of Care Review  Plan of Care Reviewed With: patient  Plan of Care Reviewed With: patient  Outcome Summary: OT eval completed. Pt with delayed initiation and processing throughout eval requiring increased time for tasks. Dep to don socks but able to ball sock with BUE in midline to prep for task with increased time. Opposition intact BUE. LAISHA intact but requires max vc to follow commands for accuracy. January required for bed mobility, min-modAx2 to stand. Pt has difficulty maintaining erect posture and does not control decent to sitting at EOB. Skilled OT required to address cognition, motor processing, and strength deficits limiting independnece with ADL and functional mobility. Recommend d/c to TCU or SNF pending progress.    Outcome Measures     Row Name 03/12/19 1400 03/12/19 0745          How much help from another person do you currently need...    Turning from your back to your side while in flat bed without using bedrails?  --  4  -MS     Moving from lying on back to sitting on the  side of a flat bed without bedrails?  --  2  -MS     Moving to and from a bed to a chair (including a wheelchair)?  --  1  -MS     Standing up from a chair using your arms (e.g., wheelchair, bedside chair)?  --  2  -MS     Climbing 3-5 steps with a railing?  --  1  -MS     To walk in hospital room?  --  1  -MS     AM-PAC 6 Clicks Score  --  11  -MS        How much help from another is currently needed...    Putting on and taking off regular lower body clothing?  1  -MW  --     Bathing (including washing, rinsing, and drying)  2  -MW  --     Toileting (which includes using toilet bed pan or urinal)  2  -MW  --     Putting on and taking off regular upper body clothing  2  -MW  --     Taking care of personal grooming (such as brushing teeth)  3  -MW  --     Eating meals  3  -MW  --     Score  13  -MW  --        Functional Assessment    Outcome Measure Options  AM-PAC 6 Clicks Daily Activity (OT)  -MW  AM-PAC 6 Clicks Basic Mobility (PT)  -MS       User Key  (r) = Recorded By, (t) = Taken By, (c) = Cosigned By    Initials Name Provider Type    Belkis Hobson R, PT, DPT, NCS Physical Therapist    Stephanie Liao OTR/L Occupational Therapist          Time Calculation:   Time Calculation- OT     Row Name 03/12/19 1426             Time Calculation- OT    OT Start Time  0800 +10 min chart review  -MW      OT Stop Time  0832  -      OT Time Calculation (min)  32 min  -      OT Received On  03/12/19  -      OT Goal Re-Cert Due Date  03/22/19  -        User Key  (r) = Recorded By, (t) = Taken By, (c) = Cosigned By    Initials Name Provider Type     Stephanie Keenan OTR/L Occupational Therapist        Therapy Suggested Charges     Code   Minutes Charges    None           Therapy Charges for Today     Code Description Service Date Service Provider Modifiers Qty    [id number]  OT EVAL MOD COMPLEXITY 3 3/12/2019 Stephanie Keenan OTR/L GO 1               TRAN [place]/JORDYN  3/12/2019

## 2019-03-12 NOTE — PROGRESS NOTES
"     LOS: 2 days   Patient Care Team:  Josafat Gonzales MD as PCP - General  Josafat Gonzales MD as PCP - Family Medicine  [facility] MD as Consulting Physician (Cardiology)  Pawel Ho MD as Consulting Physician (Urology)  Krystal Dorman APRN as Nurse Practitioner (Neurology)    Chief Complaint:  confusion    Subjective      HISTORY OF PRESENT ILLNESS: many chronic illness and many medications.  Went to bed night before admit normal.  Slept through the evening ok.  When she awoke the next morning she was confused and inappropriately mumbling \"oh God\".  There was no recent cough dysuria fever injury vomiting diarrhea; anything.  Though there has been a concern about seizure disorder in the past the presenting complaints did not include any tonic-clonic activity.  Chest x-ray in the emergency department was suspicious for pneumonia and continued spiculated lingular nodule.  This lingular nodule has been seen by cardiovascular surgery and considering her multiple medical problems was followed without removal/treated with radiation oncology.  Her Dilantin level was less than 3 and low-IV cerebyx was given in the ED.  Alcohol salicylate and Tylenol levels were all normal. Chemistry showed low protein and albumin at 6 and 3 respectively; nothing else significant. Lactic acid was elevated at 6 ammonia was less than 9 and urinalysis showed 21-30 WBCs with moderate leukocytes.   CT of her head showed only chronic ischemic changes.  She was felt to be septic with a UTI/? pneumonia and was admitted with fluid boluses and appropriate treatments.  Antibiotics started in the ER were Rocephin and azithromycin. She had jenifer similar presentation      Interval History:  Less confused.  Recalls yesterday needing to say \"oh God\", \"help me\" but not knowing why.  Disoriented to place, date; knew me.  Slept.  Denies pain. Day 3 rocephin/azithromycin for CXR ? Pneumonia and UA abnormal; UTI.  No " cough.  Awaiting speech to clear diet; as yesterday not able to eat/swallow ok.     .  Current Facility-Administered Medications:   •  acetaminophen (TYLENOL) tablet 650 mg, 650 mg, Oral, Q4H PRN, Zeeshan Hester MD  •  cefTRIAXone (ROCEPHIN) 1 g/100 mL 0.9% NS (MBP), 1 g, Intravenous, Q24H, 1 g at 03/11/19 1340 **AND** AZITHROMYCIN 500 MG/250 ML 0.9% NS IVPB (vial-mate), 500 mg, Intravenous, Q24H, Zeeshan Hester MD, 500 mg at 03/11/19 1636  •  DULoxetine (CYMBALTA) DR capsule 30 mg, 30 mg, Oral, Daily, Zeeshan Hester MD  •  enoxaparin (LOVENOX) syringe 40 mg, 40 mg, Subcutaneous, Q24H, Josafat Gonzales MD, 40 mg at 03/11/19 1636  •  fosphenytoin (Cerebyx) injection 100 mg PE, 100 mg PE, Intravenous, Q12H, Zeeshan Hester MD, 100 mg PE at 03/11/19 2334  •  gabapentin (NEURONTIN) capsule 600 mg, 600 mg, Oral, Q8H, Zeeshan Hester MD, 600 mg at 03/12/19 0659  •  ipratropium-albuterol (DUO-NEB) nebulizer solution 3 mL, 3 mL, Nebulization, Q4H - RT, Zeeshan Hester MD, 3 mL at 03/12/19 0809  •  levETIRAcetam in NaCl 0.75% (KEPPRA) IVPB 1,000 mg, 1,000 mg, Intravenous, BID, Zeeshan Hester MD, 1,000 mg at 03/11/19 2240  •  levothyroxine (SYNTHROID, LEVOTHROID) tablet 100 mcg, 100 mcg, Oral, Q AM, Zeeshan Hester MD, 100 mcg at 03/12/19 0659  •  loperamide (IMODIUM) capsule 2 mg, 2 mg, Oral, 4x Daily PRN, Zeeshan Hester MD  •  LORazepam (ATIVAN) injection 1 mg, 1 mg, Intravenous, Q4H PRN, Zeeshan Hester MD, 1 mg at 03/11/19 1649  •  Magnesium Sulfate 2 gram Bolus, followed by 8 gram infusion (total Mg dose 10 grams)- Mg less than or equal to 1mg/dL, 2 g, Intravenous, PRN **OR** Magnesium Sulfate 2 gram / 50mL Infusion (GIVE X 3 BAGS TO EQUAL 6GM TOTAL DOSE) - Mg 1.1 - 1.5 mg/dl, 2 g, Intravenous, PRN **OR** Magnesium Sulfate 4 gram infusion- Mg 1.6-1.9 mg/dL, 4 g, Intravenous, PRN, Zeeshan Hester MD  •  metoprolol tartrate (LOPRESSOR)  tablet 50 mg, 50 mg, Oral, Daily, Zeeshan Hester MD, 50 mg at 03/11/19 1022  •  Morphine (MS CONTIN) 12 hr tablet 15 mg, 15 mg, Oral, Q12H, Zeeshan Hester MD  •  Morphine sulfate (PF) injection 2 mg, 2 mg, Intravenous, Q3H PRN, Josafat Gonzales MD, 2 mg at 03/11/19 1340  •  nitroglycerin (NITROSTAT) SL tablet 0.4 mg, 0.4 mg, Sublingual, Q5 Min PRN, Zeeshan Hester MD  •  oxybutynin XL (DITROPAN-XL) 24 hr tablet 5 mg, 5 mg, Oral, Daily, Zeeshan Hester MD  •  oxyCODONE (ROXICODONE) immediate release tablet 10 mg, 10 mg, Oral, Q4H PRN, Zeeshan Hester MD, 10 mg at 03/11/19 1636  •  Pharmacy to Dose enoxaparin (LOVENOX), , Does not apply, Continuous PRN, Zeeshan Hester MD  •  potassium chloride 10 mEq in 100 mL IVPB, 10 mEq, Intravenous, Q1H PRN, Zeeshan Hester MD, Last Rate: 100 mL/hr at 03/11/19 2053, 10 mEq at 03/11/19 2053  •  [COMPLETED] Insert peripheral IV, , , Once **AND** sodium chloride 0.9 % flush 10 mL, 10 mL, Intravenous, PRN, Sergio Templeton, DO  •  sodium chloride 0.9 % flush 3 mL, 3 mL, Intravenous, Q12H, Zeeshan Hester MD, 3 mL at 03/10/19 2059  •  sodium chloride 0.9 % flush 3-10 mL, 3-10 mL, Intravenous, PRN, Zeeshan Hester MD  •  sodium chloride 0.9 % with KCl 20 mEq/L infusion, 125 mL/hr, Intravenous, Continuous, Josafat Gonzales MD, Last Rate: 125 mL/hr at 03/12/19 0511, 125 mL/hr at 03/12/19 0511  •  venlafaxine XR (EFFEXOR-XR) 24 hr capsule 300 mg, 300 mg, Oral, Daily, Zeeshan Hester MD    Review of Systems:   GENERAL:  Inactive before admit; and since here-slower with limits, speed, stamina for age and situation. Sleep was last night ok. No fever now.  ENDO:  No syncope, near or diaphoretic sweaty spells.  BS Ok.  HEENT: No head injury or headache.  No vision change.  No significant hearing loss.  Ears without pain/drainage.  No sore throat.  No significant nasal/sinus congestion/drainage. No  "epistaxis.  CHEST: No chest wall tenderness or mass. No cough,  without wheeze.  No SOB; no hemoptysis.  CV: No chest pain, palpitations, ankle edema.  GI: No heartburn; awaiting speech.   No abdominal pain, diarrhea, constipation.  No rectal bleeding, or melena.    :  Voids without dysuria.   ORTHO: No painful/swollen joints but various on /off sore.  No mention sore neck or back; has history chronic neck/back pain.  NEURO: No dizziness, weakness of extremities.  Same numbness/paresthesias.  PSYCH:  Less confused/less memory loss.  Mood good; no apparent anxious, depressed but long history of this/Rx/psych.     Objective     Vital Signs  /71 (BP Location: Right arm, Patient Position: Lying)   Pulse 84   Temp 98 °F (36.7 °C) (Oral)   Resp 18   Ht 177.8 cm (70\")   Wt 97.8 kg (215 lb 9.6 oz)   LMP  (LMP Unknown)   SpO2 95%   BMI 30.94 kg/m²   Temp:  [98 °F (36.7 °C)-99.2 °F (37.3 °C)] 98 °F (36.7 °C)  Heart Rate:  [68-88] 84  Resp:  [15-20] 18  BP: (115-166)/(71-97) 149/71      Intake/Output Summary (Last 24 hours) at 3/12/2019 0828  Last data filed at 3/12/2019 0509  Gross per 24 hour   Intake 2700 ml   Output --   Net 2700 ml       Lab Results:  Lab Results (last 24 hours)     Procedure Component Value Units Date/Time    Vitamin B12 [[id number]]  (Normal) Collected:  03/12/19 0623    Specimen:  Blood Updated:  03/12/19 0805     Vitamin B-12 251 pg/mL     Folate [[id number]]  (Normal) Collected:  03/12/19 0623    Specimen:  Blood Updated:  03/12/19 0805     Folate 6.36 ng/mL     Basic Metabolic Panel [[id number]]  (Abnormal) Collected:  03/12/19 0623    Specimen:  Blood Updated:  03/12/19 0708     Glucose 85 mg/dL      BUN 3 mg/dL      Creatinine 0.52 mg/dL      Sodium 140 mmol/L      Potassium 2.9 mmol/L      Chloride 107 mmol/L      CO2 28.0 mmol/L      Calcium 7.3 mg/dL      eGFR Non African Amer 119 mL/min/1.73      BUN/Creatinine Ratio 5.8     Anion Gap 5.0 mmol/L     Narrative:       GFR Normal " >60  Chronic Kidney Disease <60  Kidney Failure <15    CBC & Differential [[id number]] Collected:  03/12/19 0623    Specimen:  Blood Updated:  03/12/19 0649    Narrative:       The following orders were created for panel order CBC & Differential.  Procedure                               Abnormality         Status                     ---------                               -----------         ------                     CBC Auto Differential[[id number]]        Abnormal            Final result                 Please view results for these tests on the individual orders.    CBC Auto Differential [[id number]]  (Abnormal) Collected:  03/12/19 0623    Specimen:  Blood Updated:  03/12/19 0649     WBC 5.59 10*3/mm3      RBC 3.40 10*6/mm3      Hemoglobin 10.9 g/dL      Hematocrit 32.1 %      MCV 94.4 fL      MCH 32.1 pg      MCHC 34.0 g/dL      RDW 14.8 %      RDW-SD 51.2 fl      MPV 10.7 fL      Platelets 208 10*3/mm3      Neutrophil % 73.8 %      Lymphocyte % 14.8 %      Monocyte % 8.2 %      Eosinophil % 2.3 %      Basophil % 0.4 %      Immature Grans % 0.5 %      Neutrophils, Absolute 4.12 10*3/mm3      Lymphocytes, Absolute 0.83 10*3/mm3      Monocytes, Absolute 0.46 10*3/mm3      Eosinophils, Absolute 0.13 10*3/mm3      Basophils, Absolute 0.02 10*3/mm3      Immature Grans, Absolute 0.03 10*3/mm3      nRBC 0.0 /100 WBC     Blood Culture - Blood, Hand, Left [[id number]] Collected:  03/10/19 1727    Specimen:  Blood from Hand, Left Updated:  03/11/19 1815     Blood Culture No growth at 24 hours    Blood Culture - Blood, Arm, Left [[id number]] Collected:  03/10/19 1727    Specimen:  Blood from Arm, Left Updated:  03/11/19 1815     Blood Culture No growth at 24 hours    Lactic Acid, Plasma [[id number]]  (Abnormal) Collected:  03/11/19 1146    Specimen:  Blood Updated:  03/11/19 1221     Lactate 2.5 mmol/L     Blood Culture - Blood, Arm, Left [[id number]] Collected:  03/10/19 1115    Specimen:  Blood from Arm, Left Updated:   03/11/19 1215     Blood Culture No growth at 24 hours    Blood Culture - Blood, Arm, Left [[id number]] Collected:  03/10/19 1120    Specimen:  Blood from Arm, Left Updated:  03/11/19 1215     Blood Culture No growth at 24 hours          CBC:   Results from last 7 days   Lab Units 03/12/19  0623 03/11/19  0702 03/10/19  1046   WBC 10*3/mm3 5.59 6.83 6.38   HEMOGLOBIN g/dL 10.9* 11.1* 13.3   HEMATOCRIT % 32.1* 32.9* 38.5   PLATELETS 10*3/mm3 208 243 243     BMP:   Results from last 7 days   Lab Units 03/12/19  0623 03/11/19  0702 03/10/19  1046   SODIUM mmol/L 140 142 140   POTASSIUM mmol/L 2.9* 2.5* 3.3*   CHLORIDE mmol/L 107 107 98   CO2 mmol/L 28.0 29.0 30.0   BUN mg/dL 3* 9 9   CREATININE mg/dL 0.52 0.79 0.70   EGFR IF NONAFRICN AM mL/min/1.73 119 74 85   GLUCOSE mg/dL 85 100 101*   CALCIUM mg/dL 7.3* 7.2* 8.1*   ALT (SGPT) U/L  --  25 17     INR:       Culture Results:   Blood Culture   Date Value Ref Range Status   03/10/2019 No growth at 24 hours  Preliminary   03/10/2019 No growth at 24 hours  Preliminary   03/10/2019 No growth at 24 hours  Preliminary   03/10/2019 No growth at 24 hours  Preliminary     Urine Culture   Date Value Ref Range Status   03/10/2019 Growth present, too young to evaluate  Preliminary       Radiology: None    Additional Studies Reviewed: None    Physical Exam:    GENERAL:  Well nourished/developed in no acute distress; mumbling incoherently. Obese.  SKIN: Turgor excellent, without wound, rash, lesion other than several LE scabbed abrasions.  HEENT: Normal cephalic without trauma.  Pupils reactive to light; L pupil anisocoria. Extraocular motions full without nystagmus.     External canals nonobstructive nontender without reddness.  Oral cavity without growths, exudates, and moist.  Posterior pharynx without mass, obstruction, redness.  No thyromegaly, mass, tenderness, lymphadenopathy and supple.  CV: Regular rhythm.  No murmur, gallop,  edema. Posterior pulses intact.  No carotid  bruits.   CHEST: No chest wall tenderness or mass.   LUNGS: Symmetric motion with clear to auscultation.  ABD: Soft, nontender without mass.   ORTHO: Symmetric extremities without swelling/point tenderness.  Full gross range of motion.    NEURO: CN 2-12 grossly intact.  Symmetric facies. 1/4 x bicep 0/4 LE.  UE/LE variable; 2-/5 strength UE 0-1/5 LE  Nonfocal use extremities. Speech clear.    PSYCH: Disoriented x 3.  Pleasant calm, well kept.  Non-purposeful/directed conservation with intact short/long gross memory.    Results Review:    above    ASSESSMENT/PLAN:  Reason for admit/problems addressing acutely:  Confusion-delirium (? Sepsis, ? Seizure)  Abnormal UA-(21-30WBC, mod leuk, 2+ bacteria) maybe UTI  CXR-infiltrate (note lack cough, etc)-maybe pneumonia  Sepsis (tachycardia 104, lactic acid 6.0-maybe  Low phenytoin level 3.0  Hypopotassemia-2.9  Drug screen THC, opiate  Dysphagia-confusion  NPO    Chronic problems to review/consider during care:  62 y/o y/o white female   Allergy/intolerance: see above  Procedural history: see above  Family history: see above  Obesity.  This is status post gastric bypass initial failure and regain of  weight.     1 para 1 AB 0.    Surgical menopause.  Osteoporosis on previous wrist study.    Hypertension.    Hypothyroidism.    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    History of DVT - left peroneal popliteal, 2008, treated              successfully with anticoagulation.   History anticoagulation: hx DVT/(coumadin)  Chronic insomnia.  Spinal spondylosis; this is primarily cervical with multiple surgeries.  Chronic neck pain.    Chronic back pain  Chronic narcotics-from pain management/Ruxer  Chronic depression - followed by Dr. Samaniego.    Degenerative joint disease that is diffuse.   LE weakness  Gait difficulty-chronic   History of gastric bypass  and metabolic risk it carries.    Colon cancer history with previous surgery and chemo, no obvious              recurrence.  Gastroesophageal reflux.  Recurrent laryngitis  Documented neuropathy by EMG nerve conduction  Seizure disorder (2016)  antieliptics-Keppra/Dilantin  Abnormal CT chest-treating nonsurgically  Polypharmacy   Urinary incontience (history urge incontinence)      Rx-reviewed, considered with changes as needed: IV KCL runs  Labs reviewed, considered and changes made as needed: daily cbc/chemistry  Imaging reviewed, and need for considered: shantal CXR tomorrow  Consults needed: neurology, speech  Diet reviewed, considered and changes made as needed: no changes until   Ok speech  Fluids reviewed, considered and changes made as needed: no changes  Increase activity: not a good option  Code status: full  Discussed possible/future discharge plans: patient expects home but has   Used NH  Case discussed with nursing: [facility] discharge summary to neurology  Available to talk if needed with POA/caregiver and members care team.    Josafat Gonzales MD  03/12/19  8:28 AM

## 2019-03-12 NOTE — PLAN OF CARE
Problem: Patient Care Overview  Goal: Plan of Care Review  Outcome: Ongoing (interventions implemented as appropriate)   03/12/19 0401   Coping/Psychosocial   Plan of Care Reviewed With patient   OTHER   Outcome Summary Pt has slept all night, has not required any pain medication or ativan. Can be woken up for assessment, but then falls right back asleep. No yelling or inappropriate speech. Calm and cooperative. Did well swallowing pills crushed in applesauce. Does try to answer orientation questions, though doesn't know the answers except for her name. Q2 turn. Cont pulse ox on. NSR 71-80bpm on telemetry. Incontinent of urine.    Plan of Care Review   Progress improving     Goal: Individualization and Mutuality  Outcome: Ongoing (interventions implemented as appropriate)      Problem: Fall Risk (Adult)  Goal: Absence of Fall  Outcome: Ongoing (interventions implemented as appropriate)      Problem: Skin Injury Risk (Adult)  Goal: Skin Health and Integrity  Outcome: Ongoing (interventions implemented as appropriate)      Problem: Confusion, Acute (Adult)  Goal: Cognitive/Functional Impairments Minimized  Outcome: Ongoing (interventions implemented as appropriate)    Goal: Safety  Outcome: Ongoing (interventions implemented as appropriate)      Problem: Infection, Risk/Actual (Adult)  Goal: Infection Prevention/Resolution  Outcome: Ongoing (interventions implemented as appropriate)      Problem: Sepsis/Septic Shock (Adult)  Goal: Signs and Symptoms of Listed Potential Problems Will be Absent, Minimized or Managed (Sepsis/Septic Shock)  Outcome: Ongoing (interventions implemented as appropriate)

## 2019-03-12 NOTE — PLAN OF CARE
Problem: Patient Care Overview  Goal: Plan of Care Review  Outcome: Ongoing (interventions implemented as appropriate)   03/12/19 0904   Coping/Psychosocial   Plan of Care Reviewed With patient   OTHER   Outcome Summary SLP treatment complete. Pt's alert, able to follow commands, and answer all questions which shows much improvement from yesterday. She was presented with a full range of PO consistencies. Pt deomnstrates adequate oral preparation and a timely swallow with appropriate bolus clearance and laryngeal movement. Pt's vocal quality remained strong and clear and no overt s/s of aspiration was noted. SLP recommends pt begin a regular diet with thin liquids. Ok for meds to be administered whole with thin liquids. SLP will follow to ensure diet tolerance.    Plan of Care Review   Progress improving

## 2019-03-12 NOTE — THERAPY EVALUATION
Acute Care - Physical Therapy Initial Evaluation  [facility]     Patient Name: Bita Croft  : 1955  MRN: [id number]  Today's Date: 3/12/2019   Onset of Illness/Injury or Date of Surgery: 03/10/19  Date of Referral to PT: 03/10/19  Referring Physician: Dr. Gonzales      Admit Date: 3/10/2019    Visit Dx:     ICD-10-CM ICD-9-CM   1. Altered mental status, unspecified altered mental status type R41.82 780.97   2. Pneumonia of left lung due to infectious organism, unspecified part of lung J18.9 486   3. Acute urinary tract infection N39.0 599.0   4. Severe sepsis (CMS/HCC) A41.9 038.9    R65.20 995.92   5. Dysphagia, unspecified type R13.10 787.20   6. Impaired functional mobility and activity tolerance Z74.09 V49.89     Patient Active Problem List   Diagnosis   • Confusion   • Depression   • Anxiety   • Hyponatremia-often polydyspia involved   • Seizure disorder (CMS/HCC)   • Chronic neck pain   • Chronic back pain-Ruxer   • Hypothyroidism   • Abnormal thyroid blood test   • Hypertension   • Wellness examination-done   • Elevated fasting glucose   • Anemia   • Iron deficiency   • Degenerative joint disease of spine   • Conversion disorder with abnormal movement   • Reflux laryngitis   • Gait difficulty   • Weakness of both legs   • Sore throat   • Polydipsia   • Nocturia   • Frequency of urination   • Urge incontinence   • Laboratory test*   • Chronic narcotic use-Ruxer   • Abnormal x-ray   • Noncompliance   • Lung nodule, solitary   • Encounter for consultation   • Non-smoker   • Hx of colon cancer, stage I   • Altered mental status   • Anticoagulated: DVT/(?)   • History of radiation therapy     Past Medical History:   Diagnosis Date   • Asthma    • Cancer (CMS/HCC)     colon cancer   • Depression    • Disease of thyroid gland    • Hypertension    • Insomnia    • Seizure (CMS/HCC)    • Sleep apnea      Past Surgical History:   Procedure Laterality Date   • BACK SURGERY     • COLON SURGERY     • GASTRIC  "BYPASS     • HYSTERECTOMY     • KNEE ARTHROSCOPY      bilateral   • NECK SURGERY     • ORIF WRIST FRACTURE Right 6/9/2017    Procedure: WRIST OPEN REDUCTION INTERNAL FIXATION, RIGHT;  Surgeon: Alec Cardoza MD;  Location: [facility] OR;  Service:    • TONSILLECTOMY     • WRIST FRACTURE SURGERY          PT ASSESSMENT (last 12 hours)      Physical Therapy Evaluation     Row Name 03/12/19 0745          PT Evaluation Time/Intention    Subjective Information  no complaints  -MS     Document Type  evaluation  -MS     Mode of Treatment  physical therapy;concurrent therapy  -MS     Row Name 03/12/19 0745          General Information    Patient Profile Reviewed?  yes  -MS     Onset of Illness/Injury or Date of Surgery  03/10/19  -MS     Referring Physician  Dr. Gonzales  -MS     Patient Observations  alert;cooperative;agree to therapy  -MS     Patient/Family Observations  no family present  -MS     General Observations of Patient  pt fowlers in bed, asleep but easily woke with tactile stim  -MS     Prior Level of Function  min assist:;transfer;independent:;w/c or scooter pt peddles wth her feet, pt only walks very short distances  -MS     Equipment Currently Used at Home  shower chair;wheelchair;walker, rolling  -MS     Pertinent History of Current Functional Problem  presented with altered mental status. pt woke up confused and inappropriate mumbling \"oh God.\". Dx possible septic UTI, urine positive for opiates and THC  -MS     Existing Precautions/Restrictions  fall  -MS     Limitations/Impairments  safety/cognitive  -MS     Risks Reviewed  patient:;LOB;dizziness;increased discomfort;change in vital signs  -MS     Benefits Reviewed  patient:;improve function;increase independence;increase strength;increase balance;decrease risk of DVT;increase knowledge  -MS     Barriers to Rehab  medically complex;previous functional deficit;cognitive status;physical barrier  -MS     Row Name 03/12/19 0745          Relationship/Environment "    Primary Source of Support/Comfort  spouse  -MS     Lives With  spouse  -MS     Family Caregiver if Needed  spouse  -MS     Row Name 03/12/19 0745          Cognitive Assessment/Interventions    Additional Documentation  Cognitive Assessment/Intervention (Group)  -MS     Row Name 03/12/19 0745          Cognitive Assessment/Intervention- PT/OT    Affect/Mental Status (Cognitive)  confused  -MS     Orientation Status (Cognition)  oriented to;person;place;situation;disoriented to;time  -MS     Follows Commands (Cognition)  follows one step commands;75-90% accuracy;delayed response/completion;increased processing time needed  -MS     Personal Safety Interventions  elopement precautions initiated;fall prevention program maintained;gait belt;muscle strengthening facilitated;nonskid shoes/slippers when out of bed;supervised activity  -MS     Row Name 03/12/19 0745          Safety Issues, Functional Mobility    Safety Issues Affecting Function (Mobility)  ability to follow commands;problem solving;sequencing abilities  -MS     Impairments Affecting Function (Mobility)  balance;cognition;endurance/activity tolerance;motor planning;strength;range of motion (ROM)  -MS     Row Name 03/12/19 0745          Bed Mobility Assessment/Treatment    Bed Mobility Assessment/Treatment  supine-sit;sit-supine  -MS     Supine-Sit [place] (Bed Mobility)  minimum assist (75% patient effort);verbal cues;nonverbal cues (demo/gesture)  -MS     Sit-Supine [place] (Bed Mobility)  minimum assist (75% patient effort) for LEs only  -MS     Bed Mobility, Safety Issues  cognitive deficits limit understanding;decreased use of arms for pushing/pulling;decreased use of legs for bridging/pushing  -MS     Assistive Device (Bed Mobility)  bed rails  -MS     Row Name 03/12/19 0745          Transfer Assessment/Treatment    Transfer Assessment/Treatment  sit-stand transfer;stand-sit transfer  -MS     Comment (Transfers)  difficulty with maintaining  upright posture  -MS     Sit-Stand [place] (Transfers)  minimum assist (75% patient effort);2 person assist;verbal cues;nonverbal cues (demo/gesture)  -MS     Stand-Sit [place] (Transfers)  minimum assist (75% patient effort);verbal cues;nonverbal cues (demo/gesture) to control decent  -MS     Row Name 03/12/19 0745          Sit-Stand Transfer    Assistive Device (Sit-Stand Transfers)  -- HHA x2  -MS     Row Name 03/12/19 0745          Stand-Sit Transfer    Assistive Device (Stand-Sit Transfers)  -- HHA x2  -MS     [facility] Name 03/12/19 0745          Gait/Stairs Assessment/Training    Comment (Gait/Stairs)  unble to move a foot for steps in standing  -MS     [facility] Name 03/12/19 0745          General ROM    GENERAL ROM COMMENTS  B ankle dorsiflexion impaired 25% PROM and AROM, impaired AROM for knee ext and hip flexion by 25%  -MS     [facility] Name 03/12/19 0745          MMT (Manual Muscle Testing)    General MMT Comments  B ankle dorsiflexion 3-/5, knee ext 3-/5, hip flexion 2/5  -MS     [facility] Name 03/12/19 0745          Motor Assessment/Intervention    Additional Documentation  Balance (Group);Fine Motor Testing & Training (Group);Gross Motor Coordination (Group)  -MS     [facility] Name 03/12/19 0745          Gross Motor Coordination    Gross Motor Skill, Impairments Detail  LAISHA impaired in all extremites, pt moves slowly in general. Accuracy intact.  -MS     Row Name 03/12/19 0745          Balance    Balance  static sitting balance;static standing balance  -MS     Row Name 03/12/19 0745          Static Sitting Balance    Level of [place] (Supported Sitting, Static Balance)  contact guard assist  -MS     Sitting Position (Supported Sitting, Static Balance)  sitting on edge of bed  -MS     Row Name 03/12/19 0745          Static Standing Balance    Level of [place] (Supported Standing, Static Balance)  minimal assist, 75% patient effort;2 person assist  -MS     Time Able to Maintain Position (Supported Standing,  Static Balance)  30 to 45 seconds  -MS     Assistive Device Utilized (Supported Standing, Static Balance)  -- HHA x2  -MS     Comment (Supported Standing, Static Balance)  stood twice  -MS     Row Name 03/12/19 2945          Fine Motor Testing & Training    Comment, Fine Motor Coordination  intact B  -MS     Row Name 03/12/19 0745          Sensory Assessment/Intervention    Sensory General Assessment  -- pt reports numbness in B LE, pt able to localize all testing  -MS     Row Name 03/12/19 3045          Pain Assessment    Additional Documentation  Pain Scale: Numbers Pre/Post-Treatment (Group)  -MS     Row Name 03/12/19 3645          Pain Scale: Numbers Pre/Post-Treatment    Pain Scale: Numbers, Pretreatment  0/10 - no pain  -MS     Pain Scale: Numbers, Post-Treatment  0/10 - no pain  -MS     Row Name 03/12/19 7050          Plan of Care Review    Plan of Care Reviewed With  patient  -MS     Row Name 03/12/19 0745          Physical Therapy Clinical Impression    Date of Referral to PT  03/10/19  -MS     Patient/Family Goals Statement (PT Clinical Impression)  return to prior level of function  -MS     Criteria for Skilled Interventions Met (PT Clinical Impression)  yes;treatment indicated  -MS     Pathology/Pathophysiology Noted (Describe Specifically for Each System)  musculoskeletal;neuromuscular  -MS     Impairments Found (describe specific impairments)  arousal, attention, and cognition;gait, locomotion, and balance;muscle performance  -MS     Rehab Potential (PT Clinical Summary)  good, to achieve stated therapy goals  -MS     Predicted Duration of Therapy (PT)  until discharge  -MS     Care Plan Review (PT)  evaluation/treatment results reviewed;care plan/treatment goals reviewed;risks/benefits reviewed;patient/other agree to care plan;current/potential barriers reviewed  -MS     Row Name 03/12/19 7857          Physical Therapy Goals    Bed Mobility Goal Selection (PT)  bed mobility, PT goal 1  -MS      Transfer Goal Selection (PT)  transfer, PT goal 1  -MS     Gait Training Goal Selection (PT)  gait training, PT goal 1  -MS     Row Name 03/12/19 0745          Bed Mobility Goal 1 (PT)    Activity/Assistive Device (Bed Mobility Goal 1, PT)  sit to supine;supine to sit  -MS     [place] Level/Cues Needed (Bed Mobility Goal 1, PT)  supervision required;verbal cues required  -MS     Time Frame (Bed Mobility Goal 1, PT)  long term goal (LTG);by discharge  -MS     Progress/Outcomes (Bed Mobility Goal 1, PT)  goal ongoing  -MS     Row Name 03/12/19 0745          Transfer Goal 1 (PT)    Activity/Assistive Device (Transfer Goal 1, PT)  sit-to-stand/stand-to-sit;bed-to-chair/chair-to-bed;walker, rolling  -MS     [place] Level/Cues Needed (Transfer Goal 1, PT)  minimum assist (75% or more patient effort);tactile cues required;verbal cues required  -MS     Time Frame (Transfer Goal 1, PT)  long term goal (LTG);by discharge  -MS     Progress/Outcome (Transfer Goal 1, PT)  goal ongoing  -MS     Row Name 03/12/19 0745          Gait Training Goal 1 (PT)    Activity/Assistive Device (Gait Training Goal 1, PT)  gait (walking locomotion);assistive device use;decrease fall risk;forward stepping;improve balance and speed;walker, rolling  -MS     [place] Level (Gait Training Goal 1, PT)  minimum assist (75% or more patient effort);verbal cues required;tactile cues required  -MS     Distance (Gait Goal 1, PT)  25ft  -MS     Time Frame (Gait Training Goal 1, PT)  long term goal (LTG);by discharge  -MS     Progress/Outcome (Gait Training Goal 1, PT)  goal ongoing  -MS     Row Name 03/12/19 0745          Positioning and Restraints    Post Treatment Position  bed  -MS     In Bed  fowlers;call light within reach;encouraged to call for assist;exit alarm on;side rails up x2  -MS     Row Name 03/12/19 0745          Living Environment    Home Accessibility  tub/shower is not walk in  -MS       User Key  (r) = Recorded By, (t) =  Taken By, (c) = Cosigned By    Initials Name Provider Type    MS Carlito Belkis FAUSTIN, PT, DPT, NCS Physical Therapist        Physical Therapy Education     Title: PT OT SLP Therapies (In Progress)     Topic: Physical Therapy (In Progress)     Point: Mobility training (In Progress)     Learning Progress Summary           Patient Acceptance, E, NR by MS at 3/12/2019  9:17 AM    Comment:  role of PT in her care                               User Key     Initials Effective Dates Name Provider Type Discipline    MS 06/19/18 -  Belkis Esteban, PT, DPT, NCS Physical Therapist PT              PT Recommendation and Plan  Anticipated Discharge Disposition (PT): transitional care, skilled nursing facility  Planned Therapy Interventions (PT Eval): balance training, bed mobility training, gait training, patient/family education, ROM (range of motion), strengthening, transfer training  Therapy Frequency (PT Clinical Impression): 2 times/day  Outcome Summary/Treatment Plan (PT)  Anticipated Discharge Disposition (PT): transitional care, skilled nursing facility  Plan of Care Reviewed With: patient  Progress: improving  Outcome Summary: PT evaluation completed. The patient is able to fully arouse and follow directions today. She is slow in motor responses and is generally weak all over. She has a low level of prior level of function that included minimal to no walking. She used a wheelchair most of the time and peddled around the home. She was able to stand for a few seconds this morning with assist of 2 people. She will benefit from further PT to increase her strength and her motor responses. Transitional care or SNF is recommended upon discharge due to the patient being unable to transfer to a chair at this time.   Outcome Measures     Row Name 03/12/19 0745             How much help from another person do you currently need...    Turning from your back to your side while in flat bed without using bedrails?  4  -MS      Moving  from lying on back to sitting on the side of a flat bed without bedrails?  2  -MS      Moving to and from a bed to a chair (including a wheelchair)?  1  -MS      Standing up from a chair using your arms (e.g., wheelchair, bedside chair)?  2  -MS      Climbing 3-5 steps with a railing?  1  -MS      To walk in hospital room?  1  -MS      AM-PAC 6 Clicks Score  11  -MS         Functional Assessment    Outcome Measure Options  AM-PAC 6 Clicks Basic Mobility (PT)  -MS        User Key  (r) = Recorded By, (t) = Taken By, (c) = Cosigned By    Initials Name Provider Type    MS Belkis Esteban, PT, DPT, NCS Physical Therapist         Time Calculation:   PT Charges     Row Name 03/12/19 0920             Time Calculation    Start Time  0745  -MS      Stop Time  0823  -MS      Time Calculation (min)  38 min  -MS      PT Received On  03/12/19  -MS      PT Goal Re-Cert Due Date  03/22/19  -MS        User Key  (r) = Recorded By, (t) = Taken By, (c) = Cosigned By    Initials Name Provider Type    Belkis Hobson, PT, DPT, NCS Physical Therapist        Therapy Suggested Charges     Code   Minutes Charges    None           Therapy Charges for Today     Code Description Service Date Service Provider Modifiers Qty    87287989324 HC PT EVAL LOW COMPLEXITY 3 3/12/2019 Belkis Esteban PT, DPT, NCS GP 1          PT G-Codes  Outcome Measure Options: AM-PAC 6 Clicks Basic Mobility (PT)  AM-PAC 6 Clicks Score: 11      Belkis Esteban PT, DPT, IRWIN  3/12/2019

## 2019-03-12 NOTE — PLAN OF CARE
Problem: Patient Care Overview  Goal: Plan of Care Review  Outcome: Ongoing (interventions implemented as appropriate)   03/12/19 5213   Coping/Psychosocial   Plan of Care Reviewed With patient   OTHER   Outcome Summary PT evaluation completed. The patient is able to fully arouse and follow directions today. She is slow in motor responses and is generally weak all over. She has a low level of prior level of function that included minimal to no walking. She used a wheelchair most of the time and peddled around the home. She was able to stand for a few seconds this morning with assist of 2 people. She will benefit from further PT to increase her strength and her motor responses. Transitional care or SNF is recommended upon discharge due to the patient being unable to transfer to a chair at this time.    Plan of Care Review   Progress improving

## 2019-03-12 NOTE — PLAN OF CARE
Problem: Patient Care Overview  Goal: Plan of Care Review  Outcome: Ongoing (interventions implemented as appropriate)   03/12/19 2789   Coping/Psychosocial   Plan of Care Reviewed With patient   OTHER   Outcome Summary OT eval completed. Pt with delayed initiation and processing throughout eval requiring increased time for tasks. Dep to don socks but able to ball sock with BUE in midline to prep for task with increased time. Opposition intact BUE. LAISHA intact but requires max vc to follow commands for accuracy. January required for bed mobility, min-modAx2 to stand. Pt has difficulty maintaining erect posture and does not control decent to sitting at EOB. Skilled OT required to address cognition, motor processing, and strength deficits limiting independnece with ADL and functional mobility. Recommend d/c to TCU or SNF pending progress.   Plan of Care Review   Progress no change

## 2019-03-12 NOTE — THERAPY TREATMENT NOTE
Acute Care - Speech Language Pathology   Swallow Treatment Note [facility]     Patient Name: Bita Croft  : 1955  MRN: [id number]  Today's Date: 3/12/2019  Onset of Illness/Injury or Date of Surgery: 03/10/19     Referring Physician: Dr. Gonzales      Admit Date: 3/10/2019  SLP treatment complete. Pt's alert, able to follow commands, and answer all questions which shows much improvement from yesterday. She was presented with a full range of PO consistencies. Pt deomnstrates adequate oral preparation and a timely swallow with appropriate bolus clearance and laryngeal movement. Pt's vocal quality remained strong and clear and no overt s/s of aspiration was noted. SLP recommends pt begin a regular diet with thin liquids. Ok for meds to be administered whole with thin liquids. SLP will follow to ensure diet tolerance.   Abran Martinez MS CCC-SLP 3/12/2019 9:09 AM    Visit Dx:      ICD-10-CM ICD-9-CM   1. Altered mental status, unspecified altered mental status type R41.82 780.97   2. Pneumonia of left lung due to infectious organism, unspecified part of lung J18.9 486   3. Acute urinary tract infection N39.0 599.0   4. Severe sepsis (CMS/HCC) A41.9 038.9    R65.20 995.92   5. Dysphagia, unspecified type R13.10 787.20     Patient Active Problem List   Diagnosis   • Confusion   • Depression   • Anxiety   • Hyponatremia-often polydyspia involved   • Seizure disorder (CMS/HCC)   • Chronic neck pain   • Chronic back pain-Ruxer   • Hypothyroidism   • Abnormal thyroid blood test   • Hypertension   • Wellness examination-done   • Elevated fasting glucose   • Anemia   • Iron deficiency   • Degenerative joint disease of spine   • Conversion disorder with abnormal movement   • Reflux laryngitis   • Gait difficulty   • Weakness of both legs   • Sore throat   • Polydipsia   • Nocturia   • Frequency of urination   • Urge incontinence   • Laboratory test*   • Chronic narcotic use-Ruxer   • Abnormal x-ray   • Noncompliance    • Lung nodule, solitary   • Encounter for consultation   • Non-smoker   • Hx of colon cancer, stage I   • Altered mental status   • Anticoagulated: DVT/(?)   • History of radiation therapy       Therapy Treatment  Rehabilitation Treatment Summary     Row Name 03/12/19 0831             Treatment Time/Intention    Discipline  speech language pathologist  -CS      Document Type  therapy note (daily note)  -CS      Subjective Information  no complaints  -CS      Mode of Treatment  speech-language pathology  -CS      Patient/Family Observations  No family present  -CS      Recorded by [CS] Abran Martinez MS CCC-SLP 03/12/19 0903      Row Name 03/12/19 0831             Pain Assessment    Additional Documentation  Pain Scale: Numbers Pre/Post-Treatment (Group)  -CS      Recorded by [CS] Abran Martinez MS CCC-SLP 03/12/19 0903      Row Name 03/12/19 0831             Pain Scale: Numbers Pre/Post-Treatment    Pain Scale: Numbers, Pretreatment  0/10 - no pain  -CS      Pain Scale: Numbers, Post-Treatment  0/10 - no pain  -CS      Recorded by [CS] Abarn Martinez MS CCC-SLP 03/12/19 0903      Row Name 03/12/19 0831             Outcome Summary/Treatment Plan (SLP)    Daily Summary of Progress (SLP)  progress toward functional goals is good  -CS      Barriers to Overall Progress (SLP)  n/a  -CS      Plan for Continued Treatment (SLP)  Upgrade to a regular diet with thin liquids.  -CS      Anticipated Dischage Disposition  home  -CS      Recorded by [CS] Abran Martinez MS CCC-SLP 03/12/19 0903        User Key  (r) = Recorded By, (t) = Taken By, (c) = Cosigned By    Initials Name Effective Dates Discipline    CS Abran Martinez MS CCC-SLP 04/03/18 -  SLP          Outcome Summary  Outcome Summary/Treatment Plan (SLP)  Daily Summary of Progress (SLP): progress toward functional goals is good (03/12/19 0831 : Abran Martinez MS CCC-SLP)  Barriers to Overall Progress (SLP): n/a (03/12/19 0831 : Abran Martinez MS CCC-SLP)  Plan for  Continued Treatment (SLP): Upgrade to a regular diet with thin liquids. (03/12/19 0831 : Abran Martinez MS CCC-SLP)  Anticipated Dischage Disposition: home (03/12/19 0831 : Abran Martinez MS CCC-SLP)      SLP GOALS     Row Name 03/12/19 0831 03/10/19 1434          Oral Nutrition/Hydration Goal 1 (SLP)    Oral Nutrition/Hydration Goal 1, SLP  Pt will tolerate least restrictive diet with no overt s/s of aspiration.   -CS  Pt will tolerate least restrictive diet with no overt s/s of aspiration.   -MB     Time Frame (Oral Nutrition/Hydration Goal 1, SLP)  by discharge  -CS  by discharge  -MB     Barriers (Oral Nutrition/Hydration Goal 1, SLP)  n/a  -CS  n/a  -MB     Progress/Outcomes (Oral Nutrition/Hydration Goal 1, SLP)  continuing progress toward goal  -CS  goal ongoing  -MB       User Key  (r) = Recorded By, (t) = Taken By, (c) = Cosigned By    Initials Name Provider Type    Susanna Morris CCC-SLP Speech and Language Pathologist    Abran Barrera MS CCC-SLP Speech and Language Pathologist          EDUCATION  The patient has been educated in the following areas:   Dysphagia (Swallowing Impairment).    SLP Recommendation and Plan  Daily Summary of Progress (SLP): progress toward functional goals is good  Barriers to Overall Progress (SLP): n/a  Plan for Continued Treatment (SLP): Upgrade to a regular diet with thin liquids.  Anticipated Dischage Disposition: home                    Time Calculation:   Time Calculation- SLP     Row Name 03/12/19 0908             Time Calculation- SLP    SLP Start Time  0831  -CS      SLP Stop Time  0900  -      SLP Time Calculation (min)  29 min  -      SLP Received On  03/12/19  -        User Key  (r) = Recorded By, (t) = Taken By, (c) = Cosigned By    Initials Name Provider Type    Abran Barrera MS CCC-SLP Speech and Language Pathologist          Therapy Charges for Today     Code Description Service Date Service Provider Modifiers Qty    [id number] [facility]  ORAL PHARYNG SWALLOW 4 3/11/2019 Abran Martinez, MS CCC-SLP GN 1    [id number]  ST TREATMENT SWALLOW 2 3/12/2019 Abran Martinez, MS CCC-SLP GN 1                 Abran Martinez MS JANA-SLP  3/12/2019

## 2019-03-12 NOTE — DISCHARGE PLACEMENT REQUEST
"Bita Croft (63 y.o. Female)     Date of Birth Social Security Number Address Home Phone MRN    1955 [id number]   [facility] [address] [phone] [id number]    Denominational Marital Status          Yazidism        Admission Date Admission Type Admitting Provider Attending Provider Department, Room/Bed    3/10/19 Emergency Josafat Gonzales MD Oliver, Randy Eugene, MD [facility] 3A, 340/1    Discharge Date Discharge Disposition Discharge Destination                       Attending Provider:  Josafat Gonzales MD    Allergies:  No Known Allergies    Isolation:  None   Infection:  None   Code Status:  CPR    Ht:  177.8 cm (70\")   Wt:  97.8 kg (215 lb 9.6 oz)    Admission Cmt:  None   Principal Problem:  None                Active Insurance as of 3/10/2019     Primary Coverage     Payor Plan Insurance Group Employer/Plan Group    [employer] AET[employer] [id number]     Payor Plan Address Payor Plan Phone Number Payor Plan Fax Number Effective Dates    [address] [phone]  7/1/2017 - None Entered    [facility] [id number]       Subscriber Name Subscriber Birth Date Member ID       OCHOA CROFT 1955 [id number]                 Emergency Contacts      (Rel.) Home Phone Work Phone Mobile Phone    Carlito Croft (Spouse) [phone] -- --               History & Physical      Josafat Gonzales MD at 3/11/2019  8:21 AM          Patient ID: Bita Croft  MRN: [id number]     Acct:  [id number]  Admit Date: 3/10/2019   Date of service: 3.11.2019    SOURCE: The source of this information is prior knowledge of the patient, review of her office records, her current chart, as well as discussion with she; she is considered unreliable.  The history was primarily taken from the emergency room doctors notes.     PATIENT PROFILE: The patient is a 64 y/o white  female resident of [place]; she was cooperative.      CHIEF COMPLAINT: \"altered mental state\"     HISTORY " "OF PRESENT ILLNESS: many chronic illness and many medications.  Went to bed night before admit normal.  Slept through the evening ok.  When she awoke the next morning she was confused and inappropriately mumbling \"oh God\".  There was no recent cough dysuria fever injury vomiting diarrhea; anything.  Though there has been a concern about seizure disorder in the past the presenting complaints did not include any tonic-clonic  activity.  Chest x-ray in the emergency department was suspicious for pneumonia and continued spiculated lingular nodule.  This lingular nodule has been seen by cardiovascular surgery and considering her multiple medical problems was followed without removal.  Her Dilantin level was less than 3 and low.  Alcohol salicylate and Tylenol levels were all normal.  Chemistry showed low protein and albumin at 6 and 3 respectively; nothing else significant.  Lactic acid was elevated 6 ammonia was less than 9 and urinalysis showed 21-30 WBCs with moderate leukocytes.   CT of her head showed only chronic ischemic changes.  She was felt to be septic with a UTI and was admitted with fluid boluses and appropriate treatments.  Antibiotics started in the ER were Rocephin and azithromycin.  She was given Cerebyx IV in the ED.    No Known Allergies    HOME MEDICATIONS:  Prior to Admission medications    Medication Sig Start Date End Date Taking? Authorizing Provider   acetaminophen (TYLENOL) 325 MG tablet Take 2 tablets by mouth Every 4 (Four) Hours As Needed for Mild Pain  or Fever. 6/7/18  Yes Josafat Gonzales MD   clonazePAM (KlonoPIN) 1 MG tablet Take 1 tablet by mouth 3 (Three) Times a Day As Needed for Anxiety. 12/17/18   Josafat Gonzales MD   clotrimazole (LOTRIMIN) 1 % external solution Wash/dry well three times a day After dry; apply light coating of clotrimazole cream for one week 7/16/18   Josafat Gonzales MD   cyclobenzaprine (FLEXERIL) 10 MG tablet Take 1 tablet by mouth 3 (Three) " Times a Day. 10/21/18   Beverly Wells MD   DULoxetine (CYMBALTA) 30 MG capsule Take 30 mg by mouth Daily.    Beverly Wells MD   gabapentin (NEURONTIN) 600 MG tablet Take 1 tablet by mouth 3 (Three) Times a Day. 5/31/18   Josafat Gonzales MD   levETIRAcetam (KEPPRA) 1000 MG tablet TAKE 1 TABLET BY MOUTH TWICE DAILY 4/27/18   Josafat Gonzales MD   levothyroxine (SYNTHROID, LEVOTHROID) 100 MCG tablet Take 1 tablet by mouth Daily.    Beverly Wells MD   loperamide (IMODIUM A-D) 2 MG tablet Take 1 tablet by mouth 4 (Four) Times a Day As Needed for Diarrhea. 6/7/18   Josafat Gonzales MD   metoprolol tartrate (LOPRESSOR) 50 MG tablet Take 1 tablet by mouth Daily. 10/29/18   Josafat Gonzales MD   MORPHABOND ER 15 MG tablet extended-release 12 hour Take 1 tablet by mouth Every Night. 11/14/18   Josafat Gonzales MD   nystatin (MYCOSTATIN) 775903 UNIT/GM powder Wash/dry well three times a day After dry;apply nystatin powder bid 7/16/18   Josafat Gonzales MD   oxyCODONE (ROXICODONE) 10 MG tablet Take 1 tablet by mouth Every 4 (Four) Hours As Needed for Moderate Pain . 12/19/18   Josafat Gonzales MD   oxyCODONE-acetaminophen (PERCOCET) 5-325 MG per tablet Take 2 tablets by mouth Every 4 (Four) Hours As Needed for Moderate Pain . 11/14/18   Josafat Gonzales MD   phenytoin (DILANTIN) 100 MG ER capsule 100 mg in am and 200 mg in pm 12/26/17   Josafat Gonzales MD   PROAIR  (90 Base) MCG/ACT inhaler INHALE 1 PUFF BY MOUTH FOUR TIMES DAILY 1/25/19   Josafat Gonzales MD   QUEtiapine (SEROquel) 100 MG tablet Take 1 tablet by mouth 2 (Two) Times a Day. 7/17/17   Josafat Gonzales MD   Skin Protectants, Misc. (EUCERIN) cream Apply  topically to the appropriate area as directed 2 (Two) Times a Day. 10/29/18   Josafat Gonzales MD   solifenacin (VESICARE) 5 MG tablet Take 1 tablet by mouth Daily. 8/15/17   Pawel Ho MD   traZODone (DESYREL) 50  MG tablet Take 1 tablet by mouth Every Night. 18   Josafat Gonzales MD   venlafaxine XR (EFFEXOR-XR) 150 MG 24 hr capsule Take 2 capsules by mouth Daily. 18   Josafat Gonzales MD       PAST HISTORY:  CHILDHOOD: unremarkable.     PROCEDURES:   SCANNED  [id number]  Colonoscopy+tort-mass/[facility]//8.4.14/BE  EGD+nl/[facility]//5.28.15/UGI  Colonoscopy+polyp/[facility]//9.21.15/3y     SURGERIES:  T&A/age 11  R knee/H Hunt/LH/age 13  SANDRA+BSO/[facility]/age 32  T12/Garrnidia//  Gastric by-pass//  L knee/Martinez/Lorena/  C surgery/[place]a/06  C surgery/[place]ila/[place]a/08  Lap GB/WB/Armando/3-18-10  L arthroscope/  R lap colectomy/Dorothea/.14   ORIF L wrist/[place]//.16    FAMILY HISTORY:  Hypertension mother, maternal aunt, father, paternal  grandfather; heart disease in mother, father, maternal grandfather, maternal  grandmother, paternal grandmother; diabetes in maternal uncle.      HABITS:  Never smoked, only limited second-hand smoke exposure.  No alcohol or  drugs.      SOCIAL HISTORY:   in  to her current , has 1 son.  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     MEDICAL ADMISSIONS:  [facility]:   11.8.08-11.12.08-hyponatremia  2.8.10-2.9.10-L flank pain  3.13.14-3.15.14 hever/TIA  12.26.17-12.27.17    Shortness of breath  Cough  Flu B  Bronchitis-acute  Abnormal CT chest-AGUILAR spiculated nodule  Chest wall pain  Hyponatremia 128  Neutropenia 2.54    5.26.18-5.31.18  Lethargy-likely 2nd overdose Imodium-resolved  Confusion-same-resolved  Brief intubation (airway protection) -  Abnormal CXR-? Pneumonia/atelectasis  Overdose Imodium; initial ? suicide jesture refuted  Depression-acute/chronic  Anxiety-acute/chronic  Hypotension-brief and decrease Rx  Cheilosis-improved empiric monistat  Gait decline-acute/chronic  Anemia-component phlebotomy, (substrate neg)  +BC/staph homnis/epidermitis (contaminet)  E coli  "bacteruria-likely UTI  UTI-     BH:   7.21-7.23.95 abdominal pain  2.19.08-2.21.08-DVT  2.9.10-2.11.10-abdominal pain/intercostal  3.11.10-3.19.10- hever-abdominal pain+SIADH  3.24.15-3.30.15 diarrhea  5.26.15-5.31.15 nausea/vomiting/diarrhea  7.12.17-7.17.17 accidential injection water bottle cap     LH:   10.26.16-11.6.16 seizure     GENERAL:  Inactive/slower with limits, speed, stamina for age and pains, desire. Sleep is ok usually with Rx.  Has pain management and psych. No fever noted above.  ENDO:  No syncope, near or diaphoretic sweaty spells.  BS Ok since here.  HEENT: No head injury or headache.  No vision change.   No significant hearing loss.  Ears without pain/drainage.  No sore throat.  No significant nasal/sinus congestion/drainage. No epistaxis.  CHEST: No chest wall tenderness or mass. No significant cough, wheeze, SOB; no hemoptysis.  CV: No chest pain, palpitations, significant ankle edema.  GI: No heartburn, dysphagia.  No abdominal pain, diarrhea, constipation, rectal bleeding, or melena.  :  Voids without dysuria, or incontinence to completion.  ORTHO: No painful/swollen joints but various on /off sore.  Usually has sore neck or back.  No acute neck or back pain without recent injury.   NEURO: No dizziness, weakness of extremities.  Usually has LE numbness/paresthesias.   PSYCH: Prior memory loss.  Mood always variable; often anxious, depressed but/and not suicidal though has had prior overdose.  Tolerated stress.     PHYSICAL EXAMINATION:  /84 (BP Location: Left arm, Patient Position: Lying)   Pulse 91   Temp 98.6 °F (37 °C) (Oral)   Resp 18   Ht 177.8 cm (70\")   Wt 94.5 kg (208 lb 6.4 oz)   LMP  (LMP Unknown)   SpO2 95%   BMI 29.90 kg/m²      GENERAL:  Well nourished/developed in no acute distress; mumbling incoherently. Obese.  SKIN: Turgor excellent, without wound, rash, lesion other than several LE scabbed abrasions.  HEENT: Normal cephalic without trauma.  Pupils reactive " to light; L pupil anisocoria. Extraocular motions full without nystagmus.     External canals nonobstructive nontender without reddness.  Oral cavity without growths, exudates, and moist.  Posterior pharynx without mass, obstruction, redness.  No thyromegaly, mass, tenderness, lymphadenopathy and supple.  CV: Regular rhythm.  No murmur, gallop,  edema. Posterior pulses intact.  No carotid bruits.   CHEST: No chest wall tenderness or mass.   LUNGS: Symmetric motion with clear to auscultation.  ABD: Soft, nontender without mass.   ORTHO: Symmetric extremities without swelling/point tenderness.  Full gross range of motion.    NEURO: CN 2-12 grossly intact.  Symmetric facies. 1/4 x bicep 0/4 LE.  UE/LE variable; 2-/5 strength UE 0-1/5 LE  Nonfocal use extremities. Speech clear.    PSYCH: Disoriented x 3.  Pleasant calm, well kept.  Non-purposeful/directed conservation with intact short/long gross memory.     ASSESSMENT/PROBLEM LIST:   64 y/o y/o white female   Allergy/intolerance: see above  Procedural history: see above  Family history: see above  Obesity.  This is status post gastric bypass initial failure and regain of  weight.     1 para 1 AB 0.    Surgical menopause.  Osteoporosis on previous wrist study.    Hypertension.    Hypothyroidism.    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    History of DVT - left peroneal popliteal, 2008, treated   successfully with anticoagulation.   History anticoagulation: hx DVT/(coumadin)  Chronic insomnia.  Spinal spondylosis; this is primarily cervical with multiple surgeries.  Chronic neck pain.    Chronic back pain  Chronic narcotics-from pain management/Ruxer  Chronic depression - followed by Dr. Samaniego.    Degenerative joint disease that is diffuse.   LE weakness  Gait difficulty-chronic   History of gastric bypass and metabolic risk it carries.    Colon  cancer history with previous surgery and chemo, no obvious   recurrence.  Gastroesophageal reflux.  Recurrent laryngitis  Documented neuropathy by EMG nerve conduction  Seizure disorder (2016)  antieliptics-Keppra/Dilantin  Abnormal CT chest-treating nonsurgically  Polypharmacy   Urinary incontience (history urge incontinence)    REASON FOR ADMISSION:    Confusion-delirium  Abnormal UA-(21-30WBC, mod leuk, 2+ bacteria)  likely UTI vs pneumonia  CXR-infiltrate (note lack cough, etc)  Sepsis (tachycardia 104, lactic acid 6.0  Low phenytoin level 3.0  Hypopotassemia-3.0  Drug screen THC, opiate    PLANS:   Rx-reviewed; ordered as needed and will review daily/as needed.   Includes anticoagulation  for DVT prevention.   LAB-reviewed; ordered as needed and will review daily.  Particular: daily CBC, chemistry  Imaging-reviewed; ordered as needed and will review daily.  Particular:  ? Neuro will want  MRI: renal US  Consults neuro  Diet: as able; speech  Fluids: maintance till oral adequate  Special issues: seizure?   DC planning: has had to use [facility] before and eventually home  Code status: full    Electronically signed by Josafat Gonzales MD at 3/11/2019  8:56 AM       Operative/Procedure Notes (all)     No notes of this type exist for this encounter.           Physician Progress Notes (last 24 hours) (Notes from 3/11/2019  3:49 PM through 3/12/2019  3:49 PM)      Josafat Gonzales MD at 3/12/2019  8:28 AM               LOS: 2 days   Patient Care Team:  Josafat Gonzales MD as PCP - General  Josafat Gonzales MD as PCP - Family Medicine  Pawel Abrams MD as Consulting Physician (Cardiology)  Pawel Ho MD as Consulting Physician (Urology)  Krystal Dorman APRN as Nurse Practitioner (Neurology)    Chief Complaint:  confusion    Subjective      HISTORY OF PRESENT ILLNESS: many chronic illness and many medications.  Went to bed night before admit normal.  Slept through the  "evening ok.  When she awoke the next morning she was confused and inappropriately mumbling \"oh God\".  There was no recent cough dysuria fever injury vomiting diarrhea; anything.  Though there has been a concern about seizure disorder in the past the presenting complaints did not include any tonic-clonic activity.  Chest x-ray in the emergency department was suspicious for pneumonia and continued spiculated lingular nodule.  This lingular nodule has been seen by cardiovascular surgery and considering her multiple medical problems was followed without removal/treated with radiation oncology.  Her Dilantin level was less than 3 and low-IV cerebyx was given in the ED.  Alcohol salicylate and Tylenol levels were all normal. Chemistry showed low protein and albumin at 6 and 3 respectively; nothing else significant. Lactic acid was elevated at 6 ammonia was less than 9 and urinalysis showed 21-30 WBCs with moderate leukocytes.   CT of her head showed only chronic ischemic changes.  She was felt to be septic with a UTI/? pneumonia and was admitted with fluid boluses and appropriate treatments.  Antibiotics started in the ER were Rocephin and azithromycin. She had jenifer similar presentation      Interval History:  Less confused.  Recalls yesterday needing to say \"oh God\", \"help me\" but not knowing why.  Disoriented to place, date; knew me.  Slept.  Denies pain. Day 3 rocephin/azithromycin for CXR ? Pneumonia and UA abnormal; UTI.  No cough.  Awaiting speech to clear diet; as yesterday not able to eat/swallow ok.     .  Current Facility-Administered Medications:   •  acetaminophen (TYLENOL) tablet 650 mg, 650 mg, Oral, Q4H PRN, Zeeshan Hetser MD  •  cefTRIAXone (ROCEPHIN) 1 g/100 mL 0.9% NS (MBP), 1 g, Intravenous, Q24H, 1 g at 03/11/19 1340 **AND** AZITHROMYCIN 500 MG/250 ML 0.9% NS IVPB (vial-mate), 500 mg, Intravenous, Q24H, Zeeshan Hester MD, 500 mg at 03/11/19 1636  •  DULoxetine (CYMBALTA) DR capsule 30 " mg, 30 mg, Oral, Daily, Zeeshan Hester MD  •  enoxaparin (LOVENOX) syringe 40 mg, 40 mg, Subcutaneous, Q24H, Josafat Gonzales MD, 40 mg at 03/11/19 1636  •  fosphenytoin (Cerebyx) injection 100 mg PE, 100 mg PE, Intravenous, Q12H, Zeeshan eHster MD, 100 mg PE at 03/11/19 2334  •  gabapentin (NEURONTIN) capsule 600 mg, 600 mg, Oral, Q8H, Zeeshan Hester MD, 600 mg at 03/12/19 0659  •  ipratropium-albuterol (DUO-NEB) nebulizer solution 3 mL, 3 mL, Nebulization, Q4H - RT, Zeeshan Hester MD, 3 mL at 03/12/19 0809  •  levETIRAcetam in NaCl 0.75% (KEPPRA) IVPB 1,000 mg, 1,000 mg, Intravenous, BID, Zeeshan Hester MD, 1,000 mg at 03/11/19 2240  •  levothyroxine (SYNTHROID, LEVOTHROID) tablet 100 mcg, 100 mcg, Oral, Q AM, Zeeshan Hester MD, 100 mcg at 03/12/19 0659  •  loperamide (IMODIUM) capsule 2 mg, 2 mg, Oral, 4x Daily PRN, Zeeshan Hester MD  •  LORazepam (ATIVAN) injection 1 mg, 1 mg, Intravenous, Q4H PRN, Zeeshan Hester MD, 1 mg at 03/11/19 1649  •  Magnesium Sulfate 2 gram Bolus, followed by 8 gram infusion (total Mg dose 10 grams)- Mg less than or equal to 1mg/dL, 2 g, Intravenous, PRN **OR** Magnesium Sulfate 2 gram / 50mL Infusion (GIVE X 3 BAGS TO EQUAL 6GM TOTAL DOSE) - Mg 1.1 - 1.5 mg/dl, 2 g, Intravenous, PRN **OR** Magnesium Sulfate 4 gram infusion- Mg 1.6-1.9 mg/dL, 4 g, Intravenous, PRN, Zeeshan Hester MD  •  metoprolol tartrate (LOPRESSOR) tablet 50 mg, 50 mg, Oral, Daily, Zeeshan Hester MD, 50 mg at 03/11/19 1022  •  Morphine (MS CONTIN) 12 hr tablet 15 mg, 15 mg, Oral, Q12H, Zeeshan Hester MD  •  Morphine sulfate (PF) injection 2 mg, 2 mg, Intravenous, Q3H PRN, Josafat Gonzales MD, 2 mg at 03/11/19 1340  •  nitroglycerin (NITROSTAT) SL tablet 0.4 mg, 0.4 mg, Sublingual, Q5 Min PRN, Zeeshan Hester MD  •  oxybutynin XL (DITROPAN-XL) 24 hr tablet 5 mg, 5 mg, Oral, Daily, Zeeshan Hester MD  •   oxyCODONE (ROXICODONE) immediate release tablet 10 mg, 10 mg, Oral, Q4H PRN, Zeeshan Hester MD, 10 mg at 03/11/19 1636  •  Pharmacy to Dose enoxaparin (LOVENOX), , Does not apply, Continuous PRN, Zeeshan Hester MD  •  potassium chloride 10 mEq in 100 mL IVPB, 10 mEq, Intravenous, Q1H PRN, Zeeshan Hester MD, Last Rate: 100 mL/hr at 03/11/19 2053, 10 mEq at 03/11/19 2053  •  [COMPLETED] Insert peripheral IV, , , Once **AND** sodium chloride 0.9 % flush 10 mL, 10 mL, Intravenous, PRN, Sergio Templeton DO  •  sodium chloride 0.9 % flush 3 mL, 3 mL, Intravenous, Q12H, Zeeshan Hester MD, 3 mL at 03/10/19 2059  •  sodium chloride 0.9 % flush 3-10 mL, 3-10 mL, Intravenous, PRN, Zeeshan Hester MD  •  sodium chloride 0.9 % with KCl 20 mEq/L infusion, 125 mL/hr, Intravenous, Continuous, Josafat Gonzales MD, Last Rate: 125 mL/hr at 03/12/19 0511, 125 mL/hr at 03/12/19 0511  •  venlafaxine XR (EFFEXOR-XR) 24 hr capsule 300 mg, 300 mg, Oral, Daily, Zeeshan Hester MD    Review of Systems:   GENERAL:  Inactive before admit; and since here-slower with limits, speed, stamina for age and situation. Sleep was last night ok. No fever now.  ENDO:  No syncope, near or diaphoretic sweaty spells.  BS Ok.  HEENT: No head injury or headache.  No vision change.  No significant hearing loss.  Ears without pain/drainage.  No sore throat.  No significant nasal/sinus congestion/drainage. No epistaxis.  CHEST: No chest wall tenderness or mass. No cough,  without wheeze.  No SOB; no hemoptysis.  CV: No chest pain, palpitations, ankle edema.  GI: No heartburn; awaiting speech.   No abdominal pain, diarrhea, constipation.  No rectal bleeding, or melena.    :  Voids without dysuria.   ORTHO: No painful/swollen joints but various on /off sore.  No mention sore neck or back; has history chronic neck/back pain.  NEURO: No dizziness, weakness of extremities.  Same numbness/paresthesias.  PSYCH:  Less  "confused/less memory loss.  Mood good; no apparent anxious, depressed but long history of this/Rx/psych.     Objective     Vital Signs  /71 (BP Location: Right arm, Patient Position: Lying)   Pulse 84   Temp 98 °F (36.7 °C) (Oral)   Resp 18   Ht 177.8 cm (70\")   Wt 97.8 kg (215 lb 9.6 oz)   LMP  (LMP Unknown)   SpO2 95%   BMI 30.94 kg/m²    Temp:  [98 °F (36.7 °C)-99.2 °F (37.3 °C)] 98 °F (36.7 °C)  Heart Rate:  [68-88] 84  Resp:  [15-20] 18  BP: (115-166)/(71-97) 149/71      Intake/Output Summary (Last 24 hours) at 3/12/2019 0828  Last data filed at 3/12/2019 0509  Gross per 24 hour   Intake 2700 ml   Output --   Net 2700 ml       Lab Results:  Lab Results (last 24 hours)     Procedure Component Value Units Date/Time    Vitamin B12 [[id number]]  (Normal) Collected:  03/12/19 0623    Specimen:  Blood Updated:  03/12/19 0805     Vitamin B-12 251 pg/mL     Folate [[id number]]  (Normal) Collected:  03/12/19 0623    Specimen:  Blood Updated:  03/12/19 0805     Folate 6.36 ng/mL     Basic Metabolic Panel [[id number]]  (Abnormal) Collected:  03/12/19 0623    Specimen:  Blood Updated:  03/12/19 0708     Glucose 85 mg/dL      BUN 3 mg/dL      Creatinine 0.52 mg/dL      Sodium 140 mmol/L      Potassium 2.9 mmol/L      Chloride 107 mmol/L      CO2 28.0 mmol/L      Calcium 7.3 mg/dL      eGFR Non African Amer 119 mL/min/1.73      BUN/Creatinine Ratio 5.8     Anion Gap 5.0 mmol/L     Narrative:       GFR Normal >60  Chronic Kidney Disease <60  Kidney Failure <15    CBC & Differential [[id number]] Collected:  03/12/19 0623    Specimen:  Blood Updated:  03/12/19 0649    Narrative:       The following orders were created for panel order CBC & Differential.  Procedure                               Abnormality         Status                     ---------                               -----------         ------                     CBC Auto Differential[[id number]]        Abnormal            Final result             "     Please view results for these tests on the individual orders.    CBC Auto Differential [[id number]]  (Abnormal) Collected:  03/12/19 0623    Specimen:  Blood Updated:  03/12/19 0649     WBC 5.59 10*3/mm3      RBC 3.40 10*6/mm3      Hemoglobin 10.9 g/dL      Hematocrit 32.1 %      MCV 94.4 fL      MCH 32.1 pg      MCHC 34.0 g/dL      RDW 14.8 %      RDW-SD 51.2 fl      MPV 10.7 fL      Platelets 208 10*3/mm3      Neutrophil % 73.8 %      Lymphocyte % 14.8 %      Monocyte % 8.2 %      Eosinophil % 2.3 %      Basophil % 0.4 %      Immature Grans % 0.5 %      Neutrophils, Absolute 4.12 10*3/mm3      Lymphocytes, Absolute 0.83 10*3/mm3      Monocytes, Absolute 0.46 10*3/mm3      Eosinophils, Absolute 0.13 10*3/mm3      Basophils, Absolute 0.02 10*3/mm3      Immature Grans, Absolute 0.03 10*3/mm3      nRBC 0.0 /100 WBC     Blood Culture - Blood, Hand, Left [[id number]] Collected:  03/10/19 1727    Specimen:  Blood from Hand, Left Updated:  03/11/19 1815     Blood Culture No growth at 24 hours    Blood Culture - Blood, Arm, Left [[id number]] Collected:  03/10/19 1727    Specimen:  Blood from Arm, Left Updated:  03/11/19 1815     Blood Culture No growth at 24 hours    Lactic Acid, Plasma [[id number]]  (Abnormal) Collected:  03/11/19 1146    Specimen:  Blood Updated:  03/11/19 1221     Lactate 2.5 mmol/L     Blood Culture - Blood, Arm, Left [[id number]] Collected:  03/10/19 1115    Specimen:  Blood from Arm, Left Updated:  03/11/19 1215     Blood Culture No growth at 24 hours    Blood Culture - Blood, Arm, Left [[id number]] Collected:  03/10/19 1120    Specimen:  Blood from Arm, Left Updated:  03/11/19 1215     Blood Culture No growth at 24 hours          CBC:   Results from last 7 days   Lab Units 03/12/19  0623 03/11/19  0702 03/10/19  1046   WBC 10*3/mm3 5.59 6.83 6.38   HEMOGLOBIN g/dL 10.9* 11.1* 13.3   HEMATOCRIT % 32.1* 32.9* 38.5   PLATELETS 10*3/mm3 208 243 243     BMP:   Results from last 7 days   Lab Units  03/12/19  0623 03/11/19  0702 03/10/19  1046   SODIUM mmol/L 140 142 140   POTASSIUM mmol/L 2.9* 2.5* 3.3*   CHLORIDE mmol/L 107 107 98   CO2 mmol/L 28.0 29.0 30.0   BUN mg/dL 3* 9 9   CREATININE mg/dL 0.52 0.79 0.70   EGFR IF NONAFRICN AM mL/min/1.73 119 74 85   GLUCOSE mg/dL 85 100 101*   CALCIUM mg/dL 7.3* 7.2* 8.1*   ALT (SGPT) U/L  --  25 17     INR:       Culture Results:   Blood Culture   Date Value Ref Range Status   03/10/2019 No growth at 24 hours  Preliminary   03/10/2019 No growth at 24 hours  Preliminary   03/10/2019 No growth at 24 hours  Preliminary   03/10/2019 No growth at 24 hours  Preliminary     Urine Culture   Date Value Ref Range Status   03/10/2019 Growth present, too young to evaluate  Preliminary       Radiology: None    Additional Studies Reviewed: None    Physical Exam:    GENERAL:  Well nourished/developed in no acute distress; mumbling incoherently. Obese.  SKIN: Turgor excellent, without wound, rash, lesion other than several LE scabbed abrasions.  HEENT: Normal cephalic without trauma.  Pupils reactive to light; L pupil anisocoria. Extraocular motions full without nystagmus.     External canals nonobstructive nontender without reddness.  Oral cavity without growths, exudates, and moist.  Posterior pharynx without mass, obstruction, redness.  No thyromegaly, mass, tenderness, lymphadenopathy and supple.  CV: Regular rhythm.  No murmur, gallop,  edema. Posterior pulses intact.  No carotid bruits.   CHEST: No chest wall tenderness or mass.   LUNGS: Symmetric motion with clear to auscultation.  ABD: Soft, nontender without mass.   ORTHO: Symmetric extremities without swelling/point tenderness.  Full gross range of motion.    NEURO: CN 2-12 grossly intact.  Symmetric facies. 1/4 x bicep 0/4 LE.  UE/LE variable; 2-/5 strength UE 0-1/5 LE  Nonfocal use extremities. Speech clear.    PSYCH: Disoriented x 3.  Pleasant calm, well kept.  Non-purposeful/directed conservation with intact short/long  gross memory.    Results Review:    above    ASSESSMENT/PLAN:  Reason for admit/problems addressing acutely:  Confusion-delirium (? Sepsis, ? Seizure)  Abnormal UA-(21-30WBC, mod leuk, 2+ bacteria) maybe UTI  CXR-infiltrate (note lack cough, etc)-maybe pneumonia  Sepsis (tachycardia 104, lactic acid 6.0-maybe  Low phenytoin level 3.0  Hypopotassemia-2.9  Drug screen THC, opiate  Dysphagia-confusion  NPO    Chronic problems to review/consider during care:  64 y/o y/o white female   Allergy/intolerance: see above  Procedural history: see above  Family history: see above  Obesity.  This is status post gastric bypass initial failure and regain of  weight.     1 para 1 AB 0.    Surgical menopause.  Osteoporosis on previous wrist study.    Hypertension.    Hypothyroidism.    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    History of DVT - left peroneal popliteal, 2008, treated              successfully with anticoagulation.   History anticoagulation: hx DVT/(coumadin)  Chronic insomnia.  Spinal spondylosis; this is primarily cervical with multiple surgeries.  Chronic neck pain.    Chronic back pain  Chronic narcotics-from pain management/Ruxer  Chronic depression - followed by Dr. Samaniego.    Degenerative joint disease that is diffuse.   LE weakness  Gait difficulty-chronic   History of gastric bypass and metabolic risk it carries.    Colon cancer history with previous surgery and chemo, no obvious              recurrence.  Gastroesophageal reflux.  Recurrent laryngitis  Documented neuropathy by EMG nerve conduction  Seizure disorder (2016)  antieliptics-Keppra/Dilantin  Abnormal CT chest-treating nonsurgically  Polypharmacy   Urinary incontience (history urge incontinence)      Rx-reviewed, considered with changes as needed: IV KCL runs  Labs reviewed, considered and changes made as needed: daily  cbc/chemistry  Imaging reviewed, and need for considered: shantal CXR tomorrow  Consults needed: neurology, speech  Diet reviewed, considered and changes made as needed: no changes until   Ok speech  Fluids reviewed, considered and changes made as needed: no changes  Increase activity: not a good option  Code status: full  Discussed possible/future discharge plans: patient expects home but has   Used NH  Case discussed with nursing: tagged  discharge summary to neurology  Available to talk if needed with POA/caregiver and members care team.    Josafat Gonzales MD  03/12/19  8:28 AM          Electronically signed by Josafat Gonzales MD at 3/12/2019  8:49 AM          Physical Therapy Notes (last 24 hours) (Notes from 3/11/2019  3:49 PM through 3/12/2019  3:49 PM)      Belkis Esteban PT, DPT, NCS at 3/12/2019  9:20 AM  Version 1 of 1         Problem: Patient Care Overview  Goal: Plan of Care Review  Outcome: Ongoing (interventions implemented as appropriate)   03/12/19 0917   Coping/Psychosocial   Plan of Care Reviewed With patient   OTHER   Outcome Summary PT evaluation completed. The patient is able to fully arouse and follow directions today. She is slow in motor responses and is generally weak all over. She has a low level of prior level of function that included minimal to no walking. She used a wheelchair most of the time and peddled around the home. She was able to stand for a few seconds this morning with assist of 2 people. She will benefit from further PT to increase her strength and her motor responses. Transitional care or SNF is recommended upon discharge due to the patient being unable to transfer to a chair at this time.    Plan of Care Review   Progress improving           Electronically signed by Belkis Esteban PT, DPT, NCS at 3/12/2019  9:20 AM     Belkis Esteban PT, DPT, NCS at 3/12/2019  9:20 AM  Version 1 of 1         Acute Care - Physical Therapy Initial Evaluation  [facility]      Patient Name: Bita Croft  : 1955  MRN: [id number]  Today's Date: 3/12/2019   Onset of Illness/Injury or Date of Surgery: 03/10/19  Date of Referral to PT: 03/10/19  Referring Physician: Dr. Gonzales      Admit Date: 3/10/2019    Visit Dx:     ICD-10-CM ICD-9-CM   1. Altered mental status, unspecified altered mental status type R41.82 780.97   2. Pneumonia of left lung due to infectious organism, unspecified part of lung J18.9 486   3. Acute urinary tract infection N39.0 599.0   4. Severe sepsis (CMS/HCC) A41.9 038.9    R65.20 995.92   5. Dysphagia, unspecified type R13.10 787.20   6. Impaired functional mobility and activity tolerance Z74.09 V49.89     Patient Active Problem List   Diagnosis   • Confusion   • Depression   • Anxiety   • Hyponatremia-often polydyspia involved   • Seizure disorder (CMS/HCC)   • Chronic neck pain   • Chronic back pain-Ruxer   • Hypothyroidism   • Abnormal thyroid blood test   • Hypertension   • Wellness examination-done   • Elevated fasting glucose   • Anemia   • Iron deficiency   • Degenerative joint disease of spine   • Conversion disorder with abnormal movement   • Reflux laryngitis   • Gait difficulty   • Weakness of both legs   • Sore throat   • Polydipsia   • Nocturia   • Frequency of urination   • Urge incontinence   • Laboratory test*   • Chronic narcotic use-Ruxer   • Abnormal x-ray   • Noncompliance   • Lung nodule, solitary   • Encounter for consultation   • Non-smoker   • Hx of colon cancer, stage I   • Altered mental status   • Anticoagulated: DVT/(?)   • History of radiation therapy     Past Medical History:   Diagnosis Date   • Asthma    • Cancer (CMS/HCC)     colon cancer   • Depression    • Disease of thyroid gland    • Hypertension    • Insomnia    • Seizure (CMS/HCC)    • Sleep apnea      Past Surgical History:   Procedure Laterality Date   • BACK SURGERY     • COLON SURGERY     • GASTRIC BYPASS     • HYSTERECTOMY     • KNEE ARTHROSCOPY      bilateral   •  "NECK SURGERY     • ORIF WRIST FRACTURE Right 6/9/2017    Procedure: WRIST OPEN REDUCTION INTERNAL FIXATION, RIGHT;  Surgeon: Alec Cardoza MD;  Location: [facility] OR;  Service:    • TONSILLECTOMY     • WRIST FRACTURE SURGERY          PT ASSESSMENT (last 12 hours)      Physical Therapy Evaluation     Row Name 03/12/19 0745          PT Evaluation Time/Intention    Subjective Information  no complaints  -MS     Document Type  evaluation  -MS     Mode of Treatment  physical therapy;concurrent therapy  -MS     Row Name 03/12/19 0745          General Information    Patient Profile Reviewed?  yes  -MS     Onset of Illness/Injury or Date of Surgery  03/10/19  -MS     Referring Physician  Dr. Gonzales  -MS     Patient Observations  alert;cooperative;agree to therapy  -MS     Patient/Family Observations  no family present  -MS     General Observations of Patient  pt fowlers in bed, asleep but easily woke with tactile stim  -MS     Prior Level of Function  min assist:;transfer;independent:;w/c or scooter pt peddles wth her feet, pt only walks very short distances  -MS     Equipment Currently Used at Home  shower chair;wheelchair;walker, rolling  -MS     Pertinent History of Current Functional Problem  presented with altered mental status. pt woke up confused and inappropriate mumbling \"oh God.\". Dx possible septic UTI, urine positive for opiates and THC  -MS     Existing Precautions/Restrictions  fall  -MS     Limitations/Impairments  safety/cognitive  -MS     Risks Reviewed  patient:;LOB;dizziness;increased discomfort;change in vital signs  -MS     Benefits Reviewed  patient:;improve function;increase independence;increase strength;increase balance;decrease risk of DVT;increase knowledge  -MS     Barriers to Rehab  medically complex;previous functional deficit;cognitive status;physical barrier  -MS     Row Name 03/12/19 0745          Relationship/Environment    Primary Source of Support/Comfort  spouse  -MS     Lives With  " spouse  -MS     Family Caregiver if Needed  spouse  -MS     Row Name 03/12/19 0745          Cognitive Assessment/Interventions    Additional Documentation  Cognitive Assessment/Intervention (Group)  -MS     Row Name 03/12/19 0745          Cognitive Assessment/Intervention- PT/OT    Affect/Mental Status (Cognitive)  confused  -MS     Orientation Status (Cognition)  oriented to;person;place;situation;disoriented to;time  -MS     Follows Commands (Cognition)  follows one step commands;75-90% accuracy;delayed response/completion;increased processing time needed  -MS     Personal Safety Interventions  elopement precautions initiated;fall prevention program maintained;gait belt;muscle strengthening facilitated;nonskid shoes/slippers when out of bed;supervised activity  -MS     Row Name 03/12/19 0745          Safety Issues, Functional Mobility    Safety Issues Affecting Function (Mobility)  ability to follow commands;problem solving;sequencing abilities  -MS     Impairments Affecting Function (Mobility)  balance;cognition;endurance/activity tolerance;motor planning;strength;range of motion (ROM)  -MS     Row Name 03/12/19 0745          Bed Mobility Assessment/Treatment    Bed Mobility Assessment/Treatment  supine-sit;sit-supine  -MS     Supine-Sit [place] (Bed Mobility)  minimum assist (75% patient effort);verbal cues;nonverbal cues (demo/gesture)  -MS     Sit-Supine [place] (Bed Mobility)  minimum assist (75% patient effort) for LEs only  -MS     Bed Mobility, Safety Issues  cognitive deficits limit understanding;decreased use of arms for pushing/pulling;decreased use of legs for bridging/pushing  -MS     Assistive Device (Bed Mobility)  bed rails  -MS     Row Name 03/12/19 0745          Transfer Assessment/Treatment    Transfer Assessment/Treatment  sit-stand transfer;stand-sit transfer  -MS     Comment (Transfers)  difficulty with maintaining upright posture  -MS     Sit-Stand [place] (Transfers)  minimum  assist (75% patient effort);2 person assist;verbal cues;nonverbal cues (demo/gesture)  -MS     Stand-Sit [place] (Transfers)  minimum assist (75% patient effort);verbal cues;nonverbal cues (demo/gesture) to control decent  -MS     Row Name 03/12/19 0745          Sit-Stand Transfer    Assistive Device (Sit-Stand Transfers)  -- HHA x2  -MS     Row Name 03/12/19 0745          Stand-Sit Transfer    Assistive Device (Stand-Sit Transfers)  -- HHA x2  -MS     [facility] Name 03/12/19 0745          Gait/Stairs Assessment/Training    Comment (Gait/Stairs)  unble to move a foot for steps in standing  -MS     [facility] Name 03/12/19 0745          General ROM    GENERAL ROM COMMENTS  B ankle dorsiflexion impaired 25% PROM and AROM, impaired AROM for knee ext and hip flexion by 25%  -MS     [facility] Name 03/12/19 0745          MMT (Manual Muscle Testing)    General MMT Comments  B ankle dorsiflexion 3-/5, knee ext 3-/5, hip flexion 2/5  -MS     Row Name 03/12/19 0745          Motor Assessment/Intervention    Additional Documentation  Balance (Group);Fine Motor Testing & Training (Group);Gross Motor Coordination (Group)  -MS     [facility] Name 03/12/19 0745          Gross Motor Coordination    Gross Motor Skill, Impairments Detail  LAISHA impaired in all extremites, pt moves slowly in general. Accuracy intact.  -MS     [facility] Name 03/12/19 0745          Balance    Balance  static sitting balance;static standing balance  -MS     Row Name 03/12/19 0745          Static Sitting Balance    Level of [place] (Supported Sitting, Static Balance)  contact guard assist  -MS     Sitting Position (Supported Sitting, Static Balance)  sitting on edge of bed  -MS     [facility] Name 03/12/19 0745          Static Standing Balance    Level of [place] (Supported Standing, Static Balance)  minimal assist, 75% patient effort;2 person assist  -MS     Time Able to Maintain Position (Supported Standing, Static Balance)  30 to 45 seconds  -MS     Assistive Device Utilized  (Supported Standing, Static Balance)  -- HHA x2  -MS     Comment (Supported Standing, Static Balance)  stood twice  -MS     Row Name 03/12/19 0745          Fine Motor Testing & Training    Comment, Fine Motor Coordination  intact B  -MS     Row Name 03/12/19 0745          Sensory Assessment/Intervention    Sensory General Assessment  -- pt reports numbness in B LE, pt able to localize all testing  -MS     Row Name 03/12/19 0745          Pain Assessment    Additional Documentation  Pain Scale: Numbers Pre/Post-Treatment (Group)  -MS     Row Name 03/12/19 0745          Pain Scale: Numbers Pre/Post-Treatment    Pain Scale: Numbers, Pretreatment  0/10 - no pain  -MS     Pain Scale: Numbers, Post-Treatment  0/10 - no pain  -MS     Row Name 03/12/19 5545          Plan of Care Review    Plan of Care Reviewed With  patient  -MS     Row Name 03/12/19 0745          Physical Therapy Clinical Impression    Date of Referral to PT  03/10/19  -MS     Patient/Family Goals Statement (PT Clinical Impression)  return to prior level of function  -MS     Criteria for Skilled Interventions Met (PT Clinical Impression)  yes;treatment indicated  -MS     Pathology/Pathophysiology Noted (Describe Specifically for Each System)  musculoskeletal;neuromuscular  -MS     Impairments Found (describe specific impairments)  arousal, attention, and cognition;gait, locomotion, and balance;muscle performance  -MS     Rehab Potential (PT Clinical Summary)  good, to achieve stated therapy goals  -MS     Predicted Duration of Therapy (PT)  until discharge  -MS     Care Plan Review (PT)  evaluation/treatment results reviewed;care plan/treatment goals reviewed;risks/benefits reviewed;patient/other agree to care plan;current/potential barriers reviewed  -MS     Row Name 03/12/19 0745          Physical Therapy Goals    Bed Mobility Goal Selection (PT)  bed mobility, PT goal 1  -MS     Transfer Goal Selection (PT)  transfer, PT goal 1  -MS     Gait Training  Goal Selection (PT)  gait training, PT goal 1  -MS     Row Name 03/12/19 0745          Bed Mobility Goal 1 (PT)    Activity/Assistive Device (Bed Mobility Goal 1, PT)  sit to supine;supine to sit  -MS     [place] Level/Cues Needed (Bed Mobility Goal 1, PT)  supervision required;verbal cues required  -MS     Time Frame (Bed Mobility Goal 1, PT)  long term goal (LTG);by discharge  -MS     Progress/Outcomes (Bed Mobility Goal 1, PT)  goal ongoing  -MS     Row Name 03/12/19 0745          Transfer Goal 1 (PT)    Activity/Assistive Device (Transfer Goal 1, PT)  sit-to-stand/stand-to-sit;bed-to-chair/chair-to-bed;walker, rolling  -MS     [place] Level/Cues Needed (Transfer Goal 1, PT)  minimum assist (75% or more patient effort);tactile cues required;verbal cues required  -MS     Time Frame (Transfer Goal 1, PT)  long term goal (LTG);by discharge  -MS     Progress/Outcome (Transfer Goal 1, PT)  goal ongoing  -MS     Row Name 03/12/19 0745          Gait Training Goal 1 (PT)    Activity/Assistive Device (Gait Training Goal 1, PT)  gait (walking locomotion);assistive device use;decrease fall risk;forward stepping;improve balance and speed;walker, rolling  -MS     [place] Level (Gait Training Goal 1, PT)  minimum assist (75% or more patient effort);verbal cues required;tactile cues required  -MS     Distance (Gait Goal 1, PT)  25ft  -MS     Time Frame (Gait Training Goal 1, PT)  long term goal (LTG);by discharge  -MS     Progress/Outcome (Gait Training Goal 1, PT)  goal ongoing  -MS     Row Name 03/12/19 0745          Positioning and Restraints    Post Treatment Position  bed  -MS     In Bed  fowlers;call light within reach;encouraged to call for assist;exit alarm on;side rails up x2  -MS     Row Name 03/12/19 0718          Living Environment    Home Accessibility  tub/shower is not walk in  -MS       User Key  (r) = Recorded By, (t) = Taken By, (c) = Cosigned By    Initials Name Provider Type    MS Esteban  Belkis FAUSTIN, PT, DPT, NCS Physical Therapist        Physical Therapy Education     Title: PT OT SLP Therapies (In Progress)     Topic: Physical Therapy (In Progress)     Point: Mobility training (In Progress)     Learning Progress Summary           Patient Acceptance, E, NR by MS at 3/12/2019  9:17 AM    Comment:  role of PT in her care                               User Key     Initials Effective Dates Name Provider Type Discipline    MS 06/19/18 -  Belkis Esteban, PT, DPT, NCS Physical Therapist PT              PT Recommendation and Plan  Anticipated Discharge Disposition (PT): transitional care, skilled nursing facility  Planned Therapy Interventions (PT Eval): balance training, bed mobility training, gait training, patient/family education, ROM (range of motion), strengthening, transfer training  Therapy Frequency (PT Clinical Impression): 2 times/day  Outcome Summary/Treatment Plan (PT)  Anticipated Discharge Disposition (PT): transitional care, skilled nursing facility  Plan of Care Reviewed With: patient  Progress: improving  Outcome Summary: PT evaluation completed. The patient is able to fully arouse and follow directions today. She is slow in motor responses and is generally weak all over. She has a low level of prior level of function that included minimal to no walking. She used a wheelchair most of the time and peddled around the home. She was able to stand for a few seconds this morning with assist of 2 people. She will benefit from further PT to increase her strength and her motor responses. Transitional care or SNF is recommended upon discharge due to the patient being unable to transfer to a chair at this time.   Outcome Measures     Row Name 03/12/19 0745             How much help from another person do you currently need...    Turning from your back to your side while in flat bed without using bedrails?  4  -MS      Moving from lying on back to sitting on the side of a flat bed without bedrails?   2  -MS      Moving to and from a bed to a chair (including a wheelchair)?  1  -MS      Standing up from a chair using your arms (e.g., wheelchair, bedside chair)?  2  -MS      Climbing 3-5 steps with a railing?  1  -MS      To walk in hospital room?  1  -MS      AM-PAC 6 Clicks Score  11  -MS         Functional Assessment    Outcome Measure Options  AM-PAC 6 Clicks Basic Mobility (PT)  -MS        User Key  (r) = Recorded By, (t) = Taken By, (c) = Cosigned By    Initials Name Provider Type    MS Belkis Esteban, PT, DPT, NCS Physical Therapist         Time Calculation:   PT Charges     Row Name 19 0920             Time Calculation    Start Time  0745  -MS      Stop Time  0823  -MS      Time Calculation (min)  38 min  -MS      PT Received On  19  -MS      PT Goal Re-Cert Due Date  19  -MS        User Key  (r) = Recorded By, (t) = Taken By, (c) = Cosigned By    Initials Name Provider Type    MS Belkis Esteban, PT, DPT, NCS Physical Therapist        Therapy Suggested Charges     Code   Minutes Charges    None           Therapy Charges for Today     Code Description Service Date Service Provider Modifiers Qty    [id number] HC PT EVAL LOW COMPLEXITY 3 3/12/2019 Belkis Esteban PT, DPT, IRWIN GP 1          PT G-Codes  Outcome Measure Options: AM-PAC 6 Clicks Basic Mobility (PT)  AM-PAC 6 Clicks Score: 11      Belkis Esteban PT, DPT, IRWIN  3/12/2019         Electronically signed by Belkis Esteban PT DPT, IRWIN at 3/12/2019  9:21 AM          Occupational Therapy Notes (last 24 hours) (Notes from 3/11/2019  3:49 PM through 3/12/2019  3:49 PM)      Stephanie Keenan, OTR/L at 3/12/2019  2:27 PM          Acute Care - Occupational Therapy Initial Evaluation  [facility]     Patient Name: Bita Croft  : 1955  MRN: [id number]  Today's Date: 3/12/2019  Onset of Illness/Injury or Date of Surgery: 03/10/19  Date of Referral to OT: 19  Referring Physician: Dr. Gonzales    Admit Date: 3/10/2019        ICD-10-CM ICD-9-CM   1. Altered mental status, unspecified altered mental status type R41.82 780.97   2. Pneumonia of left lung due to infectious organism, unspecified part of lung J18.9 486   3. Acute urinary tract infection N39.0 599.0   4. Severe sepsis (CMS/HCC) A41.9 038.9    R65.20 995.92   5. Dysphagia, unspecified type R13.10 787.20   6. Impaired functional mobility and activity tolerance Z74.09 V49.89   7. Impaired mobility and ADLs Z74.09 799.89     Patient Active Problem List   Diagnosis   • Confusion   • Depression   • Anxiety   • Hyponatremia-often polydyspia involved   • Seizure disorder (CMS/HCC)   • Chronic neck pain   • Chronic back pain-Ruxer   • Hypothyroidism   • Abnormal thyroid blood test   • Hypertension   • Wellness examination-done   • Elevated fasting glucose   • Anemia   • Iron deficiency   • Degenerative joint disease of spine   • Conversion disorder with abnormal movement   • Reflux laryngitis   • Gait difficulty   • Weakness of both legs   • Sore throat   • Polydipsia   • Nocturia   • Frequency of urination   • Urge incontinence   • Laboratory test*   • Chronic narcotic use-Ruxer   • Abnormal x-ray   • Noncompliance   • Lung nodule, solitary   • Non-smoker   • Hx of colon cancer, stage I   • Altered mental status   • Anticoagulated: DVT/(?)   • History of radiation therapy     Past Medical History:   Diagnosis Date   • Asthma    • Cancer (CMS/HCC)     colon cancer   • Depression    • Disease of thyroid gland    • Hypertension    • Insomnia    • Seizure (CMS/HCC)    • Sleep apnea      Past Surgical History:   Procedure Laterality Date   • BACK SURGERY     • COLON SURGERY     • GASTRIC BYPASS     • HYSTERECTOMY     • KNEE ARTHROSCOPY      bilateral   • NECK SURGERY     • ORIF WRIST FRACTURE Right 6/9/2017    Procedure: WRIST OPEN REDUCTION INTERNAL FIXATION, RIGHT;  Surgeon: Alec Cardoza MD;  Location: [facility] OR;  Service:    • TONSILLECTOMY     • WRIST FRACTURE SURGERY           "  OT ASSESSMENT FLOWSHEET (last 72 hours)      Occupational Therapy Evaluation     Row Name 03/12/19 0800                   OT Evaluation Time/Intention    Subjective Information  no complaints  -MW        Document Type  evaluation  -MW        Mode of Treatment  occupational therapy  -MW           General Information    Patient Profile Reviewed?  yes  -MW        Onset of Illness/Injury or Date of Surgery  03/10/19  -        Referring Physician  Dr. Gonzales  -        Patient Observations  alert;cooperative;agree to therapy  -MW        Patient/Family Observations  no family present  -MW        General Observations of Patient  pt fowlers in bed, no apparent distress  -MW        Prior Level of Function  min assist:;transfer;independent:;w/c or scooter reports only walking short distances, peddles w/c w feet  -MW        Equipment Currently Used at Home  shower chair;wheelchair;walker, rolling  -MW        Pertinent History of Current Functional Problem  presented with altered mental status. pt woke up confused and inappropriate mumbling \"oh God.\". Dx possible septic UTI, urine positive for opiates and THC  -MW        Existing Precautions/Restrictions  fall  -MW        Limitations/Impairments  safety/cognitive  -MW        Risks Reviewed  patient:;LOB;dizziness;increased discomfort;change in vital signs  -MW        Benefits Reviewed  patient:;improve function;increase independence;increase strength;increase balance;decrease risk of DVT;increase knowledge  -MW        Barriers to Rehab  medically complex;cognitive status;previous functional deficit;physical barrier  -MW           Relationship/Environment    Lives With  spouse  -MW        Family Caregiver if Needed  spouse  -MW           Resource/Environmental Concerns    Current Living Arrangements  home/apartment/condo  -MW           Cognitive Assessment/Interventions    Additional Documentation  Cognitive Assessment/Intervention (Group)  -MW           Cognitive " Assessment/Intervention- PT/OT    Affect/Mental Status (Cognitive)  confused  -        Orientation Status (Cognition)  oriented to;person;place;situation;disoriented to;time  -        Follows Commands (Cognition)  follows one step commands;75-90% accuracy;delayed response/completion;increased processing time needed  -        Personal Safety Interventions  elopement precautions initiated;fall prevention program maintained;gait belt;nonskid shoes/slippers when out of bed;supervised activity  -           Safety Issues, Functional Mobility    Safety Issues Affecting Function (Mobility)  ability to follow commands;insight into deficits/self awareness;problem solving;sequencing abilities  -        Impairments Affecting Function (Mobility)  balance;cognition;endurance/activity tolerance;motor planning;strength;range of motion (ROM)  -           Bed Mobility Assessment/Treatment    Bed Mobility Assessment/Treatment  supine-sit;sit-supine  -        Supine-Sit [place] (Bed Mobility)  minimum assist (75% patient effort);verbal cues;nonverbal cues (demo/gesture)  -        Sit-Supine [place] (Bed Mobility)  minimum assist (75% patient effort) for LE only  -        Bed Mobility, Safety Issues  cognitive deficits limit understanding;decreased use of arms for pushing/pulling;decreased use of legs for bridging/pushing  -        Assistive Device (Bed Mobility)  bed rails  -           Functional Mobility    Functional Mobility- Ind. Level  unable to perform  -        Functional Mobility- Comment  sit<>stand only  -           Transfer Assessment/Treatment    Transfer Assessment/Treatment  sit-stand transfer;stand-sit transfer  -           Sit-Stand Transfer    Sit-Stand [place] (Transfers)  minimum assist (75% patient effort);2 person assist;verbal cues;nonverbal cues (demo/gesture)  -        Assistive Device (Sit-Stand Transfers)  -- HHAx2  -           Stand-Sit Transfer    Stand-Sit  [place] (Transfers)  minimum assist (75% patient effort);verbal cues;nonverbal cues (demo/gesture)  -MW           ADL Assessment/Intervention    BADL Assessment/Intervention  lower body dressing  -MW           Lower Body Dressing Assessment/Training    Lower Body Dressing [place] Level  don;doff;socks;dependent (less than 25% patient effort)  -MW        Lower Body Dressing Position  edge of bed sitting  -MW        Comment (Lower Body Dressing)  able to ball up sock with BUE, unable to attempt task   -MW           BADL Safety/Performance    Impairments, BADL Safety/Performance  balance;cognition;endurance/activity tolerance;coordination;pain;strength;trunk/postural control  -MW           General ROM    GENERAL ROM COMMENTS  BUE AROM WFL  -MW           MMT (Manual Muscle Testing)    General MMT Comments  BUE grossly 4-/5  -MW           Motor Assessment/Interventions    Additional Documentation  Balance (Group);Fine Motor Testing & Training (Group);Gross Motor Coordination (Group)  -MW           Gross Motor Coordination    Gross Motor Skill, Impairments Detail  LAISHA impaired BUE, requires max vc to follow commands for assessment. Accuracy intact with vc  -MW           Balance    Balance  static sitting balance;static standing balance  -           Static Sitting Balance    Level of [place] (Supported Sitting, Static Balance)  contact guard assist  -MW        Sitting Position (Supported Sitting, Static Balance)  sitting on edge of bed  -MW           Static Standing Balance    Level of [place] (Supported Standing, Static Balance)  minimal assist, 75% patient effort;2 person assist  -MW        Time Able to Maintain Position (Supported Standing, Static Balance)  30 to 45 seconds  -MW        Comment (Supported Standing, Static Balance)  stood x2  -MW           Fine Motor Testing & Training    Comment, Fine Motor Coordination  opposition intact B, manipulates clothing with increased time  -MW            Sensory Assessment/Intervention    Sensory General Assessment  no sensation deficits identified BUE per pt  -MW           Positioning and Restraints    Pre-Treatment Position  in bed  -MW        Post Treatment Position  bed  -MW        In Bed  fowlers;call light within reach;encouraged to call for assist;exit alarm on;side rails up x2  -MW           Pain Scale: Numbers Pre/Post-Treatment    Pain Scale: Numbers, Pretreatment  0/10 - no pain  -MW        Pain Scale: Numbers, Post-Treatment  0/10 - no pain  -MW           Plan of Care Review    Plan of Care Reviewed With  patient  -MW           Clinical Impression (OT)    Date of Referral to OT  03/11/19  -MW        OT Diagnosis  decreased ADL  -MW        Prognosis (OT Eval)  good  -MW        Patient/Family Goals Statement (OT Eval)  increase mobility and adl independence  -MW        Criteria for Skilled Therapeutic Interventions Met (OT Eval)  yes;treatment indicated  -        Rehab Potential (OT Eval)  good, to achieve stated therapy goals  -        Therapy Frequency (OT Eval)  3 times/wk  -MW        Predicted Duration of Therapy Intervention (Therapy Eval)  until d/c  -MW        Care Plan Review (OT)  evaluation/treatment results reviewed;care plan/treatment goals reviewed;current/potential barriers reviewed;risks/benefits reviewed;patient/other agree to care plan  -MW        Anticipated Discharge Disposition (OT)  skilled nursing facility;transitional care  -           Planned OT Interventions    Planned Therapy Interventions (OT Eval)  activity tolerance training;BADL retraining;functional balance retraining;occupation/activity based interventions;patient/caregiver education/training;strengthening exercise;transfer/mobility retraining  -           OT Goals    Transfer Goal Selection (OT)  transfer, OT goal 1  -MW        Dressing Goal Selection (OT)  dressing, OT goal 1  -MW        Toileting Goal Selection (OT)  toileting, OT goal 1  -MW           Transfer  Goal 1 (OT)    Activity/Assistive Device (Transfer Goal 1, OT)  sit-to-stand/stand-to-sit;bed-to-chair/chair-to-bed;toilet;commode, bedside without drop arms;tub  -MW        [place] Level/Cues Needed (Transfer Goal 1, OT)  moderate assist (50-74% patient effort)  -MW        Time Frame (Transfer Goal 1, OT)  long term goal (LTG);by discharge  -MW        Progress/Outcome (Transfer Goal 1, OT)  goal ongoing  -MW           Dressing Goal 1 (OT)    Activity/Assistive Device (Dressing Goal 1, OT)  upper body dressing;lower body dressing  -MW        [place]/Cues Needed (Dressing Goal 1, OT)  moderate assist (50-74% patient effort)  -MW        Time Frame (Dressing Goal 1, OT)  long term goal (LTG);by discharge  -MW        Progress/Outcome (Dressing Goal 1, OT)  goal ongoing  -MW           Toileting Goal 1 (OT)    Activity/Device (Toileting Goal 1, OT)  toileting skills, all;commode;commode, bedside without drop arms  -MW        [place] Level/Cues Needed (Toileting Goal 1, OT)  minimum assist (75% or more patient effort)  -MW        Time Frame (Toileting Goal 1, OT)  long term goal (LTG);by discharge  -MW        Progress/Outcome (Toileting Goal 1, OT)  goal ongoing  -MW           Living Environment    Home Accessibility  tub/shower is not walk in  -MW          User Key  (r) = Recorded By, (t) = Taken By, (c) = Cosigned By    Initials Name Effective Dates    Stephanie Liao, OTR/L 08/28/18 -          Occupational Therapy Education     Title: PT OT SLP Therapies (In Progress)     Topic: Occupational Therapy (Done)     Point: ADL training (Done)     Description: Instruct learner(s) on proper safety adaptation and remediation techniques during self care or transfers.   Instruct in proper use of assistive devices.    Learning Progress Summary           Patient Acceptance, E, VU by BREANNA at 3/12/2019  2:26 PM    Comment:  ADL, bed mobility, transfer, benefit of increased activity and therapy, OT POC                                User Key     Initials Effective Dates Name Provider Type Discipline     08/28/18 -  Stephanie Keenan, OTR/L Occupational Therapist OT                  OT Recommendation and Plan  Outcome Summary/Treatment Plan (OT)  Anticipated Discharge Disposition (OT): skilled nursing facility, transitional care  Planned Therapy Interventions (OT Eval): activity tolerance training, BADL retraining, functional balance retraining, occupation/activity based interventions, patient/caregiver education/training, strengthening exercise, transfer/mobility retraining  Therapy Frequency (OT Eval): 3 times/wk  Plan of Care Review  Plan of Care Reviewed With: patient  Plan of Care Reviewed With: patient  Outcome Summary: OT eval completed. Pt with delayed initiation and processing throughout eval requiring increased time for tasks. Dep to don socks but able to ball sock with BUE in midline to prep for task with increased time. Opposition intact BUE. LAISHA intact but requires max vc to follow commands for accuracy. January required for bed mobility, min-modAx2 to stand. Pt has difficulty maintaining erect posture and does not control decent to sitting at EOB. Skilled OT required to address cognition, motor processing, and strength deficits limiting independnece with ADL and functional mobility. Recommend d/c to TCU or SNF pending progress.    Outcome Measures     Row Name 03/12/19 1400 03/12/19 0745          How much help from another person do you currently need...    Turning from your back to your side while in flat bed without using bedrails?  --  4  -MS     Moving from lying on back to sitting on the side of a flat bed without bedrails?  --  2  -MS     Moving to and from a bed to a chair (including a wheelchair)?  --  1  -MS     Standing up from a chair using your arms (e.g., wheelchair, bedside chair)?  --  2  -MS     Climbing 3-5 steps with a railing?  --  1  -MS     To walk in hospital room?  --  1  -MS     AM-PAC 6 Clicks  Score  --  11  -MS        How much help from another is currently needed...    Putting on and taking off regular lower body clothing?  1  -MW  --     Bathing (including washing, rinsing, and drying)  2  -MW  --     Toileting (which includes using toilet bed pan or urinal)  2  -MW  --     Putting on and taking off regular upper body clothing  2  -MW  --     Taking care of personal grooming (such as brushing teeth)  3  -MW  --     Eating meals  3  -MW  --     Score  13  -MW  --        Functional Assessment    Outcome Measure Options  AM-PAC 6 Clicks Daily Activity (OT)  -MW  AM-PAC 6 Clicks Basic Mobility (PT)  -MS       User Key  (r) = Recorded By, (t) = Taken By, (c) = Cosigned By    Initials Name Provider Type    MS Carlito Belkis ROXIE, PT, DPT, NCS Physical Therapist     Stephanie Keenan OTR/L Occupational Therapist          Time Calculation:   Time Calculation- OT     Row Name 03/12/19 1426             Time Calculation- OT    OT Start Time  0800 +10 min chart review  -      OT Stop Time  0832  -      OT Time Calculation (min)  32 min  -MW      OT Received On  03/12/19  -      OT Goal Re-Cert Due Date  03/22/19  -        User Key  (r) = Recorded By, (t) = Taken By, (c) = Cosigned By    Initials Name Provider Type     Stephanie Keenan OTR/L Occupational Therapist        Therapy Suggested Charges     Code   Minutes Charges    None           Therapy Charges for Today     Code Description Service Date Service Provider Modifiers Qty    [id number]  OT EVAL MOD COMPLEXITY 3 3/12/2019 Stephanie Keenan OTR/L GO 1               TRAN [place]/L  3/12/2019    Electronically signed by Stephanie Keenan OTR/L at 3/12/2019  2:28 PM     Stephanie Keenan OTR/L at 3/12/2019  2:25 PM          Problem: Patient Care Overview  Goal: Plan of Care Review  Outcome: Ongoing (interventions implemented as appropriate)   03/12/19 1418   Coping/Psychosocial   Plan of Care Reviewed With patient   OTHER   Outcome Summary  OT eval completed. Pt with delayed initiation and processing throughout eval requiring increased time for tasks. Dep to don socks but able to ball sock with BUE in midline to prep for task with increased time. Opposition intact BUE. LAISHA intact but requires max vc to follow commands for accuracy. January required for bed mobility, min-modAx2 to stand. Pt has difficulty maintaining erect posture and does not control decent to sitting at EOB. Skilled OT required to address cognition, motor processing, and strength deficits limiting independnece with ADL and functional mobility. Recommend d/c to TCU or SNF pending progress.   Plan of Care Review   Progress no change           Electronically signed by Stephanie Keenan, OTR/L at 3/12/2019  2:25 PM

## 2019-03-13 NOTE — PAYOR COMM NOTE
"3/12/19 CLINICAL UPDATE  UR  [id number]  [id number]      Bita Croft (63 y.o. Female)     Date of Birth Social Security Number Address Home Phone MRN    1955  [address]   [place] [address] [phone] [id number]    [place] Marital Status          Restorationism        Admission Date Admission Type Admitting Provider Attending Provider Department, Room/Bed    3/10/19 Emergency Josafat Gonzales MD Oliver, Randy Eugene, MD [facility] 3A, 340/1    Discharge Date Discharge Disposition Discharge Destination                       Attending Provider:  Josafat Gonzales MD    Allergies:  No Known Allergies    Isolation:  None   Infection:  None   Code Status:  CPR    Ht:  177.8 cm (70\")   Wt:  97.8 kg (215 lb 9.6 oz)    Admission Cmt:  None   Principal Problem:  None                Active Insurance as of 3/10/2019     Primary Coverage     Payor Plan Insurance Group Employer/Plan Group    [employer] A[employer] [id number]     Payor Plan Address Payor Plan Phone Number Payor Plan Fax Number Effective Dates    [address] [phone]  7/1/2017 - None Entered    [place] [address]       Subscriber Name Subscriber Birth Date Member ID       OCHOA CROFT 1955 [id number]                 Emergency Contacts      (Rel.) Home Phone Work Phone Mobile Phone    Carlito Croft (Spouse) [phone] -- --            ICU Vital Signs     Row Name 03/13/19 1042 03/13/19 0425 03/13/19 0009 03/12/19 2346 03/12/19 2044       Vitals    Temp  98.4 °F (36.9 °C)  98.8 °F (37.1 °C)  98.5 °F (36.9 °C)  --  --    Temp src  Oral  Oral  Oral  --  --    Pulse  86  82  85  82  84    Heart Rate Source  Monitor  Monitor  Monitor  Monitor  Monitor    Resp  18  16  16  --  16    Resp Rate Source  Visual  Visual  Visual  --  Visual    BP  128/68  133/82  122/73  --  --    BP Location  Left arm  Left arm  Left arm  --  --    BP Method  Automatic  Automatic  Automatic  --  --    Patient " Position  Lying  Lying  Lying  Lying  Lying       Oxygen Therapy    SpO2  96 %  97 %  96 %  99 %  100 %    Pulse Oximetry Type  Continuous  Continuous  Continuous  Continuous  Continuous    Device (Oxygen Therapy)  --  room air  room air  room air  room air    Row Name 03/12/19 2035 03/12/19 2029 03/12/19 1952 03/12/19 1836 03/12/19 1646       Height and Weight    Weight  --  --  --  --  97.5 kg (215 lb)       Vitals    Temp  --  98 °F (36.7 °C)  --  97.7 °F (36.5 °C)  --    Temp src  --  Oral  --  Oral  --    Pulse  90  92  --  75  --    Heart Rate Source  Monitor  Monitor  --  Monitor  --    Resp  16  16  --  18  --    Resp Rate Source  Visual  Visual  --  Visual  --    BP  --  143/81  --  149/95  --    BP Location  --  Right arm  --  Right arm  --    BP Method  --  Automatic  --  Automatic  --    Patient Position  Lying  Lying  --  Lying  --       Oxygen Therapy    SpO2  99 %  100 %  --  96 %  --    Pulse Oximetry Type  Continuous  Continuous  --  --  --    Device (Oxygen Therapy)  room air  room air  room air  room air  --    Row Name 03/12/19 1548 03/12/19 1543                Vitals    Pulse  82  85       Resp  18  18          Oxygen Therapy    SpO2  --  100 %       Pulse Oximetry Type  --  Continuous       Device (Oxygen Therapy)  --  room air           Intake & Output (last day)       03/12 0701 - 03/13 0700 03/13 0701 - 03/14 0700    P.O. 240     I.V. (mL/kg) 1287.1 (13.2) 1538.3 (15.8)    IV Piggyback 450     Total Intake(mL/kg) 1977.1 (20.3) 1538.3 (15.8)    Net +1977.1 +1538.3          Urine Unmeasured Occurrence 5 x 2 x    Stool Unmeasured Occurrence 1 x         Lines, Drains & Airways    Active LDAs     Name:   Placement date:   Placement time:   Site:   Days:    Peripheral IV 03/12/19 1510 Left;Posterior Wrist   03/12/19    1510    Wrist   less than 1                Hospital Medications (active)       Dose Frequency Start End    acetaminophen (TYLENOL) tablet 650 mg 650 mg Every 4 Hours PRN 3/10/2019   "   Sig - Route: Take 2 tablets by mouth Every 4 (Four) Hours As Needed for Mild Pain  or Fever. - Oral    AZITHROMYCIN 500 MG/250 ML 0.9% NS IVPB (vial-mate) 500 mg Every 24 Hours 3/11/2019 3/16/2019    Sig - Route: Infuse 500 mg into a venous catheter Daily. - Intravenous    Linked Group 1:  \"And\" Linked Group Details        cefTRIAXone (ROCEPHIN) 1 g/100 mL 0.9% NS (MBP) 1 g Every 24 Hours 3/11/2019 3/18/2019    Sig - Route: Infuse 100 mL into a venous catheter Daily. - Intravenous    Linked Group 1:  \"And\" Linked Group Details        cyanocobalamin injection 1,000 mcg 1,000 mcg Once 3/12/2019 3/12/2019    Sig - Route: Inject 1 mL into the appropriate muscle as directed by prescriber 1 (One) Time. - Intramuscular    DULoxetine (CYMBALTA) DR capsule 30 mg 30 mg Daily 3/10/2019     Sig - Route: Take 1 capsule by mouth Daily. - Oral    enoxaparin (LOVENOX) syringe 40 mg 40 mg Every 24 Hours 3/10/2019     Sig - Route: Inject 0.4 mL under the skin into the appropriate area as directed Daily. - Subcutaneous    fosphenytoin (Cerebyx) injection 100 mg  mg PE Every 12 Hours Scheduled 3/10/2019     Sig - Route: Infuse 2 mL into a venous catheter Every 12 (Twelve) Hours. - Intravenous    gabapentin (NEURONTIN) capsule 600 mg 600 mg Every 8 Hours Scheduled 3/10/2019     Sig - Route: Take 2 capsules by mouth Every 8 (Eight) Hours. - Oral    gadobenate dimeglumine (MULTIHANCE) injection 20 mL 20 mL Once in Imaging 3/12/2019 3/12/2019    Sig - Route: Infuse 20 mL into a venous catheter Once. - Intravenous    ipratropium-albuterol (DUO-NEB) nebulizer solution 3 mL 3 mL Every 4 Hours - RT 3/10/2019     Sig - Route: Take 3 mL by nebulization Every 4 (Four) Hours. - Nebulization    levETIRAcetam in NaCl 0.75% (KEPPRA) IVPB 1,000 mg 1,000 mg 2 Times Daily 3/10/2019     Sig - Route: Infuse 100 mL into a venous catheter 2 (Two) Times a Day. - Intravenous    levothyroxine (SYNTHROID, LEVOTHROID) tablet 100 mcg 100 mcg Every Early " "Morning 3/11/2019     Sig - Route: Take 1 tablet by mouth Every Morning. - Oral    loperamide (IMODIUM) capsule 2 mg 2 mg 4 Times Daily PRN 3/10/2019     Sig - Route: Take 1 capsule by mouth 4 (Four) Times a Day As Needed for Diarrhea. - Oral    LORazepam (ATIVAN) injection 1 mg 1 mg Every 4 Hours PRN 3/10/2019 3/20/2019    Sig - Route: Infuse 0.5 mL into a venous catheter Every 4 (Four) Hours As Needed for Anxiety or Agitation. - Intravenous    Magnesium Sulfate 2 gram / 50mL Infusion (GIVE X 3 BAGS TO EQUAL 6GM TOTAL DOSE) - Mg 1.1 - 1.5 mg/dl 2 g As Needed 3/11/2019     Sig - Route: Infuse 50 mL into a venous catheter As Needed (See Administration Instructions). - Intravenous    Linked Group 2:  \"Or\" Linked Group Details        Magnesium Sulfate 2 gram Bolus, followed by 8 gram infusion (total Mg dose 10 grams)- Mg less than or equal to 1mg/dL 2 g As Needed 3/11/2019     Sig - Route: Infuse 50 mL into a venous catheter As Needed (See Administration Instructions). - Intravenous    Linked Group 2:  \"Or\" Linked Group Details        Magnesium Sulfate 4 gram infusion- Mg 1.6-1.9 mg/dL 4 g As Needed 3/11/2019     Sig - Route: Infuse 100 mL into a venous catheter As Needed (See Administration Instructions). - Intravenous    Linked Group 2:  \"Or\" Linked Group Details        metoprolol tartrate (LOPRESSOR) tablet 50 mg 50 mg Daily 3/10/2019     Sig - Route: Take 1 tablet by mouth Daily. - Oral    Morphine (MS CONTIN) 12 hr tablet 15 mg 15 mg Every 12 Hours Scheduled 3/10/2019     Sig - Route: Take 1 tablet by mouth Every 12 (Twelve) Hours. - Oral    Morphine sulfate (PF) injection 2 mg 2 mg Every 3 Hours PRN 3/10/2019 3/20/2019    Sig - Route: Infuse 1 mL into a venous catheter Every 3 (Three) Hours As Needed for Severe Pain . - Intravenous    nitroglycerin (NITROSTAT) SL tablet 0.4 mg 0.4 mg Every 5 Minutes PRN 3/10/2019     Sig - Route: Place 1 tablet under the tongue Every 5 (Five) Minutes As Needed for Chest Pain " "(Chest Pain With Systolic Blood Pressure Greater Than 100). - Sublingual    oxybutynin XL (DITROPAN-XL) 24 hr tablet 5 mg 5 mg Daily 3/10/2019     Sig - Route: Take 1 tablet by mouth Daily. - Oral    oxyCODONE (ROXICODONE) immediate release tablet 10 mg 10 mg Every 4 Hours PRN 3/10/2019     Sig - Route: Take 2 tablets by mouth Every 4 (Four) Hours As Needed for Moderate Pain . - Oral    Pharmacy to Dose enoxaparin (LOVENOX)  Continuous PRN 3/10/2019     Sig - Route: Continuous As Needed for Consult. - Does not apply    potassium chloride (MICRO-K) CR capsule 20 mEq 20 mEq 3 Times Daily With Meals 3/13/2019     Sig - Route: Take 2 capsules by mouth 3 (Three) Times a Day With Meals. - Oral    potassium chloride 10 mEq in 100 mL IVPB 10 mEq Every 1 Hour PRN 3/11/2019     Sig - Route: Infuse 100 mL into a venous catheter Every 1 (One) Hour As Needed (See admin Instructions.). - Intravenous    potassium chloride 10 mEq in 100 mL IVPB 10 mEq Every 2 Hours 3/12/2019 3/12/2019    Sig - Route: Infuse 100 mL into a venous catheter Every 2 (Two) Hours. - Intravenous    potassium chloride 10 mEq in 100 mL IVPB 10 mEq Every 2 Hours 3/12/2019 3/12/2019    Sig - Route: Infuse 100 mL into a venous catheter Every 2 (Two) Hours. - Intravenous    sodium chloride 0.9 % flush 10 mL 10 mL As Needed 3/10/2019     Sig - Route: Infuse 10 mL into a venous catheter As Needed for Line Care. - Intravenous    Linked Group 3:  \"And\" Linked Group Details        sodium chloride 0.9 % flush 3 mL 3 mL Every 12 Hours Scheduled 3/10/2019     Sig - Route: Infuse 3 mL into a venous catheter Every 12 (Twelve) Hours. - Intravenous    sodium chloride 0.9 % flush 3-10 mL 3-10 mL As Needed 3/10/2019     Sig - Route: Infuse 3-10 mL into a venous catheter As Needed for Line Care. - Intravenous    venlafaxine XR (EFFEXOR-XR) 24 hr capsule 300 mg 300 mg Daily 3/10/2019     Sig - Route: Take 4 capsules by mouth Daily. - Oral    potassium chloride 10 mEq in 100 mL " IVPB (Discontinued) 10 mEq Every 2 Hours 3/13/2019 3/13/2019    Sig - Route: Infuse 100 mL into a venous catheter Every 2 (Two) Hours. - Intravenous    sodium chloride 0.9 % with KCl 20 mEq/L infusion (Discontinued) 100 mL/hr Continuous 3/11/2019 3/13/2019    Sig - Route: Infuse 100 mL/hr into a venous catheter Continuous. - Intravenous          Blood Administration Record (From admission, onward)    None          Lab Results (last 24 hours)     Procedure Component Value Units Date/Time    Basic Metabolic Panel [[id number]]  (Abnormal) Collected:  03/13/19 0624    Specimen:  Blood Updated:  03/13/19 0746     Glucose 73 mg/dL      BUN 3 mg/dL      Creatinine 0.54 mg/dL      Sodium 138 mmol/L      Potassium 3.1 mmol/L      Chloride 104 mmol/L      CO2 28.0 mmol/L      Calcium 7.4 mg/dL      eGFR Non African Amer 114 mL/min/1.73      BUN/Creatinine Ratio 5.6     Anion Gap 6.0 mmol/L     Narrative:       GFR Normal >60  Chronic Kidney Disease <60  Kidney Failure <15    CBC & Differential [[id number]] Collected:  03/13/19 0624    Specimen:  Blood Updated:  03/13/19 0731    Narrative:       The following orders were created for panel order CBC & Differential.  Procedure                               Abnormality         Status                     ---------                               -----------         ------                     CBC Auto Differential[[id number]]        Abnormal            Final result                 Please view results for these tests on the individual orders.    CBC Auto Differential [[id number]]  (Abnormal) Collected:  03/13/19 0624    Specimen:  Blood Updated:  03/13/19 0731     WBC 6.74 10*3/mm3      RBC 3.47 10*6/mm3      Hemoglobin 11.1 g/dL      Hematocrit 33.4 %      MCV 96.3 fL      MCH 32.0 pg      MCHC 33.2 g/dL      RDW 15.1 %      RDW-SD 52.6 fl      MPV 11.0 fL      Platelets 204 10*3/mm3      Neutrophil % 75.1 %      Lymphocyte % 14.4 %      Monocyte % 6.4 %      Eosinophil % 3.1 %       "Basophil % 0.7 %      Immature Grans % 0.3 %      Neutrophils, Absolute 5.06 10*3/mm3      Lymphocytes, Absolute 0.97 10*3/mm3      Monocytes, Absolute 0.43 10*3/mm3      Eosinophils, Absolute 0.21 10*3/mm3      Basophils, Absolute 0.05 10*3/mm3      Immature Grans, Absolute 0.02 10*3/mm3      nRBC 0.0 /100 WBC     Blood Culture - Blood, Hand, Left [[id number]] Collected:  03/10/19 1727    Specimen:  Blood from Hand, Left Updated:  03/12/19 1815     Blood Culture No growth at 2 days    Blood Culture - Blood, Arm, Left [[id number]] Collected:  03/10/19 1727    Specimen:  Blood from Arm, Left Updated:  03/12/19 1815     Blood Culture No growth at 2 days    Vitamin D 25 Hydroxy [[id number]]  (Abnormal) Collected:  03/12/19 0623    Specimen:  Blood Updated:  03/12/19 1656     25 Hydroxy, Vitamin D <12.8 ng/ml     Blood Culture - Blood, Arm, Left [[id number]] Collected:  03/10/19 1115    Specimen:  Blood from Arm, Left Updated:  03/12/19 1215     Blood Culture No growth at 2 days    Blood Culture - Blood, Arm, Left [[id number]] Collected:  03/10/19 1120    Specimen:  Blood from Arm, Left Updated:  03/12/19 1215     Blood Culture No growth at 2 days        Imaging Results (last 24 hours)     Procedure Component Value Units Date/Time    XR Chest PA & Lateral [[id number]] Collected:  03/13/19 0815     Updated:  03/13/19 0819    Narrative:       EXAMINATION: XR CHEST PA AND LATERAL- 3/13/2019 8:15 AM CDT     HISTORY: f/u \"infiltrate\"; R41.82-Altered mental status, unspecified;  J18.9-Pneumonia, unspecified organism; N39.0-Urinary tract infection,  site not specified; A41.9-Sepsis, unspecified organism; R65.20-Severe  sepsis without septic shock; R13.10-Dysphagia, unspecified; Z74.09-Other  reduced mobility; Z74.09-Other reduced mobility.     REPORT: Comparison is made with the chest x-ray from 3/10/2019.     The lungs are mildly hypoaerated, there is no significant change in left  perihilar and midlung infiltrate likely related to " pneumonia. No  pneumothorax or pleural effusion. Heart size is normal. There is ectasia  of the thoracic aorta has before. Previous cervical and lumbar fusion is  noted.       Impression:       Persistent left perihilar and left midlung infiltrate most  likely related to pneumonia. This is unchanged.  This report was finalized on 03/13/2019 08:16 by Dr. Fabian Duenas MD.    MRI Brain With & Without Contrast [[id number]] Collected:  03/12/19 1748     Updated:  03/12/19 1756    Narrative:       EXAMINATION:  MRI BRAIN W WO CONTRAST-  3/12/2019 5:11 PM CDT     HISTORY: Encephalopathy      COMPARISON: Head CT dated 3/10/2019     TECHNIQUE: Multiplanar MR imaging of the brain was performed with  gadolinium enhanced imaging.     FINDINGS:      There is no diffusion signal abnormality to indicate an acute ischemic  event/infarction.     There is diffuse central and cortical atrophy. A predominance of frontal  lobe atrophy observed.     There is confluent and punctate periventricular and subcortical FLAIR  signal hyperintensities compatible with mild chronic ischemic changes. A  punctate area of chronic ischemic change also noted in the left  cerebellar hemisphere.     There is no mass effect or midline shift. There is no hydrocephalus.     Normal flow voids are identified.     No abnormal gadolinium enhancement identified.     Pituitary gland is not enlarged.     Cervical spine imaged in part is unremarkable.       Impression:       1. Atrophy, frontal lobe predominance.  2. Chronic ischemic changes.  3. No acute intracranial process.  This report was finalized on 03/12/2019 17:52 by Dr. Obi Phan MD.        Orders (last 24 hrs)     Start     Ordered    03/13/19 1200  potassium chloride (MICRO-K) CR capsule 20 mEq  3 Times Daily With Meals      03/13/19 1034    03/13/19 1000  XR Chest PA & Lateral  1 Time Imaging      03/12/19 0852    03/13/19 0930  potassium chloride 10 mEq in 100 mL IVPB  Every 2 Hours,   Status:   Discontinued      19 0835    19 0600  Basic Metabolic Panel  Morning Draw,   Status:  Canceled      19 1538    19 0600  CBC Auto Differential  PROCEDURE ONCE      19 0001    19 2345  potassium chloride 10 mEq in 100 mL IVPB  Every 2 Hours      19 2251    19 2230  Overnight Sleep Oximetry Study  Bedtime Once - RT      19 1535    19 1815  gadobenate dimeglumine (MULTIHANCE) injection 20 mL  Once in Imaging      19 1727    19 1537  Vitamin D 25 Hydroxy  Once      19 1536    19 1429  OT Plan of Care Cert / Re-Cert  Once     Comments:  Occupational Therapy Plan of Care  Initial Certification  Certification Period: 3/12/2019 - 6/10/2019    Patient Name: Bita Croft  : 1955    (R41.82) Altered mental status, unspecified altered mental status type  (primary encounter diagnosis)  (J18.9) Pneumonia of left lung due to infectious organism, unspecified part of lung  (N39.0) Acute urinary tract infection  (A41.9,  R65.20) Severe sepsis (CMS/HCC)  (R13.10) Dysphagia, unspecified type  (Z74.09) Impaired functional mobility and activity tolerance  (Z74.09) Impaired mobility and ADLs                Assessment  OT Assessment  OT Diagnosis: decreased ADL  Rehab Potential (OT Eval): good, to achieve stated therapy goals  Criteria for Skilled Therapeutic Interventions Met (OT Eval): yes, treatment indicated        Rehab Goal Summary     Row Name 19 0831 19 0800 19 0745       Physical Therapy Goals    Bed Mobility Goal Selection (PT)  -  -  bed mobility, PT goal 1  -MS    Transfer Goal Selection (PT)  -  -  transfer, PT goal 1  -MS    Gait Training Goal Selection (PT)  -  -  gait training, PT goal 1  -MS       Bed Mobility Goal 1 (PT)    Activity/Assistive Device (Bed Mobility Goal 1, PT)  -  -  sit to   supine;supine to sit  -MS    [place] Level/Cues Needed (Bed Mobility Goal 1, PT)  -  -    supervision required;verbal  cues required  -MS    Time Frame (Bed Mobility Goal 1, PT)  -  -  long term goal (LTG);by   discharge  -MS    Progress/Outcomes (Bed Mobility Goal 1, PT)  -  -  goal ongoing  -MS       Transfer Goal 1 (PT)    Activity/Assistive Device (Transfer Goal 1, PT)  -  -    sit-to-stand/stand-to-sit;bed-to-chair/chair-to-bed;walker, rolling  -MS    [place] Level/Cues Needed (Transfer Goal 1, PT)  -  -  minimum   assist (75% or more patient effort);tactile cues required;verbal cues   required  -MS    Time Frame (Transfer Goal 1, PT)  -  -  long term goal (LTG);by discharge    -MS    Progress/Outcome (Transfer Goal 1, PT)  -  -  goal ongoing  -MS       Gait Training Goal 1 (PT)    Activity/Assistive Device (Gait Training Goal 1, PT)  -  -  gait (walking   locomotion);assistive device use;decrease fall risk;forward   stepping;improve balance and speed;walker, rolling  -MS    [place] Level (Gait Training Goal 1, PT)  -  -  minimum assist (75%   or more patient effort);verbal cues required;tactile cues required  -MS    Distance (Gait Goal 1, PT)  -  -  25ft  -MS    Time Frame (Gait Training Goal 1, PT)  -  -  long term goal (LTG);by   discharge  -MS    Progress/Outcome (Gait Training Goal 1, PT)  -  -  goal ongoing  -MS       Occupational Therapy Goals    Transfer Goal Selection (OT)  -  transfer, OT goal 1  -MW  -    Dressing Goal Selection (OT)  -  dressing, OT goal 1  -MW  -    Toileting Goal Selection (OT)  -  toileting, OT goal 1  -MW  -       Transfer Goal 1 (OT)    Activity/Assistive Device (Transfer Goal 1, OT)  -    sit-to-stand/stand-to-sit;bed-to-chair/chair-to-bed;toilet;commode,   bedside without drop arms;tub  -MW  -    [place] Level/Cues Needed (Transfer Goal 1, OT)  -  moderate assist   (50-74% patient effort)  -MW  -    Time Frame (Transfer Goal 1, OT)  -  long term goal (LTG);by discharge    -MW  -    Progress/Outcome (Transfer Goal 1, OT)  -  goal ongoing  -MW  -       Dressing Goal 1 (OT)     Activity/Assistive Device (Dressing Goal 1, OT)  -  upper body   dressing;lower body dressing  -MW  -    [place]/Cues Needed (Dressing Goal 1, OT)  -  moderate assist   (50-74% patient effort)  -MW  -    Time Frame (Dressing Goal 1, OT)  -  long term goal (LTG);by discharge    -MW  -    Progress/Outcome (Dressing Goal 1, OT)  -  goal ongoing  -MW  -       Toileting Goal 1 (OT)    Activity/Device (Toileting Goal 1, OT)  -  toileting skills,   all;commode;commode, bedside without drop arms  -MW  -    [place] Level/Cues Needed (Toileting Goal 1, OT)  -  minimum assist   (75% or more patient effort)  -MW  -    Time Frame (Toileting Goal 1, OT)  -  long term goal (LTG);by discharge    -MW  -    Progress/Outcome (Toileting Goal 1, OT)  -  goal ongoing  -MW  -       Swallow Goals (SLP)    Oral Nutrition/Hydration Goal Selection (SLP)  oral nutrition/hydration,   SLP goal 1  -CS  -  -       Oral Nutrition/Hydration Goal 1 (SLP)    Oral Nutrition/Hydration Goal 1, SLP  Pt will tolerate least restrictive   diet with no overt s/s of aspiration.   -CS  -  -    Time Frame (Oral Nutrition/Hydration Goal 1, SLP)  by discharge  -CS  -    -    Barriers (Oral Nutrition/Hydration Goal 1, SLP)  n/a  -CS  -  -    Progress/Outcomes (Oral Nutrition/Hydration Goal 1, SLP)  continuing   progress toward goal  -CS  -  -      User Key  (r) = Recorded By, (t) = Taken By, (c) = Cosigned By    Initials Name Provider Type Discipline    Belkis Hobson, PT, DPT, NCS Physical Therapist PT    Abran Barrera, MS CCC-SLP Speech and Language Pathologist SLP     Stephanie Keenan, OTR/L Occupational Therapist OT        OT Goals     Row Name 03/12/19 1426          Time Calculation    OT Goal Re-Cert Due Date  03/22/19  -       User Key  (r) = Recorded By, (t) = Taken By, (c) = Cosigned By    Initials Name Provider Type    Stephanie Liao, OTR/L Occupational Therapist          Plan    OT Plan  Therapy Frequency (OT Eval): 3  times/wk  Predicted Duration of Therapy Intervention (Therapy Eval): until d/c  Outcome Summary: OT eval completed. Pt with delayed initiation and processing throughout eval requiring increased time for tasks. Dep to don socks but able to ball sock with BUE in midline to prep for task with increased time. Opposition intact BUE. LAISHA intact but requires max vc to follow commands for accuracy. January required for bed mobility, min-modAx2 to stand. Pt has difficulty maintaining erect posture and does not control decent to sitting at EOB. Skilled OT required to address cognition, motor processing, and strength deficits limiting independnece with ADL and functional mobility. Recommend d/c to TCU or SNF pending progress.      Stephanie Keenan, OTR/L  3/12/2019        By cosigning this order, either electronically or physically, I certify that the therapy services are furnished while this patient is under my care, the services outline above are required by this patient, and will be reviewed every 90 days.        M.D.:__________________________________________ Date: ______________    03/12/19 1428    03/12/19 1115  cyanocobalamin injection 1,000 mcg  Once      03/12/19 1017    03/12/19 1000  potassium chloride 10 mEq in 100 mL IVPB  Every 2 Hours      03/12/19 0851    03/12/19 0600  CBC & Differential  Daily      03/11/19 0859    03/12/19 0600  Basic Metabolic Panel  Daily      03/11/19 0859    03/11/19 1400  AZITHROMYCIN 500 MG/250 ML 0.9% NS IVPB (vial-mate)  Every 24 Hours      03/10/19 1633    03/11/19 1300  cefTRIAXone (ROCEPHIN) 1 g/100 mL 0.9% NS (MBP)  Every 24 Hours      03/10/19 1633    03/11/19 0945  sodium chloride 0.9 % with KCl 20 mEq/L infusion  Continuous,   Status:  Discontinued      03/11/19 0858    03/11/19 0748  Magnesium Sulfate 2 gram Bolus, followed by 8 gram infusion (total Mg dose 10 grams)- Mg less than or equal to 1mg/dL  As Needed      03/11/19 0748    03/11/19 0748  Magnesium Sulfate 2 gram / 50mL  Infusion (GIVE X 3 BAGS TO EQUAL 6GM TOTAL DOSE) - Mg 1.1 - 1.5 mg/dl  As Needed      03/11/19 0748    03/11/19 0748  Magnesium Sulfate 4 gram infusion- Mg 1.6-1.9 mg/dL  As Needed      03/11/19 0748    03/11/19 0747  potassium chloride 10 mEq in 100 mL IVPB  Every 1 Hour PRN      03/11/19 0748    03/11/19 0600  levothyroxine (SYNTHROID, LEVOTHROID) tablet 100 mcg  Every Early Morning      03/10/19 1633    03/10/19 2300  fosphenytoin (Cerebyx) injection 100 mg PE  Every 12 Hours Scheduled      03/10/19 2212    03/10/19 2200  gabapentin (NEURONTIN) capsule 600 mg  Every 8 Hours Scheduled      03/10/19 1633    03/10/19 2130  levETIRAcetam in NaCl 0.75% (KEPPRA) IVPB 1,000 mg  2 Times Daily      03/10/19 2041    03/10/19 2100  Morphine (MS CONTIN) 12 hr tablet 15 mg  Every 12 Hours Scheduled      03/10/19 1633    03/10/19 2100  sodium chloride 0.9 % flush 3 mL  Every 12 Hours Scheduled      03/10/19 1633    03/10/19 1930  ipratropium-albuterol (DUO-NEB) nebulizer solution 3 mL  Every 4 Hours - RT      03/10/19 1633    03/10/19 1736  LORazepam (ATIVAN) injection 1 mg  Every 4 Hours PRN      03/10/19 1736    03/10/19 1730  DULoxetine (CYMBALTA) DR capsule 30 mg  Daily      03/10/19 1633    03/10/19 1730  metoprolol tartrate (LOPRESSOR) tablet 50 mg  Daily      03/10/19 1633    03/10/19 1730  oxybutynin XL (DITROPAN-XL) 24 hr tablet 5 mg  Daily      03/10/19 1633    03/10/19 1730  venlafaxine XR (EFFEXOR-XR) 24 hr capsule 300 mg  Daily      03/10/19 1633    03/10/19 1730  enoxaparin (LOVENOX) syringe 40 mg  Every 24 Hours      03/10/19 1637    03/10/19 1726  Morphine sulfate (PF) injection 2 mg  Every 3 Hours PRN      03/10/19 1726    03/10/19 1630  nitroglycerin (NITROSTAT) SL tablet 0.4 mg  Every 5 Minutes PRN      03/10/19 1633    03/10/19 1630  Pharmacy to Dose enoxaparin (LOVENOX)  Continuous PRN      03/10/19 1633    03/10/19 1630  sodium chloride 0.9 % flush 3-10 mL  As Needed      03/10/19 1633    03/10/19 1628   oxyCODONE (ROXICODONE) immediate release tablet 10 mg  Every 4 Hours PRN      03/10/19 1633    03/10/19 1627  loperamide (IMODIUM) capsule 2 mg  4 Times Daily PRN      03/10/19 1633    03/10/19 1627  acetaminophen (TYLENOL) tablet 650 mg  Every 4 Hours PRN      03/10/19 1633    03/10/19 1106  sodium chloride 0.9 % flush 10 mL  As Needed      03/10/19 1106    Unscheduled  Blood Gas, Arterial  As Needed     Comments:  Respiratory Distress      03/10/19 1633    Unscheduled  ECG 12 Lead  As Needed     Comments:  Nurse to Release if Patient Expericences Acute Chest Pain or Dysrhythmias    03/10/19 1633    Unscheduled  Potassium  As Needed     Comments:  For Ventricular Arrhythmias      03/10/19 1633    Unscheduled  Magnesium  As Needed     Comments:  For Ventricular Arrhythmias      03/10/19 1633    Unscheduled  Troponin  As Needed     Comments:  For Chest Pain      03/10/19 1633    Unscheduled  Digoxin Level  As Needed     Comments:  For Atrial Arrhythmias      03/10/19 1633    Unscheduled  Blood Gas, Arterial  As Needed     Comments:  Per O2 PolicyNotify Physician      03/10/19 1633    Unscheduled  Up in Chair  As Needed      03/10/19 1633    Unscheduled  Magnesium  As Needed      03/11/19 0748    Unscheduled  Potassium  As Needed      03/11/19 0748    Unscheduled  Magnesium  As Needed      03/11/19 0748    Unscheduled  Magnesium  As Needed      03/12/19 1538    Unscheduled  Potassium  As Needed      03/12/19 1538    --  SCANNED EKG      03/10/19 0000          Ventilator/Non-Invasive Ventilation Settings (From admission, onward)    None        Operative/Procedure Notes (last 72 hours) (Notes from 3/10/2019 11:54 AM through 3/13/2019 11:54 AM)     No notes of this type exist for this encounter.           Physician Progress Notes (last 72 hours) (Notes from 3/10/2019 11:54 AM through 3/13/2019 11:54 AM)      Josafat Gonzales MD at 3/12/2019  8:28 AM               LOS: 2 days   Patient Care Team:  Josafat Gonzales  "MD Geronimo as PCP - General  ChristianJosafat MD as PCP - Family Medicine  [facility], Pawel Edmondson MD as Consulting Physician (Cardiology)  Pawel Ho MD as Consulting Physician (Urology)  Krystal Dorman APRN as Nurse Practitioner (Neurology)    Chief Complaint:  confusion    Subjective      HISTORY OF PRESENT ILLNESS: many chronic illness and many medications.  Went to bed night before admit normal.  Slept through the evening ok.  When she awoke the next morning she was confused and inappropriately mumbling \"oh God\".  There was no recent cough dysuria fever injury vomiting diarrhea; anything.  Though there has been a concern about seizure disorder in the past the presenting complaints did not include any tonic-clonic activity.  Chest x-ray in the emergency department was suspicious for pneumonia and continued spiculated lingular nodule.  This lingular nodule has been seen by cardiovascular surgery and considering her multiple medical problems was followed without removal/treated with radiation oncology.  Her Dilantin level was less than 3 and low-IV cerebyx was given in the ED.  Alcohol salicylate and Tylenol levels were all normal. Chemistry showed low protein and albumin at 6 and 3 respectively; nothing else significant. Lactic acid was elevated at 6 ammonia was less than 9 and urinalysis showed 21-30 WBCs with moderate leukocytes.   CT of her head showed only chronic ischemic changes.  She was felt to be septic with a UTI/? pneumonia and was admitted with fluid boluses and appropriate treatments.  Antibiotics started in the ER were Rocephin and azithromycin. She had jenifer similar presentation      Interval History:  Less confused.  Recalls yesterday needing to say \"oh God\", \"help me\" but not knowing why.  Disoriented to place, date; knew me.  Slept.  Denies pain. Day 3 rocephin/azithromycin for CXR ? Pneumonia and UA abnormal; UTI.  No cough.  Awaiting speech to clear diet; as yesterday not " able to eat/swallow ok.     .  Current Facility-Administered Medications:   •  acetaminophen (TYLENOL) tablet 650 mg, 650 mg, Oral, Q4H PRN, Zeeshan Hester MD  •  cefTRIAXone (ROCEPHIN) 1 g/100 mL 0.9% NS (MBP), 1 g, Intravenous, Q24H, 1 g at 03/11/19 1340 **AND** AZITHROMYCIN 500 MG/250 ML 0.9% NS IVPB (vial-mate), 500 mg, Intravenous, Q24H, Zeeshan Hester MD, 500 mg at 03/11/19 1636  •  DULoxetine (CYMBALTA) DR capsule 30 mg, 30 mg, Oral, Daily, Zeeshan Hester MD  •  enoxaparin (LOVENOX) syringe 40 mg, 40 mg, Subcutaneous, Q24H, Josafat Gonzales MD, 40 mg at 03/11/19 1636  •  fosphenytoin (Cerebyx) injection 100 mg PE, 100 mg PE, Intravenous, Q12H, Zeeshan Hester MD, 100 mg PE at 03/11/19 2334  •  gabapentin (NEURONTIN) capsule 600 mg, 600 mg, Oral, Q8H, Zeeshan Hester MD, 600 mg at 03/12/19 0659  •  ipratropium-albuterol (DUO-NEB) nebulizer solution 3 mL, 3 mL, Nebulization, Q4H - RT, Zeeshan Hester MD, 3 mL at 03/12/19 0809  •  levETIRAcetam in NaCl 0.75% (KEPPRA) IVPB 1,000 mg, 1,000 mg, Intravenous, BID, Zeeshan Hester MD, 1,000 mg at 03/11/19 2240  •  levothyroxine (SYNTHROID, LEVOTHROID) tablet 100 mcg, 100 mcg, Oral, Q AM, Zeeshan Hester MD, 100 mcg at 03/12/19 0659  •  loperamide (IMODIUM) capsule 2 mg, 2 mg, Oral, 4x Daily PRN, Zeeshan Hester MD  •  LORazepam (ATIVAN) injection 1 mg, 1 mg, Intravenous, Q4H PRN, Zeeshan Hester MD, 1 mg at 03/11/19 1649  •  Magnesium Sulfate 2 gram Bolus, followed by 8 gram infusion (total Mg dose 10 grams)- Mg less than or equal to 1mg/dL, 2 g, Intravenous, PRN **OR** Magnesium Sulfate 2 gram / 50mL Infusion (GIVE X 3 BAGS TO EQUAL 6GM TOTAL DOSE) - Mg 1.1 - 1.5 mg/dl, 2 g, Intravenous, PRN **OR** Magnesium Sulfate 4 gram infusion- Mg 1.6-1.9 mg/dL, 4 g, Intravenous, PRN, Zeeshan Hester MD  •  metoprolol tartrate (LOPRESSOR) tablet 50 mg, 50 mg, Oral, Daily, Zeeshan Hester,  MD, 50 mg at 03/11/19 1022  •  Morphine (MS CONTIN) 12 hr tablet 15 mg, 15 mg, Oral, Q12H, Zeeshan Hester MD  •  Morphine sulfate (PF) injection 2 mg, 2 mg, Intravenous, Q3H PRN, Josafat Gonzales MD, 2 mg at 03/11/19 1340  •  nitroglycerin (NITROSTAT) SL tablet 0.4 mg, 0.4 mg, Sublingual, Q5 Min PRN, Zeeshan Hester MD  •  oxybutynin XL (DITROPAN-XL) 24 hr tablet 5 mg, 5 mg, Oral, Daily, Zeeshan Hester MD  •  oxyCODONE (ROXICODONE) immediate release tablet 10 mg, 10 mg, Oral, Q4H PRN, Zeeshan Hester MD, 10 mg at 03/11/19 1636  •  Pharmacy to Dose enoxaparin (LOVENOX), , Does not apply, Continuous PRN, Zeeshan Hester MD  •  potassium chloride 10 mEq in 100 mL IVPB, 10 mEq, Intravenous, Q1H PRN, Zeeshan Hester MD, Last Rate: 100 mL/hr at 03/11/19 2053, 10 mEq at 03/11/19 2053  •  [COMPLETED] Insert peripheral IV, , , Once **AND** sodium chloride 0.9 % flush 10 mL, 10 mL, Intravenous, PRN, Sergio Templeton, DO  •  sodium chloride 0.9 % flush 3 mL, 3 mL, Intravenous, Q12H, Zeeshan Hester MD, 3 mL at 03/10/19 2059  •  sodium chloride 0.9 % flush 3-10 mL, 3-10 mL, Intravenous, PRN, Zeeshan Hester MD  •  sodium chloride 0.9 % with KCl 20 mEq/L infusion, 125 mL/hr, Intravenous, Continuous, Josafat Gonzales MD, Last Rate: 125 mL/hr at 03/12/19 0511, 125 mL/hr at 03/12/19 0511  •  venlafaxine XR (EFFEXOR-XR) 24 hr capsule 300 mg, 300 mg, Oral, Daily, Zeeshan Hester MD    Review of Systems:   GENERAL:  Inactive before admit; and since here-slower with limits, speed, stamina for age and situation. Sleep was last night ok. No fever now.  ENDO:  No syncope, near or diaphoretic sweaty spells.  BS Ok.  HEENT: No head injury or headache.  No vision change.  No significant hearing loss.  Ears without pain/drainage.  No sore throat.  No significant nasal/sinus congestion/drainage. No epistaxis.  CHEST: No chest wall tenderness or mass. No cough,  without  "wheeze.  No SOB; no hemoptysis.  CV: No chest pain, palpitations, ankle edema.  GI: No heartburn; awaiting speech.   No abdominal pain, diarrhea, constipation.  No rectal bleeding, or melena.    :  Voids without dysuria.   ORTHO: No painful/swollen joints but various on /off sore.  No mention sore neck or back; has history chronic neck/back pain.  NEURO: No dizziness, weakness of extremities.  Same numbness/paresthesias.  PSYCH:  Less confused/less memory loss.  Mood good; no apparent anxious, depressed but long history of this/Rx/psych.     Objective     Vital Signs  /71 (BP Location: Right arm, Patient Position: Lying)   Pulse 84   Temp 98 °F (36.7 °C) (Oral)   Resp 18   Ht 177.8 cm (70\")   Wt 97.8 kg (215 lb 9.6 oz)   LMP  (LMP Unknown)   SpO2 95%   BMI 30.94 kg/m²    Temp:  [98 °F (36.7 °C)-99.2 °F (37.3 °C)] 98 °F (36.7 °C)  Heart Rate:  [68-88] 84  Resp:  [15-20] 18  BP: (115-166)/(71-97) 149/71      Intake/Output Summary (Last 24 hours) at 3/12/2019 0828  Last data filed at 3/12/2019 0509  Gross per 24 hour   Intake 2700 ml   Output --   Net 2700 ml       Lab Results:  Lab Results (last 24 hours)     Procedure Component Value Units Date/Time    Vitamin B12 [[id number]]  (Normal) Collected:  03/12/19 0623    Specimen:  Blood Updated:  03/12/19 0805     Vitamin B-12 251 pg/mL     Folate [[id number]]  (Normal) Collected:  03/12/19 0623    Specimen:  Blood Updated:  03/12/19 0805     Folate 6.36 ng/mL     Basic Metabolic Panel [[id number]]  (Abnormal) Collected:  03/12/19 0623    Specimen:  Blood Updated:  03/12/19 0708     Glucose 85 mg/dL      BUN 3 mg/dL      Creatinine 0.52 mg/dL      Sodium 140 mmol/L      Potassium 2.9 mmol/L      Chloride 107 mmol/L      CO2 28.0 mmol/L      Calcium 7.3 mg/dL      eGFR Non African Amer 119 mL/min/1.73      BUN/Creatinine Ratio 5.8     Anion Gap 5.0 mmol/L     Narrative:       GFR Normal >60  Chronic Kidney Disease <60  Kidney Failure <15    CBC & Differential " [[id number]] Collected:  03/12/19 0623    Specimen:  Blood Updated:  03/12/19 0649    Narrative:       The following orders were created for panel order CBC & Differential.  Procedure                               Abnormality         Status                     ---------                               -----------         ------                     CBC Auto Differential[[id number]]        Abnormal            Final result                 Please view results for these tests on the individual orders.    CBC Auto Differential [[id number]]  (Abnormal) Collected:  03/12/19 0623    Specimen:  Blood Updated:  03/12/19 0649     WBC 5.59 10*3/mm3      RBC 3.40 10*6/mm3      Hemoglobin 10.9 g/dL      Hematocrit 32.1 %      MCV 94.4 fL      MCH 32.1 pg      MCHC 34.0 g/dL      RDW 14.8 %      RDW-SD 51.2 fl      MPV 10.7 fL      Platelets 208 10*3/mm3      Neutrophil % 73.8 %      Lymphocyte % 14.8 %      Monocyte % 8.2 %      Eosinophil % 2.3 %      Basophil % 0.4 %      Immature Grans % 0.5 %      Neutrophils, Absolute 4.12 10*3/mm3      Lymphocytes, Absolute 0.83 10*3/mm3      Monocytes, Absolute 0.46 10*3/mm3      Eosinophils, Absolute 0.13 10*3/mm3      Basophils, Absolute 0.02 10*3/mm3      Immature Grans, Absolute 0.03 10*3/mm3      nRBC 0.0 /100 WBC     Blood Culture - Blood, Hand, Left [[id number]] Collected:  03/10/19 1727    Specimen:  Blood from Hand, Left Updated:  03/11/19 1815     Blood Culture No growth at 24 hours    Blood Culture - Blood, Arm, Left [[id number]] Collected:  03/10/19 1727    Specimen:  Blood from Arm, Left Updated:  03/11/19 1815     Blood Culture No growth at 24 hours    Lactic Acid, Plasma [[id number]]  (Abnormal) Collected:  03/11/19 1146    Specimen:  Blood Updated:  03/11/19 1221     Lactate 2.5 mmol/L     Blood Culture - Blood, Arm, Left [[id number]] Collected:  03/10/19 1115    Specimen:  Blood from Arm, Left Updated:  03/11/19 1215     Blood Culture No growth at 24 hours    Blood Culture - Blood,  Arm, Left [[id number]] Collected:  03/10/19 1120    Specimen:  Blood from Arm, Left Updated:  03/11/19 1215     Blood Culture No growth at 24 hours          CBC:   Results from last 7 days   Lab Units 03/12/19  0623 03/11/19  0702 03/10/19  1046   WBC 10*3/mm3 5.59 6.83 6.38   HEMOGLOBIN g/dL 10.9* 11.1* 13.3   HEMATOCRIT % 32.1* 32.9* 38.5   PLATELETS 10*3/mm3 208 243 243     BMP:   Results from last 7 days   Lab Units 03/12/19  0623 03/11/19  0702 03/10/19  1046   SODIUM mmol/L 140 142 140   POTASSIUM mmol/L 2.9* 2.5* 3.3*   CHLORIDE mmol/L 107 107 98   CO2 mmol/L 28.0 29.0 30.0   BUN mg/dL 3* 9 9   CREATININE mg/dL 0.52 0.79 0.70   EGFR IF NONAFRICN AM mL/min/1.73 119 74 85   GLUCOSE mg/dL 85 100 101*   CALCIUM mg/dL 7.3* 7.2* 8.1*   ALT (SGPT) U/L  --  25 17     INR:       Culture Results:   Blood Culture   Date Value Ref Range Status   03/10/2019 No growth at 24 hours  Preliminary   03/10/2019 No growth at 24 hours  Preliminary   03/10/2019 No growth at 24 hours  Preliminary   03/10/2019 No growth at 24 hours  Preliminary     Urine Culture   Date Value Ref Range Status   03/10/2019 Growth present, too young to evaluate  Preliminary       Radiology: None    Additional Studies Reviewed: None    Physical Exam:    GENERAL:  Well nourished/developed in no acute distress; mumbling incoherently. Obese.  SKIN: Turgor excellent, without wound, rash, lesion other than several LE scabbed abrasions.  HEENT: Normal cephalic without trauma.  Pupils reactive to light; L pupil anisocoria. Extraocular motions full without nystagmus.     External canals nonobstructive nontender without reddness.  Oral cavity without growths, exudates, and moist.  Posterior pharynx without mass, obstruction, redness.  No thyromegaly, mass, tenderness, lymphadenopathy and supple.  CV: Regular rhythm.  No murmur, gallop,  edema. Posterior pulses intact.  No carotid bruits.   CHEST: No chest wall tenderness or mass.   LUNGS: Symmetric motion with  clear to auscultation.  ABD: Soft, nontender without mass.   ORTHO: Symmetric extremities without swelling/point tenderness.  Full gross range of motion.    NEURO: CN 2-12 grossly intact.  Symmetric facies. 1/4 x bicep 0/4 LE.  UE/LE variable; 2-/5 strength UE 0-1/5 LE  Nonfocal use extremities. Speech clear.    PSYCH: Disoriented x 3.  Pleasant calm, well kept.  Non-purposeful/directed conservation with intact short/long gross memory.    Results Review:    above    ASSESSMENT/PLAN:  Reason for admit/problems addressing acutely:  Confusion-delirium (? Sepsis, ? Seizure)  Abnormal UA-(21-30WBC, mod leuk, 2+ bacteria) maybe UTI  CXR-infiltrate (note lack cough, etc)-maybe pneumonia  Sepsis (tachycardia 104, lactic acid 6.0-maybe  Low phenytoin level 3.0  Hypopotassemia-2.9  Drug screen THC, opiate  Dysphagia-confusion  NPO    Chronic problems to review/consider during care:  62 y/o y/o white female   Allergy/intolerance: see above  Procedural history: see above  Family history: see above  Obesity.  This is status post gastric bypass initial failure and regain of  weight.     1 para 1 AB 0.    Surgical menopause.  Osteoporosis on previous wrist study.    Hypertension.    Hypothyroidism.    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    History of DVT - left peroneal popliteal, 2008, treated              successfully with anticoagulation.   History anticoagulation: hx DVT/(coumadin)  Chronic insomnia.  Spinal spondylosis; this is primarily cervical with multiple surgeries.  Chronic neck pain.    Chronic back pain  Chronic narcotics-from pain management/[facility]uxer  Chronic depression - followed by Dr. Samaniego.    Degenerative joint disease that is diffuse.   LE weakness  Gait difficulty-chronic   History of gastric bypass and metabolic risk it carries.    Colon cancer history with previous surgery and  chemo, no obvious              recurrence.  Gastroesophageal reflux.  Recurrent laryngitis  Documented neuropathy by EMG nerve conduction  Seizure disorder (2016)  antieliptics-Keppra/Dilantin  Abnormal CT chest-treating nonsurgically  Polypharmacy   Urinary incontience (history urge incontinence)      Rx-reviewed, considered with changes as needed: IV KCL runs  Labs reviewed, considered and changes made as needed: daily cbc/chemistry  Imaging reviewed, and need for considered: shantal CXR tomorrow  Consults needed: neurology, speech  Diet reviewed, considered and changes made as needed: no changes until   Ok speech  Fluids reviewed, considered and changes made as needed: no changes  Increase activity: not a good option  Code status: full  Discussed possible/future discharge plans: patient expects home but has   Used NH  Case discussed with nursing: tagged  discharge summary to neurology  Available to talk if needed with POA/caregiver and members care team.    Josafat Gonzales MD  03/12/19  8:28 AM          Electronically signed by Josafat Gonzales MD at 3/12/2019  8:49 AM          Consult Notes (last 72 hours) (Notes from 3/10/2019 11:55 AM through 3/13/2019 11:55 AM)      Tamara Gomez MD at 3/11/2019  4:38 PM      Consult Orders    1. Inpatient Neurology Consult General [[id number]] ordered by Josafat Gonzales MD at 03/11/19 0858                    Neurology Consult Note    Patient:  Bita Croft   YOB: 1955  MRN:  [id number]  Date of Admission:  3/10/2019 10:26 AM    Date: 3/11/2019    Referring Provider: Josafat Gonzales MD  Reason for Consultation: delirium; ? seizure disorder      History of present illness:     This is a 63 y.o. right handed female with H/O hypertension, seizure, sleep apnea, former smoker, anxiety and depression, colon cancer, and lung cancer s/p radiation treatment which ended in 12/2018, myelopathy evaluated for delirium; and ? seizure  "disorder    Patient woke up yesterday at 8 AM and was confused and was perseverating stating \"oh god\" and \"help\"   She recalls saying this and states she couldn't help but say it. In past  she has been diagnosed with  Seizures and supposedly is on dilantin and Keppra. However her Dilantin  level was < 3.0  She does have a low albumin of 2.5.      She has a H/O untreated sleep apnea.   states she keeps pulling her CPAP off and so he stopped trying to give it to her. She recalls an episode at [facility] where she was \"in a coma\" and suddenly woke up. She reports they  had stopped all of her pain meds then.  Since then her  gives her the medications.     Review of records from [facility] show she has a diagnosis of intractable generalized idiopathic status epilepticus for which she sees Dr Abrams but the last time seen was in 2017. She denies seeing a neurologist now.     Patient is on a number of medication that may cause or contribute to delirium including oxycodone, Norco, klonopin, ditropan, morphine, flexeril and most of these could certainly worsen sleep apnea  She also take Seroquel 100 mg at night which she states she takes for sleep    She also has been running a low potassium of only 2.5.    June 14, 2017 she underwent a spinal tap at [facility].  The sample was clear and colorless.  25 cc of CSF was obtained.  Her opening pressure was 9.  He had 3 white blood cells 3 red blood cells 54 of glucose and protein level of 38  She had a normal myelin basic protein at 3.42.  So in short her cerebrospinal fluid was normal.    EMG/NCS performed at 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 In the past she has had  a cervical spinal fusion, lumbar spinal fusion and lumbar laminectomy. She reports that she has had a " "history of spine surgery about 6 years ago and that she had some fractures of T6 through L1 but states she cannot recall how she fractured her spine. Records show tat she had a surgery of T 12 and it was following this surgery that she went to inpatient rehab and outpatient home therapy and then over time she mostly is using a wheelchair. Her back pain is the basis of all her chronic opioid use    She has not seen Dr. Abrams since June 2017 but was requesting medications up through January 2018.  Dr Abrams  would not refill those without her scheduling a follow-up appointment which she refused to do    He was hospitalized October 26, 2016 for altered mental status with hyponatremia and a background of diarrhea    She also has a H/o blood clots  Past Medical History:   Diagnosis Date   • Asthma    • Cancer (CMS/HCC)     colon cancer   • Depression    • Disease of thyroid gland    • Hypertension    • Insomnia    • Seizure (CMS/HCC)    • Sleep apnea        Past Surgical History:   Procedure Laterality Date   • BACK SURGERY     • COLON SURGERY     • GASTRIC BYPASS     • HYSTERECTOMY     • KNEE ARTHROSCOPY      bilateral   • NECK SURGERY     • ORIF WRIST FRACTURE Right 6/9/2017    Procedure: WRIST OPEN REDUCTION INTERNAL FIXATION, RIGHT;  Surgeon: Alec Cardoza MD;  Location: [facility];  Service:    • TONSILLECTOMY     • WRIST FRACTURE SURGERY     Surgical history based on Dr. Gonzales's notes in 2016   \"she had T and A at age 11. Right knee surgery  with Dr. Dodie Rodriguez at [facility] at age 13. SANDRA and BSO with Dr. Mckeon,  [facility], at age 32. T12 surgery with Dr. Park, [place], in 1993.   Gastric bypass in [place] in 2001. Left knee surgery with Dr. Martinez in [place] in 2005. C-spine surgery with Dr. Mandel at [place] 05/08/2006. C-spine  surgery with Dr. Mandel at [place] 02/04/2008. Gallbladder at [facility],  Dr. Roberts, 03/18/2010. Previous left knee arthroscopy, right laparoscopic  colectomy, " "Dr. Piedra, 08/13/2014. \"            Prior to Admission medications    Medication Sig Start Date End Date Taking? Authorizing Provider   acetaminophen (TYLENOL) 325 MG tablet Take 2 tablets by mouth Every 4 (Four) Hours As Needed for Mild Pain  or Fever. 6/7/18  Yes Josafat Gonzales MD   clonazePAM (KlonoPIN) 1 MG tablet Take 1 tablet by mouth 3 (Three) Times a Day As Needed for Anxiety. 12/17/18   Josafat Gonzales MD   clotrimazole (LOTRIMIN) 1 % external solution Wash/dry well three times a day After dry; apply light coating of clotrimazole cream for one week 7/16/18   Josafat Gonzales MD   cyclobenzaprine (FLEXERIL) 10 MG tablet Take 1 tablet by mouth 3 (Three) Times a Day. 10/21/18   ProviderBeverly MD   DULoxetine (CYMBALTA) 30 MG capsule Take 30 mg by mouth Daily.    ProviderBeverly MD   gabapentin (NEURONTIN) 600 MG tablet Take 1 tablet by mouth 3 (Three) Times a Day. 5/31/18   Josafat Gonzales MD   levETIRAcetam (KEPPRA) 1000 MG tablet TAKE 1 TABLET BY MOUTH TWICE DAILY 4/27/18   Josafat Gonzales MD   levothyroxine (SYNTHROID, LEVOTHROID) 100 MCG tablet Take 1 tablet by mouth Daily.    ProviderBeverly MD   loperamide (IMODIUM A-D) 2 MG tablet Take 1 tablet by mouth 4 (Four) Times a Day As Needed for Diarrhea. 6/7/18   Josafat Gonzales MD   metoprolol tartrate (LOPRESSOR) 50 MG tablet Take 1 tablet by mouth Daily. 10/29/18   Josafat Gonzales MD   MORPHABOND ER 15 MG tablet extended-release 12 hour Take 1 tablet by mouth Every Night. 11/14/18   Josafat Gonzales MD   nystatin (MYCOSTATIN) 792938 UNIT/GM powder Wash/dry well three times a day After dry;apply nystatin powder bid 7/16/18   Josafat Gonzales MD   oxyCODONE (ROXICODONE) 10 MG tablet Take 1 tablet by mouth Every 4 (Four) Hours As Needed for Moderate Pain . 12/19/18   Josafat Gonzales MD   oxyCODONE-acetaminophen (PERCOCET) 5-325 MG per tablet Take 2 tablets by mouth Every 4 (Four) " Hours As Needed for Moderate Pain . 11/14/18   Josafat Gonzales MD   phenytoin (DILANTIN) 100 MG ER capsule 100 mg in am and 200 mg in pm 12/26/17   Josafat Gonzales MD   PROAIR  (90 Base) MCG/ACT inhaler INHALE 1 PUFF BY MOUTH FOUR TIMES DAILY 1/25/19   Josafat Gonzales MD   QUEtiapine (SEROquel) 100 MG tablet Take 1 tablet by mouth 2 (Two) Times a Day. 7/17/17   Josafat Gonzales MD   Skin Protectants, Misc. (EUCERIN) cream Apply  topically to the appropriate area as directed 2 (Two) Times a Day. 10/29/18   Josafat Gonzales MD   solifenacin (VESICARE) 5 MG tablet Take 1 tablet by mouth Daily. 8/15/17   Pawel Ho MD   traZODone (DESYREL) 50 MG tablet Take 1 tablet by mouth Every Night. 11/14/18   Josafat Gonzales MD   venlafaxine XR (EFFEXOR-XR) 150 MG 24 hr capsule Take 2 capsules by mouth Daily. 6/7/18   Josafat Gonzales MD       Hospital scheduled medications:     albuterol 2.5 mg Nebulization Q6H - RT   ceftriaxone 1 g Intravenous Q24H   And      azithromycin 500 mg Intravenous Q24H   DULoxetine 30 mg Oral Daily   enoxaparin 40 mg Subcutaneous Q24H   fosphenytoin 100 mg PE Intravenous Q12H   gabapentin 600 mg Oral Q8H   ipratropium-albuterol 3 mL Nebulization Q4H - RT   levETIRAcetam 1,000 mg Intravenous BID   levothyroxine 100 mcg Oral Q AM   metoprolol tartrate 50 mg Oral Daily   Morphine 15 mg Oral Q12H   oxybutynin XL 5 mg Oral Daily   sodium chloride 3 mL Intravenous Q12H   venlafaxine  mg Oral Daily     Hospital PRN medications:  •  acetaminophen  •  loperamide  •  LORazepam  •  magnesium sulfate **OR** magnesium sulfate **OR** magnesium sulfate  •  Morphine  •  nitroglycerin  •  oxyCODONE  •  Pharmacy to Dose enoxaparin (LOVENOX)  •  potassium chloride  •  [COMPLETED] Insert peripheral IV **AND** sodium chloride  •  sodium chloride    No Known Allergies    Social History     Socioeconomic History   • Marital status:      Spouse name: Carlito    • Number of children: 1   • Years of education: 18+   • Highest education level: Not on file   Social Needs   • Financial resource strain: Not on file   • Food insecurity - worry: Not on file   • Food insecurity - inability: Not on file   • Transportation needs - medical: Not on file   • Transportation needs - non-medical: Not on file   Occupational History   • Occupation: retired     Comment: [employer]   Tobacco Use   • Smoking status: Never Smoker   • Smokeless tobacco: Never Used   Substance and Sexual Activity   • Alcohol use: No   • Drug use: No   • Sexual activity: Defer   Other Topics Concern   • Not on file   Social History Narrative   • Not on file     Family History   Problem Relation Age of Onset   • Heart attack Father    • Stroke Father    • Arthritis Father    • Hypertension Father    • Heart attack Mother    • Stroke Mother    • Arthritis Mother    • Hypertension Mother        Review of Systems  A 14 point review of systems was reviewed and was negative except for chronic pain    Vital Signs   Temp:  [97.5 °F (36.4 °C)-99.4 °F (37.4 °C)] 98.6 °F (37 °C)  Heart Rate:  [76-98] 78  Resp:  [16-26] 18  BP: (145-152)/(76-93) 152/92    General Exam:  Head:  Normal cephalic, atraumatic  HEENT:  Neck supple  Fundoscopic Exam:  No signs of disc edema  CVS:  Regular rate and rhythm.  No murmurs  Carotid Examination:  No bruits  Lungs:  Clear to auscultation  Abdomen:  Non-tender, Non-distended  Extremities:  No signs of peripheral edema  Skin:  No rashes    Neurologic Exam:    Mental Status:    -Awake, Alert, Oriented but she does not know what meds she is on and she cannot recall why certain procedures have been done in her past.-No word finding difficulties  -No aphasia  -No dysarthria  -Follows simple and complex commands    CN II:  Visual fields full.  Pupils equally reactive to light  CN III, IV, VI:  Extraocular Muscles full with no signs of nystagmus  CN V:  Facial sensory is symmetric    CN VII:  Facial motor symmetric  CN VIII:  Gross hearing intact bilaterally  CN IX/X:  Palate elevates symmetrically  CN XI:  Shoulder shrug symmetric  CN XII:  Tongue is midline on protrusion    Motor: (strength out of 5:  1= minimal movement, 2 = movement in plane of gravity, 3 = movement against gravity, 4 = movement against some resistance, 5 = full strength)    -Right Lower Ext: She is able to raise up her legs I.e.hip flexion from the hip but cannot sustain this for long.  Ankle dorsiflexors are quite strong knee flexors and extensors appear weak but there is suboptimal effort  -Left Lower Ext: She is able to raise up her legs i.e. hip flexion- from the hip but cannot sustain this for long.  Ankle dorsiflexors are quite strong knee flexors and extensors appear weak but there is suboptimal effort    DTR:  -Right   Bicep: 2+ Triceps: 2+ Brachioradialis: 2+   Patella: 2++ Ankle: 2+ Neg Babinski  -Left   Bicep: 2+ Triceps: 2+ Brachioradialis: 2+   Patella: 2++ Ankle: 2+ Neg Babinski    Sensory:  -Intact to light touch    Coordination:  -Finger to nose intact  -Heel to shin intact      Gait  -She is able to stand walk and transfer but this was not observed for safety reasons.      Results Review:  Lab Results (last 7 days)     Procedure Component Value Units Date/Time    Lactic Acid, Plasma [[id number]]  (Abnormal) Collected:  03/11/19 1146    Specimen:  Blood Updated:  03/11/19 1221     Lactate 2.5 mmol/L     Blood Culture - Blood, Arm, Left [[id number]] Collected:  03/10/19 1115    Specimen:  Blood from Arm, Left Updated:  03/11/19 1215     Blood Culture No growth at 24 hours    Blood Culture - Blood, Arm, Left [[id number]] Collected:  03/10/19 1120    Specimen:  Blood from Arm, Left Updated:  03/11/19 1215     Blood Culture No growth at 24 hours    Urine Culture - Urine, Urine, Catheter [[id number]]  (Normal) Collected:  03/10/19 1318    Specimen:  Urine, Catheter Updated:  03/11/19 0826     Urine Culture Growth  present, too young to evaluate    Comprehensive Metabolic Panel [[id number]]  (Abnormal) Collected:  03/11/19 0702    Specimen:  Blood Updated:  03/11/19 0742     Glucose 100 mg/dL      BUN 9 mg/dL      Creatinine 0.79 mg/dL      Sodium 142 mmol/L      Potassium 2.5 mmol/L      Chloride 107 mmol/L      CO2 29.0 mmol/L      Calcium 7.2 mg/dL      Total Protein 5.1 g/dL      Albumin 2.50 g/dL      ALT (SGPT) 25 U/L      AST (SGOT) 36 U/L      Alkaline Phosphatase 97 U/L      Total Bilirubin 0.5 mg/dL      eGFR Non African Amer 74 mL/min/1.73      Globulin 2.6 gm/dL      A/G Ratio 1.0 g/dL      BUN/Creatinine Ratio 11.4     Anion Gap 6.0 mmol/L     Lactic Acid, Plasma [[id number]]  (Normal) Collected:  03/11/19 0702    Specimen:  Blood Updated:  03/11/19 0721     Lactate 2.0 mmol/L     CBC Auto Differential [[id number]]  (Abnormal) Collected:  03/11/19 0702    Specimen:  Blood Updated:  03/11/19 0715     WBC 6.83 10*3/mm3      RBC 3.49 10*6/mm3      Hemoglobin 11.1 g/dL      Hematocrit 32.9 %      MCV 94.3 fL      MCH 31.8 pg      MCHC 33.7 g/dL      RDW 14.7 %      RDW-SD 50.0 fl      MPV 10.4 fL      Platelets 243 10*3/mm3      Neutrophil % 74.5 %      Lymphocyte % 15.2 %      Monocyte % 9.2 %      Eosinophil % 0.1 %      Basophil % 0.4 %      Immature Grans % 0.6 %      Neutrophils, Absolute 5.08 10*3/mm3      Lymphocytes, Absolute 1.04 10*3/mm3      Monocytes, Absolute 0.63 10*3/mm3      Eosinophils, Absolute 0.01 10*3/mm3      Basophils, Absolute 0.03 10*3/mm3      Immature Grans, Absolute 0.04 10*3/mm3      nRBC 0.0 /100 WBC     Blood Culture - Blood, Hand, Left [[id number]] Collected:  03/10/19 1727    Specimen:  Blood from Hand, Left Updated:  03/11/19 0616     Blood Culture No growth at less than 24 hours    Blood Culture - Blood, Arm, Left [[id number]] Collected:  03/10/19 1727    Specimen:  Blood from Arm, Left Updated:  03/11/19 0616     Blood Culture No growth at less than 24 hours    Lactic Acid, Plasma  [[id number]]  (Abnormal) Collected:  03/10/19 2322    Specimen:  Blood Updated:  03/11/19 0006     Lactate 2.9 mmol/L     Lactic Acid, Plasma [[id number]]  (Abnormal) Collected:  03/10/19 1755    Specimen:  Blood Updated:  03/10/19 1820     Lactate 4.1 mmol/L     Lactic Acid, Reflex [[id number]]  (Abnormal) Collected:  03/10/19 1558    Specimen:  Blood Updated:  03/10/19 1619     Lactate 4.2 mmol/L     Lactic Acid, Reflex Timer (This will reflex a repeat order 3-3:15 hours after ordered.) [[id number]] Collected:  03/10/19 1132    Specimen:  Blood Updated:  03/10/19 1500     Extra Tube Hold for add-ons.     Comment: Auto resulted.       Urinalysis, Microscopic Only - Urine, Catheter [[id number]]  (Abnormal) Collected:  03/10/19 1318    Specimen:  Urine, Catheter Updated:  03/10/19 1355     RBC, UA 3-5 /HPF      WBC, UA 21-30 /HPF      Bacteria, UA 2+ /HPF      Squamous Epithelial Cells, UA 0-2 /HPF      Hyaline Casts, UA None Seen /LPF      Methodology Manual Light Microscopy    Urinalysis With Culture If Indicated - Urine, Catheter [[id number]]  (Abnormal) Collected:  03/10/19 1318    Specimen:  Urine, Catheter Updated:  03/10/19 1355     Color, UA Dark Yellow     Appearance, UA Clear     pH, UA 7.5     Specific Gravity, UA 1.022     Glucose, UA Negative     Ketones, UA 80 mg/dL (3+)     Bilirubin, UA Small (1+)     Blood, UA Trace     Protein, UA 30 mg/dL (1+)     Leuk Esterase, UA Moderate (2+)     Nitrite, UA Negative     Urobilinogen, UA 4.0 E.U./dL    Urine Drug Screen - Urine, Catheter [[id number]]  (Abnormal) Collected:  03/10/19 1318    Specimen:  Urine, Catheter Updated:  03/10/19 1341     Amphetamine Screen, Urine Negative     Barbiturates Screen, Urine Negative     Benzodiazepine Screen, Urine Negative     Cocaine Screen, Urine Negative     Methadone Screen, Urine Negative     Opiate Screen Positive     Phencyclidine (PCP), Urine Negative     THC, Screen, Urine Positive    Narrative:       Negative Thresholds  For Drugs Screened in Urine:    Amphetamines          500 ng/ml  Barbiturates          200 ng/ml  Benzodiazepines       200 ng/ml  Cocaine               150 ng/ml  Methadone             150 ng/ml  Opiates               300 ng/ml  Phencyclidine         25 ng/ml  THC                      50 ng/ml    The normal value for all drugs tested is negative. This report includes final unconfirmed screening results.  A positive result by this assay can be, at your request, sent to the Reference Lab for confirmation by gas chromatography. Unconfirmed results must not be used for non-medical purposes, such as employment or legal testing. Clinical consideration should be applied to any drug of abuse test result, particularly when unconfirmed results are used.    TSH [[id number]]  (Abnormal) Collected:  03/10/19 1046    Specimen:  Blood Updated:  03/10/19 1203     TSH 5.230 mIU/mL     Specimen:  Blood Updated:  03/10/19 1200    Lactic Acid, Plasma [[id number]]  (Abnormal) Collected:  03/10/19 1132    Specimen:  Blood Updated:  03/10/19 1153     Lactate 6.0 mmol/L     Ammonia [[id number]]  (Abnormal) Collected:  03/10/19 1132    Specimen:  Blood Updated:  03/10/19 1151     Ammonia <9 umol/L     T4, Free [[id number]]  (Normal) Collected:  03/10/19 1046    Specimen:  Blood Updated:  03/10/19 1149     Free T4 0.94 ng/dL     Troponin [[id number]]  (Normal) Collected:  03/10/19 1046    Specimen:  Blood Updated:  03/10/19 1144     Troponin I <0.012 ng/mL     Myoglobin, Serum [[id number]]  (Normal) Collected:  03/10/19 1046    Specimen:  Blood Updated:  03/10/19 1144     Myoglobin 56.6 ng/mL     Acetaminophen Level [[id number]]  (Abnormal) Collected:  03/10/19 1046    Specimen:  Blood Updated:  03/10/19 1135     Acetaminophen <10.0 mcg/mL     Salicylate Level [[id number]]  (Abnormal) Collected:  03/10/19 1046    Specimen:  Blood Updated:  03/10/19 1135     Salicylate <1.0 mg/dL     Phenytoin Level, Total [[id number]]  (Abnormal) Collected:   03/10/19 1046    Specimen:  Blood Updated:  03/10/19 1135     Phenytoin Level <3.0 mcg/mL     CK [[id number]]  (Normal) Collected:  03/10/19 1046    Specimen:  Blood Updated:  03/10/19 1132     Creatine Kinase 39 U/L     Comprehensive Metabolic Panel [[id number]]  (Abnormal) Collected:  03/10/19 1046    Specimen:  Blood Updated:  03/10/19 1132     Glucose 101 mg/dL      BUN 9 mg/dL      Creatinine 0.70 mg/dL      Sodium 140 mmol/L      Potassium 3.3 mmol/L      Chloride 98 mmol/L      CO2 30.0 mmol/L      Calcium 8.1 mg/dL      Total Protein 6.0 g/dL      Albumin 3.00 g/dL      ALT (SGPT) 17 U/L      AST (SGOT) 24 U/L      Alkaline Phosphatase 128 U/L      Total Bilirubin 0.9 mg/dL      eGFR Non African Amer 85 mL/min/1.73      Globulin 3.0 gm/dL      A/G Ratio 1.0 g/dL      BUN/Creatinine Ratio 12.9     Anion Gap 12.0 mmol/L     Magnesium [[id number]]  (Normal) Collected:  03/10/19 1046    Specimen:  Blood Updated:  03/10/19 1132     Magnesium 1.8 mg/dL     Ethanol [[id number]]  (Normal) Collected:  03/10/19 1046    Specimen:  Blood Updated:  03/10/19 1132     Ethanol % <0.010 %     Narrative:       Not for legal purposes. Chain of Custody not followed.     CBC & Differential [[id number]] Collected:  03/10/19 1046    Specimen:  Blood Updated:  03/10/19 1129    Narrative:       The following orders were created for panel order CBC & Differential.  Procedure                               Abnormality         Status                     ---------                               -----------         ------                     Manual Differential[[id number]]                                                         CBC Auto Differential[[id number]]        Normal              Final result                 Please view results for these tests on the individual orders.    CBC Auto Differential [[id number]]  (Normal) Collected:  03/10/19 1046    Specimen:  Blood Updated:  03/10/19 1129     WBC 6.38 10*3/mm3      RBC 4.21 10*6/mm3       Hemoglobin 13.3 g/dL      Hematocrit 38.5 %      MCV 91.4 fL      MCH 31.6 pg      MCHC 34.5 g/dL      RDW 14.6 %      RDW-SD 48.4 fl      MPV 11.6 fL      Platelets 243 10*3/mm3      Neutrophil % 73.4 %      Lymphocyte % 17.6 %      Monocyte % 8.0 %      Eosinophil % 0.2 %      Basophil % 0.5 %      Neutrophils, Absolute 4.69 10*3/mm3      Lymphocytes, Absolute 1.12 10*3/mm3      Monocytes, Absolute 0.51 10*3/mm3      Eosinophils, Absolute 0.01 10*3/mm3      Basophils, Absolute 0.03 10*3/mm3     POC Glucose Once [[id number]]  (Normal) Collected:  03/10/19 1040    Specimen:  Blood Updated:  03/10/19 1052     Glucose 103 mg/dL      Comment: : [id number] Felix HeatherMeter ID: [id number]             .  Imaging Results (last 24 hours)     Procedure Component Value Units Date/Time    US Renal Limited [[id number]] Collected:  03/11/19 0958     Updated:  03/11/19 1002    Narrative:       HISTORY: Urinary tract infection, sepsis     Renal ultrasound: Sonographic imaging of the kidneys is performed. The  abdominal aorta and IVC are obscured by regional bowel gas. The visible  portion of the IVC does appear patent.     The kidneys are normal in echotexture. Right kidney measures 10.2 x 4.9  x 6.2 cm. Left kidney measures 10.8 x 5.2 x 5.2 cm. There is no solid or  cystic renal mass identified. There are no obstructing intrarenal  stones. There is no hydronephrosis. There is no abnormal perinephric  fluid collection.     Images of the mildly distended bladder are unremarkable.       Impression:       1. No acute sonographic pathology of the kidneys.  This report was finalized on 03/11/2019 09:59 by Dr. Almita Moreira MD.        Personal review of Head CT:  No overt abnormalities but there is significant motion artifact      Impression  · Spell where she continued to speak  repetitive words.  This may be seizure but she yhas a recall of doing this so that makes it less likely. However, she has a background history of  "seizure.  · Obstructive sleep apnea, not treated.  Sleep apnea is notorious for causing confusion upon awakening when not treated and if severe enough.  Also if there is substantial CO2 retention that can cause a seizure.  With the medications she is on she could get central sleep apnea in addition to her obstructive sleep apnea.  · UTI  · Hypokalemia  · H/O gastric bypass surgery--not taking vitamin B 12 or Vitamin D  · H/O \"blood clots\"     Plan  · MRI of brain with and without--if she is capable of holding still--may pedro sedation which will aggravate her confusion  · Vitamin B12, folate, vitamin D, ammonia (resuots now back  · UDS  · EEG  · Obtain records from [facility] of her current anti seizure meds-- no she knows if she is on dilantin.  Patient states they just may not have filled it.     I discussed the patients findings and my recommendations with patient, family and nursing staff     ADDENDUM: has B12 deficiency will replace  Await Vitamin D    Tamara Donovan MD  03/11/19  4:38 PM      Electronically signed by Tamara Gomez MD at 3/13/2019 11:46 AM       "

## 2019-03-13 NOTE — PAYOR COMM NOTE
"3/12/19 CLINICAL UPDATE  [id number]    [phone]    Bita Croft (63 y.o. Female)     Date of Birth Social Security Number Address Home Phone MRN    1955  [address]   [place] [address] [phone] [id number]    Confucianism Marital Status          Caodaism        Admission Date Admission Type Admitting Provider Attending Provider Department, Room/Bed    3/10/19 Emergency Josafat Gonzales MD Oliver, Randy Eugene, MD [facility] 3A, 340/1    Discharge Date Discharge Disposition Discharge Destination                       Attending Provider:  Josafat Gonzales MD    Allergies:  No Known Allergies    Isolation:  None   Infection:  None   Code Status:  CPR    Ht:  177.8 cm (70\")   Wt:  97.8 kg (215 lb 9.6 oz)    Admission Cmt:  None   Principal Problem:  None                Active Insurance as of 3/10/2019     Primary Coverage     Payor Plan Insurance Group Employer/Plan Group    [employer] A[employer] [id number]     Payor Plan Address Payor Plan Phone Number Payor Plan Fax Number Effective Dates    [address] [phone]  7/1/2017 - None Entered    [place] [address]       Subscriber Name Subscriber Birth Date Member ID       OCHOA CROFT 1955 [id number]                 Emergency Contacts      (Rel.) Home Phone Work Phone Mobile Phone    Carlito Croft (Spouse) [phone] -- --            ICU Vital Signs     Row Name 03/13/19 1042 03/13/19 0425 03/13/19 0009 03/12/19 2346 03/12/19 2044       Vitals    Temp  98.4 °F (36.9 °C)  98.8 °F (37.1 °C)  98.5 °F (36.9 °C)  --  --    Temp src  Oral  Oral  Oral  --  --    Pulse  86  82  85  82  84    Heart Rate Source  Monitor  Monitor  Monitor  Monitor  Monitor    Resp  18  16  16  --  16    Resp Rate Source  Visual  Visual  Visual  --  Visual    BP  128/68  133/82  122/73  --  --    BP Location  Left arm  Left arm  Left arm  --  --    BP Method  Automatic  Automatic  Automatic  --  --    Patient " Position  Lying  Lying  Lying  Lying  Lying       Oxygen Therapy    SpO2  96 %  97 %  96 %  99 %  100 %    Pulse Oximetry Type  Continuous  Continuous  Continuous  Continuous  Continuous    Device (Oxygen Therapy)  --  room air  room air  room air  room air    Row Name 03/12/19 2035 03/12/19 2029 03/12/19 1952 03/12/19 1836 03/12/19 1646       Height and Weight    Weight  --  --  --  --  97.5 kg (215 lb)       Vitals    Temp  --  98 °F (36.7 °C)  --  97.7 °F (36.5 °C)  --    Temp src  --  Oral  --  Oral  --    Pulse  90  92  --  75  --    Heart Rate Source  Monitor  Monitor  --  Monitor  --    Resp  16  16  --  18  --    Resp Rate Source  Visual  Visual  --  Visual  --    BP  --  143/81  --  149/95  --    BP Location  --  Right arm  --  Right arm  --    BP Method  --  Automatic  --  Automatic  --    Patient Position  Lying  Lying  --  Lying  --       Oxygen Therapy    SpO2  99 %  100 %  --  96 %  --    Pulse Oximetry Type  Continuous  Continuous  --  --  --    Device (Oxygen Therapy)  room air  room air  room air  room air  --    Row Name 03/12/19 1548 03/12/19 1543                Vitals    Pulse  82  85       Resp  18  18          Oxygen Therapy    SpO2  --  100 %       Pulse Oximetry Type  --  Continuous       Device (Oxygen Therapy)  --  room air           Intake & Output (last day)       03/12 0701 - 03/13 0700 03/13 0701 - 03/14 0700    P.O. 240     I.V. (mL/kg) 1287.1 (13.2) 1538.3 (15.8)    IV Piggyback 450     Total Intake(mL/kg) 1977.1 (20.3) 1538.3 (15.8)    Net +1977.1 +1538.3          Urine Unmeasured Occurrence 5 x 2 x    Stool Unmeasured Occurrence 1 x         Lines, Drains & Airways    Active LDAs     Name:   Placement date:   Placement time:   Site:   Days:    Peripheral IV 03/12/19 1510 Left;Posterior Wrist   03/12/19    1510    Wrist   less than 1                Hospital Medications (active)       Dose Frequency Start End    acetaminophen (TYLENOL) tablet 650 mg 650 mg Every 4 Hours PRN 3/10/2019   "   Sig - Route: Take 2 tablets by mouth Every 4 (Four) Hours As Needed for Mild Pain  or Fever. - Oral    AZITHROMYCIN 500 MG/250 ML 0.9% NS IVPB (vial-mate) 500 mg Every 24 Hours 3/11/2019 3/16/2019    Sig - Route: Infuse 500 mg into a venous catheter Daily. - Intravenous    Linked Group 1:  \"And\" Linked Group Details        cefTRIAXone (ROCEPHIN) 1 g/100 mL 0.9% NS (MBP) 1 g Every 24 Hours 3/11/2019 3/18/2019    Sig - Route: Infuse 100 mL into a venous catheter Daily. - Intravenous    Linked Group 1:  \"And\" Linked Group Details        cyanocobalamin injection 1,000 mcg 1,000 mcg Once 3/12/2019 3/12/2019    Sig - Route: Inject 1 mL into the appropriate muscle as directed by prescriber 1 (One) Time. - Intramuscular    DULoxetine (CYMBALTA) DR capsule 30 mg 30 mg Daily 3/10/2019     Sig - Route: Take 1 capsule by mouth Daily. - Oral    enoxaparin (LOVENOX) syringe 40 mg 40 mg Every 24 Hours 3/10/2019     Sig - Route: Inject 0.4 mL under the skin into the appropriate area as directed Daily. - Subcutaneous    fosphenytoin (Cerebyx) injection 100 mg  mg PE Every 12 Hours Scheduled 3/10/2019     Sig - Route: Infuse 2 mL into a venous catheter Every 12 (Twelve) Hours. - Intravenous    gabapentin (NEURONTIN) capsule 600 mg 600 mg Every 8 Hours Scheduled 3/10/2019     Sig - Route: Take 2 capsules by mouth Every 8 (Eight) Hours. - Oral    gadobenate dimeglumine (MULTIHANCE) injection 20 mL 20 mL Once in Imaging 3/12/2019 3/12/2019    Sig - Route: Infuse 20 mL into a venous catheter Once. - Intravenous    ipratropium-albuterol (DUO-NEB) nebulizer solution 3 mL 3 mL Every 4 Hours - RT 3/10/2019     Sig - Route: Take 3 mL by nebulization Every 4 (Four) Hours. - Nebulization    levETIRAcetam in NaCl 0.75% (KEPPRA) IVPB 1,000 mg 1,000 mg 2 Times Daily 3/10/2019     Sig - Route: Infuse 100 mL into a venous catheter 2 (Two) Times a Day. - Intravenous    levothyroxine (SYNTHROID, LEVOTHROID) tablet 100 mcg 100 mcg Every Early " "Morning 3/11/2019     Sig - Route: Take 1 tablet by mouth Every Morning. - Oral    loperamide (IMODIUM) capsule 2 mg 2 mg 4 Times Daily PRN 3/10/2019     Sig - Route: Take 1 capsule by mouth 4 (Four) Times a Day As Needed for Diarrhea. - Oral    LORazepam (ATIVAN) injection 1 mg 1 mg Every 4 Hours PRN 3/10/2019 3/20/2019    Sig - Route: Infuse 0.5 mL into a venous catheter Every 4 (Four) Hours As Needed for Anxiety or Agitation. - Intravenous    Magnesium Sulfate 2 gram / 50mL Infusion (GIVE X 3 BAGS TO EQUAL 6GM TOTAL DOSE) - Mg 1.1 - 1.5 mg/dl 2 g As Needed 3/11/2019     Sig - Route: Infuse 50 mL into a venous catheter As Needed (See Administration Instructions). - Intravenous    Linked Group 2:  \"Or\" Linked Group Details        Magnesium Sulfate 2 gram Bolus, followed by 8 gram infusion (total Mg dose 10 grams)- Mg less than or equal to 1mg/dL 2 g As Needed 3/11/2019     Sig - Route: Infuse 50 mL into a venous catheter As Needed (See Administration Instructions). - Intravenous    Linked Group 2:  \"Or\" Linked Group Details        Magnesium Sulfate 4 gram infusion- Mg 1.6-1.9 mg/dL 4 g As Needed 3/11/2019     Sig - Route: Infuse 100 mL into a venous catheter As Needed (See Administration Instructions). - Intravenous    Linked Group 2:  \"Or\" Linked Group Details        metoprolol tartrate (LOPRESSOR) tablet 50 mg 50 mg Daily 3/10/2019     Sig - Route: Take 1 tablet by mouth Daily. - Oral    Morphine (MS CONTIN) 12 hr tablet 15 mg 15 mg Every 12 Hours Scheduled 3/10/2019     Sig - Route: Take 1 tablet by mouth Every 12 (Twelve) Hours. - Oral    Morphine sulfate (PF) injection 2 mg 2 mg Every 3 Hours PRN 3/10/2019 3/20/2019    Sig - Route: Infuse 1 mL into a venous catheter Every 3 (Three) Hours As Needed for Severe Pain . - Intravenous    nitroglycerin (NITROSTAT) SL tablet 0.4 mg 0.4 mg Every 5 Minutes PRN 3/10/2019     Sig - Route: Place 1 tablet under the tongue Every 5 (Five) Minutes As Needed for Chest Pain " "(Chest Pain With Systolic Blood Pressure Greater Than 100). - Sublingual    oxybutynin XL (DITROPAN-XL) 24 hr tablet 5 mg 5 mg Daily 3/10/2019     Sig - Route: Take 1 tablet by mouth Daily. - Oral    oxyCODONE (ROXICODONE) immediate release tablet 10 mg 10 mg Every 4 Hours PRN 3/10/2019     Sig - Route: Take 2 tablets by mouth Every 4 (Four) Hours As Needed for Moderate Pain . - Oral    Pharmacy to Dose enoxaparin (LOVENOX)  Continuous PRN 3/10/2019     Sig - Route: Continuous As Needed for Consult. - Does not apply    potassium chloride (MICRO-K) CR capsule 20 mEq 20 mEq 3 Times Daily With Meals 3/13/2019     Sig - Route: Take 2 capsules by mouth 3 (Three) Times a Day With Meals. - Oral    potassium chloride 10 mEq in 100 mL IVPB 10 mEq Every 1 Hour PRN 3/11/2019     Sig - Route: Infuse 100 mL into a venous catheter Every 1 (One) Hour As Needed (See admin Instructions.). - Intravenous    potassium chloride 10 mEq in 100 mL IVPB 10 mEq Every 2 Hours 3/12/2019 3/12/2019    Sig - Route: Infuse 100 mL into a venous catheter Every 2 (Two) Hours. - Intravenous    potassium chloride 10 mEq in 100 mL IVPB 10 mEq Every 2 Hours 3/12/2019 3/12/2019    Sig - Route: Infuse 100 mL into a venous catheter Every 2 (Two) Hours. - Intravenous    sodium chloride 0.9 % flush 10 mL 10 mL As Needed 3/10/2019     Sig - Route: Infuse 10 mL into a venous catheter As Needed for Line Care. - Intravenous    Linked Group 3:  \"And\" Linked Group Details        sodium chloride 0.9 % flush 3 mL 3 mL Every 12 Hours Scheduled 3/10/2019     Sig - Route: Infuse 3 mL into a venous catheter Every 12 (Twelve) Hours. - Intravenous    sodium chloride 0.9 % flush 3-10 mL 3-10 mL As Needed 3/10/2019     Sig - Route: Infuse 3-10 mL into a venous catheter As Needed for Line Care. - Intravenous    venlafaxine XR (EFFEXOR-XR) 24 hr capsule 300 mg 300 mg Daily 3/10/2019     Sig - Route: Take 4 capsules by mouth Daily. - Oral    potassium chloride 10 mEq in 100 mL " IVPB (Discontinued) 10 mEq Every 2 Hours 3/13/2019 3/13/2019    Sig - Route: Infuse 100 mL into a venous catheter Every 2 (Two) Hours. - Intravenous    sodium chloride 0.9 % with KCl 20 mEq/L infusion (Discontinued) 100 mL/hr Continuous 3/11/2019 3/13/2019    Sig - Route: Infuse 100 mL/hr into a venous catheter Continuous. - Intravenous          Blood Administration Record (From admission, onward)    None          Lab Results (last 24 hours)     Procedure Component Value Units Date/Time    Basic Metabolic Panel [[id number]]  (Abnormal) Collected:  03/13/19 0624    Specimen:  Blood Updated:  03/13/19 0746     Glucose 73 mg/dL      BUN 3 mg/dL      Creatinine 0.54 mg/dL      Sodium 138 mmol/L      Potassium 3.1 mmol/L      Chloride 104 mmol/L      CO2 28.0 mmol/L      Calcium 7.4 mg/dL      eGFR Non African Amer 114 mL/min/1.73      BUN/Creatinine Ratio 5.6     Anion Gap 6.0 mmol/L     Narrative:       GFR Normal >60  Chronic Kidney Disease <60  Kidney Failure <15    CBC & Differential [[id number]] Collected:  03/13/19 0624    Specimen:  Blood Updated:  03/13/19 0731    Narrative:       The following orders were created for panel order CBC & Differential.  Procedure                               Abnormality         Status                     ---------                               -----------         ------                     CBC Auto Differential[[id number]]        Abnormal            Final result                 Please view results for these tests on the individual orders.    CBC Auto Differential [[id number]]  (Abnormal) Collected:  03/13/19 0624    Specimen:  Blood Updated:  03/13/19 0731     WBC 6.74 10*3/mm3      RBC 3.47 10*6/mm3      Hemoglobin 11.1 g/dL      Hematocrit 33.4 %      MCV 96.3 fL      MCH 32.0 pg      MCHC 33.2 g/dL      RDW 15.1 %      RDW-SD 52.6 fl      MPV 11.0 fL      Platelets 204 10*3/mm3      Neutrophil % 75.1 %      Lymphocyte % 14.4 %      Monocyte % 6.4 %      Eosinophil % 3.1 %       "Basophil % 0.7 %      Immature Grans % 0.3 %      Neutrophils, Absolute 5.06 10*3/mm3      Lymphocytes, Absolute 0.97 10*3/mm3      Monocytes, Absolute 0.43 10*3/mm3      Eosinophils, Absolute 0.21 10*3/mm3      Basophils, Absolute 0.05 10*3/mm3      Immature Grans, Absolute 0.02 10*3/mm3      nRBC 0.0 /100 WBC     Blood Culture - Blood, Hand, Left [[id number]] Collected:  03/10/19 1727    Specimen:  Blood from Hand, Left Updated:  03/12/19 1815     Blood Culture No growth at 2 days    Blood Culture - Blood, Arm, Left [[id number]] Collected:  03/10/19 1727    Specimen:  Blood from Arm, Left Updated:  03/12/19 1815     Blood Culture No growth at 2 days    Vitamin D 25 Hydroxy [[id number]]  (Abnormal) Collected:  03/12/19 0623    Specimen:  Blood Updated:  03/12/19 1656     25 Hydroxy, Vitamin D <12.8 ng/ml     Blood Culture - Blood, Arm, Left [[id number]] Collected:  03/10/19 1115    Specimen:  Blood from Arm, Left Updated:  03/12/19 1215     Blood Culture No growth at 2 days    Blood Culture - Blood, Arm, Left [[id number]] Collected:  03/10/19 1120    Specimen:  Blood from Arm, Left Updated:  03/12/19 1215     Blood Culture No growth at 2 days        Imaging Results (last 24 hours)     Procedure Component Value Units Date/Time    XR Chest PA & Lateral [[id number]] Collected:  03/13/19 0815     Updated:  03/13/19 0819    Narrative:       EXAMINATION: XR CHEST PA AND LATERAL- 3/13/2019 8:15 AM CDT     HISTORY: f/u \"infiltrate\"; R41.82-Altered mental status, unspecified;  J18.9-Pneumonia, unspecified organism; N39.0-Urinary tract infection,  site not specified; A41.9-Sepsis, unspecified organism; R65.20-Severe  sepsis without septic shock; R13.10-Dysphagia, unspecified; Z74.09-Other  reduced mobility; Z74.09-Other reduced mobility.     REPORT: Comparison is made with the chest x-ray from 3/10/2019.     The lungs are mildly hypoaerated, there is no significant change in left  perihilar and midlung infiltrate likely related to " pneumonia. No  pneumothorax or pleural effusion. Heart size is normal. There is ectasia  of the thoracic aorta has before. Previous cervical and lumbar fusion is  noted.       Impression:       Persistent left perihilar and left midlung infiltrate most  likely related to pneumonia. This is unchanged.  This report was finalized on 03/13/2019 08:16 by Dr. Fabian Duenas MD.    MRI Brain With & Without Contrast [[id number]] Collected:  03/12/19 1748     Updated:  03/12/19 1756    Narrative:       EXAMINATION:  MRI BRAIN W WO CONTRAST-  3/12/2019 5:11 PM CDT     HISTORY: Encephalopathy      COMPARISON: Head CT dated 3/10/2019     TECHNIQUE: Multiplanar MR imaging of the brain was performed with  gadolinium enhanced imaging.     FINDINGS:      There is no diffusion signal abnormality to indicate an acute ischemic  event/infarction.     There is diffuse central and cortical atrophy. A predominance of frontal  lobe atrophy observed.     There is confluent and punctate periventricular and subcortical FLAIR  signal hyperintensities compatible with mild chronic ischemic changes. A  punctate area of chronic ischemic change also noted in the left  cerebellar hemisphere.     There is no mass effect or midline shift. There is no hydrocephalus.     Normal flow voids are identified.     No abnormal gadolinium enhancement identified.     Pituitary gland is not enlarged.     Cervical spine imaged in part is unremarkable.       Impression:       1. Atrophy, frontal lobe predominance.  2. Chronic ischemic changes.  3. No acute intracranial process.  This report was finalized on 03/12/2019 17:52 by Dr. Obi Phan MD.        Orders (last 24 hrs)     Start     Ordered    03/13/19 1200  potassium chloride (MICRO-K) CR capsule 20 mEq  3 Times Daily With Meals      03/13/19 1034    03/13/19 1000  XR Chest PA & Lateral  1 Time Imaging      03/12/19 0852    03/13/19 0930  potassium chloride 10 mEq in 100 mL IVPB  Every 2 Hours,   Status:   Discontinued      19 0835    19 0600  Basic Metabolic Panel  Morning Draw,   Status:  Canceled      19 1538    19 0600  CBC Auto Differential  PROCEDURE ONCE      19 0001    19 2345  potassium chloride 10 mEq in 100 mL IVPB  Every 2 Hours      19 2251    19 2230  Overnight Sleep Oximetry Study  Bedtime Once - RT      19 1535    19 1815  gadobenate dimeglumine (MULTIHANCE) injection 20 mL  Once in Imaging      19 1727    19 1537  Vitamin D 25 Hydroxy  Once      19 1536    19 1429  OT Plan of Care Cert / Re-Cert  Once     Comments:  Occupational Therapy Plan of Care  Initial Certification  Certification Period: 3/12/2019 - 6/10/2019    Patient Name: Bita Croft  : 1955    (R41.82) Altered mental status, unspecified altered mental status type  (primary encounter diagnosis)  (J18.9) Pneumonia of left lung due to infectious organism, unspecified part of lung  (N39.0) Acute urinary tract infection  (A41.9,  R65.20) Severe sepsis (CMS/HCC)  (R13.10) Dysphagia, unspecified type  (Z74.09) Impaired functional mobility and activity tolerance  (Z74.09) Impaired mobility and ADLs                Assessment  OT Assessment  OT Diagnosis: decreased ADL  Rehab Potential (OT Eval): good, to achieve stated therapy goals  Criteria for Skilled Therapeutic Interventions Met (OT Eval): yes, treatment indicated        Rehab Goal Summary     Row Name 19 0831 19 0800 19 0745       Physical Therapy Goals    Bed Mobility Goal Selection (PT)  -  -  bed mobility, PT goal 1  -MS    Transfer Goal Selection (PT)  -  -  transfer, PT goal 1  -MS    Gait Training Goal Selection (PT)  -  -  gait training, PT goal 1  -MS       Bed Mobility Goal 1 (PT)    Activity/Assistive Device (Bed Mobility Goal 1, PT)  -  -  sit to   supine;supine to sit  -MS    [place] Level/Cues Needed (Bed Mobility Goal 1, PT)  -  -    supervision required;verbal  cues required  -MS    Time Frame (Bed Mobility Goal 1, PT)  -  -  long term goal (LTG);by   discharge  -MS    Progress/Outcomes (Bed Mobility Goal 1, PT)  -  -  goal ongoing  -MS       Transfer Goal 1 (PT)    Activity/Assistive Device (Transfer Goal 1, PT)  -  -    sit-to-stand/stand-to-sit;bed-to-chair/chair-to-bed;walker, rolling  -MS    [place] Level/Cues Needed (Transfer Goal 1, PT)  -  -  minimum   assist (75% or more patient effort);tactile cues required;verbal cues   required  -MS    Time Frame (Transfer Goal 1, PT)  -  -  long term goal (LTG);by discharge    -MS    Progress/Outcome (Transfer Goal 1, PT)  -  -  goal ongoing  -MS       Gait Training Goal 1 (PT)    Activity/Assistive Device (Gait Training Goal 1, PT)  -  -  gait (walking   locomotion);assistive device use;decrease fall risk;forward   stepping;improve balance and speed;walker, rolling  -MS    [place] Level (Gait Training Goal 1, PT)  -  -  minimum assist (75%   or more patient effort);verbal cues required;tactile cues required  -MS    Distance (Gait Goal 1, PT)  -  -  25ft  -MS    Time Frame (Gait Training Goal 1, PT)  -  -  long term goal (LTG);by   discharge  -MS    Progress/Outcome (Gait Training Goal 1, PT)  -  -  goal ongoing  -MS       Occupational Therapy Goals    Transfer Goal Selection (OT)  -  transfer, OT goal 1  -MW  -    Dressing Goal Selection (OT)  -  dressing, OT goal 1  -MW  -    Toileting Goal Selection (OT)  -  toileting, OT goal 1  -MW  -       Transfer Goal 1 (OT)    Activity/Assistive Device (Transfer Goal 1, OT)  -    sit-to-stand/stand-to-sit;bed-to-chair/chair-to-bed;toilet;commode,   bedside without drop arms;tub  -MW  -    [place] Level/Cues Needed (Transfer Goal 1, OT)  -  moderate assist   (50-74% patient effort)  -MW  -    Time Frame (Transfer Goal 1, OT)  -  long term goal (LTG);by discharge    -MW  -    Progress/Outcome (Transfer Goal 1, OT)  -  goal ongoing  -MW  -       Dressing Goal 1 (OT)     Activity/Assistive Device (Dressing Goal 1, OT)  -  upper body   dressing;lower body dressing  -MW  -    [place]/Cues Needed (Dressing Goal 1, OT)  -  moderate assist   (50-74% patient effort)  -MW  -    Time Frame (Dressing Goal 1, OT)  -  long term goal (LTG);by discharge    -MW  -    Progress/Outcome (Dressing Goal 1, OT)  -  goal ongoing  -MW  -       Toileting Goal 1 (OT)    Activity/Device (Toileting Goal 1, OT)  -  toileting skills,   all;commode;commode, bedside without drop arms  -MW  -    [place] Level/Cues Needed (Toileting Goal 1, OT)  -  minimum assist   (75% or more patient effort)  -MW  -    Time Frame (Toileting Goal 1, OT)  -  long term goal (LTG);by discharge    -MW  -    Progress/Outcome (Toileting Goal 1, OT)  -  goal ongoing  -MW  -       Swallow Goals (SLP)    Oral Nutrition/Hydration Goal Selection (SLP)  oral nutrition/hydration,   SLP goal 1  -CS  -  -       Oral Nutrition/Hydration Goal 1 (SLP)    Oral Nutrition/Hydration Goal 1, SLP  Pt will tolerate least restrictive   diet with no overt s/s of aspiration.   -CS  -  -    Time Frame (Oral Nutrition/Hydration Goal 1, SLP)  by discharge  -CS  -    -    Barriers (Oral Nutrition/Hydration Goal 1, SLP)  n/a  -CS  -  -    Progress/Outcomes (Oral Nutrition/Hydration Goal 1, SLP)  continuing   progress toward goal  -CS  -  -      User Key  (r) = Recorded By, (t) = Taken By, (c) = Cosigned By    Initials Name Provider Type Discipline    Belkis Hobson, PT, DPT, NCS Physical Therapist PT    Abran Barrera, MS CCC-SLP Speech and Language Pathologist SLP     Stephanie Keenan, OTR/L Occupational Therapist OT        OT Goals     Row Name 03/12/19 1426          Time Calculation    OT Goal Re-Cert Due Date  03/22/19  -       User Key  (r) = Recorded By, (t) = Taken By, (c) = Cosigned By    Initials Name Provider Type    Stephanie Liao, OTR/L Occupational Therapist          Plan    OT Plan  Therapy Frequency (OT Eval): 3  times/wk  Predicted Duration of Therapy Intervention (Therapy Eval): until d/c  Outcome Summary: OT eval completed. Pt with delayed initiation and processing throughout eval requiring increased time for tasks. Dep to don socks but able to ball sock with BUE in midline to prep for task with increased time. Opposition intact BUE. LAISHA intact but requires max vc to follow commands for accuracy. January required for bed mobility, min-modAx2 to stand. Pt has difficulty maintaining erect posture and does not control decent to sitting at EOB. Skilled OT required to address cognition, motor processing, and strength deficits limiting independnece with ADL and functional mobility. Recommend d/c to TCU or SNF pending progress.      Stephanie Keenan, OTR/L  3/12/2019        By cosigning this order, either electronically or physically, I certify that the therapy services are furnished while this patient is under my care, the services outline above are required by this patient, and will be reviewed every 90 days.        M.D.:__________________________________________ Date: ______________    03/12/19 1428    03/12/19 1115  cyanocobalamin injection 1,000 mcg  Once      03/12/19 1017    03/12/19 1000  potassium chloride 10 mEq in 100 mL IVPB  Every 2 Hours      03/12/19 0851    03/12/19 0600  CBC & Differential  Daily      03/11/19 0859    03/12/19 0600  Basic Metabolic Panel  Daily      03/11/19 0859    03/11/19 1400  AZITHROMYCIN 500 MG/250 ML 0.9% NS IVPB (vial-mate)  Every 24 Hours      03/10/19 1633    03/11/19 1300  cefTRIAXone (ROCEPHIN) 1 g/100 mL 0.9% NS (MBP)  Every 24 Hours      03/10/19 1633    03/11/19 0945  sodium chloride 0.9 % with KCl 20 mEq/L infusion  Continuous,   Status:  Discontinued      03/11/19 0858    03/11/19 0748  Magnesium Sulfate 2 gram Bolus, followed by 8 gram infusion (total Mg dose 10 grams)- Mg less than or equal to 1mg/dL  As Needed      03/11/19 0748    03/11/19 0748  Magnesium Sulfate 2 gram / 50mL  Infusion (GIVE X 3 BAGS TO EQUAL 6GM TOTAL DOSE) - Mg 1.1 - 1.5 mg/dl  As Needed      03/11/19 0748    03/11/19 0748  Magnesium Sulfate 4 gram infusion- Mg 1.6-1.9 mg/dL  As Needed      03/11/19 0748    03/11/19 0747  potassium chloride 10 mEq in 100 mL IVPB  Every 1 Hour PRN      03/11/19 0748    03/11/19 0600  levothyroxine (SYNTHROID, LEVOTHROID) tablet 100 mcg  Every Early Morning      03/10/19 1633    03/10/19 2300  fosphenytoin (Cerebyx) injection 100 mg PE  Every 12 Hours Scheduled      03/10/19 2212    03/10/19 2200  gabapentin (NEURONTIN) capsule 600 mg  Every 8 Hours Scheduled      03/10/19 1633    03/10/19 2130  levETIRAcetam in NaCl 0.75% (KEPPRA) IVPB 1,000 mg  2 Times Daily      03/10/19 2041    03/10/19 2100  Morphine (MS CONTIN) 12 hr tablet 15 mg  Every 12 Hours Scheduled      03/10/19 1633    03/10/19 2100  sodium chloride 0.9 % flush 3 mL  Every 12 Hours Scheduled      03/10/19 1633    03/10/19 1930  ipratropium-albuterol (DUO-NEB) nebulizer solution 3 mL  Every 4 Hours - RT      03/10/19 1633    03/10/19 1736  LORazepam (ATIVAN) injection 1 mg  Every 4 Hours PRN      03/10/19 1736    03/10/19 1730  DULoxetine (CYMBALTA) DR capsule 30 mg  Daily      03/10/19 1633    03/10/19 1730  metoprolol tartrate (LOPRESSOR) tablet 50 mg  Daily      03/10/19 1633    03/10/19 1730  oxybutynin XL (DITROPAN-XL) 24 hr tablet 5 mg  Daily      03/10/19 1633    03/10/19 1730  venlafaxine XR (EFFEXOR-XR) 24 hr capsule 300 mg  Daily      03/10/19 1633    03/10/19 1730  enoxaparin (LOVENOX) syringe 40 mg  Every 24 Hours      03/10/19 1637    03/10/19 1726  Morphine sulfate (PF) injection 2 mg  Every 3 Hours PRN      03/10/19 1726    03/10/19 1630  nitroglycerin (NITROSTAT) SL tablet 0.4 mg  Every 5 Minutes PRN      03/10/19 1633    03/10/19 1630  Pharmacy to Dose enoxaparin (LOVENOX)  Continuous PRN      03/10/19 1633    03/10/19 1630  sodium chloride 0.9 % flush 3-10 mL  As Needed      03/10/19 1633    03/10/19 1628   oxyCODONE (ROXICODONE) immediate release tablet 10 mg  Every 4 Hours PRN      03/10/19 1633    03/10/19 1627  loperamide (IMODIUM) capsule 2 mg  4 Times Daily PRN      03/10/19 1633    03/10/19 1627  acetaminophen (TYLENOL) tablet 650 mg  Every 4 Hours PRN      03/10/19 1633    03/10/19 1106  sodium chloride 0.9 % flush 10 mL  As Needed      03/10/19 1106    Unscheduled  Blood Gas, Arterial  As Needed     Comments:  Respiratory Distress      03/10/19 1633    Unscheduled  ECG 12 Lead  As Needed     Comments:  Nurse to Release if Patient Expericences Acute Chest Pain or Dysrhythmias    03/10/19 1633    Unscheduled  Potassium  As Needed     Comments:  For Ventricular Arrhythmias      03/10/19 1633    Unscheduled  Magnesium  As Needed     Comments:  For Ventricular Arrhythmias      03/10/19 1633    Unscheduled  Troponin  As Needed     Comments:  For Chest Pain      03/10/19 1633    Unscheduled  Digoxin Level  As Needed     Comments:  For Atrial Arrhythmias      03/10/19 1633    Unscheduled  Blood Gas, Arterial  As Needed     Comments:  Per O2 PolicyNotify Physician      03/10/19 1633    Unscheduled  Up in Chair  As Needed      03/10/19 1633    Unscheduled  Magnesium  As Needed      03/11/19 0748    Unscheduled  Potassium  As Needed      03/11/19 0748    Unscheduled  Magnesium  As Needed      03/11/19 0748    Unscheduled  Magnesium  As Needed      03/12/19 1538    Unscheduled  Potassium  As Needed      03/12/19 1538    --  SCANNED EKG      03/10/19 0000                                             Josafat Gonzaels MD   Physician   Medicine          Progress Notes   Signed        Date of Service:  3/12/2019  8:28 AM   Creation Time:  3/12/2019  8:28 AM                         Signed                Expand All Collapse All                      Expand widget buttonCollapse widget button        Show:Clear all      ManualTemplateCopied    Added by:          Josafat Gonzales MD [facility] for detailscustomization  "button                                                                                                                                                        LOS: 2 days     Patient Care Team:    Josafat Gonzales MD as PCP - General    Josafat Gonzales MD as PCP - Family Medicine    [facility] as Consulting Physician (Cardiology)    Pawel Ho MD as Consulting Physician (Urology)    Krystal Dorman APRN as Nurse Practitioner (Neurology)         Chief Complaint:  confusion              Subjective                HISTORY OF PRESENT ILLNESS: many chronic illness and many medications.  Went to bed night before admit normal.  Slept through the evening ok.  When she awoke the next morning she was confused and inappropriately mumbling \"oh God\".  There was no recent cough dysuria fever injury vomiting diarrhea; anything.  Though there has been a concern about seizure disorder in the past the presenting complaints did not include any tonic-clonic activity.  Chest x-ray in the emergency department was suspicious for pneumonia and continued spiculated lingular nodule.  This lingular nodule has been seen by cardiovascular surgery and considering her multiple medical problems was followed without removal/treated with radiation oncology.  Her Dilantin level was less than 3 and low-IV cerebyx was given in the ED.  Alcohol salicylate and Tylenol levels were all normal. Chemistry showed low protein and albumin at 6 and 3 respectively; nothing else significant. Lactic acid was elevated at 6 ammonia was less than 9 and urinalysis showed 21-30 WBCs with moderate leukocytes.   CT of her head showed only chronic ischemic changes.  She was felt to be septic with a UTI/? pneumonia and was admitted with fluid boluses and appropriate treatments.  Antibiotics started in the ER were Rocephin and azithromycin. She had jenifer similar presentation           Interval History:  Less confused.  Recalls yesterday " "needing to say \"oh God\", \"help me\" but not knowing why.  Disoriented to place, date; knew me.  Slept.  Denies pain. Day 3 rocephin/azithromycin for CXR ? Pneumonia and UA abnormal; UTI.  No cough.  Awaiting speech to clear diet; as yesterday not able to eat/swallow ok.          .    Current Facility-Administered Medications:     •  acetaminophen (TYLENOL) tablet 650 mg, 650 mg, Oral, Q4H PRN, Zeeshan Hester MD    •  cefTRIAXone (ROCEPHIN) 1 g/100 mL 0.9% NS (MBP), 1 g, Intravenous, Q24H, 1 g at 03/11/19 1340 **AND** AZITHROMYCIN 500 MG/250 ML 0.9% NS IVPB (vial-mate), 500 mg, Intravenous, Q24H, Zeeshan Hester MD, 500 mg at 03/11/19 1636    •  DULoxetine (CYMBALTA) DR capsule 30 mg, 30 mg, Oral, Daily, Zeeshan Hester MD    •  enoxaparin (LOVENOX) syringe 40 mg, 40 mg, Subcutaneous, Q24H, Josafat Gonzales MD, 40 mg at 03/11/19 1636    •  fosphenytoin (Cerebyx) injection 100 mg PE, 100 mg PE, Intravenous, Q12H, Zeeshan Hester MD, 100 mg PE at 03/11/19 2334    •  gabapentin (NEURONTIN) capsule 600 mg, 600 mg, Oral, Q8H, Zeeshan Hester MD, 600 mg at 03/12/19 0659    •  ipratropium-albuterol (DUO-NEB) nebulizer solution 3 mL, 3 mL, Nebulization, Q4H - RT, Zeeshan Hester MD, 3 mL at 03/12/19 0809    •  levETIRAcetam in NaCl 0.75% (KEPPRA) IVPB 1,000 mg, 1,000 mg, Intravenous, BID, Zeeshan Hester MD, 1,000 mg at 03/11/19 2240    •  levothyroxine (SYNTHROID, LEVOTHROID) tablet 100 mcg, 100 mcg, Oral, Q AM, Zeeshan Hester MD, 100 mcg at 03/12/19 0659    •  loperamide (IMODIUM) capsule 2 mg, 2 mg, Oral, 4x Daily PRN, Zeeshan Hester MD    •  LORazepam (ATIVAN) injection 1 mg, 1 mg, Intravenous, Q4H PRN, Zeeshan Hester MD, 1 mg at 03/11/19 1649    •  Magnesium Sulfate 2 gram Bolus, followed by 8 gram infusion (total Mg dose 10 grams)- Mg less than or equal to 1mg/dL, 2 g, Intravenous, PRN **OR** Magnesium Sulfate 2 gram / 50mL Infusion (GIVE X 3 " BAGS TO EQUAL 6GM TOTAL DOSE) - Mg 1.1 - 1.5 mg/dl, 2 g, Intravenous, PRN **OR** Magnesium Sulfate 4 gram infusion- Mg 1.6-1.9 mg/dL, 4 g, Intravenous, PRN, Zeeshan Hester MD    •  metoprolol tartrate (LOPRESSOR) tablet 50 mg, 50 mg, Oral, Daily, Zeeshan Hester MD, 50 mg at 03/11/19 1022    •  Morphine (MS CONTIN) 12 hr tablet 15 mg, 15 mg, Oral, Q12H, Zeeshan Hester MD    •  Morphine sulfate (PF) injection 2 mg, 2 mg, Intravenous, Q3H PRN, Josafat Gonzales MD, 2 mg at 03/11/19 1340    •  nitroglycerin (NITROSTAT) SL tablet 0.4 mg, 0.4 mg, Sublingual, Q5 Min PRN, Zeeshan Hester MD    •  oxybutynin XL (DITROPAN-XL) 24 hr tablet 5 mg, 5 mg, Oral, Daily, Zeeshan Hester MD    •  oxyCODONE (ROXICODONE) immediate release tablet 10 mg, 10 mg, Oral, Q4H PRN, Zeeshan Hester MD, 10 mg at 03/11/19 1636    •  Pharmacy to Dose enoxaparin (LOVENOX), , Does not apply, Continuous PRN, Zeeshan Hester MD    •  potassium chloride 10 mEq in 100 mL IVPB, 10 mEq, Intravenous, Q1H PRN, Zeeshan Hester MD, Last Rate: 100 mL/hr at 03/11/19 2053, 10 mEq at 03/11/19 2053    •  [COMPLETED] Insert peripheral IV, , , Once **AND** sodium chloride 0.9 % flush 10 mL, 10 mL, Intravenous, PRN, Sergio Templeton, DO    •  sodium chloride 0.9 % flush 3 mL, 3 mL, Intravenous, Q12H, Zeeshan Hester MD, 3 mL at 03/10/19 2059    •  sodium chloride 0.9 % flush 3-10 mL, 3-10 mL, Intravenous, PRN, Zeeshan Hester MD    •  sodium chloride 0.9 % with KCl 20 mEq/L infusion, 125 mL/hr, Intravenous, Continuous, Josafat Gonzales MD, Last Rate: 125 mL/hr at 03/12/19 0511, 125 mL/hr at 03/12/19 0511    •  venlafaxine XR (EFFEXOR-XR) 24 hr capsule 300 mg, 300 mg, Oral, Daily, Zeeshan Hester MD         Review of Systems:     GENERAL:  Inactive before admit; and since here-slower with limits, speed, stamina for age and situation. Sleep was last night ok. No fever now.    ENDO:   "No syncope, near or diaphoretic sweaty spells.  BS Ok.    HEENT: No head injury or headache.  No vision change.  No significant hearing loss.  Ears without pain/drainage.  No sore throat.  No significant nasal/sinus congestion/drainage. No epistaxis.    CHEST: No chest wall tenderness or mass. No cough,  without wheeze.  No SOB; no hemoptysis.    CV: No chest pain, palpitations, ankle edema.    GI: No heartburn; awaiting speech.   No abdominal pain, diarrhea, constipation.  No rectal bleeding, or melena.      :  Voids without dysuria.     ORTHO: No painful/swollen joints but various on /off sore.  No mention sore neck or back; has history chronic neck/back pain.    NEURO: No dizziness, weakness of extremities.  Same numbness/paresthesias.    PSYCH:  Less confused/less memory loss.  Mood good; no apparent anxious, depressed but long history of this/Rx/psych.                   Objective               Vital Signs    /71 (BP Location: Right arm, Patient Position: Lying)   Pulse 84   Temp 98 °F (36.7 °C) (Oral)   Resp 18   Ht 177.8 cm (70\")   Wt 97.8 kg (215 lb 9.6 oz)   LMP  (LMP Unknown)   SpO2 95%   BMI 30.94 kg/m²     Temp:  [98 °F (36.7 °C)-99.2 °F (37.3 °C)] 98 °F (36.7 °C)    Heart Rate:  [68-88] 84    Resp:  [15-20] 18    BP: (115-166)/(71-97) 149/71              Intake/Output Summary (Last 24 hours) at 3/12/2019 0828    Last data filed at 3/12/2019 0509          Gross per 24 hour       Intake     2700 ml       Output     --       Net     2700 ml                 Lab Results:              Lab Results (last 24 hours)                    Procedure       Component       Value       Units       Date/Time               Vitamin B12 [[id number]]  (Normal)     Collected:  03/12/19 0623             Specimen:  Blood     Updated:  03/12/19 0805                   Vitamin B-12     251     pg/mL                   Folate [[id number]]  (Normal)     Collected:  03/12/19 0623             Specimen:  Blood     Updated:  " 03/12/19 0805                   Folate     6.36     ng/mL                   Basic Metabolic Panel [[id number]]  (Abnormal)     Collected:  03/12/19 0623             Specimen:  Blood     Updated:  03/12/19 0708                   Glucose     85     mg/dL                         BUN     3(abnormally low)     mg/dL                         Creatinine     0.52     mg/dL                         Sodium     140     mmol/L                         Potassium     2.9(critically low)     mmol/L                         Chloride     107     mmol/L                         CO2     28.0     mmol/L                         Calcium     7.3(abnormally low)     mg/dL                         eGFR Non  Amer     119     mL/min/1.73                         BUN/Creatinine Ratio     5.8(abnormally low)                   Anion Gap     5.0     mmol/L                   Narrative:                GFR Normal >60    Chronic Kidney Disease <60    Kidney Failure <15             CBC & Differential [[id number]]     Collected:  03/12/19 0623             Specimen:  Blood     Updated:  03/12/19 0649             Narrative:                The following orders were created for panel order CBC & Differential.    Procedure                               Abnormality         Status                       ---------                               -----------         ------                       CBC Auto Differential[[id number]]        Abnormal            Final result                      Please view results for these tests on the individual orders.             CBC Auto Differential [[id number]]  (Abnormal)     Collected:  03/12/19 0623             Specimen:  Blood     Updated:  03/12/19 0649                   WBC     5.59     10*3/mm3                         RBC     3.40(abnormally low)     10*6/mm3                         Hemoglobin     10.9(abnormally low)     g/dL                         Hematocrit     32.1(abnormally low)     %                         MCV     94.4      fL                         MCH     32.1(abnormally high)     pg                         MCHC     34.0     g/dL                         RDW     14.8     %                         RDW-SD     51.2     fl                         MPV     10.7     fL                         Platelets     208     10*3/mm3                         Neutrophil %     73.8     %                         Lymphocyte %     14.8(abnormally low)     %                         Monocyte %     8.2     %                         Eosinophil %     2.3     %                         Basophil %     0.4     %                         Immature Grans %     0.5     %                         Neutrophils, Absolute     4.12     10*3/mm3                         Lymphocytes, Absolute     0.83     10*3/mm3                         Monocytes, Absolute     0.46     10*3/mm3                         Eosinophils, Absolute     0.13     10*3/mm3                         Basophils, Absolute     0.02     10*3/mm3                         Immature Grans, Absolute     0.03     10*3/mm3                         nRBC     0.0     /100 WBC                   Blood Culture - Blood, Hand, Left [[id number]]     Collected:  03/10/19 1727             Specimen:  Blood from Hand, Left     Updated:  03/11/19 1815                   Blood Culture     No growth at 24 hours             Blood Culture - Blood, Arm, Left [[id number]]     Collected:  03/10/19 1727             Specimen:  Blood from Arm, Left     Updated:  03/11/19 1815                   Blood Culture     No growth at 24 hours             Lactic Acid, Plasma [[id number]]  (Abnormal)     Collected:  03/11/19 1146             Specimen:  Blood     Updated:  03/11/19 1221                   Lactate     2.5(critically high)     mmol/L                   Blood Culture - Blood, Arm, Left [[id number]]     Collected:  03/10/19 1115             Specimen:  Blood from Arm, Left     Updated:  03/11/19 1215                   Blood Culture     No growth at 24  hours             Blood Culture - Blood, Arm, Left [[id number]]     Collected:  03/10/19 1120             Specimen:  Blood from Arm, Left     Updated:  03/11/19 1215                   Blood Culture     No growth at 24 hours                             CBC:              Results from last 7 days       Lab     Units     03/12/19    0623     03/11/19    0702     03/10/19    1046       WBC     10*3/mm3     5.59     6.83     6.38       HEMOGLOBIN     g/dL     10.9*     11.1*     13.3       HEMATOCRIT     %     32.1*     32.9*     38.5       PLATELETS     10*3/mm3     208     243     243            BMP:              Results from last 7 days       Lab     Units     03/12/19    0623     03/11/19    0702     03/10/19    1046       SODIUM     mmol/L     140     142     140       POTASSIUM     mmol/L     2.9*     2.5*     3.3*       CHLORIDE     mmol/L     107     107     98       CO2     mmol/L     28.0     29.0     30.0       BUN     mg/dL     3*     9     9       CREATININE     mg/dL     0.52     0.79     0.70       EGFR IF NONAFRICN AM     mL/min/1.73     119     74     85       GLUCOSE     mg/dL     85     100     101*       CALCIUM     mg/dL     7.3*     7.2*     8.1*       ALT (SGPT)     U/L      --      25     17            INR:            Culture Results:             Blood Culture       Date     Value     Ref Range     Status       03/10/2019     No growth at 24 hours           Preliminary       03/10/2019     No growth at 24 hours           Preliminary       03/10/2019     No growth at 24 hours           Preliminary       03/10/2019     No growth at 24 hours           Preliminary                    Urine Culture       Date     Value     Ref Range     Status       03/10/2019     Growth present, too young to evaluate           Preliminary                 Radiology: None         Additional Studies Reviewed: None         Physical Exam:         GENERAL:  Well nourished/developed in no acute distress; mumbling incoherently.  Obese.    SKIN: Turgor excellent, without wound, rash, lesion other than several LE scabbed abrasions.    HEENT: Normal cephalic without trauma.  Pupils reactive to light; L pupil anisocoria. Extraocular motions full without nystagmus.       External canals nonobstructive nontender without reddness.    Oral cavity without growths, exudates, and moist.  Posterior pharynx without mass, obstruction, redness.  No thyromegaly, mass, tenderness, lymphadenopathy and supple.    CV: Regular rhythm.  No murmur, gallop,  edema. Posterior pulses intact.  No carotid bruits.     CHEST: No chest wall tenderness or mass.     LUNGS: Symmetric motion with clear to auscultation.    ABD: Soft, nontender without mass.     ORTHO: Symmetric extremities without swelling/point tenderness.  Full gross range of motion.      NEURO: CN 2-12 grossly intact.  Symmetric facies. 1/4 x bicep 0/4 LE.  UE/LE variable; 2-/5 strength UE 0-1/5 LE  Nonfocal use extremities. Speech clear.      PSYCH: Disoriented x 3.  Pleasant calm, well kept.  Non-purposeful/directed conservation with intact short/long gross memory.         Results Review:      above         ASSESSMENT/PLAN:    Reason for admit/problems addressing acutely:    Confusion-delirium (? Sepsis, ? Seizure)    Abnormal UA-(21-30WBC, mod leuk, 2+ bacteria) maybe UTI    CXR-infiltrate (note lack cough, etc)-maybe pneumonia    Sepsis (tachycardia 104, lactic acid 6.0-maybe    Low phenytoin level 3.0    Hypopotassemia-2.9    Drug screen THC, opiate    Dysphagia-confusion    NPO         Chronic problems to review/consider during care:    64 y/o y/o white female     Allergy/intolerance: see above    Procedural history: see above    Family history: see above    Obesity.  This is status post gastric bypass initial failure and regain of    weight.       1 para 1 AB 0.      Surgical menopause.    Osteoporosis on previous wrist study.      Hypertension.      Hypothyroidism.      History of recurrent  hyponatremia felt to be syndrome of inappropriate                antidiuretic hormone, as least from Ziac or Cymbalta and question others.      History of DVT - left peroneal popliteal, 11/08/2008, treated                successfully with anticoagulation.     History anticoagulation: hx DVT/(coumadin)    Chronic insomnia.    Spinal spondylosis; this is primarily cervical with multiple surgeries.    Chronic neck pain.      Chronic back pain    Chronic narcotics-from pain management/Ruxer    Chronic depression - followed by Dr. Samaniego.      Degenerative joint disease that is diffuse.     LE weakness    Gait difficulty-chronic     History of gastric bypass and metabolic risk it carries.      Colon cancer history with previous surgery and chemo, no obvious                recurrence.    Gastroesophageal reflux.    Recurrent laryngitis    Documented neuropathy by EMG nerve conduction    Seizure disorder (2016)    antieliptics-Keppra/Dilantin    Abnormal CT chest-treating nonsurgically    Polypharmacy     Urinary incontience (history urge incontinence)              Rx-reviewed, considered with changes as needed: IV KCL runs    Labs reviewed, considered and changes made as needed: daily cbc/chemistry    Imaging reviewed, and need for considered: shantal CXR tomorrow    Consults needed: neurology, speech    Diet reviewed, considered and changes made as needed: no changes until                Ok speech    Fluids reviewed, considered and changes made as needed: no changes    Increase activity: not a good option    Code status: full    Discussed possible/future discharge plans: patient expects home but has                Used NH    Case discussed with nursing: [facility] discharge summary to neurology    Available to talk if needed with POA/caregiver and members care team.         Josafat Gonzales MD    03/12/19    8:28 AM

## 2019-03-13 NOTE — PLAN OF CARE
Problem: Patient Care Overview  Goal: Plan of Care Review  Outcome: Ongoing (interventions implemented as appropriate)   03/13/19 2884   Coping/Psychosocial   Plan of Care Reviewed With patient   OTHER   Outcome Summary Standardized Mini-Mental State Examination complete due to report of confusion. Pt was noted to be alert and oriented x4. She was able to follow all commands w/o difficulty. Pt scored a 25/30 which indicates a mild cognitive impairment. She did not exhibit any difficulties in the areas of attention, thoughout organization, reasoning, or mental manipulation, but did display mild deficits in the area of memory as she expereinced difficulty with delayed recall. She expresses that she currently feels like she is back to her baseline status as she has had slight memory issues for some time now. She also expresses that she lives at home with her  who provides aid if needed. She is currently on a regular diet and reports she is doing well. RN also reports pt is tolerating a regular diet w/o concerns. Pt completed thin liquids on this date and no overt s/s of aspiration were noted. SLP is signing off at this time as pt is felt to be at baseline status and does not warrant further SLP services. MD to re-consult if there is a change in cognitive status or further concerns arise.    Plan of Care Review   Progress no change

## 2019-03-13 NOTE — PLAN OF CARE
Problem: Patient Care Overview  Goal: Plan of Care Review  Outcome: Ongoing (interventions implemented as appropriate)   03/13/19 6409   Coping/Psychosocial   Plan of Care Reviewed With patient   OTHER   Outcome Summary pt AOX4, no neuro changes, pain controlled with prn meds, NSR on Tele, K+ still low, replacement ordered PO per pt request, possible d/c to rehab late today   Plan of Care Review   Progress improving       Problem: Fall Risk (Adult)  Goal: Absence of Fall  Outcome: Ongoing (interventions implemented as appropriate)      Problem: Skin Injury Risk (Adult)  Goal: Skin Health and Integrity  Outcome: Ongoing (interventions implemented as appropriate)      Problem: Confusion, Acute (Adult)  Goal: Cognitive/Functional Impairments Minimized  Outcome: Ongoing (interventions implemented as appropriate)    Goal: Safety  Outcome: Ongoing (interventions implemented as appropriate)      Problem: Infection, Risk/Actual (Adult)  Goal: Infection Prevention/Resolution  Outcome: Ongoing (interventions implemented as appropriate)      Problem: Sepsis/Septic Shock (Adult)  Goal: Signs and Symptoms of Listed Potential Problems Will be Absent, Minimized or Managed (Sepsis/Septic Shock)  Outcome: Ongoing (interventions implemented as appropriate)

## 2019-03-13 NOTE — PROGRESS NOTES
Neurology Progress Note      Date of admission: 3/10/2019 10:26 AM  Date of visit: 3/13/2019    Chief Complaint:  F/u episode of confusion and repeating words    Subjective     Subjective:    No further episodes.  She is back to her baseline.  Able to obtain the patient's medications from her pharmacist that she filled over the past couple years.  The last time she filled Keppra was 1000 mg twice a day and that was last filled August 22, 2018 there is no list of Dilantin or phenytoin on this and the list goes down back to December 2017 he has however filled her clonazepam, Flexeril and that was last filled January 15, 2019   Gabapentin, Cymbalta was last filled February 2019 and only one other time in the year and that was  May 2018. Morphine last filled 3/9/2019 oxycodone last filled January 13, 2019. Her potassium was last filled April 2018 Seroquel last filled February 2019 Effexor was only  filled October 2018 and  March 2019.  Vesicare last filled May 2018  Medications:  Current Facility-Administered Medications   Medication Dose Route Frequency Provider Last Rate Last Dose   • acetaminophen (TYLENOL) tablet 650 mg  650 mg Oral Q4H PRN Zeeshan Hester MD       • cefTRIAXone (ROCEPHIN) 1 g/100 mL 0.9% NS (MBP)  1 g Intravenous Q24H Zeeshan Hester MD   1 g at 03/12/19 1502    And   • AZITHROMYCIN 500 MG/250 ML 0.9% NS IVPB (vial-mate)  500 mg Intravenous Q24H Zeeshan Hester MD   500 mg at 03/12/19 1657   • DULoxetine (CYMBALTA) DR capsule 30 mg  30 mg Oral Daily Zeeshan Hester MD   30 mg at 03/13/19 0858   • enoxaparin (LOVENOX) syringe 40 mg  40 mg Subcutaneous Q24H Josafat Gonzales MD   40 mg at 03/12/19 1911   • fosphenytoin (Cerebyx) injection 100 mg PE  100 mg PE Intravenous Q12H Zeeshan Hester MD   100 mg PE at 03/13/19 0858   • gabapentin (NEURONTIN) capsule 600 mg  600 mg Oral Q8H Zeeshan Hester MD   600 mg at 03/13/19 0539   • ipratropium-albuterol  (DUO-NEB) nebulizer solution 3 mL  3 mL Nebulization Q4H - RT Zeeshan Hester MD   3 mL at 03/12/19 2035   • levETIRAcetam in NaCl 0.75% (KEPPRA) IVPB 1,000 mg  1,000 mg Intravenous BID Zeeshan Hester MD   1,000 mg at 03/13/19 0858   • levothyroxine (SYNTHROID, LEVOTHROID) tablet 100 mcg  100 mcg Oral Q AM Zeeshan Hester MD   100 mcg at 03/13/19 0539   • loperamide (IMODIUM) capsule 2 mg  2 mg Oral 4x Daily PRN Zeeshan Hester MD       • LORazepam (ATIVAN) injection 1 mg  1 mg Intravenous Q4H PRN Zeeshan Hester MD   1 mg at 03/12/19 1911   • Magnesium Sulfate 2 gram Bolus, followed by 8 gram infusion (total Mg dose 10 grams)- Mg less than or equal to 1mg/dL  2 g Intravenous PRN Zeeshan Hester MD        Or   • Magnesium Sulfate 2 gram / 50mL Infusion (GIVE X 3 BAGS TO EQUAL 6GM TOTAL DOSE) - Mg 1.1 - 1.5 mg/dl  2 g Intravenous PRN Zeeshan Hester MD        Or   • Magnesium Sulfate 4 gram infusion- Mg 1.6-1.9 mg/dL  4 g Intravenous PRN Zeeshan Hester MD       • metoprolol tartrate (LOPRESSOR) tablet 50 mg  50 mg Oral Daily Zeeshan Hester MD   50 mg at 03/13/19 0858   • Morphine (MS CONTIN) 12 hr tablet 15 mg  15 mg Oral Q12H Zeeshan Hester MD   15 mg at 03/13/19 0858   • Morphine sulfate (PF) injection 2 mg  2 mg Intravenous Q3H PRN Josafat Gonzales MD   2 mg at 03/12/19 1911   • nitroglycerin (NITROSTAT) SL tablet 0.4 mg  0.4 mg Sublingual Q5 Min PRN Zeeshan Hester MD       • oxybutynin XL (DITROPAN-XL) 24 hr tablet 5 mg  5 mg Oral Daily Zeeshan Hester MD   5 mg at 03/13/19 0858   • oxyCODONE (ROXICODONE) immediate release tablet 10 mg  10 mg Oral Q4H PRN Zeeshan Hester MD   10 mg at 03/13/19 0914   • Pharmacy to Dose enoxaparin (LOVENOX)   Does not apply Continuous PRN Zeeshan Hester MD       • potassium chloride (MICRO-K) CR capsule 20 mEq  20 mEq Oral TID With Meals Josafat Gonzales MD       •  potassium chloride 10 mEq in 100 mL IVPB  10 mEq Intravenous Q1H PRN Zeeshan Hester  mL/hr at 03/11/19 2053 10 mEq at 03/11/19 2053   • sodium chloride 0.9 % flush 10 mL  10 mL Intravenous PRN Sergio Templeton, DO       • sodium chloride 0.9 % flush 3 mL  3 mL Intravenous Q12H Zeeshan Hester MD   3 mL at 03/12/19 2012   • sodium chloride 0.9 % flush 3-10 mL  3-10 mL Intravenous PRN Zeeshan Hester MD       • venlafaxine XR (EFFEXOR-XR) 24 hr capsule 300 mg  300 mg Oral Daily Zeeshan Hester MD   300 mg at 03/13/19 0858       Review of Systems:   -A 14 point review of systems is completed and is negative   Objective     Objective      Vital Signs  Temp:  [97.7 °F (36.5 °C)-98.8 °F (37.1 °C)] 98.4 °F (36.9 °C)  Heart Rate:  [75-92] 86  Resp:  [16-18] 18  BP: (122-149)/(68-95) 128/68    Physical Exam:    HEENT:  Neck supple  CVS:  Regular rate and rhythm.  No murmurs  Carotid Examination:  No bruits  Lungs:  Clear to auscultation  Abdomen:  Non-tender, Non-distended  Extremities:  No signs of peripheral edema    Neurologic Exam:    -Awake, Alert, Oriented to person ans place  -No word finding difficulties  -No aphasia  -No dysarthria  -Follows simple  commands    Cranial nerves II through XII intact  EOMI No facial sensory loss.  No facial weakness. Hearing intact to voice  Tongue protrudes midline.    Motor: (strength out of 5:  1= minimal movement, 2 = movement in plane of gravity, 3 = movement against gravity, 4 = movement against some resistance, 5 = full strength)    -Right Upper Ext: Proximal: 5 Distal: 5  -Left Upper Ext: Proximal: 5 Distal: 5    No change in her strength in her legs:  Right Lower Ext: She is able to raise up her legs I.e.hip flexion from the hip but cannot sustain this for long.  Ankle dorsiflexors are strong. Knee flexors and extensors appear weak but there is suboptimal effort  -Left Lower Ext: She is able to raise up her legs i.e. hip flexion- from the hip  but cannot sustain this for long.  Ankle dorsiflexors are strong. Knee flexors and extensors appear weak but there is suboptimal effort        DTR:  2+ throughout in all four extremities with increased 2++ briskness of the lower extremities.    Sensory:  -Intact to light touch    Coordination/Gait:  -Normal finger to nose     Results Review:    I reviewed the patient's new clinical results.    Lab Results (last 24 hours)     Procedure Component Value Units Date/Time    Basic Metabolic Panel [[id number]]  (Abnormal) Collected:  03/13/19 0624    Specimen:  Blood Updated:  03/13/19 0746     Glucose 73 mg/dL      BUN 3 mg/dL      Creatinine 0.54 mg/dL      Sodium 138 mmol/L      Potassium 3.1 mmol/L      Chloride 104 mmol/L      CO2 28.0 mmol/L      Calcium 7.4 mg/dL      eGFR Non African Amer 114 mL/min/1.73      BUN/Creatinine Ratio 5.6     Anion Gap 6.0 mmol/L     Narrative:       GFR Normal >60  Chronic Kidney Disease <60  Kidney Failure <15    CBC & Differential [[id number]] Collected:  03/13/19 0624    Specimen:  Blood Updated:  03/13/19 0731    Narrative:       The following orders were created for panel order CBC & Differential.  Procedure                               Abnormality         Status                     ---------                               -----------         ------                     CBC Auto Differential[[id number]]        Abnormal            Final result                 Please view results for these tests on the individual orders.    CBC Auto Differential [[id number]]  (Abnormal) Collected:  03/13/19 0624    Specimen:  Blood Updated:  03/13/19 0731     WBC 6.74 10*3/mm3      RBC 3.47 10*6/mm3      Hemoglobin 11.1 g/dL      Hematocrit 33.4 %      MCV 96.3 fL      MCH 32.0 pg      MCHC 33.2 g/dL      RDW 15.1 %      RDW-SD 52.6 fl      MPV 11.0 fL      Platelets 204 10*3/mm3      Neutrophil % 75.1 %      Lymphocyte % 14.4 %      Monocyte % 6.4 %      Eosinophil % 3.1 %      Basophil % 0.7 %       "Immature Grans % 0.3 %      Neutrophils, Absolute 5.06 10*3/mm3      Lymphocytes, Absolute 0.97 10*3/mm3      Monocytes, Absolute 0.43 10*3/mm3      Eosinophils, Absolute 0.21 10*3/mm3      Basophils, Absolute 0.05 10*3/mm3      Immature Grans, Absolute 0.02 10*3/mm3      nRBC 0.0 /100 WBC     Blood Culture - Blood, Hand, Left [[id number]] Collected:  03/10/19 1727    Specimen:  Blood from Hand, Left Updated:  03/12/19 1815     Blood Culture No growth at 2 days    Blood Culture - Blood, Arm, Left [[id number]] Collected:  03/10/19 1727    Specimen:  Blood from Arm, Left Updated:  03/12/19 1815     Blood Culture No growth at 2 days    Vitamin D 25 Hydroxy [[id number]]  (Abnormal) Collected:  03/12/19 0623    Specimen:  Blood Updated:  03/12/19 1656     25 Hydroxy, Vitamin D <12.8 ng/ml     Blood Culture - Blood, Arm, Left [[id number]] Collected:  03/10/19 1115    Specimen:  Blood from Arm, Left Updated:  03/12/19 1215     Blood Culture No growth at 2 days    Blood Culture - Blood, Arm, Left [[id number]] Collected:  03/10/19 1120    Specimen:  Blood from Arm, Left Updated:  03/12/19 1215     Blood Culture No growth at 2 days        Imaging Results (last 24 hours)     Procedure Component Value Units Date/Time    XR Chest PA & Lateral [[id number]] Collected:  03/13/19 0815     Updated:  03/13/19 0819    Narrative:       EXAMINATION: XR CHEST PA AND LATERAL- 3/13/2019 8:15 AM CDT     HISTORY: f/u \"infiltrate\"; R41.82-Altered mental status, unspecified;  J18.9-Pneumonia, unspecified organism; N39.0-Urinary tract infection,  site not specified; A41.9-Sepsis, unspecified organism; R65.20-Severe  sepsis without septic shock; R13.10-Dysphagia, unspecified; Z74.09-Other  reduced mobility; Z74.09-Other reduced mobility.     REPORT: Comparison is made with the chest x-ray from 3/10/2019.     The lungs are mildly hypoaerated, there is no significant change in left  perihilar and midlung infiltrate likely related to pneumonia. " No  pneumothorax or pleural effusion. Heart size is normal. There is ectasia  of the thoracic aorta has before. Previous cervical and lumbar fusion is  noted.       Impression:       Persistent left perihilar and left midlung infiltrate most  likely related to pneumonia. This is unchanged.  This report was finalized on 03/13/2019 08:16 by Dr. Fabian Dueans MD.    MRI Brain With & Without Contrast [[id number]] Collected:  03/12/19 1748     Updated:  03/12/19 1756    Narrative:       EXAMINATION:  MRI BRAIN W WO CONTRAST-  3/12/2019 5:11 PM CDT     HISTORY: Encephalopathy      COMPARISON: Head CT dated 3/10/2019     TECHNIQUE: Multiplanar MR imaging of the brain was performed with  gadolinium enhanced imaging.     FINDINGS:      There is no diffusion signal abnormality to indicate an acute ischemic  event/infarction.     There is diffuse central and cortical atrophy. A predominance of frontal  lobe atrophy observed.     There is confluent and punctate periventricular and subcortical FLAIR  signal hyperintensities compatible with mild chronic ischemic changes. A  punctate area of chronic ischemic change also noted in the left  cerebellar hemisphere.     There is no mass effect or midline shift. There is no hydrocephalus.     Normal flow voids are identified.     No abnormal gadolinium enhancement identified.     Pituitary gland is not enlarged.     Cervical spine imaged in part is unremarkable.       Impression:       1. Atrophy, frontal lobe predominance.  2. Chronic ischemic changes.  3. No acute intracranial process.  This report was finalized on 03/12/2019 17:52 by Dr. Obi Phan MD.        EEG 3/12/2019: Normal awake and sleep EEG  :   Assessment/Plan     Hospital Problem List      Altered mental status    Anticoagulated: DVT/(?)    Impression:  1. Episode of repeating words and of confusion.  I suspect this is from her use of opioids and benzo's on a background of untreated sleep apnea. However she was not  taking any antiseizure meds and per her pharmacy she has not done this since 8/22/18 and it looks like it was not taken regularly throughout the entire year of 2018.  Looks like she only had one single  prescription for Keppra that she picked up.  Per conversation with Dr. Gonzales patient was hooked up to an EEG for a couple days at [facility] and had constant epileptic discharges    2. Frontal lobe atrophy  3. Chronic opioid use  4.  B12 deficiency at 251 --has received one dose of IM B 12  5. Vitamin D deficiency--not    6. H/O gastric bypass so will likely need daily replacement of these  7. H/O YOLIE not treated overnight oximetry   8.  There is a lot of hit and miss of her filling her medications based on the list of meds coming from [employer].  But it looks like she is taking both Effexor and Cymbalta which is probably not a good combination as both  of those can cause urinary retention and it places  her at risk for serotonin syndrome  9.  Significant issues with compliance with her medications except for the opioids/morphine    Plan:  · Continue Keppra for now given previous H/O status epilepticus although I'm wondering if that was in the face of any type of withdrawal of meds or with use of ultram,etc.  Too little information to make a qualified judgement of best treatment plan for her.  In addition sleep apnea if severe enough and not treated can cause elevated C02 and then seizures as well--especially if breathing suppressed by opioid/benzo's  · Reduce combination of opioids and benzo's. Her age will also make her start having more side effects as over time with aging  she will not be able to metabolize these drugs as well.   · Will give 50,000 IU of vitamin D x 1   · Follow up with Neurology as there are many options to treat sleep apnea and many techniques to help overcome her mask being pulled off during sleep--for example deconditioning where she wears the mask during the day off and on until she gets used  to it     · Discharge on monotherapy Keppra--will use XR format as it is a more steady release and still give it every 12 hours even though it is listed as once a day to prevent fluctuations in her levels.        Tamara Donovan MD  03/13/19  11:46 AM

## 2019-03-13 NOTE — PLAN OF CARE
Problem: Patient Care Overview  Goal: Plan of Care Review  Outcome: Ongoing (interventions implemented as appropriate)   03/13/19 1136   Coping/Psychosocial   Plan of Care Reviewed With patient   OTHER   Outcome Summary With extra time, cues and cga pt able to get EOB. Sat EOB worked on BLE ex's AROM x20. sit-stand w/ bed elvated min x2 cues for wt shifting and help w/ AD pt able to take few steps to chair. pt would benefit from cont PT upon d/c   Plan of Care Review   Progress improving

## 2019-03-13 NOTE — PROGRESS NOTES
Continued Stay Note   Chanel     Patient Name: Bita Croft  MRN: [id number]  Today's Date: 3/13/2019    Admit Date: 3/10/2019    Discharge Plan     Row Name 03/13/19 1442       Plan    Plan Comments  Malou from [facility] contacted  in regards to precert being completed with insurance. Dr. Gonzales has spoke with facility and facility will take pt at any time today. Phone: [phone] Fax: [phone]     Final Discharge Disposition Code  03 - skilled nursing facility (SNF)          Discharge Codes    No documentation.       Expected Discharge Date and Time     Expected Discharge Date Expected Discharge Time    Mar 13, 2019             Donya Weldon

## 2019-03-13 NOTE — PLAN OF CARE
Problem: Patient Care Overview  Goal: Plan of Care Review  Outcome: Ongoing (interventions implemented as appropriate)   03/13/19 0411   Coping/Psychosocial   Plan of Care Reviewed With patient   OTHER   Outcome Summary vss; alert and oriented x 4; seemed confused at times; c/o back pain; prn pain med offered relief; ivf continue; potassium runs finished; turned every 2 hours; safety maintained   Plan of Care Review   Progress no change     Goal: Individualization and Mutuality  Outcome: Ongoing (interventions implemented as appropriate)    Goal: Discharge Needs Assessment  Outcome: Ongoing (interventions implemented as appropriate)    Goal: Interprofessional Rounds/Family Conf  Outcome: Ongoing (interventions implemented as appropriate)      Problem: Fall Risk (Adult)  Goal: Absence of Fall  Outcome: Ongoing (interventions implemented as appropriate)      Problem: Skin Injury Risk (Adult)  Goal: Skin Health and Integrity  Outcome: Ongoing (interventions implemented as appropriate)      Problem: Confusion, Acute (Adult)  Goal: Cognitive/Functional Impairments Minimized  Outcome: Ongoing (interventions implemented as appropriate)    Goal: Safety  Outcome: Ongoing (interventions implemented as appropriate)      Problem: Infection, Risk/Actual (Adult)  Goal: Infection Prevention/Resolution  Outcome: Ongoing (interventions implemented as appropriate)      Problem: Sepsis/Septic Shock (Adult)  Goal: Signs and Symptoms of Listed Potential Problems Will be Absent, Minimized or Managed (Sepsis/Septic Shock)  Outcome: Ongoing (interventions implemented as appropriate)

## 2019-03-13 NOTE — PROGRESS NOTES
Continued Stay Note   Chanel     Patient Name: Bita Croft  MRN: [id number]  Today's Date: 3/13/2019    Admit Date: 3/10/2019    Discharge Plan     Row Name 03/13/19 0816       Plan    Plan Comments  EARL spoke with Malou from [facility]. She states precertification has been started with pt's insurance for placement. EARL will follow         Discharge Codes    No documentation.       Expected Discharge Date and Time     Expected Discharge Date Expected Discharge Time    Mar 13, 2019             Donya Weldon

## 2019-03-13 NOTE — THERAPY DISCHARGE NOTE
Acute Care - Speech Language Pathology Initial Eval/Discharge  [facility]     Patient Name: Bita Croft  : 1955  MRN: [id number]  Today's Date: 3/13/2019  Onset of Illness/Injury or Date of Surgery: 03/10/19     Referring Physician: Dr. Gonzales      Admit Date: 3/10/2019   Standardized Mini-Mental State Examination complete due to report of confusion. Pt was noted to be alert and oriented x4. She was able to follow all commands w/o difficulty. Pt scored a 25/30 which indicates a mild cognitive impairment. She did not exhibit any difficulties in the areas of attention, thoughout organization, reasoning, or mental manipulation, but did display mild deficits in the area of memory as she expereinced difficulty with delayed recall. She expresses that she currently feels like she is back to her baseline status as she has had slight memory issues for some time now. She also expresses that she lives at home with her  who provides aid if needed. She is currently on a regular diet and reports she is doing well. RN also reports pt is tolerating a regular diet w/o concerns. Pt completed thin liquids on this date and no overt s/s of aspiration were noted. SLP is signing off at this time as pt is felt to be at baseline status and does not warrant further SLP services. MD to re-consult if there is a change in cognitive status or further concerns arise.  Abran Martinez, MS CCC-SLP 3/13/2019 3:01 PM    Visit Dx:    ICD-10-CM ICD-9-CM   1. Altered mental status, unspecified altered mental status type R41.82 780.97   2. Pneumonia of left lung due to infectious organism, unspecified part of lung J18.9 486   3. Acute urinary tract infection N39.0 599.0   4. Severe sepsis (CMS/[facility]) A41.9 038.9    R65.20 995.92   5. Dysphagia, unspecified type R13.10 787.20   6. Impaired functional mobility and activity tolerance Z74.09 V49.89   7. Impaired mobility and ADLs Z74.09 799.89   8. Impaired cognition R41.89 294.9     Patient  Active Problem List   Diagnosis   • Confusion   • Depression   • Anxiety   • Hyponatremia-often polydyspia involved   • Seizure disorder (CMS/HCC)   • Chronic neck pain   • Chronic back pain-Ruxer   • Hypothyroidism   • Abnormal thyroid blood test   • Hypertension   • Wellness examination-done   • Elevated fasting glucose   • Anemia   • Iron deficiency   • Degenerative joint disease of spine   • Conversion disorder with abnormal movement   • Reflux laryngitis   • Gait difficulty   • Weakness of both legs   • Sore throat   • Polydipsia   • Nocturia   • Frequency of urination   • Urge incontinence   • Laboratory test*   • Chronic narcotic use-Ruxer   • Abnormal x-ray   • Noncompliance   • Lung nodule, solitary   • Non-smoker   • Hx of colon cancer, stage I   • Altered mental status   • Anticoagulated: DVT/(?)   • History of radiation therapy     Past Medical History:   Diagnosis Date   • Asthma    • Cancer (CMS/HCC)     colon cancer   • Depression    • Disease of thyroid gland    • Hypertension    • Insomnia    • Seizure (CMS/HCC)    • Sleep apnea      Past Surgical History:   Procedure Laterality Date   • BACK SURGERY     • COLON SURGERY     • GASTRIC BYPASS     • HYSTERECTOMY     • KNEE ARTHROSCOPY      bilateral   • NECK SURGERY     • ORIF WRIST FRACTURE Right 6/9/2017    Procedure: WRIST OPEN REDUCTION INTERNAL FIXATION, RIGHT;  Surgeon: Alec Cardoza MD;  Location: [facility];  Service:    • TONSILLECTOMY     • WRIST FRACTURE SURGERY            SLP EVALUATION (last 72 hours)      SLP SLC Evaluation     Row Name 03/13/19 1044                   Communication Assessment/Intervention    Document Type  evaluation  -CS        Subjective Information  complains of;weakness  -CS        Patient Observations  alert;cooperative;agree to therapy  -CS        Patient/Family Observations  no family present  -CS        Patient Effort  adequate  -CS           General Information    Patient Profile Reviewed  yes  -CS         Pertinent History Of Current Problem  AMS, seizure, sleep apnea  -CS        Precautions/Limitations, Vision  corrective lenses needed for reading;WFL;for purposes of eval  -CS        Precautions/Limitations, Hearing  WFL;for purposes of eval  -CS        Plans/Goals Discussed with  patient  -CS        Barriers to Rehab  none identified  -CS        Patient's Goals for Discharge  return to all previous roles/activities  -CS           Pain Assessment    Additional Documentation  Pain Scale: FACES Pre/Post-Treatment (Group)  -CS           Pain Scale: FACES Pre/Post-Treatment    Pain: FACES Scale, Pretreatment  0-->no hurt  -CS        Pain: FACES Scale, Post-Treatment  0-->no hurt  -CS           Comprehension Assessment/Intervention    Comprehension Assessment/Intervention  Auditory Comprehension  -CS           Auditory Comprehension Assessment/Intervention    Auditory Comprehension (Communication)  WFL  -CS           Expression Assessment/Intervention    Expression Assessment/Intervention  verbal expression  -CS           Verbal Expression Assessment/Intervention    Verbal Expression  WFL  -CS           Oral Motor Structure and Function    Oral Motor Structure and Function  WFL  -CS           Oral Musculature and Cranial Nerve Assessment    Oral Motor General Assessment  WFL  -CS           Motor Speech Assessment/Intervention    Motor Speech Function  WFL  -CS           Cognitive Assessment Intervention- SLP    Cognitive Function (Cognition)  mild impairment  -CS        Orientation Status (Cognition)  person;place;time;situation;WFL  -CS        Memory (Cognitive)  mild impairment;short-term;delayed  -CS        Attention (Cognitive)  WFL  -CS        Thought Organization (Cognitive)  WFL  -CS        Reasoning (Cognitive)  WFL  -CS        Executive Function (Cognition)  WFL  -CS           Recommendations    Therapy Frequency (SLP SLC)  evaluation only  -CS        Anticipated Dischage Disposition  home with assist  -CS            SLP Discharge Summary    Discharge Destination  home  -CS          User Key  (r) = Recorded By, (t) = Taken By, (c) = Cosigned By    Initials Name Effective Dates    CS Renetta Martinezy AAMIR, MS CCC-SLP 04/03/18 -            EDUCATION  The patient has been educated in the following areas:   Cognitive Impairment.      SLP Recommendation and Plan              Anticipated Dischage Disposition: home with assist          Plan of Care Reviewed With: patient  Plan of Care Review  Plan of Care Reviewed With: patient  Progress: no change  Outcome Summary: Standardized Mini-Mental State Examination complete due to report of confusion. Pt was noted to be alert and oriented x4. She was able to follow all commands w/o difficulty. Pt scored a 25/30 which indicates a mild cognitive impairment. She did not exhibit any difficulties in the areas of attention, thoughout organization, reasoning, or mental manipulation, but did display mild deficits in the area of memory as she expereinced difficulty with delayed recall. She expresses that she currently feels like she is back to her baseline status as she has had slight memory issues for some time now. She also expresses that she lives at home with her  who provides aid if needed. She is currently on a regular diet and reports she is doing well. RN also reports pt is tolerating a regular diet w/o concerns. Pt completed thin liquids on this date and no overt s/s of aspiration were noted. SLP is signing off at this time as pt is felt to be at baseline status and does not warrant futher SLP services. MD to re-consult if there is a change in cognitive status or further concerns arise.       SLP GOALS     Row Name 03/13/19 1400 03/12/19 0831          Oral Nutrition/Hydration Goal 1 (SLP)    Oral Nutrition/Hydration Goal 1, SLP  Pt will tolerate least restrictive diet with no overt s/s of aspiration.   -CS  Pt will tolerate least restrictive diet with no overt s/s of aspiration.   -CS      Time Frame (Oral Nutrition/Hydration Goal 1, SLP)  by discharge  -CS  by discharge  -CS     Barriers (Oral Nutrition/Hydration Goal 1, SLP)  n/a  -CS  n/a  -CS     Progress/Outcomes (Oral Nutrition/Hydration Goal 1, SLP)  goal met  -CS  continuing progress toward goal  -CS       User Key  (r) = Recorded By, (t) = Taken By, (c) = Cosigned By    Initials Name Provider Type    CS Abran Martinez MS CCC-SLP Speech and Language Pathologist              Time Calculation:   Time Calculation- SLP     Row Name 19 1450             Time Calculation- SLP    SLP Start Time  1040  -CS      SLP Stop Time  1159  -CS      SLP Time Calculation (min)  79 min  -CS      SLP Received On  19  -CS      SLP Goal Re-Cert Due Date  19  -CS        User Key  (r) = Recorded By, (t) = Taken By, (c) = Cosigned By    Initials Name Provider Type    Abran Barrera MS CCC-SLP Speech and Language Pathologist          Therapy Charges for Today     Code Description Service Date Service Provider Modifiers Qty    52614146271 HC ST TREATMENT SWALLOW 2 3/12/2019 Abran Martinez MS CCC-SLP GN 1    54662285450 HC ST EVAL SPEECH AND PROD W LANG  4 3/13/2019 Abran Martinez MS CCC-SLP GN 1    47202689040 HC ST TREATMENT SWALLOW 1 3/13/2019 Abran Martinez MS CCC-SLP GN 1                   SLP Discharge Summary  Anticipated Dischage Disposition: home with assist  Discharge Destination: home    Abran Martinez MS CCC-SLP  3/13/2019  and Acute Care - Speech Language Pathology   Swallow Treatment Note/Discharge   [facility]     Patient Name: Bita Croft  : 1955  MRN: [id number]  Today's Date: 3/13/2019  Onset of Illness/Injury or Date of Surgery: 03/10/19     Referring Physician: Dr. Gonzales      Admit Date: 3/10/2019    Visit Dx:      ICD-10-CM ICD-9-CM   1. Altered mental status, unspecified altered mental status type R41.82 780.97   2. Pneumonia of left lung due to infectious organism, unspecified part of lung J18.9 486   3. Acute  "urinary tract infection N39.0 599.0   4. Severe sepsis (CMS/HCC) A41.9 038.9    R65.20 995.92   5. Dysphagia, unspecified type R13.10 787.20   6. Impaired functional mobility and activity tolerance Z74.09 V49.89   7. Impaired mobility and ADLs Z74.09 799.89   8. Impaired cognition R41.89 294.9     Patient Active Problem List   Diagnosis   • Confusion   • Depression   • Anxiety   • Hyponatremia-often polydyspia involved   • Seizure disorder (CMS/HCC)   • Chronic neck pain   • Chronic back pain-Ruxer   • Hypothyroidism   • Abnormal thyroid blood test   • Hypertension   • Wellness examination-done   • Elevated fasting glucose   • Anemia   • Iron deficiency   • Degenerative joint disease of spine   • Conversion disorder with abnormal movement   • Reflux laryngitis   • Gait difficulty   • Weakness of both legs   • Sore throat   • Polydipsia   • Nocturia   • Frequency of urination   • Urge incontinence   • Laboratory test*   • Chronic narcotic use-Ruxer   • Abnormal x-ray   • Noncompliance   • Lung nodule, solitary   • Non-smoker   • Hx of colon cancer, stage I   • Altered mental status   • Anticoagulated: DVT/(?)   • History of radiation therapy       Therapy Treatment  Rehabilitation Treatment Summary     Row Name 03/13/19 1321 03/13/19 1108 03/13/19 1040       Treatment Time/Intention    Discipline  physical therapy assistant  -NW  physical therapy assistant  -NW  speech language pathologist  -CS    Document Type  therapy note (daily note)  -NW  therapy note (daily note)  -NW  --    Subjective Information  complains of;weakness  -NW2  complains of;weakness  -NW2  --    Patient/Family Observations  no family present  -NW2  no family present  -NW2  --    Comment  --  Pt reports  lifts pt and transfers to chair at home \"i have walked to Bathroom before with my  following w/ chair\"  (Significant)   -NW2  --    Existing Precautions/Restrictions  fall  -NW2  fall  -NW2  --    Recorded by [NW] Adelina Silva, " PTA 03/13/19 1328  [NW2] Adelina Silva, [facility] 03/13/19 1357 [NW] Adelina Silva, [facility] 03/13/19 1118  [NW2] Adelina Silva, [facility] 03/13/19 1135 [CS] Abran Martinez, MS CCC-SLP 03/13/19 1432    Row Name 03/13/19 1321 03/13/19 1108          Safety Issues, Functional Mobility    Safety Issues Affecting Function (Mobility)  friction/shear risk  -NW  friction/shear risk  -NW     Impairments Affecting Function (Mobility)  endurance/activity tolerance  -NW  endurance/activity tolerance;strength  -NW     Recorded by [NW] Adelina Silva, [facility] 03/13/19 1357 [NW] Adelina Silva, [facility] 03/13/19 1135     Row Name 03/13/19 1321 03/13/19 1108          Bed Mobility Assessment/Treatment    Bed Mobility Assessment/Treatment  --  rolling right  -NW     Rolling Right Bohannon (Bed Mobility)  --  verbal cues;supervision  -NW     Supine-Sit Bohannon (Bed Mobility)  --  verbal cues;contact guard  -NW     Sit-Supine Bohannon (Bed Mobility)  verbal cues;minimum assist (75% patient effort)  -NW  -- up in chair  -NW     Bed Mobility, Safety Issues  cognitive deficits limit understanding  -NW  --     Assistive Device (Bed Mobility)  bed rails  -NW  bed rails  -NW     Comment (Bed Mobility)  --  pt required extra time for sitting EOB but w/ cues and some help w/ LEs pt able to get there  -NW     Recorded by [NW] Adelina Silva, [facility] 03/13/19 1357 [NW] Adelina Silva, [facility] 03/13/19 1135     Row Name 03/13/19 1321 03/13/19 1108          Sit-Stand Transfer    Sit-Stand Bohannon (Transfers)  verbal cues;moderate assist (50% patient effort);2 person assist  -NW  verbal cues;minimum assist (75% patient effort);2 person assist  -NW     Assistive Device (Sit-Stand Transfers)  walker, front-wheeled from chair  -NW  walker, front-wheeled elevate bed!  -NW     Recorded by [NW] Adelina Silva, [facility] 03/13/19 1357 [NW] Adelina Silva, PTA 03/13/19 1135     Row Name 03/13/19 1321 03/13/19 1108          Stand-Sit Transfer    Stand-Sit  Isabela (Transfers)  verbal cues;minimum assist (75% patient effort);2 person assist  -NW  verbal cues;contact guard;minimum assist (75% patient effort);1 person assist;2 person assist  -NW     Assistive Device (Stand-Sit Transfers)  walker, front-wheeled  -NW  walker, front-wheeled  -NW     Recorded by [NW] Adelina Silva, PTA 03/13/19 1357 [NW] Adelina Silva, [facility] 03/13/19 1135     Row Name 03/13/19 1321 03/13/19 1108          Gait/Stairs Assessment/Training    Isabela Level (Gait)  verbal cues;minimum assist (75% patient effort);2 person assist  -NW  verbal cues;minimum assist (75% patient effort);2 person assist  -NW     Assistive Device (Gait)  walker, front-wheeled  -NW  walker, front-wheeled  -NW     Distance in Feet (Gait)  few steps btb  -NW  few steps to chair  -NW     Deviations/Abnormal Patterns (Gait)  base of support, narrow;gait speed decreased;stride length decreased  -NW  base of support, narrow;gait speed decreased;stride length decreased  -NW     Comment (Gait/Stairs)  cont to need cues for safety and help w/ AD  -NW  cues for wt shifting and help w/ AD  -NW     Recorded by [NW] Adelina Silva, [facility] 03/13/19 1357 [NW] Adelina Silva, [facility] 03/13/19 1135     Row Name 03/13/19 1108             General ROM    GENERAL ROM COMMENTS  AROM BLE x20 sitting  -NW      Recorded by [NW] Adelina Silva, hospitals 03/13/19 1135      Row Name 03/13/19 1321 03/13/19 1108          Positioning and Restraints    Pre-Treatment Position  sitting in chair/recliner  -NW  in bed  -NW     Post Treatment Position  bed  -NW  chair  -NW     In Bed  fowlers;call light within reach;encouraged to call for assist;notified nsg  -NW  --     In Chair  --  reclined;call light within reach;encouraged to call for assist;notified nsg  -NW     Recorded by [NW] Adelina Silva, PTA 03/13/19 1357 [NW] Adelina Silva, hospitals 03/13/19 1135     Row Name 03/13/19 1108             Pain Scale: Numbers Pre/Post-Treatment    Pain  Scale: Numbers, Pretreatment  0/10 - no pain  -NW      Recorded by [NW] Adelina Silva, PTA 03/13/19 1135        User Key  (r) = Recorded By, (t) = Taken By, (c) = Cosigned By    Initials Name Effective Dates Discipline    NW Adelina Silva, PTA 08/02/16 -  PT    CS Abran Martinez, MS CCC-SLP 04/03/18 -  SLP        Outcome Summary  Outcome Summary/Treatment Plan (SLP)  Anticipated Dischage Disposition: home with assist (03/13/19 1040 : Abran Martinez, MS CCC-SLP)  SLP GOALS     Row Name 03/13/19 1400 03/12/19 0831          Oral Nutrition/Hydration Goal 1 (SLP)    Oral Nutrition/Hydration Goal 1, SLP  Pt will tolerate least restrictive diet with no overt s/s of aspiration.   -CS  Pt will tolerate least restrictive diet with no overt s/s of aspiration.   -CS     Time Frame (Oral Nutrition/Hydration Goal 1, SLP)  by discharge  -CS  by discharge  -CS     Barriers (Oral Nutrition/Hydration Goal 1, SLP)  n/a  -CS  n/a  -CS     Progress/Outcomes (Oral Nutrition/Hydration Goal 1, SLP)  goal met  -CS  continuing progress toward goal  -CS       User Key  (r) = Recorded By, (t) = Taken By, (c) = Cosigned By    Initials Name Provider Type    Abran Barrera MS CCC-SLP Speech and Language Pathologist          EDUCATION  The patient has been educated in the following areas:   Dysphagia (Swallowing Impairment).    SLP Recommendation and Plan           Anticipated Dischage Disposition: home with assist                    Time Calculation:   Time Calculation- SLP     Row Name 03/13/19 1450             Time Calculation- SLP    SLP Start Time  1040  -CS      SLP Stop Time  1159  -CS      SLP Time Calculation (min)  79 min  -CS      SLP Received On  03/13/19  -CS      SLP Goal Re-Cert Due Date  03/23/19  -CS        User Key  (r) = Recorded By, (t) = Taken By, (c) = Cosigned By    Initials Name Provider Type    Abran Barrera MS CCC-SLP Speech and Language Pathologist          Therapy Charges for Today     Code Description  Service Date Service Provider Modifiers Qty    90129577203 HC ST TREATMENT SWALLOW 2 3/12/2019 Abran Martinez, MS CCC-SLP GN 1    29009816847 HC ST EVAL SPEECH AND PROD W LANG  4 3/13/2019 Abran Martinez, MS CCC-SLP GN 1    57298110766 HC ST TREATMENT SWALLOW 1 3/13/2019 Abran Martinez, MS CCC-SLP GN 1               SLP Discharge Summary  Anticipated Dischage Disposition: home with assist  Discharge Destination: home    Abran Martinez MS CCC-SLP  3/13/2019

## 2019-03-14 NOTE — PROGRESS NOTES
Case Management Discharge Note    Final Note: NOTIFIED KRISTOPHER AT [facility] OF PT'S D/C TODAY.  PT. WILL BE ADMITTED TO THE SKILLED LEVEL OF CARE.     Destination - Selection Complete      Service Provider Request Status Selected Services Address Phone Number Fax Number    [facility] Selected Skilled Nursing [address] [id number] [phone]      Durable Medical Equipment      No service has been selected for the patient.      Dialysis/Infusion      No service has been selected for the patient.      Home Medical Care      No service has been selected for the patient.      Community Resources      No service has been selected for the patient.             Final Discharge Disposition Code: 03 - skilled nursing facility (SNF)

## 2019-03-14 NOTE — PROGRESS NOTES
"     LOS: 3 days   Patient Care Team:  Josafat Gonzales MD as PCP - General  Josafat Gonzales MD as PCP - Family Medicine  [facility] as Consulting Physician (Cardiology)  Pawel Ho MD as Consulting Physician (Urology)  Krystal Dorman APRN as Nurse Practitioner (Neurology)    Chief Complaint:  confusion    Subjective      HISTORY OF PRESENT ILLNESS: many chronic illness and many medications.  Went to bed night before admit normal.  Slept through the evening ok.  When she awoke the next morning she was confused and inappropriately mumbling \"oh God\".  There was no recent cough dysuria fever injury vomiting diarrhea; anything.  Though there has been a concern about seizure disorder in the past the presenting complaints did not include any tonic-clonic activity.  Chest x-ray in the emergency department was suspicious for pneumonia and continued spiculated lingular nodule.  This lingular nodule has been seen by cardiovascular surgery and considering her multiple medical problems was followed without removal/treated with radiation oncology.  Her Dilantin level was less than 3 and low-IV cerebyx was given in the ED.  Alcohol salicylate and Tylenol levels were all normal. Chemistry showed low protein and albumin at 6 and 3 respectively; nothing else significant. Lactic acid was elevated at 6 ammonia was less than 9 and urinalysis showed 21-30 WBCs with moderate leukocytes.   CT of her head showed only chronic ischemic changes.  She was felt to be septic with a UTI/? pneumonia and was admitted with fluid boluses and appropriate treatments.  Antibiotics started in the ER were Rocephin and azithromycin. She had jenifer similar presentation      Interval History:  Back to normal; not confused.  Slept ok.  Eating/drinking/stooling/voiding.  Neuro recommends CPAP;  boxed machine about 7 years ago and cannot find machine/old settings.  Accepted MRN; difficulty getting cpap orders " holding patient today.  K still low; on oral.  Vit B12, D low; getting oral.     .  Current Facility-Administered Medications:   •  acetaminophen (TYLENOL) tablet 650 mg, 650 mg, Oral, Q4H PRN, Zeeshan Hester MD  •  cefTRIAXone (ROCEPHIN) 1 g/100 mL 0.9% NS (MBP), 1 g, Intravenous, Q24H, 1 g at 03/13/19 1210 **AND** AZITHROMYCIN 500 MG/250 ML 0.9% NS IVPB (vial-mate), 500 mg, Intravenous, Q24H, Zeeshan Hester MD, 500 mg at 03/13/19 1332  •  DULoxetine (CYMBALTA) DR capsule 30 mg, 30 mg, Oral, Daily, Zeeshan Hester MD, 30 mg at 03/13/19 0858  •  enoxaparin (LOVENOX) syringe 40 mg, 40 mg, Subcutaneous, Q24H, Josafat Gonzales MD, 40 mg at 03/13/19 1729  •  gabapentin (NEURONTIN) capsule 600 mg, 600 mg, Oral, Q8H, Zeeshan Hester MD, 600 mg at 03/13/19 1332  •  ipratropium-albuterol (DUO-NEB) nebulizer solution 3 mL, 3 mL, Nebulization, Q4H - RT, Zeeshan Hester MD, 3 mL at 03/13/19 1929  •  levETIRAcetam XR (KEPPRA XR) 24 hr tablet 1,000 mg, 1,000 mg, Oral, Q12H, Tamara Gomez MD  •  levothyroxine (SYNTHROID, LEVOTHROID) tablet 100 mcg, 100 mcg, Oral, Q AM, Zeeshan Hester MD, 100 mcg at 03/13/19 0539  •  loperamide (IMODIUM) capsule 2 mg, 2 mg, Oral, 4x Daily PRN, Zeeshan Hester MD  •  LORazepam (ATIVAN) injection 1 mg, 1 mg, Intravenous, Q4H PRN, Zeeshan Hester MD, 1 mg at 03/12/19 1911  •  Magnesium Sulfate 2 gram Bolus, followed by 8 gram infusion (total Mg dose 10 grams)- Mg less than or equal to 1mg/dL, 2 g, Intravenous, PRN **OR** Magnesium Sulfate 2 gram / 50mL Infusion (GIVE X 3 BAGS TO EQUAL 6GM TOTAL DOSE) - Mg 1.1 - 1.5 mg/dl, 2 g, Intravenous, PRN **OR** Magnesium Sulfate 4 gram infusion- Mg 1.6-1.9 mg/dL, 4 g, Intravenous, PRN, Zeeshan Hester MD  •  metoprolol tartrate (LOPRESSOR) tablet 50 mg, 50 mg, Oral, Daily, Zeeshan Hester MD, 50 mg at 03/13/19 0858  •  Morphine (MS CONTIN) 12 hr tablet 15 mg, 15 mg, Oral,  Q12H, Zeeshan Hester MD, 15 mg at 03/13/19 0858  •  Morphine sulfate (PF) injection 2 mg, 2 mg, Intravenous, Q3H PRN, Josafat Gonzales MD, 2 mg at 03/12/19 1911  •  nitroglycerin (NITROSTAT) SL tablet 0.4 mg, 0.4 mg, Sublingual, Q5 Min PRN, Zeeshan Hester MD  •  oxybutynin XL (DITROPAN-XL) 24 hr tablet 5 mg, 5 mg, Oral, Daily, Zeeshan Hester MD, 5 mg at 03/13/19 0858  •  oxyCODONE (ROXICODONE) immediate release tablet 10 mg, 10 mg, Oral, Q4H PRN, Zeeshan Hester MD, 10 mg at 03/13/19 1729  •  Pharmacy to Dose enoxaparin (LOVENOX), , Does not apply, Continuous PRN, Zeeshan Hester MD  •  potassium chloride (MICRO-K) CR capsule 20 mEq, 20 mEq, Oral, TID With Meals, Josafat Gonzales MD, 20 mEq at 03/13/19 1729  •  potassium chloride 10 mEq in 100 mL IVPB, 10 mEq, Intravenous, Q1H PRN, Zeeshan Hester MD, Last Rate: 100 mL/hr at 03/11/19 2053, 10 mEq at 03/11/19 2053  •  [COMPLETED] Insert peripheral IV, , , Once **AND** sodium chloride 0.9 % flush 10 mL, 10 mL, Intravenous, PRN, Sergio Templeton, DO  •  sodium chloride 0.9 % flush 3 mL, 3 mL, Intravenous, Q12H, Zeeshan Hester MD, 3 mL at 03/12/19 2012  •  sodium chloride 0.9 % flush 3-10 mL, 3-10 mL, Intravenous, PRN, Zeeshan Hester MD  •  venlafaxine XR (EFFEXOR-XR) 24 hr capsule 300 mg, 300 mg, Oral, Daily, Zeeshan Hester MD, 300 mg at 03/13/19 0858    Review of Systems:   GENERAL:  Inactive/slower with limits, speed, stamina for age and LE weakness before; admit confusion.  Not out of bed. Sleep is ok now. No fever now.  ENDO:  No syncope, near or diaphoretic sweaty spells.  BS Ok.  HEENT: No head injury or headache.  No vision change.  No significant hearing loss.  Ears without pain/drainage.  No sore throat.  No significant nasal/sinus congestion/drainage. No epistaxis.  CHEST: No chest wall tenderness or mass. No cough, without wheeze.  No SOB; no hemoptysis.  CV: No chest pain,  "palpitations, ankle edema.  GI: No heartburn, dysphagia.  No abdominal pain, diarrhea, constipation.  No rectal bleeding, or melena.    :  Voids without dysuria.  ORTHO: No painful/swollen joints but various on /off sore.  No sore neck or back.    NEURO: No dizziness, weakness of extremities.  Same UE/LE numbness/paresthesias.  PSYCH: No confusion; mild memory loss.  Mood good; not that anxious, depressed.     Objective     Vital Signs  /91 (BP Location: Left arm, Patient Position: Lying)   Pulse 86   Temp 98.3 °F (36.8 °C) (Oral)   Resp 16   Ht 177.8 cm (70\")   Wt 97.8 kg (215 lb 9.6 oz)   LMP  (LMP Unknown)   SpO2 96%   BMI 30.94 kg/m²   Temp:  [98 °F (36.7 °C)-98.8 °F (37.1 °C)] 98.3 °F (36.8 °C)  Heart Rate:  [81-92] 86  Resp:  [16-18] 16  BP: (122-144)/(68-91) 134/91      Intake/Output Summary (Last 24 hours) at 3/13/2019 2022  Last data filed at 3/13/2019 1332  Gross per 24 hour   Intake 2088.33 ml   Output --   Net 2088.33 ml       Lab Results:  Lab Results (last 24 hours)     Procedure Component Value Units Date/Time    Blood Culture - Blood, Hand, Left [[id number]] Collected:  03/10/19 1727    Specimen:  Blood from Hand, Left Updated:  03/13/19 1816     Blood Culture No growth at 3 days    Blood Culture - Blood, Arm, Left [[id number]] Collected:  03/10/19 1727    Specimen:  Blood from Arm, Left Updated:  03/13/19 1816     Blood Culture No growth at 3 days    Blood Culture - Blood, Arm, Left [[id number]] Collected:  03/10/19 1115    Specimen:  Blood from Arm, Left Updated:  03/13/19 1216     Blood Culture No growth at 3 days    Blood Culture - Blood, Arm, Left [[id number]] Collected:  03/10/19 1120    Specimen:  Blood from Arm, Left Updated:  03/13/19 1216     Blood Culture No growth at 3 days    Basic Metabolic Panel [[id number]]  (Abnormal) Collected:  03/13/19 0624    Specimen:  Blood Updated:  03/13/19 0746     Glucose 73 mg/dL      BUN 3 mg/dL      Creatinine 0.54 mg/dL      Sodium 138 mmol/L "      Potassium 3.1 mmol/L      Chloride 104 mmol/L      CO2 28.0 mmol/L      Calcium 7.4 mg/dL      eGFR Non African Amer 114 mL/min/1.73      BUN/Creatinine Ratio 5.6     Anion Gap 6.0 mmol/L     Narrative:       GFR Normal >60  Chronic Kidney Disease <60  Kidney Failure <15    CBC & Differential [[id number]] Collected:  03/13/19 0624    Specimen:  Blood Updated:  03/13/19 0731    Narrative:       The following orders were created for panel order CBC & Differential.  Procedure                               Abnormality         Status                     ---------                               -----------         ------                     CBC Auto Differential[[id number]]        Abnormal            Final result                 Please view results for these tests on the individual orders.    CBC Auto Differential [[id number]]  (Abnormal) Collected:  03/13/19 0624    Specimen:  Blood Updated:  03/13/19 0731     WBC 6.74 10*3/mm3      RBC 3.47 10*6/mm3      Hemoglobin 11.1 g/dL      Hematocrit 33.4 %      MCV 96.3 fL      MCH 32.0 pg      MCHC 33.2 g/dL      RDW 15.1 %      RDW-SD 52.6 fl      MPV 11.0 fL      Platelets 204 10*3/mm3      Neutrophil % 75.1 %      Lymphocyte % 14.4 %      Monocyte % 6.4 %      Eosinophil % 3.1 %      Basophil % 0.7 %      Immature Grans % 0.3 %      Neutrophils, Absolute 5.06 10*3/mm3      Lymphocytes, Absolute 0.97 10*3/mm3      Monocytes, Absolute 0.43 10*3/mm3      Eosinophils, Absolute 0.21 10*3/mm3      Basophils, Absolute 0.05 10*3/mm3      Immature Grans, Absolute 0.02 10*3/mm3      nRBC 0.0 /100 WBC           CBC:   Results from last 7 days   Lab Units 03/13/19 0624 03/12/19 0623 03/11/19  0702 03/10/19  1046   WBC 10*3/mm3 6.74 5.59 6.83 6.38   HEMOGLOBIN g/dL 11.1* 10.9* 11.1* 13.3   HEMATOCRIT % 33.4* 32.1* 32.9* 38.5   PLATELETS 10*3/mm3 204 208 243 243     BMP:   Results from last 7 days   Lab Units 03/13/19 0624 03/12/19 0623 03/11/19  0702 03/10/19  1046   SODIUM mmol/L  138 140 142 140   POTASSIUM mmol/L 3.1* 2.9* 2.5* 3.3*   CHLORIDE mmol/L 104 107 107 98   CO2 mmol/L 28.0 28.0 29.0 30.0   BUN mg/dL 3* 3* 9 9   CREATININE mg/dL 0.54 0.52 0.79 0.70   EGFR IF NONAFRICN AM mL/min/1.73 114 119 74 85   GLUCOSE mg/dL 73 85 100 101*   CALCIUM mg/dL 7.4* 7.3* 7.2* 8.1*   ALT (SGPT) U/L  --   --  25 17     INR:       Culture Results:   Blood Culture   Date Value Ref Range Status   03/10/2019 No growth at 3 days  Preliminary   03/10/2019 No growth at 3 days  Preliminary   03/10/2019 No growth at 3 days  Preliminary   03/10/2019 No growth at 3 days  Preliminary     Urine Culture   Date Value Ref Range Status   03/10/2019 >100,000 CFU/mL Streptococcus agalactiae (Group B) (A)  Final     Comment:     This organism is considered to be universally susceptible to penicillin.  No further antibiotic testing will be performed.       Radiology: None    Additional Studies Reviewed: EEG neg    Physical Exam:  GENERAL:  Well nourished/developed in no acute distress; mumbling incoherently. Obese.  SKIN: Turgor excellent, without wound, rash, lesion other than several LE scabbed abrasions.  HEENT: Normal cephalic without trauma.  Pupils reactive to light; L pupil anisocoria. Extraocular motions full without nystagmus.     External canals nonobstructive nontender without reddness.  Oral cavity without growths, exudates, and moist.  Posterior pharynx without mass, obstruction, redness.  No thyromegaly, mass, tenderness, lymphadenopathy and supple.  CV: Regular rhythm.  No murmur, gallop,  edema. Posterior pulses intact.  No carotid bruits.   CHEST: No chest wall tenderness or mass.   LUNGS: Symmetric motion with clear to auscultation.  ABD: Soft, nontender without mass.   ORTHO: Symmetric extremities without swelling/point tenderness.  Full gross range of motion.    NEURO: CN 2-12 grossly intact.  Symmetric facies. 1/4 x bicep 0/4 LE.  UE/LE variable; 2-/5 strength UE 0-1/5 LE  Nonfocal use extremities.  Speech clear.    PSYCH: Disoriented x 3.  Pleasant calm, well kept.  Non-purposeful/directed conservation with intact short/long gross memory.    Results Review:    above    ASSESSMENT/PLAN:  Reason for admit/problems addressing acutely:  Confusion-delirium (? Sepsis, ? Seizure)-resolved  Hx seizure disorder-normal EEG  Abnormal UA-(21-30WBC, mod leuk, 2+ bacteria) maybe UTI (BC neg, 10.5 strept         Group B)  CXR-infiltrate (note lack cough, etc)-maybe pneumonia-persistant L peihilair/mid            Lung infiltrate  Sepsis (tachycardia 104, lactic acid 6.0)-maybe  Low phenytoin level 3.0-not taking-improved  Hypopotassemia-2.0-sluwfvjm-gsbiadzf  Drug screen THC, opiate  Elajuaofn-mrcfxxcoa-tqalmcjz  NPO-resolved  Cerebral atrophy-frontal  YOLIE-without treatment  Non-compliance; Rx (dilantin, keppra), CPAP  B12 def (post gastric bypass);   Vit D def (post gastric bypass)  Polypharmacy; difficult Rx list    Chronic problems to review/consider during care:  62 y/o y/o white female   Allergy/intolerance: see above  Procedural history: see above  Family history: see above  Obesity.  This is status post gastric bypass initial failure and regain of  weight.     1 para 1 AB 0.    Surgical menopause.  Osteoporosis on previous wrist study.    Hypertension.    Hypothyroidism.    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    History of DVT - left peroneal popliteal, 2008, treated              successfully with anticoagulation.   History anticoagulation: hx DVT/(coumadin)  Chronic insomnia.  Spinal spondylosis; this is primarily cervical with multiple surgeries.  Chronic neck pain.    Chronic back pain  Chronic narcotics-from pain management/[facility]  Chronic depression - followed by Dr. Samaniego.    Degenerative joint disease that is diffuse.   LE weakness  Gait difficulty-chronic   History of gastric  bypass and metabolic risk it carries.    Colon cancer history with previous surgery and chemo, no obvious              recurrence.  Gastroesophageal reflux.  Recurrent laryngitis  Documented neuropathy by EMG nerve conduction  Seizure disorder (2016)  antieliptics-Keppra/Dilantin  Abnormal CT chest-treating nonsurgically  Polypharmacy   Urinary incontience (history urge incontinence)    Rx-reviewed, considered with changes as needed: changing over to oral   And replacing K  Labs reviewed, considered and changes made as needed: daily CBC, chemistry  Imaging reviewed, and need for considered: no planned  Consults needed: active neurology  Diet reviewed, considered and changes made as needed: no changes  Fluids reviewed, considered and changes made as needed: now on oral  Increase activity: as able; probably more for rehab  Code status: full  Discussed possible/future discharge plans: patient expects MRN; accepted   But cpap problem enough of a problem to keep another night  Case discussed with Dr ANTON/neurology, , nursing; message left respiratory  Available to talk if needed with POA/caregiver and members care team.    Josafat Gonzales MD  03/13/19  8:22 PM

## 2019-03-14 NOTE — DISCHARGE INSTRUCTIONS
Admit to MRN  Routine NH admit orders/procedures  NH to re/establish code status    Rx:   Oxygen 2 liters when asleep  See AVS/discharge summary     Diet:   AHA  Consistency:    Solids: general     Fluids: thin  Calories 3601-5399  Fluids 60 oz/24 hr  Dietary referral for nutritional assessment, advise and follow    Activity:   Gradually increase as able  PT, OT, speech referrals   Up with assistance only until cleared by PT     Additional instructions:  LAB:   A. Short term-each Tuesday CBC, CMP  B. (if stays >1m)   2m CBC, CMP, iron  6m CBC, BMP, TSH, T4. iron, ferritin (dilantin level if on dilantin)  12m CBC, CMP, LIPID, TSH, T4, Vit D, B12, folate, Fe, ferritin, % sat, retic count (dilantin level if on dilantin)    F/u appointments:   Dr Gonzales will see on next regular NH rounds (call between if needs to be seen earlier) (3.20.19)

## 2019-03-14 NOTE — PLAN OF CARE
Problem: Patient Care Overview  Goal: Plan of Care Review  Outcome: Ongoing (interventions implemented as appropriate)   03/14/19 1019   Coping/Psychosocial   Plan of Care Reviewed With patient   OTHER   Outcome Summary Pt stronger today. bed mobiity min/cga x1 stood w/ bed elevated min/mod x1 Pt amb 15'x2 rwx. was able to practicce sit-stand mult times min/mod x1. will benefit from snf upon d/c   Plan of Care Review   Progress improving

## 2019-03-14 NOTE — PAYOR COMM NOTE
"Clinical update 3/13/19    [id number]   [phone]  Bita Croft (63 y.o. Female)     Date of Birth Social Security Number Address Home Phone MRN    1955  [address] [address] [phone] [id number]    Jew Marital Status          Sikhism        Admission Date Admission Type Admitting Provider Attending Provider Department, Room/Bed    3/10/19 Emergency Josafat Gonzales MD Oliver, Randy Eugene, MD [facility] 3A, 340/1    Discharge Date Discharge Disposition Discharge Destination                       Attending Provider:  Josafat Gonzales MD    Allergies:  No Known Allergies    Isolation:  None   Infection:  None   Code Status:  CPR    Ht:  177.8 cm (70\")   Wt:  97.8 kg (215 lb 9.6 oz)    Admission Cmt:  None   Principal Problem:  None                Active Insurance as of 3/10/2019     Primary Coverage     Payor Plan Insurance Group Employer/Plan Group    [employer]CIAL AETNA [id number]     Payor Plan Address Payor Plan Phone Number Payor Plan Fax Number Effective Dates    [address] [phone]  7/1/2017 - None Entered    [place] [address]       Subscriber Name Subscriber Birth Date Member ID       OCHOA CROFT 1955 [id number]                 Emergency Contacts      (Rel.) Home Phone Work Phone Mobile Phone    Carlito Croft (Spouse) [phone] -- --            ICU Vital Signs     Row Name 03/14/19 0704 03/14/19 0700 03/14/19 0343 03/14/19 0337 03/14/19 0300       Vitals    Temp  --  --  --  --  97.9 °F (36.6 °C)    Temp src  --  --  --  --  Oral    Pulse  81  80  91  88  89    Heart Rate Source  Monitor  Monitor  Monitor  Monitor  Monitor    Resp  16  16  18  18  18    Resp Rate Source  Visual  Visual  Visual  Visual  Visual    BP  --  --  --  --  143/72    BP Location  --  --  --  --  Right arm    BP Method  --  --  --  --  Automatic    Patient Position  --  --  --  --  Lying       Oxygen Therapy    SpO2  100 %  96 % "  --  96 %  96 %    Pulse Oximetry Type  Continuous  Continuous  --  Intermittent  Intermittent    Device (Oxygen Therapy)  room air  room air  --  room air  room air    James Name 03/14/19 0016 03/13/19 2352 03/13/19 2345 03/13/19 2026 03/13/19 2000       Vitals    Temp  98.2 °F (36.8 °C)  --  --  97.8 °F (36.6 °C)  --    Temp src  Oral  --  --  Oral  --    Pulse  92  87  89  90  --    Heart Rate Source  Monitor  Monitor  Monitor  Monitor  --    Resp  17  16  16  18  --    Resp Rate Source  Visual  Visual  Visual  Visual  --    BP  133/74  --  --  125/71  --    Noninvasive MAP (mmHg)  --  --  --  85  --    BP Location  Right arm  --  --  Right arm  --    BP Method  Automatic  --  --  Automatic  --    Patient Position  Lying  --  --  Lying  --       Oxygen Therapy    SpO2  96 %  --  98 %  98 %  --    Pulse Oximetry Type  Intermittent  --  --  Intermittent  --    Device (Oxygen Therapy)  room air  --  room air  room air  room air    [facility] Name 03/13/19 1936 03/13/19 1929 03/13/19 1720 03/13/19 1526 03/13/19 1520       Vitals    Temp  --  --  98.3 °F (36.8 °C)  --  --    Temp src  --  --  Oral  --  --    Pulse  86  89  84  83  81    Heart Rate Source  Monitor  Monitor  Monitor  --  --    Resp  16  16  16  18  18    Resp Rate Source  Visual  Visual  Visual  --  --    BP  --  --  134/91  --  --    Noninvasive MAP (mmHg)  --  --  101  --  --    BP Location  --  --  Left arm  --  --    BP Method  --  --  Automatic  --  --    Patient Position  --  --  Lying  --  --       Oxygen Therapy    SpO2  --  96 %  97 %  --  97 %    Pulse Oximetry Type  --  Intermittent  Continuous  --  Continuous    Device (Oxygen Therapy)  --  room air  room air  --  room air    Row [facility] 03/13/19 1347 03/13/19 1042                Vitals    Temp  98.3 °F (36.8 °C)  98.4 °F (36.9 °C)       Temp src  Oral  Oral       Pulse  86  86       Heart Rate Source  Monitor  Monitor       Resp  18  18       Resp Rate Source  Visual  Visual       BP  144/88  128/68     "   Noninvasive MAP (mmHg)  102  --       BP Location  Right arm  Left arm       BP Method  Automatic  Automatic       Patient Position  Sitting  Lying          Oxygen Therapy    SpO2  96 %  96 %       Pulse Oximetry Type  Continuous  Continuous       Device (Oxygen Therapy)  room air  --           Intake & Output (last day)       03/13 0701 - 03/14 0700 03/14 0701 - 03/15 0700    P.O.      I.V. (mL/kg) 1538.3 (15.8)     IV Piggyback 550     Total Intake(mL/kg) 2088.3 (21.4)     Net +2088.3           Urine Unmeasured Occurrence 6 x         Lines, Drains & Airways    Active LDAs     Name:   Placement date:   Placement time:   Site:   Days:    Peripheral IV 03/12/19 1510 Left;Posterior Wrist   03/12/19 1510    Wrist   1                Hospital Medications (active)       Dose Frequency Start End    acetaminophen (TYLENOL) tablet 650 mg 650 mg Every 4 Hours PRN 3/10/2019     Sig - Route: Take 2 tablets by mouth Every 4 (Four) Hours As Needed for Mild Pain  or Fever. - Oral    AZITHROMYCIN 500 MG/250 ML 0.9% NS IVPB (vial-mate) 500 mg Every 24 Hours 3/11/2019 3/16/2019    Sig - Route: Infuse 500 mg into a venous catheter Daily. - Intravenous    Linked Group 1:  \"And\" Linked Group Details        cefTRIAXone (ROCEPHIN) 1 g/100 mL 0.9% NS (MBP) 1 g Every 24 Hours 3/11/2019 3/18/2019    Sig - Route: Infuse 100 mL into a venous catheter Daily. - Intravenous    Linked Group 1:  \"And\" Linked Group Details        DULoxetine (CYMBALTA) DR capsule 30 mg 30 mg Daily 3/10/2019     Sig - Route: Take 1 capsule by mouth Daily. - Oral    enoxaparin (LOVENOX) syringe 40 mg 40 mg Every 24 Hours 3/10/2019     Sig - Route: Inject 0.4 mL under the skin into the appropriate area as directed Daily. - Subcutaneous    ergocalciferol capsule 50,000 Units 50,000 Units Once 3/13/2019 3/13/2019    Sig - Route: Take 1 capsule by mouth 1 (One) Time. - Oral    gabapentin (NEURONTIN) capsule 600 mg 600 mg Every 8 Hours Scheduled 3/10/2019     Sig - " "Route: Take 2 capsules by mouth Every 8 (Eight) Hours. - Oral    ipratropium-albuterol (DUO-NEB) nebulizer solution 3 mL 3 mL Every 4 Hours - RT 3/10/2019     Sig - Route: Take 3 mL by nebulization Every 4 (Four) Hours. - Nebulization    levETIRAcetam XR (KEPPRA XR) 24 hr tablet 1,000 mg 1,000 mg Every 12 Hours 3/13/2019     Sig - Route: Take 2 tablets by mouth Every 12 (Twelve) Hours. - Oral    levothyroxine (SYNTHROID, LEVOTHROID) tablet 100 mcg 100 mcg Every Early Morning 3/11/2019     Sig - Route: Take 1 tablet by mouth Every Morning. - Oral    loperamide (IMODIUM) capsule 2 mg 2 mg 4 Times Daily PRN 3/10/2019     Sig - Route: Take 1 capsule by mouth 4 (Four) Times a Day As Needed for Diarrhea. - Oral    LORazepam (ATIVAN) injection 1 mg 1 mg Every 4 Hours PRN 3/10/2019 3/20/2019    Sig - Route: Infuse 0.5 mL into a venous catheter Every 4 (Four) Hours As Needed for Anxiety or Agitation. - Intravenous    Magnesium Sulfate 2 gram / 50mL Infusion (GIVE X 3 BAGS TO EQUAL 6GM TOTAL DOSE) - Mg 1.1 - 1.5 mg/dl 2 g As Needed 3/11/2019     Sig - Route: Infuse 50 mL into a venous catheter As Needed (See Administration Instructions). - Intravenous    Linked Group 2:  \"Or\" Linked Group Details        Magnesium Sulfate 2 gram Bolus, followed by 8 gram infusion (total Mg dose 10 grams)- Mg less than or equal to 1mg/dL 2 g As Needed 3/11/2019     Sig - Route: Infuse 50 mL into a venous catheter As Needed (See Administration Instructions). - Intravenous    Linked Group 2:  \"Or\" Linked Group Details        Magnesium Sulfate 4 gram infusion- Mg 1.6-1.9 mg/dL 4 g As Needed 3/11/2019     Sig - Route: Infuse 100 mL into a venous catheter As Needed (See Administration Instructions). - Intravenous    Linked Group 2:  \"Or\" Linked Group Details        metoprolol tartrate (LOPRESSOR) tablet 50 mg 50 mg Daily 3/10/2019     Sig - Route: Take 1 tablet by mouth Daily. - Oral    Morphine (MS CONTIN) 12 hr tablet 15 mg 15 mg Every 12 Hours " "Scheduled 3/10/2019     Sig - Route: Take 1 tablet by mouth Every 12 (Twelve) Hours. - Oral    Morphine sulfate (PF) injection 2 mg 2 mg Every 3 Hours PRN 3/10/2019 3/20/2019    Sig - Route: Infuse 1 mL into a venous catheter Every 3 (Three) Hours As Needed for Severe Pain . - Intravenous    nitroglycerin (NITROSTAT) SL tablet 0.4 mg 0.4 mg Every 5 Minutes PRN 3/10/2019     Sig - Route: Place 1 tablet under the tongue Every 5 (Five) Minutes As Needed for Chest Pain (Chest Pain With Systolic Blood Pressure Greater Than 100). - Sublingual    oxybutynin XL (DITROPAN-XL) 24 hr tablet 5 mg 5 mg Daily 3/10/2019     Sig - Route: Take 1 tablet by mouth Daily. - Oral    oxyCODONE (ROXICODONE) immediate release tablet 10 mg 10 mg Every 4 Hours PRN 3/10/2019     Sig - Route: Take 2 tablets by mouth Every 4 (Four) Hours As Needed for Moderate Pain . - Oral    Pharmacy to Dose enoxaparin (LOVENOX)  Continuous PRN 3/10/2019     Sig - Route: Continuous As Needed for Consult. - Does not apply    potassium chloride (MICRO-K) CR capsule 20 mEq 20 mEq 3 Times Daily With Meals 3/13/2019     Sig - Route: Take 2 capsules by mouth 3 (Three) Times a Day With Meals. - Oral    potassium chloride 10 mEq in 100 mL IVPB 10 mEq Every 1 Hour PRN 3/11/2019     Sig - Route: Infuse 100 mL into a venous catheter Every 1 (One) Hour As Needed (See admin Instructions.). - Intravenous    sodium chloride 0.9 % flush 10 mL 10 mL As Needed 3/10/2019     Sig - Route: Infuse 10 mL into a venous catheter As Needed for Line Care. - Intravenous    Linked Group 3:  \"And\" Linked Group Details        sodium chloride 0.9 % flush 3 mL 3 mL Every 12 Hours Scheduled 3/10/2019     Sig - Route: Infuse 3 mL into a venous catheter Every 12 (Twelve) Hours. - Intravenous    sodium chloride 0.9 % flush 3-10 mL 3-10 mL As Needed 3/10/2019     Sig - Route: Infuse 3-10 mL into a venous catheter As Needed for Line Care. - Intravenous    venlafaxine XR (EFFEXOR-XR) 24 hr capsule " 300 mg 300 mg Daily 3/10/2019     Sig - Route: Take 4 capsules by mouth Daily. - Oral    cholecalciferol (VITAMIN D3) tablet 50,000 Units (Discontinued) 50,000 Units Once 3/13/2019 3/13/2019    Sig - Route: Take 125 tablets by mouth 1 (One) Time. - Oral    Reason for Discontinue: *Re-Entry    fosphenytoin (Cerebyx) injection 100 mg PE (Discontinued) 100 mg PE Every 12 Hours Scheduled 3/10/2019 3/13/2019    Sig - Route: Infuse 2 mL into a venous catheter Every 12 (Twelve) Hours. - Intravenous    levETIRAcetam in NaCl 0.75% (KEPPRA) IVPB 1,000 mg (Discontinued) 1,000 mg 2 Times Daily 3/10/2019 3/13/2019    Sig - Route: Infuse 100 mL into a venous catheter 2 (Two) Times a Day. - Intravenous    potassium chloride 10 mEq in 100 mL IVPB (Discontinued) 10 mEq Every 2 Hours 3/13/2019 3/13/2019    Sig - Route: Infuse 100 mL into a venous catheter Every 2 (Two) Hours. - Intravenous            Lab Results (last 24 hours)     Procedure Component Value Units Date/Time    Basic Metabolic Panel [[id number]]  (Abnormal) Collected:  03/14/19 0506    Specimen:  Blood Updated:  03/14/19 0547     Glucose 83 mg/dL      BUN 4 mg/dL      Creatinine 0.45 mg/dL      Sodium 133 mmol/L      Potassium 3.6 mmol/L      Chloride 103 mmol/L      CO2 28.0 mmol/L      Calcium 7.5 mg/dL      eGFR Non African Amer 141 mL/min/1.73      BUN/Creatinine Ratio 8.9     Anion Gap 2.0 mmol/L     Narrative:       GFR Normal >60  Chronic Kidney Disease <60  Kidney Failure <15    CBC & Differential [[id number]] Collected:  03/14/19 0506    Specimen:  Blood Updated:  03/14/19 0525    Narrative:       The following orders were created for panel order CBC & Differential.  Procedure                               Abnormality         Status                     ---------                               -----------         ------                     CBC Auto Differential[[id number]]        Abnormal            Final result                 Please view results for these tests  on the individual orders.    CBC Auto Differential [[id number]]  (Abnormal) Collected:  03/14/19 0506    Specimen:  Blood Updated:  03/14/19 0525     WBC 5.63 10*3/mm3      RBC 3.47 10*6/mm3      Hemoglobin 10.8 g/dL      Hematocrit 32.5 %      MCV 93.7 fL      MCH 31.1 pg      MCHC 33.2 g/dL      RDW 14.9 %      RDW-SD 51.0 fl      MPV 10.7 fL      Platelets 206 10*3/mm3      Neutrophil % 71.0 %      Lymphocyte % 17.4 %      Monocyte % 8.7 %      Eosinophil % 2.0 %      Basophil % 0.4 %      Immature Grans % 0.5 %      Neutrophils, Absolute 4.00 10*3/mm3      Lymphocytes, Absolute 0.98 10*3/mm3      Monocytes, Absolute 0.49 10*3/mm3      Eosinophils, Absolute 0.11 10*3/mm3      Basophils, Absolute 0.02 10*3/mm3      Immature Grans, Absolute 0.03 10*3/mm3      nRBC 0.0 /100 WBC     Blood Culture - Blood, Hand, Left [[id number]] Collected:  03/10/19 1727    Specimen:  Blood from Hand, Left Updated:  03/13/19 1816     Blood Culture No growth at 3 days    Blood Culture - Blood, Arm, Left [[id number]] Collected:  03/10/19 1727    Specimen:  Blood from Arm, Left Updated:  03/13/19 1816     Blood Culture No growth at 3 days    Blood Culture - Blood, Arm, Left [[id number]] Collected:  03/10/19 1115    Specimen:  Blood from Arm, Left Updated:  03/13/19 1216     Blood Culture No growth at 3 days    Blood Culture - Blood, Arm, Left [[id number]] Collected:  03/10/19 1120    Specimen:  Blood from Arm, Left Updated:  03/13/19 1216     Blood Culture No growth at 3 days        Imaging Results (last 24 hours)     ** No results found for the last 24 hours. **        Orders (last 24 hrs)     Start     Ordered    03/14/19 0600  CBC Auto Differential  PROCEDURE ONCE      03/14/19 0001    03/13/19 2022  Sent to respiratory: Is there way to come up with CPAP settings for her to have A. cpap on discharge to [facility] tomorrow B. To know some setting for that She had this 7 years ago;  cannot find paper work or machine.  Strong suspicion she  still h...  Once     Comments:  Sent to respiratory:  Is there way to come up with CPAP settings for her to have   A. cpap on discharge to NH tomorrow  B. To know some setting for that    She had this 7 years ago;  cannot find paper work or machine.  Strong suspicion she still has YOLIE and connected  To possible seizure/part of this admit.    Has to go to NH with cpap tomorrow    19 2022    19  Respiratory communication  Once     Comments:  Is there way to come up with CPAP settings for her to have   A. cpap on discharge to NH tomorrow  B. To know some setting for that    She had this 7 years ago;  cannot find paper work or machine.  Strong suspicion she still has YOLIE and connected  To possible seizure/part of this admit.    Has to go to NH with cpap tomorrow    19 20219 2000  levETIRAcetam XR (KEPPRA XR) 24 hr tablet 1,000 mg  Every 12 Hours      19 1441    19 1630  ergocalciferol capsule 50,000 Units  Once      19 1624    19 1500  cholecalciferol (VITAMIN D3) tablet 50,000 Units  Once,   Status:  Discontinued      19 1413    19 1451  SLP Plan of Care Cert / Re-Cert  Once     Comments:  Speech Language Pathology Plan of Care  Initial Certification  Certification Period: 3/13/2019 - 2019    Patient Name: Bita Croft  : 1955    (R41.82) Altered mental status, unspecified altered mental status type  (primary encounter diagnosis)  (J18.9) Pneumonia of left lung due to infectious organism, unspecified part of lung  (N39.0) Acute urinary tract infection  (A41.9,  R65.20) Severe sepsis (CMS/HCC)  (R13.10) Dysphagia, unspecified type  (Z74.09) Impaired functional mobility and activity tolerance  (Z74.09) Impaired mobility and ADLs                Assessment  SLP Assessment  Prior Level of Function-Swallowing: no diet consistency restrictions  SLP Swallowing Diagnosis: oral dysfunction  Plan of Care Reviewed With:  patient  Swallow Criteria for Skilled Therapeutic Interventions Met: demonstrates skilled criteria      IP SLP Goals     Row Name 03/13/19 1400 03/12/19 0831          Oral Nutrition/Hydration Goal 1 (SLP)    Oral Nutrition/Hydration Goal 1, SLP  Pt will tolerate least restrictive   diet with no overt s/s of aspiration.   -CS  Pt will tolerate least   restrictive diet with no overt s/s of aspiration.   -CS     Time Frame (Oral Nutrition/Hydration Goal 1, SLP)  by discharge  -CS  by   discharge  -CS     Barriers (Oral Nutrition/Hydration Goal 1, SLP)  n/a  -CS  n/a  -CS     Progress/Outcomes (Oral Nutrition/Hydration Goal 1, SLP)  goal met  -CS    continuing progress toward goal  -CS       User Key  (r) = Recorded By, (t) = Taken By, (c) = Cosigned By    Initials Name Provider Type    CS Abran Martinez MS CCC-SLP Speech and Language Pathologist          SLP OP Goals     Row Name 03/13/19 1138          Time Calculation    PT Goal Re-Cert Due Date  03/22/19  -NW       User Key  (r) = Recorded By, (t) = Taken By, (c) = Cosigned By    Initials Name Provider Type    NW Adelina Silva, PTA Physical Therapy Assistant            Plan    SLP Plan  Therapy Frequency (SLP SLC): evaluation only  Therapy Frequency (Swallow): PRN  Predicted Duration Therapy Intervention (Days): until discharge  Daily Summary of Progress (SLP): progress toward functional goals is good  Plan for Continued Treatment (SLP): Upgrade to a regular diet with thin liquids.        Abran Martinez, MS CCC-SLP  3/13/2019      By cosigning this order, either electronically or physically, I certify that the therapy services are furnished while this patient is under my care, the services outline above are required by this patient, and will be reviewed every 90 days.        M.D.:__________________________________________ Date: ______________    03/13/19 1450    03/13/19 1200  potassium chloride (MICRO-K) CR capsule 20 mEq  3 Times Daily With Meals      03/13/19  1034    03/13/19 1000  XR Chest PA & Lateral  1 Time Imaging      03/12/19 0852    03/13/19 0930  potassium chloride 10 mEq in 100 mL IVPB  Every 2 Hours,   Status:  Discontinued      03/13/19 0835    03/12/19 0600  CBC & Differential  Daily      03/11/19 0859    03/12/19 0600  Basic Metabolic Panel  Daily      03/11/19 0859    03/11/19 1400  AZITHROMYCIN 500 MG/250 ML 0.9% NS IVPB (vial-mate)  Every 24 Hours      03/10/19 1633    03/11/19 1300  cefTRIAXone (ROCEPHIN) 1 g/100 mL 0.9% NS (MBP)  Every 24 Hours      03/10/19 1633    03/11/19 0748  Magnesium Sulfate 2 gram Bolus, followed by 8 gram infusion (total Mg dose 10 grams)- Mg less than or equal to 1mg/dL  As Needed      03/11/19 0748    03/11/19 0748  Magnesium Sulfate 2 gram / 50mL Infusion (GIVE X 3 BAGS TO EQUAL 6GM TOTAL DOSE) - Mg 1.1 - 1.5 mg/dl  As Needed      03/11/19 0748    03/11/19 0748  Magnesium Sulfate 4 gram infusion- Mg 1.6-1.9 mg/dL  As Needed      03/11/19 0748    03/11/19 0747  potassium chloride 10 mEq in 100 mL IVPB  Every 1 Hour PRN      03/11/19 0748    03/11/19 0600  levothyroxine (SYNTHROID, LEVOTHROID) tablet 100 mcg  Every Early Morning      03/10/19 1633    03/10/19 2300  fosphenytoin (Cerebyx) injection 100 mg PE  Every 12 Hours Scheduled,   Status:  Discontinued      03/10/19 2212    03/10/19 2200  gabapentin (NEURONTIN) capsule 600 mg  Every 8 Hours Scheduled      03/10/19 1633    03/10/19 2130  levETIRAcetam in NaCl 0.75% (KEPPRA) IVPB 1,000 mg  2 Times Daily,   Status:  Discontinued      03/10/19 2041    03/10/19 2100  Morphine (MS CONTIN) 12 hr tablet 15 mg  Every 12 Hours Scheduled      03/10/19 1633    03/10/19 2100  sodium chloride 0.9 % flush 3 mL  Every 12 Hours Scheduled      03/10/19 1633    03/10/19 1930  ipratropium-albuterol (DUO-NEB) nebulizer solution 3 mL  Every 4 Hours - RT      03/10/19 1633    03/10/19 1736  LORazepam (ATIVAN) injection 1 mg  Every 4 Hours PRN      03/10/19 1736    03/10/19 1730  DULoxetine  (CYMBALTA) DR capsule 30 mg  Daily      03/10/19 1633    03/10/19 1730  metoprolol tartrate (LOPRESSOR) tablet 50 mg  Daily      03/10/19 1633    03/10/19 1730  oxybutynin XL (DITROPAN-XL) 24 hr tablet 5 mg  Daily      03/10/19 1633    03/10/19 1730  venlafaxine XR (EFFEXOR-XR) 24 hr capsule 300 mg  Daily      03/10/19 1633    03/10/19 1730  enoxaparin (LOVENOX) syringe 40 mg  Every 24 Hours      03/10/19 1637    03/10/19 1726  Morphine sulfate (PF) injection 2 mg  Every 3 Hours PRN      03/10/19 1726    03/10/19 1630  nitroglycerin (NITROSTAT) SL tablet 0.4 mg  Every 5 Minutes PRN      03/10/19 1633    03/10/19 1630  Pharmacy to Dose enoxaparin (LOVENOX)  Continuous PRN      03/10/19 1633    03/10/19 1630  sodium chloride 0.9 % flush 3-10 mL  As Needed      03/10/19 1633    03/10/19 1628  oxyCODONE (ROXICODONE) immediate release tablet 10 mg  Every 4 Hours PRN      03/10/19 1633    03/10/19 1627  loperamide (IMODIUM) capsule 2 mg  4 Times Daily PRN      03/10/19 1633    03/10/19 1627  acetaminophen (TYLENOL) tablet 650 mg  Every 4 Hours PRN      03/10/19 1633    03/10/19 1106  sodium chloride 0.9 % flush 10 mL  As Needed      03/10/19 1106    Unscheduled  Blood Gas, Arterial  As Needed     Comments:  Respiratory Distress      03/10/19 1633    Unscheduled  ECG 12 Lead  As Needed     Comments:  Nurse to Release if Patient Expericences Acute Chest Pain or Dysrhythmias    03/10/19 1633    Unscheduled  Potassium  As Needed     Comments:  For Ventricular Arrhythmias      03/10/19 1633    Unscheduled  Magnesium  As Needed     Comments:  For Ventricular Arrhythmias      03/10/19 1633    Unscheduled  Troponin  As Needed     Comments:  For Chest Pain      03/10/19 1633    Unscheduled  Digoxin Level  As Needed     Comments:  For Atrial Arrhythmias      03/10/19 1633    Unscheduled  Blood Gas, Arterial  As Needed     Comments:  Per O2 PolicyNotify Physician      03/10/19 6451    Unscheduled  Up in Chair  As Needed       "03/10/19 1633    Unscheduled  Magnesium  As Needed      03/11/19 0748    Unscheduled  Potassium  As Needed      03/11/19 0748    Unscheduled  Magnesium  As Needed      03/11/19 0748    Unscheduled  Magnesium  As Needed      03/12/19 1538    Unscheduled  Potassium  As Needed      03/12/19 1538    --  SCANNED EKG      03/10/19 0000          Ventilator/Non-Invasive Ventilation Settings (From admission, onward)    None           Physician Progress Notes (last 72 hours) (Notes from 3/11/2019  8:37 AM through 3/14/2019  8:37 AM)      Josafat Gonzales MD at 3/13/2019  8:22 PM               LOS: 3 days   Patient Care Team:  Josafat Gonzales MD as PCP - General  Josafat Gonzales MD as PCP - Family Medicine  [facility], Pawel Edmondson MD as Consulting Physician (Cardiology)  Pawel Ho MD as Consulting Physician (Urology)  Krystal Dorman APRN as Nurse Practitioner (Neurology)    Chief Complaint:  confusion    Subjective      HISTORY OF PRESENT ILLNESS: many chronic illness and many medications.  Went to bed night before admit normal.  Slept through the evening ok.  When she awoke the next morning she was confused and inappropriately mumbling \"oh God\".  There was no recent cough dysuria fever injury vomiting diarrhea; anything.  Though there has been a concern about seizure disorder in the past the presenting complaints did not include any tonic-clonic activity.  Chest x-ray in the emergency department was suspicious for pneumonia and continued spiculated lingular nodule.  This lingular nodule has been seen by cardiovascular surgery and considering her multiple medical problems was followed without removal/treated with radiation oncology.  Her Dilantin level was less than 3 and low-IV cerebyx was given in the ED.  Alcohol salicylate and Tylenol levels were all normal. Chemistry showed low protein and albumin at 6 and 3 respectively; nothing else significant. Lactic acid was elevated at 6 ammonia was " less than 9 and urinalysis showed 21-30 WBCs with moderate leukocytes.   CT of her head showed only chronic ischemic changes.  She was felt to be septic with a UTI/? pneumonia and was admitted with fluid boluses and appropriate treatments.  Antibiotics started in the ER were Rocephin and azithromycin. She had jenifer similar presentation      Interval History:  Back to normal; not confused.  Slept ok.  Eating/drinking/stooling/voiding.  Neuro recommends CPAP;  boxed machine about 7 years ago and cannot find machine/old settings.  Accepted MRN; difficulty getting cpap orders holding patient today.  K still low; on oral.  Vit B12, D low; getting oral.     .  Current Facility-Administered Medications:   •  acetaminophen (TYLENOL) tablet 650 mg, 650 mg, Oral, Q4H PRN, Zeeshan Hester MD  •  cefTRIAXone (ROCEPHIN) 1 g/100 mL 0.9% NS (MBP), 1 g, Intravenous, Q24H, 1 g at 03/13/19 1210 **AND** AZITHROMYCIN 500 MG/250 ML 0.9% NS IVPB (vial-mate), 500 mg, Intravenous, Q24H, Zeeshan Hester MD, 500 mg at 03/13/19 1332  •  DULoxetine (CYMBALTA) DR capsule 30 mg, 30 mg, Oral, Daily, Zeeshan Hester MD, 30 mg at 03/13/19 0858  •  enoxaparin (LOVENOX) syringe 40 mg, 40 mg, Subcutaneous, Q24H, Josafat Gonzales MD, 40 mg at 03/13/19 1729  •  gabapentin (NEURONTIN) capsule 600 mg, 600 mg, Oral, Q8H, Zeeshan Hester MD, 600 mg at 03/13/19 1332  •  ipratropium-albuterol (DUO-NEB) nebulizer solution 3 mL, 3 mL, Nebulization, Q4H - RT, Zeeshan Hester MD, 3 mL at 03/13/19 1929  •  levETIRAcetam XR (KEPPRA XR) 24 hr tablet 1,000 mg, 1,000 mg, Oral, Q12H, Tamara Gomez MD  •  levothyroxine (SYNTHROID, LEVOTHROID) tablet 100 mcg, 100 mcg, Oral, Q AM, Zeeshan Hester MD, 100 mcg at 03/13/19 0539  •  loperamide (IMODIUM) capsule 2 mg, 2 mg, Oral, 4x Daily PRN, Zeeshan Hester MD  •  LORazepam (ATIVAN) injection 1 mg, 1 mg, Intravenous, Q4H PRN, Zeeshan Hester MD, 1  mg at 03/12/19 1911  •  Magnesium Sulfate 2 gram Bolus, followed by 8 gram infusion (total Mg dose 10 grams)- Mg less than or equal to 1mg/dL, 2 g, Intravenous, PRN **OR** Magnesium Sulfate 2 gram / 50mL Infusion (GIVE X 3 BAGS TO EQUAL 6GM TOTAL DOSE) - Mg 1.1 - 1.5 mg/dl, 2 g, Intravenous, PRN **OR** Magnesium Sulfate 4 gram infusion- Mg 1.6-1.9 mg/dL, 4 g, Intravenous, PRN, Zeeshan Hester MD  •  metoprolol tartrate (LOPRESSOR) tablet 50 mg, 50 mg, Oral, Daily, Zeeshan Hester MD, 50 mg at 03/13/19 0858  •  Morphine (MS CONTIN) 12 hr tablet 15 mg, 15 mg, Oral, Q12H, Zeeshan Hester MD, 15 mg at 03/13/19 0858  •  Morphine sulfate (PF) injection 2 mg, 2 mg, Intravenous, Q3H PRN, Josafat Gonzales MD, 2 mg at 03/12/19 1911  •  nitroglycerin (NITROSTAT) SL tablet 0.4 mg, 0.4 mg, Sublingual, Q5 Min PRN, Zeeshan Hester MD  •  oxybutynin XL (DITROPAN-XL) 24 hr tablet 5 mg, 5 mg, Oral, Daily, Zeeshan Hester MD, 5 mg at 03/13/19 0858  •  oxyCODONE (ROXICODONE) immediate release tablet 10 mg, 10 mg, Oral, Q4H PRN, Zeeshan Hester MD, 10 mg at 03/13/19 1729  •  Pharmacy to Dose enoxaparin (LOVENOX), , Does not apply, Continuous PRN, Zeeshan Hester MD  •  potassium chloride (MICRO-K) CR capsule 20 mEq, 20 mEq, Oral, TID With Meals, Josafat Gonzales MD, 20 mEq at 03/13/19 1729  •  potassium chloride 10 mEq in 100 mL IVPB, 10 mEq, Intravenous, Q1H PRN, Zeeshan Hester MD, Last Rate: 100 mL/hr at 03/11/19 2053, 10 mEq at 03/11/19 2053  •  [COMPLETED] Insert peripheral IV, , , Once **AND** sodium chloride 0.9 % flush 10 mL, 10 mL, Intravenous, PRN, Sergio Templeton, DO  •  sodium chloride 0.9 % flush 3 mL, 3 mL, Intravenous, Q12H, Zeeshan Hester MD, 3 mL at 03/12/19 2012  •  sodium chloride 0.9 % flush 3-10 mL, 3-10 mL, Intravenous, PRN, Zeeshan Hester MD  •  venlafaxine XR (EFFEXOR-XR) 24 hr capsule 300 mg, 300 mg, Oral, Daily, Zeeshan Hester  "MD Gaurav, 300 mg at 03/13/19 0842    Review of Systems:   GENERAL:  Inactive/slower with limits, speed, stamina for age and LE weakness before; admit confusion.  Not out of bed. Sleep is ok now. No fever now.  ENDO:  No syncope, near or diaphoretic sweaty spells.  BS Ok.  HEENT: No head injury or headache.  No vision change.  No significant hearing loss.  Ears without pain/drainage.  No sore throat.  No significant nasal/sinus congestion/drainage. No epistaxis.  CHEST: No chest wall tenderness or mass. No cough, without wheeze.  No SOB; no hemoptysis.  CV: No chest pain, palpitations, ankle edema.  GI: No heartburn, dysphagia.  No abdominal pain, diarrhea, constipation.  No rectal bleeding, or melena.    :  Voids without dysuria.  ORTHO: No painful/swollen joints but various on /off sore.  No sore neck or back.    NEURO: No dizziness, weakness of extremities.  Same UE/LE numbness/paresthesias.  PSYCH: No confusion; mild memory loss.  Mood good; not that anxious, depressed.     Objective     Vital Signs  /91 (BP Location: Left arm, Patient Position: Lying)   Pulse 86   Temp 98.3 °F (36.8 °C) (Oral)   Resp 16   Ht 177.8 cm (70\")   Wt 97.8 kg (215 lb 9.6 oz)   LMP  (LMP Unknown)   SpO2 96%   BMI 30.94 kg/m²    Temp:  [98 °F (36.7 °C)-98.8 °F (37.1 °C)] 98.3 °F (36.8 °C)  Heart Rate:  [81-92] 86  Resp:  [16-18] 16  BP: (122-144)/(68-91) 134/91      Intake/Output Summary (Last 24 hours) at 3/13/2019 2022  Last data filed at 3/13/2019 1332  Gross per 24 hour   Intake 2088.33 ml   Output --   Net 2088.33 ml       Lab Results:  Lab Results (last 24 hours)     Procedure Component Value Units Date/Time    Blood Culture - Blood, Hand, Left [[id number]] Collected:  03/10/19 1727    Specimen:  Blood from Hand, Left Updated:  03/13/19 1816     Blood Culture No growth at 3 days    Blood Culture - Blood, Arm, Left [[id number]] Collected:  03/10/19 1727    Specimen:  Blood from Arm, Left Updated:  03/13/19 1816     " Blood Culture No growth at 3 days    Blood Culture - Blood, Arm, Left [[id number]] Collected:  03/10/19 1115    Specimen:  Blood from Arm, Left Updated:  03/13/19 1216     Blood Culture No growth at 3 days    Blood Culture - Blood, Arm, Left [[id number]] Collected:  03/10/19 1120    Specimen:  Blood from Arm, Left Updated:  03/13/19 1216     Blood Culture No growth at 3 days    Basic Metabolic Panel [[id number]]  (Abnormal) Collected:  03/13/19 0624    Specimen:  Blood Updated:  03/13/19 0746     Glucose 73 mg/dL      BUN 3 mg/dL      Creatinine 0.54 mg/dL      Sodium 138 mmol/L      Potassium 3.1 mmol/L      Chloride 104 mmol/L      CO2 28.0 mmol/L      Calcium 7.4 mg/dL      eGFR Non African Amer 114 mL/min/1.73      BUN/Creatinine Ratio 5.6     Anion Gap 6.0 mmol/L     Narrative:       GFR Normal >60  Chronic Kidney Disease <60  Kidney Failure <15    CBC & Differential [[id number]] Collected:  03/13/19 0624    Specimen:  Blood Updated:  03/13/19 0731    Narrative:       The following orders were created for panel order CBC & Differential.  Procedure                               Abnormality         Status                     ---------                               -----------         ------                     CBC Auto Differential[[id number]]        Abnormal            Final result                 Please view results for these tests on the individual orders.    CBC Auto Differential [[id number]]  (Abnormal) Collected:  03/13/19 0624    Specimen:  Blood Updated:  03/13/19 0731     WBC 6.74 10*3/mm3      RBC 3.47 10*6/mm3      Hemoglobin 11.1 g/dL      Hematocrit 33.4 %      MCV 96.3 fL      MCH 32.0 pg      MCHC 33.2 g/dL      RDW 15.1 %      RDW-SD 52.6 fl      MPV 11.0 fL      Platelets 204 10*3/mm3      Neutrophil % 75.1 %      Lymphocyte % 14.4 %      Monocyte % 6.4 %      Eosinophil % 3.1 %      Basophil % 0.7 %      Immature Grans % 0.3 %      Neutrophils, Absolute 5.06 10*3/mm3      Lymphocytes, Absolute 0.97  10*3/mm3      Monocytes, Absolute 0.43 10*3/mm3      Eosinophils, Absolute 0.21 10*3/mm3      Basophils, Absolute 0.05 10*3/mm3      Immature Grans, Absolute 0.02 10*3/mm3      nRBC 0.0 /100 WBC           CBC:   Results from last 7 days   Lab Units 03/13/19 0624 03/12/19  0623 03/11/19  0702 03/10/19  1046   WBC 10*3/mm3 6.74 5.59 6.83 6.38   HEMOGLOBIN g/dL 11.1* 10.9* 11.1* 13.3   HEMATOCRIT % 33.4* 32.1* 32.9* 38.5   PLATELETS 10*3/mm3 204 208 243 243     BMP:   Results from last 7 days   Lab Units 03/13/19  0624 03/12/19  0623 03/11/19  0702 03/10/19  1046   SODIUM mmol/L 138 140 142 140   POTASSIUM mmol/L 3.1* 2.9* 2.5* 3.3*   CHLORIDE mmol/L 104 107 107 98   CO2 mmol/L 28.0 28.0 29.0 30.0   BUN mg/dL 3* 3* 9 9   CREATININE mg/dL 0.54 0.52 0.79 0.70   EGFR IF NONAFRICN AM mL/min/1.73 114 119 74 85   GLUCOSE mg/dL 73 85 100 101*   CALCIUM mg/dL 7.4* 7.3* 7.2* 8.1*   ALT (SGPT) U/L  --   --  25 17     INR:       Culture Results:   Blood Culture   Date Value Ref Range Status   03/10/2019 No growth at 3 days  Preliminary   03/10/2019 No growth at 3 days  Preliminary   03/10/2019 No growth at 3 days  Preliminary   03/10/2019 No growth at 3 days  Preliminary     Urine Culture   Date Value Ref Range Status   03/10/2019 >100,000 CFU/mL Streptococcus agalactiae (Group B) (A)  Final     Comment:     This organism is considered to be universally susceptible to penicillin.  No further antibiotic testing will be performed.       Radiology: None    Additional Studies Reviewed: EEG neg    Physical Exam:  GENERAL:  Well nourished/developed in no acute distress; mumbling incoherently. Obese.  SKIN: Turgor excellent, without wound, rash, lesion other than several LE scabbed abrasions.  HEENT: Normal cephalic without trauma.  Pupils reactive to light; L pupil anisocoria. Extraocular motions full without nystagmus.     External canals nonobstructive nontender without reddness.  Oral cavity without growths, exudates, and moist.   Posterior pharynx without mass, obstruction, redness.  No thyromegaly, mass, tenderness, lymphadenopathy and supple.  CV: Regular rhythm.  No murmur, gallop,  edema. Posterior pulses intact.  No carotid bruits.   CHEST: No chest wall tenderness or mass.   LUNGS: Symmetric motion with clear to auscultation.  ABD: Soft, nontender without mass.   ORTHO: Symmetric extremities without swelling/point tenderness.  Full gross range of motion.    NEURO: CN 2-12 grossly intact.  Symmetric facies. 1/4 x bicep 0/4 LE.  UE/LE variable; 2-/5 strength UE 0-1/5 LE  Nonfocal use extremities. Speech clear.    PSYCH: Disoriented x 3.  Pleasant calm, well kept.  Non-purposeful/directed conservation with intact short/long gross memory.    Results Review:    above    ASSESSMENT/PLAN:  Reason for admit/problems addressing acutely:  Confusion-delirium (? Sepsis, ? Seizure)-resolved  Hx seizure disorder-normal EEG  Abnormal UA-(21-30WBC, mod leuk, 2+ bacteria) maybe UTI (BC neg, 10.5 strept         Group B)  CXR-infiltrate (note lack cough, etc)-maybe pneumonia-persistant L peihilair/mid            Lung infiltrate  Sepsis (tachycardia 104, lactic acid 6.0)-maybe  Low phenytoin level 3.0-not taking-improved  Hypopotassemia-2.2-oowccmim-hesrhoqp  Drug screen THC, opiate  Owyxwqoai-ueenuahaq-syvssvly  NPO-resolved  Cerebral atrophy-frontal  YOLIE-without treatment  Non-compliance; Rx (dilantin, keppra), CPAP  B12 def (post gastric bypass);   Vit D def (post gastric bypass)  Polypharmacy; difficult Rx list    Chronic problems to review/consider during care:  64 y/o y/o white female   Allergy/intolerance: see above  Procedural history: see above  Family history: see above  Obesity.  This is status post gastric bypass initial failure and regain of  weight.     1 para 1 AB 0.    Surgical menopause.  Osteoporosis on previous wrist study.    Hypertension.    Hypothyroidism.    History of recurrent hyponatremia felt to be syndrome of  inappropriate              antidiuretic hormone, as least from Ziac or Cymbalta and question others.    History of DVT - left peroneal popliteal, 11/08/2008, treated              successfully with anticoagulation.   History anticoagulation: hx DVT/(coumadin)  Chronic insomnia.  Spinal spondylosis; this is primarily cervical with multiple surgeries.  Chronic neck pain.    Chronic back pain  Chronic narcotics-from pain management/[facility]  Chronic depression - followed by Dr. Samaniego.    Degenerative joint disease that is diffuse.   LE weakness  Gait difficulty-chronic   History of gastric bypass and metabolic risk it carries.    Colon cancer history with previous surgery and chemo, no obvious              recurrence.  Gastroesophageal reflux.  Recurrent laryngitis  Documented neuropathy by EMG nerve conduction  Seizure disorder (2016)  antieliptics-Keppra/Dilantin  Abnormal CT chest-treating nonsurgically  Polypharmacy   Urinary incontience (history urge incontinence)    Rx-reviewed, considered with changes as needed: changing over to oral   And replacing K  Labs reviewed, considered and changes made as needed: daily CBC, chemistry  Imaging reviewed, and need for considered: no planned  Consults needed: active neurology  Diet reviewed, considered and changes made as needed: no changes  Fluids reviewed, considered and changes made as needed: now on oral  Increase activity: as able; probably more for rehab  Code status: full  Discussed possible/future discharge plans: patient expects MRN; accepted   But cpap problem enough of a problem to keep another night  Case discussed with Dr ANTON/neurology, , nursing; message left respiratory  Available to talk if needed with POA/caregiver and members care team.    Josafat Gonzales MD  03/13/19  8:22 PM          Electronically signed by Josafat Gonzales MD at 3/13/2019  8:40 PM     Tamara Gomez MD at 3/13/2019 11:46 AM            Neurology Progress  Note      Date of admission: 3/10/2019 10:26 AM  Date of visit: 3/13/2019    Chief Complaint:  F/u episode of confusion and repeating words    Subjective     Subjective:    No further episodes.  She is back to her baseline.  Able to obtain the patient's medications from her pharmacist that she filled over the past couple years.  The last time she filled Keppra was 1000 mg twice a day and that was last filled August 22, 2018 there is no list of Dilantin or phenytoin on this and the list goes down back to December 2017 he has however filled her clonazepam, Flexeril and that was last filled January 15, 2019   Gabapentin, Cymbalta was last filled February 2019 and only one other time in the year and that was  May 2018. Morphine last filled 3/9/2019 oxycodone last filled January 13, 2019. Her potassium was last filled April 2018 Seroquel last filled February 2019 Effexor was only  filled October 2018 and  March 2019.  Vesicare last filled May 2018  Medications:  Current Facility-Administered Medications   Medication Dose Route Frequency Provider Last Rate Last Dose   • acetaminophen (TYLENOL) tablet 650 mg  650 mg Oral Q4H PRN Zeeshan Hester MD       • cefTRIAXone (ROCEPHIN) 1 g/100 mL 0.9% NS (MBP)  1 g Intravenous Q24H Zeeshan Hester MD   1 g at 03/12/19 1502    And   • AZITHROMYCIN 500 MG/250 ML 0.9% NS IVPB (vial-mate)  500 mg Intravenous Q24H Zeeshan Hester MD   500 mg at 03/12/19 1657   • DULoxetine (CYMBALTA) DR capsule 30 mg  30 mg Oral Daily Zeeshan Hester MD   30 mg at 03/13/19 0858   • enoxaparin (LOVENOX) syringe 40 mg  40 mg Subcutaneous Q24H Josafat Gonzlaes MD   40 mg at 03/12/19 1911   • fosphenytoin (Cerebyx) injection 100 mg PE  100 mg PE Intravenous Q12H Zeeshan Hester MD   100 mg PE at 03/13/19 0858   • gabapentin (NEURONTIN) capsule 600 mg  600 mg Oral Q8H Zeeshan Hester MD   600 mg at 03/13/19 0539   • ipratropium-albuterol (DUO-NEB) nebulizer  solution 3 mL  3 mL Nebulization Q4H - RT Zeeshan Hester MD   3 mL at 03/12/19 2035   • levETIRAcetam in NaCl 0.75% (KEPPRA) IVPB 1,000 mg  1,000 mg Intravenous BID Zeeshan Hester MD   1,000 mg at 03/13/19 0858   • levothyroxine (SYNTHROID, LEVOTHROID) tablet 100 mcg  100 mcg Oral Q AM Zeeshan Hester MD   100 mcg at 03/13/19 0539   • loperamide (IMODIUM) capsule 2 mg  2 mg Oral 4x Daily PRN Zeeshan Hester MD       • LORazepam (ATIVAN) injection 1 mg  1 mg Intravenous Q4H PRN Zeeshan Hester MD   1 mg at 03/12/19 1911   • Magnesium Sulfate 2 gram Bolus, followed by 8 gram infusion (total Mg dose 10 grams)- Mg less than or equal to 1mg/dL  2 g Intravenous PRN Zeeshan Hester MD        Or   • Magnesium Sulfate 2 gram / 50mL Infusion (GIVE X 3 BAGS TO EQUAL 6GM TOTAL DOSE) - Mg 1.1 - 1.5 mg/dl  2 g Intravenous PRN Zeeshan Hester MD        Or   • Magnesium Sulfate 4 gram infusion- Mg 1.6-1.9 mg/dL  4 g Intravenous PRN Zeeshan Hester MD       • metoprolol tartrate (LOPRESSOR) tablet 50 mg  50 mg Oral Daily Zeeshan Hester MD   50 mg at 03/13/19 0858   • Morphine (MS CONTIN) 12 hr tablet 15 mg  15 mg Oral Q12H Zeeshan Hester MD   15 mg at 03/13/19 0858   • Morphine sulfate (PF) injection 2 mg  2 mg Intravenous Q3H PRN Josafat Gonzales MD   2 mg at 03/12/19 1911   • nitroglycerin (NITROSTAT) SL tablet 0.4 mg  0.4 mg Sublingual Q5 Min PRN Zeeshan Hester MD       • oxybutynin XL (DITROPAN-XL) 24 hr tablet 5 mg  5 mg Oral Daily Zeeshan Hetser MD   5 mg at 03/13/19 0858   • oxyCODONE (ROXICODONE) immediate release tablet 10 mg  10 mg Oral Q4H PRN Zeeshan Hester MD   10 mg at 03/13/19 0914   • Pharmacy to Dose enoxaparin (LOVENOX)   Does not apply Continuous PRN Zeeshan Hester MD       • potassium chloride (MICRO-K) CR capsule 20 mEq  20 mEq Oral TID With Meals Josafat Gonzales MD       • potassium chloride 10  mEq in 100 mL IVPB  10 mEq Intravenous Q1H PRN Zeeshan Hester  mL/hr at 03/11/19 2053 10 mEq at 03/11/19 2053   • sodium chloride 0.9 % flush 10 mL  10 mL Intravenous PRN Sergio Templeton, DO       • sodium chloride 0.9 % flush 3 mL  3 mL Intravenous Q12H Zeeshan Hester MD   3 mL at 03/12/19 2012   • sodium chloride 0.9 % flush 3-10 mL  3-10 mL Intravenous PRN Zeeshan Hester MD       • venlafaxine XR (EFFEXOR-XR) 24 hr capsule 300 mg  300 mg Oral Daily Zeeshan Hester MD   300 mg at 03/13/19 0858       Review of Systems:   -A 14 point review of systems is completed and is negative   Objective     Objective      Vital Signs  Temp:  [97.7 °F (36.5 °C)-98.8 °F (37.1 °C)] 98.4 °F (36.9 °C)  Heart Rate:  [75-92] 86  Resp:  [16-18] 18  BP: (122-149)/(68-95) 128/68    Physical Exam:    HEENT:  Neck supple  CVS:  Regular rate and rhythm.  No murmurs  Carotid Examination:  No bruits  Lungs:  Clear to auscultation  Abdomen:  Non-tender, Non-distended  Extremities:  No signs of peripheral edema    Neurologic Exam:    -Awake, Alert, Oriented to person ans place  -No word finding difficulties  -No aphasia  -No dysarthria  -Follows simple  commands    Cranial nerves II through XII intact  EOMI No facial sensory loss.  No facial weakness. Hearing intact to voice  Tongue protrudes midline.    Motor: (strength out of 5:  1= minimal movement, 2 = movement in plane of gravity, 3 = movement against gravity, 4 = movement against some resistance, 5 = full strength)    -Right Upper Ext: Proximal: 5 Distal: 5  -Left Upper Ext: Proximal: 5 Distal: 5    No change in her strength in her legs:  Right Lower Ext: She is able to raise up her legs I.e.hip flexion from the hip but cannot sustain this for long.  Ankle dorsiflexors are strong. Knee flexors and extensors appear weak but there is suboptimal effort  -Left Lower Ext: She is able to raise up her legs i.e. hip flexion- from the hip but cannot sustain  this for long.  Ankle dorsiflexors are strong. Knee flexors and extensors appear weak but there is suboptimal effort        DTR:  2+ throughout in all four extremities with increased 2++ briskness of the lower extremities.    Sensory:  -Intact to light touch    Coordination/Gait:  -Normal finger to nose     Results Review:    I reviewed the patient's new clinical results.    Lab Results (last 24 hours)     Procedure Component Value Units Date/Time    Basic Metabolic Panel [[id number]]  (Abnormal) Collected:  03/13/19 0624    Specimen:  Blood Updated:  03/13/19 0746     Glucose 73 mg/dL      BUN 3 mg/dL      Creatinine 0.54 mg/dL      Sodium 138 mmol/L      Potassium 3.1 mmol/L      Chloride 104 mmol/L      CO2 28.0 mmol/L      Calcium 7.4 mg/dL      eGFR Non African Amer 114 mL/min/1.73      BUN/Creatinine Ratio 5.6     Anion Gap 6.0 mmol/L     Narrative:       GFR Normal >60  Chronic Kidney Disease <60  Kidney Failure <15    CBC & Differential [[id number]] Collected:  03/13/19 0624    Specimen:  Blood Updated:  03/13/19 0731    Narrative:       The following orders were created for panel order CBC & Differential.  Procedure                               Abnormality         Status                     ---------                               -----------         ------                     CBC Auto Differential[[id number]]        Abnormal            Final result                 Please view results for these tests on the individual orders.    CBC Auto Differential [[id number]]  (Abnormal) Collected:  03/13/19 0624    Specimen:  Blood Updated:  03/13/19 0731     WBC 6.74 10*3/mm3      RBC 3.47 10*6/mm3      Hemoglobin 11.1 g/dL      Hematocrit 33.4 %      MCV 96.3 fL      MCH 32.0 pg      MCHC 33.2 g/dL      RDW 15.1 %      RDW-SD 52.6 fl      MPV 11.0 fL      Platelets 204 10*3/mm3      Neutrophil % 75.1 %      Lymphocyte % 14.4 %      Monocyte % 6.4 %      Eosinophil % 3.1 %      Basophil % 0.7 %      Immature Grans % 0.3 %  "     Neutrophils, Absolute 5.06 10*3/mm3      Lymphocytes, Absolute 0.97 10*3/mm3      Monocytes, Absolute 0.43 10*3/mm3      Eosinophils, Absolute 0.21 10*3/mm3      Basophils, Absolute 0.05 10*3/mm3      Immature Grans, Absolute 0.02 10*3/mm3      nRBC 0.0 /100 WBC     Blood Culture - Blood, Hand, Left [[id number]] Collected:  03/10/19 1727    Specimen:  Blood from Hand, Left Updated:  03/12/19 1815     Blood Culture No growth at 2 days    Blood Culture - Blood, Arm, Left [[id number]] Collected:  03/10/19 1727    Specimen:  Blood from Arm, Left Updated:  03/12/19 1815     Blood Culture No growth at 2 days    Vitamin D 25 Hydroxy [[id number]]  (Abnormal) Collected:  03/12/19 0623    Specimen:  Blood Updated:  03/12/19 1656     25 Hydroxy, Vitamin D <12.8 ng/ml     Blood Culture - Blood, Arm, Left [[id number]] Collected:  03/10/19 1115    Specimen:  Blood from Arm, Left Updated:  03/12/19 1215     Blood Culture No growth at 2 days    Blood Culture - Blood, Arm, Left [[id number]] Collected:  03/10/19 1120    Specimen:  Blood from Arm, Left Updated:  03/12/19 1215     Blood Culture No growth at 2 days        Imaging Results (last 24 hours)     Procedure Component Value Units Date/Time    XR Chest PA & Lateral [[id number]] Collected:  03/13/19 0815     Updated:  03/13/19 0819    Narrative:       EXAMINATION: XR CHEST PA AND LATERAL- 3/13/2019 8:15 AM CDT     HISTORY: f/u \"infiltrate\"; R41.82-Altered mental status, unspecified;  J18.9-Pneumonia, unspecified organism; N39.0-Urinary tract infection,  site not specified; A41.9-Sepsis, unspecified organism; R65.20-Severe  sepsis without septic shock; R13.10-Dysphagia, unspecified; Z74.09-Other  reduced mobility; Z74.09-Other reduced mobility.     REPORT: Comparison is made with the chest x-ray from 3/10/2019.     The lungs are mildly hypoaerated, there is no significant change in left  perihilar and midlung infiltrate likely related to pneumonia. No  pneumothorax or pleural " effusion. Heart size is normal. There is ectasia  of the thoracic aorta has before. Previous cervical and lumbar fusion is  noted.       Impression:       Persistent left perihilar and left midlung infiltrate most  likely related to pneumonia. This is unchanged.  This report was finalized on 03/13/2019 08:16 by Dr. Fabian Duenas MD.    MRI Brain With & Without Contrast [[id number]] Collected:  03/12/19 1748     Updated:  03/12/19 1756    Narrative:       EXAMINATION:  MRI BRAIN W WO CONTRAST-  3/12/2019 5:11 PM CDT     HISTORY: Encephalopathy      COMPARISON: Head CT dated 3/10/2019     TECHNIQUE: Multiplanar MR imaging of the brain was performed with  gadolinium enhanced imaging.     FINDINGS:      There is no diffusion signal abnormality to indicate an acute ischemic  event/infarction.     There is diffuse central and cortical atrophy. A predominance of frontal  lobe atrophy observed.     There is confluent and punctate periventricular and subcortical FLAIR  signal hyperintensities compatible with mild chronic ischemic changes. A  punctate area of chronic ischemic change also noted in the left  cerebellar hemisphere.     There is no mass effect or midline shift. There is no hydrocephalus.     Normal flow voids are identified.     No abnormal gadolinium enhancement identified.     Pituitary gland is not enlarged.     Cervical spine imaged in part is unremarkable.       Impression:       1. Atrophy, frontal lobe predominance.  2. Chronic ischemic changes.  3. No acute intracranial process.  This report was finalized on 03/12/2019 17:52 by Dr. Obi Phan MD.        EEG 3/12/2019: Normal awake and sleep EEG  :   Assessment/Plan     Hospital Problem List      Altered mental status    Anticoagulated: DVT/(?)    Impression:  1. Episode of repeating words and of confusion.  I suspect this is from her use of opioids and benzo's on a background of untreated sleep apnea. However she was not taking any antiseizure meds  and per her pharmacy she has not done this since 8/22/18 and it looks like it was not taken regularly throughout the entire year of 2018.  Looks like she only had one single  prescription for Keppra that she picked up.  Per conversation with Dr. Gonzales patient was hooked up to an EEG for a couple days at [facility] and had constant epileptic discharges    2. Frontal lobe atrophy  3. Chronic opioid use  4.  B12 deficiency at 251 --has received one dose of IM B 12  5. Vitamin D deficiency--not    6. H/O gastric bypass so will likely need daily replacement of these  7. H/O YOLIE not treated overnight oximetry   8.  There is a lot of hit and miss of her filling her medications based on the list of meds coming from [facility].  But it looks like she is taking both Effexor and Cymbalta which is probably not a good combination as both  of those can cause urinary retention and it places  her at risk for serotonin syndrome  9.  Significant issues with compliance with her medications except for the opioids/morphine    Plan:  · Continue Keppra for now given previous H/O status epilepticus although I'm wondering if that was in the face of any type of withdrawal of meds or with use of ultram,etc.  Too little information to make a qualified judgement of best treatment plan for her.  In addition sleep apnea if severe enough and not treated can cause elevated C02 and then seizures as well--especially if breathing suppressed by opioid/benzo's  · Reduce combination of opioids and benzo's. Her age will also make her start having more side effects as over time with aging  she will not be able to metabolize these drugs as well.   · Will give 50,000 IU of vitamin D x 1   · Follow up with Neurology as there are many options to treat sleep apnea and many techniques to help overcome her mask being pulled off during sleep--for example deconditioning where she wears the mask during the day off and on until she gets used to it     · Discharge on  "monotherapy Keppra--will use XR format as it is a more steady release and still give it every 12 hours even though it is listed as once a day to prevent fluctuations in her levels.        Tamara Donovan MD  03/13/19  11:46 AM        Electronically signed by Tamara Gomez MD at 3/13/2019  2:51 PM     Josafat Gonzales MD at 3/12/2019  8:28 AM               LOS: 2 days   Patient Care Team:  Josafat Gonzales MD as PCP - General  Josafat Gonzales MD as PCP - Family Medicine  [facility]ing Physician (Cardiology)  Pawel Ho MD as Consulting Physician (Urology)  Krystal Dorman APRN as Nurse Practitioner (Neurology)    Chief Complaint:  confusion    Subjective      HISTORY OF PRESENT ILLNESS: many chronic illness and many medications.  Went to bed night before admit normal.  Slept through the evening ok.  When she awoke the next morning she was confused and inappropriately mumbling \"oh God\".  There was no recent cough dysuria fever injury vomiting diarrhea; anything.  Though there has been a concern about seizure disorder in the past the presenting complaints did not include any tonic-clonic activity.  Chest x-ray in the emergency department was suspicious for pneumonia and continued spiculated lingular nodule.  This lingular nodule has been seen by cardiovascular surgery and considering her multiple medical problems was followed without removal/treated with radiation oncology.  Her Dilantin level was less than 3 and low-IV cerebyx was given in the ED.  Alcohol salicylate and Tylenol levels were all normal. Chemistry showed low protein and albumin at 6 and 3 respectively; nothing else significant. Lactic acid was elevated at 6 ammonia was less than 9 and urinalysis showed 21-30 WBCs with moderate leukocytes.   CT of her head showed only chronic ischemic changes.  She was felt to be septic with a UTI/? pneumonia and was admitted with fluid boluses and " "appropriate treatments.  Antibiotics started in the ER were Rocephin and azithromycin. She had jenifer similar presentation      Interval History:  Less confused.  Recalls yesterday needing to say \"oh God\", \"help me\" but not knowing why.  Disoriented to place, date; knew me.  Slept.  Denies pain. Day 3 rocephin/azithromycin for CXR ? Pneumonia and UA abnormal; UTI.  No cough.  Awaiting speech to clear diet; as yesterday not able to eat/swallow ok.     .  Current Facility-Administered Medications:   •  acetaminophen (TYLENOL) tablet 650 mg, 650 mg, Oral, Q4H PRN, Zeeshan Hester MD  •  cefTRIAXone (ROCEPHIN) 1 g/100 mL 0.9% NS (MBP), 1 g, Intravenous, Q24H, 1 g at 03/11/19 1340 **AND** AZITHROMYCIN 500 MG/250 ML 0.9% NS IVPB (vial-mate), 500 mg, Intravenous, Q24H, Zeeshan Hester MD, 500 mg at 03/11/19 1636  •  DULoxetine (CYMBALTA) DR capsule 30 mg, 30 mg, Oral, Daily, Zeeshan Hester MD  •  enoxaparin (LOVENOX) syringe 40 mg, 40 mg, Subcutaneous, Q24H, Josafat Gonzales MD, 40 mg at 03/11/19 1636  •  fosphenytoin (Cerebyx) injection 100 mg PE, 100 mg PE, Intravenous, Q12H, Zeeshan Hesetr MD, 100 mg PE at 03/11/19 2334  •  gabapentin (NEURONTIN) capsule 600 mg, 600 mg, Oral, Q8H, Zeeshan Hester MD, 600 mg at 03/12/19 0659  •  ipratropium-albuterol (DUO-NEB) nebulizer solution 3 mL, 3 mL, Nebulization, Q4H - RT, Zeeshan Hester MD, 3 mL at 03/12/19 0809  •  levETIRAcetam in NaCl 0.75% (KEPPRA) IVPB 1,000 mg, 1,000 mg, Intravenous, BID, Zeeshan Hester MD, 1,000 mg at 03/11/19 2240  •  levothyroxine (SYNTHROID, LEVOTHROID) tablet 100 mcg, 100 mcg, Oral, Q AM, Zeeshan Hester MD, 100 mcg at 03/12/19 0659  •  loperamide (IMODIUM) capsule 2 mg, 2 mg, Oral, 4x Daily PRN, Zeeshan Hester MD  •  LORazepam (ATIVAN) injection 1 mg, 1 mg, Intravenous, Q4H PRN, Zeeshan Hester MD, 1 mg at 03/11/19 1647  •  Magnesium Sulfate 2 gram Bolus, followed by 8 " gram infusion (total Mg dose 10 grams)- Mg less than or equal to 1mg/dL, 2 g, Intravenous, PRN **OR** Magnesium Sulfate 2 gram / 50mL Infusion (GIVE X 3 BAGS TO EQUAL 6GM TOTAL DOSE) - Mg 1.1 - 1.5 mg/dl, 2 g, Intravenous, PRN **OR** Magnesium Sulfate 4 gram infusion- Mg 1.6-1.9 mg/dL, 4 g, Intravenous, PRN, Zeeshan Hester MD  •  metoprolol tartrate (LOPRESSOR) tablet 50 mg, 50 mg, Oral, Daily, Zeeshan Hester MD, 50 mg at 03/11/19 1022  •  Morphine (MS CONTIN) 12 hr tablet 15 mg, 15 mg, Oral, Q12H, Zeeshan Hester MD  •  Morphine sulfate (PF) injection 2 mg, 2 mg, Intravenous, Q3H PRN, Josafat Gonzales MD, 2 mg at 03/11/19 1340  •  nitroglycerin (NITROSTAT) SL tablet 0.4 mg, 0.4 mg, Sublingual, Q5 Min PRN, Zeeshan Hester MD  •  oxybutynin XL (DITROPAN-XL) 24 hr tablet 5 mg, 5 mg, Oral, Daily, Zeeshan Hester MD  •  oxyCODONE (ROXICODONE) immediate release tablet 10 mg, 10 mg, Oral, Q4H PRN, Zeeshan Hester MD, 10 mg at 03/11/19 1636  •  Pharmacy to Dose enoxaparin (LOVENOX), , Does not apply, Continuous PRN, Zeeshan Hester MD  •  potassium chloride 10 mEq in 100 mL IVPB, 10 mEq, Intravenous, Q1H PRN, Zeeshan Hester MD, Last Rate: 100 mL/hr at 03/11/19 2053, 10 mEq at 03/11/19 2053  •  [COMPLETED] Insert peripheral IV, , , Once **AND** sodium chloride 0.9 % flush 10 mL, 10 mL, Intravenous, PRN, Sergio Templeton, DO  •  sodium chloride 0.9 % flush 3 mL, 3 mL, Intravenous, Q12H, Zeeshan Hester MD, 3 mL at 03/10/19 2059  •  sodium chloride 0.9 % flush 3-10 mL, 3-10 mL, Intravenous, PRN, Zeeshan Hester MD  •  sodium chloride 0.9 % with KCl 20 mEq/L infusion, 125 mL/hr, Intravenous, Continuous, Josafat Gonzales MD, Last Rate: 125 mL/hr at 03/12/19 0511, 125 mL/hr at 03/12/19 0511  •  venlafaxine XR (EFFEXOR-XR) 24 hr capsule 300 mg, 300 mg, Oral, Daily, Zeeshan Hester MD    Review of Systems:   GENERAL:  Inactive before admit;  "and since here-slower with limits, speed, stamina for age and situation. Sleep was last night ok. No fever now.  ENDO:  No syncope, near or diaphoretic sweaty spells.  BS Ok.  HEENT: No head injury or headache.  No vision change.  No significant hearing loss.  Ears without pain/drainage.  No sore throat.  No significant nasal/sinus congestion/drainage. No epistaxis.  CHEST: No chest wall tenderness or mass. No cough,  without wheeze.  No SOB; no hemoptysis.  CV: No chest pain, palpitations, ankle edema.  GI: No heartburn; awaiting speech.   No abdominal pain, diarrhea, constipation.  No rectal bleeding, or melena.    :  Voids without dysuria.   ORTHO: No painful/swollen joints but various on /off sore.  No mention sore neck or back; has history chronic neck/back pain.  NEURO: No dizziness, weakness of extremities.  Same numbness/paresthesias.  PSYCH:  Less confused/less memory loss.  Mood good; no apparent anxious, depressed but long history of this/Rx/psych.     Objective     Vital Signs  /71 (BP Location: Right arm, Patient Position: Lying)   Pulse 84   Temp 98 °F (36.7 °C) (Oral)   Resp 18   Ht 177.8 cm (70\")   Wt 97.8 kg (215 lb 9.6 oz)   LMP  (LMP Unknown)   SpO2 95%   BMI 30.94 kg/m²    Temp:  [98 °F (36.7 °C)-99.2 °F (37.3 °C)] 98 °F (36.7 °C)  Heart Rate:  [68-88] 84  Resp:  [15-20] 18  BP: (115-166)/(71-97) 149/71      Intake/Output Summary (Last 24 hours) at 3/12/2019 0828  Last data filed at 3/12/2019 0509  Gross per 24 hour   Intake 2700 ml   Output --   Net 2700 ml       Lab Results:  Lab Results (last 24 hours)     Procedure Component Value Units Date/Time    Vitamin B12 [[id number]]  (Normal) Collected:  03/12/19 0623    Specimen:  Blood Updated:  03/12/19 0805     Vitamin B-12 251 pg/mL     Folate [[id number]]  (Normal) Collected:  03/12/19 0623    Specimen:  Blood Updated:  03/12/19 0805     Folate 6.36 ng/mL     Basic Metabolic Panel [[id number]]  (Abnormal) Collected:  03/12/19 0623 "    Specimen:  Blood Updated:  03/12/19 0708     Glucose 85 mg/dL      BUN 3 mg/dL      Creatinine 0.52 mg/dL      Sodium 140 mmol/L      Potassium 2.9 mmol/L      Chloride 107 mmol/L      CO2 28.0 mmol/L      Calcium 7.3 mg/dL      eGFR Non African Amer 119 mL/min/1.73      BUN/Creatinine Ratio 5.8     Anion Gap 5.0 mmol/L     Narrative:       GFR Normal >60  Chronic Kidney Disease <60  Kidney Failure <15    CBC & Differential [[id number]] Collected:  03/12/19 0623    Specimen:  Blood Updated:  03/12/19 0649    Narrative:       The following orders were created for panel order CBC & Differential.  Procedure                               Abnormality         Status                     ---------                               -----------         ------                     CBC Auto Differential[[id number]]        Abnormal            Final result                 Please view results for these tests on the individual orders.    CBC Auto Differential [[id number]]  (Abnormal) Collected:  03/12/19 0623    Specimen:  Blood Updated:  03/12/19 0649     WBC 5.59 10*3/mm3      RBC 3.40 10*6/mm3      Hemoglobin 10.9 g/dL      Hematocrit 32.1 %      MCV 94.4 fL      MCH 32.1 pg      MCHC 34.0 g/dL      RDW 14.8 %      RDW-SD 51.2 fl      MPV 10.7 fL      Platelets 208 10*3/mm3      Neutrophil % 73.8 %      Lymphocyte % 14.8 %      Monocyte % 8.2 %      Eosinophil % 2.3 %      Basophil % 0.4 %      Immature Grans % 0.5 %      Neutrophils, Absolute 4.12 10*3/mm3      Lymphocytes, Absolute 0.83 10*3/mm3      Monocytes, Absolute 0.46 10*3/mm3      Eosinophils, Absolute 0.13 10*3/mm3      Basophils, Absolute 0.02 10*3/mm3      Immature Grans, Absolute 0.03 10*3/mm3      nRBC 0.0 /100 WBC     Blood Culture - Blood, Hand, Left [[id number]] Collected:  03/10/19 1727    Specimen:  Blood from Hand, Left Updated:  03/11/19 1815     Blood Culture No growth at 24 hours    Blood Culture - Blood, Arm, Left [[id number]] Collected:  03/10/19 1727     Specimen:  Blood from Arm, Left Updated:  03/11/19 1815     Blood Culture No growth at 24 hours    Lactic Acid, Plasma [[id number]]  (Abnormal) Collected:  03/11/19 1146    Specimen:  Blood Updated:  03/11/19 1221     Lactate 2.5 mmol/L     Blood Culture - Blood, Arm, Left [[id number]] Collected:  03/10/19 1115    Specimen:  Blood from Arm, Left Updated:  03/11/19 1215     Blood Culture No growth at 24 hours    Blood Culture - Blood, Arm, Left [[id number]] Collected:  03/10/19 1120    Specimen:  Blood from Arm, Left Updated:  03/11/19 1215     Blood Culture No growth at 24 hours          CBC:   Results from last 7 days   Lab Units 03/12/19  0623 03/11/19  0702 03/10/19  1046   WBC 10*3/mm3 5.59 6.83 6.38   HEMOGLOBIN g/dL 10.9* 11.1* 13.3   HEMATOCRIT % 32.1* 32.9* 38.5   PLATELETS 10*3/mm3 208 243 243     BMP:   Results from last 7 days   Lab Units 03/12/19  0623 03/11/19  0702 03/10/19  1046   SODIUM mmol/L 140 142 140   POTASSIUM mmol/L 2.9* 2.5* 3.3*   CHLORIDE mmol/L 107 107 98   CO2 mmol/L 28.0 29.0 30.0   BUN mg/dL 3* 9 9   CREATININE mg/dL 0.52 0.79 0.70   EGFR IF NONAFRICN AM mL/min/1.73 119 74 85   GLUCOSE mg/dL 85 100 101*   CALCIUM mg/dL 7.3* 7.2* 8.1*   ALT (SGPT) U/L  --  25 17     INR:       Culture Results:   Blood Culture   Date Value Ref Range Status   03/10/2019 No growth at 24 hours  Preliminary   03/10/2019 No growth at 24 hours  Preliminary   03/10/2019 No growth at 24 hours  Preliminary   03/10/2019 No growth at 24 hours  Preliminary     Urine Culture   Date Value Ref Range Status   03/10/2019 Growth present, too young to evaluate  Preliminary       Radiology: None    Additional Studies Reviewed: None    Physical Exam:    GENERAL:  Well nourished/developed in no acute distress; mumbling incoherently. Obese.  SKIN: Turgor excellent, without wound, rash, lesion other than several LE scabbed abrasions.  HEENT: Normal cephalic without trauma.  Pupils reactive to light; L pupil anisocoria. Extraocular  motions full without nystagmus.     External canals nonobstructive nontender without reddness.  Oral cavity without growths, exudates, and moist.  Posterior pharynx without mass, obstruction, redness.  No thyromegaly, mass, tenderness, lymphadenopathy and supple.  CV: Regular rhythm.  No murmur, gallop,  edema. Posterior pulses intact.  No carotid bruits.   CHEST: No chest wall tenderness or mass.   LUNGS: Symmetric motion with clear to auscultation.  ABD: Soft, nontender without mass.   ORTHO: Symmetric extremities without swelling/point tenderness.  Full gross range of motion.    NEURO: CN 2-12 grossly intact.  Symmetric facies. 1/4 x bicep 0/4 LE.  UE/LE variable; 2-/5 strength UE 0-1/5 LE  Nonfocal use extremities. Speech clear.    PSYCH: Disoriented x 3.  Pleasant calm, well kept.  Non-purposeful/directed conservation with intact short/long gross memory.    Results Review:    above    ASSESSMENT/PLAN:  Reason for admit/problems addressing acutely:  Confusion-delirium (? Sepsis, ? Seizure)  Abnormal UA-(21-30WBC, mod leuk, 2+ bacteria) maybe UTI  CXR-infiltrate (note lack cough, etc)-maybe pneumonia  Sepsis (tachycardia 104, lactic acid 6.0-maybe  Low phenytoin level 3.0  Hypopotassemia-2.9  Drug screen THC, opiate  Dysphagia-confusion  NPO    Chronic problems to review/consider during care:  64 y/o y/o white female   Allergy/intolerance: see above  Procedural history: see above  Family history: see above  Obesity.  This is status post gastric bypass initial failure and regain of  weight.     1 para 1 AB 0.    Surgical menopause.  Osteoporosis on previous wrist study.    Hypertension.    Hypothyroidism.    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    History of DVT - left peroneal popliteal, 2008, treated              successfully with anticoagulation.   History anticoagulation: hx  DVT/(coumadin)  Chronic insomnia.  Spinal spondylosis; this is primarily cervical with multiple surgeries.  Chronic neck pain.    Chronic back pain  Chronic narcotics-from pain management/[facility]  Chronic depression - followed by Dr. Samaniego.    Degenerative joint disease that is diffuse.   LE weakness  Gait difficulty-chronic   History of gastric bypass and metabolic risk it carries.    Colon cancer history with previous surgery and chemo, no obvious              recurrence.  Gastroesophageal reflux.  Recurrent laryngitis  Documented neuropathy by EMG nerve conduction  Seizure disorder (2016)  antieliptics-Keppra/Dilantin  Abnormal CT chest-treating nonsurgically  Polypharmacy   Urinary incontience (history urge incontinence)      Rx-reviewed, considered with changes as needed: IV KCL runs  Labs reviewed, considered and changes made as needed: daily cbc/chemistry  Imaging reviewed, and need for considered: shantal CXR tomorrow  Consults needed: neurology, speech  Diet reviewed, considered and changes made as needed: no changes until   Ok speech  Fluids reviewed, considered and changes made as needed: no changes  Increase activity: not a good option  Code status: full  Discussed possible/future discharge plans: patient expects home but has   Used NH  Case discussed with nursing: tagged  discharge summary to neurology  Available to talk if needed with POA/caregiver and members care team.    Josafat Gonzales MD  03/12/19  8:28 AM          Electronically signed by Josafat Gonzales MD at 3/12/2019  8:49 AM          Consult Notes (last 72 hours) (Notes from 3/11/2019  8:37 AM through 3/14/2019  8:37 AM)      Tamara Gomez MD at 3/11/2019  4:38 PM      Consult Orders    1. Inpatient Neurology Consult General [[id number]] ordered by Josafat Gonzales MD at 03/11/19 0858                    Neurology Consult Note    Patient:  Bita Croft   YOB: 1955  MRN:  [id number]  Date of Admission:   "3/10/2019 10:26 AM    Date: 3/11/2019    Referring Provider: Josfaat Gonzales MD  Reason for Consultation: delirium; ? seizure disorder      History of present illness:     This is a 63 y.o. right handed female with H/O hypertension, seizure, sleep apnea, former smoker, anxiety and depression, colon cancer, and lung cancer s/p radiation treatment which ended in 12/2018, myelopathy evaluated for delirium; and ? seizure disorder    Patient woke up yesterday at 8 AM and was confused and was perseverating stating \"oh god\" and \"help\"   She recalls saying this and states she couldn't help but say it. In past  she has been diagnosed with  Seizures and supposedly is on dilantin and Keppra. However her Dilantin  level was < 3.0  She does have a low albumin of 2.5.      She has a H/O untreated sleep apnea.   states she keeps pulling her CPAP off and so he stopped trying to give it to her. She recalls an episode at [facility] where she was \"in a coma\" and suddenly woke up. She reports they  had stopped all of her pain meds then.  Since then her  gives her the medications.     Review of records from [facility] show she has a diagnosis of intractable generalized idiopathic status epilepticus for which she sees Dr Abrams but the last time seen was in 2017. She denies seeing a neurologist now.     Patient is on a number of medication that may cause or contribute to delirium including oxycodone, Norco, klonopin, ditropan, morphine, flexeril and most of these could certainly worsen sleep apnea  She also take Seroquel 100 mg at night which she states she takes for sleep    She also has been running a low potassium of only 2.5.    June 14, 2017 she underwent a spinal tap at [facility].  The sample was clear and colorless.  25 cc of CSF was obtained.  Her opening pressure was 9.  He had 3 white blood cells 3 red blood cells 54 of glucose and protein level of 38  She had a normal myelin basic protein at 3.42.  So in short " "her cerebrospinal fluid was normal.    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 In the past she has had  a cervical spinal fusion, lumbar spinal fusion and lumbar laminectomy. She reports that she has had a history of spine surgery about 6 years ago and that she had some fractures of T6 through L1 but states she cannot recall how she fractured her spine. Records show tat she had a surgery of T 12 and it was following this surgery that she went to inpatient rehab and outpatient home therapy and then over time she mostly is using a wheelchair. Her back pain is the basis of all her chronic opioid use    She has not seen Dr. Abrams since June 2017 but was requesting medications up through January 2018.  Dr Abrams  would not refill those without her scheduling a follow-up appointment which she refused to do    He was hospitalized October 26, 2016 for altered mental status with hyponatremia and a background of diarrhea    She also has a H/o blood clots  Past Medical History:   Diagnosis Date   • Asthma    • Cancer (CMS/HCC)     colon cancer   • Depression    • Disease of thyroid gland    • Hypertension    • Insomnia    • Seizure (CMS/HCC)    • Sleep apnea        Past Surgical History:   Procedure Laterality Date   • BACK SURGERY     • COLON SURGERY     • GASTRIC BYPASS     • HYSTERECTOMY     • KNEE ARTHROSCOPY      bilateral   • NECK SURGERY     • ORIF WRIST FRACTURE Right 6/9/2017    Procedure: WRIST OPEN REDUCTION INTERNAL FIXATION, RIGHT;  Surgeon: Alec Cardoza MD;  Location: [facility];  Service:    • TONSILLECTOMY     • WRIST FRACTURE SURGERY     Surgical history based on Dr. Gonzales's notes in 2016   \"she had T and A at age 11. Right knee surgery  with Dr. Dodie Rodriguez at [facility] at age 13. " "SANDRA and BSO with Dr. Mckeon,  [facility], at age 32. T12 surgery with Dr. Park, [place], in 1993.   Gastric bypass in [place] in 2001. Left knee surgery with Dr. Martinez in [place] in 2005. C-spine surgery with Dr. Mandel at [place] 05/08/2006. C-spine  surgery with Dr. Mandel at [place] 02/04/2008. Gallbladder at [facility],  Dr. Roberts, 03/18/2010. Previous left knee arthroscopy, right laparoscopic  colectomy, Dr. Piedra, 08/13/2014. \"            Prior to Admission medications    Medication Sig Start Date End Date Taking? Authorizing Provider   acetaminophen (TYLENOL) 325 MG tablet Take 2 tablets by mouth Every 4 (Four) Hours As Needed for Mild Pain  or Fever. 6/7/18  Yes Josafat Gonzales MD   clonazePAM (KlonoPIN) 1 MG tablet Take 1 tablet by mouth 3 (Three) Times a Day As Needed for Anxiety. 12/17/18   Josafat Gonzales MD   clotrimazole (LOTRIMIN) 1 % external solution Wash/dry well three times a day After dry; apply light coating of clotrimazole cream for one week 7/16/18   Josafat Gonzales MD   cyclobenzaprine (FLEXERIL) 10 MG tablet Take 1 tablet by mouth 3 (Three) Times a Day. 10/21/18   Beverly Wells MD   DULoxetine (CYMBALTA) 30 MG capsule Take 30 mg by mouth Daily.    Beverly Wells MD   gabapentin (NEURONTIN) 600 MG tablet Take 1 tablet by mouth 3 (Three) Times a Day. 5/31/18   Josafat Gonzales MD   levETIRAcetam (KEPPRA) 1000 MG tablet TAKE 1 TABLET BY MOUTH TWICE DAILY 4/27/18   Josafat Gonzales MD   levothyroxine (SYNTHROID, LEVOTHROID) 100 MCG tablet Take 1 tablet by mouth Daily.    Beverly Wells MD   loperamide (IMODIUM A-D) 2 MG tablet Take 1 tablet by mouth 4 (Four) Times a Day As Needed for Diarrhea. 6/7/18   Josafat Gonzales MD   metoprolol tartrate (LOPRESSOR) 50 MG tablet Take 1 tablet by mouth Daily. 10/29/18   Josafat Gonzales MD   MORPHABOND ER 15 MG tablet extended-release 12 hour Take 1 tablet by mouth Every Night. " 11/14/18   Josafat Gonzales MD   nystatin (MYCOSTATIN) 768092 UNIT/GM powder Wash/dry well three times a day After dry;apply nystatin powder bid 7/16/18   Josafat Gonzales MD   oxyCODONE (ROXICODONE) 10 MG tablet Take 1 tablet by mouth Every 4 (Four) Hours As Needed for Moderate Pain . 12/19/18   Josafat Gonzales MD   oxyCODONE-acetaminophen (PERCOCET) 5-325 MG per tablet Take 2 tablets by mouth Every 4 (Four) Hours As Needed for Moderate Pain . 11/14/18   Josafat Gonzales MD   phenytoin (DILANTIN) 100 MG ER capsule 100 mg in am and 200 mg in pm 12/26/17   Josafat Gonzales MD   PROAIR  (90 Base) MCG/ACT inhaler INHALE 1 PUFF BY MOUTH FOUR TIMES DAILY 1/25/19   Josafat Gonzales MD   QUEtiapine (SEROquel) 100 MG tablet Take 1 tablet by mouth 2 (Two) Times a Day. 7/17/17   Josafat Gonzales MD   Skin Protectants, Misc. (EUCERIN) cream Apply  topically to the appropriate area as directed 2 (Two) Times a Day. 10/29/18   Josafat Gonzales MD   solifenacin (VESICARE) 5 MG tablet Take 1 tablet by mouth Daily. 8/15/17   Pawel Ho MD   traZODone (DESYREL) 50 MG tablet Take 1 tablet by mouth Every Night. 11/14/18   Josafat Gonzales MD   venlafaxine XR (EFFEXOR-XR) 150 MG 24 hr capsule Take 2 capsules by mouth Daily. 6/7/18   Josafat Gonzales MD       Hospital scheduled medications:     albuterol 2.5 mg Nebulization Q6H - RT   ceftriaxone 1 g Intravenous Q24H   And      azithromycin 500 mg Intravenous Q24H   DULoxetine 30 mg Oral Daily   enoxaparin 40 mg Subcutaneous Q24H   fosphenytoin 100 mg PE Intravenous Q12H   gabapentin 600 mg Oral Q8H   ipratropium-albuterol 3 mL Nebulization Q4H - RT   levETIRAcetam 1,000 mg Intravenous BID   levothyroxine 100 mcg Oral Q AM   metoprolol tartrate 50 mg Oral Daily   Morphine 15 mg Oral Q12H   oxybutynin XL 5 mg Oral Daily   sodium chloride 3 mL Intravenous Q12H   venlafaxine  mg Oral Daily     Hospital PRN  medications:  •  acetaminophen  •  loperamide  •  LORazepam  •  magnesium sulfate **OR** magnesium sulfate **OR** magnesium sulfate  •  Morphine  •  nitroglycerin  •  oxyCODONE  •  Pharmacy to Dose enoxaparin (LOVENOX)  •  potassium chloride  •  [COMPLETED] Insert peripheral IV **AND** sodium chloride  •  sodium chloride    No Known Allergies    Social History     Socioeconomic History   • Marital status:      Spouse name: Carlito   • Number of children: 1   • Years of education: 18+   • Highest education level: Not on file   Social Needs   • Financial resource strain: Not on file   • Food insecurity - worry: Not on file   • Food insecurity - inability: Not on file   • Transportation needs - medical: Not on file   • Transportation needs - non-medical: Not on file   Occupational History   • Occupation: retired     Comment: [employer]   Tobacco Use   • Smoking status: Never Smoker   • Smokeless tobacco: Never Used   Substance and Sexual Activity   • Alcohol use: No   • Drug use: No   • Sexual activity: Defer   Other Topics Concern   • Not on file   Social History Narrative   • Not on file     Family History   Problem Relation Age of Onset   • Heart attack Father    • Stroke Father    • Arthritis Father    • Hypertension Father    • Heart attack Mother    • Stroke Mother    • Arthritis Mother    • Hypertension Mother        Review of Systems  A 14 point review of systems was reviewed and was negative except for chronic pain    Vital Signs   Temp:  [97.5 °F (36.4 °C)-99.4 °F (37.4 °C)] 98.6 °F (37 °C)  Heart Rate:  [76-98] 78  Resp:  [16-26] 18  BP: (145-152)/(76-93) 152/92    General Exam:  Head:  Normal cephalic, atraumatic  HEENT:  Neck supple  Fundoscopic Exam:  No signs of disc edema  CVS:  Regular rate and rhythm.  No murmurs  Carotid Examination:  No bruits  Lungs:  Clear to auscultation  Abdomen:  Non-tender, Non-distended  Extremities:  No signs of peripheral edema  Skin:  No  collins    Neurologic Exam:    Mental Status:    -Awake, Alert, Oriented but she does not know what meds she is on and she cannot recall why certain procedures have been done in her past.-No word finding difficulties  -No aphasia  -No dysarthria  -Follows simple and complex commands    CN II:  Visual fields full.  Pupils equally reactive to light  CN III, IV, VI:  Extraocular Muscles full with no signs of nystagmus  CN V:  Facial sensory is symmetric   CN VII:  Facial motor symmetric  CN VIII:  Gross hearing intact bilaterally  CN IX/X:  Palate elevates symmetrically  CN XI:  Shoulder shrug symmetric  CN XII:  Tongue is midline on protrusion    Motor: (strength out of 5:  1= minimal movement, 2 = movement in plane of gravity, 3 = movement against gravity, 4 = movement against some resistance, 5 = full strength)    -Right Lower Ext: She is able to raise up her legs I.e.hip flexion from the hip but cannot sustain this for long.  Ankle dorsiflexors are quite strong knee flexors and extensors appear weak but there is suboptimal effort  -Left Lower Ext: She is able to raise up her legs i.e. hip flexion- from the hip but cannot sustain this for long.  Ankle dorsiflexors are quite strong knee flexors and extensors appear weak but there is suboptimal effort    DTR:  -Right   Bicep: 2+ Triceps: 2+ Brachioradialis: 2+   Patella: 2++ Ankle: 2+ Neg Babinski  -Left   Bicep: 2+ Triceps: 2+ Brachioradialis: 2+   Patella: 2++ Ankle: 2+ Neg Babinski    Sensory:  -Intact to light touch    Coordination:  -Finger to nose intact  -Heel to shin intact      Gait  -She is able to stand walk and transfer but this was not observed for safety reasons.      Results Review:  Lab Results (last 7 days)     Procedure Component Value Units Date/Time    Lactic Acid, Plasma [[id number]]  (Abnormal) Collected:  03/11/19 1146    Specimen:  Blood Updated:  03/11/19 1221     Lactate 2.5 mmol/L     Blood Culture - Blood, Arm, Left [[id number]] Collected:   03/10/19 1115    Specimen:  Blood from Arm, Left Updated:  03/11/19 1215     Blood Culture No growth at 24 hours    Blood Culture - Blood, Arm, Left [[id number]] Collected:  03/10/19 1120    Specimen:  Blood from Arm, Left Updated:  03/11/19 1215     Blood Culture No growth at 24 hours    Urine Culture - Urine, Urine, Catheter [[id number]]  (Normal) Collected:  03/10/19 1318    Specimen:  Urine, Catheter Updated:  03/11/19 0826     Urine Culture Growth present, too young to evaluate    Comprehensive Metabolic Panel [[id number]]  (Abnormal) Collected:  03/11/19 0702    Specimen:  Blood Updated:  03/11/19 0742     Glucose 100 mg/dL      BUN 9 mg/dL      Creatinine 0.79 mg/dL      Sodium 142 mmol/L      Potassium 2.5 mmol/L      Chloride 107 mmol/L      CO2 29.0 mmol/L      Calcium 7.2 mg/dL      Total Protein 5.1 g/dL      Albumin 2.50 g/dL      ALT (SGPT) 25 U/L      AST (SGOT) 36 U/L      Alkaline Phosphatase 97 U/L      Total Bilirubin 0.5 mg/dL      eGFR Non African Amer 74 mL/min/1.73      Globulin 2.6 gm/dL      A/G Ratio 1.0 g/dL      BUN/Creatinine Ratio 11.4     Anion Gap 6.0 mmol/L     Lactic Acid, Plasma [[id number]]  (Normal) Collected:  03/11/19 0702    Specimen:  Blood Updated:  03/11/19 0721     Lactate 2.0 mmol/L     CBC Auto Differential [[id number]]  (Abnormal) Collected:  03/11/19 0702    Specimen:  Blood Updated:  03/11/19 0715     WBC 6.83 10*3/mm3      RBC 3.49 10*6/mm3      Hemoglobin 11.1 g/dL      Hematocrit 32.9 %      MCV 94.3 fL      MCH 31.8 pg      MCHC 33.7 g/dL      RDW 14.7 %      RDW-SD 50.0 fl      MPV 10.4 fL      Platelets 243 10*3/mm3      Neutrophil % 74.5 %      Lymphocyte % 15.2 %      Monocyte % 9.2 %      Eosinophil % 0.1 %      Basophil % 0.4 %      Immature Grans % 0.6 %      Neutrophils, Absolute 5.08 10*3/mm3      Lymphocytes, Absolute 1.04 10*3/mm3      Monocytes, Absolute 0.63 10*3/mm3      Eosinophils, Absolute 0.01 10*3/mm3      Basophils, Absolute 0.03 10*3/mm3       Immature Grans, Absolute 0.04 10*3/mm3      nRBC 0.0 /100 WBC     Blood Culture - Blood, Hand, Left [[id number]] Collected:  03/10/19 1727    Specimen:  Blood from Hand, Left Updated:  03/11/19 0616     Blood Culture No growth at less than 24 hours    Blood Culture - Blood, Arm, Left [[id number]] Collected:  03/10/19 1727    Specimen:  Blood from Arm, Left Updated:  03/11/19 0616     Blood Culture No growth at less than 24 hours    Lactic Acid, Plasma [[id number]]  (Abnormal) Collected:  03/10/19 2322    Specimen:  Blood Updated:  03/11/19 0006     Lactate 2.9 mmol/L     Lactic Acid, Plasma [[id number]]  (Abnormal) Collected:  03/10/19 1755    Specimen:  Blood Updated:  03/10/19 1820     Lactate 4.1 mmol/L     Lactic Acid, Reflex [[id number]]  (Abnormal) Collected:  03/10/19 1558    Specimen:  Blood Updated:  03/10/19 1619     Lactate 4.2 mmol/L     Lactic Acid, Reflex Timer (This will reflex a repeat order 3-3:15 hours after ordered.) [[id number]] Collected:  03/10/19 1132    Specimen:  Blood Updated:  03/10/19 1500     Extra Tube Hold for add-ons.     Comment: Auto resulted.       Urinalysis, Microscopic Only - Urine, Catheter [[id number]]  (Abnormal) Collected:  03/10/19 1318    Specimen:  Urine, Catheter Updated:  03/10/19 1355     RBC, UA 3-5 /HPF      WBC, UA 21-30 /HPF      Bacteria, UA 2+ /HPF      Squamous Epithelial Cells, UA 0-2 /HPF      Hyaline Casts, UA None Seen /LPF      Methodology Manual Light Microscopy    Urinalysis With Culture If Indicated - Urine, Catheter [[id number]]  (Abnormal) Collected:  03/10/19 1318    Specimen:  Urine, Catheter Updated:  03/10/19 1355     Color, UA Dark Yellow     Appearance, UA Clear     pH, UA 7.5     Specific Gravity, UA 1.022     Glucose, UA Negative     Ketones, UA 80 mg/dL (3+)     Bilirubin, UA Small (1+)     Blood, UA Trace     Protein, UA 30 mg/dL (1+)     Leuk Esterase, UA Moderate (2+)     Nitrite, UA Negative     Urobilinogen, UA 4.0 E.U./dL    Urine Drug Screen -  Urine, Catheter [[id number]]  (Abnormal) Collected:  03/10/19 1318    Specimen:  Urine, Catheter Updated:  03/10/19 1341     Amphetamine Screen, Urine Negative     Barbiturates Screen, Urine Negative     Benzodiazepine Screen, Urine Negative     Cocaine Screen, Urine Negative     Methadone Screen, Urine Negative     Opiate Screen Positive     Phencyclidine (PCP), Urine Negative     THC, Screen, Urine Positive    Narrative:       Negative Thresholds For Drugs Screened in Urine:    Amphetamines          500 ng/ml  Barbiturates          200 ng/ml  Benzodiazepines       200 ng/ml  Cocaine               150 ng/ml  Methadone             150 ng/ml  Opiates               300 ng/ml  Phencyclidine         25 ng/ml  THC                      50 ng/ml    The normal value for all drugs tested is negative. This report includes final unconfirmed screening results.  A positive result by this assay can be, at your request, sent to the Reference Lab for confirmation by gas chromatography. Unconfirmed results must not be used for non-medical purposes, such as employment or legal testing. Clinical consideration should be applied to any drug of abuse test result, particularly when unconfirmed results are used.    TSH [[id number]]  (Abnormal) Collected:  03/10/19 1046    Specimen:  Blood Updated:  03/10/19 1203     TSH 5.230 mIU/mL     Specimen:  Blood Updated:  03/10/19 1200    Lactic Acid, Plasma [[id number]]  (Abnormal) Collected:  03/10/19 1132    Specimen:  Blood Updated:  03/10/19 1153     Lactate 6.0 mmol/L     Ammonia [[id number]]  (Abnormal) Collected:  03/10/19 1132    Specimen:  Blood Updated:  03/10/19 1151     Ammonia <9 umol/L     T4, Free [[id number]]  (Normal) Collected:  03/10/19 1046    Specimen:  Blood Updated:  03/10/19 1149     Free T4 0.94 ng/dL     Troponin [[id number]]  (Normal) Collected:  03/10/19 1046    Specimen:  Blood Updated:  03/10/19 1144     Troponin I <0.012 ng/mL     Myoglobin, Serum [[id number]]  (Normal)  Collected:  03/10/19 1046    Specimen:  Blood Updated:  03/10/19 1144     Myoglobin 56.6 ng/mL     Acetaminophen Level [[id number]]  (Abnormal) Collected:  03/10/19 1046    Specimen:  Blood Updated:  03/10/19 1135     Acetaminophen <10.0 mcg/mL     Salicylate Level [[id number]]  (Abnormal) Collected:  03/10/19 1046    Specimen:  Blood Updated:  03/10/19 1135     Salicylate <1.0 mg/dL     Phenytoin Level, Total [[id number]]  (Abnormal) Collected:  03/10/19 1046    Specimen:  Blood Updated:  03/10/19 1135     Phenytoin Level <3.0 mcg/mL     CK [[id number]]  (Normal) Collected:  03/10/19 1046    Specimen:  Blood Updated:  03/10/19 1132     Creatine Kinase 39 U/L     Comprehensive Metabolic Panel [[id number]]  (Abnormal) Collected:  03/10/19 1046    Specimen:  Blood Updated:  03/10/19 1132     Glucose 101 mg/dL      BUN 9 mg/dL      Creatinine 0.70 mg/dL      Sodium 140 mmol/L      Potassium 3.3 mmol/L      Chloride 98 mmol/L      CO2 30.0 mmol/L      Calcium 8.1 mg/dL      Total Protein 6.0 g/dL      Albumin 3.00 g/dL      ALT (SGPT) 17 U/L      AST (SGOT) 24 U/L      Alkaline Phosphatase 128 U/L      Total Bilirubin 0.9 mg/dL      eGFR Non African Amer 85 mL/min/1.73      Globulin 3.0 gm/dL      A/G Ratio 1.0 g/dL      BUN/Creatinine Ratio 12.9     Anion Gap 12.0 mmol/L     Magnesium [[id number]]  (Normal) Collected:  03/10/19 1046    Specimen:  Blood Updated:  03/10/19 1132     Magnesium 1.8 mg/dL     Ethanol [[id number]]  (Normal) Collected:  03/10/19 1046    Specimen:  Blood Updated:  03/10/19 1132     Ethanol % <0.010 %     Narrative:       Not for legal purposes. Chain of Custody not followed.     CBC & Differential [[id number]] Collected:  03/10/19 1046    Specimen:  Blood Updated:  03/10/19 1129    Narrative:       The following orders were created for panel order CBC & Differential.  Procedure                               Abnormality         Status                     ---------                                -----------         ------                     Manual Differential[[id number]]                                                         CBC Auto Differential[[id number]]        Normal              Final result                 Please view results for these tests on the individual orders.    CBC Auto Differential [[id number]]  (Normal) Collected:  03/10/19 1046    Specimen:  Blood Updated:  03/10/19 1129     WBC 6.38 10*3/mm3      RBC 4.21 10*6/mm3      Hemoglobin 13.3 g/dL      Hematocrit 38.5 %      MCV 91.4 fL      MCH 31.6 pg      MCHC 34.5 g/dL      RDW 14.6 %      RDW-SD 48.4 fl      MPV 11.6 fL      Platelets 243 10*3/mm3      Neutrophil % 73.4 %      Lymphocyte % 17.6 %      Monocyte % 8.0 %      Eosinophil % 0.2 %      Basophil % 0.5 %      Neutrophils, Absolute 4.69 10*3/mm3      Lymphocytes, Absolute 1.12 10*3/mm3      Monocytes, Absolute 0.51 10*3/mm3      Eosinophils, Absolute 0.01 10*3/mm3      Basophils, Absolute 0.03 10*3/mm3     POC Glucose Once [[id number]]  (Normal) Collected:  03/10/19 1040    Specimen:  Blood Updated:  03/10/19 1052     Glucose 103 mg/dL      Comment: : [id number] [place] HeatherMeter ID: [id number]             .  Imaging Results (last 24 hours)     Procedure Component Value Units Date/Time    US Renal Limited [[id number]] Collected:  03/11/19 0958     Updated:  03/11/19 1002    Narrative:       HISTORY: Urinary tract infection, sepsis     Renal ultrasound: Sonographic imaging of the kidneys is performed. The  abdominal aorta and IVC are obscured by regional bowel gas. The visible  portion of the IVC does appear patent.     The kidneys are normal in echotexture. Right kidney measures 10.2 x 4.9  x 6.2 cm. Left kidney measures 10.8 x 5.2 x 5.2 cm. There is no solid or  cystic renal mass identified. There are no obstructing intrarenal  stones. There is no hydronephrosis. There is no abnormal perinephric  fluid collection.     Images of the mildly distended bladder are unremarkable.     "   Impression:       1. No acute sonographic pathology of the kidneys.  This report was finalized on 03/11/2019 09:59 by Dr. Almita Moreira MD.        Personal review of Head CT:  No overt abnormalities but there is significant motion artifact      Impression  · Spell where she continued to speak  repetitive words.  This may be seizure but she yhas a recall of doing this so that makes it less likely. However, she has a background history of seizure.  · Obstructive sleep apnea, not treated.  Sleep apnea is notorious for causing confusion upon awakening when not treated and if severe enough.  Also if there is substantial CO2 retention that can cause a seizure.  With the medications she is on she could get central sleep apnea in addition to her obstructive sleep apnea.  · UTI  · Hypokalemia  · H/O gastric bypass surgery--not taking vitamin B 12 or Vitamin D  · H/O \"blood clots\"     Plan  · MRI of brain with and without--if she is capable of holding still--may pedro sedation which will aggravate her confusion  · Vitamin B12, folate, vitamin D, ammonia (resuots now back  · UDS  · EEG  · [facility] seizure [facility]-- no she knows if she is on dilantin.  Patient states they just may not have filled it.     I discussed the patients findings and my recommendations with patient, family and nursing staff     ADDENDUM: has B12 deficiency will replace  Await Vitamin D    Tamara Donovan MD  03/11/19  4:38 PM      Electronically signed by Tamara Gomez MD at 3/13/2019 11:46 AM       "

## 2019-03-14 NOTE — PAYOR COMM NOTE
"Dc to snf 3-14-19  [id number]  Bita Croft (63 y.o. Female)     Date of Birth Social Security Number Address Home Phone MRN    1955  [address]   [facility] [address] [phone] [id number]    Confucianism Marital Status          Scientologist        Admission Date Admission Type Admitting Provider Attending Provider Department, Room/Bed    3/10/19 Emergency Josafat Gonzales MD Oliver, Randy Eugene, MD [facility] 3A, 340/1    Discharge Date Discharge Disposition Discharge Destination         Skilled Nursing Facility (DC - External)              Attending Provider:  Josafat Gonzales MD    Allergies:  No Known Allergies    Isolation:  None   Infection:  None   Code Status:  CPR    Ht:  177.8 cm (70\")   Wt:  97.8 kg (215 lb 9.6 oz)    Admission Cmt:  None   Principal Problem:  None                Active Insurance as of 3/10/2019     Primary Coverage     Payor Plan Insurance Group Employer/Plan Group    [employer]ERCIAL AETNA [id number]     Payor Plan Address Payor Plan Phone Number Payor Plan Fax Number Effective Dates    [address] [phone]  7/1/2017 - None Entered    [facility] [id number]       Subscriber Name Subscriber Birth Date Member ID       OCHOA CROFT 1955 [id number]                 Emergency Contacts      (Rel.) Home Phone Work Phone Mobile Phone    Carlito Croft (Spouse) [phone] -- --               Discharge Summary      Josafat Gonzales MD at 3/14/2019  8:51 AM          Patient ID: Bita Croft  MRN: [id number]     Acct:  [id number]    Admit Date: 3/10/2019   Discharge Date: 3.14.19  Date of service: 3.14.19    Consults:    IP CONSULT TO FAMILY PRACTICE  IP CONSULT TO CASE MANAGEMENT   IP CONSULT TO NEUROLOGY    PATIENT PROFILE: The patient is a 62 y/o white  female resident of [place]; she was cooperative.      CHIEF COMPLAINT: \"altered mental state\"     HISTORY OF PRESENT ILLNESS: many chronic illness and " "many medications.  Went to bed night before admit normal.  Slept through the evening ok.  When she awoke the next morning she was confused and inappropriately mumbling \"oh God\".  There was no recent cough dysuria fever injury vomiting diarrhea; anything.  Though there has been a concern about seizure disorder in the past the presenting complaints did not include any tonic-clonic  activity.  Chest x-ray in the emergency department was suspicious for pneumonia and continued spiculated lingular nodule.  This lingular nodule has been seen by cardiovascular surgery and considering her multiple medical problems was followed without removal.  Her Dilantin level was less than 3 and low.  Alcohol salicylate and Tylenol levels were all normal.  Chemistry showed low protein and albumin at 6 and 3 respectively; nothing else significant.  Lactic acid was elevated 6 ammonia was less than 9 and urinalysis showed 21-30 WBCs with moderate leukocytes.   CT of her head showed only chronic ischemic changes.  She was felt to be septic with a UTI and was admitted with fluid boluses and appropriate treatments.  Antibiotics started in the ER were Rocephin and azithromycin.  She was given Cerebyx IV in the ED.     No Known Allergies     HOME MEDICATIONS:          Prior to Admission medications    Medication Sig Start Date End Date Taking? Authorizing Provider   acetaminophen (TYLENOL) 325 MG tablet Take 2 tablets by mouth Every 4 (Four) Hours As Needed for Mild Pain  or Fever. 6/7/18   Yes Josafat Gonzales MD   clonazePAM (KlonoPIN) 1 MG tablet Take 1 tablet by mouth 3 (Three) Times a Day As Needed for Anxiety. 12/17/18     Josafat Gonzales MD   clotrimazole (LOTRIMIN) 1 % external solution Wash/dry well three times a day After dry; apply light coating of clotrimazole cream for one week 7/16/18     Josafat Gonzales MD   cyclobenzaprine (FLEXERIL) 10 MG tablet Take 1 tablet by mouth 3 (Three) Times a Day. 10/21/18     " Beverly Wells MD   DULoxetine (CYMBALTA) 30 MG capsule Take 30 mg by mouth Daily.       Beverly Wells MD   gabapentin (NEURONTIN) 600 MG tablet Take 1 tablet by mouth 3 (Three) Times a Day. 5/31/18     Josafat Gonzales MD   levETIRAcetam (KEPPRA) 1000 MG tablet TAKE 1 TABLET BY MOUTH TWICE DAILY 4/27/18     Josafat Gonzales MD   levothyroxine (SYNTHROID, LEVOTHROID) 100 MCG tablet Take 1 tablet by mouth Daily.       Beverly Wells MD   loperamide (IMODIUM A-D) 2 MG tablet Take 1 tablet by mouth 4 (Four) Times a Day As Needed for Diarrhea. 6/7/18     Josafat Gonzales MD   metoprolol tartrate (LOPRESSOR) 50 MG tablet Take 1 tablet by mouth Daily. 10/29/18     Josafat Gonzales MD   MORPHABOND ER 15 MG tablet extended-release 12 hour Take 1 tablet by mouth Every Night. 11/14/18     Josafat Gonzales MD   nystatin (MYCOSTATIN) 647982 UNIT/GM powder Wash/dry well three times a day After dry;apply nystatin powder bid 7/16/18     Josafat Gonzales MD   oxyCODONE (ROXICODONE) 10 MG tablet Take 1 tablet by mouth Every 4 (Four) Hours As Needed for Moderate Pain . 12/19/18     Josafat Gonzales MD   oxyCODONE-acetaminophen (PERCOCET) 5-325 MG per tablet Take 2 tablets by mouth Every 4 (Four) Hours As Needed for Moderate Pain . 11/14/18     Josafat Gonzales MD   phenytoin (DILANTIN) 100 MG ER capsule 100 mg in am and 200 mg in pm 12/26/17     Josafat Gonzales MD   PROAIR  (90 Base) MCG/ACT inhaler INHALE 1 PUFF BY MOUTH FOUR TIMES DAILY 1/25/19     Josafat Gonzales MD   QUEtiapine (SEROquel) 100 MG tablet Take 1 tablet by mouth 2 (Two) Times a Day. 7/17/17     Josafat Gonzales MD   Skin Protectants, Misc. (EUCERIN) cream Apply  topically to the appropriate area as directed 2 (Two) Times a Day. 10/29/18     Josafat Gonzales MD   solifenacin (VESICARE) 5 MG tablet Take 1 tablet by mouth Daily. 8/15/17     Pawel Ho MD traZODone  (DESYREL) 50 MG tablet Take 1 tablet by mouth Every Night. 18     Josafat Gonzales MD   venlafaxine XR (EFFEXOR-XR) 150 MG 24 hr capsule Take 2 capsules by mouth Daily. 18     Josafat Gonzales MD         PAST HISTORY:  CHILDHOOD: unremarkable.      PROCEDURES:   SCANNED  [id number]  Colonoscopy+tort-mass/[facility]//8.4.14/BE  EGD+nl/[facility]//5.28.15/UGI  Colonoscopy+polyp/[facility]//9.21.15/3y     SURGERIES:  T&A/age 11  R knee/[facility] [place]/LH/age 13  SANDRA+BSO/[place]/[facility]/age 32  T12/[place]//  Gastric by-pass//  L knee/[place]/  C surgery/[place]/06  C surgery/[place]/08  Lap GB/WB/[place]/3-18-10  L arthroscope/  R lap colectomy/[place]/.14   ORIF L wrist/[place]//.16     FAMILY HISTORY:  Hypertension mother, maternal aunt, father, paternal  grandfather; heart disease in mother, father, maternal grandfather, maternal  grandmother, paternal grandmother; diabetes in maternal uncle.      HABITS:  Never smoked, only limited second-hand smoke exposure.  No alcohol or  drugs.      SOCIAL HISTORY:   in  to her current , has 1 son.  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     MEDICAL ADMISSIONS:  [facility]:   11.8.08-11.12.08-hyponatremia  2.8.10-2.9.10-L flank pain  3.13.14-3.15.14 hever/TIA  12.26.17-12.27.17    Shortness of breath  Cough  Flu B  Bronchitis-acute  Abnormal CT chest-AGUILAR spiculated nodule  Chest wall pain  Hyponatremia 128  Neutropenia 2.54     5.26.18-5.31.18  Lethargy-likely 2nd overdose Imodium-resolved  Confusion-same-resolved  Brief intubation (airway protection)   Abnormal CXR-? Pneumonia/atelectasis  Overdose Imodium; initial ? suicide jesture refuted  Depression-acute/chronic  Anxiety-acute/chronic  Hypotension-brief and decrease Rx  Cheilosis-improved empiric monistat  Gait decline-acute/chronic  Anemia-component phlebotomy, (substrate neg)  +BC/staph homnis/epidermitis  "(contaminet)  E coli bacteruria-likely UTI  UTI-     BH:   7.21-7.23.95 abdominal pain  2.19.08-2.21.08-DVT  2.9.10-2.11.10-abdominal pain/intercostal  3.11.10-3.19.10- hever-abdominal pain+SIADH  3.24.15-3.30.15 diarrhea  5.26.15-5.31.15 nausea/vomiting/diarrhea  7.12.17-7.17.17 accidential injection water bottle cap     LH:   10.26.16-11.6.16 seizure     GENERAL:  Inactive/slower with limits, speed, stamina for age and pains, desire. Sleep is ok usually with Rx.  Has pain management and psych. No fever noted above.  ENDO:  No syncope, near or diaphoretic sweaty spells.  BS Ok since here.  HEENT: No head injury or headache.  No vision change.   No significant hearing loss.  Ears without pain/drainage.  No sore throat.  No significant nasal/sinus congestion/drainage. No epistaxis.  CHEST: No chest wall tenderness or mass. No significant cough, wheeze, SOB; no hemoptysis.  CV: No chest pain, palpitations, significant ankle edema.  GI: No heartburn, dysphagia.  No abdominal pain, diarrhea, constipation, rectal bleeding, or melena.  :  Voids without dysuria, or incontinence to completion.  ORTHO: No painful/swollen joints but various on /off sore.  Usually has sore neck or back.  No acute neck or back pain without recent injury.   NEURO: No dizziness, weakness of extremities.  Usually has LE numbness/paresthesias.   PSYCH: Prior memory loss.  Mood always variable; often anxious, depressed but/and not suicidal though has had prior overdose.  Tolerated stress.      PHYSICAL EXAMINATION:  /84 (BP Location: Left arm, Patient Position: Lying)   Pulse 91   Temp 98.6 °F (37 °C) (Oral)   Resp 18   Ht 177.8 cm (70\")   Wt 94.5 kg (208 lb 6.4 oz)   LMP  (LMP Unknown)   SpO2 95%   BMI 29.90 kg/m²      GENERAL:  Well nourished/developed in no acute distress; mumbling incoherently. Obese.  SKIN: Turgor excellent, without wound, rash, lesion other than several LE scabbed abrasions.  HEENT: Normal cephalic without " trauma.  Pupils reactive to light; L pupil anisocoria. Extraocular motions full without nystagmus.     External canals nonobstructive nontender without reddness.  Oral cavity without growths, exudates, and moist.  Posterior pharynx without mass, obstruction, redness.  No thyromegaly, mass, tenderness, lymphadenopathy and supple.  CV: Regular rhythm.  No murmur, gallop,  edema. Posterior pulses intact.  No carotid bruits.   CHEST: No chest wall tenderness or mass.   LUNGS: Symmetric motion with clear to auscultation.  ABD: Soft, nontender without mass.   ORTHO: Symmetric extremities without swelling/point tenderness.  Full gross range of motion.    NEURO: CN 2-12 grossly intact.  Symmetric facies. 1/4 x bicep 0/4 LE.  UE/LE variable; 2-/5 strength UE 0-1/5 LE  Nonfocal use extremities. Speech clear.    PSYCH: Disoriented x 3.  Pleasant calm, well kept.  Non-purposeful/directed conservation with intact short/long gross memory.     ASSESSMENT/PROBLEM LIST:   62 y/o y/o white female   Allergy/intolerance: see above  Procedural history: see above  Family history: see above  Obesity.  This is status post gastric bypass initial failure and regain of  weight.     1 para 1 AB 0.    Surgical menopause.  Osteoporosis on previous wrist study.    Hypertension.    Hypothyroidism.    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    History of DVT - left peroneal popliteal, 2008, treated              successfully with anticoagulation.   History anticoagulation: hx DVT/(coumadin)  Chronic insomnia.  Spinal spondylosis; this is primarily cervical with multiple surgeries.  Chronic neck pain.    Chronic back pain  Chronic narcotics-from pain management/[facility]  Chronic depression - followed by Dr. Samaniego.    Degenerative joint disease that is diffuse.   LE weakness  Gait difficulty-chronic   History of gastric  bypass and metabolic risk it carries.    Colon cancer history with previous surgery and chemo, no obvious              recurrence.  Gastroesophageal reflux.  Recurrent laryngitis  Documented neuropathy by EMG nerve conduction  Seizure disorder (2016)  antieliptics-Keppra/Dilantin  Abnormal CT chest-treating nonsurgically  Polypharmacy   Urinary incontience (history urge incontinence)     REASON FOR ADMISSION:    Confusion-delirium  Abnormal UA-(21-30WBC, mod leuk, 2+ bacteria)  likely UTI vs pneumonia  CXR-infiltrate (note lack cough, etc)  Sepsis (tachycardia 104, lactic acid 6.0  Low phenytoin level 3.0  Hypopotassemia-3.0  Drug screen THC, opiate    HOSPITAL COARSE: Her confusion resolved over 2-3 days.  She was back to her baseline.  Neurology had appropriate concerns that she presented with problems with her seizure disorder; her EEG though was normal.  Despite that she was given IV Dilantin and Keppra and plans are to keep her on Keppra XR at discharge.  She was found to be B12 deficient (recall she has had gastric bypass); she was started on B12.  She was vitamin D deficient and started on vitamin D.  There was great concern with her previously having obstructive sleep apnea and not using CPAP that it was involved in the presenting complaints.  There was concern that she had sepsis (associated with pneumonia and/or UTI); he only grew group B strep in her urine and her chest x-ray at this point is not showing much worsening or improvement in repeat.  At the same time she never coughed and wheezed or had any fever to suggest that she had a rampant pneumonia.  Her potassium required oral and IV replacement and is improving.  There was concern with her med list but it was difficult to make much additional changes with her having such a history of anxiety and depression and seemingly tolerating all of her current medications.  This issue of polypharmacy and types of medicines will be followed more long-term.  Her  MRI of her head shows cerebral frontal atrophy and neurology was concerned that even it was related to his untreated sleep apnea.  Were made and are being made to find previous CPAP settings; or get her sleep study as soon as feasible.  In the meantime she will be treated with nocturnal oxygen empirically at the nursing home.  All the above cause her to be weaker than her baseline; causing gait difficulties.  Having used [facility] before for rehab and having a good experience she was quite agreeable to going there again.  She was transitioned again to oral medications with follow-up plans and is being sent to rehab for convalescence and  completion of her treatment courses.    Labs included:     Labs 24 hr before discharge:  Lab Results (last 24 hours)     Procedure Component Value Units Date/Time    Basic Metabolic Panel [[id number]]  (Abnormal) Collected:  03/14/19 0506    Specimen:  Blood Updated:  03/14/19 0547     Glucose 83 mg/dL      BUN 4 mg/dL      Creatinine 0.45 mg/dL      Sodium 133 mmol/L      Potassium 3.6 mmol/L      Chloride 103 mmol/L      CO2 28.0 mmol/L      Calcium 7.5 mg/dL      eGFR Non African Amer 141 mL/min/1.73      BUN/Creatinine Ratio 8.9     Anion Gap 2.0 mmol/L     Narrative:       GFR Normal >60  Chronic Kidney Disease <60  Kidney Failure <15    CBC & Differential [[id number]] Collected:  03/14/19 0506    Specimen:  Blood Updated:  03/14/19 0525    Narrative:       The following orders were created for panel order CBC & Differential.  Procedure                               Abnormality         Status                     ---------                               -----------         ------                     CBC Auto Differential[[id number]]        Abnormal            Final result                 Please view results for these tests on the individual orders.    CBC Auto Differential [[id number]]  (Abnormal) Collected:  03/14/19 0506    Specimen:  Blood Updated:  03/14/19 0525     WBC  5.63 10*3/mm3      RBC 3.47 10*6/mm3      Hemoglobin 10.8 g/dL      Hematocrit 32.5 %      MCV 93.7 fL      MCH 31.1 pg      MCHC 33.2 g/dL      RDW 14.9 %      RDW-SD 51.0 fl      MPV 10.7 fL      Platelets 206 10*3/mm3      Neutrophil % 71.0 %      Lymphocyte % 17.4 %      Monocyte % 8.7 %      Eosinophil % 2.0 %      Basophil % 0.4 %      Immature Grans % 0.5 %      Neutrophils, Absolute 4.00 10*3/mm3      Lymphocytes, Absolute 0.98 10*3/mm3      Monocytes, Absolute 0.49 10*3/mm3      Eosinophils, Absolute 0.11 10*3/mm3      Basophils, Absolute 0.02 10*3/mm3      Immature Grans, Absolute 0.03 10*3/mm3      nRBC 0.0 /100 WBC     Blood Culture - Blood, Hand, Left [[id number]] Collected:  03/10/19 1727    Specimen:  Blood from Hand, Left Updated:  03/13/19 1816     Blood Culture No growth at 3 days    Blood Culture - Blood, Arm, Left [[id number]] Collected:  03/10/19 1727    Specimen:  Blood from Arm, Left Updated:  03/13/19 1816     Blood Culture No growth at 3 days    Blood Culture - Blood, Arm, Left [[id number]] Collected:  03/10/19 1115    Specimen:  Blood from Arm, Left Updated:  03/13/19 1216     Blood Culture No growth at 3 days    Blood Culture - Blood, Arm, Left [[id number]] Collected:  03/10/19 1120    Specimen:  Blood from Arm, Left Updated:  03/13/19 1216     Blood Culture No growth at 3 days          Lab Results otherwise  CBC:   Results from last 7 days   Lab Units 03/14/19  0506 03/13/19  0624 03/12/19  0623 03/11/19  0702 03/10/19  1046   WBC 10*3/mm3 5.63 6.74 5.59 6.83 6.38   HEMOGLOBIN g/dL 10.8* 11.1* 10.9* 11.1* 13.3   HEMATOCRIT % 32.5* 33.4* 32.1* 32.9* 38.5   PLATELETS 10*3/mm3 206 204 208 243 243     BMP:  Results from last 7 days   Lab Units 03/14/19  0506 03/13/19  0624 03/12/19  0623 03/11/19  0702 03/10/19  1046   SODIUM mmol/L 133* 138 140 142 140   POTASSIUM mmol/L 3.6 3.1* 2.9* 2.5* 3.3*   CHLORIDE mmol/L 103 104 107 107 98   CO2 mmol/L 28.0 28.0 28.0 29.0 30.0   BUN mg/dL 4* 3* 3* 9 9    CREATININE mg/dL 0.45* 0.54 0.52 0.79 0.70   GLUCOSE mg/dL 83 73 85 100 101*   CALCIUM mg/dL 7.5* 7.4* 7.3* 7.2* 8.1*   ALT (SGPT) U/L  --   --   --  25 17     A1c:Invalid input(s): A1C   A1c:@LABRCNOP(A1c:7)@    Culture Results:   Blood Culture   Date Value Ref Range Status   03/10/2019 No growth at 3 days  Preliminary   03/10/2019 No growth at 3 days  Preliminary   03/10/2019 No growth at 3 days  Preliminary   03/10/2019 No growth at 3 days  Preliminary     Urine Culture   Date Value Ref Range Status   03/10/2019 >100,000 CFU/mL Streptococcus agalactiae (Group B) (A)  Final     Comment:     This organism is considered to be universally susceptible to penicillin.  No further antibiotic testing will be performed.       DISCHARGE ASSESSMENT:  Reasons for admit/problems address while here:   Confusion-delirium (? Sepsis, ? Seizure)-resolved  Hx seizure disorder-normal EEG  Abnormal UA-(21-30WBC, mod leuk, 2+ bacteria) maybe UTI (BC neg, 10.5 strept                               Group B)  CXR-infiltrate (note lack cough, etc)-maybe pneumonia-persistant L peihilair/mid                               Lung infiltrate  Sepsis (tachycardia 104, lactic acid 6.0)-maybe  Low phenytoin level 3.0-not taking-improved  Hypopotassemia-2.8-vszuzsxg-shliqvcw  Drug screen THC, opiate  Fmcpgtucc-xwiwobjlj-olckgtuq  NPO-resolved  Cerebral atrophy-frontal  YOLIE-without treatment  Non-compliance; Rx (dilantin, keppra), CPAP  B12 def (post gastric bypass);   Vit D def (post gastric bypass)  Polypharmacy; difficult Rx list    Chronic problems affecting stay:  See above      PLAN:   See AVS    Discharge Disposition:  Skilled Nursing Facility (DC - External)To [place] [facility]    Discharge Medications:     Discharge Medications      New Medications      Instructions Start Date   cefTRIAXone 10 g/21 mL in lidocaine injection IM syringe   1 g, Intramuscular, Every 24 Hours      enoxaparin 40 MG/0.4ML solution syringe  Commonly known  as:  LOVENOX   40 mg, Subcutaneous, Every 24 Hours      levETIRAcetam  MG 24 hr tablet  Commonly known as:  KEPPRA XR  Replaces:  levETIRAcetam 1000 MG tablet   1,000 mg, Oral, Every 12 Hours      potassium chloride 10 MEQ CR capsule  Commonly known as:  MICRO-K   20 mEq, Oral, 2 Times Daily With Meals         Continue These Medications      Instructions Start Date   acetaminophen 325 MG tablet  Commonly known as:  TYLENOL   650 mg, Oral, Every 4 Hours PRN      clonazePAM 1 MG tablet  Commonly known as:  KlonoPIN   1 mg, Oral, 3 Times Daily PRN      clotrimazole 1 % external solution  Commonly known as:  LOTRIMIN   Wash/dry well three times a day After dry; apply light coating of clotrimazole cream for one week      cyclobenzaprine 10 MG tablet  Commonly known as:  FLEXERIL   10 mg, Oral, 3 Times Daily      DULoxetine 30 MG capsule  Commonly known as:  CYMBALTA   30 mg, Oral, Daily      eucerin cream   Topical, 2 Times Daily      gabapentin 600 MG tablet  Commonly known as:  NEURONTIN   600 mg, Oral, 3 Times Daily      levothyroxine 100 MCG tablet  Commonly known as:  SYNTHROID, LEVOTHROID   1 tablet, Oral, Daily      metoprolol tartrate 50 MG tablet  Commonly known as:  LOPRESSOR   50 mg, Oral, Daily      MORPHABOND ER 15 MG tablet extended-release 12 hour  Generic drug:  Morphine Sulfate ER   1 tablet, Oral, Nightly      nystatin 499889 UNIT/GM powder  Commonly known as:  MYCOSTATIN   Wash/dry well three times a day After dry;apply nystatin powder bid       oxyCODONE 10 MG tablet  Commonly known as:  ROXICODONE   10 mg, Oral, Every 4 Hours PRN      oxyCODONE-acetaminophen 5-325 MG per tablet  Commonly known as:  PERCOCET   2 tablets, Oral, Every 4 Hours PRN      PROAIR  (90 Base) MCG/ACT inhaler  Generic drug:  albuterol sulfate HFA   INHALE 1 PUFF BY MOUTH FOUR TIMES DAILY      QUEtiapine 100 MG tablet  Commonly known as:  SEROquel   100 mg, Oral, 2 Times Daily      solifenacin 5 MG tablet  Commonly  known as:  VESICARE   5 mg, Oral, Daily      traZODone 50 MG tablet  Commonly known as:  DESYREL   50 mg, Oral, Nightly      venlafaxine  MG 24 hr capsule  Commonly known as:  EFFEXOR-XR   300 mg, Oral, Daily         Stop These Medications    levETIRAcetam 1000 MG tablet  Commonly known as:  KEPPRA  Replaced by:  levETIRAcetam  MG 24 hr tablet     loperamide 2 MG tablet  Commonly known as:  IMODIUM A-D     phenytoin 100 MG ER capsule  Commonly known as:  DILANTIN            Discharge Care Plan/Instructions:   Admit to MRN  Routine NH admit orders/procedures  NH to re/establish code status    Rx:   Oxygen 2 liters when asleep  See AVS/discharge summary     Diet:   AHA  Consistency:    Solids: general     Fluids: thin  Calories [id number]  Fluids 60 oz/24 hr  Dietary referral for nutritional assessment, advise and follow    Activity:   Gradually increase as able  PT, OT, speech referrals   Up with assistance only until cleared by PT     Additional instructions:  LAB:   A. Short term-each Tuesday CBC, CMP  B. (if stays >1m)   2m CBC, CMP, iron  6m CBC, BMP, TSH, T4. iron, ferritin (dilantin level if on dilantin)  12m CBC, CMP, LIPID, TSH, T4, Vit D, B12, folate, Fe, ferritin, % sat, retic count (dilantin level if on dilantin)    F/u appointments:   Dr Gonzales will see on next regular NH rounds (call between if needs to be seen earlier) (3.20.19)    Other appointments:   Future Appointments   Date Time Provider Department Center   5/3/2019  1:30 PM Josafat Gonzales MD [facility] None     CONDITION: stable/improved    PROGNOSIS: guarded    Electronically signed by Josafat Gonzales MD at 3/14/2019  9:27 AM

## 2019-03-14 NOTE — DISCHARGE SUMMARY
"Patient ID: Bita Croft  MRN: [id number]     Acct:  [id number]    Admit Date: 3/10/2019   Discharge Date: 3.14.19  Date of service: 3.14.19    Consults:    IP CONSULT TO FAMILY PRACTICE  IP CONSULT TO CASE MANAGEMENT   IP CONSULT TO NEUROLOGY    PATIENT PROFILE: The patient is a 62 y/o white  female resident of [place]; she was cooperative.      CHIEF COMPLAINT: \"altered mental state\"     HISTORY OF PRESENT ILLNESS: many chronic illness and many medications.  Went to bed night before admit normal.  Slept through the evening ok.  When she awoke the next morning she was confused and inappropriately mumbling \"oh God\".  There was no recent cough dysuria fever injury vomiting diarrhea; anything.  Though there has been a concern about seizure disorder in the past the presenting complaints did not include any tonic-clonic  activity.  Chest x-ray in the emergency department was suspicious for pneumonia and continued spiculated lingular nodule.  This lingular nodule has been seen by cardiovascular surgery and considering her multiple medical problems was followed without removal.  Her Dilantin level was less than 3 and low.  Alcohol salicylate and Tylenol levels were all normal.  Chemistry showed low protein and albumin at 6 and 3 respectively; nothing else significant.  Lactic acid was elevated 6 ammonia was less than 9 and urinalysis showed 21-30 WBCs with moderate leukocytes.   CT of her head showed only chronic ischemic changes.  She was felt to be septic with a UTI and was admitted with fluid boluses and appropriate treatments.  Antibiotics started in the ER were Rocephin and azithromycin.  She was given Cerebyx IV in the ED.     No Known Allergies     HOME MEDICATIONS:          Prior to Admission medications    Medication Sig Start Date End Date Taking? Authorizing Provider   acetaminophen (TYLENOL) 325 MG tablet Take 2 tablets by mouth Every 4 (Four) Hours As Needed for Mild Pain  or Fever. " 6/7/18   Yes Josafat Gonzales MD   clonazePAM (KlonoPIN) 1 MG tablet Take 1 tablet by mouth 3 (Three) Times a Day As Needed for Anxiety. 12/17/18     Josafat Gonzales MD   clotrimazole (LOTRIMIN) 1 % external solution Wash/dry well three times a day After dry; apply light coating of clotrimazole cream for one week 7/16/18     Josafat Gonzales MD   cyclobenzaprine (FLEXERIL) 10 MG tablet Take 1 tablet by mouth 3 (Three) Times a Day. 10/21/18     ProviderBeverly MD   DULoxetine (CYMBALTA) 30 MG capsule Take 30 mg by mouth Daily.       ProviderBeverly MD   gabapentin (NEURONTIN) 600 MG tablet Take 1 tablet by mouth 3 (Three) Times a Day. 5/31/18     Josafat Gonzales MD   levETIRAcetam (KEPPRA) 1000 MG tablet TAKE 1 TABLET BY MOUTH TWICE DAILY 4/27/18     Josafat Gonzales MD   levothyroxine (SYNTHROID, LEVOTHROID) 100 MCG tablet Take 1 tablet by mouth Daily.       ProviderBeverly MD   loperamide (IMODIUM A-D) 2 MG tablet Take 1 tablet by mouth 4 (Four) Times a Day As Needed for Diarrhea. 6/7/18     Josafat Gonzales MD   metoprolol tartrate (LOPRESSOR) 50 MG tablet Take 1 tablet by mouth Daily. 10/29/18     Josafat Gonzales MD   MORPHABOND ER 15 MG tablet extended-release 12 hour Take 1 tablet by mouth Every Night. 11/14/18     Josafat Gonzales MD   nystatin (MYCOSTATIN) 209896 UNIT/GM powder Wash/dry well three times a day After dry;apply nystatin powder bid 7/16/18     Josafat Gonzales MD   oxyCODONE (ROXICODONE) 10 MG tablet Take 1 tablet by mouth Every 4 (Four) Hours As Needed for Moderate Pain . 12/19/18     Josafat Gonzales MD   oxyCODONE-acetaminophen (PERCOCET) 5-325 MG per tablet Take 2 tablets by mouth Every 4 (Four) Hours As Needed for Moderate Pain . 11/14/18     Josafat Gonzales MD   phenytoin (DILANTIN) 100 MG ER capsule 100 mg in am and 200 mg in pm 12/26/17     Josafat Gonzales MD   PROAIR  (90 Base) MCG/ACT inhaler  INHALE 1 PUFF BY MOUTH FOUR TIMES DAILY 19     Josafat Gonzales MD   QUEtiapine (SEROquel) 100 MG tablet Take 1 tablet by mouth 2 (Two) Times a Day. 17     Josafat Gonzales MD   Skin Protectants, Misc. (EUCERIN) cream Apply  topically to the appropriate area as directed 2 (Two) Times a Day. 10/29/18     Josafat Gonzales MD   solifenacin (VESICARE) 5 MG tablet Take 1 tablet by mouth Daily. 8/15/17     Pawel Ho MD   traZODone (DESYREL) 50 MG tablet Take 1 tablet by mouth Every Night. 18     Josafat Gonzales MD   venlafaxine XR (EFFEXOR-XR) 150 MG 24 hr capsule Take 2 capsules by mouth Daily. 18     Josafat Gonzales MD         PAST HISTORY:  CHILDHOOD: unremarkable.      PROCEDURES:   SCANNED  [id number]  Colonoscopy+tort-mass/[facility]//8.4.14/BE  EGD+nl/[facility]//.15/UGI  Colonoscopy+polyp/[facility]//9.21.15/3y     SURGERIES:  T&A/age 11  R knee/[facility]//age 13  SANDRA+BSO/[place]/[facility]/age 32  T12/[place]/  Gastric by-pass/[place]/  L knee/[place]na/  C surgery/[place]/06  C surgery/[place]/08  Lap GB/[facility]/[place]/3-18-10  L arthroscope/  R lap colectomy/[place]ae/.14   ORIF L wrist/[place]//.16     FAMILY HISTORY:  Hypertension mother, maternal aunt, father, paternal  grandfather; heart disease in mother, father, maternal grandfather, maternal  grandmother, paternal grandmother; diabetes in maternal uncle.      HABITS:  Never smoked, only limited second-hand smoke exposure.  No alcohol or  drugs.      SOCIAL HISTORY:   in  to her current , has 1 son.  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     MEDICAL ADMISSIONS:  [facility]:   11.8.08-11.12.08-hyponatremia  2.8.10-2.9.10-L flank pain  3.13.14-3.15.14 hever/TIA  12.26.17-12.27.17    Shortness of breath  Cough  Flu B  Bronchitis-acute  Abnormal CT chest-AGUILAR spiculated nodule  Chest wall pain  Hyponatremia  128  Neutropenia 2.54     5.26.18-5.31.18  Lethargy-likely 2nd overdose Imodium-resolved  Confusion-same-resolved  Brief intubation (airway protection) 26-27th  Abnormal CXR-? Pneumonia/atelectasis  Overdose Imodium; initial ? suicide jesture refuted  Depression-acute/chronic  Anxiety-acute/chronic  Hypotension-brief and decrease Rx  Cheilosis-improved empiric monistat  Gait decline-acute/chronic  Anemia-component phlebotomy, (substrate neg)  +BC/staph homnis/epidermitis (contaminet)  E coli bacteruria-likely UTI  UTI-     BH:   7.21-7.23.95 abdominal pain  2.19.08-2.21.08-DVT  2.9.10-2.11.10-abdominal pain/intercostal  3.11.10-3.19.10- hever-abdominal pain+SIADH  3.24.15-3.30.15 diarrhea  5.26.15-5.31.15 nausea/vomiting/diarrhea  7.12.17-7.17.17 accidential injection water bottle cap     LH:   10.26.16-11.6.16 seizure     GENERAL:  Inactive/slower with limits, speed, stamina for age and pains, desire. Sleep is ok usually with Rx.  Has pain management and psych. No fever noted above.  ENDO:  No syncope, near or diaphoretic sweaty spells.  BS Ok since here.  HEENT: No head injury or headache.  No vision change.   No significant hearing loss.  Ears without pain/drainage.  No sore throat.  No significant nasal/sinus congestion/drainage. No epistaxis.  CHEST: No chest wall tenderness or mass. No significant cough, wheeze, SOB; no hemoptysis.  CV: No chest pain, palpitations, significant ankle edema.  GI: No heartburn, dysphagia.  No abdominal pain, diarrhea, constipation, rectal bleeding, or melena.  :  Voids without dysuria, or incontinence to completion.  ORTHO: No painful/swollen joints but various on /off sore.  Usually has sore neck or back.  No acute neck or back pain without recent injury.   NEURO: No dizziness, weakness of extremities.  Usually has LE numbness/paresthesias.   PSYCH: Prior memory loss.  Mood always variable; often anxious, depressed but/and not suicidal though has had prior overdose.   "Tolerated stress.      PHYSICAL EXAMINATION:  /84 (BP Location: Left arm, Patient Position: Lying)   Pulse 91   Temp 98.6 °F (37 °C) (Oral)   Resp 18   Ht 177.8 cm (70\")   Wt 94.5 kg (208 lb 6.4 oz)   LMP  (LMP Unknown)   SpO2 95%   BMI 29.90 kg/m²      GENERAL:  Well nourished/developed in no acute distress; mumbling incoherently. Obese.  SKIN: Turgor excellent, without wound, rash, lesion other than several LE scabbed abrasions.  HEENT: Normal cephalic without trauma.  Pupils reactive to light; L pupil anisocoria. Extraocular motions full without nystagmus.     External canals nonobstructive nontender without reddness.  Oral cavity without growths, exudates, and moist.  Posterior pharynx without mass, obstruction, redness.  No thyromegaly, mass, tenderness, lymphadenopathy and supple.  CV: Regular rhythm.  No murmur, gallop,  edema. Posterior pulses intact.  No carotid bruits.   CHEST: No chest wall tenderness or mass.   LUNGS: Symmetric motion with clear to auscultation.  ABD: Soft, nontender without mass.   ORTHO: Symmetric extremities without swelling/point tenderness.  Full gross range of motion.    NEURO: CN 2-12 grossly intact.  Symmetric facies. 1/4 x bicep 0/4 LE.  UE/LE variable; 2-/5 strength UE 0-1/5 LE  Nonfocal use extremities. Speech clear.    PSYCH: Disoriented x 3.  Pleasant calm, well kept.  Non-purposeful/directed conservation with intact short/long gross memory.     ASSESSMENT/PROBLEM LIST:   64 y/o y/o white female   Allergy/intolerance: see above  Procedural history: see above  Family history: see above  Obesity.  This is status post gastric bypass initial failure and regain of  weight.     1 para 1 AB 0.    Surgical menopause.  Osteoporosis on previous wrist study.    Hypertension.    Hypothyroidism.    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    History of " DVT - left peroneal popliteal, 11/08/2008, treated              successfully with anticoagulation.   History anticoagulation: hx DVT/(coumadin)  Chronic insomnia.  Spinal spondylosis; this is primarily cervical with multiple surgeries.  Chronic neck pain.    Chronic back pain  Chronic narcotics-from pain management/[facility]  Chronic depression - followed by Dr. Samaniego.    Degenerative joint disease that is diffuse.   LE weakness  Gait difficulty-chronic   History of gastric bypass and metabolic risk it carries.    Colon cancer history with previous surgery and chemo, no obvious              recurrence.  Gastroesophageal reflux.  Recurrent laryngitis  Documented neuropathy by EMG nerve conduction  Seizure disorder (2016)  antieliptics-Keppra/Dilantin  Abnormal CT chest-treating nonsurgically  Polypharmacy   Urinary incontience (history urge incontinence)     REASON FOR ADMISSION:    Confusion-delirium  Abnormal UA-(21-30WBC, mod leuk, 2+ bacteria)  likely UTI vs pneumonia  CXR-infiltrate (note lack cough, etc)  Sepsis (tachycardia 104, lactic acid 6.0  Low phenytoin level 3.0  Hypopotassemia-3.0  Drug screen THC, opiate    HOSPITAL COARSE: Her confusion resolved over 2-3 days.  She was back to her baseline.  Neurology had appropriate concerns that she presented with problems with her seizure disorder; her EEG though was normal.  Despite that she was given IV Dilantin and Keppra and plans are to keep her on Keppra XR at discharge.  She was found to be B12 deficient (recall she has had gastric bypass); she was started on B12.  She was vitamin D deficient and started on vitamin D.  There was great concern with her previously having obstructive sleep apnea and not using CPAP that it was involved in the presenting complaints.  There was concern that she had sepsis (associated with pneumonia and/or UTI); he only grew group B strep in her urine and her chest x-ray at this point is not showing much worsening or improvement in  repeat.  At the same time she never coughed and wheezed or had any fever to suggest that she had a rampant pneumonia.  Her potassium required oral and IV replacement and is improving.  There was concern with her med list but it was difficult to make much additional changes with her having such a history of anxiety and depression and seemingly tolerating all of her current medications.  This issue of polypharmacy and types of medicines will be followed more long-term.  Her MRI of her head shows cerebral frontal atrophy and neurology was concerned that even it was related to his untreated sleep apnea.  Were made and are being made to find previous CPAP settings; or get her sleep study as soon as feasible.  In the meantime she will be treated with nocturnal oxygen empirically at the nursing home.  All the above cause her to be weaker than her baseline; causing gait difficulties.  Having used [facility] before for rehab and having a good experience she was quite agreeable to going there again.  She was transitioned again to oral medications with follow-up plans and is being sent to rehab for convalescence and  completion of her treatment courses.    Labs included:     Labs 24 hr before discharge:  Lab Results (last 24 hours)     Procedure Component Value Units Date/Time    Basic Metabolic Panel [[id number]]  (Abnormal) Collected:  03/14/19 0506    Specimen:  Blood Updated:  03/14/19 0547     Glucose 83 mg/dL      BUN 4 mg/dL      Creatinine 0.45 mg/dL      Sodium 133 mmol/L      Potassium 3.6 mmol/L      Chloride 103 mmol/L      CO2 28.0 mmol/L      Calcium 7.5 mg/dL      eGFR Non African Amer 141 mL/min/1.73      BUN/Creatinine Ratio 8.9     Anion Gap 2.0 mmol/L     Narrative:       GFR Normal >60  Chronic Kidney Disease <60  Kidney Failure <15    CBC & Differential [[id number]] Collected:  03/14/19 0506    Specimen:  Blood Updated:  03/14/19 0525    Narrative:       The following orders were created for panel  order CBC & Differential.  Procedure                               Abnormality         Status                     ---------                               -----------         ------                     CBC Auto Differential[[id number]]        Abnormal            Final result                 Please view results for these tests on the individual orders.    CBC Auto Differential [[id number]]  (Abnormal) Collected:  03/14/19 0506    Specimen:  Blood Updated:  03/14/19 0525     WBC 5.63 10*3/mm3      RBC 3.47 10*6/mm3      Hemoglobin 10.8 g/dL      Hematocrit 32.5 %      MCV 93.7 fL      MCH 31.1 pg      MCHC 33.2 g/dL      RDW 14.9 %      RDW-SD 51.0 fl      MPV 10.7 fL      Platelets 206 10*3/mm3      Neutrophil % 71.0 %      Lymphocyte % 17.4 %      Monocyte % 8.7 %      Eosinophil % 2.0 %      Basophil % 0.4 %      Immature Grans % 0.5 %      Neutrophils, Absolute 4.00 10*3/mm3      Lymphocytes, Absolute 0.98 10*3/mm3      Monocytes, Absolute 0.49 10*3/mm3      Eosinophils, Absolute 0.11 10*3/mm3      Basophils, Absolute 0.02 10*3/mm3      Immature Grans, Absolute 0.03 10*3/mm3      nRBC 0.0 /100 WBC     Blood Culture - Blood, Hand, Left [[id number]] Collected:  03/10/19 1727    Specimen:  Blood from Hand, Left Updated:  03/13/19 1816     Blood Culture No growth at 3 days    Blood Culture - Blood, Arm, Left [[id number]] Collected:  03/10/19 1727    Specimen:  Blood from Arm, Left Updated:  03/13/19 1816     Blood Culture No growth at 3 days    Blood Culture - Blood, Arm, Left [[id number]] Collected:  03/10/19 1115    Specimen:  Blood from Arm, Left Updated:  03/13/19 1216     Blood Culture No growth at 3 days    Blood Culture - Blood, Arm, Left [[id number]] Collected:  03/10/19 1120    Specimen:  Blood from Arm, Left Updated:  03/13/19 1216     Blood Culture No growth at 3 days          Lab Results otherwise  CBC:   Results from last 7 days   Lab Units 03/14/19  0506 03/13/19  0624 03/12/19  0623 03/11/19  0702  03/10/19  1046   WBC 10*3/mm3 5.63 6.74 5.59 6.83 6.38   HEMOGLOBIN g/dL 10.8* 11.1* 10.9* 11.1* 13.3   HEMATOCRIT % 32.5* 33.4* 32.1* 32.9* 38.5   PLATELETS 10*3/mm3 206 204 208 243 243     BMP:  Results from last 7 days   Lab Units 03/14/19  0506 03/13/19  0624 03/12/19  0623 03/11/19  0702 03/10/19  1046   SODIUM mmol/L 133* 138 140 142 140   POTASSIUM mmol/L 3.6 3.1* 2.9* 2.5* 3.3*   CHLORIDE mmol/L 103 104 107 107 98   CO2 mmol/L 28.0 28.0 28.0 29.0 30.0   BUN mg/dL 4* 3* 3* 9 9   CREATININE mg/dL 0.45* 0.54 0.52 0.79 0.70   GLUCOSE mg/dL 83 73 85 100 101*   CALCIUM mg/dL 7.5* 7.4* 7.3* 7.2* 8.1*   ALT (SGPT) U/L  --   --   --  25 17     A1c:Invalid input(s): A1C   A1c:@LABRCNOP(A1c:7)@    Culture Results:   Blood Culture   Date Value Ref Range Status   03/10/2019 No growth at 3 days  Preliminary   03/10/2019 No growth at 3 days  Preliminary   03/10/2019 No growth at 3 days  Preliminary   03/10/2019 No growth at 3 days  Preliminary     Urine Culture   Date Value Ref Range Status   03/10/2019 >100,000 CFU/mL Streptococcus agalactiae (Group B) (A)  Final     Comment:     This organism is considered to be universally susceptible to penicillin.  No further antibiotic testing will be performed.       DISCHARGE ASSESSMENT:  Reasons for admit/problems address while here:   Confusion-delirium (? Sepsis, ? Seizure)-resolved  Hx seizure disorder-normal EEG  Abnormal UA-(21-30WBC, mod leuk, 2+ bacteria) maybe UTI (BC neg, 10.5 strept                               Group B)  CXR-infiltrate (note lack cough, etc)-maybe pneumonia-persistant L peihilair/mid                               Lung infiltrate  Sepsis (tachycardia 104, lactic acid 6.0)-maybe  Low phenytoin level 3.0-not taking-improved  Hypopotassemia-2.4-iwrpquml-bhdcqksm  Drug screen THC, opiate  [id number]-[id number]-byxfzxkv  NPO-resolved  Cerebral atrophy-frontal  YOLIE-without treatment  Non-compliance; Rx (dilantin, keppra), CPAP  B12 def (post gastric bypass);    Vit D def (post gastric bypass)  Polypharmacy; difficult Rx list    Chronic problems affecting stay:  See above      PLAN:   See AVS    Discharge Disposition:  Skilled Nursing Facility (DC - External)To [facility]    Discharge Medications:     Discharge Medications      New Medications      Instructions Start Date   cefTRIAXone 10 g/21 mL in lidocaine injection IM syringe   1 g, Intramuscular, Every 24 Hours      enoxaparin 40 MG/0.4ML solution syringe  Commonly known as:  LOVENOX   40 mg, Subcutaneous, Every 24 Hours      levETIRAcetam  MG 24 hr tablet  Commonly known as:  KEPPRA XR  Replaces:  levETIRAcetam 1000 MG tablet   1,000 mg, Oral, Every 12 Hours      potassium chloride 10 MEQ CR capsule  Commonly known as:  MICRO-K   20 mEq, Oral, 2 Times Daily With Meals         Continue These Medications      Instructions Start Date   acetaminophen 325 MG tablet  Commonly known as:  TYLENOL   650 mg, Oral, Every 4 Hours PRN      clonazePAM 1 MG tablet  Commonly known as:  KlonoPIN   1 mg, Oral, 3 Times Daily PRN      clotrimazole 1 % external solution  Commonly known as:  LOTRIMIN   Wash/dry well three times a day After dry; apply light coating of clotrimazole cream for one week      cyclobenzaprine 10 MG tablet  Commonly known as:  FLEXERIL   10 mg, Oral, 3 Times Daily      DULoxetine 30 MG capsule  Commonly known as:  CYMBALTA   30 mg, Oral, Daily      eucerin cream   Topical, 2 Times Daily      gabapentin 600 MG tablet  Commonly known as:  NEURONTIN   600 mg, Oral, 3 Times Daily      levothyroxine 100 MCG tablet  Commonly known as:  SYNTHROID, LEVOTHROID   1 tablet, Oral, Daily      metoprolol tartrate 50 MG tablet  Commonly known as:  LOPRESSOR   50 mg, Oral, Daily      MORPHABOND ER 15 MG tablet extended-release 12 hour  Generic drug:  Morphine Sulfate ER   1 tablet, Oral, Nightly      nystatin 186641 UNIT/GM powder  Commonly known as:  MYCOSTATIN   Wash/dry well three times a day After  dry;apply nystatin powder bid       oxyCODONE 10 MG tablet  Commonly known as:  ROXICODONE   10 mg, Oral, Every 4 Hours PRN      oxyCODONE-acetaminophen 5-325 MG per tablet  Commonly known as:  PERCOCET   2 tablets, Oral, Every 4 Hours PRN      PROAIR  (90 Base) MCG/ACT inhaler  Generic drug:  albuterol sulfate HFA   INHALE 1 PUFF BY MOUTH FOUR TIMES DAILY      QUEtiapine 100 MG tablet  Commonly known as:  SEROquel   100 mg, Oral, 2 Times Daily      solifenacin 5 MG tablet  Commonly known as:  VESICARE   5 mg, Oral, Daily      traZODone 50 MG tablet  Commonly known as:  DESYREL   50 mg, Oral, Nightly      venlafaxine  MG 24 hr capsule  Commonly known as:  EFFEXOR-XR   300 mg, Oral, Daily         Stop These Medications    levETIRAcetam 1000 MG tablet  Commonly known as:  KEPPRA  Replaced by:  levETIRAcetam  MG 24 hr tablet     loperamide 2 MG tablet  Commonly known as:  IMODIUM A-D     phenytoin 100 MG ER capsule  Commonly known as:  DILANTIN            Discharge Care Plan/Instructions:   Admit to N  Routine NH admit orders/procedures  NH to re/establish code status    Rx:   Oxygen 2 liters when asleep  See AVS/discharge summary     Diet:   AHA  Consistency:    Solids: general     Fluids: thin  Calories [id number]  Fluids 60 oz/24 hr  Dietary referral for nutritional assessment, advise and follow    Activity:   Gradually increase as able  PT, OT, speech referrals   Up with assistance only until cleared by PT     Additional instructions:  LAB:   A. Short term-each Tuesday CBC, CMP  B. (if stays >1m)   2m CBC, CMP, iron  6m CBC, BMP, TSH, T4. iron, ferritin (dilantin level if on dilantin)  12m CBC, CMP, LIPID, TSH, T4, Vit D, B12, folate, Fe, ferritin, % sat, retic count (dilantin level if on dilantin)    F/u appointments:   Dr Gonzales will see on next regular NH rounds (call between if needs to be seen earlier) (3.20.19)    Other appointments:   Future Appointments   Date Time Provider Department  Center   5/3/2019  1:30 PM Josafat Gonzales MD [facility] None     CONDITION: stable/improved    PROGNOSIS: guarded

## 2019-03-14 NOTE — THERAPY DISCHARGE NOTE
Acute Care - Physical Therapy Discharge Summary  [facility]       Patient Name: Bita Croft  : 1955  MRN: [id number]    Today's Date: 3/14/2019  Onset of Illness/Injury or Date of Surgery: 03/10/19    Date of Referral to PT: 03/10/19  Referring Physician: Dr. Gonzales      Admit Date: 3/10/2019      PT Recommendation and Plan    Visit Dx:    ICD-10-CM ICD-9-CM   1. Altered mental status, unspecified altered mental status type R41.82 780.97   2. Pneumonia of left lung due to infectious organism, unspecified part of lung J18.9 486   3. Acute urinary tract infection N39.0 599.0   4. Severe sepsis (CMS/HCC) A41.9 038.9    R65.20 995.92   5. Dysphagia, unspecified type R13.10 787.20   6. Impaired functional mobility and activity tolerance Z74.09 V49.89   7. Impaired mobility and ADLs Z74.09 799.89   8. Impaired cognition R41.89 294.9   9. Anxiety F41.9 300.00       Outcome Measures     Row Name 19 1000 19 1100 19 1400       How much help from another person do you currently need...    Turning from your back to your side while in flat bed without using bedrails?  3  -NW  3  -NW  --    Moving from lying on back to sitting on the side of a flat bed without bedrails?  3  -NW  3  -NW  --    Moving to and from a bed to a chair (including a wheelchair)?  3  -NW  3  -NW  --    Standing up from a chair using your arms (e.g., wheelchair, bedside chair)?  2  -NW  2  -NW  --    Climbing 3-5 steps with a railing?  1  -NW  1  -NW  --    To walk in hospital room?  3  -NW  3  -NW  --    AM-PAC 6 Clicks Score  15  -NW  15  -NW  --       How much help from another is currently needed...    Putting on and taking off regular lower body clothing?  --  --  1  -MW    Bathing (including washing, rinsing, and drying)  --  --  2  -MW    Toileting (which includes using toilet bed pan or urinal)  --  --  2  -MW    Putting on and taking off regular upper body clothing  --  --  2  -MW    Taking care of personal grooming  (such as brushing teeth)  --  --  3  -MW    Eating meals  --  --  3  -MW    Score  --  --  13  -MW       Functional Assessment    Outcome Measure Options  AM-PAC 6 Clicks Basic Mobility (PT)  -NW  AM-PAC 6 Clicks Basic Mobility (PT)  -NW  AM-PAC 6 Clicks Daily Activity (OT)  -MW    Row Name 03/12/19 0745             How much help from another person do you currently need...    Turning from your back to your side while in flat bed without using bedrails?  4  -MS      Moving from lying on back to sitting on the side of a flat bed without bedrails?  2  -MS      Moving to and from a bed to a chair (including a wheelchair)?  1  -MS      Standing up from a chair using your arms (e.g., wheelchair, bedside chair)?  2  -MS      Climbing 3-5 steps with a railing?  1  -MS      To walk in hospital room?  1  -MS      AM-PAC 6 Clicks Score  11  -MS         Functional Assessment    Outcome Measure Options  AM-PAC 6 Clicks Basic Mobility (PT)  -MS        User Key  (r) = Recorded By, (t) = Taken By, (c) = Cosigned By    Initials Name Provider Type    MS Esteban Belkis R, PT, DPT, NCS Physical Therapist    NW Adelina Silva PTA Physical Therapy Assistant     Stephanie Keenan, OTR/L Occupational Therapist          PT Charges     Row Name 03/14/19 1020             Time Calculation    Start Time  0915  -NW      Stop Time  1000  -NW      Time Calculation (min)  45 min  -NW      PT Received On  03/14/19  -NW      PT Goal Re-Cert Due Date  03/22/19  -NW         Time Calculation- PT    Total Timed Code Minutes- PT  45 minute(s)  -NW         Timed Charges    29609 - Gait Training Minutes   15  -NW      03590 - PT Therapeutic Activity Minutes  30  -NW        User Key  (r) = Recorded By, (t) = Taken By, (c) = Cosigned By    Initials Name Provider Type    Adelina Yip PTA Physical Therapy Assistant        Therapy Suggested Charges     Code   Minutes Charges    27140 (CPT®) Hc Pt Neuromusc Re Education Ea 15 Min      35211 (CPT®) Hc  Pt Ther Proc Ea 15 Min      80276 (CPT®) Hc Gait Training Ea 15 Min 15 1    99005 (CPT®) Hc Pt Therapeutic Act Ea 15 Min 30 2    52005 (CPT®) Hc Pt Manual Therapy Ea 15 Min      06133 (CPT®) Hc Pt Iontophoresis Ea 15 Min      46411 (CPT®) Hc Pt Elec Stim Ea-Per 15 Min      64707 (CPT®) Hc Pt Ultrasound Ea 15 Min      36010 (CPT®) Hc Pt Self Care/Mgmt/Train Ea 15 Min      76543 (CPT®) Hc Pt Prosthetic (S) Train Initial Encounter, Each 15 Min      45257 (CPT®) Hc Pt Orthotic(S)/Prosthetic(S) Encounter, Each 15 Min      63219 (CPT®) Hc Orthotic(S) Mgmt/Train Initial Encounter, Each 15min      Total  45 3          Rehab Goal Summary     Row Name 03/14/19 1519             Physical Therapy Goals    Bed Mobility Goal Selection (PT)  bed mobility, PT goal 1  -NW      Transfer Goal Selection (PT)  transfer, PT goal 1  -NW      Gait Training Goal Selection (PT)  gait training, PT goal 1  -NW         Bed Mobility Goal 1 (PT)    Activity/Assistive Device (Bed Mobility Goal 1, PT)  sit to supine;supine to sit  -NW      [place] Level/Cues Needed (Bed Mobility Goal 1, PT)  supervision required;verbal cues required  -NW      Time Frame (Bed Mobility Goal 1, PT)  long term goal (LTG);by discharge  -NW      Progress/Outcomes (Bed Mobility Goal 1, PT)  goal not met  -NW         Transfer Goal 1 (PT)    Activity/Assistive Device (Transfer Goal 1, PT)  sit-to-stand/stand-to-sit;bed-to-chair/chair-to-bed;walker, rolling  -NW      [place] Level/Cues Needed (Transfer Goal 1, PT)  minimum assist (75% or more patient effort);tactile cues required;verbal cues required  -NW      Time Frame (Transfer Goal 1, PT)  long term goal (LTG);by discharge  -NW      Progress/Outcome (Transfer Goal 1, PT)  goal met  -NW         Gait Training Goal 1 (PT)    Activity/Assistive Device (Gait Training Goal 1, PT)  gait (walking locomotion);assistive device use;decrease fall risk;forward stepping;improve balance and speed;walker, rolling  -NW       [place] Level (Gait Training Goal 1, PT)  minimum assist (75% or more patient effort);verbal cues required;tactile cues required  -NW      Distance (Gait Goal 1, PT)  25ft  -NW      Time Frame (Gait Training Goal 1, PT)  long term goal (LTG);by discharge  -NW      Progress/Outcome (Gait Training Goal 1, PT)  goal not met  -NW        User Key  (r) = Recorded By, (t) = Taken By, (c) = Cosigned By    Initials Name Provider Type Discipline    NW Adelina Silva PTA Physical Therapy Assistant PT          Therapy Charges for Today     Code Description Service Date Service Provider Modifiers Qty    79952481757 HC PT THER PROC EA 15 MIN 3/13/2019 Adelina Silva, PTA GP 1    14327699762 HC PT THERAPEUTIC ACT EA 15 MIN 3/13/2019 Adelina Silva, PTA GP 1    15352315411 HC PT THERAPEUTIC ACT EA 15 MIN 3/13/2019 Adelina Silva, PTA GP 1    70505325727 HC GAIT TRAINING EA 15 MIN 3/14/2019 Adelina Silva, PTA GP 1    94291812114 HC PT THERAPEUTIC ACT EA 15 MIN 3/14/2019 Adelina Silva, PTA GP 2          PT Discharge Summary  Anticipated Discharge Disposition (PT): skilled nursing facility  Reason for Discharge: Discharge from facility  Outcomes Achieved: Refer to plan of care for updates on goals achieved  Discharge Destination: SNF      Adelina Silva PTA   3/14/2019

## 2019-03-14 NOTE — PLAN OF CARE
Problem: Patient Care Overview  Goal: Plan of Care Review  Outcome: Ongoing (interventions implemented as appropriate)   03/14/19 4541   Coping/Psychosocial   Plan of Care Reviewed With patient   OTHER   Outcome Summary vss; alert and oriented x 4; c/o back pain; prn and scheduled pain meds offered relief; nsr on tele; rrt unable to provide overnight cpap for pt; incont of bladder; turned every 2 hours; possible d[facility]   Plan of Care Review   Progress no change     Goal: Individualization and Mutuality  Outcome: Ongoing (interventions implemented as appropriate)    Goal: Discharge Needs Assessment  Outcome: Ongoing (interventions implemented as appropriate)    Goal: Interprofessional Rounds/Family Conf  Outcome: Ongoing (interventions implemented as appropriate)      Problem: Fall Risk (Adult)  Goal: Absence of Fall  Outcome: Ongoing (interventions implemented as appropriate)      Problem: Skin Injury Risk (Adult)  Goal: Skin Health and Integrity  Outcome: Ongoing (interventions implemented as appropriate)      Problem: Confusion, Acute (Adult)  Goal: Cognitive/Functional Impairments Minimized  Outcome: Ongoing (interventions implemented as appropriate)    Goal: Safety  Outcome: Ongoing (interventions implemented as appropriate)      Problem: Infection, Risk/Actual (Adult)  Goal: Infection Prevention/Resolution  Outcome: Ongoing (interventions implemented as appropriate)      Problem: Sepsis/Septic Shock (Adult)  Goal: Signs and Symptoms of Listed Potential Problems Will be Absent, Minimized or Managed (Sepsis/Septic Shock)  Outcome: Ongoing (interventions implemented as appropriate)

## 2019-03-15 NOTE — THERAPY DISCHARGE NOTE
Acute Care - Occupational Therapy Discharge Summary  [facility]     Patient Name: Bita Croft  : 1955  MRN: [id number]    Today's Date: 3/15/2019  Onset of Illness/Injury or Date of Surgery: 03/10/19    Date of Referral to OT: 19  Referring Physician: Dr. Gonzales      Admit Date: 3/10/2019        OT Recommendation and Plan    Visit Dx:    ICD-10-CM ICD-9-CM   1. Altered mental status, unspecified altered mental status type R41.82 780.97   2. Pneumonia of left lung due to infectious organism, unspecified part of lung J18.9 486   3. Acute urinary tract infection N39.0 599.0   4. Severe sepsis (CMS/HCC) A41.9 038.9    R65.20 995.92   5. Dysphagia, unspecified type R13.10 787.20   6. Impaired functional mobility and activity tolerance Z74.09 V49.89   7. Impaired mobility and ADLs Z74.09 799.89   8. Impaired cognition R41.89 294.9   9. Anxiety F41.9 300.00               Rehab Goal Summary     Row Name 03/15/19 0700             Transfer Goal 1 (OT)    Activity/Assistive Device (Transfer Goal 1, OT)  sit-to-stand/stand-to-sit;bed-to-chair/chair-to-bed;toilet;commode, bedside without drop arms;tub  -TS      [place] Level/Cues Needed (Transfer Goal 1, OT)  moderate assist (50-74% patient effort)  -TS      Time Frame (Transfer Goal 1, OT)  long term goal (LTG);by discharge  -TS      Progress/Outcome (Transfer Goal 1, OT)  goal not met  -TS         Dressing Goal 1 (OT)    Activity/Assistive Device (Dressing Goal 1, OT)  upper body dressing;lower body dressing  -TS      [place]/Cues Needed (Dressing Goal 1, OT)  moderate assist (50-74% patient effort)  -TS      Time Frame (Dressing Goal 1, OT)  long term goal (LTG);by discharge  -TS      Progress/Outcome (Dressing Goal 1, OT)  goal not met  -TS         Toileting Goal 1 (OT)    Activity/Device (Toileting Goal 1, OT)  toileting skills, all;commode;commode, bedside without drop arms  -TS      [place] Level/Cues Needed (Toileting Goal 1, OT)  minimum  assist (75% or more patient effort)  -TS      Time Frame (Toileting Goal 1, OT)  long term goal (LTG);by discharge  -TS      Progress/Outcome (Toileting Goal 1, OT)  goal not met  -TS        User Key  (r) = Recorded By, (t) = Taken By, (c) = Cosigned By    Initials Name Provider Type Discipline    TS Alecia Nazario, MATEUS/L Occupational Therapy Assistant OT          Outcome Measures     Row Name 03/14/19 1000 03/13/19 1100 03/12/19 1400       How much help from another person do you currently need...    Turning from your back to your side while in flat bed without using bedrails?  3  -NW  3  -NW  --    Moving from lying on back to sitting on the side of a flat bed without bedrails?  3  -NW  3  -NW  --    Moving to and from a bed to a chair (including a wheelchair)?  3  -NW  3  -NW  --    Standing up from a chair using your arms (e.g., wheelchair, bedside chair)?  2  -NW  2  -NW  --    Climbing 3-5 steps with a railing?  1  -NW  1  -NW  --    To walk in hospital room?  3  -NW  3  -NW  --    AM-PAC 6 Clicks Score  15  -NW  15  -NW  --       How much help from another is currently needed...    Putting on and taking off regular lower body clothing?  --  --  1  -MW    Bathing (including washing, rinsing, and drying)  --  --  2  -MW    Toileting (which includes using toilet bed pan or urinal)  --  --  2  -MW    Putting on and taking off regular upper body clothing  --  --  2  -MW    Taking care of personal grooming (such as brushing teeth)  --  --  3  -MW    Eating meals  --  --  3  -MW    Score  --  --  13  -MW       Functional Assessment    Outcome Measure Options  AM-PAC 6 Clicks Basic Mobility (PT)  -NW  AM-PAC 6 Clicks Basic Mobility (PT)  -NW  AM-PAC 6 Clicks Daily Activity (OT)  -MW    Row Name 03/12/19 0745             How much help from another person do you currently need...    Turning from your back to your side while in flat bed without using bedrails?  4  -MS      Moving from lying on back to sitting on  the side of a flat bed without bedrails?  2  -MS      Moving to and from a bed to a chair (including a wheelchair)?  1  -MS      Standing up from a chair using your arms (e.g., wheelchair, bedside chair)?  2  -MS      Climbing 3-5 steps with a railing?  1  -MS      To walk in hospital room?  1  -MS      AM-PAC 6 Clicks Score  11  -MS         Functional Assessment    Outcome Measure Options  AM-PAC 6 Clicks Basic Mobility (PT)  -MS        User Key  (r) = Recorded By, (t) = Taken By, (c) = Cosigned By    Initials Name Provider Type    Belkis Hobson, PT, DPT, NCS Physical Therapist    NW Adelina Silva, PTA Physical Therapy Assistant    MW Stephanie Keenan, OTR/L Occupational Therapist          Therapy Suggested Charges     Code   Minutes Charges    None                 OT Discharge Summary  Reason for Discharge: Discharge from facility  Outcomes Achieved: Refer to plan of care for updates on goals achieved  Discharge Destination: [facility]      SERGE Stovall  3/15/2019

## 2019-03-17 ENCOUNTER — HOSPITAL ENCOUNTER (OUTPATIENT)
Dept: HOSPITAL 58 - AMBL | Age: 64
Discharge: ANOTHER HOSPITAL | End: 2019-03-17
Attending: INTERNAL MEDICINE

## 2019-03-17 VITALS — BODY MASS INDEX: 31.6 KG/M2

## 2019-03-17 DIAGNOSIS — M79.605: ICD-10-CM

## 2019-03-17 DIAGNOSIS — R60.0: Primary | ICD-10-CM

## 2019-03-17 DIAGNOSIS — R21: ICD-10-CM

## 2019-03-18 NOTE — ED NOTES
[facility] now states it will be morning before they can transport patient back to [facility] home     Anayeli Valentin, RN  03/18/19 7114

## 2019-03-18 NOTE — ED NOTES
Pt incontinent of urine, cleansed and clean linens provided by PCT Anayeli Xiong, MARK ANTHONY  03/18/19 9182

## 2019-03-18 NOTE — ED NOTES
CALL PLACED TO [facility], THEY HAVE NO ONE AVAILABLE TO TAKE PATIENT AT THIS TIME. THEY WILL BE HERE AS SOON AS THEY CAN     Anayeli Valentin RN  03/18/19 0535

## 2019-03-18 NOTE — ED PROVIDER NOTES
Subjective   Patient is a 63-year-old  female that presents to the ER today with complaint of left lower extremity swelling and pain.  Patient reports this began approximately 1 week ago.  The patient was recently admitted at this facility from March 10 of March 14, 2019.  The patient was admitted due to altered mental status.  The patient was found to be septic from a UTI and had a questionable pneumonia.  Patient was given appropriate antibiotics and was discharged to the [facility].  Patient reports that she has had a rash to her legs for several weeks now.  States that she has been scratching her legs and has several sores to her legs from this.  The patient states that there seems to be increased swelling and pain to her left calf.  The patient does report a history of a DVT in the past.  Per her chart she had a left peroneal popliteal DVT in 2008.  Patient was anticoagulated on Coumadin at that time.  She is not currently on anticoagulants.  Patient states that she is concerned that she can have another blood clot or possibly infection to the leg.  She denies any chest pain or shortness of breath.  She denies abdominal pain, acute mental changes, recent falls or any injuries.  She reports that she is mostly bedbound however does get around with the help of a wheelchair or walker.  She presents to the ER today per EMS for further evaluation.        History provided by:  Patient   used: No    Lower Extremity Issue   Location:  Leg  Time since incident:  1 week  Injury: no    Leg location:  L leg  Pain details:     Quality:  Aching and dull    Radiates to:  Does not radiate    Severity:  Moderate    Onset quality:  Sudden    Duration:  1 week    Timing:  Constant    Progression:  Unchanged  Chronicity:  New  Dislocation: no    Foreign body present:  No foreign bodies  Prior injury to area:  No  Relieved by:  Nothing  Worsened by:  Nothing  Ineffective treatments:   None tried  Associated symptoms: swelling    Associated symptoms: no back pain, no decreased ROM, no fatigue, no fever, no itching, no muscle weakness, no neck pain, no numbness, no stiffness and no tingling    Risk factors: no concern for non-accidental trauma, no frequent fractures, no known bone disorder, no obesity and no recent illness        Review of Systems   Constitutional: Negative for fatigue and fever.   Musculoskeletal: Negative for back pain, neck pain and stiffness.   Skin: Negative for itching.   All other systems reviewed and are negative.      Past Medical History:   Diagnosis Date   • Asthma    • Cancer (CMS/HCC)     colon cancer   • Depression    • Disease of thyroid gland    • Hypertension    • Insomnia    • Seizure (CMS/HCC)    • Sleep apnea        No Known Allergies    Past Surgical History:   Procedure Laterality Date   • BACK SURGERY     • COLON SURGERY     • GASTRIC BYPASS     • HYSTERECTOMY     • KNEE ARTHROSCOPY      bilateral   • NECK SURGERY     • ORIF WRIST FRACTURE Right 6/9/2017    Procedure: WRIST OPEN REDUCTION INTERNAL FIXATION, RIGHT;  Surgeon: Alec Cardoza MD;  Location: [facility];  Service:    • TONSILLECTOMY     • WRIST FRACTURE SURGERY         Family History   Problem Relation Age of Onset   • Heart attack Father    • Stroke Father    • Arthritis Father    • Hypertension Father    • Heart attack Mother    • Stroke Mother    • Arthritis Mother    • Hypertension Mother        Social History     Socioeconomic History   • Marital status:      Spouse name: Carlito   • Number of children: 1   • Years of education: 18+   • Highest education level: Not on file   Occupational History   • Occupation: retired     Comment: [employer]   Tobacco Use   • Smoking status: Never Smoker   • Smokeless tobacco: Never Used   Substance and Sexual Activity   • Alcohol use: No   • Drug use: No   • Sexual activity: Defer           Objective   Physical Exam   Constitutional:  She appears well-developed and well-nourished.   HENT:   Head: Normocephalic and atraumatic.   Eyes: Conjunctivae are normal. Pupils are equal, round, and reactive to light.   Cardiovascular: Normal rate, regular rhythm and normal heart sounds.   Pulmonary/Chest: Effort normal and breath sounds normal.   Musculoskeletal:        Legs:  Neurological: She is alert.   Skin: Skin is warm and dry. Capillary refill takes less than 2 seconds.   Psychiatric: She has a normal mood and affect.   Nursing note and vitals reviewed.      Procedures           ED Course  ED Course as of Mar 18 0231   Mon Mar 18, 2019   0148 Venous doppler showed no thrombus visualized.   [LF]   0214 The patient's labs are reviewed.  Her CMP shows a normal renal function, normal liver enzymes.  Patient's white blood cell count slightly low at 4.7.  Patient's hemoglobin, hematocrit, and platelet count are stable.  [LF]   0217 X-ray showed no acute.  Patient recently had a rash to he Eucerin cream.  She states that she has been scratching a lot and has several abrasions and scratch marks noted to her leg.  The leg is slightly warm to the touch and swelling.  Is slightly red.  We will go ahead and treat the patient for cellulitis.  I will place her on Keflex.  The patient will be given a tetanus vaccination.  She is advised to follow-up Dr. Gonzales tomorrow.  Patient is advised to return to the ER for any new worsening symptoms.  She be discharged back to the [place] facility in stable condition at this time.  [LF]      ED Course User Index  [LF] Tamika Rojo, APRN        XR Tibia Fibula 2 View Left   ED Interpretation   No acute findings      US Venous Doppler Lower Extremity Left (duplex)    (Results Pending)     Labs Reviewed   COMPREHENSIVE METABOLIC PANEL - Abnormal; Notable for the following components:       Result Value    Calcium 8.1 (*)     Total Protein 4.9 (*)     Albumin 2.50 (*)     A/G Ratio 1.0 (*)     All other  components within normal limits    Narrative:     GFR Normal >60  Chronic Kidney Disease <60  Kidney Failure <15   PROTIME-INR - Abnormal; Notable for the following components:    INR 0.85 (*)     All other components within normal limits   CBC WITH AUTO DIFFERENTIAL - Abnormal; Notable for the following components:    WBC 4.74 (*)     RBC 3.41 (*)     Hemoglobin 10.7 (*)     Hematocrit 32.3 (*)     All other components within normal limits   APTT - Normal   CBC AND DIFFERENTIAL    Narrative:     The following orders were created for panel order CBC & Differential.  Procedure                               Abnormality         Status                     ---------                               -----------         ------                     CBC Auto Differential[[id number]]        Abnormal            Final result                 Please view results for these tests on the individual orders.                 MDM  Number of Diagnoses or Management Options  Cellulitis of left lower extremity: new and requires workup     Amount and/or Complexity of Data Reviewed  Clinical lab tests: reviewed and ordered  Tests in the radiology section of CPT®: ordered and reviewed  Decide to obtain previous medical records or to obtain history from someone other than the patient: yes  Discuss the patient with other providers: yes  Independent visualization of images, tracings, or specimens: yes    Patient Progress  Patient progress: stable        Final diagnoses:   Cellulitis of left lower extremity            Tamika Rojo, APRN  03/18/19 0231

## 2019-03-18 NOTE — ED NOTES
PT TO BE DISCHARGED. NEEDS AMBULANCE BACK TO NURSING HOME. EXPLAINED TO PATIENT THAT IT WOULD BE A WHILE TO FIND TRANSPORTATION     Anayeli Valentin, RN  03/18/19 4558

## 2019-03-28 ENCOUNTER — HOSPITAL ENCOUNTER (OUTPATIENT)
Dept: HOSPITAL 58 - AMBL | Age: 64
Discharge: ANOTHER HOSPITAL | End: 2019-03-28
Attending: EMERGENCY MEDICINE

## 2019-03-28 VITALS — BODY MASS INDEX: 31.6 KG/M2

## 2019-03-28 DIAGNOSIS — S79.911A: Primary | ICD-10-CM
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
## 2019-03-28 PROBLEM — S72.001A CLOSED RIGHT HIP FRACTURE, INITIAL ENCOUNTER (HCC): Status: ACTIVE | Noted: 2019-01-01

## 2019-03-28 NOTE — TELEPHONE ENCOUNTER
She fell in her bathroom. Complaining of pain in her right hip and the right foot is turned outward. They are taking her to [facility] ED

## 2019-03-29 NOTE — ANESTHESIA PREPROCEDURE EVALUATION
Anesthesia Evaluation     Patient summary reviewed   no history of anesthetic complications:  NPO Solid Status: > 8 hours  NPO Liquid Status: > 8 hours           Airway   Mallampati: II  TM distance: >3 FB  Neck ROM: full  No difficulty expected  Dental      Pulmonary    (+) asthma, sleep apnea,   Cardiovascular   Exercise tolerance: poor (<4 METS) (wheelchair)    Patient on routine beta blocker and Beta blocker given within 24 hours of surgery    (+) hypertension,   (-) past MI, CAD, cardiac stents      Neuro/Psych  (+) seizures, weakness (lower extremities), numbness,     GI/Hepatic/Renal/Endo    (+) obesity,   hypothyroidism,   (-) liver disease, no renal disease, diabetes    Musculoskeletal     Abdominal    Substance History      OB/GYN          Other      history of cancer (colon)                    Anesthesia Plan    ASA 3     general with block     intravenous induction   Anesthetic plan, all risks, benefits, and alternatives have been provided, discussed and informed consent has been obtained with: patient.

## 2019-03-29 NOTE — ANESTHESIA PROCEDURE NOTES
Airway  Urgency: elective    Airway not difficult    General Information and Staff    Patient location during procedure: OR  CRNA: Lizeth Chavez CRNA    Indications and Patient Condition  Indications for airway management: airway protection    Preoxygenated: yes  Mask difficulty assessment: 1 - vent by mask    Final Airway Details  Final airway type: endotracheal airway      Successful airway: ETT  Cuffed: yes   Successful intubation technique: direct laryngoscopy  Facilitating devices/methods: intubating stylet  Endotracheal tube insertion site: oral  Blade: Martinez  Blade size: 2  ETT size (mm): 7.0  Cormack-Lehane Classification: grade I - full view of glottis  Placement verified by: chest auscultation and capnometry   Cuff volume (mL): 7  Measured from: lips  ETT to lips (cm): 22  Number of attempts at approach: 1    Additional Comments  Atraumatic

## 2019-04-01 NOTE — THERAPY TREATMENT NOTE
Acute Care - Occupational Therapy Treatment Note  [facility]     Patient Name: Bita Croft  : 1955  MRN: [id number]  Today's Date: 2019  Onset of Illness/Injury or Date of Surgery: 19  Date of Referral to OT: 19  Referring Physician: Dr. Gonzales     Admit Date: 3/28/2019       ICD-10-CM ICD-9-CM   1. Closed right hip fracture, initial encounter (CMS/[facility]) S72.001A 820.8   2. Impaired mobility Z74.09 799.89   3. Impaired mobility and ADLs Z74. 799.89     Patient Active Problem List   Diagnosis   • Confusion   • Depression   • Anxiety   • Hyponatremia-often polydyspia involved   • Seizure disorder (CMS/[facility])   • Chronic neck pain   • Chronic back pain-Ruxer   • Hypothyroidism   • Abnormal thyroid blood test   • Hypertension   • Wellness examination-done   • Elevated fasting glucose   • Anemia   • Iron deficiency   • Degenerative joint disease of spine   • Conversion disorder with abnormal movement   • Reflux laryngitis   • Gait difficulty   • Weakness of both legs   • Sore throat   • Polydipsia   • Nocturia   • Frequency of urination   • Urge incontinence   • Laboratory test*   • Chronic narcotic use-Ruxer   • Abnormal x-ray   • Noncompliance   • Lung nodule, solitary   • Non-smoker   • Hx of colon cancer, stage I   • Altered mental status   • Anticoagulated: DVT/(?)   • History of radiation therapy   • Closed right hip fracture, initial encounter (CMS/[facility])     Past Medical History:   Diagnosis Date   • Asthma    • Cancer (CMS/[facility])     colon cancer   • Depression    • Disease of thyroid gland    • Hypertension    • Insomnia    • Seizure (CMS/[facility])    • Sleep apnea      Past Surgical History:   Procedure Laterality Date   • BACK SURGERY     • COLON SURGERY     • GASTRIC BYPASS     • HIP HEMIARTHROPLASTY Right 3/29/2019    Procedure: HIP HEMIARTHROPLASTY;  Surgeon: Mehul Loaiza MD;  Location: [facility];  Service: Orthopedics   • HYSTERECTOMY     • KNEE ARTHROSCOPY      bilateral   • NECK  SURGERY     • ORIF WRIST FRACTURE Right 6/9/2017    Procedure: WRIST OPEN REDUCTION INTERNAL FIXATION, RIGHT;  Surgeon: Alec Cardoza MD;  Location: [facility];  Service:    • TONSILLECTOMY     • WRIST FRACTURE SURGERY         Therapy Treatment    Rehabilitation Treatment Summary     Row Name 04/01/19 0817             Treatment Time/Intention    Discipline  occupational therapy assistant  -TS      Document Type  therapy note (daily note)  -TS      Subjective Information  complains of;pain  -TS2      Patient Effort  adequate  -TS2      Existing Precautions/Restrictions  fall;right;hip, posterior  -TS2      Recorded by [TS] Alecia Nazario COTA/L 04/01/19 0817  [TS2] Alecia Nazario IGNACIO/L 04/01/19 0840      Row Name 04/01/19 0817             Cognitive Assessment/Intervention- PT/OT    Personal Safety Interventions  fall prevention program maintained;nonskid shoes/slippers when out of bed  -TS      Recorded by [TS] Alecia Nazario IGNACIO/L 04/01/19 0840      Row Name 04/01/19 0817             Bed Mobility Assessment/Treatment    Bed Mobility Assessment/Treatment  supine-sit  -TS      Supine-Sit [place] (Bed Mobility)  minimum assist (75% patient effort);moderate assist (50% patient effort)  -TS      Assistive Device (Bed Mobility)  bed rails;head of bed elevated  -TS      Comment (Bed Mobility)  increased time  -TS      Recorded by [TS] Alecia Nazario COTA/L 04/01/19 0840      Row Name 04/01/19 0817             ADL Assessment/Intervention    BADL Assessment/Intervention  feeding  -TS      Recorded by [TS] Alecia Nazario COTA/L 04/01/19 0840      Row Name 04/01/19 0817             Self-Feeding Assessment/Training    [place] Level (Feeding)  set up;independent  -TS      Position (Self-Feeding)  edge of bed sitting  -TS      Recorded by [TS] Alecia Nazario IGNACIO/L 04/01/19 0840      Row Name 04/01/19 0817             Positioning and Restraints    Pre-Treatment Position   in bed  -TS      Post Treatment Position  bed  -TS      In Bed  sitting EOB;call light within reach;encouraged to call for assist;side rails up x1;notified nsg  -TS      Recorded by [TS] Alecia Nazario COTA/L 04/01/19 0840      Row Name 04/01/19 0817             Pain Scale: Numbers Pre/Post-Treatment    Pain Scale: Numbers, Pretreatment  3/10  -TS      Pain Scale: Numbers, Post-Treatment  3/10  -TS      Pain Location - Side  Right  -TS      Pain Location  hip  -TS      Pain Intervention(s)  Repositioned  -TS      Recorded by [TS] Alecia Nazario IGNACIO/L 04/01/19 0840      Row Name                Wound 03/29/19 1232 Right hip incision    Wound - Properties Group Date first assessed: 03/29/19 [SH] Time first assessed: 1232 [SH] Side: Right [SH] Location: hip [SH] Type: incision [SH] Recorded by:  [SH] Merary Velasquez RN 03/29/19 1232    Row Name 04/01/19 0817             Outcome Summary/Treatment Plan (OT)    Daily Summary of Progress (OT)  progress towards functional goals is fair  -TS      Recorded by [TS] Alecia Nazario IGNACIO/L 04/01/19 0840        User Key  (r) = Recorded By, (t) = Taken By, (c) = Cosigned By    Initials Name Effective Dates Discipline    TS Alecia Nazario IGNACIO/L 08/02/16 -  OT    SH Merary Velasquez RN 08/02/16 -  Nurse        Wound 03/29/19 1232 Right hip incision (Active)   Dressing Appearance dry;intact;no drainage 3/31/2019  8:06 PM   Closure ELMA 3/31/2019  8:06 PM   Base dressing in place, unable to visualize 3/31/2019  8:06 PM   Drainage Amount none 3/31/2019  8:06 PM   Dressing Care, Wound foam 3/31/2019  8:06 PM       Occupational Therapy Education     Title: PT OT SLP Therapies (Not Started)     Topic: Occupational Therapy (In Progress)     Point: ADL training (In Progress)     Description: Instruct learner(s) on proper safety adaptation and remediation techniques during self care or transfers.   Instruct in proper use of assistive devices.    Learning Progress  Summary           Patient Acceptance, E, NR by  at 3/30/2019 10:19 AM    Comment:  posterior hip precautions, bed mobility, transfer training, benefit of increased activity, ADL, OT POC                   Point: Precautions (In Progress)     Description: Instruct learner(s) on prescribed precautions during self-care and functional transfers.    Learning Progress Summary           Patient Acceptance, E, NR by  at 3/30/2019 10:19 AM    Comment:  posterior hip precautions, bed mobility, transfer training, benefit of increased activity, ADL, OT POC                   Point: Body mechanics (In Progress)     Description: Instruct learner(s) on proper positioning and spine alignment during self-care, functional mobility activities and/or exercises.    Learning Progress Summary           Patient Acceptance, E, NR by  at 3/30/2019 10:19 AM    Comment:  posterior hip precautions, bed mobility, transfer training, benefit of increased activity, ADL, OT POC                               User Key     Initials Effective Dates Name Provider Type Discipline     08/28/18 -  Stephanie Keenan, OTR/L Occupational Therapist OT                OT Recommendation and Plan  Outcome Summary/Treatment Plan (OT)  Daily Summary of Progress (OT): progress towards functional goals is fair  Daily Summary of Progress (OT): progress towards functional goals is fair  Outcome Measures     Row Name 04/01/19 0800 03/31/19 1100 03/30/19 1000       How much help from another person do you currently need...    Turning from your back to your side while in flat bed without using bedrails?  --  2  -AH  --    Moving from lying on back to sitting on the side of a flat bed without bedrails?  --  2  -AH  --    Moving to and from a bed to a chair (including a wheelchair)?  --  1  -AH  --    Standing up from a chair using your arms (e.g., wheelchair, bedside chair)?  --  1  -AH  --    Climbing 3-5 steps with a railing?  --  1  -AH  --    To walk in hospital  room?  --  1  -AH  --    AM-PAC 6 Clicks Score  --  8  -AH  --       How much help from another is currently needed...    Putting on and taking off regular lower body clothing?  2  -TS  --  1  -MW    Bathing (including washing, rinsing, and drying)  2  -TS  --  2  -MW    Toileting (which includes using toilet bed pan or urinal)  2  -TS  --  2  -MW    Putting on and taking off regular upper body clothing  3  -TS  --  3  -MW    Taking care of personal grooming (such as brushing teeth)  4  -TS  --  4  -MW    Eating meals  4  -TS  --  4  -MW    Score  17  -TS  --  16  -MW       Functional Assessment    Outcome Measure Options  --  AM-PAC 6 Clicks Basic Mobility (PT)  -[facility] 6 Clicks Daily Activity (OT)  -    Row Name 03/30/19 0900             How much help from another person do you currently need...    Turning from your back to your side while in flat bed without using bedrails?  3  -MS (r) EW (t) MS (c)      Moving from lying on back to sitting on the side of a flat bed without bedrails?  2  -MS (r) EW (t) MS (c)      Moving to and from a bed to a chair (including a wheelchair)?  2  -MS (r) EW (t) MS (c)      Standing up from a chair using your arms (e.g., wheelchair, bedside chair)?  2  -MS (r) EW (t) MS (c)      Climbing 3-5 steps with a railing?  2  -MS (r) EW (t) MS (c)      To walk in hospital room?  2  -MS (r) EW (t) MS (c)      AM-PAC 6 Clicks Score  13  -MS (r) EW (t)         Functional Assessment    Outcome Measure Options  AM-PAC 6 Clicks Basic Mobility (PT)  -MS (r) EW (t) MS (c)        User Key  (r) = Recorded By, (t) = Taken By, (c) = Cosigned By    Initials Name Provider Type     Hailey Hernandez, PTA Physical Therapy Assistant    TS Alecia Nazario, IGNACIO/L Occupational Therapy Assistant    MS Belkis Esteban, PT, DPT, NCS Physical Therapist    MW Stephanie Keenan, OTR/L Occupational Therapist    Priscilla Hernandez, PT Student PT Student           Time Calculation:   Time Calculation- OT      Row Name 04/01/19 0841             Time Calculation- OT    OT Start Time  0817  -TS      OT Stop Time  0840  -TS      OT Time Calculation (min)  23 min  -TS      Total Timed Code Minutes- OT  23 minute(s)  -TS      OT Received On  04/01/19  -         Timed Charges    04992 - OT Self Care/Mgmt Minutes  23  -TS        User Key  (r) = Recorded By, (t) = Taken By, (c) = Cosigned By    Initials Name Provider Type     Alecia Nazario COTA/L Occupational Therapy Assistant        Therapy Charges for Today     Code Description Service Date Service Provider Modifiers Qty    29248034016 HC OT SELF CARE/MGMT/TRAIN EA 15 MIN 4/1/2019 Alecia Nazario COTA/L GO 2               Alecia TRAN. SERGE Nazario  4/1/2019

## 2019-04-01 NOTE — PLAN OF CARE
Problem: Patient Care Overview  Goal: Plan of Care Review   04/01/19 4569   Coping/Psychosocial   Plan of Care Reviewed With patient   Plan of Care Review   Progress improving   OTHER   Outcome Summary Patient cooperative and AOR x 3. Dressing C/D/I to right hip. Abduction wedge intact. Voiding per bedpan. Bladder scan showed >658, patient voided 650 per bedpan. Safety maintained. No new skin issues noted. Medicated x 1 with po prn pain med with good relief.

## 2019-04-01 NOTE — THERAPY TREATMENT NOTE
Acute Care - Physical Therapy Treatment Note  [facility]     Patient Name: Bita Croft  : 1955  MRN: [id number]  Today's Date: 2019  Onset of Illness/Injury or Date of Surgery: 19  Date of Referral to PT: 19  Referring Physician: Dr. Gonzales     Admit Date: 3/28/2019    Visit Dx:    ICD-10-CM ICD-9-CM   1. Closed right hip fracture, initial encounter (CMS/HCC) S72.001A 820.8   2. Impaired mobility Z74.09 799.89   3. Impaired mobility and ADLs Z74. 799.89     Patient Active Problem List   Diagnosis   • Confusion   • Depression   • Anxiety   • Hyponatremia-often polydyspia involved   • Seizure disorder (CMS/HCC)   • Chronic neck pain   • Chronic back pain-Ruxer   • Hypothyroidism   • Abnormal thyroid blood test   • Hypertension   • Wellness examination-done   • Elevated fasting glucose   • Anemia   • Iron deficiency   • Degenerative joint disease of spine   • Conversion disorder with abnormal movement   • Reflux laryngitis   • Gait difficulty   • Weakness of both legs   • Sore throat   • Polydipsia   • Nocturia   • Frequency of urination   • Urge incontinence   • Laboratory test*   • Chronic narcotic use-Ruxer   • Abnormal x-ray   • Noncompliance   • Lung nodule, solitary   • Non-smoker   • Hx of colon cancer, stage I   • Altered mental status   • Anticoagulated: DVT/(?)   • History of radiation therapy   • Closed right hip fracture, initial encounter (CMS/HCC)       Therapy Treatment    Rehabilitation Treatment Summary     Row Name 19 0858 19 0817          Treatment Time/Intention    Discipline  physical therapy assistant  -AE  occupational therapy assistant  -TS     Document Type  therapy note (daily note)  -AE  therapy note (daily note)  -TS     Subjective Information  complains of;pain  -AE  complains of;pain  -TS2     Patient Effort  --  adequate  -TS2     Existing Precautions/Restrictions  fall;right;hip  -AE  fall;right;hip, posterior  -TS2     Recorded by [AE] Domingo  Antonia ANTON, PTA 04/01/19 0926 [TS] Alecia Nazario IGNACIO/L 04/01/19 0817  [TS2] Alecia Nazario IGNACIO/L 04/01/19 0840     Row Name 04/01/19 0817             Cognitive Assessment/Intervention- PT/OT    Personal Safety Interventions  fall prevention program maintained;nonskid shoes/slippers when out of bed  -TS      Recorded by [TS] Alecia Nazario COTA/L 04/01/19 0840      Row Name 04/01/19 0858 04/01/19 0817          Bed Mobility Assessment/Treatment    Bed Mobility Assessment/Treatment  --  supine-sit  -TS     Supine-Sit [place] (Bed Mobility)  --  minimum assist (75% patient effort);moderate assist (50% patient effort)  -TS     Sit-Supine [place] (Bed Mobility)  moderate assist (50% patient effort);2 person assist;verbal cues  -AE  --     Assistive Device (Bed Mobility)  --  bed rails;head of bed elevated  -TS     Comment (Bed Mobility)  sitting EOB holding on to BS table  -AE2  increased time  -TS     Recorded by [AE] Antonia Lane, PTA 04/01/19 0943  [AE2] Antonia Lane, PTA 04/01/19 0926 [TS] Alecia Nazario COTA/L 04/01/19 0840     Row Name 04/01/19 0858             Sit-Stand Transfer    Sit-Stand [place] (Transfers)  maximum assist (25% patient effort);2 person assist;verbal cues stood x 2 second stand pt took 2 side steps  -AE      Recorded by [AE] Antonia Lane, PTA 04/01/19 0943      Row Name 04/01/19 0858             Stand-Sit Transfer    Stand-Sit [place] (Transfers)  moderate assist (50% patient effort);2 person assist  -AE      Recorded by [AE] Antonia Lane, PTA 04/01/19 0943      Row Name 04/01/19 0858             Gait/Stairs Assessment/Training    [place] Level (Gait)  moderate assist (50% patient effort);2 person assist  -AE      Assistive Device (Gait)  walker, front-wheeled  -AE      Distance in Feet (Gait)  --  (Significant)  2 side steps with help advancing L LE  -AE      Recorded by [AE] Antonia Lane, PTA 04/01/19 0943      Row Name  04/01/19 0817             ADL Assessment/Intervention    BADL Assessment/Intervention  feeding  -TS      Recorded by [TS] Alecia Nazario IGNACIO/L 04/01/19 0840      Row Name 04/01/19 0817             Self-Feeding Assessment/Training    [place] Level (Feeding)  set up;independent  -TS      Position (Self-Feeding)  edge of bed sitting  -TS      Recorded by [TS] Alecia Nazario IGNACIO/L 04/01/19 0840      Row Name 04/01/19 0858             Static Sitting Balance    Level of [place] (Unsupported Sitting, Static Balance)  standby assist  -AE      Recorded by [AE] Antonia Lane, PTA 04/01/19 0943      Row Name 04/01/19 0858             Static Standing Balance    Level of [place] (Supported Standing, Static Balance)  moderate assist, 50 to 74% patient effort;2 person assist  -AE      Recorded by [AE] Antonia Lane, PTA 04/01/19 0943      Row Name 04/01/19 0858 04/01/19 0817          Positioning and Restraints    Pre-Treatment Position  in bed  -AE  in bed  -TS     Post Treatment Position  bed  -AE  bed  -TS     In Bed  fowlers;call light within reach  -AE  sitting EOB;call light within reach;encouraged to call for assist;side rails up x1;notified nsg  -TS     Recorded by [AE] Antonia Lane, PTA 04/01/19 0943 [TS] Alecia Nazario IGNACIO/L 04/01/19 0840     Row Name 04/01/19 0858 04/01/19 0817          Pain Scale: Numbers Pre/Post-Treatment    Pain Scale: Numbers, Pretreatment  4/10  -AE  3/10  -TS     Pain Scale: Numbers, Post-Treatment  4/10  -AE  3/10  -TS     Pain Location - Side  Right  -AE  Right  -TS     Pain Location - Orientation  generalized  -AE  --     Pain Location  hip  -AE  hip  -TS     Pain Intervention(s)  Repositioned  -AE  Repositioned  -TS     Recorded by [AE] Antonia Lane, PTA 04/01/19 0943 [TS] Alecia Nazario IGNACIO/L 04/01/19 0840     Row Name                Wound 03/29/19 1232 Right hip incision    Wound - Properties Group Date first assessed: 03/29/19 [SH]  Time first assessed: 1232 [SH] Side: Right [SH] Location: hip [SH] Type: incision [SH] Recorded by:  [SH] Merary Velasquez RN 03/29/19 1232    Row Name 04/01/19 0817             Outcome Summary/Treatment Plan (OT)    Daily Summary of Progress (OT)  progress towards functional goals is fair  -TS      Recorded by [TS] Alecia Nazario, IGNACIO/L 04/01/19 0840        User Key  (r) = Recorded By, (t) = Taken By, (c) = Cosigned By    Initials Name Effective Dates Discipline    AE Antonia Lane PTA 06/22/15 -  PT    TS Alecia Nazario IGNACIO/L 08/02/16 -  OT     Merary Velasquez RN 08/02/16 -  Nurse          Wound 03/29/19 1232 Right hip incision (Active)   Dressing Appearance dry;intact;no drainage 3/31/2019  8:06 PM   Closure ELMA 3/31/2019  8:06 PM   Base dressing in place, unable to visualize 3/31/2019  8:06 PM   Drainage Amount none 3/31/2019  8:06 PM   Dressing Care, Wound foam 3/31/2019  8:06 PM           Physical Therapy Education     Title: PT OT SLP Therapies (Not Started)     Topic: Physical Therapy (In Progress)     Point: Mobility training (Done)     Learning Progress Summary           Patient Eager, E, VU by  at 4/1/2019  9:47 AM    Comment:  t/f's POC    Acceptance, E, NR by  at 3/30/2019 10:01 AM    Comment:  role of PT, posterior hip precautions, need for mobility                   Point: Precautions (Done)     Learning Progress Summary           Patient Acceptance, E,TB, VU by  at 3/31/2019 11:53 AM    Comment:  hip precautions    Acceptance, E, NR by  at 3/30/2019 10:01 AM    Comment:  role of PT, posterior hip precautions, need for mobility                               User Key     Initials Effective Dates Name Provider Type Discipline     06/22/15 -  Antonia Lane, FOREIGN Physical Therapy Assistant PT     08/02/16 -  Hailey Hernandez PTA Physical Therapy Assistant PT     03/04/19 -  Priscilla Machado PT Student PT Student PT                PT Recommendation and Plan     Plan of Care  Reviewed With: patient  Progress: improving  Outcome Summary: Pt was sitting EOB and needed mod x 2 to stand. Pt stood x 2 and on second stand pt was able to take 2 side steps with help advancing L LE  Outcome Measures     Row Name 04/01/19 0800 03/31/19 1100 03/30/19 1000       How much help from another person do you currently need...    Turning from your back to your side while in flat bed without using bedrails?  --  2  -AH  --    Moving from lying on back to sitting on the side of a flat bed without bedrails?  --  2  -AH  --    Moving to and from a bed to a chair (including a wheelchair)?  --  1  -AH  --    Standing up from a chair using your arms (e.g., wheelchair, bedside chair)?  --  1  -AH  --    Climbing 3-5 steps with a railing?  --  1  -AH  --    To walk in hospital room?  --  1  -AH  --    AM-PAC 6 Clicks Score  --  8  -AH  --       How much help from another is currently needed...    Putting on and taking off regular lower body clothing?  2  -TS  --  1  -MW    Bathing (including washing, rinsing, and drying)  2  -TS  --  2  -MW    Toileting (which includes using toilet bed pan or urinal)  2  -TS  --  2  -MW    Putting on and taking off regular upper body clothing  3  -TS  --  3  -MW    Taking care of personal grooming (such as brushing teeth)  4  -TS  --  4  -MW    Eating meals  4  -TS  --  4  -MW    Score  17  -TS  --  16  -MW       Functional Assessment    Outcome Measure Options  --  AM-PAC 6 Clicks Basic Mobility (PT)  -  AM-PAC 6 Clicks Daily Activity (OT)  -MW    Row Name 03/30/19 0900             How much help from another person do you currently need...    Turning from your back to your side while in flat bed without using bedrails?  3  -MS (r) EW (t) MS (c)      Moving from lying on back to sitting on the side of a flat bed without bedrails?  2  -MS (r) EW (t) MS (c)      Moving to and from a bed to a chair (including a wheelchair)?  2  -MS (r) EW (t) MS (c)      Standing up from a chair  using your arms (e.g., wheelchair, bedside chair)?  2  -MS (r) EW (t) MS (c)      Climbing 3-5 steps with a railing?  2  -MS (r) EW (t) MS (c)      To walk in hospital room?  2  -MS (r) EW (t) MS (c)      AM-PAC 6 Clicks Score  13  -MS (r) EW (t)         Functional Assessment    Outcome Measure Options  AM-PAC 6 Clicks Basic Mobility (PT)  -MS (r) EW (t) MS (c)        User Key  (r) = Recorded By, (t) = Taken By, (c) = Cosigned By    Initials Name Provider Type     Hailey Hernandez, PTA Physical Therapy Assistant    Alecia Franks, IGNACIO/L Occupational Therapy Assistant    MS Belkis Esteban, PT, DPT, NCS Physical Therapist    MW Stephanie Keenan, OTR/L Occupational Therapist    EW Priscilla Machado, PT Student PT Student         Time Calculation:   PT Charges     Row Name 04/01/19 0947             Time Calculation    Start Time  0858  -AE      Stop Time  0915  -AE      Time Calculation (min)  17 min  -AE      PT Received On  04/01/19  -AE      PT Goal Re-Cert Due Date  04/09/19  -AE         Timed Charges    80683 - PT Therapeutic Activity Minutes  17  -AE        User Key  (r) = Recorded By, (t) = Taken By, (c) = Cosigned By    Initials Name Provider Type    Antonia Sanchez PTA Physical Therapy Assistant        Therapy Charges for Today     Code Description Service Date Service Provider Modifiers Qty    97960655559  PT THERAPEUTIC ACT EA 15 MIN 4/1/2019 Antonia Lane PTA GP 1          PT G-Codes  Outcome Measure Options: AM-PAC 6 Clicks Basic Mobility (PT)  AM-PAC 6 Clicks Score: 8  Score: 17    Antonia Lane PTA  4/1/2019

## 2019-04-01 NOTE — PLAN OF CARE
Problem: Fall Risk (Adult)  Goal: Absence of Fall  Outcome: Ongoing (interventions implemented as appropriate)      Problem: Patient Care Overview  Goal: Plan of Care Review  Outcome: Ongoing (interventions implemented as appropriate)   03/31/19 4420   Coping/Psychosocial   Plan of Care Reviewed With patient;spouse   Plan of Care Review   Progress improving   OTHER   Outcome Summary Patient medicated prn for pain with relief noted. Patient A&OX3, safety maintained. Reposition q 2 hours and prn, no skin breakdown noted. Dressing CDI, PPP.     Goal: Individualization and Mutuality  Outcome: Ongoing (interventions implemented as appropriate)    Goal: Discharge Needs Assessment  Outcome: Ongoing (interventions implemented as appropriate)    Goal: Interprofessional Rounds/Family Conf  Outcome: Ongoing (interventions implemented as appropriate)      Problem: Fracture Orthopaedic (Adult)  Goal: Signs and Symptoms of Listed Potential Problems Will be Absent, Minimized or Managed (Fracture Orthopaedic)  Outcome: Ongoing (interventions implemented as appropriate)      Problem: Hip Arthroplasty (Total, Partial) (Adult)  Goal: Signs and Symptoms of Listed Potential Problems Will be Absent, Minimized or Managed (Hip Arthroplasty)  Outcome: Ongoing (interventions implemented as appropriate)      Problem: Skin Injury Risk (Adult)  Goal: Identify Related Risk Factors and Signs and Symptoms  Outcome: Ongoing (interventions implemented as appropriate)    Goal: Skin Health and Integrity  Outcome: Ongoing (interventions implemented as appropriate)

## 2019-04-01 NOTE — DISCHARGE INSTRUCTIONS
Re-admit to [facility]  Routine NH admit orders/procedures  NH to re/establish code status    Rx:   Oxygen; keep sat > 90% with q shift monitoring for first 48 hr  See AVS/discharge summary     Diet:   AHA  Consistency:               Solids: general                Fluids: thin  Calories [id number]  Fluids 60 oz/24 hr  Dietary referral for nutritional assessment, advise and follow     Activity:   Gradually increase as able  PT, OT, speech referrals   Up with assistance only until cleared by PT   PT to follow instructions for weight bearing per Dr Loaiza's orders  Wound care instructions for incision per Dr Loaiza's orders     Additional instructions:  LAB:   A.  CBC, CMP on 4.9.19  B. (if stays >1m)   2m CBC, CMP, iron  6m CBC, BMP, TSH, T4. iron, ferritin (dilantin level if on dilantin)  12m CBC, CMP, LIPID, TSH, T4, Vit D, B12, folate, Fe, ferritin, % sat, retic count (dilantin level if on dilantin)     F/u appointments:   Dr Gonzales will see (call between if needs to be seen earlier) 4.10.19  Dr Loaiza: as he directs

## 2019-04-01 NOTE — PLAN OF CARE
Problem: Fall Risk (Adult)  Goal: Absence of Fall  Outcome: Ongoing (interventions implemented as appropriate)      Problem: Patient Care Overview  Goal: Plan of Care Review  Outcome: Ongoing (interventions implemented as appropriate)   04/01/19 1548   Coping/Psychosocial   Plan of Care Reviewed With patient   Plan of Care Review   Progress improving   OTHER   Outcome Summary Pt sat on side of bed. Changed dressing this pm. Bed check on. Continue to turn every 2 hrs. Possible D/c this evening.       Problem: Fracture Orthopaedic (Adult)  Goal: Signs and Symptoms of Listed Potential Problems Will be Absent, Minimized or Managed (Fracture Orthopaedic)  Outcome: Ongoing (interventions implemented as appropriate)      Problem: Hip Arthroplasty (Total, Partial) (Adult)  Goal: Signs and Symptoms of Listed Potential Problems Will be Absent, Minimized or Managed (Hip Arthroplasty)  Outcome: Ongoing (interventions implemented as appropriate)      Problem: Skin Injury Risk (Adult)  Goal: Identify Related Risk Factors and Signs and Symptoms  Outcome: Ongoing (interventions implemented as appropriate)    Goal: Skin Health and Integrity  Outcome: Ongoing (interventions implemented as appropriate)

## 2019-04-01 NOTE — PLAN OF CARE
Problem: Patient Care Overview  Goal: Plan of Care Review  Outcome: Ongoing (interventions implemented as appropriate)   04/01/19 0965   Coping/Psychosocial   Plan of Care Reviewed With patient   Plan of Care Review   Progress improving   OTHER   Outcome Summary Pt was sitting EOB and needed mod x 2 to stand. Pt stood x 2 and on second stand pt was able to take 2 side steps with RW and needed help advancing L LE. Pt was mod x 2 sit to supine. Pt would benefit from rehab.

## 2019-04-01 NOTE — DISCHARGE SUMMARY
"Patient ID: Bita Croft  MRN: [id number]     Acct:  [id number]    Admit Date: 3/28/2019   Discharge Date: 4.1.19  Date of service: 4.1.19    Consults:    IP CONSULT TO ORTHOPEDIC SURGERY  IP CONSULT TO CASE MANAGEMENT   IP CONSULT TO CASE MANAGEMENT     CHIEF COMPLAINT: \"she broke her hip\"     HISTORY OF PRESENT ILLNESS: She fell at the nursing home.  She had pain in her right hip.  X-rays showed a fracture.  She was admitted for surgery.  There was no syncope.  There was no other injury noted.     No Known Allergies     HOME MEDICATIONS:          Prior to Admission medications    Medication Sig Start Date End Date Taking? Authorizing Provider   albuterol sulfate  (90 Base) MCG/ACT inhaler Inhale 2 puffs 4 (Four) Times a Day.     Yes Beverly Wells MD   clonazePAM (KlonoPIN) 1 MG tablet Take 1 tablet by mouth 3 (Three) Times a Day As Needed for Anxiety. 3/14/19   Yes Josafat Gonzales MD   cyclobenzaprine (FLEXERIL) 10 MG tablet Take 1 tablet by mouth 3 (Three) Times a Day. 3/14/19   Yes Josafat Gonzales MD   DULoxetine (CYMBALTA) 30 MG capsule Take 30 mg by mouth Daily.     Yes Beverly Wells MD   enoxaparin (LOVENOX) 40 MG/0.4ML solution syringe Inject 0.4 mL under the skin into the appropriate area as directed Daily. 3/14/19   Yes Josafat Gonzales MD   gabapentin (NEURONTIN) 600 MG tablet Take 1 tablet by mouth 3 (Three) Times a Day. 5/31/18   Yes Josafat Gonzales MD   levETIRAcetam XR (KEPPRA XR) 500 MG 24 hr tablet Take 2 tablets by mouth Every 12 (Twelve) Hours. 3/14/19   Yes Josafat Gonzales MD   levothyroxine (SYNTHROID, LEVOTHROID) 100 MCG tablet Take 1 tablet by mouth Daily.     Yes Beverly Wells MD   metoprolol tartrate (LOPRESSOR) 50 MG tablet Take 1 tablet by mouth Daily. 10/29/18   Yes Josafat Gonzales MD   MORPHABOND ER 15 MG tablet extended-release 12 hour Take 1 tablet by mouth Every Night. 3/14/19   Yes " Josafat Gonzales MD   oxyCODONE (ROXICODONE) 10 MG tablet Take 1 tablet by mouth Every 4 (Four) Hours As Needed for Moderate Pain . 3/14/19   Yes Josafat Gonzales MD   oxyCODONE-acetaminophen (PERCOCET) 5-325 MG per tablet Take 2 tablets by mouth Every 4 (Four) Hours As Needed for Moderate Pain . 3/14/19   Yes Josafat Gonzales MD   potassium chloride (MICRO-K) 10 MEQ CR capsule Take 2 capsules by mouth 2 (Two) Times a Day With Meals. 3/14/19   Yes Josafat Gonzales MD   QUEtiapine (SEROquel) 100 MG tablet Take 1 tablet by mouth 2 (Two) Times a Day. 17   Yes Josafat Gonzales MD   solifenacin (VESICARE) 5 MG tablet Take 1 tablet by mouth Daily. 8/15/17   Yes Pawel Ho MD   tolterodine LA (DETROL LA) 2 MG 24 hr capsule Take 2 mg by mouth Daily.     Yes ProviderBeverly MD   traZODone (DESYREL) 50 MG tablet Take 1 tablet by mouth Every Night. 18   Yes Josafat Gonzales MD   venlafaxine XR (EFFEXOR-XR) 150 MG 24 hr capsule Take 2 capsules by mouth Daily. 18   Yes Josafat Gonzales MD   acetaminophen (TYLENOL) 325 MG tablet Take 2 tablets by mouth Every 4 (Four) Hours As Needed for Mild Pain  or Fever. 18     Josafat Gonzales MD         PAST HISTORY:  CHILDHOOD: unremarkable.      PROCEDURES:   SCANNED  [id number]  Colonoscopy+tort-mass/[facility]//8.4.14/BE  EGD+nl/[facility]//5.15/UGI  Colonoscopy+polyp/[facility]//9..15/3y     SURGERIES:  T&A/age 11  R knee/[facility]/LH/age 13  SANDRA+BSO/[facility]/age 32  T12/Gómezrnidia/Lorena/  Gastric by-pass/Lorena/  L knee/Martinez/Lorena/  C surgery/Ribrisa/[facility]/06  C surgery/Tequila/Dorothea/08  Lap GB/[facility]/Armando/3-18-10  L arthroscope/  R lap colectomy/[place]othea/..14   ORIF L wrist/[place]/LUIZ/.16     FAMILY HISTORY:  Hypertension mother, maternal aunt, father, paternal  grandfather; heart disease in mother, father, maternal grandfather, maternal  grandmother, paternal grandmother; diabetes in maternal  uncle.      HABITS:  Never smoked, only limited second-hand smoke exposure.  No alcohol or  drugs.      SOCIAL HISTORY:   in 1987 to her current , has 1 son.  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     MEDICAL ADMISSIONS:  MM:   11.8.08-11.12.08-hyponatremia  2.8.10-2.9.10-L flank pain  3.13.14-3.15.14 hever/TIA  12.26.17-12.27.17    Shortness of breath  Cough  Flu B  Bronchitis-acute  Abnormal CT chest-AGUILAR spiculated nodule  Chest wall pain  Hyponatremia 128  Neutropenia 2.54     5.26.18-5.31.18  Lethargy-likely 2nd overdose Imodium-resolved  Confusion-same-resolved  Brief intubation (airway protection) 26-27th  Abnormal CXR-? Pneumonia/atelectasis  Overdose Imodium; initial ? suicide jesture refuted  Depression-acute/chronic  Anxiety-acute/chronic  Hypotension-brief and decrease Rx  Cheilosis-improved empiric monistat  Gait decline-acute/chronic  Anemia-component phlebotomy, (substrate neg)  +BC/staph homnis/epidermitis (contaminet)  E coli bacteruria-likely UTI  UTI-     BH:   7.21-7.23.95 abdominal pain  2.19.08-2.21.08-DVT  2.9.10-2.11.10-abdominal pain/intercostal  3.11.10-3.19.10- hever-abdominal pain+SIADH  3.24.15-3.30.15 diarrhea  5.26.15-5.31.15 nausea/vomiting/diarrhea  7.12.17-7.17.17 accidential injection water bottle cap     3.10.19-3.14.19  Confusion-delirium (? Sepsis, ? Seizure)-resolved  Hx seizure disorder-normal EEG  Abnormal UA-(21-30WBC, mod leuk, 2+ bacteria) maybe UTI (BC neg, 10.5 strept                               Group B)  CXR-infiltrate (note lack cough, etc)-maybe pneumonia-persistant L peihilair/mid                               Lung infiltrate  Sepsis (tachycardia 104, lactic acid 6.0)-maybe  Low phenytoin level 3.0-not taking-improved  Hypopotassemia-2.6-kxarrvqs-mmarxjsy  Drug screen THC, opiate  Dhapxqler-ahdgedcwf-vfxxqbcm  NPO-resolved  Cerebral atrophy-frontal  YOLIE-without treatment  Non-compliance; Rx (dilantin,  "keppra), CPAP  B12 def (post gastric bypass);   Vit D def (post gastric bypass)  Polypharmacy; difficult Rx list     LH:   10.26.16-11.6.16 seizure     GENERAL:  Inactive/slower with limits, speed, stamina for age and pains, desire. Sleep is ok usually with Rx.  Has pain management and psych. No fever noted above.  ENDO:  No syncope, near or diaphoretic sweaty spells.  BS Ok since here.  HEENT: No head injury or headache.  No vision change.   No significant hearing loss.  Ears without pain/drainage.  No sore throat.  No significant nasal/sinus congestion/drainage. No epistaxis.  CHEST: No chest wall tenderness or mass. No significant cough, wheeze, SOB; no hemoptysis.  CV: No chest pain, palpitations, significant ankle edema.  GI: No heartburn, dysphagia.  No abdominal pain, diarrhea, constipation, rectal bleeding, or melena.  :  Voids without dysuria, or incontinence to completion.  ORTHO: No painful/swollen joints but various on /off sore.  Usually has sore neck or back.  No acute neck or back pain without recent injury.   NEURO: No dizziness, weakness of extremities.  Usually has LE numbness/paresthesias.   PSYCH: Prior memory loss.  Mood always variable; often anxious, depressed but/and not suicidal though has had prior overdose.  Tolerates stress variably.       PHYSICAL EXAMINATION:  /82 (BP Location: Left arm, Patient Position: Lying)   Pulse 91   Temp 98.1 °F (36.7 °C) (Oral)   Resp 18   Ht 180.3 cm (71\")   Wt 99.8 kg (220 lb)   LMP  (LMP Unknown)   SpO2 100%   BMI 30.68 kg/m²      GENERAL:  Well nourished/developed in no acute distress. Obese   SKIN: Turgor excellent, without wound, rash, lesion; mild chelosis.  HEENT: Normal cephalic without trauma.  Pupils equal round reactive to light. Extraocular motions full without nystagmus.     External canals nonobstructive nontender without reddness.  Oral cavity without growths, exudates, and moist.  Posterior pharynx without mass, obstruction, " redness.  No thyromegaly, mass, tenderness, lymphadenopathy and supple.  CV: Regular rhythm.  No murmur, gallop,  edema. Posterior pulses intact.  No carotid bruits.   CHEST: No chest wall tenderness or mass.   LUNGS: Symmetric motion with clear to auscultation.  ABD: Soft, nontender without mass.   ORTHO: Symmetric extremities without swelling/point tenderness.  Full gross range of motion.    NEURO: CN 2-12 grossly intact.  Symmetric facies. 1/4 x bicep equal reflexes.  UE/LE   2/5 strength throughout.  Nonfocal use extremities. Speech clear.    PSYCH: Oriented x 3.  Pleasant calm, well kept.  Purposeful/directed conservation with intact short/long gross memory.     ASSESSMENT/PROBLEM LIST:   64 y/o white female   Allergy/intolerance: see above  Procedural history: see above  Family history: see above  Obesity.  This is status post gastric bypass initial failure and regain of  weight.     1 para 1 AB 0.    Surgical menopause.  Osteoporosis on previous wrist study (hip fx 3.28.19)   Hypertension.    Hypothyroidism.    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    History of DVT - left peroneal popliteal, 2008, treated              successfully with anticoagulation.   History anticoagulation: hx DVT/(coumadin)  Chronic insomnia.  Spinal spondylosis; this is primarily cervical with multiple surgeries.  Chronic neck pain.    Chronic back pain  Chronic narcotics-from pain management/[facility]uxer  Chronic depression - followed by Dr. Samaniego.    Degenerative joint disease that is diffuse.   LE weakness  Gait difficulty-chronic   History of gastric bypass and metabolic risk it carries.    Colon cancer history with previous surgery and chemo, no obvious              recurrence.  Gastroesophageal reflux.  Recurrent laryngitis  Documented neuropathy by EMG nerve conduction  Seizure disorder  (2016)  antieliptics-Keppra/Dilantin  Cerebral atrophy  Abnormal CT chest-treating nonsurgically  Polypharmacy   Urinary incontience (history urge incontinence)  Vitamin D def (post gastric bypass)  Vitamin B12 def (post gastric bypass)  YOLIE     REASON FOR ADMISSION:    R hip fracture  Pain R hip  Gait difficulty-acute/chronic  Perioperative state  Anticoagulation needs: hx DVT, DVT risk/(coumadin) [facility] COARSE: She remained medically stable through the entire stay.  She was taken to surgery the next day and had an uneventful repair of her right hip fracture.  Her medications were temporarily held by accident; once they were restarted her pain was under control along with her anxiety and other issues.  She slowly began a therapy program; with baseline difficulties she certainly has a long way to go before achieving what ever her ultimate is for gait balance and other mobility activities.  But with all related parameters stable she can now be returned to the nursing home to continue the current care; and her rehab.    Labs included:     Labs 24 hr before discharge:  Lab Results (last 24 hours)     ** No results found for the last 24 hours. **          Lab Results otherwise  CBC:   Results from last 7 days   Lab Units 03/30/19  0540 03/29/19  0421 03/28/19  1350   WBC 10*3/mm3 7.73 5.29 5.87   HEMOGLOBIN g/dL 9.5* 9.9* 11.6*   HEMATOCRIT % 27.9* 30.6* 35.7*   PLATELETS 10*3/mm3 254 278 326     BMP:  Results from last 7 days   Lab Units 03/30/19  0540 03/29/19  0421 03/28/19  1350   SODIUM mmol/L 133* 135 134*   POTASSIUM mmol/L 4.3 4.5 5.0   CHLORIDE mmol/L 100 101 96*   CO2 mmol/L 28.0 30.0 31.0   BUN mg/dL 9 8 11   CREATININE mg/dL 0.65 0.59 0.63   GLUCOSE mg/dL 90 77 85   CALCIUM mg/dL 7.9* 8.0* 8.5   ALT (SGPT) U/L  --   --  20     Culture Results:  none    DISCHARGE ASSESSMENT:  Reasons for admit/problems address while here:   R hip fracture-post surgery  Pain R hip-improving  R hip  incision-healing  Gait difficulty-acute/chronic-slowly improving  Post-operative state-improving  Anticoagulation needs: hx DVT, DVT risk/(coumadin) (lovonex), xarelto  Anemia-chronic-component post op/blood loss  Arthritis-diffuse  Joint pains-multiple-chronic (worse without Rx)  Back pain-chronic (worse without Rx)  Neck pain-chronic (worse without Rx)  Anxiety-chronic (worse without Rx)  Medication error (accidentaly holding of Rx)    Chronic problems affecting stay:  See above      PLAN:   See AVS    Discharge Disposition:  Skilled Nursing Facility (DC - External)    Discharge Medications:     Discharge Medications      New Medications      Instructions Start Date   rivaroxaban 10 MG tablet  Commonly known as:  XARELTO   10 mg, Oral, Daily   Start Date:  4/2/2019        Continue These Medications      Instructions Start Date   acetaminophen 325 MG tablet  Commonly known as:  TYLENOL   650 mg, Oral, Every 4 Hours PRN      albuterol sulfate  (90 Base) MCG/ACT inhaler  Commonly known as:  PROVENTIL HFA;VENTOLIN HFA;PROAIR HFA   2 puffs, Inhalation, 4 Times Daily      clonazePAM 1 MG tablet  Commonly known as:  KlonoPIN   1 mg, Oral, 3 Times Daily PRN      cyclobenzaprine 10 MG tablet  Commonly known as:  FLEXERIL   10 mg, Oral, 3 Times Daily      DULoxetine 30 MG capsule  Commonly known as:  CYMBALTA   30 mg, Oral, Daily      gabapentin 600 MG tablet  Commonly known as:  NEURONTIN   600 mg, Oral, 3 Times Daily      levETIRAcetam  MG 24 hr tablet  Commonly known as:  KEPPRA XR   1,000 mg, Oral, Every 12 Hours      levothyroxine 100 MCG tablet  Commonly known as:  SYNTHROID, LEVOTHROID   1 tablet, Oral, Daily      metoprolol tartrate 50 MG tablet  Commonly known as:  LOPRESSOR   50 mg, Oral, Daily      MORPHABOND ER 15 MG tablet extended-release 12 hour  Generic drug:  Morphine Sulfate ER   1 tablet, Oral, Nightly      oxyCODONE 10 MG tablet  Commonly known as:  ROXICODONE   10 mg, Oral, Every 4 Hours  PRN      oxyCODONE-acetaminophen 5-325 MG per tablet  Commonly known as:  PERCOCET   2 tablets, Oral, Every 4 Hours PRN      potassium chloride 10 MEQ CR capsule  Commonly known as:  MICRO-K   20 mEq, Oral, 2 Times Daily With Meals      QUEtiapine 100 MG tablet  Commonly known as:  SEROquel   100 mg, Oral, 2 Times Daily      solifenacin 5 MG tablet  Commonly known as:  VESICARE   5 mg, Oral, Daily      tolterodine LA 2 MG 24 hr capsule  Commonly known as:  DETROL LA   2 mg, Oral, Daily      traZODone 50 MG tablet  Commonly known as:  DESYREL   50 mg, Oral, Nightly      venlafaxine  MG 24 hr capsule  Commonly known as:  EFFEXOR-XR   300 mg, Oral, Daily         Stop These Medications    enoxaparin 40 MG/0.4ML solution syringe  Commonly known as:  LOVENOX            Discharge Care Plan/Instructions:   Re-admit to [facility]  Routine NH admit orders/procedures  NH to re/establish code status    Rx:   Oxygen; keep sat > 90% with q shift monitoring for first 48 hr  See AVS/discharge summary     Diet:   AHA  Consistency:               Solids: general                Fluids: thin  Calories 2624-8364  Fluids 60 oz/24 hr  Dietary referral for nutritional assessment, advise and follow     Activity:   Gradually increase as able  PT, OT, speech referrals   Up with assistance only until cleared by PT   PT to follow instructions for weight bearing per Dr Loaiza's orders  Wound care instructions for incision per Dr Loaiza's orders     Additional instructions:  LAB:   A.  CBC, CMP on 4.9.19  B. (if stays >1m)   2m CBC, CMP, iron  6m CBC, BMP, TSH, T4. iron, ferritin (dilantin level if on dilantin)  12m CBC, CMP, LIPID, TSH, T4, Vit D, B12, folate, Fe, ferritin, % sat, retic count (dilantin level if on dilantin)     F/u appointments:   Dr Gonzales will see (call between if needs to be seen earlier) 4.10.19  Dr Loaiza: as he directs    Other appointments:  (NH be aware)  Future Appointments   Date Time Provider  Department Center   4/18/2019  2:00 PM Sergio Leal III, MD [facility] None   5/3/2019  1:30 PM Josafat Gonzales MD [facility] None       CONDITION: stable/improved    PROGNOSIS: guarded

## 2019-04-01 NOTE — PROGRESS NOTES
Continued Stay Note   [facility]     Patient Name: Bita Croft  MRN: [id number]  Today's Date: 4/1/2019    Admit Date: 3/28/2019    Discharge Plan     Row Name 04/01/19 1052       Plan    Plan Comments  Pt still has bedhold at [facility]. Facility will be ready for pt when pt is ready for discharge. SW will follow. Phone: [phone] Fax: [phone]         Discharge Codes    No documentation.             Donya Weldon

## 2019-04-01 NOTE — PROGRESS NOTES
Continued Stay Note   [place]     Patient Name: Bita Croft  MRN: [id number]  Today's Date: 4/1/2019    Admit Date: 3/28/2019    Discharge Plan     Row Name 04/01/19 1542       Plan    Plan Comments  Malou from [facility] contacted  in regards to precert. Facility was under impression precert was not needed but it is. Appropriate information was sent to facility and facility will contact  when precert is completed. SW will follow.          Discharge Codes    No documentation.       Expected Discharge Date and Time     Expected Discharge Date Expected Discharge Time    Apr 1, 2019             Donya Weldon

## 2019-04-02 NOTE — PAYOR COMM NOTE
"DC TO SNF 4-1-19    [id number]  Bita Croft (63 y.o. Female)     Date of Birth Social Security Number Address Home Phone MRN    1955  [address]   [place] [address] [phone] [id number]    Christianity Marital Status          Congregation        Admission Date Admission Type Admitting Provider Attending Provider Department, Room/Bed    3/28/19 Emergency Josafat Gonzales MD  [facility] 3A, 325/1    Discharge Date Discharge Disposition Discharge Destination        4/1/2019 Skilled Nursing Facility (DC - External)              Attending Provider:  (none)   Allergies:  No Known Allergies    Isolation:  None   Infection:  None   Code Status:  Prior    Ht:  180.3 cm (71\")   Wt:  99.8 kg (220 lb)    Admission Cmt:  None   Principal Problem:  Closed right hip fracture, initial encounter (CMS/[facility]) [S72.001A] More...                 Active Insurance as of 3/28/2019     Primary Coverage     Payor Plan Insurance Group Employer/Plan Group    [employer]ET[employer] COMMERCIAL AETNA [id number]     Payor Plan Address Payor Plan Phone Number Payor Plan Fax Number Effective Dates    [address] [phone]  7/1/2017 - None Entered    [facility] [id number]       Subscriber Name Subscriber Birth Date Member ID       OCHOA CROFT 1955 [id number]                 Emergency Contacts      (Rel.) Home Phone Work Phone Mobile Phone    Carlito Croft (Spouse) [phone] -- --               Discharge Summary      Josafat Gonzales MD at 4/1/2019  1:16 PM          Patient ID: Bita Croft  MRN: [id number]     Acct:  [id number]    Admit Date: 3/28/2019   Discharge Date: 4.1.19  Date of service: 4.1.19    Consults:    IP CONSULT TO ORTHOPEDIC SURGERY  IP CONSULT TO CASE MANAGEMENT   IP CONSULT TO CASE MANAGEMENT     CHIEF COMPLAINT: \"she broke her hip\"     HISTORY OF PRESENT ILLNESS: She fell at the nursing home.  She had pain in her right hip.  X-rays showed a " fracture.  She was admitted for surgery.  There was no syncope.  There was no other injury noted.     No Known Allergies     HOME MEDICATIONS:          Prior to Admission medications    Medication Sig Start Date End Date Taking? Authorizing Provider   albuterol sulfate  (90 Base) MCG/ACT inhaler Inhale 2 puffs 4 (Four) Times a Day.     Yes Beverly Wells MD   clonazePAM (KlonoPIN) 1 MG tablet Take 1 tablet by mouth 3 (Three) Times a Day As Needed for Anxiety. 3/14/19   Yes Josafat Gonzales MD   cyclobenzaprine (FLEXERIL) 10 MG tablet Take 1 tablet by mouth 3 (Three) Times a Day. 3/14/19   Yes Josafat Gonzales MD   DULoxetine (CYMBALTA) 30 MG capsule Take 30 mg by mouth Daily.     Yes Beverly Wells MD   enoxaparin (LOVENOX) 40 MG/0.4ML solution syringe Inject 0.4 mL under the skin into the appropriate area as directed Daily. 3/14/19   Yes Josafat Gonzales MD   gabapentin (NEURONTIN) 600 MG tablet Take 1 tablet by mouth 3 (Three) Times a Day. 5/31/18   Yes Josafat Gonzales MD   levETIRAcetam XR (KEPPRA XR) 500 MG 24 hr tablet Take 2 tablets by mouth Every 12 (Twelve) Hours. 3/14/19   Yes Josafat Gonzales MD   levothyroxine (SYNTHROID, LEVOTHROID) 100 MCG tablet Take 1 tablet by mouth Daily.     Yes Beverly Wells MD   metoprolol tartrate (LOPRESSOR) 50 MG tablet Take 1 tablet by mouth Daily. 10/29/18   Yes Josafat Gonzales MD   MORPHABOND ER 15 MG tablet extended-release 12 hour Take 1 tablet by mouth Every Night. 3/14/19   Yes Josafat Gonzales MD   oxyCODONE (ROXICODONE) 10 MG tablet Take 1 tablet by mouth Every 4 (Four) Hours As Needed for Moderate Pain . 3/14/19   Yes Josafat Gonzales MD   oxyCODONE-acetaminophen (PERCOCET) 5-325 MG per tablet Take 2 tablets by mouth Every 4 (Four) Hours As Needed for Moderate Pain . 3/14/19   Yes Josafat Gonzales MD   potassium chloride (MICRO-K) 10 MEQ CR capsule Take 2 capsules by mouth 2 (Two) Times a Day  With Meals. 3/14/19   Yes Josafat Gonzales MD   QUEtiapine (SEROquel) 100 MG tablet Take 1 tablet by mouth 2 (Two) Times a Day. 17   Yes Josafat Gonzales MD   solifenacin (VESICARE) 5 MG tablet Take 1 tablet by mouth Daily. 8/15/17   Yes Pawel Ho MD   tolterodine LA (DETROL LA) 2 MG 24 hr capsule Take 2 mg by mouth Daily.     Yes ProviderBeverly MD   traZODone (DESYREL) 50 MG tablet Take 1 tablet by mouth Every Night. 18   Yes Josafat Gonzales MD   venlafaxine XR (EFFEXOR-XR) 150 MG 24 hr capsule Take 2 capsules by mouth Daily. 18   Yes Josafat Gonzales MD   acetaminophen (TYLENOL) 325 MG tablet Take 2 tablets by mouth Every 4 (Four) Hours As Needed for Mild Pain  or Fever. 18     Josafat Gonzales MD         PAST HISTORY:  CHILDHOOD: unremarkable.      PROCEDURES:   SCANNED  [id number]  Colonoscopy+tort-mass/[facility]//8.4.14/BE  EGD+nl/[facility]//.15/UGI  Colonoscopy+polyp/[facility]//9..15/3y     SURGERIES:  T&A/age 11  R knee/H Hunt/LH/age 13  SANDRA+BSO/Mckeon/WB/age 32  T12/[place]//  Gastric by-pass//  L knee/Martinez/Lorena/  C surgery/Tequila/Dorothea/06  C surgery/Tequila/Dorothea/08  Lap GB/WB/Armando/3-18-10  L arthroscope/  R lap colectomy/Dorothea/.14   ORIF L wrist/Nj//.16     FAMILY HISTORY:  Hypertension mother, maternal aunt, father, paternal  grandfather; heart disease in mother, father, maternal grandfather, maternal  grandmother, paternal grandmother; diabetes in maternal uncle.      HABITS:  Never smoked, only limited second-hand smoke exposure.  No alcohol or  drugs.      SOCIAL HISTORY:   in  to her current , has 1 son.  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     MEDICAL ADMISSIONS:  [facility]:   11.8.08-11.12.08-hyponatremia  2.8.10-2.9.10-L flank pain  3.13.14-3.15.14 hever/TIA  12.26.17-12.27.17    Shortness of breath  Cough  Flu  B  Bronchitis-acute  Abnormal CT chest-AGUILAR spiculated nodule  Chest wall pain  Hyponatremia 128  Neutropenia 2.54     5.26.18-5.31.18  Lethargy-likely 2nd overdose Imodium-resolved  Confusion-same-resolved  Brief intubation (airway protection) 26-27th  Abnormal CXR-? Pneumonia/atelectasis  Overdose Imodium; initial ? suicide jesture refuted  Depression-acute/chronic  Anxiety-acute/chronic  Hypotension-brief and decrease Rx  Cheilosis-improved empiric monistat  Gait decline-acute/chronic  Anemia-component phlebotomy, (substrate neg)  +BC/staph homnis/epidermitis (contaminet)  E coli bacteruria-likely UTI  UTI-     BH:   7.21-7.23.95 abdominal pain  2.19.08-2.21.08-DVT  2.9.10-2.11.10-abdominal pain/intercostal  3.11.10-3.19.10- hever-abdominal pain+SIADH  3.24.15-3.30.15 diarrhea  5.26.15-5.31.15 nausea/vomiting/diarrhea  7.12.17-7.17.17 accidential injection water bottle cap     3.10.19-3.14.19  Confusion-delirium (? Sepsis, ? Seizure)-resolved  Hx seizure disorder-normal EEG  Abnormal UA-(21-30WBC, mod leuk, 2+ bacteria) maybe UTI (BC neg, 10.5 strept                               Group B)  CXR-infiltrate (note lack cough, etc)-maybe pneumonia-persistant L peihilair/mid                               Lung infiltrate  Sepsis (tachycardia 104, lactic acid 6.0)-maybe  Low phenytoin level 3.0-not taking-improved  Hypopotassemia-2.0-cakghnwy-drpzezbm  Drug screen THC, opiate  Lubhzlhcp-wzdfaeemv-ywrmolta  NPO-resolved  Cerebral atrophy-frontal  YOLIE-without treatment  Non-compliance; Rx (dilantin, keppra), CPAP  B12 def (post gastric bypass);   Vit D def (post gastric bypass)  Polypharmacy; difficult Rx list     LH:   10.26.16-11.6.16 seizure     GENERAL:  Inactive/slower with limits, speed, stamina for age and pains, desire. Sleep is ok usually with Rx.  Has pain management and psych. No fever noted above.  ENDO:  No syncope, near or diaphoretic sweaty spells.  BS Ok since here.  HEENT: No head injury or headache.  No  "vision change.   No significant hearing loss.  Ears without pain/drainage.  No sore throat.  No significant nasal/sinus congestion/drainage. No epistaxis.  CHEST: No chest wall tenderness or mass. No significant cough, wheeze, SOB; no hemoptysis.  CV: No chest pain, palpitations, significant ankle edema.  GI: No heartburn, dysphagia.  No abdominal pain, diarrhea, constipation, rectal bleeding, or melena.  :  Voids without dysuria, or incontinence to completion.  ORTHO: No painful/swollen joints but various on /off sore.  Usually has sore neck or back.  No acute neck or back pain without recent injury.   NEURO: No dizziness, weakness of extremities.  Usually has LE numbness/paresthesias.   PSYCH: Prior memory loss.  Mood always variable; often anxious, depressed but/and not suicidal though has had prior overdose.  Tolerates stress variably.       PHYSICAL EXAMINATION:  /82 (BP Location: Left arm, Patient Position: Lying)   Pulse 91   Temp 98.1 °F (36.7 °C) (Oral)   Resp 18   Ht 180.3 cm (71\")   Wt 99.8 kg (220 lb)   LMP  (LMP Unknown)   SpO2 100%   BMI 30.68 kg/m²      GENERAL:  Well nourished/developed in no acute distress. Obese   SKIN: Turgor excellent, without wound, rash, lesion; mild chelosis.  HEENT: Normal cephalic without trauma.  Pupils equal round reactive to light. Extraocular motions full without nystagmus.     External canals nonobstructive nontender without reddness.  Oral cavity without growths, exudates, and moist.  Posterior pharynx without mass, obstruction, redness.  No thyromegaly, mass, tenderness, lymphadenopathy and supple.  CV: Regular rhythm.  No murmur, gallop,  edema. Posterior pulses intact.  No carotid bruits.   CHEST: No chest wall tenderness or mass.   LUNGS: Symmetric motion with clear to auscultation.  ABD: Soft, nontender without mass.   ORTHO: Symmetric extremities without swelling/point tenderness.  Full gross range of motion.    NEURO: CN 2-12 grossly intact.  " Symmetric facies. 1/4 x bicep equal reflexes.  UE/LE   2/5 strength throughout.  Nonfocal use extremities. Speech clear.    PSYCH: Oriented x 3.  Pleasant calm, well kept.  Purposeful/directed conservation with intact short/long gross memory.     ASSESSMENT/PROBLEM LIST:   64 y/o white female   Allergy/intolerance: see above  Procedural history: see above  Family history: see above  Obesity.  This is status post gastric bypass initial failure and regain of  weight.     1 para 1 AB 0.    Surgical menopause.  Osteoporosis on previous wrist study (hip fx 3.28.19)   Hypertension.    Hypothyroidism.    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    History of DVT - left peroneal popliteal, 2008, treated              successfully with anticoagulation.   History anticoagulation: hx DVT/(coumadin)  Chronic insomnia.  Spinal spondylosis; this is primarily cervical with multiple surgeries.  Chronic neck pain.    Chronic back pain  Chronic narcotics-from pain management/Ruxer  Chronic depression - followed by Dr. Samaniego.    Degenerative joint disease that is diffuse.   LE weakness  Gait difficulty-chronic   History of gastric bypass and metabolic risk it carries.    Colon cancer history with previous surgery and chemo, no obvious              recurrence.  Gastroesophageal reflux.  Recurrent laryngitis  Documented neuropathy by EMG nerve conduction  Seizure disorder (2016)  antieliptics-Keppra/Dilantin  Cerebral atrophy  Abnormal CT chest-treating nonsurgically  Polypharmacy   Urinary incontience (history urge incontinence)  Vitamin D def (post gastric bypass)  Vitamin B12 def (post gastric bypass)  YOLIE     REASON FOR ADMISSION:    R hip fracture  Pain R hip  Gait difficulty-acute/chronic  Perioperative state  Anticoagulation needs: hx DVT, DVT risk/(coumadin) [facility] COARSE: She remained  medically stable through the entire stay.  She was taken to surgery the next day and had an uneventful repair of her right hip fracture.  Her medications were temporarily held by accident; once they were restarted her pain was under control along with her anxiety and other issues.  She slowly began a therapy program; with baseline difficulties she certainly has a long way to go before achieving what ever her ultimate is for gait balance and other mobility activities.  But with all related parameters stable she can now be returned to the nursing home to continue the current care; and her rehab.    Labs included:     Labs 24 hr before discharge:  Lab Results (last 24 hours)     ** No results found for the last 24 hours. **          Lab Results otherwise  CBC:   Results from last 7 days   Lab Units 03/30/19  0540 03/29/19  0421 03/28/19  1350   WBC 10*3/mm3 7.73 5.29 5.87   HEMOGLOBIN g/dL 9.5* 9.9* 11.6*   HEMATOCRIT % 27.9* 30.6* 35.7*   PLATELETS 10*3/mm3 254 278 326     BMP:  Results from last 7 days   Lab Units 03/30/19  0540 03/29/19  0421 03/28/19  1350   SODIUM mmol/L 133* 135 134*   POTASSIUM mmol/L 4.3 4.5 5.0   CHLORIDE mmol/L 100 101 96*   CO2 mmol/L 28.0 30.0 31.0   BUN mg/dL 9 8 11   CREATININE mg/dL 0.65 0.59 0.63   GLUCOSE mg/dL 90 77 85   CALCIUM mg/dL 7.9* 8.0* 8.5   ALT (SGPT) U/L  --   --  20     Culture Results:  none    DISCHARGE ASSESSMENT:  Reasons for admit/problems address while here:   R hip fracture-post surgery  Pain R hip-improving  R hip incision-healing  Gait difficulty-acute/chronic-slowly improving  Post-operative state-improving  Anticoagulation needs: hx DVT, DVT risk/(coumadin) (lovonex), xarelto  Anemia-chronic-component post op/blood loss  Arthritis-diffuse  Joint pains-multiple-chronic (worse without Rx)  Back pain-chronic (worse without Rx)  Neck pain-chronic (worse without Rx)  Anxiety-chronic (worse without Rx)  Medication error (accidentaly holding of Rx)    Chronic problems  affecting stay:  See above      PLAN:   See AVS    Discharge Disposition:  Skilled Nursing Facility (DC - External)    Discharge Medications:     Discharge Medications      New Medications      Instructions Start Date   rivaroxaban 10 MG tablet  Commonly known as:  XARELTO   10 mg, Oral, Daily   Start Date:  4/2/2019        Continue These Medications      Instructions Start Date   acetaminophen 325 MG tablet  Commonly known as:  TYLENOL   650 mg, Oral, Every 4 Hours PRN      albuterol sulfate  (90 Base) MCG/ACT inhaler  Commonly known as:  PROVENTIL HFA;VENTOLIN HFA;PROAIR HFA   2 puffs, Inhalation, 4 Times Daily      clonazePAM 1 MG tablet  Commonly known as:  KlonoPIN   1 mg, Oral, 3 Times Daily PRN      cyclobenzaprine 10 MG tablet  Commonly known as:  FLEXERIL   10 mg, Oral, 3 Times Daily      DULoxetine 30 MG capsule  Commonly known as:  CYMBALTA   30 mg, Oral, Daily      gabapentin 600 MG tablet  Commonly known as:  NEURONTIN   600 mg, Oral, 3 Times Daily      levETIRAcetam  MG 24 hr tablet  Commonly known as:  KEPPRA XR   1,000 mg, Oral, Every 12 Hours      levothyroxine 100 MCG tablet  Commonly known as:  SYNTHROID, LEVOTHROID   1 tablet, Oral, Daily      metoprolol tartrate 50 MG tablet  Commonly known as:  LOPRESSOR   50 mg, Oral, Daily      MORPHABOND ER 15 MG tablet extended-release 12 hour  Generic drug:  Morphine Sulfate ER   1 tablet, Oral, Nightly      oxyCODONE 10 MG tablet  Commonly known as:  ROXICODONE   10 mg, Oral, Every 4 Hours PRN      oxyCODONE-acetaminophen 5-325 MG per tablet  Commonly known as:  PERCOCET   2 tablets, Oral, Every 4 Hours PRN      potassium chloride 10 MEQ CR capsule  Commonly known as:  MICRO-K   20 mEq, Oral, 2 Times Daily With Meals      QUEtiapine 100 MG tablet  Commonly known as:  SEROquel   100 mg, Oral, 2 Times Daily      solifenacin 5 MG tablet  Commonly known as:  VESICARE   5 mg, Oral, Daily      tolterodine LA 2 MG 24 hr capsule  Commonly known as:   DETROL LA   2 mg, Oral, Daily      traZODone 50 MG tablet  Commonly known as:  DESYREL   50 mg, Oral, Nightly      venlafaxine  MG 24 hr capsule  Commonly known as:  EFFEXOR-XR   300 mg, Oral, Daily         Stop These Medications    enoxaparin 40 MG/0.4ML solution syringe  Commonly known as:  LOVENOX            Discharge Care Plan/Instructions:   Re-admit to [facility]  Routine NH admit orders/procedures  NH to re/establish code status    Rx:   Oxygen; keep sat > 90% with q shift monitoring for first 48 hr  See AVS/discharge summary     Diet:   AHA  Consistency:               Solids: general                Fluids: thin  Calories [id number]  Fluids 60 oz/24 hr  Dietary referral for nutritional assessment, advise and follow     Activity:   Gradually increase as able  PT, OT, speech referrals   Up with assistance only until cleared by PT   PT to follow instructions for weight bearing per Dr Loaiza's orders  Wound care instructions for incision per Dr Loaiza's orders     Additional instructions:  LAB:   A.  CBC, CMP on 4.9.19  B. (if stays >1m)   2m CBC, CMP, iron  6m CBC, BMP, TSH, T4. iron, ferritin (dilantin level if on dilantin)  12m CBC, CMP, LIPID, TSH, T4, Vit D, B12, folate, Fe, ferritin, % sat, retic count (dilantin level if on dilantin)     F/u appointments:   Dr Gonzales will see (call between if needs to be seen earlier) 4.10.19  Dr Loaiaz: as he directs    Other appointments:  (NH be aware)  Future Appointments   Date Time Provider Department Center   4/18/2019  2:00 PM Sergio Leal III, MD [facility] None   5/3/2019  1:30 PM Josafat Gonzales MD [facility] None       CONDITION: stable/improved    PROGNOSIS: guarded    Electronically signed by Josafat Gonzales MD at 4/1/2019  1:20 PM

## 2019-04-02 NOTE — THERAPY DISCHARGE NOTE
Acute Care - Occupational Therapy Discharge Summary  [facility]     Patient Name: Bita Croft  : 1955  MRN: [id number]    Today's Date: 2019  Onset of Illness/Injury or Date of Surgery: 19    Date of Referral to OT: 19  Referring Physician: Dr. Gonzales       Admit Date: 3/28/2019        OT Recommendation and Plan    Visit Dx:    ICD-10-CM ICD-9-CM   1. Closed right hip fracture, initial encounter (CMS/[facility]) S72.001A 820.8   2. Impaired mobility Z74.09 799.89   3. Impaired mobility and ADLs Z74.09 799.89               Rehab Goal Summary     Row Name 19 0700             Transfer Goal 1 (OT)    Activity/Assistive Device (Transfer Goal 1, OT)  sit-to-stand/stand-to-sit;bed-to-chair/chair-to-bed;shower chair;commode, bedside without drop arms  -TS      Walsh Level/Cues Needed (Transfer Goal 1, OT)  moderate assist (50-74% patient effort)  -TS      Time Frame (Transfer Goal 1, OT)  long term goal (LTG);by discharge  -TS      Progress/Outcome (Transfer Goal 1, OT)  goal not met  -TS         Dressing Goal 1 (OT)    Activity/Assistive Device (Dressing Goal 1, OT)  lower body dressing  -TS      Walsh/Cues Needed (Dressing Goal 1, OT)  minimum assist (75% or more patient effort)  -TS      Time Frame (Dressing Goal 1, OT)  long term goal (LTG);by discharge  -TS      Progress/Outcome (Dressing Goal 1, OT)  goal not met  -TS         Toileting Goal 1 (OT)    Activity/Device (Toileting Goal 1, OT)  toileting skills, all;commode, bedside without drop arms  -TS      Walsh Level/Cues Needed (Toileting Goal 1, OT)  moderate assist (50-74% patient effort)  -TS      Time Frame (Toileting Goal 1, OT)  long term goal (LTG);by discharge  -TS      Progress/Outcome (Toileting Goal 1, OT)  goal not met  -TS        User Key  (r) = Recorded By, (t) = Taken By, (c) = Cosigned By    Initials Name Provider Type Discipline    TS Alecia Nazario, IGNACIO/L Occupational Therapy Assistant OT           Outcome Measures     Row Name 04/01/19 0800 03/31/19 1100 03/30/19 1000       How much help from another person do you currently need...    Turning from your back to your side while in flat bed without using bedrails?  --  2  -AH  --    Moving from lying on back to sitting on the side of a flat bed without bedrails?  --  2  -AH  --    Moving to and from a bed to a chair (including a wheelchair)?  --  1  -AH  --    Standing up from a chair using your arms (e.g., wheelchair, bedside chair)?  --  1  -AH  --    Climbing 3-5 steps with a railing?  --  1  -AH  --    To walk in hospital room?  --  1  -AH  --    AM-PAC 6 Clicks Score  --  8  -AH  --       How much help from another is currently needed...    Putting on and taking off regular lower body clothing?  2  -TS  --  1  -MW    Bathing (including washing, rinsing, and drying)  2  -TS  --  2  -MW    Toileting (which includes using toilet bed pan or urinal)  2  -TS  --  2  -MW    Putting on and taking off regular upper body clothing  3  -TS  --  3  -MW    Taking care of personal grooming (such as brushing teeth)  4  -TS  --  4  -MW    Eating meals  4  -TS  --  4  -MW    Score  17  -TS  --  16  -MW       Functional Assessment    Outcome Measure Options  --  AM-PAC 6 Clicks Basic Mobility (PT)  -  AM-PAC 6 Clicks Daily Activity (OT)  -MW    Row Name 03/30/19 0900             How much help from another person do you currently need...    Turning from your back to your side while in flat bed without using bedrails?  3  -MS (r) EW (t) MS (c)      Moving from lying on back to sitting on the side of a flat bed without bedrails?  2  -MS (r) EW (t) MS (c)      Moving to and from a bed to a chair (including a wheelchair)?  2  -MS (r) EW (t) MS (c)      Standing up from a chair using your arms (e.g., wheelchair, bedside chair)?  2  -MS (r) EW (t) MS (c)      Climbing 3-5 steps with a railing?  2  -MS (r) EW (t) MS (c)      To walk in hospital room?  2  -MS (r) EW (t) MS (c)       AM-PAC 6 Clicks Score  13  -MS (r) EW (t)         Functional Assessment    Outcome Measure Options  AM-PAC 6 Clicks Basic Mobility (PT)  -MS (r) EW (t) MS (c)        User Key  (r) = Recorded By, (t) = Taken By, (c) = Cosigned By    Initials Name Provider Type    Hailey Araujo, PTA Physical Therapy Assistant    TS Alecia Nazario, IGNACIO/L Occupational Therapy Assistant    MS Belkis Esteban, PT, DPT, NCS Physical Therapist    MW Stephanie Keenan, OTR/L Occupational Therapist    EW Priscilla Machado, PT Student PT Student          Therapy Suggested Charges     Code   Minutes Charges    28038 (CPT®) Hc Ot Neuromusc Re Education Ea 15 Min      59374 (CPT®) Hc Ot Ther Proc Ea 15 Min      34275 (CPT®) Hc Ot Therapeutic Act Ea 15 Min      02589 (CPT®) Hc Ot Manual Therapy Ea 15 Min      15815 (CPT®) Hc Ot Iontophoresis Ea 15 Min      49806 (CPT®) Hc Ot Elec Stim Ea-Per 15 Min      87686 (CPT®) Hc Ot Ultrasound Ea 15 Min      31241 (CPT®) Hc Ot Self Care/Mgmt/Train Ea 15 Min 23 2    Total  23 2          Therapy Charges for Today     Code Description Service Date Service Provider Modifiers Qty    [id number] HC OT SELF CARE/MGMT/TRAIN EA 15 MIN 4/1/2019 Alecia Nazario COTA/L GO 2          OT Discharge Summary  Reason for Discharge: Discharge from facility  Outcomes Achieved: Refer to plan of care for updates on goals achieved  Discharge Destination: [facility]      SERGE Stovall  4/2/2019

## 2019-04-02 NOTE — THERAPY DISCHARGE NOTE
Acute Care - Physical Therapy Discharge Summary  [facility]       Patient Name: Bita Croft  : 1955  MRN: [id number]    Today's Date: 2019  Onset of Illness/Injury or Date of Surgery: 19    Date of Referral to PT: 19  Referring Physician: Dr. Gonzales       Admit Date: 3/28/2019      PT Recommendation and Plan    Visit Dx:    ICD-10-CM ICD-9-CM   1. Closed right hip fracture, initial encounter (CMS/[facility]) S72.001A 820.8   2. Impaired mobility Z74.09 799.89   3. Impaired mobility and ADLs Z74.09 799.89       Outcome Measures     Row Name 19 0800 19 1100          How much help from another person do you currently need...    Turning from your back to your side while in flat bed without using bedrails?  --  2  -AH     Moving from lying on back to sitting on the side of a flat bed without bedrails?  --  2  -AH     Moving to and from a bed to a chair (including a wheelchair)?  --  1  -AH     Standing up from a chair using your arms (e.g., wheelchair, bedside chair)?  --  1  -AH     Climbing 3-5 steps with a railing?  --  1  -AH     To walk in hospital room?  --  1  -     AM-PAC 6 Clicks Score  --  8  -AH        How much help from another is currently needed...    Putting on and taking off regular lower body clothing?  2  -TS  --     Bathing (including washing, rinsing, and drying)  2  -TS  --     Toileting (which includes using toilet bed pan or urinal)  2  -TS  --     Putting on and taking off regular upper body clothing  3  -TS  --     Taking care of personal grooming (such as brushing teeth)  4  -TS  --     Eating meals  4  -TS  --     Score  17  -TS  --        Functional Assessment    Outcome Measure Options  --  AM-PAC 6 Clicks Basic Mobility (PT)  -       User Key  (r) = Recorded By, (t) = Taken By, (c) = Cosigned By    Initials Name Provider Type     Hailey Hernandez, FOREIGN Physical Therapy Assistant    Alecia Franks, MATEUS/L Occupational Therapy Assistant               Rehab Goal Summary     Row Name 04/02/19 1158 04/02/19 0700          Physical Therapy Goals    Transfer Goal Selection (PT)  transfer, PT goal 1  -KJ  --     Gait Training Goal Selection (PT)  gait training, PT goal 1  -KJ  --        Transfer Goal 1 (PT)    Activity/Assistive Device (Transfer Goal 1, PT)  sit-to-stand/stand-to-sit;bed-to-chair/chair-to-bed  -KJ  --     [place] Level/Cues Needed (Transfer Goal 1, PT)  supervision required  -KJ  --     Time Frame (Transfer Goal 1, PT)  long term goal (LTG);by discharge  -KJ  --     Progress/Outcome (Transfer Goal 1, PT)  goal not met  -KJ  --        Gait Training Goal 1 (PT)    Activity/Assistive Device (Gait Training Goal 1, PT)  gait (walking locomotion)  -KJ  --     [place] Level (Gait Training Goal 1, PT)  supervision required;1 person assist  -KJ  --     Distance (Gait Goal 1, PT)  50 feet   -KJ  --     Time Frame (Gait Training Goal 1, PT)  long term goal (LTG);by discharge  -KJ  --     Progress/Outcome (Gait Training Goal 1, PT)  goal not met  -KJ  --        Transfer Goal 1 (OT)    Activity/Assistive Device (Transfer Goal 1, OT)  --  sit-to-stand/stand-to-sit;bed-to-chair/chair-to-bed;shower chair;commode, bedside without drop arms  -TS     [place] Level/Cues Needed (Transfer Goal 1, OT)  --  moderate assist (50-74% patient effort)  -TS     Time Frame (Transfer Goal 1, OT)  --  long term goal (LTG);by discharge  -TS     Progress/Outcome (Transfer Goal 1, OT)  --  goal not met  -TS        Dressing Goal 1 (OT)    Activity/Assistive Device (Dressing Goal 1, OT)  --  lower body dressing  -TS     [place]/Cues Needed (Dressing Goal 1, OT)  --  minimum assist (75% or more patient effort)  -TS     Time Frame (Dressing Goal 1, OT)  --  long term goal (LTG);by discharge  -TS     Progress/Outcome (Dressing Goal 1, OT)  --  goal not met  -TS        Toileting Goal 1 (OT)    Activity/Device (Toileting Goal 1, OT)  --  toileting skills,  all;commode, bedside without drop arms  -TS     Midnight Level/Cues Needed (Toileting Goal 1, OT)  --  moderate assist (50-74% patient effort)  -TS     Time Frame (Toileting Goal 1, OT)  --  long term goal (LTG);by discharge  -TS     Progress/Outcome (Toileting Goal 1, OT)  --  goal not met  -TS       User Key  (r) = Recorded By, (t) = Taken By, (c) = Cosigned By    Initials Name Provider Type Discipline    Mell Guzman, PTA Physical Therapy Assistant PT    TS Alecia Nazario, IGNACIO/L Occupational Therapy Assistant OT              PT Discharge Summary  Anticipated Discharge Disposition (PT): skilled nursing facility  Reason for Discharge: Discharge from facility  Outcomes Achieved: Refer to plan of care for updates on goals achieved  Discharge Destination: SNF      Mell Trinidad PTA   4/2/2019

## 2019-04-17 NOTE — PROGRESS NOTES
[facility]SY[facility]ethCMD Kenia Gutierrez, ANANDN, PA-C  ____________________________________________________________  [facility]  Department of Radiation Oncology  [address] [place] [id number]  Office:  [phone]  Fax: [phone]    DATE:   04/18/2019    PATIENT:   Bita Croft   1955                                   MEDICAL RECORD #:  [id number]    Reason for Follow up Visit:  Bita Croft is a very pleasant 63 y.o. patient that returns to the clinic today for inital follow up exam. Last seen on 11/28/2018, the patient is doing well today with no new significant treatment or disease related complaints. She is currently in a rehab center for a broken hip.    History of Present Illness  Diagnosed in October 2018 with Stage IA3 (T1c, N0, cM0) Malignant Neoplasm of Lung, 2.7cm, AGUILAR. Treated with SBRT 5000 cGy completion date 12/12/2018.   History of colon cancer in 2015 treated at [facility] in [facility].    Patient has been undergoing workup for her lower extremity disability.  She was seen locally but tertiary referral has been recommended. She is not able to walk.  She is weaker actually. On work up CT Chest a left upper lobe nodule was revealed. She was referred to  for further evaluation and treatment.     12/25/2017 - CT Chest:  • Left upper lobe spiculated nodule could represent nodular consolidation or neoplasm with the latter favored; in the setting of pneumonia symptoms, post-treatment follow-up recommended; otherwise, tissue sampling recommended    05/22/2018 - CT Chest:  • Slight increase in size of spiculated left upper lobe nodule, highly suspicious for neoplasm; percutaneous sampling recommended  • Diffuse bilateral ground-glass opacities and ground-glass nodules most likely due to infectious or inflammatory process    09/17/2018 - Documentation per :  • Spoke with Dr. Gonzales.  Patient had appointment with me  tomorrow which she has cancelled again.  It is cited to my office staff that she has to address her neurologic condition first.  We reviewed her case.  I remain concerned that her lung lesion could be lung cancer or it could be metastatic colon cancer or certainly another disease process.  He will reach out to her for possible follow up with me.  It would be optimal if she could maintain care for the specific problems we are seeing while she works with her other specialists.  I hope that she will follow up as she is approaching a window of time where an addidiontal CT scan of the chest will be needed.      09/18/2018 - Appointment with :  • We discussed that at this time she is problematic. My usual recommendation would be to consider surgery for diagnosis and treatment; however, her perioperative mortality and morbidity will be increased significantly.  She based on my assessment today is a poor operative candidate for surgery.  She remains adamant that she does not want to have chemotherapy.  We discussed the option of stereotactic radiotherapy. At this time the best option is to obtain a CT scan of the chest.  If she decides to proceed with radiotherapy a consult can be made with Dr. Leal.  Otherwise will see next available appointment.    10/08/2018 - CT Chest - P:  • 2.7 x 1.6 cm left upper lobe lung nodule with irregular margins. This should be considered a primary neoplasm of the lung until otherwise proven. Along its lower margin it abuts the major fissure and no it does not definitely traverse the fissure it does abut it in the sagittal plane for almost a centimeter. There is also some spiculation extending out laterally from the lesion which extends to the visceral pleura with slight puckering of the visceral pleura but without evidence of invasion into the subpleural fat or associated thickening of the parietal pleura. There is no associated axillary, mediastinal or hilar adenopathy clearly  demonstrated. PET/CT may be helpful for further characterization and staging.  • Stable groundglass nodule which is subpleural in location with the left lower lobe. Lungs are otherwise clear except for some patchy peripheral groundglass disease is either some mild pneumonitis or atelectasis. There is mild left basilar atelectasis.  • Extensive postoperative changes of the thoracolumbar juncture.  • There is circumferential of the distal esophageal segment with a moderate size hiatal hernia.    10/09/2018 - Appointment with :  • We discussed that primary recommendation would be to consider surgery for diagnosis and treatment; however, her perioperative mortality and morbidity will be increased significantly.  Her specific neurologic decline remains elusive and we discussed that this may have manifestations extending beyond her legs including thoracic considerations. She based on my assessment again is a poor operative candidate for surgery.  She remains adamant that she does not want to have chemotherapy.  We discussed the option of stereotactic radiotherapy.  A referral was made to discuss further with Dr. Leal.   • I encouraged that she continue to work with obtaining a diagnosis for her neurologic findings.  We discussed that if this is addressed or at least a diagnosis achieved that stereotactic radiotherapy does not prohibit at a later date surgical resection.    • We discussed that her lung function is likely normal.    • Will defer diagnosis as she does not want chemotherapy but also understands that a CT guided needle biopsy would be possible.  This would allow a diagnosis to be established which could be metastatic even though morphologically it appears as a lung primary.     10/25/2018 - Consult with :  • Mrs Croft has a history of colon cancer in 2015 treated at [facility] in [facility].   • She has been deemed not a surgical candidate for resection or biopsy by Dr. Mello so  therefore with a curative intention, I am recommending SBRT which will consist of 3-5 treatment fractions for total of cGy to be determined after treatment planning.   • We will perform CT simulation today (09/25/2018) to initiate the treatment planning and notify the patient when complete to begin.     11/28/2018 - 12/12/2018 - Completed Radiation course:  • Received 5000 cGy in 4 fractions to left upper lobe lung via stereotactic radiosurgery.    03/10/2019 - 03/14/2019 - Hospital admission due to altered mental state.   • The patient was admitted due to altered mental status.  The patient was found to be septic from a UTI and had a questionable pneumonia.    03/17/2019 - Presented to the ER today with complaint of left lower extremity swelling and pain. Patient reports this began approximately 1 week ago.      03/28/2019 - 04/01/2019 - Hospital admission due to right hip fracture.   • She fell at the nursing home.  She had pain in her right hip.  X-rays showed a fracture.  She was admitted for surgery.    Patient comes in for follow-up accompanied by staff from the nursing home.  She denies worsening breathing changes since radiation therapy.  She also is denies cough, wheezing, hemoptysis or chest pain.  She denies knowledge of nodules or masses or new bone pain.  She denies recent imaging studies and has not had a CT scan of the chest that we had previously ordered.    History obtained from  PATIENT and CHART    PAST MEDICAL HISTORY  Past Medical History:   Diagnosis Date   • Asthma    • Cancer (CMS/HCC)     colon cancer   • Depression    • Disease of thyroid gland    • Hypertension    • Insomnia    • Seizure (CMS/HCC)    • Sleep apnea       PAST SURGICAL HISTORY   Past Surgical History:   Procedure Laterality Date   • BACK SURGERY     • COLON SURGERY     • GASTRIC BYPASS     • HIP HEMIARTHROPLASTY Right 3/29/2019    Procedure: HIP HEMIARTHROPLASTY;  Surgeon: Mehul Loaiza MD;  Location: [facility] OR;  Service:  Orthopedics   • HYSTERECTOMY     • KNEE ARTHROSCOPY      bilateral   • NECK SURGERY     • ORIF WRIST FRACTURE Right 6/9/2017    Procedure: WRIST OPEN REDUCTION INTERNAL FIXATION, RIGHT;  Surgeon: Alec Cardoza MD;  Location: [facility] OR;  Service:    • TONSILLECTOMY     • WRIST FRACTURE SURGERY        FAMILY HISTORY  family history includes Arthritis in her father and mother; Heart attack in her father and mother; Hypertension in her father and mother; Stroke in her father and mother.     SOCIAL HISTORY   Social History     Tobacco Use   • Smoking status: Never Smoker   • Smokeless tobacco: Never Used   Substance Use Topics   • Alcohol use: No   • Drug use: No      ALLERGIES  Patient has no known allergies.     MEDICATIONS   Current Outpatient Medications   Medication Sig Dispense Refill   • acetaminophen (TYLENOL) 325 MG tablet Take 2 tablets by mouth Every 4 (Four) Hours As Needed for Mild Pain  or Fever.     • albuterol sulfate  (90 Base) MCG/ACT inhaler Inhale 2 puffs 4 (Four) Times a Day.     • bisacodyl (DULCOLAX) 10 MG suppository Insert 10 mg into the rectum Daily.     • clonazePAM (KlonoPIN) 1 MG tablet Take 1 tablet by mouth 3 (Three) Times a Day As Needed for Anxiety. 12 tablet 0   • cyclobenzaprine (FLEXERIL) 10 MG tablet Take 1 tablet by mouth 3 (Three) Times a Day. 90 tablet 0   • DULoxetine (CYMBALTA) 30 MG capsule Take 30 mg by mouth Daily.     • gabapentin (NEURONTIN) 600 MG tablet Take 1 tablet by mouth 3 (Three) Times a Day. 90 tablet 0   • levETIRAcetam XR (KEPPRA XR) 500 MG 24 hr tablet Take 2 tablets by mouth Every 12 (Twelve) Hours.     • levothyroxine (SYNTHROID, LEVOTHROID) 100 MCG tablet Take 1 tablet by mouth Daily.     • Magnesium Hydroxide (MILK OF MAGNESIA PO) Take 30 mL by mouth Daily As Needed.     • metoprolol tartrate (LOPRESSOR) 50 MG tablet Take 1 tablet by mouth Daily. 30 tablet 5   • MORPHABOND ER 15 MG tablet extended-release 12 hour Take 1 tablet by mouth Every  Night. 15 each 0   • oxyCODONE (ROXICODONE) 10 MG tablet Take 1 tablet by mouth Every 4 (Four) Hours As Needed for Moderate Pain . 30 tablet 0   • oxyCODONE-acetaminophen (PERCOCET) 5-325 MG per tablet Take 2 tablets by mouth Every 4 (Four) Hours As Needed for Moderate Pain . 180 tablet 0   • potassium chloride (MICRO-K) 10 MEQ CR capsule Take 2 capsules by mouth 2 (Two) Times a Day With Meals. (Patient taking differently: Take 10 mEq by mouth 2 (Two) Times a Day With Meals.)     • QUEtiapine (SEROquel) 100 MG tablet Take 1 tablet by mouth 2 (Two) Times a Day.     • rivaroxaban (XARELTO) 10 MG tablet Take 1 tablet by mouth Daily for 30 days. 30 tablet    • solifenacin (VESICARE) 5 MG tablet Take 1 tablet by mouth Daily. 90 tablet 5   • tolterodine LA (DETROL LA) 2 MG 24 hr capsule Take 2 mg by mouth Daily.     • traZODone (DESYREL) 50 MG tablet Take 1 tablet by mouth Every Night. 15 tablet 0   • venlafaxine XR (EFFEXOR-XR) 150 MG 24 hr capsule Take 2 capsules by mouth Daily.       No current facility-administered medications for this visit.       The following portions of the patient's history were reviewed and updated as appropriate: allergies, current medications, past family history, past medical history, past social history, past surgical history and problem list.    REVIEW OF SYSTEMS  Review of Systems   Constitutional: Positive for fatigue. Negative for appetite change and unexpected weight change.   HENT: Negative.    Respiratory: Negative for cough and shortness of breath.    Cardiovascular: Negative.    Gastrointestinal:        Wears briefs - incontinent at times         Endocrine: Negative.    Genitourinary:        Wears briefs - incontinent at times     Musculoskeletal: Positive for back pain (chronic).        Right hip pain; recent fall and recent hip surgery     Skin: Negative.    Allergic/Immunologic: Negative.    Neurological: Positive for weakness (bilateral lower extremity).   Hematological:  "Negative.    Psychiatric/Behavioral: Negative.        PHYSICAL EXAM  VITAL SIGNS:   Vitals:    04/18/19 1028   BP: 98/62   Weight: 101 kg (222 lb)   Height: 180.3 cm (71\")   PainSc:   8   PainLoc: Back  Comment: right hip; bilateral legs\     General Appearance:  awake, alert, oriented, in no acute distress, cooperative.In wheelchair  Head: Normocephalic  Eyes: Conjunctiva pink, pupils equal and reactive.   Ears:  Normal externally.  Hearing- normal to conversation  Nose/Sinuses:  Mucosa normal. No drainage or sinus tenderness.  Mouth/Throat:  Mucosa moist, no lesions; pharynx without erythema, edema or exudate.  Neck: Supple, no mass, non-tender  Back:  Symmetric, no curvature, ROM normal, no CVA tenderness   Lungs:  Normal expansion.  Clear to auscultation.  No rales, rhonchi, or wheezing.  Heart:  Heart sounds are normal.  Regular rate and rhythm without murmur, gallop or rub.  Abdomen:  Soft, non-tender, normal bowel sounds; no bruits, organomegaly or masses.  Extremities: Warm to touch, pink, 2+pitting edema bilaterally  Musculoskeletal: strength and sensation grossly normal  Neurologic:  Alert and oriented, gait normal  Psych exam: normal situational behavior      Skin:  Warm and moist. No suspicious lesions or rashes of concern     Performance Status: ECOG (2) Ambulatory and capable of self care, unable to carry out work activity, up and about > 50% or waking hours    Clinical Quality Measures  -Pain Documented  Pain severity quantified; no pain present, no followup plan required.8/10 related to legs/Takes a pain pill when needed, prescribed from another MD. Will notify us or them if pain increases or pain medication becomes ineffective.   -Advanced Care Planning Care plan discussed, no care plan provided  -Body Mass Index Screening and Follow-Up Plan  Patient's Body mass index is 30.96 kg/m². BMI is above normal parameters. Recommendations include: educational material.  -Tobacco Use: Screening and Cessation " Intervention Social History    Tobacco Use      Smoking status: Never Smoker      Smokeless tobacco: Never Used    ASSESSMENT AND PLAN   1. Malignant neoplasm of upper lobe of right lung (CMS/HCC)    2. History of radiation therapy    3. Hx of colon cancer, stage I    4. Non-smoker       Orders Placed This Encounter   Procedures   • CT Chest With Contrast     Standing Status:   Future     Standing Expiration Date:   4/18/2020   • Creatinine, Serum     Prior to CT scan   • Creatinine, Serum     Standing Status:   Future     Standing Expiration Date:   4/18/2020     Scheduling Instructions:      Prior to CT scan     RECOMMENDATIONS: Bita Croft is status post completion of radiation therapy and presents to our clinic today for inital follow up exam. Diagnosed in October 2018  with Stage IA3 (T1c, N0, cM0) Malignant Neoplasm of Lung, 2.7cm AGUILAR. Treated with SBRT 5000 cGy completion date 12/12/2018.   History of colon cancer in 2015 treated at [facility] in [facility]. She has not had a CT scan of the chest since radiation completion. We will order one to be completed over the next few days and let her know the results.    If she has a stable or improved CT scan of the chest now, we will repeat CT of the chest in 3 to 4 months prior to next follow-up visit.  Pt is without symptoms or evidence for recurrent or metastatic disease at this time and denies untoward side effects from treatment.  Will continue follow-up/surveillance as discussed and shr will continue to see her other health care providers as per their scheduling.    Patient Instructions   1. Return to Dr. Leal in 3 months- will order CT to be done before visit.  2. Continue to follow with your other physicians.3  3. Good luck with your rehab!    Todays appointment time was spent in counseling and coordination of care as follows: diagnosis, intent of treatment discussing radiation therapy specifics: logistics, possible and probable side effects  and after effects, staging of cancer, standard of care in for this stage of this cancer and treatment options.  I saw this patient in follow-up with Kenia Gilman PA-C while covering for Dr. Sergio Leal, radiation oncologist.  Alex Carson MD  04/18/2019

## 2019-04-18 PROBLEM — C34.11 MALIGNANT NEOPLASM OF UPPER LOBE OF RIGHT LUNG (HCC): Status: ACTIVE | Noted: 2019-01-01

## 2019-04-18 NOTE — PATIENT INSTRUCTIONS
1. Return to Dr. Leal in 3 months- will order CT to be done before visit.  2. Continue to follow with your other physicians.3  3. Good luck with your rehab!

## 2019-05-03 NOTE — TELEPHONE ENCOUNTER
Nursing Home requesting refill for:    Oxycodone 10MG  Per protocol last refill 4/13/19    Morphabond ER  Per protocol last refill 4/15/19    Clonazepam  1MG   Per protocol last refill 03/14/19

## 2019-05-15 NOTE — TELEPHONE ENCOUNTER
Call patient in AM see if she being home is having signs of cellulitis  Once again; NH patient discharged without NH f/u

## 2019-05-15 NOTE — TELEPHONE ENCOUNTER
"Vm from patient \"I was in [facility] and have cellulitis  and need another antibiotic, I still have an infection\"     Called [facility] and spoke to Violeta and pt did not leave with antibiotic? Check med list, discharge summary, no phone calls from facility to find out what pt is requesting  "

## 2019-05-16 NOTE — TELEPHONE ENCOUNTER
"Spoke to pt spoke spouse \"it is on her lower leg of the Right hip that was broke\" questioned if it was weeping and spouse advised \"a place every now and, no fever, but area is red\" [facility]    Did schedule follow up appt   "

## 2019-05-16 NOTE — TELEPHONE ENCOUNTER
"Called spouse and advised and agreeable, call to  to schedule and pt spouse refused appt today \"she has an appt on the 21st, well wait\"  "

## 2019-06-11 NOTE — TELEPHONE ENCOUNTER
"Vm from spouse \"she is bed ridden and has a bed sore and I have been using triple antibiotic ointment . Do I need to cover it with a band aid or keep it open? It seems to be getting worse\"  "

## 2019-06-11 NOTE — TELEPHONE ENCOUNTER
Needs if home bound to get home health [facility] somehow face/to face not needed?   Or  Even back to [facility] for 2-3 weeks to get healed    Not sure wise to have him try to heal this himself

## 2019-06-12 NOTE — TELEPHONE ENCOUNTER
"Spouse called back and advised and stated \"lets get her back to [facility]\" questioned sore?  \"above tailbone as big as a silver dollar and there is 3 sores there all together\"  "

## 2019-06-12 NOTE — TELEPHONE ENCOUNTER
Attempted to call twice, the first time someone picked up and hung up, called back and got message  is full and unable to leave a message

## 2019-06-12 NOTE — TELEPHONE ENCOUNTER
Called Brooke Lorenz; asked if she would investigate and call    pt still wheezing after duoneb x 2, will administer pred pt s/p pred and duoneb x 3 now, feels much better, no longer wheezing but has  (usuallly is 450), will administer another albuterol neb received albuterol x 2, now w/ . Pt feels much better and is breathing comfortably. Requesting discharge to home.

## 2019-06-13 ENCOUNTER — HOSPITAL ENCOUNTER (OUTPATIENT)
Dept: HOSPITAL 58 - NONPT | Age: 64
Discharge: HOME | End: 2019-06-13
Attending: FAMILY MEDICINE

## 2019-06-13 VITALS — BODY MASS INDEX: 31.6 KG/M2

## 2019-06-13 DIAGNOSIS — E55.9: ICD-10-CM

## 2019-06-13 DIAGNOSIS — J18.9: ICD-10-CM

## 2019-06-13 DIAGNOSIS — E03.9: Primary | ICD-10-CM

## 2019-06-13 DIAGNOSIS — F44.5: ICD-10-CM

## 2019-06-13 DIAGNOSIS — I10: ICD-10-CM

## 2019-06-13 DIAGNOSIS — R65.20: ICD-10-CM

## 2019-06-13 PROCEDURE — 84134 ASSAY OF PREALBUMIN: CPT

## 2019-06-13 PROCEDURE — 80053 COMPREHEN METABOLIC PANEL: CPT

## 2019-06-13 PROCEDURE — 84100 ASSAY OF PHOSPHORUS: CPT

## 2019-06-13 PROCEDURE — 85025 COMPLETE CBC W/AUTO DIFF WBC: CPT

## 2019-06-13 PROCEDURE — 83970 ASSAY OF PARATHORMONE: CPT

## 2019-06-13 PROCEDURE — 82306 VITAMIN D 25 HYDROXY: CPT

## 2019-06-13 PROCEDURE — 84443 ASSAY THYROID STIM HORMONE: CPT

## 2019-06-13 NOTE — TELEPHONE ENCOUNTER
They are requesting refills of her Klonipin. Morphine,Roxicodone,and Percocet sent to [facility]. They need a few to last until refills sent to their pharmacy.Pt has not been on these meds since she was in the NH. Dr. Horan did not fill these meds also. [facility] does not show any of these meds being filled.[facility] said these had not been filled by them. Does she need these?

## 2019-06-13 NOTE — TELEPHONE ENCOUNTER
Sylvia says the  wants her to have the Roxicodone and the Klonopin. Does not need the Percocet and the Morphine.

## 2019-06-19 NOTE — TELEPHONE ENCOUNTER
She needs referral to [facility] wound care for her coccyx and R heel, order done, both areas are unstageable

## 2019-06-19 NOTE — TELEPHONE ENCOUNTER
Per Sylvia INGRAM. Pharmacy is advising pt cant be on detrol and vesicare, and advising increasing vesicare to 10 mg daily, d/t formulary increase vesicare to 10 mg

## 2019-06-21 ENCOUNTER — HOSPITAL ENCOUNTER (OUTPATIENT)
Dept: HOSPITAL 58 - NONPT | Age: 64
Discharge: HOME | End: 2019-06-21
Attending: FAMILY MEDICINE

## 2019-06-21 VITALS — BODY MASS INDEX: 31.6 KG/M2

## 2019-06-21 DIAGNOSIS — I10: ICD-10-CM

## 2019-06-21 DIAGNOSIS — A41.9: ICD-10-CM

## 2019-06-21 DIAGNOSIS — R65.20: Primary | ICD-10-CM

## 2019-06-21 PROCEDURE — 80048 BASIC METABOLIC PNL TOTAL CA: CPT

## 2019-07-09 NOTE — TELEPHONE ENCOUNTER
"\"she is going to be discharged on Wed and we need orders to discharge with meds, [facility], PT/OT/ST\"  "

## 2019-07-09 NOTE — TELEPHONE ENCOUNTER
ACTIVITY:  Gradually increase activity   No driving  Safety device when up/as recommended PT  Elevate legs as much as can/avoid legs handing down   Suggest no smoking/exposure smoking  Wound care/procedure-surgery instructions (does she have any wounds)    DIET:  What is current NH diet?  AHA  ADA  Additional type:   Consistency:    Solids:      Fluids   Suggest no alcohol   Avoid caffeine   Calories   Fluids at least 60 oz/24 hr    MEDICATIONS:   Per AVS  Oxygen on oxygen?    EXPECT:   LAB   Home health being set up  Equipment needed?  Call if problems getting this    CALL:   If problems/questions

## 2019-07-10 NOTE — TELEPHONE ENCOUNTER
Diet regular/regular solids  Fluids-1200 mg fluid restriction per 24/hr  Regular fluids  Oxygen-none  [facility]-nursing, wound care, PT/OT/ to eval and treat

## 2019-07-10 NOTE — TELEPHONE ENCOUNTER
Faxed to [facility] [phone].  Spoke to nurse Kathleen and gave her my ext and she will let me know if she doesn't receive the fax.

## 2019-07-10 NOTE — TELEPHONE ENCOUNTER
Faxed to [facility] [phone].  Spoke to nurse Kathleen and gave her my ext and she will let me know if she doesn't receive the fax.

## 2019-07-10 NOTE — TELEPHONE ENCOUNTER
dwight velez at [facility] called and stated they did receive the discharge info and pt now has left the facility.

## 2019-07-10 NOTE — TELEPHONE ENCOUNTER
Discharge Orders  7.10.19    Discharge [facility]    ACTIVITY:  Gradually increase activity   No driving  Safety device when up/as recommended PT  Elevate legs as much as can/avoid legs handing down   Suggest no smoking/exposure smoking  Wound care/procedure-surgery instructions (does she have any wounds)     DIET:  Diet: regular  Additional type: none  Consistency:               Solids: general                Fluids: thin  Suggest no alcohol   Avoid caffeine   Calories: [id number]/24 hr  Fluids: fluid restrict 1200cc/24 hr     MEDICATIONS:   Same; no change from current NH Rx  Bring all meds to Dr Gonzales follow up appointment in bottles/blister packs  Nursing home to fax current Rx list to Dr Gonzales day of discharge  Nursing home/family to call Dr oGnzales if any refills needed  Oxygen: none     EXPECT:   LAB   Home health: [facility] referral; nursing, wound care, PT/OT  Equipment: as before/no new    CALL:   If problems/questions    Follow-up:   Dr Gonzales 7.17.19; call for best time and expect labs

## 2019-07-17 NOTE — TELEPHONE ENCOUNTER
May have medrol dose pack    No Known Allergies     Current Outpatient Medications:   •  acetaminophen (TYLENOL) 325 MG tablet, Take 2 tablets by mouth Every 4 (Four) Hours As Needed for Mild Pain  or Fever., Disp: , Rfl:   •  albuterol sulfate  (90 Base) MCG/ACT inhaler, Inhale 2 puffs 4 (Four) Times a Day., Disp: , Rfl:   •  bisacodyl (DULCOLAX) 10 MG suppository, Insert 10 mg into the rectum Daily., Disp: , Rfl:   •  clonazePAM (KlonoPIN) 1 MG tablet, Take 1 tablet by mouth 3 (Three) Times a Day As Needed for Anxiety., Disp: 42 tablet, Rfl: 0  •  cyclobenzaprine (FLEXERIL) 10 MG tablet, Take 1 tablet by mouth 3 (Three) Times a Day., Disp: 90 tablet, Rfl: 0  •  DULoxetine (CYMBALTA) 30 MG capsule, Take 30 mg by mouth Daily., Disp: , Rfl:   •  gabapentin (NEURONTIN) 600 MG tablet, Take 1 tablet by mouth 3 (Three) Times a Day., Disp: 90 tablet, Rfl: 0  •  levETIRAcetam XR (KEPPRA XR) 500 MG 24 hr tablet, Take 2 tablets by mouth Every 12 (Twelve) Hours., Disp: , Rfl:   •  levothyroxine (SYNTHROID, LEVOTHROID) 100 MCG tablet, Take 1 tablet by mouth Daily., Disp: , Rfl:   •  Magnesium Hydroxide (MILK OF MAGNESIA PO), Take 30 mL by mouth Daily As Needed., Disp: , Rfl:   •  metoprolol tartrate (LOPRESSOR) 50 MG tablet, Take 1 tablet by mouth Daily., Disp: 30 tablet, Rfl: 5  •  nystatin (MYCOSTATIN) 640944 UNIT/GM powder, Wash/dry well three times a day After dry;apply nystatin powder bid, Disp: 60 g, Rfl: 0  •  oxyCODONE (ROXICODONE) 10 MG tablet, Take 1 tablet by mouth Every 4 (Four) Hours As Needed for Moderate Pain ., Disp: 60 tablet, Rfl: 0  •  potassium chloride (MICRO-K) 10 MEQ CR capsule, Take 2 capsules by mouth 2 (Two) Times a Day With Meals. (Patient taking differently: Take 10 mEq by mouth 2 (Two) Times a Day With Meals.), Disp: , Rfl:   •  QUEtiapine (SEROquel) 100 MG tablet, Take 1 tablet by mouth 2 (Two) Times a Day., Disp: , Rfl:   •  solifenacin (VESICARE) 10 MG tablet, Take 1 tablet by mouth  Daily., Disp: 30 tablet, Rfl: 6  •  traZODone (DESYREL) 50 MG tablet, Take 1 tablet by mouth Every Night., Disp: 15 tablet, Rfl: 0  •  venlafaxine XR (EFFEXOR-XR) 150 MG 24 hr capsule, Take 2 capsules by mouth Daily., Disp: , Rfl:

## 2019-07-17 NOTE — TELEPHONE ENCOUNTER
called in asking for steroid for the rash, says it is worse and she can't wait to at least start something until tomorrow at her appointment.

## 2019-07-18 NOTE — PROGRESS NOTES
"Subjective   Bita Croft is a 63 y.o. female presenting with chief complaint of:   Chief Complaint   Patient presents with   • Rash     \"its on her back\"       History of Present Illness :  With  and brother (took both of them to get her here)..  Here for primarily an acute issue today; recent discharge from [facility] or nursing.  Appears her insurance caused her to have to leave.   says when she left she still had a rash on her back.  Phone calls since home.  They were expecting referral to [facility]; no when showed up.  She rarely gets out of bed; she stays incontinent.  She went to see Dr. Monte psychiatry yesterday and he try to get her admitted to [facility]; sounds like the hospitalist declined.   Has multiple chronic problems to consider that might have a bearing on today's issues; somewhat an interval appointment.       Chronic/acute problems reviewed today:   1. Essential hypertension: Chronic/stable. Stable here/if home blood pressures.  No significant chest pain, SOB, LE edema, orthopnea, near syncope, dizziness/light headness.      2. Rash: Chronic/variable problems with rashes.  Current one is primarily on the back.  Is sore.   3. Urinary incontinence, unspecified type: Chronic/variable.  Currently ongoing.   4. Elevated fasting glucose: Chronic/variable over time.  No problem/pattern hypoglycemia/hyperglycemia manifest by poly- dypsia, phagia, uria, or sweats, diaphoretic episodes, syncope/near.  Chronic/variable.  In the past have been     5. Hyponatremia-often polydyspia involved: Contributed to by polydipsia.  May have an influence of antidepressants.   6. Anemia, unspecified type: chronic/variable.  No active melena hematochezia.   7. Gait difficulty: chronic/slowly worsening over time.  Spends most her time in bed and rarely in a wheelchair.  Has seen multiple neurosurgeons neurologist with her spinal disease and no one has a solution to prevent the worsening of her " ability to ambulate.   8. Pressure injury of skin, unspecified injury stage, unspecified location: chronic/variable.  Currently has lesions on the ankle and feet along with the back.     Has an/another acute issue today: none.    The following portions of the patient's history were reviewed and updated as appropriate: allergies, current medications, past family history, past medical history, past social history, past surgical history and problem list.      Current Outpatient Medications:   •  albuterol sulfate  (90 Base) MCG/ACT inhaler, Inhale 2 puffs 4 (Four) Times a Day., Disp: , Rfl:   •  clonazePAM (KlonoPIN) 1 MG tablet, Take 1 tablet by mouth 3 (Three) Times a Day As Needed for Anxiety., Disp: 42 tablet, Rfl: 0  •  cyclobenzaprine (FLEXERIL) 10 MG tablet, Take 1 tablet by mouth 3 (Three) Times a Day., Disp: 90 tablet, Rfl: 0  •  DULoxetine (CYMBALTA) 30 MG capsule, Take 30 mg by mouth Daily., Disp: , Rfl:   •  gabapentin (NEURONTIN) 600 MG tablet, Take 1 tablet by mouth 3 (Three) Times a Day., Disp: 90 tablet, Rfl: 0  •  levETIRAcetam XR (KEPPRA XR) 500 MG 24 hr tablet, Take 2 tablets by mouth Every 12 (Twelve) Hours., Disp: , Rfl:   •  levothyroxine (SYNTHROID, LEVOTHROID) 100 MCG tablet, Take 1 tablet by mouth Daily., Disp: , Rfl:   •  methylPREDNISolone (MEDROL) 4 MG tablet, Take as directed on package instructions., Disp: 1 each, Rfl: 0  •  metoprolol tartrate (LOPRESSOR) 50 MG tablet, Take 1 tablet by mouth Daily., Disp: 30 tablet, Rfl: 5  •  Multiple Vitamin (MULTI-VITAMIN DAILY PO), Take  by mouth., Disp: , Rfl:   •  oxyCODONE (ROXICODONE) 10 MG tablet, Take 1 tablet by mouth Every 4 (Four) Hours As Needed for Moderate Pain ., Disp: 60 tablet, Rfl: 0  •  potassium chloride (K-DUR,KLOR-CON) 20 MEQ CR tablet, Take 20 mEq by mouth 3 (Three) Times a Day., Disp: , Rfl:   •  QUEtiapine (SEROquel) 100 MG tablet, Take 1 tablet by mouth 2 (Two) Times a Day., Disp: , Rfl:   •  QUEtiapine (SEROquel) 300 MG  tablet, Take 300 mg by mouth Every Night., Disp: , Rfl:   •  rivaroxaban (XARELTO) 10 MG tablet, Take 10 mg by mouth Daily., Disp: , Rfl:   •  solifenacin (VESICARE) 10 MG tablet, Take 1 tablet by mouth Daily., Disp: 30 tablet, Rfl: 6  •  traZODone (DESYREL) 50 MG tablet, Take 1 tablet by mouth Every Night., Disp: 15 tablet, Rfl: 0  •  venlafaxine XR (EFFEXOR-XR) 150 MG 24 hr capsule, Take 2 capsules by mouth Daily., Disp: , Rfl:   •  vitamin C (ASCORBIC ACID) 250 MG tablet, Take 250 mg by mouth 2 (Two) Times a Day., Disp: , Rfl:   •  vitamin D (ERGOCALCIFEROL) 75586 units capsule capsule, Take 50,000 Units by mouth 1 (One) Time Per Week., Disp: , Rfl:   •  acetaminophen (TYLENOL) 325 MG tablet, Take 2 tablets by mouth Every 4 (Four) Hours As Needed for Mild Pain  or Fever., Disp: , Rfl:   •  bisacodyl (DULCOLAX) 10 MG suppository, Insert 10 mg into the rectum Daily., Disp: , Rfl:   •  Magnesium Hydroxide (MILK OF MAGNESIA PO), Take 30 mL by mouth Daily As Needed., Disp: , Rfl:   •  nystatin (MYCOSTATIN) 894264 UNIT/GM powder, Wash/dry well three times a day After dry;apply nystatin powder bid, Disp: 60 g, Rfl: 0    No problems with medications.  Refills if needed done    No Known Allergies    Review of Systems  GENERAL:  Inactive/slower with limits, speed, stamina for age and pains, desire. Sleep is occ difficult falling/staying. No fever now.  SKIN: No other rash/skin lesion of concern: note above  ENDO:  No syncope, near or diaphoretic sweaty spells.  HEENT: No recent head injury; frequent/same headache.  No vision change.  Same hearing loss.  Ears without pain/drainage.  No sore throat.   Usual nasal/sinus congestion/drainage. No epistaxis.  CHEST: No chest wall tenderness or mass. No cough, without wheeze.  No SOB; no hemoptysis.  CV: No chest pain, palpitations, ankle edema.  GI: No heartburn, occ dysphagia.  No abdominal pain, diarrhea, constipation.  No rectal bleeding, or melena.    :  Voids with  occasional dysuria and often urinary incontinence.  ORTHO: No painful/swollen joints but various on /off sore.  No change daily sore neck or back.  No acute neck or back pain without recent injury.  NEURO: No dizziness, weakness of extremities.  UE/LE numbness/paresthesias.   PSYCH: Mild memory loss.  Mood good; usually anxious, depressed but/and not suicidal.  Sees [facility].  Tries to tolerate stress; better than past.   Screening:  Mammogram: 1.4.16-reminded many times  Bone density: no TCC/epic; advised  Low dose CT chest: has had many CTs  GI:   EGD+nl/BHP//5.28.15/UGI  Colonoscopy+polyp/MM//9.21.15/3y-reminded  Prostate: NA  Usual lab order  2m CBC, CMP, iron  6m CBC, BMP, TSH, T4. iron, ferritin  12m CBC, CMP, LIPID, TSH, T4, Vit D, B12, folate, Fe, ferritin, % sat, retic count    Lab Results:  Results for orders placed or performed during the hospital encounter of 03/28/19   Comprehensive Metabolic Panel   Result Value Ref Range    Glucose 85 70 - 100 mg/dL    BUN 11 5 - 21 mg/dL    Creatinine 0.63 0.50 - 1.40 mg/dL    Sodium 134 (L) 135 - 145 mmol/L    Potassium 5.0 3.5 - 5.3 mmol/L    Chloride 96 (L) 98 - 110 mmol/L    CO2 31.0 24.0 - 31.0 mmol/L    Calcium 8.5 8.4 - 10.4 mg/dL    Total Protein 6.3 6.3 - 8.7 g/dL    Albumin 3.10 (L) 3.50 - 5.00 g/dL    ALT (SGPT) 20 0 - 54 U/L    AST (SGOT) 29 7 - 45 U/L    Alkaline Phosphatase 104 24 - 120 U/L    Total Bilirubin 0.3 0.1 - 1.0 mg/dL    eGFR Non African Amer 95 >60 mL/min/1.73    Globulin 3.2 gm/dL    A/G Ratio 1.0 (L) 1.1 - 2.5 g/dL    BUN/Creatinine Ratio 17.5 7.0 - 25.0    Anion Gap 7.0 4.0 - 13.0 mmol/L   Protime-INR   Result Value Ref Range    Protime 11.5 (L) 11.9 - 14.6 Seconds    INR 0.82 (L) 0.91 - 1.09   CBC Auto Differential   Result Value Ref Range    WBC 5.87 4.80 - 10.80 10*3/mm3    RBC 3.67 (L) 4.20 - 5.40 10*6/mm3    Hemoglobin 11.6 (L) 12.0 - 16.0 g/dL    Hematocrit 35.7 (L) 37.0 - 47.0 %    MCV 97.3 82.0 - 98.0 fL    MCH 31.6 28.0 - 32.0  pg    MCHC 32.5 (L) 33.0 - 36.0 g/dL    RDW 14.2 12.0 - 15.0 %    RDW-SD 51.1 40.0 - 54.0 fl    MPV 10.6 6.0 - 12.0 fL    Platelets 326 130 - 400 10*3/mm3    Neutrophil % 73.3 39.0 - 78.0 %    Lymphocyte % 14.0 (L) 15.0 - 45.0 %    Monocyte % 7.8 4.0 - 12.0 %    Eosinophil % 3.7 0.0 - 4.0 %    Basophil % 0.9 0.0 - 2.0 %    Immature Grans % 0.3 0.0 - 5.0 %    Neutrophils, Absolute 4.30 1.87 - 8.40 10*3/mm3    Lymphocytes, Absolute 0.82 0.72 - 4.86 10*3/mm3    Monocytes, Absolute 0.46 0.19 - 1.30 10*3/mm3    Eosinophils, Absolute 0.22 0.00 - 0.70 10*3/mm3    Basophils, Absolute 0.05 0.00 - 0.20 10*3/mm3    Immature Grans, Absolute 0.02 0.00 - 0.05 10*3/mm3    nRBC 0.0 0.0 - 0.0 /100 WBC   Basic Metabolic Panel   Result Value Ref Range    Glucose 77 70 - 100 mg/dL    BUN 8 5 - 21 mg/dL    Creatinine 0.59 0.50 - 1.40 mg/dL    Sodium 135 135 - 145 mmol/L    Potassium 4.5 3.5 - 5.3 mmol/L    Chloride 101 98 - 110 mmol/L    CO2 30.0 24.0 - 31.0 mmol/L    Calcium 8.0 (L) 8.4 - 10.4 mg/dL    eGFR Non African Amer 103 >60 mL/min/1.73    BUN/Creatinine Ratio 13.6 7.0 - 25.0    Anion Gap 4.0 4.0 - 13.0 mmol/L   CBC Auto Differential   Result Value Ref Range    WBC 5.29 4.80 - 10.80 10*3/mm3    RBC 3.22 (L) 4.20 - 5.40 10*6/mm3    Hemoglobin 9.9 (L) 12.0 - 16.0 g/dL    Hematocrit 30.6 (L) 37.0 - 47.0 %    MCV 95.0 82.0 - 98.0 fL    MCH 30.7 28.0 - 32.0 pg    MCHC 32.4 (L) 33.0 - 36.0 g/dL    RDW 14.0 12.0 - 15.0 %    RDW-SD 49.1 40.0 - 54.0 fl    MPV 10.9 6.0 - 12.0 fL    Platelets 278 130 - 400 10*3/mm3    Neutrophil % 70.8 39.0 - 78.0 %    Lymphocyte % 15.9 15.0 - 45.0 %    Monocyte % 8.5 4.0 - 12.0 %    Eosinophil % 3.6 0.0 - 4.0 %    Basophil % 0.8 0.0 - 2.0 %    Immature Grans % 0.4 0.0 - 5.0 %    Neutrophils, Absolute 3.75 1.87 - 8.40 10*3/mm3    Lymphocytes, Absolute 0.84 0.72 - 4.86 10*3/mm3    Monocytes, Absolute 0.45 0.19 - 1.30 10*3/mm3    Eosinophils, Absolute 0.19 0.00 - 0.70 10*3/mm3    Basophils, Absolute 0.04  0.00 - 0.20 10*3/mm3    Immature Grans, Absolute 0.02 0.00 - 0.05 10*3/mm3    nRBC 0.0 0.0 - 0.0 /100 WBC   Basic Metabolic Panel   Result Value Ref Range    Glucose 90 70 - 100 mg/dL    BUN 9 5 - 21 mg/dL    Creatinine 0.65 0.50 - 1.40 mg/dL    Sodium 133 (L) 135 - 145 mmol/L    Potassium 4.3 3.5 - 5.3 mmol/L    Chloride 100 98 - 110 mmol/L    CO2 28.0 24.0 - 31.0 mmol/L    Calcium 7.9 (L) 8.4 - 10.4 mg/dL    eGFR Non African Amer 92 >60 mL/min/1.73    BUN/Creatinine Ratio 13.8 7.0 - 25.0    Anion Gap 5.0 4.0 - 13.0 mmol/L   CBC (No Diff)   Result Value Ref Range    WBC 7.73 4.80 - 10.80 10*3/mm3    RBC 3.04 (L) 4.20 - 5.40 10*6/mm3    Hemoglobin 9.5 (L) 12.0 - 16.0 g/dL    Hematocrit 27.9 (L) 37.0 - 47.0 %    MCV 91.8 82.0 - 98.0 fL    MCH 31.3 28.0 - 32.0 pg    MCHC 34.1 33.0 - 36.0 g/dL    RDW 13.7 12.0 - 15.0 %    RDW-SD 45.9 40.0 - 54.0 fl    MPV 10.8 6.0 - 12.0 fL    Platelets 254 130 - 400 10*3/mm3       A1C:No results for input(s): HGBA1C in the last 55737 hours.  PSA:No results for input(s): PSA in the last 13418 hours.  CBC:  Lab Results - Last 18 Months   Lab Units 03/30/19  0540 03/29/19  0421 03/28/19  1350 03/18/19  0103 03/14/19  0506 03/13/19  0624 03/12/19  0623   WBC 10*3/mm3 7.73 5.29 5.87 4.74* 5.63 6.74 5.59   HEMOGLOBIN g/dL 9.5* 9.9* 11.6* 10.7* 10.8* 11.1* 10.9*   HEMATOCRIT % 27.9* 30.6* 35.7* 32.3* 32.5* 33.4* 32.1*   PLATELETS 10*3/mm3 254 278 326 222 206 204 208      BMP/CMP:  Lab Results - Last 18 Months   Lab Units 03/30/19  0540 03/29/19  0421 03/28/19  1350 03/18/19  0103 03/14/19  0506 03/13/19  0624 03/12/19  0623   SODIUM mmol/L 133* 135 134* 139 133* 138 140   POTASSIUM mmol/L 4.3 4.5 5.0 4.2 3.6 3.1* 2.9*   CHLORIDE mmol/L 100 101 96* 105 103 104 107   CO2 mmol/L 28.0 30.0 31.0 30.0 28.0 28.0 28.0   BUN mg/dL 9 8 11 10 4* 3* 3*   CREATININE mg/dL 0.65 0.59 0.63 0.58 0.45* 0.54 0.52   EGFR IF NONAFRICN AM mL/min/1.73 92 103 95 105 141 114 119   CALCIUM mg/dL 7.9* 8.0* 8.5 8.1*  "7.5* 7.4* 7.3*     HEPATIC:  Lab Results - Last 18 Months   Lab Units 03/28/19  1350 03/18/19  0103 03/11/19  0702 03/10/19  1046   ALT (SGPT) U/L 20 17 25 17   AST (SGOT) U/L 29 22 36 24   ALK PHOS U/L 104 86 97 128*     THYROID:  Lab Results - Last 18 Months   Lab Units 03/10/19  1046   TSH mIU/mL 5.230*       Objective   BP 98/62 (BP Location: Right arm, Patient Position: Sitting, Cuff Size: Large Adult)   Pulse 84   Temp 98 °F (36.7 °C) (Oral)   Resp 16   Ht 180.3 cm (71\")   Wt 97.5 kg (215 lb) Comment: pt stated weight, unable to stand to weigh  LMP  (LMP Unknown)   SpO2 97%   Breastfeeding? No   BMI 29.99 kg/m²   Body mass index is 29.99 kg/m².    Physical Exam  GENERAL:  Older stated age/AFA-developed in no acute distress but frail/weak in wheelchair.    SKIN: Turgor reduced/pale.  Entire back red with few shallow ulcers.  No induration.   HEENT: Normal cephalic without trauma.  Pupils equal round reactive to light. Extraocular motions full without nystagmus.   Posterior pharynx without mass, obstruction, redness.  No thyromegaly, mass, tenderness, lymphadenopathy and supple.  CV: Regular rhythm.  No murmur, gallop,  edema. Posterior pulses intact.  No carotid bruits.  CHEST: No chest wall tenderness or mass; mild kyphosis.   LUNGS: Symmetric motion with clear to auscultation.   ABD: Soft, nontender without mass.   ORTHO: Symmetric extremities without swelling/point tenderness.  Full gross range of motion.  Wheelchair.  NEURO: CN 2-12 grossly intact.  Symmetric facies and UE/LE. 2/5 strength throughout. 1/4 x bicep equal reflexes.  Nonfocal use extremities. Speech clear.  Reduced light touch with monofilament, vibratory sensation with tuning fork; equal toes/distal feet.    PSYCH: Oriented x 3.  Pleasant calm, well kept.  Shallow but purposeful/directed conservation with intact short/long gross memory.     Assessment/Plan     1. Essential hypertension    2. Rash    3. Urinary incontinence, " unspecified type    4. Elevated fasting glucose    5. Hyponatremia-often polydyspia involved    6. Anemia, unspecified type    7. Gait difficulty    8. Pressure injury of skin, unspecified injury stage, unspecified location        Rx: reviewed/changes:  New Medications Ordered This Visit   Medications   • cephalexin (KEFLEX) 500 MG capsule     Sig: Take 1 capsule by mouth 2 (Two) Times a Day.     Dispense:  14 capsule     Refill:  0       LAB/Testing/Referrals: reviewed/orders:   Today:   Orders Placed This Encounter   Procedures   • Comprehensive metabolic panel   • Lipid Panel With LDL/HDL Ratio   • TSH   • CBC and Differential     Chronic/recurrent labs above or change to:   same    Discussions:   Family advised that she needs a lot of nursing care.   wants to be the one to provide that; he is warned it is unlikely he can keep the 24/7 schedule going.  I talked personally with [facility] and asked him to get on board quickly.   to keep us informed if they do not arrive.  I asked for [facility] to the weekend; a urine with urine culture and attention to all wounds including the back.   Continue to follow-up [facility]  Turn every 2 hours and keep the back dry.    Body mass index is 29.99 kg/m².  Patient's Body mass index is 29.99 kg/m². BMI is within normal parameters. No follow-up required..    Tobacco use reviewed:  Non-smoker    There are no Patient Instructions on file for this visit.    Follow up: Return for lab today/, and, Dr Gonzales-7-14 d.  Future Appointments   Date Time Provider Department Center   8/2/2019 10:45 AM Josafat Gonzales MD [facility] None

## 2019-07-19 NOTE — TELEPHONE ENCOUNTER
"[facility] called to advise \"[facility] called me and told me they will not see her d/t her insurance and will need referral to another home health\"    Also advised \"she has unstagable wound to her left ankle and her Right heel. She also has one to her Right buttock. The rash she has from her mid thigh to chest is d/t laying in urine. She was wearing a brief the last time she was here and it was dripping with urine, and foul odor. Pt was wear sweat pants and a tie-die hoodie. We did not treat her buttock wound d/t the rash, but we are treating her ankle and heal with Traid cream and foam dressing\"  "

## 2019-07-19 NOTE — TELEPHONE ENCOUNTER
If  can afford; she really needs to get back to [facility] for a couple weeks (pretty sure they would take her even today)  Cannot promise insurance would cover

## 2019-07-19 NOTE — TELEPHONE ENCOUNTER
"Spoke to spouse \"I really cant afford that, [facility] is helping me with her. We are doing what they told us to do. She has had [facility] before\"  "

## 2019-07-19 NOTE — TELEPHONE ENCOUNTER
Put in for [facility] and have faxed it all over to them at [phone] and she stated as long as insurance covers they will see her late this evening.

## 2019-07-22 LAB
AMORPHOUS: ABNORMAL /HPF
BACTERIA: ABNORMAL /HPF
BILIRUBIN URINE: ABNORMAL
BLOOD, URINE: NEGATIVE
CLARITY: ABNORMAL
COLOR: ABNORMAL
GLUCOSE URINE: NEGATIVE MG/DL
KETONES, URINE: ABNORMAL MG/DL
LEUKOCYTE ESTERASE, URINE: ABNORMAL
NITRITE, URINE: POSITIVE
PH UA: 6 (ref 5–8)
PROTEIN UA: NEGATIVE MG/DL
RBC UA: ABNORMAL /HPF (ref 0–2)
SPECIFIC GRAVITY UA: 1.03 (ref 1–1.03)
URINE REFLEX TO CULTURE: YES
UROBILINOGEN, URINE: 1 E.U./DL
WBC UA: ABNORMAL /HPF (ref 0–5)
YEAST: ABNORMAL /HPF

## 2019-07-22 NOTE — TELEPHONE ENCOUNTER
[facility] called to inform Dr. Gonzales that they did get the patient set up, but when they called to schedule the time on Friday 7/19/19 patient's  stated it was to late to come. He said he was to busy on Saturday and didn't want them to come on a Sunday. They are scheduled to go out today 7/22/19.

## 2019-07-23 NOTE — TELEPHONE ENCOUNTER
"[facility] \"I need to know what to stage her wounds to her heel and ankle, also her buttock? I went last night and put in cath and obtained urine sample. She was soaked in urine to her head.\" advised they were unstageable per [facility]    Laurie left vm \"I called to set up visit and past discussing with spouse at his request to wait until her skin integrity improves. Between him and his nurse we will postpone for two weeks\"   "

## 2019-07-24 LAB — URINE CULTURE, ROUTINE: NORMAL

## 2019-07-24 NOTE — TELEPHONE ENCOUNTER
"Per Chiquita TOLBERT [facility] \"ill be seeing her next week to train her spouse on brittany lift use. They also need an order for the brittany faxed to [facility] at [phone]  "

## 2019-07-25 NOTE — TELEPHONE ENCOUNTER
Pharmacy called and said that the Pyridium is only for urinary tract infections and will not help bladder spasms. However; it is not covered by insurance and will cost them $70. Would like to see if you would send something else?

## 2019-07-25 NOTE — TELEPHONE ENCOUNTER
Unless pharmacist has other ideas; I am not willing to use B&O/like with risk for interaction with her pain Rx, benzos, etc  If becomes big issue; have to remove the garcia and move to straight cath program ( to be taught in/out cath tid)    Current Outpatient Medications:   •  acetaminophen (TYLENOL) 325 MG tablet, Take 2 tablets by mouth Every 4 (Four) Hours As Needed for Mild Pain  or Fever., Disp: , Rfl:   •  albuterol sulfate  (90 Base) MCG/ACT inhaler, Inhale 2 puffs 4 (Four) Times a Day., Disp: , Rfl:   •  bisacodyl (DULCOLAX) 10 MG suppository, Insert 10 mg into the rectum Daily., Disp: , Rfl:   •  cephalexin (KEFLEX) 500 MG capsule, Take 1 capsule by mouth 2 (Two) Times a Day., Disp: 14 capsule, Rfl: 0  •  clonazePAM (KlonoPIN) 1 MG tablet, Take 1 tablet by mouth 3 (Three) Times a Day As Needed for Anxiety., Disp: 42 tablet, Rfl: 0  •  cyclobenzaprine (FLEXERIL) 10 MG tablet, Take 1 tablet by mouth 3 (Three) Times a Day., Disp: 90 tablet, Rfl: 0  •  DULoxetine (CYMBALTA) 30 MG capsule, Take 30 mg by mouth Daily., Disp: , Rfl:   •  gabapentin (NEURONTIN) 600 MG tablet, Take 1 tablet by mouth 3 (Three) Times a Day., Disp: 90 tablet, Rfl: 0  •  levETIRAcetam XR (KEPPRA XR) 500 MG 24 hr tablet, Take 2 tablets by mouth Every 12 (Twelve) Hours., Disp: , Rfl:   •  levothyroxine (SYNTHROID, LEVOTHROID) 100 MCG tablet, Take 1 tablet by mouth Daily., Disp: , Rfl:   •  Magnesium Hydroxide (MILK OF MAGNESIA PO), Take 30 mL by mouth Daily As Needed., Disp: , Rfl:   •  metoprolol tartrate (LOPRESSOR) 50 MG tablet, Take 1 tablet by mouth Daily., Disp: 30 tablet, Rfl: 5  •  Multiple Vitamin (MULTI-VITAMIN DAILY PO), Take  by mouth., Disp: , Rfl:   •  nystatin (nystatin) 374356 UNIT/GM powder, WASH AFFECTED AREA AND DRY WELL THREE TIMES DAILY. AFTER DRY, APPLY NYSTATIN POWDER TWICE DAILY., Disp: 60 g, Rfl: 0  •  oxyCODONE (ROXICODONE) 10 MG tablet, Take 1 tablet by mouth Every 4 (Four) Hours As Needed for  Moderate Pain ., Disp: 60 tablet, Rfl: 0  •  phenazopyridine (PYRIDIUM) 100 MG tablet, Take 1 tablet by mouth 2 (Two) Times a Day., Disp: 60 tablet, Rfl: 2  •  potassium chloride (K-DUR,KLOR-CON) 20 MEQ CR tablet, Take 20 mEq by mouth 3 (Three) Times a Day., Disp: , Rfl:   •  QUEtiapine (SEROquel) 100 MG tablet, Take 1 tablet by mouth 2 (Two) Times a Day., Disp: , Rfl:   •  QUEtiapine (SEROquel) 300 MG tablet, Take 300 mg by mouth Every Night., Disp: , Rfl:   •  rivaroxaban (XARELTO) 10 MG tablet, Take 10 mg by mouth Daily., Disp: , Rfl:   •  solifenacin (VESICARE) 10 MG tablet, Take 1 tablet by mouth Daily., Disp: 30 tablet, Rfl: 6  •  traZODone (DESYREL) 50 MG tablet, Take 1 tablet by mouth Every Night., Disp: 15 tablet, Rfl: 0  •  venlafaxine XR (EFFEXOR-XR) 150 MG 24 hr capsule, Take 2 capsules by mouth Daily., Disp: , Rfl:   •  vitamin C (ASCORBIC ACID) 250 MG tablet, Take 250 mg by mouth 2 (Two) Times a Day., Disp: , Rfl:   •  vitamin D (ERGOCALCIFEROL) 80481 units capsule capsule, Take 50,000 Units by mouth 1 (One) Time Per Week., Disp: , Rfl:

## 2019-07-25 NOTE — TELEPHONE ENCOUNTER
[facility] called asking if you would give the patient something for bladder spasms?

## 2019-07-26 NOTE — TELEPHONE ENCOUNTER
"Spoke to spouse and stated understanding, \"they are coming back out today to check it out, it is barely draining\"  "

## 2019-07-30 PROBLEM — L98.499 SKIN ULCER (HCC): Status: ACTIVE | Noted: 2019-01-01

## 2019-07-30 NOTE — TELEPHONE ENCOUNTER
Ask  if he is ok with admit to [facility]; I think it is time as it sounds like she is not getting better
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
## 2019-08-06 NOTE — TELEPHONE ENCOUNTER
Pt discharged to [facility] and needed rx's to nursing home pharmacy [facility]    Klonopin 1 mg tid prn, oxycodone 10 mg Q 4 hours prn, Neurontin 600 mg tid

## 2019-09-04 NOTE — TELEPHONE ENCOUNTER
" [facility] \"her spouse wanted to see about getting a hospice referral?\"  "

## 2019-09-04 NOTE — TELEPHONE ENCOUNTER
"Notified Fara and advised, pt is not swallowing her pills, they are being crushed and not eating well, \"she just lays there\"  "

## 2019-09-05 NOTE — TELEPHONE ENCOUNTER
Carlito called with concerns that Bita is not eating.   He said that she is only eating 3-5 bites/meal and wants to know if there is something else to do.  He asked to have Dr. Gonzales call him please.  [phone]

## 2019-09-05 NOTE — TELEPHONE ENCOUNTER
Carlito is going to discuss when/if Bita's brother and he will be able to come in at the same time to meet with you and he will call in the AM to try to schedule a time.

## 2019-09-06 ENCOUNTER — HOSPITAL ENCOUNTER (OUTPATIENT)
Dept: HOSPITAL 58 - AMBL | Age: 64
Discharge: ANOTHER HOSPITAL | End: 2019-09-06
Attending: FAMILY MEDICINE

## 2019-09-06 VITALS — BODY MASS INDEX: 31.6 KG/M2

## 2019-09-06 DIAGNOSIS — L89.303: ICD-10-CM

## 2019-09-06 DIAGNOSIS — R41.82: Primary | ICD-10-CM

## 2019-09-06 DIAGNOSIS — R00.0: ICD-10-CM

## 2019-09-06 DIAGNOSIS — L89.619: ICD-10-CM

## 2019-09-06 DIAGNOSIS — L89.529: ICD-10-CM

## 2019-09-06 DIAGNOSIS — L89.629: ICD-10-CM

## 2019-09-06 DIAGNOSIS — L89.519: ICD-10-CM

## 2019-09-06 PROBLEM — J18.9 PNEUMONIA DUE TO INFECTIOUS ORGANISM: Status: ACTIVE | Noted: 2019-01-01

## 2019-09-07 NOTE — PLAN OF CARE
Problem: Patient Care Overview  Goal: Plan of Care Review  Outcome: Ongoing (interventions implemented as appropriate)   09/07/19 1141   Coping/Psychosocial   Plan of Care Reviewed With patient   Plan of Care Review   Progress no change   OTHER   Outcome Summary Initial nutrition assessment. She is ordered a regular diet. She is a little confused when talking to this RD, but reports a pretty good appetite. No po intake recorded at this time. Unsure if this is accurate info. She says that she has used supplements before and is willing to drink them now. She was able to tell this RD her UBW. She has had significant weight loss over the last year. She has multiple wounds and PI's. She has dry mucous membranes, slow skin elasticity, poor dentition, and muscle and fat wasting per NFPE. Malnutrition assessment completed and submitted to MD. Will send Boost Plus BID with meals. She has been advised of alternate selections and had menu in her room. Per ED notes, her  reports the plan to proceed to hospice in the near future. Will cont to follow nutritio needs.       Problem: Nutrition, Imbalanced: Inadequate Oral Intake (Adult)  Goal: Identify Related Risk Factors and Signs and Symptoms  Outcome: Ongoing (interventions implemented as appropriate)    Goal: Improved Oral Intake  Outcome: Ongoing (interventions implemented as appropriate)    Goal: Prevent Further Weight Loss  Outcome: Ongoing (interventions implemented as appropriate)

## 2019-09-07 NOTE — THERAPY EVALUATION
Acute Care - Speech Language Pathology   Swallow Initial Evaluation [facility]     Patient Name: Bita Croft  : 1955  MRN: [id number]  Today's Date: 2019               Admit Date: 2019     Bedside swallow eval completed. Pt was presented a full range of consistencies except mechanical soft. Pt was noted to have dried oral secretions, with mild brown lingual coating which appeared to be mucus. Oral motor WFL. Pt with confused language, perseverations, difficulty answering open ended questions appropriately. With PO trials, pt was noted to have adequate labial seal on spoon, slightly decreased on straw. No concerns with oral transit or with laryngeal elevation. Significantly audible swallow with nectar thick and thin, raising concern for delay in swallow initiation. 1x significantly delayed cough (minutes following) thin x1. Functional mastication with regular solid without oral residue. Cannot fully r/o aspiration at this time. RECOMMENDATIONS: D/C NPO status, ok to resume PO diet of regular solid diet consistency with regular/thin liquid consistency; RN to monitor for increased congestion; meds whole with thin liquids; general feeding/aspiration precautions. ST to monitor diet toleration PRN. May benefit from VFSS in Radiology if concerns arise, especially given CXR on 2019 of small patchy right upper lobe infiltrate. Thanks! Marisabel Faye, CCC-SLP 2019 11:46 AM    Visit Dx:     ICD-10-CM ICD-9-CM   1. Pneumonia due to infectious organism, unspecified laterality, unspecified part of lung J18.9 136.9     484.8   2. Failure to thrive in adult R62.7 783.7   3. Dysphagia, unspecified type R13.10 787.20     Patient Active Problem List   Diagnosis   • Confusion   • Depression   • Anxiety   • Hyponatremia-often polydyspia involved   • Seizure disorder (CMS/HCC)   • Chronic neck pain   • Chronic back pain-Ruxer   • Hypothyroidism   • Abnormal thyroid blood test   • Hypertension   • Wellness  examination-done   • Elevated fasting glucose   • Anemia   • Iron deficiency   • Degenerative joint disease of spine   • Conversion disorder with abnormal movement   • Reflux laryngitis   • Gait difficulty   • Weakness of both legs   • Sore throat   • Polydipsia   • Nocturia   • Frequency of urination   • Urge incontinence   • Laboratory test*   • Chronic narcotic use-Ruxer   • Abnormal x-ray   • Noncompliance   • Lung nodule, solitary   • Non-smoker   • Hx of colon cancer, stage I   • Altered mental status   • Anticoagulated: DVT/(?)   • History of radiation therapy   • Closed right hip fracture, initial encounter (CMS/HCC)   • Malignant neoplasm of upper lobe of right lung (CMS/HCC)   • Skin ulcer (CMS/HCC)   • Pneumonia due to infectious organism     Past Medical History:   Diagnosis Date   • Asthma    • Cancer (CMS/HCC)     colon cancer   • Depression    • Disease of thyroid gland    • Hypertension    • Insomnia    • Seizure (CMS/HCC)    • Sleep apnea      Past Surgical History:   Procedure Laterality Date   • BACK SURGERY     • COLON SURGERY     • GASTRIC BYPASS     • HIP HEMIARTHROPLASTY Right 3/29/2019    Procedure: HIP HEMIARTHROPLASTY;  Surgeon: Mehul oLaiza MD;  Location: [facility];  Service: Orthopedics   • HYSTERECTOMY     • KNEE ARTHROSCOPY      bilateral   • NECK SURGERY     • ORIF WRIST FRACTURE Right 6/9/2017    Procedure: WRIST OPEN REDUCTION INTERNAL FIXATION, RIGHT;  Surgeon: Alec Cardoza MD;  Location: [facility] OR;  Service:    • TONSILLECTOMY     • WRIST FRACTURE SURGERY          SWALLOW EVALUATION (last 72 hours)      [facility] Adult Swallow Evaluation     Row Name 09/07/19 2340                   Rehab Evaluation    Document Type  evaluation  -TM        Subjective Information  complains of;pain  -TM        Patient Observations  alert;cooperative  -TM        Patient/Family Observations  No family present.  -TM        Patient Effort  adequate  -TM           General Information    Patient Profile  Reviewed  yes  -TM        Pertinent History Of Current Problem  Acue fatigue, altered mental status. CT head showed no acute process. CXR 09/06/2019 showd small patchy RUL infiltrate.   -TM        Current Method of Nutrition  NPO;other (see comments) Reg diet with thin liquids ordered yet RN held until eval.  -TM        Precautions/Limitations, Vision  WFL  -TM        Precautions/Limitations, Hearing  WFL  -TM        Prior Level of Function-Communication  cognitive-linguistic impairment;motor speech impairment  -TM        Prior Level of Function-Swallowing  no diet consistency restrictions  -TM        Plans/Goals Discussed with  patient  -TM        Barriers to Rehab  medically complex  -TM        Patient's Goals for Discharge  patient did not state  -TM           Pain Assessment    Additional Documentation  Pain Scale: Numbers Pre/Post-Treatment (Group)  -TM           Pain Scale: Numbers Pre/Post-Treatment    Pain Scale: Numbers, Pretreatment  7/10  -TM        Pain Scale: Numbers, Post-Treatment  7/10  -TM        Pain Location  back  -TM           Oral Motor and Function    Dentition Assessment  poor oral hygiene;natural, present and adequate  -TM        Secretion Management  dried secretions in oral cavity  -TM        Mucosal Quality  dry;sticky  -TM        Volitional Swallow  WFL  -TM        Volitional Cough  reduced respiratory support  -TM           Oral Musculature and Cranial Nerve Assessment    Oral Motor General Assessment  WFL  -TM           General Eating/Swallowing Observations    Respiratory Support Currently in Use  nasal cannula  -TM        Eating/Swallowing Skills  fed by SLP  -TM        Positioning During Eating  upright 90 degree;upright in bed  -TM        Utensils Used  spoon;straw  -TM        Consistencies Trialed  pudding thick;honey-thick liquids;nectar/syrup-thick liquids;thin liquids;regular textures  -TM           Respiratory    Respiratory Status  WFL  -TM           Clinical Swallow Eval     Oral Prep Phase  WFL  -TM        Oral Transit  WFL  -TM        Oral Residue  WFL  -TM        Pharyngeal Phase  suspected pharyngeal impairment  -TM        Esophageal Phase  unremarkable  -TM        Clinical Swallow Evaluation Summary  Bedside swallow eval completed. Pt was presented a full range of consistencies except mechanical soft. Pt was noted to have dried oral secretions, with mild brown lingual coating which appeared to be mucus. Oral motor WFL. Pt with confused language, perseverations, difficulty answering open ended questions appropriately. With PO trials, pt was noted to have adequate labial seal on spoon, slightly decreased on straw. No concerns with oral transit or with laryngeal elevation. Significantly audible swallow with nectar thick and thin, raising concern for delay in swallow initiation. 1x significantly delayed cough (minutes following) thin x1. Functional mastication with regular solid without oral residue. Cannot fully r/o aspiration at this time.   -TM           Pharyngeal Phase Concerns    Pharyngeal Phase Concerns  other (see comments)  -        Pharyngeal Phase Concerns, Comment  Audible swallows with nectar thick, sig. delayed cough following thin x1  -TM           Clinical Impression    SLP Swallowing Diagnosis  functional oral phase;suspected pharyngeal dysfunction  -        Functional Impact  risk of aspiration/pneumonia  -        Rehab Potential/Prognosis, Swallowing  good, to achieve stated therapy goals  -        Swallow Criteria for Skilled Therapeutic Interventions Met  demonstrates skilled criteria  -           Recommendations    Therapy Frequency (Swallow)  PRN  -TM        Predicted Duration Therapy Intervention (Days)  until discharge  -        SLP Diet Recommendation  regular textures;thin liquids  -        Recommended Diagnostics  VFSS (MBS);other (see comments) If concerns arise with toleration of recommended diet.  -        Recommended Precautions and  Strategies  upright posture during/after eating;small bites of food and sips of liquid  -TM        SLP Rec. for Method of Medication Administration  meds whole;with thin liquids  -TM        Monitor for Signs of Aspiration  yes;cough;gurgly voice;throat clearing;pneumonia;right lower lobe infiltrates  -TM        Anticipated Dischage Disposition  skilled nursing [facility]  -TM           Swallow Goals (SLP)    Oral Nutrition/Hydration Goal Selection (SLP)  oral nutrition/hydration, SLP goal 1  -TM           Oral Nutrition/Hydration Goal 1 (SLP)    Oral Nutrition/Hydration Goal 1, SLP  Pt will tolerate LRD without overt s/s of aspiration.  -TM        Time Frame (Oral Nutrition/Hydration Goal 1, SLP)  by discharge  -TM        Barriers (Oral Nutrition/Hydration Goal 1, SLP)  n/a  -TM        Progress/Outcomes (Oral Nutrition/Hydration Goal 1, SLP)  goal ongoing  -TM          User Key  (r) = Recorded By, (t) = Taken By, (c) = Cosigned By    Initials Name Effective Dates    TM Marisabel Faye CCC-SLP 08/02/16 -           EDUCATION  The patient has been educated in the following areas:   Dysphagia (Swallowing Impairment).    SLP Recommendation and Plan  SLP Swallowing Diagnosis: functional oral phase, suspected pharyngeal dysfunction  SLP Diet Recommendation: regular textures, thin liquids  Recommended Precautions and Strategies: upright posture during/after eating, small bites of food and sips of liquid  SLP Rec. for Method of Medication Administration: meds whole, with thin liquids     Monitor for Signs of Aspiration: yes, cough, gurgly voice, throat clearing, pneumonia, right lower lobe infiltrates  Recommended Diagnostics: VFSS (MBS), other (see comments)(If concerns arise with toleration of recommended diet.)  Swallow Criteria for Skilled Therapeutic Interventions Met: demonstrates skilled criteria  Anticipated Dischage Disposition: skilled nursing facility  Rehab Potential/Prognosis, Swallowing: good, to achieve stated  therapy goals  Therapy Frequency (Swallow): PRN  Predicted Duration Therapy Intervention (Days): until discharge       Plan of Care Reviewed With: patient  Progress: (Eval)  Outcome Summary: Bedside swallow eval completed. Pt was presented a full range of consistencies except mechanical soft. Pt was noted to have dried oral secretions, with mild brown lingual coating which appeared to be mucus. Oral motor WFL. Pt with confused language, perseverations, difficulty answering open ended questions appropriately. With PO trials, pt was noted to have adequate labial seal on spoon, slightly decreased on straw. No concerns with oral transit or with laryngeal elevation. Significantly audible swallow with nectar thick and thin, raising concern for delay in swallow initiation. 1x significantly delayed cough (minutes following) thin x1. Functional mastication with regular solid without oral residue. Cannot fully r/o aspiration at this time. RECOMMENDATIONS: D/C NPO status, ok to resume PO diet of regular solid diet consistency with regular/thin liquid consistency; RN to monitor for increased congestion; meds whole with thin liquids; general feeding/aspiration precautions. ST to monitor diet toleration PRN. May benefit from VFSS in Radiology if concerns arise, especially given CXR on 09/06/2019 of small patchy right upper lobe infiltrate. Thanks!     SLP GOALS     Row Name 09/07/19 1050             Oral Nutrition/Hydration Goal 1 (SLP)    Oral Nutrition/Hydration Goal 1, SLP  Pt will tolerate LRD without overt s/s of aspiration.  -TM      Time Frame (Oral Nutrition/Hydration Goal 1, SLP)  by discharge  -TM      Barriers (Oral Nutrition/Hydration Goal 1, SLP)  n/a  -TM      Progress/Outcomes (Oral Nutrition/Hydration Goal 1, SLP)  goal ongoing  -TM        User Key  (r) = Recorded By, (t) = Taken By, (c) = Cosigned By    Initials Name Provider Type    TM Marisabel Faye CCC-SLP Speech and Language Pathologist             Time  Calculation:   Time Calculation- SLP     Row Name 09/07/19 1145             Time Calculation- SLP    SLP Start Time  1050  -TM      SLP Stop Time  1145  -TM      SLP Time Calculation (min)  55 min  -TM      SLP Received On  09/07/19  -      SLP Goal Re-Cert Due Date  09/17/19  -        User Key  (r) = Recorded By, (t) = Taken By, (c) = Cosigned By    Initials Name Provider Type     Marisabel Faye, CCC-SLP Speech and Language Pathologist          Therapy Charges for Today     Code Description Service Date Service Provider Modifiers Qty    [id number]  ST EVAL ORAL PHARYNG SWALLOW 4 9/7/2019 Marisabel Faye CCC-SLP GN 1               LEOLA Castorena  9/7/2019

## 2019-09-07 NOTE — CONSULTS
"Pharmacy Dosing Service  Pharmacokinetics  Vancomycin Initial Evaluation    Assessment/Action/Plan:  Initiated Vancomycin 1500 mg IVPB every 12 hours. Vancomycin 1000mg given once 9/6 ~2030.  Vancomycin trough ordered prior to 4th dose on 9/7/19 before 0600 dose..  Patient is also on El Zosyn.  Current vancomycin end date: 9/10. Pharmacy will monitor renal function and adjust dose accordingly.     Subjective:  Bita Croft is a 63 y.o. female with a Vancomycin \"Pharmacy to Dose\" consult for the treatment of PNA .  Day 1 of therapy.    Objective:  Ht:  ; Wt: 85.8 kg (189 lb 3.2 oz)  Estimated Creatinine Clearance: 139.3 mL/min (by C-G formula based on SCr of 0.56 mg/dL).   Lab Results   Component Value Date    CREATININE 0.56 09/06/2019    CREATININE 0.41 (L) 08/06/2019    CREATININE 0.42 (L) 08/05/2019    CREATININE 0.90 10/08/2018      Lab Results   Component Value Date    WBC 13.49 (H) 09/06/2019    WBC 8.21 08/06/2019    WBC 8.99 08/05/2019      Baseline culture results:  Microbiology Results (last 10 days)       ** No results found for the last 240 hours. **            Yudith Fernandez RPH  09/06/19 10:58 PM    "

## 2019-09-07 NOTE — PLAN OF CARE
Problem: Patient Care Overview  Goal: Plan of Care Review   09/07/19 9178   OTHER   Outcome Summary Severe excoriation on buttocks and posterior thighs and a portion of her lower back. Doris's amazing butt cream ordered and meticulous trevor care provided. Incont. of a large amount of stool this am. Will continue to observe frequently for incontinence. Pt encourage to use call light if needs assistance. Dr. Gonzales states that he plans to cut back on her medications to see if it decreases her letheragy and hopefully her appetite will improve. He does not want her in tremendious pain, but expects her to be tearful at times.  at the bedside this morning but was only here for a short amount of time.

## 2019-09-07 NOTE — CONSULTS
Pharmacy Dosing Service  Antimicrobials  Zosyn      Assessment/Action/Plan:  Initated Karthik Zosyn 3.375g IV Q8h. Current end date: 9/11  Bolus dose Zosyn 4.5g given 9/6 ~1900     Subjective:  Bita Croft is a 63 y.o. female currently being treated for PNA.    Objective:  Estimated Creatinine Clearance: 139.3 mL/min (by C-G formula based on SCr of 0.56 mg/dL).   Lab Results   Component Value Date    CREATININE 0.56 09/06/2019    CREATININE 0.41 (L) 08/06/2019    CREATININE 0.42 (L) 08/05/2019    CREATININE 0.90 10/08/2018     Lab Results   Component Value Date    WBC 13.49 (H) 09/06/2019     Temp Readings from Last 1 Encounters:   09/06/19 98.1 °F (36.7 °C) (Oral)       Culture Results:  Microbiology Results (last 10 days)       ** No results found for the last 240 hours. **          Current Antimicrobials:   Anti-Infectives (From admission, onward)      Ordered     Dose/Rate Route Frequency Start Stop    09/06/19 2243  piperacillin-tazobactam (ZOSYN) 3.375 g in iso-osmotic dextrose 50 ml (premix)     Ordering Provider:  Josafat Gonzales MD    3.375 g  over 4 Hours Intravenous Every 8 Hours 09/07/19 0100 09/12/19 0059 09/06/19 2120  Pharmacy to Dose Zosyn     Ordering Provider:  Josafat Gonzales MD     Does not apply Continuous PRN 09/06/19 2120 09/11/19 2119 09/06/19 2120  Pharmacy to dose vancomycin     Ordering Provider:  Josafat Gonzales MD     Does not apply Continuous PRN 09/06/19 2120 09/10/19 2119    09/06/19 1633  vancomycin 1 g/250 mL 0.9% NS (vial-mate)     Ordering Provider:  Juanjose Last PA-C    1,000 mg Intravenous Once 09/06/19 1635 09/06/19 2032 09/06/19 1633  piperacillin-tazobactam (ZOSYN) 4.5 g/100 mL 0.9% NS IVPB (mbp)     Ordering Provider:  Juanjose Last PA-C    4.5 g Intravenous Once 09/06/19 1635 09/06/19 2026            Yudith Fernandez, [facility]  09/06/19 10:43 PM

## 2019-09-07 NOTE — ED PROVIDER NOTES
Subjective   History of Present Illness  63-year-old female presents by EMS from nursing home reports fatigue and altered mental status.  On my examination the patient is a very poor historian and is unable to complete the history.  Son is not present at bedside and he reports that the plan for this patient is to proceed to hospice in the near future.  Dr. Gonzales is the primary care doctor  Review of Systems   All other systems reviewed and are negative.      Past Medical History:   Diagnosis Date   • Asthma    • Cancer (CMS/HCC)     colon cancer   • Depression    • Disease of thyroid gland    • Hypertension    • Insomnia    • Seizure (CMS/HCC)    • Sleep apnea        No Known Allergies    Past Surgical History:   Procedure Laterality Date   • BACK SURGERY     • COLON SURGERY     • GASTRIC BYPASS     • HIP HEMIARTHROPLASTY Right 3/29/2019    Procedure: HIP HEMIARTHROPLASTY;  Surgeon: Mehul Loaiza MD;  Location: [facility] OR;  Service: Orthopedics   • HYSTERECTOMY     • KNEE ARTHROSCOPY      bilateral   • NECK SURGERY     • ORIF WRIST FRACTURE Right 6/9/2017    Procedure: WRIST OPEN REDUCTION INTERNAL FIXATION, RIGHT;  Surgeon: Alec Cardoza MD;  Location: [facility] OR;  Service:    • TONSILLECTOMY     • WRIST FRACTURE SURGERY         Family History   Problem Relation Age of Onset   • Heart attack Father    • Stroke Father    • Arthritis Father    • Hypertension Father    • Heart attack Mother    • Stroke Mother    • Arthritis Mother    • Hypertension Mother        Social History     Socioeconomic History   • Marital status:      Spouse name: Carlito   • Number of children: 1   • Years of education: 18+   • Highest education level: Not on file   Occupational History   • Occupation: retired     Comment: [employer]   Tobacco Use   • Smoking status: Never Smoker   • Smokeless tobacco: Never Used   Substance and Sexual Activity   • Alcohol use: No   • Drug use: No   • Sexual activity: Defer            Objective   Physical Exam   Constitutional: She appears lethargic.   Appears ill   HENT:   Head: Normocephalic and atraumatic.   Eyes: EOM are normal. Pupils are equal, round, and reactive to light.   Neck: Normal range of motion. Neck supple.   Cardiovascular:   Tachycardia   Pulmonary/Chest: Effort normal and breath sounds normal.   Abdominal: Soft. Bowel sounds are normal. There is no tenderness.   Musculoskeletal: Normal range of motion.   Neurological: She has normal strength. She appears lethargic. She is disoriented. She displays a negative Romberg sign.   Skin: Skin is warm. Capillary refill takes less than 2 seconds.   Psychiatric:   Flat, lethargic   Nursing note and vitals reviewed.      Procedures           ED Course        Labs Reviewed   COMPREHENSIVE METABOLIC PANEL - Abnormal; Notable for the following components:       Result Value    Glucose 69 (*)     BUN 28 (*)     Calcium 7.6 (*)     Total Protein 5.1 (*)     Albumin 2.00 (*)     Alkaline Phosphatase 129 (*)     A/G Ratio 0.6 (*)     BUN/Creatinine Ratio 50.0 (*)     All other components within normal limits    Narrative:     GFR Normal >60  Chronic Kidney Disease <60  Kidney Failure <15   URINALYSIS W/ CULTURE IF INDICATED - Abnormal; Notable for the following components:    Color, UA Dark Yellow (*)     Appearance, UA Cloudy (*)     Ketones, UA Trace (*)     Bilirubin, UA Small (1+) (*)     Blood, UA Trace (*)     Protein, UA Trace (*)     Leuk Esterase, UA Large (3+) (*)     Nitrite, UA Positive (*)     All other components within normal limits   LIPASE - Abnormal; Notable for the following components:    Lipase <10 (*)     All other components within normal limits   CBC WITH AUTO DIFFERENTIAL - Abnormal; Notable for the following components:    WBC 13.49 (*)     Hemoglobin 11.4 (*)     RDW 17.7 (*)     RDW-SD 54.8 (*)     Neutrophil % 85.0 (*)     Lymphocyte % 8.7 (*)     Neutrophils, Absolute 11.46 (*)     Immature Grans, Absolute  0.06 (*)     All other components within normal limits   URINALYSIS, MICROSCOPIC ONLY - Abnormal; Notable for the following components:    WBC, UA 21-30 (*)     Bacteria, UA 4+ (*)     All other components within normal limits   APTT - Normal   PROTIME-INR - Normal   TROPONIN (IN-HOUSE) - Normal   BNP (IN-HOUSE) - Normal   LACTIC ACID, PLASMA - Normal   TSH - Normal   MAGNESIUM - Normal   PROCALCITONIN - Normal    Narrative:     SIRS, sepsis, severe sepsis, and septic shock are categorized according to the criteria of the consensus conference of the American College of Chest Physicians/Society of Critical Care Medicine.    PCT < 0.5 ng/mL     Systemic infection (sepsis) is not likely.    PCT >0.5 and < 2.0 ng/mL Systemic infection (sepsis) is possible, but other conditions are known to elevate PCT as well.    PCT > 2.0 ng/mL     Systemic infection (sepsis) is likely, unless other causes are known.      PCT > 10.0 ng/mL    Important systemic inflammatory response, almost exclusively due to severe bacterial sepsis or septic shock.    PCT values of < 0.5 ng/mL do not exclude an infection, because localized infections (without systemic signs) may be associated with such low concentrations, or a systemic infection in its initial stages (<6 hours).  Increased PCT can occur without infection.  PCT concentrations between 0.5 and 2.0 ng/mL should be interpreted taking into account the patients history.  It is recommended to retest PCT within 6-24 hours if any concentrations < 2.0 ng/mL are obtained.   URINE DRUG SCREEN - Normal    Narrative:     Negative Thresholds For Drugs Screened in Urine:    Amphetamines          500 ng/ml  Barbiturates          200 ng/ml  Benzodiazepines       200 ng/ml  Cocaine               150 ng/ml  Methadone             150 ng/ml  Opiates               300 ng/ml  Phencyclidine         25 ng/ml  THC                      50 ng/ml    The normal value for all drugs tested is negative. This report  includes final unconfirmed screening results.  A positive result by this assay can be, at your request, sent to the Reference Lab for confirmation by gas chromatography. Unconfirmed results must not be used for non-medical purposes, such as employment or legal testing. Clinical consideration should be applied to any drug of abuse test result, particularly when unconfirmed results are used.   CK - Normal   BLOOD CULTURE   BLOOD CULTURE   URINE CULTURE   CBC AND DIFFERENTIAL    Narrative:     The following orders were created for panel order CBC & Differential.  Procedure                               Abnormality         Status                     ---------                               -----------         ------                     CBC Auto Differential[[id number]]        Abnormal            Final result                 Please view results for these tests on the individual orders.     XR Chest 1 View   Final Result   1. Chronic atelectasis region the lingula.   2. Suspicious for small patchy infiltrate right upper lung.           This report was finalized on 09/06/2019 16:07 by Dr. Lamin Corado MD.      CT Head Without Contrast   Final Result   1. No CT evidence of an acute intracranial process.                                  This report was finalized on 09/06/2019 15:56 by Dr. Obi Phan MD.                  MDM  Number of Diagnoses or Management Options  Failure to thrive in adult: new and requires workup  Pneumonia due to infectious organism, unspecified laterality, unspecified part of lung: new and requires workup  Diagnosis management comments: Admitted to Dr. Gonzales       Amount and/or Complexity of Data Reviewed  Clinical lab tests: reviewed and ordered  Tests in the radiology section of CPT®: reviewed and ordered  Tests in the medicine section of CPT®: ordered and reviewed    Risk of Complications, Morbidity, and/or Mortality  Presenting problems: moderate  Diagnostic procedures: moderate  Management  options: moderate    Patient Progress  Patient progress: stable      Final diagnoses:   Pneumonia due to infectious organism, unspecified laterality, unspecified part of lung   Failure to thrive in adult              Juanjoes Last PA-C  09/06/19 1958

## 2019-09-07 NOTE — PROGRESS NOTES
Discharge Planning Assessment  [facility]     Patient Name: Bita Croft  MRN: [id number]  Today's Date: 9/7/2019    Admit Date: 9/6/2019    Discharge Needs Assessment     Row Name 09/07/19 0741       Living Environment    Lives With  facility resident    Current Living Arrangements  extended care facility    Primary Care Provided by  -- facility cares for pt    Family Caregiver if Needed  child(sabrina), adult    Quality of Family Relationships  helpful;involved;supportive    Able to Return to Prior Arrangements  yes       Resource/Environmental Concerns    Resource/Environmental Concerns  none    Transportation Concerns  car, none       Transition Planning    Patient/Family Anticipates Transition to  long term care facility    Patient/Family Anticipated Services at Transition  skilled nursing    Transportation Anticipated  health plan transportation       Discharge Needs Assessment    Readmission Within the Last 30 Days  no previous admission in last 30 days    Concerns to be Addressed  no discharge needs identified;denies needs/concerns at this time    Equipment Currently Used at Home  -- facility provides all that is needed    Anticipated Changes Related to Illness  none    Equipment Needed After Discharge  none    Discharge Facility/Level of Care Needs  nursing facility, skilled    Offered/Gave Vendor List  yes    Patient's Choice of Community Agency(s)  [facility]    Discharge Coordination/Progress  Pt has RX coverage and a PCP. Pt has a bed [facility] at [facility] and plans on returning when medically stable. Pt denies any other needs at this time. SW will follow and assist with discharge needs. Phone: [phone] Fax: [phone]        Discharge Plan    No documentation.       Destination      No service coordination in this encounter.      Durable Medical Equipment      No service coordination in this encounter.      Dialysis/Infusion      No service coordination in this encounter.       Home Medical Care      No service coordination in this encounter.      Therapy      No service coordination in this encounter.      Community Resources      No service coordination in this encounter.          Demographic Summary    No documentation.       Functional Status    No documentation.       Psychosocial    No documentation.       Abuse/Neglect    No documentation.       Legal    No documentation.       Substance Abuse    No documentation.       Patient Forms    No documentation.           Donya Cohn

## 2019-09-07 NOTE — PROGRESS NOTES
Malnutrition Severity Assessment    Patient Name:  Bita Croft  YOB: 1955  MRN: [id number]  Admit Date:  9/6/2019    Patient meets criteria for : Severe Malnutrition(secondary signs: weakness, dry mucous membranes, slow skin elasticity, multiple non-healing wounds, FTT, chacha score 12)    Comments:  Please attest this note if you agree with this assessment.    Malnutrition Severity Assessment  Malnutrition Type: Chronic Disease - Related Malnutrition     Malnutrition Type (last 8 hours)      Malnutrition Severity Assessment     Row Name 09/07/19 1134       Malnutrition Severity Assessment    Malnutrition Type  Chronic Disease - Related Malnutrition    Row Name 09/07/19 1134       Unintentional Weight Loss     Unintentional Weight Loss   Weight loss greater than 10% in six months    Row Name 09/07/19 1134       Muscle Loss    Loss of Muscle Mass Findings  Moderate    [place] Region  Moderate - slight depression    Clavicle Bone Region  Moderate - some protrusion in females, visible in males    Acromion Bone Region  Moderate - acromion may slightly protrude    Row Name 09/07/19 1134       Fat Loss    Subcutaneous Fat Loss Findings  Severe    Orbital Region   Severe - pronounced hollowness/depression, dark circles, loose saggy skin    Upper Arm Region  -- some loose skin    Row Name 09/07/19 1134       Fluid Accumulation (Edema)    Fluid Acumulation Findings  Moderate    Fluid Accumulation   Moderate equals 2+ pitting edema    Row Name 09/07/19 1134       Criteria Met (Must meet criteria for severity in at least 2 of these categories: M Wasting, Fat Loss, Fluid, Secondary Signs, Wt. Status, Intake)    Patient meets criteria for   Severe Malnutrition secondary signs: weakness, dry mucous membranes, slow skin elasticity, multiple non-healing wounds, FTT, chacha score 12          Electronically signed by:  Yoly Partida RDN, LD  09/07/19 11:40 AM

## 2019-09-07 NOTE — PLAN OF CARE
Problem: Skin Injury Risk (Adult)  Goal: Skin Health and Integrity  Outcome: Ongoing (interventions implemented as appropriate)      Problem: Patient Care Overview  Goal: Plan of Care Review  Outcome: Ongoing (interventions implemented as appropriate)      Problem: Fall Risk (Adult)  Goal: Absence of Fall  Outcome: Ongoing (interventions implemented as appropriate)      Problem: Pneumonia (Adult)  Goal: Signs and Symptoms of Listed Potential Problems Will be Absent, Minimized or Managed (Pneumonia)  Outcome: Ongoing (interventions implemented as appropriate)      Problem: Nutrition, Imbalanced: Inadequate Oral Intake (Adult)  Goal: Identify Related Risk Factors and Signs and Symptoms  Outcome: Ongoing (interventions implemented as appropriate)    Goal: Improved Oral Intake  Outcome: Ongoing (interventions implemented as appropriate)    Goal: Prevent Further Weight Loss  Outcome: Ongoing (interventions implemented as appropriate)

## 2019-09-07 NOTE — PLAN OF CARE
Problem: Patient Care Overview  Goal: Plan of Care Review  Outcome: Ongoing (interventions implemented as appropriate)   09/07/19 1142   Coping/Psychosocial   Plan of Care Reviewed With patient   Plan of Care Review   Progress (Eval)   OTHER   Outcome Summary Bedside swallow eval completed. Pt was presented a full range of consistencies except mechanical soft. Pt was noted to have dried oral secretions, with mild brown lingual coating which appeared to be mucus. Oral motor WFL. Pt with confused language, perseverations, difficulty answering open ended questions appropriately. With PO trials, pt was noted to have adequate labial seal on spoon, slightly decreased on straw. No concerns with oral transit or with laryngeal elevation. Significantly audible swallow with nectar thick and thin, raising concern for delay in swallow initiation. 1x significantly delayed cough (minutes following) thin x1. Functional mastication with regular solid without oral residue. Cannot fully r/o aspiration at this time. RECOMMENDATIONS: D/C NPO status, ok to resume PO diet of regular solid diet consistency with regular/thin liquid consistency; RN to monitor for increased congestion; meds whole with thin liquids; general feeding/aspiration precautions. ST to monitor diet toleration PRN. May benefit from VFSS in Radiology if concerns arise, especially given CXR on 09/06/2019 of small patchy right upper lobe infiltrate. Thanks!

## 2019-09-07 NOTE — PLAN OF CARE
Problem: Skin Injury Risk (Adult)  Goal: Identify Related Risk Factors and Signs and Symptoms  Outcome: Outcome(s) achieved Date Met: 09/07/19    Goal: Skin Health and Integrity  Outcome: Ongoing (interventions implemented as appropriate)      Problem: Patient Care Overview  Goal: Plan of Care Review  Outcome: Ongoing (interventions implemented as appropriate)   09/07/19 0600   Coping/Psychosocial   Plan of Care Reviewed With patient   Plan of Care Review   Progress no change   OTHER   Outcome Summary Patient arrived to room 491 from ED. Patient alert but disorientated to time and situation. On O 2 at 2 NC with saturations in the upper 90's. Pressure injuries, excoriation, and garcia present on admission. NPO for speech evaluation.   2    Problem: Fall Risk (Adult)  Goal: Identify Related Risk Factors and Signs and Symptoms  Outcome: Outcome(s) achieved Date Met: 09/07/19    Goal: Absence of Fall  Outcome: Ongoing (interventions implemented as appropriate)      Problem: Pneumonia (Adult)  Goal: Signs and Symptoms of Listed Potential Problems Will be Absent, Minimized or Managed (Pneumonia)  Outcome: Ongoing (interventions implemented as appropriate)

## 2019-09-08 NOTE — PROGRESS NOTES
"Pharmacy Dosing Service  Pharmacokinetics  Vancomycin Follow-up Evaluation    Assessment/Action/Plan:  Calc. Trough therapeutic. Renal function stable. Continue Vancomycin 1500mg IV q12. Pharmacy will continue to monitor renal function and adjust dose accordingly.     Subjective:  Bita Croft is a 63 y.o. female currently on Vancomycin 1500 mg IV every 12 hours for the treatment of pneumonia, day 3 of therapy (Current vancomycin end date: 9/10).    Objective:  Ht: 182.9 cm (72.01\"); Wt: 85.8 kg (189 lb 3.2 oz)  Estimated Creatinine Clearance: 156 mL/min (by C-G formula based on SCr of 0.5 mg/dL).   Lab Results   Component Value Date    CREATININE 0.50 09/08/2019    CREATININE 0.48 (L) 09/07/2019    CREATININE 0.56 09/06/2019      Lab Results   Component Value Date    WBC 8.57 09/08/2019    WBC 10.25 09/07/2019    WBC 13.49 (H) 09/06/2019         Lab Results   Component Value Date    VANCOTROUGH 25.77 (H) 09/08/2019   Trough drawn ~10 hours post 1500mg IV q12 dose.   Calc. Trough ~16    Culture Results:  Microbiology Results (last 10 days)       Procedure Component Value - Date/Time    Blood Culture - Blood, Hand, Left [[id number]] Collected:  09/06/19 1856    Lab Status:  Preliminary result Specimen:  Blood from Hand, Left Updated:  09/07/19 1930     Blood Culture No growth at 24 hours    Urine Culture - Urine, Urine, Clean Catch [[id number]]  (Abnormal)  (Susceptibility) Collected:  09/06/19 1844    Lab Status:  Preliminary result Specimen:  Urine, Clean Catch Updated:  09/08/19 0634     Urine Culture >100,000 CFU/mL Klebsiella pneumoniae ssp pneumoniae    Susceptibility        Klebsiella pneumoniae ssp pneumoniae     LOGAN (Preliminary)     Ampicillin Resistant     Ampicillin + Sulbactam Susceptible     Cefazolin Susceptible     Cefepime Susceptible     Ceftriaxone Susceptible     Ertapenem Susceptible     ESBL Confirmation Test Negative     Gentamicin Susceptible     Levofloxacin Susceptible     Meropenem " Susceptible     Nitrofurantoin Resistant     Piperacillin + Tazobactam Susceptible     Trimethoprim + Sulfamethoxazole Susceptible                      Blood Culture - Blood, Hand, Left [[id number]] Collected:  09/06/19 1710    Lab Status:  Preliminary result Specimen:  Blood from Hand, Left Updated:  09/08/19 1747     Blood Culture No growth at 2 days            PAMELA Ramos PharmD   09/08/19 5:48 PM

## 2019-09-08 NOTE — PROGRESS NOTES
LOS: 2 days   Patient Care Team:  Josafat Gonzales MD as PCP - General  Josafat Gonzales MD as PCP - Family Medicine  [facility]ing Physician (Cardiology)  Pawel Ho MD as Consulting Physician (Urology)  Krystal Dorman APRN as Nurse Practitioner (Neurology)    Chief Complaint:  decline    Subjective      HISTORY OF PRESENT ILLNESS: I had a family meeting with her brother and  the day of admission.  They indicated they thought she lost about 20 pounds in the last 2 weeks and was not eating anything at the nursing home.  Through 4 days earlier I talked to the director of nurses and she indicated the patient was not improving with therapy in general and with wondered if with her history her family would want her to be hospice patient.  She does have a history of probable lung cancer (she was not well enough to do surgery she was treated with radiation):  she has significant spinal stenosis for which her medical condition makes her nonoperable and at least for the last 6 months she has not been able to ambulate; she is either been wheelchair or bedridden.  She has a long history of depression anxiety probable seizure disorder may be even a psych chronic condition.  She is seen numerous psychiatrists and takes large doses of psychiatric as well as pain medications.  She is frequently quite lethargic and subdued.  So the decision made by the family was to bring her to the ER; there she was found to be hypotensive hypoxic (which corrected with oxygen) and we elected to admit with fluids empiric antibiotics (there was concern she might have an infiltrate in her urine had abnormalities) allow speech to evaluate use fluids slowly withdrawal some of her polypharmacy and then the next 48+ hours to see if there would be any improvement in her condition that would help define whether we need to do continue to press on or whether she should be hospice.  She is  apparently discussed with her  when she was well that she would never want life support such as with a feeding tube.    Interval History: She has rested on and off.  She still not eating well.  Staff has not reported vomiting or choking.  Her IV fluids continue; she is voiding.  She does not seem to be off her baseline in any manner with anxiety depression confusion or complaints of pain (as we continue to wean many of her Rx/her polypharmacy.     .  Current Facility-Administered Medications:   •  acetaminophen (TYLENOL) tablet 650 mg, 650 mg, Oral, Q4H PRN, Josafat Gonzales MD  •  bisacodyl (DULCOLAX) suppository 10 mg, 10 mg, Rectal, Q72H PRN, Josafat Gonzales MD  •  clonazePAM (KlonoPIN) tablet 0.5 mg, 0.5 mg, Oral, TID PRN, Josafat Gonzales MD  •  cyclobenzaprine (FLEXERIL) tablet 5 mg, 5 mg, Oral, BID, Josafat Gonzales MD  •  DULoxetine (CYMBALTA) DR capsule 30 mg, 30 mg, Oral, Daily, Josafat Gonzales MD, 30 mg at 09/07/19 1154  •  gabapentin (NEURONTIN) capsule 300 mg, 300 mg, Oral, Q12H, Josafat Gonzales MD  •  levETIRAcetam XR (KEPPRA XR) 24 hr tablet 1,000 mg, 1,000 mg, Oral, Daily, Josafat Gonzales MD, 1,000 mg at 09/07/19 1152  •  levothyroxine (SYNTHROID, LEVOTHROID) tablet 100 mcg, 100 mcg, Oral, Q AM, Josafat Gonzales MD, 100 mcg at 09/08/19 0639  •  nystatin (MYCOSTATIN) powder, , Topical, Q8H, Josafat Gonzales MD  •  oxyCODONE (ROXICODONE) immediate release tablet 5 mg, 5 mg, Oral, Q4H PRN, Josafat Gonzales MD, 5 mg at 09/07/19 1151  •  Pharmacy to Dose Zosyn, , Does not apply, Continuous PRN, Josafat Gonzales MD  •  piperacillin-tazobactam (ZOSYN) 3.375 g in iso-osmotic dextrose 50 ml (premix), 3.375 g, Intravenous, Q8H, Josafat Gonzales MD, Last Rate: 0 mL/hr at 09/07/19 1317, 3.375 g at 09/08/19 0003  •  QUEtiapine (SEROquel) tablet 25 mg, 25 mg, Oral, Q12H, Josafat Gonzales MD  •  rivaroxaban (XARELTO) tablet 10 mg, 10 mg,  "Oral, Daily, Josafat Gonzales MD, 10 mg at 09/07/19 1200  •  sodium chloride 0.9 % flush 10 mL, 10 mL, Intravenous, Q12H, Josafat Gonzales MD, 10 mL at 09/07/19 2126  •  sodium chloride 0.9 % flush 10 mL, 10 mL, Intravenous, PRN, Josafat Gonzales MD  •  sodium chloride 0.9 % infusion, 100 mL/hr, Intravenous, Continuous, Josafat Gonzales MD, Last Rate: 100 mL/hr at 09/08/19 0226, 100 mL/hr at 09/08/19 0226  •  vancomycin 1500 mg/500 mL 0.9% NS IVPB ([facility]), 1,500 mg, Intravenous, Q12H, Josafat Gonzales MD, Last Rate: 0 mL/hr at 09/07/19 1317, 1,500 mg at 09/08/19 0638  •  venlafaxine XR (EFFEXOR-XR) 24 hr capsule 75 mg, 75 mg, Oral, Daily, Josafat Gonzales MD, 75 mg at 09/07/19 1155  •  Doris's amazing butt cream, , Topical, PRN, Josafat Gonzales MD    Review of Systems:   GENERAL:  Inactive/slower with limits, speed, stamina for age and pains, desire, above. Sleep is ok usually with Rx; on/off here.  Has pain management and psych past. No fever reported at nursing home/since here.   SKIN: continues with \"gaulded\" rash to back and prior heel ulcers.    ENDO:  No syncope, near or diaphoretic sweaty spells.  BS Ok since here.  HEENT: No head injury or headache.  No vision change.   No significant hearing loss. Ears without pain/drainage.  No sore throat.  No significant nasal/sinus congestion/drainage. No epistaxis.  CHEST: No chest wall tenderness or mass. No significant cough, wheeze, SOB; no hemoptysis.  CV: No chest pain, palpitations, significant ankle edema.  GI: No heartburn, dysphagia.  No abdominal pain, diarrhea, constipation, rectal bleeding, or melena;  incontinent stool/prior voiding.   :  Voids usually with incontinence; came with garcia from nursing home.   ORTHO: No painful/swollen joints but various on /off sore.  Usually has sore neck or back.  No acute neck or back pain without recent injury.   NEURO: No dizziness, weakness of extremities.  Usually has " "LE numbness/paresthesias.   PSYCH: Prior memory loss.  Mood always variable; often anxious, depressed but/and not suicidal though has had prior overdose.  Tolerates stress variably.      Objective     Vital Signs  /81 (BP Location: Left arm, Patient Position: Lying)   Pulse 99   Temp 97.8 °F (36.6 °C) (Oral)   Resp 16   Ht 182.9 cm (72.01\")   Wt 85.8 kg (189 lb 3.2 oz)   LMP  (LMP Unknown)   SpO2 99%   BMI 25.65 kg/m²   Temp:  [97.4 °F (36.3 °C)-98.7 °F (37.1 °C)] 97.8 °F (36.6 °C)  Heart Rate:  [] 99  Resp:  [16] 16  BP: (107-116)/(65-81) 115/81      Intake/Output Summary (Last 24 hours) at 9/8/2019 0951  Last data filed at 9/8/2019 0004  Gross per 24 hour   Intake 1240 ml   Output 300 ml   Net 940 ml       Lab Results:  Lab Results (last 24 hours)     Procedure Component Value Units Date/Time    Basic Metabolic Panel [[id number]]  (Abnormal) Collected:  09/08/19 0744    Specimen:  Blood Updated:  09/08/19 0841     Glucose 64 mg/dL      BUN 21 mg/dL      Creatinine 0.50 mg/dL      Sodium 136 mmol/L      Potassium 3.2 mmol/L      Chloride 110 mmol/L      CO2 24.0 mmol/L      Calcium 7.1 mg/dL      eGFR Non African Amer 125 mL/min/1.73      BUN/Creatinine Ratio 42.0     Anion Gap 2.0 mmol/L     Narrative:       GFR Normal >60  Chronic Kidney Disease <60  Kidney Failure <15    CBC & Differential [[id number]] Updated:  09/08/19 0834    Specimen:  Blood     Narrative:       The following orders were created for panel order CBC & Differential.  Procedure                               Abnormality         Status                     ---------                               -----------         ------                     CBC Auto Differential[[id number]]                                                         Please view results for these tests on the individual orders.    CBC Auto Differential [[id number]] Updated:  09/08/19 0834    Specimen:  Blood     Urine Culture - Urine, Urine, Clean Catch [[id number]]  " (Abnormal)  (Susceptibility) Collected:  09/06/19 1844    Specimen:  Urine, Clean Catch Updated:  09/08/19 0634     Urine Culture >100,000 CFU/mL Klebsiella pneumoniae ssp pneumoniae    Susceptibility      Klebsiella pneumoniae ssp pneumoniae     LOGAN (Preliminary)     Ampicillin Resistant     Ampicillin + Sulbactam Susceptible     Cefazolin Susceptible     Cefepime Susceptible     Ceftriaxone Susceptible     Ertapenem Susceptible     ESBL Confirmation Test Negative     Gentamicin Susceptible     Levofloxacin Susceptible     Meropenem Susceptible     Nitrofurantoin Resistant     Piperacillin + Tazobactam Susceptible     Trimethoprim + Sulfamethoxazole Susceptible                    Blood Culture - Blood, Hand, Left [[id number]] Collected:  09/06/19 1856    Specimen:  Blood from Hand, Left Updated:  09/07/19 1930     Blood Culture No growth at 24 hours    Blood Culture - Blood, Hand, Left [[id number]] Collected:  09/06/19 1710    Specimen:  Blood from Hand, Left Updated:  09/07/19 1745     Blood Culture No growth at 24 hours          CBC:   Results from last 7 days   Lab Units 09/07/19  0434 09/06/19  1710   WBC 10*3/mm3 10.25 13.49*   HEMOGLOBIN g/dL 10.4* 11.4*   HEMATOCRIT % 31.8* 34.5   PLATELETS 10*3/mm3 322 302     BMP:   Results from last 7 days   Lab Units 09/08/19  0744 09/07/19  0434 09/06/19  1710   SODIUM mmol/L 136 138 136   POTASSIUM mmol/L 3.2* 4.5 4.7   CHLORIDE mmol/L 110 109 104   CO2 mmol/L 24.0 27.0 27.0   BUN mg/dL 21 28* 28*   CREATININE mg/dL 0.50 0.48* 0.56   EGFR IF NONAFRICN AM mL/min/1.73 125 131 109   GLUCOSE mg/dL 64* 69* 69*   CALCIUM mg/dL 7.1* 7.3* 7.6*   ALT (SGPT) U/L  --   --  33     INR:   Results from last 7 days   Lab Units 09/06/19  1710   INR  0.95       Culture Results:   Blood Culture   Date Value Ref Range Status   09/06/2019 No growth at 24 hours  Preliminary   09/06/2019 No growth at 24 hours  Preliminary     Urine Culture   Date Value Ref Range Status   09/06/2019 (A)   Preliminary    >100,000 CFU/mL Klebsiella pneumoniae ssp pneumoniae       Radiology: None    Additional Studies Reviewed: None    Physical Exam:  GENERAL:  AFA/developed in no acute distress. Thin.   SKIN: Pale; scabbed heel ulcers and diffuse reddness back.   Mild chelosis.  HEENT: Normal cephalic without trauma.  Pupils equal round reactive to light; pupils  Dilated.  Extraocular motions full without nystagmus.    Oral cavity without growths, exudates, and moist.  Posterior pharynx without mass, obstruction, redness.  No thyromegaly, mass, tenderness, lymphadenopathy and supple.  CV: Regular rhythm.  No murmur, gallop trace LE edema. Posterior pulses intact.  No carotid bruits.   CHEST: No chest wall tenderness or mass.   LUNGS: Symmetric motion with clear to auscultation.  ABD: Soft, nontender without mass.   ORTHO: Symmetric extremities without swelling/point tenderness.  Ankles plantar  Flexed.   NEURO: CN 2-12 grossly intact.  Symmetric facies. 1/4 x bicep equal reflexes.  UE/LE   2/5 strength throughout.  Nonfocal use extremities. Speech weak.   PSYCH: Disoriented x 3.   Pleasant calm, well kept.  Shallow; minimal purposeful/directed conservation.     Results Review:    above    ASSESSMENT/PLAN:  Reason for admit/problems addressing acutely:  Lethargy-improved  Anorexia  Weight loss  Hypotension-resolved  Respiratory failure-hypoxic-oxygen replaced  Abnormal urine (21-30 WBC, 4+ bacteruria with garcia)  UTI-klebsiella/garcia  Polypharmacy-weaning  Hypopotassemia-IV affected    Chronic problems to review/consider during care:  64 y/o white female   Allergy/intolerance: see above  Procedural history: see above  Family history: see above  Obesity.  This is status post gastric bypass initial failure and regain of  weight.     1 para 1 AB 0.    Surgical menopause.  Osteoporosis on previous wrist study (hip fx 3.28.19)   Hypertension.    Hypothyroidism.    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    History of DVT - left peroneal popliteal, 11/08/2008, treated              successfully with anticoagulation.   Anticoagulation needs: hx DVT, DVT risk/(coumadin) lovonex  Chronic insomnia.  Spinal spondylosis; this is primarily cervical with multiple surgeries.  Chronic neck pain.    Chronic back pain  Chronic narcotics-from pain management/[facility]  Chronic depression - followed by Dr. Samaniego.    Degenerative joint disease that is diffuse.   LE weakness  Gait difficulty-chronic   History of gastric bypass and metabolic risk it carries.    Colon cancer history with previous surgery and chemo, no obvious              recurrence.  Gastroesophageal reflux.  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)  Abnormal CT chest-treating nonsurgically  Polypharmacy   Urinary incontience (history urge incontinence)  Vitamin D def (post gastric bypass)  Vitamin B12 def (post gastric bypass)  YOLIE       Rx-reviewed, considered with changes as needed: continue slow wean  Labs reviewed, considered and changes made as needed: no changes  Imaging reviewed, and need for considered: no changes  Consults needed: none  Diet reviewed, considered and changes made as needed: no changes/encouraged  Fluids reviewed, considered and changes made as needed: NS to NS/with KCL  Increase activity: if/as able  Code status: DNR/limited  Discussed possible/future discharge plans: patient expects back to [facility]; regular or with    hospice  Case discussed with /brother at admit; with  yesterday  Available to talk if needed with POA/caregiver and members care team.    Josafat Gonzales MD  09/08/19  9:51 AM

## 2019-09-08 NOTE — PLAN OF CARE
Problem: Skin Injury Risk (Adult)  Goal: Skin Health and Integrity  Outcome: Ongoing (interventions implemented as appropriate)      Problem: Patient Care Overview  Goal: Plan of Care Review  Outcome: Ongoing (interventions implemented as appropriate)   09/08/19 0547 09/08/19 1554   Coping/Psychosocial   Plan of Care Reviewed With patient --    Plan of Care Review   Progress no change --    OTHER   Outcome Summary --  Potassium low this morning. Resp utilized to draw her blood this morning she is a very difficult IV access. Buttick remaines excoriated but does seem to be improving a little.        Problem: Fall Risk (Adult)  Goal: Absence of Fall  Outcome: Ongoing (interventions implemented as appropriate)      Problem: Pneumonia (Adult)  Goal: Signs and Symptoms of Listed Potential Problems Will be Absent, Minimized or Managed (Pneumonia)  Outcome: Ongoing (interventions implemented as appropriate)      Problem: Nutrition, Imbalanced: Inadequate Oral Intake (Adult)  Goal: Improved Oral Intake  Outcome: Ongoing (interventions implemented as appropriate)    Goal: Prevent Further Weight Loss  Outcome: Ongoing (interventions implemented as appropriate)

## 2019-09-08 NOTE — PLAN OF CARE
Problem: Skin Injury Risk (Adult)  Goal: Skin Health and Integrity  Outcome: Ongoing (interventions implemented as appropriate)      Problem: Patient Care Overview  Goal: Plan of Care Review  Outcome: Ongoing (interventions implemented as appropriate)   09/08/19 0531   Coping/Psychosocial   Plan of Care Reviewed With patient   Plan of Care Review   Progress no change   OTHER   Outcome Summary IV antibiotics given and IV fluids continue. Patient alert but orientated to person only this shift. Patient kept turned. Campbell remains in place. Pressure injuries present upon admission. Safety maintained.       Problem: Fall Risk (Adult)  Goal: Absence of Fall  Outcome: Ongoing (interventions implemented as appropriate)      Problem: Pneumonia (Adult)  Goal: Signs and Symptoms of Listed Potential Problems Will be Absent, Minimized or Managed (Pneumonia)  Outcome: Ongoing (interventions implemented as appropriate)      Problem: Nutrition, Imbalanced: Inadequate Oral Intake (Adult)  Goal: Identify Related Risk Factors and Signs and Symptoms  Outcome: Outcome(s) achieved Date Met: 09/08/19    Goal: Improved Oral Intake  Outcome: Ongoing (interventions implemented as appropriate)    Goal: Prevent Further Weight Loss  Outcome: Ongoing (interventions implemented as appropriate)

## 2019-09-08 NOTE — H&P
"Patient ID: Bita Croft  MRN: [id number]     Acct:  [id number]  Admit Date: 9/6/2019   Date of service: 9.7.2019    SOURCE: The source of this information is prior knowledge of the patient, review of her office records, her current chart, as well as discussion with her brother, , prior discussion with NH personal; all are considered reliable (patient is confused/unreliable).      PATIENT PROFILE: The patient is a 64 y/o white  female resident of [facility]; she was cooperative.      CHIEF COMPLAINT: \"she is dying\"     HISTORY OF PRESENT ILLNESS: I had a family meeting with her brother and  the day of admission.  They indicated they thought she lost about 20 pounds in the last 2 weeks and was not eating anything at the nursing home.  Through 4 days earlier I talked to the director of nurses and she indicated the patient was not improving with therapy in general and with wondered if with her history her family would want her to be hospice patient.  She does have a history of probable lung cancer (she was not well enough to do surgery she was treated with radiation):  she has significant spinal stenosis for which her medical condition makes her nonoperable and at least for the last 6 months she has not been able to ambulate; she is either been wheelchair or bedridden.  She has a long history of depression anxiety probable seizure disorder may be even a psych chronic condition.  She is seen numerous psychiatrists and takes large doses of psychiatric as well as pain medications.  She is frequently quite lethargic and subdued.  So the decision made by the family was to bring her to the ER; there she was found to be hypotensive hypoxic (which corrected with oxygen) and we elected to admit with fluids empiric antibiotics (there was concern she might have an infiltrate in her urine had abnormalities) allow speech to evaluate use fluids slowly withdrawal some of her polypharmacy and then the next 48+ hours " to see if there would be any improvement in her condition that would help define whether we need to do continue to press on or whether she should be hospice.  She is apparently discussed with her  when she was well that she would never want life support such as with a feeding tube.    No Known Allergies    HOME MEDICATIONS:  Prior to Admission medications    Medication Sig Start Date End Date Taking? Authorizing Provider   acetaminophen (TYLENOL) 325 MG tablet Take 2 tablets by mouth Every 4 (Four) Hours As Needed for Mild Pain  or Fever. 6/7/18  Yes Josafat Gonzales MD   bisacodyl (DULCOLAX) 10 MG suppository Insert 10 mg into the rectum Every 72 (Seventy-Two) Hours As Needed for Constipation.   Yes Beverly Wells MD   clonazePAM (KlonoPIN) 1 MG tablet Take 1 tablet by mouth 3 (Three) Times a Day As Needed for Anxiety. 8/6/19  Yes Josafat Gonzales MD   collagenase 250 UNIT/GM ointment Apply  topically to the appropriate area as directed Every 12 (Twelve) Hours. 8/6/19  Yes Josafat Gonzales MD   cyclobenzaprine (FLEXERIL) 10 MG tablet Take 1 tablet by mouth 3 (Three) Times a Day. 3/14/19  Yes Josafat Gonzales MD   DULoxetine (CYMBALTA) 30 MG capsule Take 30 mg by mouth Daily.   Yes Beverly Wells MD   gabapentin (NEURONTIN) 600 MG tablet Take 1 tablet by mouth 3 (Three) Times a Day. 8/6/19  Yes Josafat Gonzales MD   levETIRAcetam XR (KEPPRA XR) 500 MG 24 hr tablet Take 2 tablets by mouth Every 12 (Twelve) Hours. 3/14/19  Yes Josafat Gonzales MD   levothyroxine (SYNTHROID, LEVOTHROID) 100 MCG tablet Take 100 mcg by mouth Daily.   Yes Beverly Wells MD   Multiple Vitamin (MULTI-VITAMIN DAILY) tablet Take 1 tablet by mouth Daily.   Yes Beverly Wells MD   nystatin (MYCOSTATIN) 942983 UNIT/GM powder Apply  topically to the appropriate area as directed 2 (Two) Times a Day. Apply to groin.   Yes Beverly Wells MD   oxyCODONE (ROXICODONE) 10 MG tablet  Take 1 tablet by mouth Every 4 (Four) Hours As Needed for Moderate Pain . 19  Yes Josafat Gonzales MD   potassium chloride (K-DUR,KLOR-CON) 20 MEQ CR tablet Take 20 mEq by mouth 3 (Three) Times a Day.   Yes Beverly Wells MD   QUEtiapine (SEROquel) 100 MG tablet Take 1 tablet by mouth 2 (Two) Times a Day. 17  Yes Josafat Gonzales MD   QUEtiapine (SEROquel) 300 MG tablet Take 300 mg by mouth Every Night.   Yes Beverly Wells MD   rivaroxaban (XARELTO) 10 MG tablet Take 10 mg by mouth Daily.   Yes Beverly Wells MD   tolterodine LA (DETROL LA) 4 MG 24 hr capsule Take 4 mg by mouth Daily.   Yes Beverly Wells MD   traZODone (DESYREL) 50 MG tablet Take 1 tablet by mouth Every Night. 18  Yes Josafat Gonzales MD   venlafaxine XR (EFFEXOR-XR) 150 MG 24 hr capsule Take 2 capsules by mouth Daily. 18  Yes Josafat Gonzales MD   vitamin C (ASCORBIC ACID) 250 MG tablet Take 250 mg by mouth 2 (Two) Times a Day.   Yes Beverly Wells MD   vitamin D (ERGOCALCIFEROL) 72741 units capsule capsule Take 50,000 Units by mouth 1 (One) Time Per Week. .   Yes Beverly Wells MD   metoprolol tartrate (LOPRESSOR) 50 MG tablet TAKE 1 TABLET BY MOUTH DAILY 19  Josafat Gonzales MD   nystatin (nystatin) 754676 UNIT/GM powder WASH AFFECTED AREA AND DRY WELL THREE TIMES DAILY. AFTER DRY APPLY NYSTATIN POWDER TWICE DAILY 19  Josafat Gonzales MD   solifenacin (VESICARE) 10 MG tablet Take 1 tablet by mouth Daily. 19  Josafat Gonzales MD       PAST HISTORY:  CHILDHOOD: unremarkable.      PROCEDURES:   SCANNED  [id number]  Colonoscopy+tort-mass/[facility]/Mc/8.4.14/BE  EGD+nl/[facility]/Mc/5.28.15/UGI  Colonoscopy+polyp/[facility]/Mc/9.21.15/3y     SURGERIES:  T&A/age 11  R knee/[place]/LH/age 13  SANDRA+BSO/Mike/[facility]/age 32  T12/[place]/  Gastric by-pass//  L knee/[place]/  C surgery/[place]/06  C  surgery/[place]/2-4-08  Lap GB/[facility]/[place]/3-18-10  L arthroscope/  R lap colectomy/[place]/8.13.14   ORIF L wrist/[place]//7.28.16  R hip fx//3.29.19     FAMILY HISTORY:  HTN/m,a-m,f,gf-p,  Heart/m,f,gf-p,gm-p,gm-m  DM/u-m,  CA-colon/none  CA-other/none  CA-breast/none     HABITS:  Tobacco-smoker/never  Tobacco-2nd handed/limited  Alcohol/none  Drugs/     SOCIAL HISTORY:  /1987,  Children/1  Employment/[employer] tx-unit 1/disability  Disability/     PATIENT EDUCATION:     ADVISED/OFFERED:  Colonoscopy/8.2.10+  MRI LS spine/6.11.12     HOSPITAL ADMITS:      MMH:   11.8.08-11.12.08-hyponatremia  2.8.10-2.9.10-L flank pain  3.13.14-3.15.14 hever/TIA  12.26.17-12.27.17    Shortness of breath  Cough  Flu B  Bronchitis-acute  Abnormal CT chest-AGUILAR spiculated nodule  Chest wall pain  Hyponatremia 128  Neutropenia 2.54     5.26.18-5.31.18  Lethargy-likely 2nd overdose Imodium-resolved  Confusion-same-resolved  Brief intubation (airway protection) 26-27th  Abnormal CXR-? Pneumonia/atelectasis  Overdose Imodium; initial ? suicide jesture refuted  Depression-acute/chronic  Anxiety-acute/chronic  Hypotension-brief and decrease Rx  Cheilosis-improved empiric monistat  Gait decline-acute/chronic  Anemia-component phlebotomy, (substrate neg)  +BC/staph homnis/epidermitis (contaminet)  E coli bacteruria-likely UTI  UTI-     :   7.21-7.23.95 abdominal pain  2.19.08-2.21.08-DVT  2.9.10-2.11.10-abdominal pain/intercostal  3.11.10-3.19.10- hever-abdominal pain+SIADH  3.24.15-3.30.15 diarrhea  5.26.15-5.31.15 nausea/vomiting/diarrhea  7.12.17-7.17.17 accidential injection water bottle cap     3.10.19-3.14.19  Confusion-delirium (? Sepsis, ? Seizure)-resolved  Hx seizure disorder-normal EEG  Abnormal UA-(21-30WBC, mod leuk, 2+ bacteria) maybe UTI (BC neg, 10.5 strept                               Group B)  CXR-infiltrate (note lack cough, etc)-maybe pneumonia-persistant L peihilair/mid                               Lung  "infiltrate  Sepsis (tachycardia 104, lactic acid 6.0)-maybe  Low phenytoin level 3.0-not taking-improved  Hypopotassemia-2.1-ifmodhhp-skfpzzxv  Drug screen THC, opiate  Bzpljcxqk-qhlqmqril-linhxsff  NPO-resolved  Cerebral atrophy-frontal  YOLIE-without treatment  Non-compliance; Rx (dilantin, keppra), CPAP  B12 def (post gastric bypass);   Vit D def (post gastric bypass)  Polypharmacy; difficult Rx list     3.28.2019 -4.1.19  R hip fracture-post surgery  Pain R hip-improving  R hip incision-healing  Gait difficulty-acute/chronic-slowly improving  Post-operative state-improving  Anticoagulation needs: hx DVT, DVT risk/(coumadin) (lovonex), xarelto  Anemia-chronic-component post op/blood loss  Arthritis-diffuse  Joint pains-multiple-chronic (worse without Rx)  Back pain-chronic (worse without Rx)  Neck pain-chronic (worse without Rx)  Anxiety-chronic (worse without Rx)  Medication error (accidentaly holding of Rx)    7.30.19-8.6.19  Wounds-back (cultures klebsiella, proteus buttock)  Cellulitis-back-(klebsiella/proteus)  Wounds-heels  Urinary incontinence  Bacteruria-pseudomonas  Abnormal urine-unlikely UTI (cultures neg)  IV abx: vancomycin (7.30.19-8.3.19), zosyn (7.30.19-8.3.19)  Fecal incontinence  Campbell catheter-needed for wound care  Lethargy-variable  Icrsyybqk-ewabqdppldenar-Pv/hyponatremia/others  Hyponatremia 127-improved  SIADH-chronic  hypokalemia  Malnutrition-severe (weight loss, low t protein, albumin)  Hypoglycemia-nutrition related  Dehydration (SG > 1.030)-resolved  Hypotension-affect lopressor, Rx, dehydration-improved  Bedrest  Diarrhea-abx related  Itching  Rash-dry skin     LH:   10.26.16-11.6.16 seizure     GENERAL:  Inactive/slower with limits, speed, stamina for age and pains, desire, above. Sleep is ok usually with Rx.  Has pain management and psych past. No fever reported at nursing home.   SKIN: continues with \"gaulded\" rash to back.   ENDO:  No syncope, near or diaphoretic sweaty spells.  BS " "Ok since here.  HEENT: No head injury or headache.  No vision change.   No significant hearing loss. Ears without pain/drainage.  No sore throat.  No significant nasal/sinus congestion/drainage. No epistaxis.  CHEST: No chest wall tenderness or mass. No significant cough, wheeze, SOB; no hemoptysis.  CV: No chest pain, palpitations, significant ankle edema.  GI: No heartburn, dysphagia.  No abdominal pain, diarrhea, constipation, rectal bleeding, or melena;  incontinent.  :  Voids with incontinence; came with garcia from nursing home.   ORTHO: No painful/swollen joints but various on /off sore.  Usually has sore neck or back.  No acute neck or back pain without recent injury.   NEURO: No dizziness, weakness of extremities.  Usually has LE numbness/paresthesias.   PSYCH: Prior memory loss.  Mood always variable; often anxious, depressed but/and not suicidal though has had prior overdose.  Tolerates stress variably.         PHYSICAL EXAMINATION:  /65 (BP Location: Right arm, Patient Position: Lying)   Pulse 110   Temp 98.7 °F (37.1 °C) (Oral)   Resp 16   Ht 182.9 cm (72.01\")   Wt 85.8 kg (189 lb 3.2 oz)   LMP  (LMP Unknown)   SpO2 100%   BMI 25.65 kg/m²     GENERAL:  AFA/developed in no acute distress. Thin.   SKIN: Pale; scabbed heel ulcers and diffuse reddness back.   Mild chelosis.  HEENT: Normal cephalic without trauma.  Pupils equal round reactive to light; pupils  Dilated.  Extraocular motions full without nystagmus.    Oral cavity without growths, exudates, and moist.  Posterior pharynx without mass, obstruction, redness.  No thyromegaly, mass, tenderness, lymphadenopathy and supple.  CV: Regular rhythm.  No murmur, gallop trace LE edema. Posterior pulses intact.  No carotid bruits.   CHEST: No chest wall tenderness or mass.   LUNGS: Symmetric motion with clear to auscultation.  ABD: Soft, nontender without mass.   ORTHO: Symmetric extremities without swelling/point tenderness.  Ankles " plantar  Flexed.   NEURO: CN 2-12 grossly intact.  Symmetric facies. 1/4 x bicep equal reflexes.  UE/LE   2/5 strength throughout.  Nonfocal use extremities. Speech weak.   PSYCH: Oriented x 2; not date.  Pleasant calm, well kept.  Shallow; minimal purposeful/directed conservation.      ASSESSMENT/PROBLEM LIST:   62 y/o white female   Allergy/intolerance: see above  Procedural history: see above  Family history: see above  Obesity.  This is status post gastric bypass initial failure and regain of  weight.     1 para 1 AB 0.    Surgical menopause.  Osteoporosis on previous wrist study (hip fx 3.28.19)   Hypertension.    Hypothyroidism.    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    History of DVT - left peroneal popliteal, 2008, treated              successfully with anticoagulation.   Anticoagulation needs: hx DVT, DVT risk/(coumadin) lovonex  Chronic insomnia.  Spinal spondylosis; this is primarily cervical with multiple surgeries.  Chronic neck pain.    Chronic back pain  Chronic narcotics-from pain management/[facility]  Chronic depression - followed by Dr. Samaniego.    Degenerative joint disease that is diffuse.   LE weakness  Gait difficulty-chronic   History of gastric bypass and metabolic risk it carries.    Colon cancer history with previous surgery and chemo, no obvious              recurrence.  Gastroesophageal reflux.  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   Includes anticoagulation for  DVT prevention as able.  Decrease all nonessential Rx and hold bp Rx.   LAB-reviewed; ordered as needed and will review daily.  Particular:  Daily CBC, BMP  Imaging-reviewed; ordered as needed and will review daily.  Particular:  No additional  Consults none  Diet: as able/encouraged  Fluids: 100 cc hr NS/KCL  Special issues: hospice?  DC planning: back to [facility] despite approach  Code status: DNR

## 2019-09-09 NOTE — PROGRESS NOTES
LOS: 3 days   Patient Care Team:  Josafat Gonzales MD as PCP - General  Josafat Gonzales MD as PCP - Family Medicine  [facility] ([facility]iology[facility]  Pawel Ho MD as Consulting Physician (Urology)  Krystal Dorman APRN as Nurse Practitioner (Neurology)    Chief Complaint:  Overall decline    Subjective      HISTORY OF PRESENT ILLNESS: I had a family meeting with her brother and  the day of admission.  They indicated they thought she lost about 20 pounds in the last 2 weeks and was not eating anything at the nursing home.  Through 4 days earlier I talked to the director of nurses and she indicated the patient was not improving with therapy in general and with wondered if with her history her family would want her to be hospice patient.  She does have a history of probable lung cancer (she was not well enough to do surgery she was treated with radiation):  she has significant spinal stenosis for which her medical condition makes her nonoperable and at least for the last 6 months she has not been able to ambulate; she is either been wheelchair or bedridden.  She has a long history of depression anxiety probable seizure disorder may be even a psych chronic condition.  She is seen numerous psychiatrists and takes large doses of psychiatric as well as pain medications.  She is frequently quite lethargic and subdued.  So the decision made by the family was to bring her to the ER; there she was found to be hypotensive hypoxic (which corrected with oxygen) and we elected to admit with fluids empiric antibiotics (there was concern she might have an infiltrate in her urine had abnormalities) allow speech to evaluate use fluids slowly withdrawal some of her polypharmacy and then the next 48+ hours to see if there would be any improvement in her condition that would help define whether we need to do continue to press on or whether she should be hospice.  She is  apparently discussed with her  when she was well that she would never want life support such as with a feeding tube.    Interval History: tolerating wean of several Rx; more alert with IV, abx, and Rx wean.  ? (no one knows) ate well.     .  Current Facility-Administered Medications:   •  acetaminophen (TYLENOL) tablet 650 mg, 650 mg, Oral, Q4H PRN, Josafat Gonzales MD  •  bisacodyl (DULCOLAX) suppository 10 mg, 10 mg, Rectal, Q72H PRN, Josafat Gonzales MD  •  clonazePAM (KlonoPIN) tablet 0.5 mg, 0.5 mg, Oral, TID PRN, Josafat Gonzales MD  •  cyclobenzaprine (FLEXERIL) tablet 5 mg, 5 mg, Oral, BID, Josafat Gonzales MD, 5 mg at 09/08/19 2116  •  DULoxetine (CYMBALTA) DR capsule 30 mg, 30 mg, Oral, Daily, Josafat Gonzales MD, 30 mg at 09/08/19 0954  •  gabapentin (NEURONTIN) capsule 300 mg, 300 mg, Oral, Q12H, Josafat Gonzales MD, 300 mg at 09/08/19 2116  •  levETIRAcetam XR (KEPPRA XR) 24 hr tablet 1,000 mg, 1,000 mg, Oral, Daily, Josafat Gonzales MD, 1,000 mg at 09/08/19 0955  •  levothyroxine (SYNTHROID, LEVOTHROID) tablet 100 mcg, 100 mcg, Oral, Q AM, Josafat Gonzales MD, 100 mcg at 09/09/19 0701  •  nystatin (MYCOSTATIN) powder, , Topical, Q8H, Josafat Gonzales MD  •  oxyCODONE (ROXICODONE) immediate release tablet 5 mg, 5 mg, Oral, Q4H PRN, Josafat Gonzales MD, 5 mg at 09/08/19 2155  •  Pharmacy to Dose Zosyn, , Does not apply, Continuous PRN, Josafat Gonzales MD  •  piperacillin-tazobactam (ZOSYN) 3.375 g in iso-osmotic dextrose 50 ml (premix), 3.375 g, Intravenous, Q8H, Josafat Gonzales MD, Last Rate: 0 mL/hr at 09/08/19 2217, 3.375 g at 09/09/19 0004  •  QUEtiapine (SEROquel) tablet 25 mg, 25 mg, Oral, Q12H, Josafat Gonzales MD, 25 mg at 09/08/19 2116  •  rivaroxaban (XARELTO) tablet 10 mg, 10 mg, Oral, Daily, Josafat Gonzales MD, 10 mg at 09/08/19 0953  •  sodium chloride 0.9 % flush 10 mL, 10 mL, Intravenous, Q12H, Josafat Gonzales  "MD Geronimo, 10 mL at 09/08/19 2117  •  sodium chloride 0.9 % flush 10 mL, 10 mL, Intravenous, PRN, Josafat Gonzales MD  •  sodium chloride 0.9 % with KCl 20 mEq/L infusion, 100 mL/hr, Intravenous, Continuous, Josafat Gonzales MD, Last Rate: 100 mL/hr at 09/09/19 0252, 100 mL/hr at 09/09/19 0252  •  vancomycin (VANCOCIN) in iso-osmotic dextrose IVPB 1 g (premix) 200 mL, 1,000 mg, Intravenous, Q12H, Josafat Gonzales MD  •  venlafaxine XR (EFFEXOR-XR) 24 hr capsule 75 mg, 75 mg, Oral, Daily, Josafat Gonzales MD, 75 mg at 09/08/19 0954  •  Doris's amazing butt cream, , Topical, PRN, Josafat Gonzales MD    Review of Systems:   GENERAL:  Inactive/slower with limits, speed, stamina for age and pains, desire, above. Sleep is ok usually with Rx; on/off here.  Has pain management and psych past. No fever reported at nursing home/since here.   SKIN: continues with \"gaulded\" rash to back and prior heel ulcers.    ENDO:  No syncope, near or diaphoretic sweaty spells.  BS Ok since here.  HEENT: No head injury or headache.  No vision change.   No significant hearing loss. Ears without pain/drainage.  No sore throat.  No significant nasal/sinus congestion/drainage. No epistaxis.  CHEST: No chest wall tenderness or mass. No significant cough, wheeze, SOB; no hemoptysis.  CV: No chest pain, palpitations, significant ankle edema; some hand/feet edema pitting.   GI: No heartburn, dysphagia.  No abdominal pain, diarrhea, constipation, rectal bleeding, or melena;  incontinent stool/prior voiding.   :  Voids usually with incontinence; came with garcia from nursing home.   ORTHO: No painful/swollen joints but various on /off sore.  Usually has sore neck or back.  No acute neck or back pain without recent injury.   NEURO: No dizziness, weakness of extremities.  Usually has LE numbness/paresthesias.   PSYCH: Prior memory loss.  Mood always variable; often anxious, depressed but/and not suicidal though has had prior " "overdose.  Tolerates stress variably.      Objective     Vital Signs  /64 (BP Location: Right arm, Patient Position: Lying)   Pulse 94   Temp 97.8 °F (36.6 °C) (Oral)   Resp 18   Ht 182.9 cm (72.01\")   Wt 85.8 kg (189 lb 3.2 oz)   LMP  (LMP Unknown)   SpO2 100%   BMI 25.65 kg/m²   Temp:  [97 °F (36.1 °C)-97.8 °F (36.6 °C)] 97.8 °F (36.6 °C)  Heart Rate:  [94-98] 94  Resp:  [16-18] 18  BP: (108-117)/(64-76) 111/64      Intake/Output Summary (Last 24 hours) at 9/9/2019 0816  Last data filed at 9/9/2019 0250  Gross per 24 hour   Intake 1733 ml   Output 725 ml   Net 1008 ml       Lab Results:  Lab Results (last 24 hours)     Procedure Component Value Units Date/Time    Urine Culture - Urine, Urine, Clean Catch [[id number]]  (Abnormal)  (Susceptibility) Collected:  09/06/19 1844    Specimen:  Urine, Clean Catch Updated:  09/09/19 0726     Urine Culture >100,000 CFU/mL Klebsiella pneumoniae ssp pneumoniae      40,000-50,000 CFU/mL Morganella morganii ssp morganii    Susceptibility      Klebsiella pneumoniae ssp pneumoniae     LOGAN     Ampicillin Resistant     Ampicillin + Sulbactam Susceptible     Cefazolin Susceptible     Cefepime Susceptible     Ceftriaxone Susceptible     Ertapenem Susceptible     ESBL Confirmation Test Negative     Gentamicin Susceptible     Levofloxacin Susceptible     Meropenem Susceptible     Nitrofurantoin Resistant     Piperacillin + Tazobactam Susceptible     Trimethoprim + Sulfamethoxazole Susceptible                Susceptibility      Morganella morganii ssp morganii     LOGAN     Ampicillin Resistant     Ampicillin + Sulbactam Resistant     Cefazolin Resistant     Cefepime Susceptible     Ceftriaxone Susceptible     Ertapenem Susceptible     Gentamicin Susceptible     Levofloxacin Susceptible     Meropenem Susceptible     Nitrofurantoin Resistant     Piperacillin + Tazobactam Susceptible     Trimethoprim + Sulfamethoxazole Susceptible                    Basic Metabolic Panel " [[id number]]  (Abnormal) Collected:  09/09/19 0616    Specimen:  Blood Updated:  09/09/19 0709     Glucose 59 mg/dL      BUN 15 mg/dL      Creatinine 0.51 mg/dL      Sodium 138 mmol/L      Potassium 3.2 mmol/L      Chloride 112 mmol/L      CO2 22.0 mmol/L      Calcium 6.9 mg/dL      eGFR Non African Amer 122 mL/min/1.73      BUN/Creatinine Ratio 29.4     Anion Gap 4.0 mmol/L     Narrative:       GFR Normal >60  Chronic Kidney Disease <60  Kidney Failure <15    CBC & Differential [[id number]] Collected:  09/09/19 0616    Specimen:  Blood Updated:  09/09/19 0639    Narrative:       The following orders were created for panel order CBC & Differential.  Procedure                               Abnormality         Status                     ---------                               -----------         ------                     CBC Auto Differential[[id number]]        Abnormal            Final result                 Please view results for these tests on the individual orders.    CBC Auto Differential [[id number]]  (Abnormal) Collected:  09/09/19 0616    Specimen:  Blood Updated:  09/09/19 0639     WBC 5.99 10*3/mm3      RBC 3.31 10*6/mm3      Hemoglobin 9.7 g/dL      Hematocrit 29.0 %      MCV 87.6 fL      MCH 29.3 pg      MCHC 33.4 g/dL      RDW 17.7 %      RDW-SD 55.9 fl      MPV 10.8 fL      Platelets 302 10*3/mm3      Neutrophil % 62.5 %      Lymphocyte % 22.7 %      Monocyte % 8.3 %      Eosinophil % 5.0 %      Basophil % 0.8 %      Immature Grans % 0.7 %      Neutrophils, Absolute 3.74 10*3/mm3      Lymphocytes, Absolute 1.36 10*3/mm3      Monocytes, Absolute 0.50 10*3/mm3      Eosinophils, Absolute 0.30 10*3/mm3      Basophils, Absolute 0.05 10*3/mm3      Immature Grans, Absolute 0.04 10*3/mm3      nRBC 0.0 /100 WBC     Blood Culture - Blood, Hand, Left [[id number]] Collected:  09/06/19 1856    Specimen:  Blood from Hand, Left Updated:  09/08/19 1930     Blood Culture No growth at 2 days    Blood Culture - Blood, Hand,  Left [[id number]] Collected:  09/06/19 1710    Specimen:  Blood from Hand, Left Updated:  09/08/19 1745     Blood Culture No growth at 2 days    CBC & Differential [[id number]] Collected:  09/08/19 1647    Specimen:  Blood Updated:  09/08/19 1736    Narrative:       The following orders were created for panel order CBC & Differential.  Procedure                               Abnormality         Status                     ---------                               -----------         ------                     CBC Auto Differential[[id number]]        Abnormal            Final result                 Please view results for these tests on the individual orders.    CBC Auto Differential [[id number]]  (Abnormal) Collected:  09/08/19 1647    Specimen:  Blood Updated:  09/08/19 1736     WBC 8.57 10*3/mm3      RBC 3.35 10*6/mm3      Hemoglobin 9.8 g/dL      Hematocrit 29.4 %      MCV 87.8 fL      MCH 29.3 pg      MCHC 33.3 g/dL      RDW 17.6 %      RDW-SD 55.3 fl      MPV 11.0 fL      Platelets 324 10*3/mm3      Neutrophil % 74.4 %      Lymphocyte % 14.5 %      Monocyte % 7.5 %      Eosinophil % 2.5 %      Basophil % 0.6 %      Immature Grans % 0.5 %      Neutrophils, Absolute 6.39 10*3/mm3      Lymphocytes, Absolute 1.24 10*3/mm3      Monocytes, Absolute 0.64 10*3/mm3      Eosinophils, Absolute 0.21 10*3/mm3      Basophils, Absolute 0.05 10*3/mm3      Immature Grans, Absolute 0.04 10*3/mm3      nRBC 0.0 /100 WBC     Vancomycin, Trough [[id number]]  (Abnormal) Collected:  09/08/19 1647    Specimen:  Blood Updated:  09/08/19 1710     Vancomycin Trough 25.77 mcg/mL     Basic Metabolic Panel [[id number]]  (Abnormal) Collected:  09/08/19 0744    Specimen:  Blood Updated:  09/08/19 0841     Glucose 64 mg/dL      BUN 21 mg/dL      Creatinine 0.50 mg/dL      Sodium 136 mmol/L      Potassium 3.2 mmol/L      Chloride 110 mmol/L      CO2 24.0 mmol/L      Calcium 7.1 mg/dL      eGFR Non African Amer 125 mL/min/1.73      BUN/Creatinine Ratio  42.0     Anion Gap 2.0 mmol/L     Narrative:       GFR Normal >60  Chronic Kidney Disease <60  Kidney Failure <15          CBC:   Results from last 7 days   Lab Units 09/09/19  0616 09/08/19  1647 09/07/19  0434 09/06/19  1710   WBC 10*3/mm3 5.99 8.57 10.25 13.49*   HEMOGLOBIN g/dL 9.7* 9.8* 10.4* 11.4*   HEMATOCRIT % 29.0* 29.4* 31.8* 34.5   PLATELETS 10*3/mm3 302 324 322 302     BMP:   Results from last 7 days   Lab Units 09/09/19  0616 09/08/19  0744 09/07/19  0434 09/06/19  1710   SODIUM mmol/L 138 136 138 136   POTASSIUM mmol/L 3.2* 3.2* 4.5 4.7   CHLORIDE mmol/L 112* 110 109 104   CO2 mmol/L 22.0* 24.0 27.0 27.0   BUN mg/dL 15 21 28* 28*   CREATININE mg/dL 0.51 0.50 0.48* 0.56   EGFR IF NONAFRICN AM mL/min/1.73 122 125 131 109   GLUCOSE mg/dL 59* 64* 69* 69*   CALCIUM mg/dL 6.9* 7.1* 7.3* 7.6*   ALT (SGPT) U/L  --   --   --  33     INR:   Results from last 7 days   Lab Units 09/06/19  1710   INR  0.95       Culture Results:   Blood Culture   Date Value Ref Range Status   09/06/2019 No growth at 2 days  Preliminary   09/06/2019 No growth at 2 days  Preliminary     Urine Culture   Date Value Ref Range Status   09/06/2019 (A)  Final    >100,000 CFU/mL Klebsiella pneumoniae ssp pneumoniae   09/06/2019 (A)  Final    40,000-50,000 CFU/mL Morganella morganii ssp morganii       Radiology: None    Additional Studies Reviewed: None    Physical Exam:  GENERAL:  AFA/developed in no acute distress. Thin.   SKIN: Pale; scabbed heel ulcers and diffuse reddness back.   Mild chelosis.  HEENT: Normal cephalic without trauma.  Pupils equal round reactive to light; pupils  Dilated.  Extraocular motions full without nystagmus.    Oral cavity without growths, exudates, and moist.  Posterior pharynx without mass, obstruction, redness.  No thyromegaly, mass, tenderness, lymphadenopathy and supple.  CV: Regular rhythm.  No murmur, gallop trace LE edema. Posterior pulses intact.  No carotid bruits.   CHEST: No chest wall tenderness or  mass.   LUNGS: Symmetric motion with clear to auscultation.  ABD: Soft, nontender without mass.   ORTHO: Symmetric extremities without swelling/point tenderness.  Ankles plantar  Flexed.   NEURO: CN 2-12 grossly intact.  Symmetric facies. 1/4 x bicep equal reflexes.  UE/LE   2/5 strength throughout.  Nonfocal use extremities. Speech weak.   PSYCH: Disoriented x 2; knew me today.   Pleasant calm, well kept.  Shallow; minimal purposeful/directed conservation.     Results Review:    above    ASSESSMENT/PLAN:  Reason for admit/problems addressing acutely:  Lethargy-improved  Anorexia  Weight loss  Severe malnutrition  Hypotension-resolved  Respiratory failure-hypoxic-oxygen replaced  Abnormal urine (21-30 WBC, 4+ bacteruria with garcia)  UTI-klebsiella and Morganella/garcia  Polypharmacy-weaning  Hypopotassemia-IV affected  Edema; nutrition, IV fluids    Chronic problems to review/consider during care:  64 y/o white female   Allergy/intolerance: see above  Procedural history: see above  Family history: see above  Obesity.  This is status post gastric bypass initial failure and regain of  weight.     1 para 1 AB 0.    Surgical menopause.  Osteoporosis on previous wrist study (hip fx 3.28.19)   Hypertension.    Hypothyroidism.    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    History of DVT - left peroneal popliteal, 2008, treated              successfully with anticoagulation.   Anticoagulation needs: hx DVT, DVT risk/(coumadin) lovonex  Chronic insomnia.  Spinal spondylosis; this is primarily cervical with multiple surgeries.  Chronic neck pain.    Chronic back pain  Chronic narcotics-from pain management/[facility]  Chronic depression - followed by Dr. Samaniego.    Degenerative joint disease that is diffuse.   LE weakness  Gait difficulty-chronic   History of gastric bypass and metabolic risk it carries.     Colon cancer history with previous surgery and chemo, no obvious              recurrence.  Gastroesophageal reflux.  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)  Abnormal CT chest-treating nonsurgically  Polypharmacy   Urinary incontience (history urge incontinence)  Vitamin D def (post gastric bypass)  Vitamin B12 def (post gastric bypass)  YOLIE    Rx-reviewed, considered with changes as needed: change abx to rocephin, po KCL,    Further wean effexor, seroquel and stop flexeril  Labs reviewed, considered and changes made as needed: no changes  Imaging reviewed, and need for considered: no changes  Consults needed: none  Diet reviewed, considered and changes made as needed: no changes/need calorie count  Fluids reviewed, considered and changes made as needed: lock  Increase activity: encouraged  Code status: DNR  Discussed possible/future discharge plans: family expects back to [facility]; ? With hospice/not  Case discussed with noneone  Available to talk if needed with POA/caregiver and members care team.    Josafat Gonzales MD  09/09/19  8:16 AM

## 2019-09-09 NOTE — PAYOR COMM NOTE
"[facility]  RAJINDER,  [phone]  OR   FAX   [phone]    REF:[id number]    Bita Croft (63 y.o. Female)     Date of Birth Social Security Number Address Home Phone MRN    1955  [address]   [place] [address] [phone] [id number]    Zoroastrianism Marital Status          Advent        Admission Date Admission Type Admitting Provider Attending Provider Department, Room/Bed    9/6/19 Emergency Josafat Gonzales MD Oliver, Randy Eugene, MD [facility] 4C, 491/1    Discharge Date Discharge Disposition Discharge Destination                       Attending Provider:  Josafat Gonzales MD    Allergies:  No Known Allergies    Isolation:  None   Infection:  None   Code Status:  No CPR    Ht:  182.9 cm (72.01\")   Wt:  85.8 kg (189 lb 3.2 oz)    Admission Cmt:  None   Principal Problem:  None                Active Insurance as of 9/6/2019     Primary Coverage     Payor Plan Insurance Group Employer/Plan Group    [employer]ERCIAL AETNA [id number]     Payor Plan Address Payor Plan Phone Number Payor Plan Fax Number Effective Dates    [address] [phone]  7/1/2017 - None Entered    [facility] [id number]       Subscriber Name Subscriber Birth Date Member ID       OCHOA CROFT 1955 [id number]                 Emergency Contacts      (Rel.) Home Phone Work Phone Mobile Phone    Carlito Croft (Spouse) [phone] -- --        Patient Care Timeline (9/6/2019 14:44 to 9/6/2019 21:26:37)     9/6/2019 Event Details User   14:44 Patient arrival  Sole Bacon RN   14:44 HPI HPI   Stated Reason for Visit: EMS ststes thats SNF told them that pt has increased AMS and decreased oral intake. EMS also states that family wanted pt checked out.  History Obtained From: EMS    Sole Bacon RN     14:47 Vital Signs Vital Signs   Temp: 97.9 °F (36.6 °C) Heart Rate: 110   Heart Rate Source: Monitor Resp: 12   Resp Rate Source: " Visual BP: 103/67   BP Location: Left arm BP Method: Automatic   Patient Position: Lying    Oxygen Therapy   SpO2: 100 % Pulse Oximetry Type: Intermittent   Device (Oxygen Therapy): nasal cannula Flow (L/min): 4   Vitals Timer   Restart Vitals Timer: Yes    Height and Weight   Weight: 83.8 kg (184 lb 12.8 oz)     Sole Bacon RN     15:00 Neuro Cognitive (Adult) Neuro Cognitive (Adult)   Cognitive/Neuro/Behavioral WDL: WDL Additional Documentation: Yazmin Coma Scale (Group); NIH Stroke Scale (Group)   Yazmin Coma Scale   Best Eye Response: 4-->(E4) spontaneous Best Motor Response: 6-->(M6) obeys commands   Best Verbal Response: 5-->(V5) oriented Yazmin Coma Scale Score: 15   NIH Stroke Scale   Interval: baseline 1a. Level of Consciousness: 0-->Alert, keenly responsive   1b. LOC Questions: 1-->Answers one question correctly 1c. LOC Commands: 2-->Performs neither task correctly   2. Best Gaze: 0-->Normal 3. Visual: 0-->No visual loss   4. Facial Palsy: 0-->Normal symmetrical movements 5a. Motor Arm, Left: 1-->Drift, limb holds 90 (or 45) degrees, but drifts down before full 10 seconds, does not hit bed or other support   5b. Motor Arm, Right: 1-->Drift, limb holds 90 (or 45) degrees, but drifts down before full 10 secs, does not hit bed or other support 6a. Motor Leg, Left: 2-->Some effort against gravity, leg falls to bed by 5 secs, but has some effort against gravity   6b. Motor Leg, Right: 2-->Some effort against gravity, leg falls to bed by 5 secs, but has some effort against gravity 7. Limb Ataxia: 0-->Absent   8. Sensory: 0-->Normal, no sensory loss 9. Best Language: 0-->No aphasia, normal   10. Dysarthria: 1-->Mild-to-moderate dysarthria, patient slurs at least some words and, at worst, can be understood with some difficulty 11. Extinction and Inattention (formerly Neglect): 0-->No abnormality   Total (NIH Stroke Scale): 10     Sole Bacon RN     15:00 Peripheral  "Neurovascular (Adult) Peripheral Neurovascular (Adult)   Peripheral Neuro Vascular WDL: WDL except Additional Documentation: Edema (Group)   Edema   Edema: dependent; generalized     Sole Bacon RN       19:08 Medication New Bag piperacillin-tazobactam (ZOSYN) 4.5 g/100 mL 0.9% NS IVPB (mbp) - Dose: 4.5 g ; Route: Intravenous ; Line: Peripheral IV 09/06/19 1855 Left Hand ; Scheduled Time: 1635  Sole Bacon RN   19:08 Medication New Bag sodium chloride 0.9 % bolus 1,000 mL - Dose: 1,000 mL ; Rate: 4,000 mL/hr ; Route: Intravenous ; Line: Peripheral IV 09/06/19 1855 Left Hand ; Scheduled Time: 1750  Sole Bacon RN     20:32 Medication New Bag vancomycin 1 g/250 mL 0.9% NS (vial-mate) - Dose: 1,000 mg ; Route: Intravenous ; Line: Peripheral IV 09/06/19 1855 Left Hand ; Scheduled Time: 1635  Mckayla Blair RN                History & Physical      ChristianJosafat MD at 9/7/2019  9:09 PM          Patient ID: Bita Croft  MRN: [id number]     Acct:  [id number]  Admit Date: 9/6/2019   Date of service: 9.7.2019    SOURCE: The source of this information is prior knowledge of the patient, review of her office records, her current chart, as well as discussion with her brother, , prior discussion with NH personal; all are considered reliable (patient is confused/unreliable).      PATIENT PROFILE: The patient is a 64 y/o white  female resident of [facility]; she was cooperative.      CHIEF COMPLAINT: \"she is dying\"     HISTORY OF PRESENT ILLNESS: I had a family meeting with her brother and  the day of admission.  They indicated they thought she lost about 20 pounds in the last 2 weeks and was not eating anything at the nursing home.  Through 4 days earlier I talked to the director of nurses and she indicated the patient was not improving with therapy in general and with wondered if with her history her family would want her to be hospice patient.  She " does have a history of probable lung cancer (she was not well enough to do surgery she was treated with radiation):  she has significant spinal stenosis for which her medical condition makes her nonoperable and at least for the last 6 months she has not been able to ambulate; she is either been wheelchair or bedridden.  She has a long history of depression anxiety probable seizure disorder may be even a psych chronic condition.  She is seen numerous psychiatrists and takes large doses of psychiatric as well as pain medications.  She is frequently quite lethargic and subdued.  So the decision made by the family was to bring her to the ER; there she was found to be hypotensive hypoxic (which corrected with oxygen) and we elected to admit with fluids empiric antibiotics (there was concern she might have an infiltrate in her urine had abnormalities) allow speech to evaluate use fluids slowly withdrawal some of her polypharmacy and then the next 48+ hours to see if there would be any improvement in her condition that would help define whether we need to do continue to press on or whether she should be hospice.  She is apparently discussed with her  when she was well that she would never want life support such as with a feeding tube.    No Known Allergies    HOME MEDICATIONS:  Prior to Admission medications    Medication Sig Start Date End Date Taking? Authorizing Provider   acetaminophen (TYLENOL) 325 MG tablet Take 2 tablets by mouth Every 4 (Four) Hours As Needed for Mild Pain  or Fever. 6/7/18  Yes Josafat Gonzales MD   bisacodyl (DULCOLAX) 10 MG suppository Insert 10 mg into the rectum Every 72 (Seventy-Two) Hours As Needed for Constipation.   Yes ProviderBeverly MD   clonazePAM (KlonoPIN) 1 MG tablet Take 1 tablet by mouth 3 (Three) Times a Day As Needed for Anxiety. 8/6/19  Yes Josafat Gonzales MD   collagenase 250 UNIT/GM ointment Apply  topically to the appropriate area as directed Every 12  (Twelve) Hours. 8/6/19  Yes Josafat Gonzales MD   cyclobenzaprine (FLEXERIL) 10 MG tablet Take 1 tablet by mouth 3 (Three) Times a Day. 3/14/19  Yes Josafat Gonzales MD   DULoxetine (CYMBALTA) 30 MG capsule Take 30 mg by mouth Daily.   Yes Beverly Wells MD   gabapentin (NEURONTIN) 600 MG tablet Take 1 tablet by mouth 3 (Three) Times a Day. 8/6/19  Yes Josafat Gonzales MD   levETIRAcetam XR (KEPPRA XR) 500 MG 24 hr tablet Take 2 tablets by mouth Every 12 (Twelve) Hours. 3/14/19  Yes Josafat Gonzales MD   levothyroxine (SYNTHROID, LEVOTHROID) 100 MCG tablet Take 100 mcg by mouth Daily.   Yes Beverly Wells MD   Multiple Vitamin (MULTI-VITAMIN DAILY) tablet Take 1 tablet by mouth Daily.   Yes Beverly Wells MD   nystatin (MYCOSTATIN) 774662 UNIT/GM powder Apply  topically to the appropriate area as directed 2 (Two) Times a Day. Apply to groin.   Yes Beverly Wells MD   oxyCODONE (ROXICODONE) 10 MG tablet Take 1 tablet by mouth Every 4 (Four) Hours As Needed for Moderate Pain . 8/6/19  Yes Josafat Gonzales MD   potassium chloride (K-DUR,KLOR-CON) 20 MEQ CR tablet Take 20 mEq by mouth 3 (Three) Times a Day.   Yes Beverly Wells MD   QUEtiapine (SEROquel) 100 MG tablet Take 1 tablet by mouth 2 (Two) Times a Day. 7/17/17  Yes Josafat Gonzales MD   QUEtiapine (SEROquel) 300 MG tablet Take 300 mg by mouth Every Night.   Yes Beverly Wells MD   rivaroxaban (XARELTO) 10 MG tablet Take 10 mg by mouth Daily.   Yes Beverly Wells MD   tolterodine LA (DETROL LA) 4 MG 24 hr capsule Take 4 mg by mouth Daily.   Yes Beverly Wells MD   traZODone (DESYREL) 50 MG tablet Take 1 tablet by mouth Every Night. 11/14/18  Yes Josafat Gonzales MD   venlafaxine XR (EFFEXOR-XR) 150 MG 24 hr capsule Take 2 capsules by mouth Daily. 6/7/18  Yes Josafat Gonzales MD   vitamin C (ASCORBIC ACID) 250 MG tablet Take 250 mg by mouth 2 (Two) Times a Day.   Yes  Provider, MD Beverly   vitamin D (ERGOCALCIFEROL) 05749 units capsule capsule Take 50,000 Units by mouth 1 (One) Time Per Week. .   Yes ProviderBeverly MD   metoprolol tartrate (LOPRESSOR) 50 MG tablet TAKE 1 TABLET BY MOUTH DAILY 19  Josafat Gonzales MD   nystatin (nystatin) 920831 UNIT/GM powder WASH AFFECTED AREA AND DRY WELL THREE TIMES DAILY. AFTER DRY APPLY NYSTATIN POWDER TWICE DAILY 19  Josafat Gonzales MD   solifenacin (VESICARE) 10 MG tablet Take 1 tablet by mouth Daily. 19  Josafat Gonzales MD       PAST HISTORY:  CHILDHOOD: unremarkable.      PROCEDURES:   SCANNED  [id number]  Colonoscopy+tort-mass/[facility]//14/BE  EGD+nl/[facility]//.15/UGI  Colonoscopy+polyp/[facility]//.15/3y     SURGERIES:  T&A/age 11  R knee/[facility]//age 13  SANDRA+BSO/[place]/[facility]/age 32  T12/Gómezrnidia//  Gastric by-pass//  L knee/Martinez/Lorena/  C surgery/Tequila/[place]/06  C surgery/[place]a/08  Lap GB/WB/Armando/3-18-10  L arthroscope/  R lap colectomy/Dorothea/.14   ORIF L wrist/[place]//16  R hip fx//3.29.19     FAMILY HISTORY:  HTN/m,a-m,f,gf-p,  Heart/m,f,gf-p,gm-p,gm-m  DM/u-m,  CA-colon/none  CA-other/none  CA-breast/none     HABITS:  Tobacco-smoker/never  Tobacco-2nd handed/limited  Alcohol/none  Drugs/     SOCIAL HISTORY:  /1987,  Children/1  Employment/[employer] tx-unit 1/disability  Disability/     PATIENT EDUCATION:     ADVISED/OFFERED:  Colonoscopy/8.2.10+  MRI LS spine/12     HOSPITAL ADMITS:      MMH:   11.8.08-11.12.08-hyponatremia  2.8.10-2.9.10-L flank pain  3.13.14-3.15.14 hever/TIA  12.26.17-12.27.17    Shortness of breath  Cough  Flu B  Bronchitis-acute  Abnormal CT chest-AGUILAR spiculated nodule  Chest wall pain  Hyponatremia 128  Neutropenia 2.54     5.26.18-5.31.18  Lethargy-likely 2nd overdose Imodium-resolved  Confusion-same-resolved  Brief intubation (airway protection) -27th  Abnormal  CXR-? Pneumonia/atelectasis  Overdose Imodium; initial ? suicide jesture refuted  Depression-acute/chronic  Anxiety-acute/chronic  Hypotension-brief and decrease Rx  Cheilosis-improved empiric monistat  Gait decline-acute/chronic  Anemia-component phlebotomy, (substrate neg)  +BC/staph homnis/epidermitis (contaminet)  E coli bacteruria-likely UTI  UTI-     BH:   7.21-7.23.95 abdominal pain  2.19.08-2.21.08-DVT  2.9.10-2.11.10-abdominal pain/intercostal  3.11.10-3.19.10- hever-abdominal pain+SIADH  3.24.15-3.30.15 diarrhea  5.26.15-5.31.15 nausea/vomiting/diarrhea  7.12.17-7.17.17 accidential injection water bottle cap     3.10.19-3.14.19  Confusion-delirium (? Sepsis, ? Seizure)-resolved  Hx seizure disorder-normal EEG  Abnormal UA-(21-30WBC, mod leuk, 2+ bacteria) maybe UTI (BC neg, 10.5 strept                               Group B)  CXR-infiltrate (note lack cough, etc)-maybe pneumonia-persistant L peihilair/mid                               Lung infiltrate  Sepsis (tachycardia 104, lactic acid 6.0)-maybe  Low phenytoin level 3.0-not taking-improved  Hypopotassemia-2.1-uacreqak-fmfxhceg  Drug screen THC, opiate  Fxmujxtei-xsqsymrxs-ummmwikt  NPO-resolved  Cerebral atrophy-frontal  YOLIE-without treatment  Non-compliance; Rx (dilantin, keppra), CPAP  B12 def (post gastric bypass);   Vit D def (post gastric bypass)  Polypharmacy; difficult Rx list     3.28.2019 -4.1.19  R hip fracture-post surgery  Pain R hip-improving  R hip incision-healing  Gait difficulty-acute/chronic-slowly improving  Post-operative state-improving  Anticoagulation needs: hx DVT, DVT risk/(coumadin) (lovonex), xarelto  Anemia-chronic-component post op/blood loss  Arthritis-diffuse  Joint pains-multiple-chronic (worse without Rx)  Back pain-chronic (worse without Rx)  Neck pain-chronic (worse without Rx)  Anxiety-chronic (worse without Rx)  Medication error (accidentaly holding of Rx)    7.30.19-8.6.19  Wounds-back (cultures klebsiella, proteus  "buttock)  Cellulitis-back-(klebsiella/proteus)  Wounds-heels  Urinary incontinence  Bacteruria-pseudomonas  Abnormal urine-unlikely UTI (cultures neg)  IV abx: vancomycin (7.30.19-8.3.19), zosyn (7.30.19-8.3.19)  Fecal incontinence  Garcia catheter-needed for wound care  Lethargy-variable  Ggjvubebf-fqwhavcoahnqgu-Gj/hyponatremia/others  Hyponatremia 127-improved  SIADH-chronic  hypokalemia  Malnutrition-severe (weight loss, low t protein, albumin)  Hypoglycemia-nutrition related  Dehydration (SG > 1.030)-resolved  Hypotension-affect lopressor, Rx, dehydration-improved  Bedrest  Diarrhea-abx related  Itching  Rash-dry skin     LH:   10.26.16-11.6.16 seizure     GENERAL:  Inactive/slower with limits, speed, stamina for age and pains, desire, above. Sleep is ok usually with Rx.  Has pain management and psych past. No fever reported at nursing home.   SKIN: continues with \"gaulded\" rash to back.   ENDO:  No syncope, near or diaphoretic sweaty spells.  BS Ok since here.  HEENT: No head injury or headache.  No vision change.   No significant hearing loss. Ears without pain/drainage.  No sore throat.  No significant nasal/sinus congestion/drainage. No epistaxis.  CHEST: No chest wall tenderness or mass. No significant cough, wheeze, SOB; no hemoptysis.  CV: No chest pain, palpitations, significant ankle edema.  GI: No heartburn, dysphagia.  No abdominal pain, diarrhea, constipation, rectal bleeding, or melena;  incontinent.  :  Voids with incontinence; came with garcia from nursing home.   ORTHO: No painful/swollen joints but various on /off sore.  Usually has sore neck or back.  No acute neck or back pain without recent injury.   NEURO: No dizziness, weakness of extremities.  Usually has LE numbness/paresthesias.   PSYCH: Prior memory loss.  Mood always variable; often anxious, depressed but/and not suicidal though has had prior overdose.  Tolerates stress variably.         PHYSICAL EXAMINATION:  /65 (BP Location: " "Right arm, Patient Position: Lying)   Pulse 110   Temp 98.7 °F (37.1 °C) (Oral)   Resp 16   Ht 182.9 cm (72.01\")   Wt 85.8 kg (189 lb 3.2 oz)   LMP  (LMP Unknown)   SpO2 100%   BMI 25.65 kg/m²      GENERAL:  AFA/developed in no acute distress. Thin.   SKIN: Pale; scabbed heel ulcers and diffuse reddness back.   Mild chelosis.  HEENT: Normal cephalic without trauma.  Pupils equal round reactive to light; pupils  Dilated.  Extraocular motions full without nystagmus.    Oral cavity without growths, exudates, and moist.  Posterior pharynx without mass, obstruction, redness.  No thyromegaly, mass, tenderness, lymphadenopathy and supple.  CV: Regular rhythm.  No murmur, gallop trace LE edema. Posterior pulses intact.  No carotid bruits.   CHEST: No chest wall tenderness or mass.   LUNGS: Symmetric motion with clear to auscultation.  ABD: Soft, nontender without mass.   ORTHO: Symmetric extremities without swelling/point tenderness.  Ankles plantar  Flexed.   NEURO: CN 2-12 grossly intact.  Symmetric facies. 1/4 x bicep equal reflexes.  UE/LE   2/5 strength throughout.  Nonfocal use extremities. Speech weak.   PSYCH: Oriented x 2; not date.  Pleasant calm, well kept.  Shallow; minimal purposeful/directed conservation.      ASSESSMENT/PROBLEM LIST:   62 y/o white female   Allergy/intolerance: see above  Procedural history: see above  Family history: see above  Obesity.  This is status post gastric bypass initial failure and regain of  weight.     1 para 1 AB 0.    Surgical menopause.  Osteoporosis on previous wrist study (hip fx 3.28.19)   Hypertension.    Hypothyroidism.    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    History of DVT - left peroneal popliteal, 2008, treated              successfully with anticoagulation.   Anticoagulation needs: hx DVT, DVT risk/(coumadin) lovonex  Chronic " insomnia.  Spinal spondylosis; this is primarily cervical with multiple surgeries.  Chronic neck pain.    Chronic back pain  Chronic narcotics-from pain management/Ruxer  Chronic depression - followed by Dr. Samaniego.    Degenerative joint disease that is diffuse.   LE weakness  Gait difficulty-chronic   History of gastric bypass and metabolic risk it carries.    Colon cancer history with previous surgery and chemo, no obvious              recurrence.  Gastroesophageal reflux.  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   Includes anticoagulation for  DVT prevention as able.  Decrease all nonessential Rx and hold bp Rx.   LAB-reviewed; ordered as needed and will review daily.  Particular:  Daily CBC, BMP  Imaging-reviewed; ordered as needed and will review daily.  Particular:  No additional  Consults none  Diet: as able/encouraged  Fluids: 100 cc hr NS/KCL  Special issues: hospice?  DC planning: back to [facility] despite approach  Code status: DNR    Electronically signed by Josafat Gonzales MD at 9/7/2019  9:21 PM          Emergency Department Notes      Juanjose Last PA-C at 9/6/2019  7:56 PM     Attestation signed by Zeeshan Desai Jr., MD at 9/6/2019  8:07 PM          For this patient encounter, I reviewed the NP or PA documentation, treatment plan, and medical decision making. Zeeshan Desai Jr, MD 9/6/2019 8:07 PM                  Subjective   History of  Present Illness  63-year-old female presents by EMS from nursing home reports fatigue and altered mental status.  On my examination the patient is a very poor historian and is unable to complete the history.  Son is not present at bedside and he reports that the plan for this patient is to proceed to hospice in the near future.  Dr. Gonzales is the primary care doctor  Review of Systems   All other systems reviewed and are negative.      Past Medical History:   Diagnosis Date   • Asthma    • Cancer (CMS/HCC)     colon cancer   • Depression    • Disease of thyroid gland    • Hypertension    • Insomnia    • Seizure (CMS/HCC)    • Sleep apnea        No Known Allergies    Past Surgical History:   Procedure Laterality Date   • BACK SURGERY     • COLON SURGERY     • GASTRIC BYPASS     • HIP HEMIARTHROPLASTY Right 3/29/2019    Procedure: HIP HEMIARTHROPLASTY;  Surgeon: Mehul Loaiza MD;  Location: [facility] OR;  Service: Orthopedics   • HYSTERECTOMY     • KNEE ARTHROSCOPY      bilateral   • NECK SURGERY     • ORIF WRIST FRACTURE Right 6/9/2017    Procedure: WRIST OPEN REDUCTION INTERNAL FIXATION, RIGHT;  Surgeon: Alec Cardoza MD;  Location: [facility] OR;  Service:    • TONSILLECTOMY     • WRIST FRACTURE SURGERY         Family History   Problem Relation Age of Onset   • Heart attack Father    • Stroke Father    • Arthritis Father    • Hypertension Father    • Heart attack Mother    • Stroke Mother    • Arthritis Mother    • Hypertension Mother        Social History     Socioeconomic History   • Marital status:      Spouse name: Carlito   • Number of children: 1   • Years of education: 18+   • Highest education level: Not on file   Occupational History   • Occupation: retired     Comment: [employer]   Tobacco Use   • Smoking status: Never Smoker   • Smokeless tobacco: Never Used   Substance and Sexual Activity   • Alcohol use: No   • Drug use: No   • Sexual activity: Defer           Objective   Physical  Exam   Constitutional: She appears lethargic.   Appears ill   HENT:   Head: Normocephalic and atraumatic.   Eyes: EOM are normal. Pupils are equal, round, and reactive to light.   Neck: Normal range of motion. Neck supple.   Cardiovascular:   Tachycardia   Pulmonary/Chest: Effort normal and breath sounds normal.   Abdominal: Soft. Bowel sounds are normal. There is no tenderness.   Musculoskeletal: Normal range of motion.   Neurological: She has normal strength. She appears lethargic. She is disoriented. She displays a negative Romberg sign.   Skin: Skin is warm. Capillary refill takes less than 2 seconds.   Psychiatric:   Flat, lethargic   Nursing note and vitals reviewed.      Procedures          ED Course        Labs Reviewed   COMPREHENSIVE METABOLIC PANEL - Abnormal; Notable for the following components:       Result Value    Glucose 69 (*)     BUN 28 (*)     Calcium 7.6 (*)     Total Protein 5.1 (*)     Albumin 2.00 (*)     Alkaline Phosphatase 129 (*)     A/G Ratio 0.6 (*)     BUN/Creatinine Ratio 50.0 (*)     All other components within normal limits    Narrative:     GFR Normal >60  Chronic Kidney Disease <60  Kidney Failure <15   URINALYSIS W/ CULTURE IF INDICATED - Abnormal; Notable for the following components:    Color, UA Dark Yellow (*)     Appearance, UA Cloudy (*)     Ketones, UA Trace (*)     Bilirubin, UA Small (1+) (*)     Blood, UA Trace (*)     Protein, UA Trace (*)     Leuk Esterase, UA Large (3+) (*)     Nitrite, UA Positive (*)     All other components within normal limits   LIPASE - Abnormal; Notable for the following components:    Lipase <10 (*)     All other components within normal limits   CBC WITH AUTO DIFFERENTIAL - Abnormal; Notable for the following components:    WBC 13.49 (*)     Hemoglobin 11.4 (*)     RDW 17.7 (*)     RDW-SD 54.8 (*)     Neutrophil % 85.0 (*)     Lymphocyte % 8.7 (*)     Neutrophils, Absolute 11.46 (*)     Immature Grans, Absolute 0.06 (*)     All other  components within normal limits   URINALYSIS, MICROSCOPIC ONLY - Abnormal; Notable for the following components:    WBC, UA 21-30 (*)     Bacteria, UA 4+ (*)     All other components within normal limits   APTT - Normal   PROTIME-INR - Normal   TROPONIN (IN-HOUSE) - Normal   BNP (IN-HOUSE) - Normal   LACTIC ACID, PLASMA - Normal   TSH - Normal   MAGNESIUM - Normal   PROCALCITONIN - Normal    Narrative:     SIRS, sepsis, severe sepsis, and septic shock are categorized according to the criteria of the consensus conference of the American College of Chest Physicians/Society of Critical Care Medicine.    PCT < 0.5 ng/mL     Systemic infection (sepsis) is not likely.    PCT >0.5 and < 2.0 ng/mL Systemic infection (sepsis) is possible, but other conditions are known to elevate PCT as well.    PCT > 2.0 ng/mL     Systemic infection (sepsis) is likely, unless other causes are known.      PCT > 10.0 ng/mL    Important systemic inflammatory response, almost exclusively due to severe bacterial sepsis or septic shock.    PCT values of < 0.5 ng/mL do not exclude an infection, because localized infections (without systemic signs) may be associated with such low concentrations, or a systemic infection in its initial stages (<6 hours).  Increased PCT can occur without infection.  PCT concentrations between 0.5 and 2.0 ng/mL should be interpreted taking into account the patients history.  It is recommended to retest PCT within 6-24 hours if any concentrations < 2.0 ng/mL are obtained.   URINE DRUG SCREEN - Normal    Narrative:     Negative Thresholds For Drugs Screened in Urine:    Amphetamines          500 ng/ml  Barbiturates          200 ng/ml  Benzodiazepines       200 ng/ml  Cocaine               150 ng/ml  Methadone             150 ng/ml  Opiates               300 ng/ml  Phencyclidine         25 ng/ml  THC                      50 ng/ml    The normal value for all drugs tested is negative. This report includes final unconfirmed  screening results.  A positive result by this assay can be, at your request, sent to the Reference Lab for confirmation by gas chromatography. Unconfirmed results must not be used for non-medical purposes, such as employment or legal testing. Clinical consideration should be applied to any drug of abuse test result, particularly when unconfirmed results are used.   CK - Normal   BLOOD CULTURE   BLOOD CULTURE   URINE CULTURE   CBC AND DIFFERENTIAL    Narrative:     The following orders were created for panel order CBC & Differential.  Procedure                               Abnormality         Status                     ---------                               -----------         ------                     CBC Auto Differential[[id number]]        Abnormal            Final result                 Please view results for these tests on the individual orders.     XR Chest 1 View   Final Result   1. Chronic atelectasis region the lingula.   2. Suspicious for small patchy infiltrate right upper lung.           This report was finalized on 09/06/2019 16:07 by Dr. Lamin Corado MD.      CT Head Without Contrast   Final Result   1. No CT evidence of an acute intracranial process.                                  This report was finalized on 09/06/2019 15:56 by Dr. Obi Phan MD.                  MDM  Number of Diagnoses or Management Options  Failure to thrive in adult: new and requires workup  Pneumonia due to infectious organism, unspecified laterality, unspecified part of lung: new and requires workup  Diagnosis management comments: Admitted to Dr. Gonzales       Amount and/or Complexity of Data Reviewed  Clinical lab tests: reviewed and ordered  Tests in the radiology section of CPT®:  reviewed and ordered  Tests in the medicine section of CPT®:  ordered and reviewed    Risk of Complications, Morbidity, and/or Mortality  Presenting problems: moderate  Diagnostic procedures: moderate  Management options:  moderate    Patient Progress  Patient progress: stable      Final diagnoses:   Pneumonia due to infectious organism, unspecified laterality, unspecified part of lung   Failure to thrive in adult              Juanjose Last PA-C  09/06/19 1958      Electronically signed by Zeeshan Desai Jr., MD at 9/6/2019  8:07 PM       Hospital Medications (all)       Dose Frequency Start End    acetaminophen (TYLENOL) tablet 650 mg 650 mg Every 4 Hours PRN 9/6/2019     Sig - Route: Take 2 tablets by mouth Every 4 (Four) Hours As Needed for Mild Pain  or Fever. - Oral    bisacodyl (DULCOLAX) suppository 10 mg 10 mg Every 72 Hours PRN 9/6/2019     Sig - Route: Insert 1 suppository into the rectum Every 72 (Seventy-Two) Hours As Needed for Constipation. - Rectal    cefTRIAXone (ROCEPHIN) 1 g/100 mL 0.9% NS (MBP) 1 g Every 24 Hours 9/9/2019 9/14/2019    Sig - Route: Infuse 100 mL into a venous catheter Daily. - Intravenous    clonazePAM (KlonoPIN) tablet 0.5 mg 0.5 mg 3 Times Daily PRN 9/6/2019     Sig - Route: Take 1 tablet by mouth 3 (Three) Times a Day As Needed for Anxiety. - Oral    DULoxetine (CYMBALTA) DR capsule 30 mg 30 mg Daily 9/7/2019     Sig - Route: Take 1 capsule by mouth Daily. - Oral    gabapentin (NEURONTIN) capsule 300 mg 300 mg Every 12 Hours Scheduled 9/8/2019     Sig - Route: Take 1 capsule by mouth Every 12 (Twelve) Hours. - Oral    levETIRAcetam XR (KEPPRA XR) 24 hr tablet 1,000 mg 1,000 mg Daily 9/7/2019     Sig - Route: Take 2 tablets by mouth Daily. - Oral    levothyroxine (SYNTHROID, LEVOTHROID) tablet 100 mcg 100 mcg Every Early Morning 9/7/2019     Sig - Route: Take 1 tablet by mouth Every Morning. - Oral    nystatin (MYCOSTATIN) powder  Every 8 Hours Scheduled 9/6/2019     Sig - Route: Apply  topically to the appropriate area as directed Every 8 (Eight) Hours. - Topical    oxyCODONE (ROXICODONE) immediate release tablet 5 mg 5 mg Every 4 Hours PRN 9/6/2019     Sig - Route: Take 1 tablet by  mouth Every 4 (Four) Hours As Needed for Moderate Pain . - Oral    piperacillin-tazobactam (ZOSYN) 4.5 g/100 mL 0.9% NS IVPB (mbp) 4.5 g Once 9/6/2019 9/6/2019    Sig - Route: Infuse 100 mL into a venous catheter 1 (One) Time. - Intravenous    Cosign for Ordering: Accepted by Ryan Henning MD on 9/7/2019  6:44 AM    potassium chloride (MICRO-K) CR capsule 20 mEq 20 mEq 2 Times Daily With Meals 9/9/2019     Sig - Route: Take 2 capsules by mouth 2 (Two) Times a Day With Meals. - Oral    QUEtiapine (SEROquel) tablet 25 mg 25 mg Nightly 9/9/2019     Sig - Route: Take 1 tablet by mouth Every Night. - Oral    rivaroxaban (XARELTO) tablet 10 mg 10 mg Daily 9/7/2019     Sig - Route: Take 0.5 tablets by mouth Daily. - Oral    sodium chloride 0.9 % bolus 1,000 mL 1,000 mL Once 9/6/2019 9/6/2019    Sig - Route: Infuse 1,000 mL into a venous catheter 1 (One) Time. - Intravenous    Cosign for Ordering: Accepted by Ryan Henning MD on 9/7/2019  6:44 AM    sodium chloride 0.9 % flush 10 mL 10 mL Every 12 Hours Scheduled 9/6/2019     Sig - Route: Infuse 10 mL into a venous catheter Every 12 (Twelve) Hours. - Intravenous    sodium chloride 0.9 % flush 10 mL 10 mL As Needed 9/6/2019     Sig - Route: Infuse 10 mL into a venous catheter As Needed for Line Care. - Intravenous    vancomycin 1 g/250 mL 0.9% NS (vial-mate) 1,000 mg Once 9/6/2019 9/6/2019    Sig - Route: Infuse 250 mL into a venous catheter 1 (One) Time. - Intravenous    Cosign for Ordering: Accepted by Ryan Henning MD on 9/7/2019  6:44 AM    venlafaxine XR (EFFEXOR-XR) 24 hr capsule 37.5 mg 37.5 mg Daily 9/9/2019     Sig - Route: Take 1 capsule by mouth Daily. - Oral    Doris's amazing butt cream  As Needed 9/7/2019     Sig - Route: Apply  topically to the appropriate area as directed As Needed for skin irritation. - Topical    cyclobenzaprine (FLEXERIL) tablet 5 mg (Discontinued) 5 mg 3 Times Daily 9/6/2019 9/7/2019    Sig - Route: Take 0.5 tablets by mouth 3 (Three)  Times a Day. - Oral    cyclobenzaprine (FLEXERIL) tablet 5 mg (Discontinued) 5 mg 2 Times Daily 9/8/2019 9/9/2019    Sig - Route: Take 1 tablet by mouth 2 (Two) Times a Day. - Oral    gabapentin (NEURONTIN) capsule 400 mg (Discontinued) 400 mg Every 12 Hours Scheduled 9/6/2019 9/7/2019    Sig - Route: Take 4 capsules by mouth Every 12 (Twelve) Hours. - Oral    nystatin (MYCOSTATIN) powder (Discontinued)  3 Times Daily 9/6/2019 9/6/2019    Sig - Route: Apply  topically to the appropriate area as directed 3 (Three) Times a Day. - Topical    Reason for Discontinue: Duplicate order    Pharmacy to dose vancomycin (Discontinued)  Continuous PRN 9/6/2019 9/6/2019    Sig - Route: Continuous As Needed for Consult. - Does not apply    Reason for Discontinue: Reorder    Pharmacy to Dose Zosyn (Discontinued)  Continuous PRN 9/6/2019 9/9/2019    Sig - Route: Continuous As Needed for Consult. - Does not apply    Reason for Discontinue: Discontinued by another clinician    piperacillin-tazobactam (ZOSYN) 3.375 g in iso-osmotic dextrose 50 ml (premix) (Discontinued) 3.375 g Every 8 Hours 9/7/2019 9/9/2019    Sig - Route: Infuse 50 mL into a venous catheter Every 8 (Eight) Hours. - Intravenous    QUEtiapine (SEROquel) tablet 25 mg (Discontinued) 25 mg Every 12 Hours Scheduled 9/8/2019 9/9/2019    Sig - Route: Take 1 tablet by mouth Every 12 (Twelve) Hours. - Oral    QUEtiapine (SEROquel) tablet 50 mg (Discontinued) 50 mg Every 12 Hours Scheduled 9/6/2019 9/7/2019    Sig - Route: Take 2 tablets by mouth Every 12 (Twelve) Hours. - Oral    sodium chloride 0.9 % infusion (Discontinued) 100 mL/hr Continuous 9/6/2019 9/8/2019    Sig - Route: Infuse 100 mL/hr into a venous catheter Continuous. - Intravenous    sodium chloride 0.9 % with KCl 20 mEq/L infusion (Discontinued) 100 mL/hr Continuous 9/8/2019 9/9/2019    Sig - Route: Infuse 100 mL/hr into a venous catheter Continuous. - Intravenous    vancomycin (VANCOCIN) in iso-osmotic dextrose  IVPB 1 g (premix) 200 mL (Discontinued) 1,000 mg Every 12 Hours 9/9/2019 9/9/2019    Sig - Route: Infuse 200 mL into a venous catheter Every 12 (Twelve) Hours. - Intravenous    vancomycin 1500 mg/500 mL 0.9% NS IVPB ([facility]) (Discontinued) 1,500 mg Every 12 Hours 9/7/2019 9/8/2019    Sig - Route: Infuse 500 mL into a venous catheter Every 12 (Twelve) Hours. - Intravenous    venlafaxine XR (EFFEXOR-XR) 24 hr capsule 75 mg (Discontinued) 75 mg Daily 9/7/2019 9/9/2019    Sig - Route: Take 1 capsule by mouth Daily. - Oral          Orders (last 72 hrs)     Start     Ordered    09/09/19 2100  QUEtiapine (SEROquel) tablet 25 mg  Nightly      09/09/19 0825 09/09/19 1400  vancomycin (VANCOCIN) in iso-osmotic dextrose IVPB 1 g (premix) 200 mL  Every 12 Hours,   Status:  Discontinued      09/08/19 2132 09/09/19 0915  venlafaxine XR (EFFEXOR-XR) 24 hr capsule 37.5 mg  Daily      09/09/19 0825 09/09/19 0915  potassium chloride (MICRO-K) CR capsule 20 mEq  2 Times Daily With Meals      09/09/19 0825 09/09/19 0915  cefTRIAXone (ROCEPHIN) 1 g/100 mL 0.9% NS (MBP)  Every 24 Hours      09/09/19 0826    09/09/19 0600  CBC Auto Differential  PROCEDURE ONCE      09/09/19 0001    09/08/19 1826  PT Consult: Eval & Treat deconditioning  Once      09/08/19 1826    09/08/19 1826  OT Consult: Eval & Treat deconditioning  Once      09/08/19 1826    09/08/19 1815  sodium chloride 0.9 % with KCl 20 mEq/L infusion  Continuous,   Status:  Discontinued      09/08/19 1722    09/08/19 1700  Vancomycin, Trough  Timed      09/07/19 1543    09/08/19 0934  Respiratory Therapy to Obtain Blood Via Arterial Puncture  Once,   Status:  Canceled      09/08/19 0933    09/08/19 0900  gabapentin (NEURONTIN) capsule 300 mg  Every 12 Hours Scheduled      09/07/19 2136 09/08/19 0900  cyclobenzaprine (FLEXERIL) tablet 5 mg  2 Times Daily,   Status:  Discontinued      09/07/19 2136 09/08/19 0900  QUEtiapine (SEROquel) tablet 25 mg  Every 12 Hours  Scheduled,   Status:  Discontinued      19 2136    19 0834  CBC Auto Differential  PROCEDURE ONCE      19 0001    19 0658  Manual Differential  Once,   Status:  Canceled      19 0657    19 0500  Vancomycin, Trough Please draw prior to the 0600 dose, thank you.  Timed,   Status:  Canceled     Comments:  Please draw prior to the 0600 dose, thank you.      19 2257    19 1800  Dietary Nutrition Supplements Boost Plus (Ensure Enlive, Ensure Plus)  Daily With Breakfast & Dinner      19 1139    19 1147  SLP Plan of Care Cert / Re-Cert  Once     Comments:  Speech Language Pathology Plan of Care  Initial Certification  Certification Period: 2019 - 2019    Patient Name: Bita Croft  : 1955    (J18.9) Pneumonia due to infectious organism, unspecified laterality, unspecified part of lung  (primary encounter diagnosis)  (R62.7) Failure to thrive in adult  (R13.10) Dysphagia, unspecified type                Assessment  SLP Assessment  Prior Level of Function-Communication: cognitive-linguistic impairment, motor speech impairment  Prior Level of Function-Swallowing: no diet consistency restrictions  SLP Swallowing Diagnosis: functional oral phase, suspected pharyngeal dysfunction  Plan of Care Reviewed With: patient  Swallow Criteria for Skilled Therapeutic Interventions Met: demonstrates skilled criteria      IP SLP Goals     Row Name 19 1050             Oral Nutrition/Hydration Goal 1 (SLP)    Oral Nutrition/Hydration Goal 1, SLP  Pt will tolerate LRD without overt   s/s of aspiration.  -TM      Time Frame (Oral Nutrition/Hydration Goal 1, SLP)  by discharge  -TM      Barriers (Oral Nutrition/Hydration Goal 1, SLP)  n/a  -TM      Progress/Outcomes (Oral Nutrition/Hydration Goal 1, SLP)  goal ongoing    -TM        User Key  (r) = Recorded By, (t) = Taken By, (c) = Cosigned By    Initials Name Provider Type    TM Marisabel Faye CCC-SLP Speech  and Language Pathologist          SLP OP Goals     Row Name 09/07/19 1145          SLP Time Calculation    SLP Goal Re-Cert Due Date  09/17/19  -       User Key  (r) = Recorded By, (t) = Taken By, (c) = Cosigned By    Initials Name Provider Type    Marisabel Sanchez CCC-SLP Speech and Language Pathologist            Plan    SLP Plan  Therapy Frequency (Swallow): PRN  Predicted Duration Therapy Intervention (Days): until discharge        Marisabel Faye CCC-SLP  9/7/2019      By cosigning this order, either electronically or physically, I certify that the therapy services are furnished while this patient is under my care, the services outline above are required by this patient, and will be reviewed every 90 days.        M.D.:__________________________________________ Date: ______________    09/07/19 1146    09/07/19 1134  Diet Regular; Thin  Diet Effective Now     Comments:  Regular solid diet consistency with regular/thin liquid consistency. Continue any other MD diet restrictions. RN to monitor for increased congestion.    09/07/19 1133    09/07/19 1059  Doris's amazing butt cream  As Needed      09/07/19 1059    09/07/19 0900  DULoxetine (CYMBALTA) DR capsule 30 mg  Daily      09/06/19 2116 09/07/19 0900  levETIRAcetam XR (KEPPRA XR) 24 hr tablet 1,000 mg  Daily      09/06/19 2116 09/07/19 0900  rivaroxaban (XARELTO) tablet 10 mg  Daily      09/06/19 2116 09/07/19 0900  venlafaxine XR (EFFEXOR-XR) 24 hr capsule 75 mg  Daily,   Status:  Discontinued      09/06/19 2116 09/07/19 0600  levothyroxine (SYNTHROID, LEVOTHROID) tablet 100 mcg  Every Early Morning      09/06/19 2116 09/07/19 0600  Basic Metabolic Panel  Daily      09/06/19 2119 09/07/19 0600  CBC & Differential  Daily      09/06/19 2119 09/07/19 0600  vancomycin 1500 mg/500 mL 0.9% NS IVPB ([facility])  Every 12 Hours,   Status:  Discontinued      09/06/19 2256    09/07/19 0600  CBC Auto Differential  PROCEDURE ONCE      09/07/19 0001     09/07/19 0423  Wound Ostomy Eval & Treat  Once      09/07/19 0423    09/07/19 0409  Inpatient Case Management  Consult  Once     Provider:  (Not yet assigned)    09/07/19 0408    09/07/19 0100  piperacillin-tazobactam (ZOSYN) 3.375 g in iso-osmotic dextrose 50 ml (premix)  Every 8 Hours,   Status:  Discontinued      09/06/19 2243    09/07/19 0000  Vital Signs  Every 4 Hours     Comments:  Per per hospital policy    09/06/19 2119 09/06/19 2323  POC Glucose Once  Once      09/06/19 2311    09/06/19 2200  nystatin (MYCOSTATIN) powder  Every 8 Hours Scheduled      09/06/19 2116 09/06/19 2121  sodium chloride 0.9 % flush 10 mL  Every 12 Hours Scheduled      09/06/19 2119 09/06/19 2121  sodium chloride 0.9 % infusion  Continuous,   Status:  Discontinued      09/06/19 2119 09/06/19 2120  Pharmacy to Dose Zosyn  Continuous PRN,   Status:  Discontinued      09/06/19 2120 09/06/19 2120  Pharmacy to dose vancomycin  Continuous PRN,   Status:  Discontinued      09/06/19 2120 09/06/19 2119  Inpatient consult to Nutrition  Once     Provider:  (Not yet assigned)    09/06/19 2119 09/06/19 2119  SLP Consult: Eval & Treat Swallow Disorder; Regular - No Dietary Restrictions  Once      09/06/19 2119 09/06/19 2118  Activity (Specify Details)  Until Discontinued     Comments:  Keep heels protected/elevated    09/06/19 2119 09/06/19 2118  Diet Regular  Diet Effective Now,   Status:  Canceled      09/06/19 2119 09/06/19 2117  cyclobenzaprine (FLEXERIL) tablet 5 mg  3 Times Daily,   Status:  Discontinued      09/06/19 2116 09/06/19 2117  gabapentin (NEURONTIN) capsule 400 mg  Every 12 Hours Scheduled,   Status:  Discontinued      09/06/19 2116 09/06/19 2117  nystatin (MYCOSTATIN) powder  3 Times Daily,   Status:  Discontinued      09/06/19 2116 09/06/19 2117  QUEtiapine (SEROquel) tablet 50 mg  Every 12 Hours Scheduled,   Status:  Discontinued      09/06/19 2116 09/06/19 2117  NPO  Diet  Diet Effective Now,   Status:  Canceled     Comments:  Should remain in effect until a complete SLP evaluation occurs and a superceding MD order is received.    09/06/19 2116 09/06/19 2117  SLP Consult: Eval & Treat RN Dysphagia Screen Failed  Once      09/06/19 2116 09/06/19 2117  Code Status and Medical Interventions:  Continuous      09/06/19 2119 09/06/19 2117  Intake & Output  Every Shift      09/06/19 2119 09/06/19 2117  Weigh patient  Once      09/06/19 2119 09/06/19 2117  Notify Physician (with Parameters)  Until Discontinued      09/06/19 2119 09/06/19 2117  Oxygen Therapy- Nasal Cannula; Titrate for SPO2: 90% - 95%  Continuous      09/06/19 2119 09/06/19 2117  Insert Peripheral IV  Once      09/06/19 2119 09/06/19 2117  Saline Lock & Maintain IV Access  Continuous      09/06/19 2119 09/06/19 2117  VTE Prophylaxis Not Indicated:  Once      09/06/19 2119 09/06/19 2117  Turn Patient  Now Then Every 2 Hours      09/06/19 2119 09/06/19 2116  sodium chloride 0.9 % flush 10 mL  As Needed      09/06/19 2119 09/06/19 2113  oxyCODONE (ROXICODONE) immediate release tablet 5 mg  Every 4 Hours PRN      09/06/19 2116 09/06/19 2111  clonazePAM (KlonoPIN) tablet 0.5 mg  3 Times Daily PRN      09/06/19 2116 09/06/19 2111  bisacodyl (DULCOLAX) suppository 10 mg  Every 72 Hours PRN      09/06/19 2116 09/06/19 2111  acetaminophen (TYLENOL) tablet 650 mg  Every 4 Hours PRN      09/06/19 2116 09/1955  Inpatient Admission  Once      09/1955 09/06/19 1923  Urine Culture - Urine,  Once      09/06/19 1922 09/06/19 1904  Urinalysis, Microscopic Only - Urine, Clean Catch  Once      09/06/19 1903    09/06/19 1750  sodium chloride 0.9 % bolus 1,000 mL  Once      09/06/19 1748    09/06/19 1635  vancomycin 1 g/250 mL 0.9% NS (vial-mate)  Once      09/06/19 1633    09/06/19 1635  piperacillin-tazobactam (ZOSYN) 4.5 g/100 mL 0.9% NS IVPB (mbp)  Once      09/06/19 1633     09/06/19 1509  ECG 12 Lead  Once      09/06/19 1509    09/06/19 1509  XR Chest 1 View  1 Time Imaging      09/06/19 1509    09/06/19 1509  CT Head Without Contrast  1 Time Imaging      09/06/19 1509    09/06/19 1509  Lipase  Once      09/06/19 1509    09/06/19 1509  Procalcitonin  Once      09/06/19 1509    09/06/19 1509  Urine Drug Screen - Urine, Clean Catch  Once      09/06/19 1509    09/06/19 1509  CK  Once      09/06/19 1509    09/06/19 1508  CBC & Differential  Once      09/06/19 1509    09/06/19 1508  Comprehensive Metabolic Panel  Once      09/06/19 1509    09/06/19 1508  aPTT  Once      09/06/19 1509    09/06/19 1508  Protime-INR  Once      09/06/19 1509    09/06/19 1508  Urinalysis With Culture If Indicated - Urine, Clean Catch  Once      09/06/19 1509    09/06/19 1508  Troponin  Once      09/06/19 1509    09/06/19 1508  BNP  Once      09/06/19 1509    09/06/19 1508  Blood Culture - Blood,  Once      09/06/19 1509    09/06/19 1508  Blood Culture - Blood,  Once      09/06/19 1509    09/06/19 1508  Lactic Acid, Plasma  Once      09/06/19 1509    09/06/19 1508  TSH  Once      09/06/19 1509    09/06/19 1508  Magnesium  Once      09/06/19 1509    09/06/19 1508  CBC Auto Differential  PROCEDURE ONCE      09/06/19 1509    Unscheduled  Respiratory Therapy to Obtain Blood Via Arterial Puncture  As Needed      09/08/19 1244    --  tolterodine LA (DETROL LA) 4 MG 24 hr capsule  Daily      09/07/19 1308    --  SCANNED EKG      09/06/19 0000             Physician Progress Notes (last 72 hours) (Notes from 9/6/2019  9:11 AM through 9/9/2019  9:11 AM)      Josafat Gonzales MD at 9/9/2019  8:16 AM               LOS: 3 days   Patient Care Team:  Josafat Gonzales MD as PCP - General  Josafat Gonzales MD as PCP - Family Medicine  [facility], Pawel Edmondson MD as Consulting Physician (Cardiology)  Pawel Ho MD as Consulting Physician (Urology)  Krystal Dorman APRN as Nurse Practitioner  (Neurology)    Chief Complaint:  Overall decline    Subjective      HISTORY OF PRESENT ILLNESS: I had a family meeting with her brother and  the day of admission.  They indicated they thought she lost about 20 pounds in the last 2 weeks and was not eating anything at the nursing home.  Through 4 days earlier I talked to the director of nurses and she indicated the patient was not improving with therapy in general and with wondered if with her history her family would want her to be hospice patient.  She does have a history of probable lung cancer (she was not well enough to do surgery she was treated with radiation):  she has significant spinal stenosis for which her medical condition makes her nonoperable and at least for the last 6 months she has not been able to ambulate; she is either been wheelchair or bedridden.  She has a long history of depression anxiety probable seizure disorder may be even a psych chronic condition.  She is seen numerous psychiatrists and takes large doses of psychiatric as well as pain medications.  She is frequently quite lethargic and subdued.  So the decision made by the family was to bring her to the ER; there she was found to be hypotensive hypoxic (which corrected with oxygen) and we elected to admit with fluids empiric antibiotics (there was concern she might have an infiltrate in her urine had abnormalities) allow speech to evaluate use fluids slowly withdrawal some of her polypharmacy and then the next 48+ hours to see if there would be any improvement in her condition that would help define whether we need to do continue to press on or whether she should be hospice.  She is apparently discussed with her  when she was well that she would never want life support such as with a feeding tube.    Interval History: tolerating wean of several Rx; more alert with IV, abx, and Rx wean.  ? (no one knows) ate well.     .  Current Facility-Administered Medications:   •   acetaminophen (TYLENOL) tablet 650 mg, 650 mg, Oral, Q4H PRN, Josafat Gonzales MD  •  bisacodyl (DULCOLAX) suppository 10 mg, 10 mg, Rectal, Q72H PRN, Josafat Gonzales MD  •  clonazePAM (KlonoPIN) tablet 0.5 mg, 0.5 mg, Oral, TID PRN, Josafat Gonzales MD  •  cyclobenzaprine (FLEXERIL) tablet 5 mg, 5 mg, Oral, BID, Josafat Gonzales MD, 5 mg at 09/08/19 2116  •  DULoxetine (CYMBALTA) DR capsule 30 mg, 30 mg, Oral, Daily, Josafat Gonzales MD, 30 mg at 09/08/19 0954  •  gabapentin (NEURONTIN) capsule 300 mg, 300 mg, Oral, Q12H, Josafat Gonzales MD, 300 mg at 09/08/19 2116  •  levETIRAcetam XR (KEPPRA XR) 24 hr tablet 1,000 mg, 1,000 mg, Oral, Daily, Jsoafat Gonzales MD, 1,000 mg at 09/08/19 0955  •  levothyroxine (SYNTHROID, LEVOTHROID) tablet 100 mcg, 100 mcg, Oral, Q AM, Josafat Gonzales MD, 100 mcg at 09/09/19 0701  •  nystatin (MYCOSTATIN) powder, , Topical, Q8H, Josafat Gonzales MD  •  oxyCODONE (ROXICODONE) immediate release tablet 5 mg, 5 mg, Oral, Q4H PRN, Josafat Gonzales MD, 5 mg at 09/08/19 2155  •  Pharmacy to Dose Zosyn, , Does not apply, Continuous PRN, Josafat Gonzales MD  •  piperacillin-tazobactam (ZOSYN) 3.375 g in iso-osmotic dextrose 50 ml (premix), 3.375 g, Intravenous, Q8H, Josafat Gonzales MD, Last Rate: 0 mL/hr at 09/08/19 2217, 3.375 g at 09/09/19 0004  •  QUEtiapine (SEROquel) tablet 25 mg, 25 mg, Oral, Q12H, Josafat Gonzales MD, 25 mg at 09/08/19 2116  •  rivaroxaban (XARELTO) tablet 10 mg, 10 mg, Oral, Daily, Josafat Gonzales MD, 10 mg at 09/08/19 0953  •  sodium chloride 0.9 % flush 10 mL, 10 mL, Intravenous, Q12H, Josafat Gonzales MD, 10 mL at 09/08/19 2117  •  sodium chloride 0.9 % flush 10 mL, 10 mL, Intravenous, PRN, Josafat Gonzales MD  •  sodium chloride 0.9 % with KCl 20 mEq/L infusion, 100 mL/hr, Intravenous, Continuous, Josafat Gonzales MD, Last Rate: 100 mL/hr at 09/09/19 0252, 100 mL/hr at  "09/09/19 0252  •  vancomycin (VANCOCIN) in iso-osmotic dextrose IVPB 1 g (premix) 200 mL, 1,000 mg, Intravenous, Q12H, Josafat Gonzales MD  •  venlafaxine XR (EFFEXOR-XR) 24 hr capsule 75 mg, 75 mg, Oral, Daily, Josafat oGnzales MD, 75 mg at 09/08/19 0954  •  Doris's amazing butt cream, , Topical, PRN, Josafat Gonzales MD    Review of Systems:   GENERAL:  Inactive/slower with limits, speed, stamina for age and pains, desire, above. Sleep is ok usually with Rx; on/off here.  Has pain management and psych past. No fever reported at nursing home/since here.   SKIN: continues with \"gaulded\" rash to back and prior heel ulcers.    ENDO:  No syncope, near or diaphoretic sweaty spells.  BS Ok since here.  HEENT: No head injury or headache.  No vision change.   No significant hearing loss. Ears without pain/drainage.  No sore throat.  No significant nasal/sinus congestion/drainage. No epistaxis.  CHEST: No chest wall tenderness or mass. No significant cough, wheeze, SOB; no hemoptysis.  CV: No chest pain, palpitations, significant ankle edema; some hand/feet edema pitting.   GI: No heartburn, dysphagia.  No abdominal pain, diarrhea, constipation, rectal bleeding, or melena;  incontinent stool/prior voiding.   :  Voids usually with incontinence; came with garcia from nursing home.   ORTHO: No painful/swollen joints but various on /off sore.  Usually has sore neck or back.  No acute neck or back pain without recent injury.   NEURO: No dizziness, weakness of extremities.  Usually has LE numbness/paresthesias.   PSYCH: Prior memory loss.  Mood always variable; often anxious, depressed but/and not suicidal though has had prior overdose.  Tolerates stress variably.      Objective     Vital Signs  /64 (BP Location: Right arm, Patient Position: Lying)   Pulse 94   Temp 97.8 °F (36.6 °C) (Oral)   Resp 18   Ht 182.9 cm (72.01\")   Wt 85.8 kg (189 lb 3.2 oz)   LMP  (LMP Unknown)   SpO2 100%   BMI 25.65 " kg/m²    Temp:  [97 °F (36.1 °C)-97.8 °F (36.6 °C)] 97.8 °F (36.6 °C)  Heart Rate:  [94-98] 94  Resp:  [16-18] 18  BP: (108-117)/(64-76) 111/64      Intake/Output Summary (Last 24 hours) at 9/9/2019 0816  Last data filed at 9/9/2019 0250  Gross per 24 hour   Intake 1733 ml   Output 725 ml   Net 1008 ml       Lab Results:  Lab Results (last 24 hours)     Procedure Component Value Units Date/Time    Urine Culture - Urine, Urine, Clean Catch [[id number]]  (Abnormal)  (Susceptibility) Collected:  09/06/19 1844    Specimen:  Urine, Clean Catch Updated:  09/09/19 0726     Urine Culture >100,000 CFU/mL Klebsiella pneumoniae ssp pneumoniae      40,000-50,000 CFU/mL Morganella morganii ssp morganii    Susceptibility      Klebsiella pneumoniae ssp pneumoniae     LOGAN     Ampicillin Resistant     Ampicillin + Sulbactam Susceptible     Cefazolin Susceptible     Cefepime Susceptible     Ceftriaxone Susceptible     Ertapenem Susceptible     ESBL Confirmation Test Negative     Gentamicin Susceptible     Levofloxacin Susceptible     Meropenem Susceptible     Nitrofurantoin Resistant     Piperacillin + Tazobactam Susceptible     Trimethoprim + Sulfamethoxazole Susceptible                Susceptibility      Morganella morganii ssp morganii     LOGAN     Ampicillin Resistant     Ampicillin + Sulbactam Resistant     Cefazolin Resistant     Cefepime Susceptible     Ceftriaxone Susceptible     Ertapenem Susceptible     Gentamicin Susceptible     Levofloxacin Susceptible     Meropenem Susceptible     Nitrofurantoin Resistant     Piperacillin + Tazobactam Susceptible     Trimethoprim + Sulfamethoxazole Susceptible                    Basic Metabolic Panel [[id number]]  (Abnormal) Collected:  09/09/19 0616    Specimen:  Blood Updated:  09/09/19 0709     Glucose 59 mg/dL      BUN 15 mg/dL      Creatinine 0.51 mg/dL      Sodium 138 mmol/L      Potassium 3.2 mmol/L      Chloride 112 mmol/L      CO2 22.0 mmol/L      Calcium 6.9 mg/dL      eGFR Non   Amer 122 mL/min/1.73      BUN/Creatinine Ratio 29.4     Anion Gap 4.0 mmol/L     Narrative:       GFR Normal >60  Chronic Kidney Disease <60  Kidney Failure <15    CBC & Differential [[id number]] Collected:  09/09/19 0616    Specimen:  Blood Updated:  09/09/19 0639    Narrative:       The following orders were created for panel order CBC & Differential.  Procedure                               Abnormality         Status                     ---------                               -----------         ------                     CBC Auto Differential[[id number]]        Abnormal            Final result                 Please view results for these tests on the individual orders.    CBC Auto Differential [[id number]]  (Abnormal) Collected:  09/09/19 0616    Specimen:  Blood Updated:  09/09/19 0639     WBC 5.99 10*3/mm3      RBC 3.31 10*6/mm3      Hemoglobin 9.7 g/dL      Hematocrit 29.0 %      MCV 87.6 fL      MCH 29.3 pg      MCHC 33.4 g/dL      RDW 17.7 %      RDW-SD 55.9 fl      MPV 10.8 fL      Platelets 302 10*3/mm3      Neutrophil % 62.5 %      Lymphocyte % 22.7 %      Monocyte % 8.3 %      Eosinophil % 5.0 %      Basophil % 0.8 %      Immature Grans % 0.7 %      Neutrophils, Absolute 3.74 10*3/mm3      Lymphocytes, Absolute 1.36 10*3/mm3      Monocytes, Absolute 0.50 10*3/mm3      Eosinophils, Absolute 0.30 10*3/mm3      Basophils, Absolute 0.05 10*3/mm3      Immature Grans, Absolute 0.04 10*3/mm3      nRBC 0.0 /100 WBC     Blood Culture - Blood, Hand, Left [[id number]] Collected:  09/06/19 1856    Specimen:  Blood from Hand, Left Updated:  09/08/19 1930     Blood Culture No growth at 2 days    Blood Culture - Blood, Hand, Left [[id number]] Collected:  09/06/19 1710    Specimen:  Blood from Hand, Left Updated:  09/08/19 1745     Blood Culture No growth at 2 days    CBC & Differential [[id number]] Collected:  09/08/19 1647    Specimen:  Blood Updated:  09/08/19 1736    Narrative:       The following orders were  created for panel order CBC & Differential.  Procedure                               Abnormality         Status                     ---------                               -----------         ------                     CBC Auto Differential[[id number]]        Abnormal            Final result                 Please view results for these tests on the individual orders.    CBC Auto Differential [[id number]]  (Abnormal) Collected:  09/08/19 1647    Specimen:  Blood Updated:  09/08/19 1736     WBC 8.57 10*3/mm3      RBC 3.35 10*6/mm3      Hemoglobin 9.8 g/dL      Hematocrit 29.4 %      MCV 87.8 fL      MCH 29.3 pg      MCHC 33.3 g/dL      RDW 17.6 %      RDW-SD 55.3 fl      MPV 11.0 fL      Platelets 324 10*3/mm3      Neutrophil % 74.4 %      Lymphocyte % 14.5 %      Monocyte % 7.5 %      Eosinophil % 2.5 %      Basophil % 0.6 %      Immature Grans % 0.5 %      Neutrophils, Absolute 6.39 10*3/mm3      Lymphocytes, Absolute 1.24 10*3/mm3      Monocytes, Absolute 0.64 10*3/mm3      Eosinophils, Absolute 0.21 10*3/mm3      Basophils, Absolute 0.05 10*3/mm3      Immature Grans, Absolute 0.04 10*3/mm3      nRBC 0.0 /100 WBC     Vancomycin, Trough [[id number]]  (Abnormal) Collected:  09/08/19 1647    Specimen:  Blood Updated:  09/08/19 1710     Vancomycin Trough 25.77 mcg/mL     Basic Metabolic Panel [[id number]]  (Abnormal) Collected:  09/08/19 0744    Specimen:  Blood Updated:  09/08/19 0841     Glucose 64 mg/dL      BUN 21 mg/dL      Creatinine 0.50 mg/dL      Sodium 136 mmol/L      Potassium 3.2 mmol/L      Chloride 110 mmol/L      CO2 24.0 mmol/L      Calcium 7.1 mg/dL      eGFR Non African Amer 125 mL/min/1.73      BUN/Creatinine Ratio 42.0     Anion Gap 2.0 mmol/L     Narrative:       GFR Normal >60  Chronic Kidney Disease <60  Kidney Failure <15          CBC:   Results from last 7 days   Lab Units 09/09/19  0616 09/08/19 1647 09/07/19  0434 09/06/19  1710   WBC 10*3/mm3 5.99 8.57 10.25 13.49*   HEMOGLOBIN g/dL 9.7* 9.8*  10.4* 11.4*   HEMATOCRIT % 29.0* 29.4* 31.8* 34.5   PLATELETS 10*3/mm3 302 324 322 302     BMP:   Results from last 7 days   Lab Units 09/09/19  0616 09/08/19  0744 09/07/19  0434 09/06/19  1710   SODIUM mmol/L 138 136 138 136   POTASSIUM mmol/L 3.2* 3.2* 4.5 4.7   CHLORIDE mmol/L 112* 110 109 104   CO2 mmol/L 22.0* 24.0 27.0 27.0   BUN mg/dL 15 21 28* 28*   CREATININE mg/dL 0.51 0.50 0.48* 0.56   EGFR IF NONAFRICN AM mL/min/1.73 122 125 131 109   GLUCOSE mg/dL 59* 64* 69* 69*   CALCIUM mg/dL 6.9* 7.1* 7.3* 7.6*   ALT (SGPT) U/L  --   --   --  33     INR:   Results from last 7 days   Lab Units 09/06/19  1710   INR  0.95       Culture Results:   Blood Culture   Date Value Ref Range Status   09/06/2019 No growth at 2 days  Preliminary   09/06/2019 No growth at 2 days  Preliminary     Urine Culture   Date Value Ref Range Status   09/06/2019 (A)  Final    >100,000 CFU/mL Klebsiella pneumoniae ssp pneumoniae   09/06/2019 (A)  Final    40,000-50,000 CFU/mL Morganella morganii ssp morganii       Radiology: None    Additional Studies Reviewed: None    Physical Exam:  GENERAL:  AFA/developed in no acute distress. Thin.   SKIN: Pale; scabbed heel ulcers and diffuse reddness back.   Mild chelosis.  HEENT: Normal cephalic without trauma.  Pupils equal round reactive to light; pupils  Dilated.  Extraocular motions full without nystagmus.    Oral cavity without growths, exudates, and moist.  Posterior pharynx without mass, obstruction, redness.  No thyromegaly, mass, tenderness, lymphadenopathy and supple.  CV: Regular rhythm.  No murmur, gallop trace LE edema. Posterior pulses intact.  No carotid bruits.   CHEST: No chest wall tenderness or mass.   LUNGS: Symmetric motion with clear to auscultation.  ABD: Soft, nontender without mass.   ORTHO: Symmetric extremities without swelling/point tenderness.  Ankles plantar  Flexed.   NEURO: CN 2-12 grossly intact.  Symmetric facies. 1/4 x bicep equal reflexes.  UE/LE   2/5 strength  throughout.  Nonfocal use extremities. Speech weak.   PSYCH: Disoriented x 2; knew me today.   Pleasant calm, well kept.  Shallow; minimal purposeful/directed conservation.     Results Review:    above    ASSESSMENT/PLAN:  Reason for admit/problems addressing acutely:  Lethargy-improved  Anorexia  Weight loss  Severe malnutrition  Hypotension-resolved  Respiratory failure-hypoxic-oxygen replaced  Abnormal urine (21-30 WBC, 4+ bacteruria with garcia)  UTI-klebsiella and Morganella/garcia  Polypharmacy-weaning  Hypopotassemia-IV affected  Edema; nutrition, IV fluids    Chronic problems to review/consider during care:  64 y/o white female   Allergy/intolerance: see above  Procedural history: see above  Family history: see above  Obesity.  This is status post gastric bypass initial failure and regain of  weight.     1 para 1 AB 0.    Surgical menopause.  Osteoporosis on previous wrist study (hip fx 3.28.19)   Hypertension.    Hypothyroidism.    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    History of DVT - left peroneal popliteal, 2008, treated              successfully with anticoagulation.   Anticoagulation needs: hx DVT, DVT risk/(coumadin) lovonex  Chronic insomnia.  Spinal spondylosis; this is primarily cervical with multiple surgeries.  Chronic neck pain.    Chronic back pain  Chronic narcotics-from pain management/[facility]  Chronic depression - followed by Dr. Samaniego.    Degenerative joint disease that is diffuse.   LE weakness  Gait difficulty-chronic   History of gastric bypass and metabolic risk it carries.    Colon cancer history with previous surgery and chemo, no obvious              recurrence.  Gastroesophageal reflux.  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)  Abnormal CT chest-treating nonsurgically  Polypharmacy   Urinary incontience (history urge incontinence)  Vitamin D def (post gastric bypass)  Vitamin B12 def (post gastric bypass)  YOLIE    Rx-reviewed, considered with changes as needed: change abx to rocephin, po KCL,    Further wean effexor, seroquel and stop flexeril  Labs reviewed, considered and changes made as needed: no changes  Imaging reviewed, and need for considered: no changes  Consults needed: none  Diet reviewed, considered and changes made as needed: no changes/need calorie count  Fluids reviewed, considered and changes made as needed: lock  Increase activity: encouraged  Code status: DNR  Discussed possible/future discharge plans: family expects back to [facility]; ? With hospice/not  Case discussed with noneone  Available to talk if needed with POA/caregiver and members care team.    Josafat Gonzales MD  09/09/19  8:16 AM          Electronically signed by Josafat Gonzales MD at 9/9/2019  8:26 AM     Josafat Gonzales MD at 9/8/2019  9:50 AM               LOS: 2 days   Patient Care Team:  Josafat Gonzales MD as PCP - General  Josafat Goznales MD as PCP - Family Medicine  [facility] (Cardiology[facility]  Pawel Ho MD as Consulting Physician (Urology)  Krystal Dorman APRN as Nurse Practitioner (Neurology)    Chief Complaint:  decline    Subjective      HISTORY OF PRESENT ILLNESS: I had a family meeting with her brother and  the day of admission.  They indicated they thought she lost about 20 pounds in the last 2 weeks and was not eating anything at the nursing home.  Through 4 days earlier I talked to the director of nurses and she indicated the patient was not improving with therapy in general and with wondered if with her history her family would want her to be hospice patient.  She does have a history of probable lung cancer (she was not well enough to do surgery she was  treated with radiation):  she has significant spinal stenosis for which her medical condition makes her nonoperable and at least for the last 6 months she has not been able to ambulate; she is either been wheelchair or bedridden.  She has a long history of depression anxiety probable seizure disorder may be even a psych chronic condition.  She is seen numerous psychiatrists and takes large doses of psychiatric as well as pain medications.  She is frequently quite lethargic and subdued.  So the decision made by the family was to bring her to the ER; there she was found to be hypotensive hypoxic (which corrected with oxygen) and we elected to admit with fluids empiric antibiotics (there was concern she might have an infiltrate in her urine had abnormalities) allow speech to evaluate use fluids slowly withdrawal some of her polypharmacy and then the next 48+ hours to see if there would be any improvement in her condition that would help define whether we need to do continue to press on or whether she should be hospice.  She is apparently discussed with her  when she was well that she would never want life support such as with a feeding tube.    Interval History: She has rested on and off.  She still not eating well.  Staff has not reported vomiting or choking.  Her IV fluids continue; she is voiding.  She does not seem to be off her baseline in any manner with anxiety depression confusion or complaints of pain (as we continue to wean many of her Rx/her polypharmacy.     .  Current Facility-Administered Medications:   •  acetaminophen (TYLENOL) tablet 650 mg, 650 mg, Oral, Q4H PRN, Josafat Gonzales MD  •  bisacodyl (DULCOLAX) suppository 10 mg, 10 mg, Rectal, Q72H PRN, Josafat Gonzales MD  •  clonazePAM (KlonoPIN) tablet 0.5 mg, 0.5 mg, Oral, TID PRN, Jsoafat Gonzales MD  •  cyclobenzaprine (FLEXERIL) tablet 5 mg, 5 mg, Oral, BID, Josafat Gonzales MD  •  DULoxetine (CYMBALTA) DR capsule 30 mg,  30 mg, Oral, Daily, Josafat Gonzales MD, 30 mg at 09/07/19 1154  •  gabapentin (NEURONTIN) capsule 300 mg, 300 mg, Oral, Q12H, Josafat Gonzales MD  •  levETIRAcetam XR (KEPPRA XR) 24 hr tablet 1,000 mg, 1,000 mg, Oral, Daily, Josafat Gonzales MD, 1,000 mg at 09/07/19 1152  •  levothyroxine (SYNTHROID, LEVOTHROID) tablet 100 mcg, 100 mcg, Oral, Q AM, Josafat Gonzales MD, 100 mcg at 09/08/19 0639  •  nystatin (MYCOSTATIN) powder, , Topical, Q8H, Josafat Gonzales MD  •  oxyCODONE (ROXICODONE) immediate release tablet 5 mg, 5 mg, Oral, Q4H PRN, Josafat Gonzales MD, 5 mg at 09/07/19 1151  •  Pharmacy to Dose Zosyn, , Does not apply, Continuous PRN, Josafat Gonzales MD  •  piperacillin-tazobactam (ZOSYN) 3.375 g in iso-osmotic dextrose 50 ml (premix), 3.375 g, Intravenous, Q8H, Josafat Gonzales MD, Last Rate: 0 mL/hr at 09/07/19 1317, 3.375 g at 09/08/19 0003  •  QUEtiapine (SEROquel) tablet 25 mg, 25 mg, Oral, Q12H, Josafat Gonzales MD  •  rivaroxaban (XARELTO) tablet 10 mg, 10 mg, Oral, Daily, Josafat Gonzales MD, 10 mg at 09/07/19 1200  •  sodium chloride 0.9 % flush 10 mL, 10 mL, Intravenous, Q12H, Josafat Gonzales MD, 10 mL at 09/07/19 2126  •  sodium chloride 0.9 % flush 10 mL, 10 mL, Intravenous, PRN, Josafat Gonzales MD  •  sodium chloride 0.9 % infusion, 100 mL/hr, Intravenous, Continuous, Josafat Gonzales MD, Last Rate: 100 mL/hr at 09/08/19 0226, 100 mL/hr at 09/08/19 0226  •  vancomycin 1500 mg/500 mL 0.9% NS IVPB ([facility]), 1,500 mg, Intravenous, Q12H, Josafat Gonzales MD, Last Rate: 0 mL/hr at 09/07/19 1317, 1,500 mg at 09/08/19 0638  •  venlafaxine XR (EFFEXOR-XR) 24 hr capsule 75 mg, 75 mg, Oral, Daily, Josafat Gonzales MD, 75 mg at 09/07/19 1155  •  Doris's amazing butt cream, , Topical, PRN, Josafat Gonzales MD    Review of Systems:   GENERAL:  Inactive/slower with limits, speed, stamina for age and pains, desire, above. Sleep is  "ok usually with Rx; on/off here.  Has pain management and psych past. No fever reported at nursing home /since here.   SKIN: continues with \"gaulded\" rash to back and prior heel ulcers.    ENDO:  No syncope, near or diaphoretic sweaty spells.  BS Ok since here.  HEENT: No head injury or headache.  No vision change.   No significant hearing loss. Ears without pain/drainage.  No sore throat.  No significant nasal/sinus congestion/drainage. No epistaxis.  CHEST: No chest wall tenderness or mass. No significant cough, wheeze, SOB; no hemoptysis.  CV: No chest pain, palpitations, significant ankle edema.  GI: No heartburn, dysphagia.  No abdominal pain, diarrhea, constipation, rectal bleeding, or melena;  incontinent stool/prior voiding.   :  Voids usually with incontinence; came with garcia from nursing home.   ORTHO: No painful/swollen joints but various on /off sore.  Usually has sore neck or back.  No acute neck or back pain without recent injury.   NEURO: No dizziness, weakness of extremities.  Usually has LE numbness/paresthesias.   PSYCH: Prior memory loss.  Mood always variable; often anxious, depressed but/and not suicidal though has had prior overdose.  Tolerates stress variably.      Objective     Vital Signs  /81 (BP Location: Left arm, Patient Position: Lying)   Pulse 99   Temp 97.8 °F (36.6 °C) (Oral)   Resp 16   Ht 182.9 cm (72.01\")   Wt 85.8 kg (189 lb 3.2 oz)   LMP  (LMP Unknown)   SpO2 99%   BMI 25.65 kg/m²    Temp:  [97.4 °F (36.3 °C)-98.7 °F (37.1 °C)] 97.8 °F (36.6 °C)  Heart Rate:  [] 99  Resp:  [16] 16  BP: (107-116)/(65-81) 115/81      Intake/Output Summary (Last 24 hours) at 9/8/2019 0951  Last data filed at 9/8/2019 0004  Gross per 24 hour   Intake 1240 ml   Output 300 ml   Net 940 ml       Lab Results:  Lab Results (last 24 hours)     Procedure Component Value Units Date/Time    Basic Metabolic Panel [[id number]]  (Abnormal) Collected:  09/08/19 0744    Specimen:  Blood " Updated:  09/08/19 0841     Glucose 64 mg/dL      BUN 21 mg/dL      Creatinine 0.50 mg/dL      Sodium 136 mmol/L      Potassium 3.2 mmol/L      Chloride 110 mmol/L      CO2 24.0 mmol/L      Calcium 7.1 mg/dL      eGFR Non African Amer 125 mL/min/1.73      BUN/Creatinine Ratio 42.0     Anion Gap 2.0 mmol/L     Narrative:       GFR Normal >60  Chronic Kidney Disease <60  Kidney Failure <15    CBC & Differential [[id number]] Updated:  09/08/19 0834    Specimen:  Blood     Narrative:       The following orders were created for panel order CBC & Differential.  Procedure                               Abnormality         Status                     ---------                               -----------         ------                     CBC Auto Differential[[id number]]                                                         Please view results for these tests on the individual orders.    CBC Auto Differential [[id number]] Updated:  09/08/19 0834    Specimen:  Blood     Urine Culture - Urine, Urine, Clean Catch [[id number]]  (Abnormal)  (Susceptibility) Collected:  09/06/19 1844    Specimen:  Urine, Clean Catch Updated:  09/08/19 0634     Urine Culture >100,000 CFU/mL Klebsiella pneumoniae ssp pneumoniae    Susceptibility      Klebsiella pneumoniae ssp pneumoniae     LOGAN (Preliminary)     Ampicillin Resistant     Ampicillin + Sulbactam Susceptible     Cefazolin Susceptible     Cefepime Susceptible     Ceftriaxone Susceptible     Ertapenem Susceptible     ESBL Confirmation Test Negative     Gentamicin Susceptible     Levofloxacin Susceptible     Meropenem Susceptible     Nitrofurantoin Resistant     Piperacillin + Tazobactam Susceptible     Trimethoprim + Sulfamethoxazole Susceptible                    Blood Culture - Blood, Hand, Left [[id number]] Collected:  09/06/19 1856    Specimen:  Blood from Hand, Left Updated:  09/07/19 1930     Blood Culture No growth at 24 hours    Blood Culture - Blood, Hand, Left [[id number]] Collected:   09/06/19 1710    Specimen:  Blood from Hand, Left Updated:  09/07/19 1745     Blood Culture No growth at 24 hours          CBC:   Results from last 7 days   Lab Units 09/07/19  0434 09/06/19  1710   WBC 10*3/mm3 10.25 13.49*   HEMOGLOBIN g/dL 10.4* 11.4*   HEMATOCRIT % 31.8* 34.5   PLATELETS 10*3/mm3 322 302     BMP:   Results from last 7 days   Lab Units 09/08/19  0744 09/07/19  0434 09/06/19  1710   SODIUM mmol/L 136 138 136   POTASSIUM mmol/L 3.2* 4.5 4.7   CHLORIDE mmol/L 110 109 104   CO2 mmol/L 24.0 27.0 27.0   BUN mg/dL 21 28* 28*   CREATININE mg/dL 0.50 0.48* 0.56   EGFR IF NONAFRICN AM mL/min/1.73 125 131 109   GLUCOSE mg/dL 64* 69* 69*   CALCIUM mg/dL 7.1* 7.3* 7.6*   ALT (SGPT) U/L  --   --  33     INR:   Results from last 7 days   Lab Units 09/06/19  1710   INR  0.95       Culture Results:   Blood Culture   Date Value Ref Range Status   09/06/2019 No growth at 24 hours  Preliminary   09/06/2019 No growth at 24 hours  Preliminary     Urine Culture   Date Value Ref Range Status   09/06/2019 (A)  Preliminary    >100,000 CFU/mL Klebsiella pneumoniae ssp pneumoniae       Radiology: None    Additional Studies Reviewed: None    Physical Exam:  GENERAL:  AFA/developed in no acute distress. Thin.   SKIN: Pale; scabbed heel ulcers and diffuse reddness back.   Mild chelosis.  HEENT: Normal cephalic without trauma.  Pupils equal round reactive to light; pupils  Dilated.  Extraocular motions full without nystagmus.    Oral cavity without growths, exudates, and moist.  Posterior pharynx without mass, obstruction, redness.  No thyromegaly, mass, tenderness, lymphadenopathy and supple.  CV: Regular rhythm.  No murmur, gallop trace LE edema. Posterior pulses intact.  No carotid bruits.   CHEST: No chest wall tenderness or mass.   LUNGS: Symmetric motion with clear to auscultation.  ABD: Soft, nontender without mass.   ORTHO: Symmetric extremities without swelling/point tenderness.  Ankles plantar  Flexed.   NEURO: CN  2-12 grossly intact.  Symmetric facies. 1/4 x bicep equal reflexes.  UE/LE   2/5 strength throughout.  Nonfocal use extremities. Speech weak.   PSYCH: Disoriented x 3.   Pleasant calm, well kept.  Shallow; minimal purposeful/directed conservation.     Results Review:    above    ASSESSMENT/PLAN:  Reason for admit/problems addressing acutely:  Lethargy-improved  Anorexia  Weight loss  Hypotension-resolved  Respiratory failure-hypoxic-oxygen replaced  Abnormal urine (21-30 WBC, 4+ bacteruria with garcia)  UTI-klebsiella/garcia  Polypharmacy-weaning  Hypopotassemia-IV affected    Chronic problems to review/consider during care:  64 y/o white female   Allergy/intolerance: see above  Procedural history: see above  Family history: see above  Obesity.  This is status post gastric bypass initial failure and regain of  weight.     1 para 1 AB 0.    Surgical menopause.  Osteoporosis on previous wrist study (hip fx 3.28.19)   Hypertension.    Hypothyroidism.    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    History of DVT - left peroneal popliteal, 2008, treated              successfully with anticoagulation.   Anticoagulation needs: hx DVT, DVT risk/(coumadin) lovonex  Chronic insomnia.  Spinal spondylosis; this is primarily cervical with multiple surgeries.  Chronic neck pain.    Chronic back pain  Chronic narcotics-from pain management/Ruxer  Chronic depression - followed by Dr. Samaniego.    Degenerative joint disease that is diffuse.   LE weakness  Gait difficulty-chronic   History of gastric bypass and metabolic risk it carries.    Colon cancer history with previous surgery and chemo, no obvious              recurrence.  Gastroesophageal reflux.  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)  Abnormal CT chest-treating nonsurgically  Polypharmacy   Urinary incontience (history urge incontinence)  Vitamin D def (post gastric bypass)  Vitamin B12 def (post gastric bypass)  YOLIE       Rx-reviewed, considered with changes as needed: continue slow wean  Labs reviewed, considered and changes made as needed: no changes  Imaging reviewed, and need for considered: no changes  Consults needed: none  Diet reviewed, considered and changes made as needed: no changes/encouraged  Fluids reviewed, considered and changes made as needed: NS to NS/with KCL  Increase activity: if/as able  Code status: DNR/limited  Discussed possible/future discharge plans: patient expects back to [facility]; regular or with    hospice  Case discussed with /brother at admit; with  yesterday  Available to talk if needed with POA/caregiver and members care team.    Josafat Gonzales MD  09/08/19  9:51 AM          Electronically signed by Josafat Gonzales MD at 9/8/2019  9:58 AM     Josafat Gonzales MD at 9/7/2019 11:39 AM          Malnutrition Severity Assessment    Patient Name:  Bita Croft  YOB: 1955  MRN: [id number]  Admit Date:  9/6/2019    Patient meets criteria for : Severe Malnutrition(secondary signs: weakness, dry mucous membranes, slow skin elasticity, multiple non-healing wounds, FTT, chacha score 12)    Comments:  Please attest this note if you agree with this assessment.    Malnutrition Severity Assessment  Malnutrition Type: Chronic Disease - Related Malnutrition     Malnutrition Type (last 8 hours)      Malnutrition Severity Assessment     Row Name 09/07/19 1134       Malnutrition Severity Assessment    Malnutrition Type  Chronic Disease - Related Malnutrition    Row Name 09/07/19 1134       Unintentional Weight Loss     Unintentional Weight Loss   Weight loss greater than 10% in six months    Row Name 09/07/19 1134       Muscle Loss    Loss of Muscle Mass Findings  Moderate     Temple Region  Moderate - slight depression    Clavicle Bone Region  Moderate - some protrusion in females, visible in males    Acromion Bone Region  Moderate - acromion may slightly protrude    Row Name 09/07/19 1134       Fat Loss    Subcutaneous Fat Loss Findings  Severe    Orbital Region   Severe - pronounced hollowness/depression, dark circles, loose saggy skin    Upper Arm Region  -- some loose skin    Row Name 09/07/19 1134       Fluid Accumulation (Edema)    Fluid Acumulation Findings  Moderate    Fluid Accumulation   Moderate equals 2+ pitting edema    Row Name 09/07/19 1134       Criteria Met (Must meet criteria for severity in at least 2 of these categories: M Wasting, Fat Loss, Fluid, Secondary Signs, Wt. Status, Intake)    Patient meets criteria for   Severe Malnutrition secondary signs: weakness, dry mucous membranes, slow skin elasticity, multiple non-healing wounds, FTT, chacha score 12          Electronically signed by:  Yoly Partida RDN, YEFRI  09/07/19 11:40 AM    Electronically signed by Josafat Gonzales MD at 9/7/2019  8:59 PM

## 2019-09-09 NOTE — PLAN OF CARE
Problem: Patient Care Overview  Goal: Plan of Care Review  Outcome: Ongoing (interventions implemented as appropriate)   09/09/19 0844   Coping/Psychosocial   Plan of Care Reviewed With patient;spouse   Plan of Care Review   Progress no change   OTHER   Outcome Summary Attempted OT eval. Pt too lethargic to fully participate. Spoke w/ spouse who is considering pursuing hospice and has requested that PT/OT sign off. Informed spouse that therapy can be reconsulted PRN. OT to sign off at this time.

## 2019-09-09 NOTE — PLAN OF CARE
Problem: Patient Care Overview  Goal: Plan of Care Review  Outcome: Ongoing (interventions implemented as appropriate)   09/09/19 8339   Coping/Psychosocial   Plan of Care Reviewed With patient   Plan of Care Review   Progress declining   OTHER   Outcome Summary Wound care consulted. Dr Marx evaluated patient. See new wound care orders. Dolphin bed ordered. IV abx administered. Safety maintained. Will continue to monitor.        Problem: Fall Risk (Adult)  Goal: Absence of Fall  Outcome: Ongoing (interventions implemented as appropriate)      Problem: Pneumonia (Adult)  Goal: Signs and Symptoms of Listed Potential Problems Will be Absent, Minimized or Managed (Pneumonia)  Outcome: Ongoing (interventions implemented as appropriate)      Problem: Nutrition, Imbalanced: Inadequate Oral Intake (Adult)  Goal: Improved Oral Intake  Outcome: Ongoing (interventions implemented as appropriate)    Goal: Prevent Further Weight Loss  Outcome: Ongoing (interventions implemented as appropriate)

## 2019-09-09 NOTE — PLAN OF CARE
Problem: Patient Care Overview  Goal: Plan of Care Review   09/09/19 0815 09/09/19 0941   Coping/Psychosocial   Plan of Care Reviewed With --  spouse   Plan of Care Review   Progress --  declining   OTHER   Outcome Summary Attempted OT eval. Pt too lethargic to fully participate. Spoke w/ spouse who is considering pursuing hospice and has requested that PT/OT sign off. Informed spouse that therapy can be reconsulted PRN. OT to sign off at this time.  --      PT is also signing off per family request.

## 2019-09-09 NOTE — PROGRESS NOTES
"Pharmacy Dosing Service  Pharmacokinetics  Vancomycin Follow-up Evaluation    Assessment/Action/Plan:  Vancomycin trough supratherapeutic (= 25.77 approx 10 hrs post dose), renal function stable.  Tonight's dose has already been given, will decrease vancomycin to 1000 mg Q12H starting tomr (9/9 @1400).  Pharmacy will continue to monitor renal function and adjust dose accordingly.     Subjective:  Bita Croft is a 63 y.o. female currently on Vancomycin 1500 mg IV every 12 hours for the treatment of Pna, day 3 of therapy (Current vancomycin end date: 9/10/19).    Objective:  Ht: 182.9 cm (72.01\"); Wt: 85.8 kg (189 lb 3.2 oz)  Estimated Creatinine Clearance: 156 mL/min (by C-G formula based on SCr of 0.5 mg/dL).   Lab Results   Component Value Date    CREATININE 0.50 09/08/2019    CREATININE 0.48 (L) 09/07/2019    CREATININE 0.56 09/06/2019    CREATININE 0.90 10/08/2018      Lab Results   Component Value Date    WBC 8.57 09/08/2019    WBC 10.25 09/07/2019    WBC 13.49 (H) 09/06/2019         Lab Results   Component Value Date    VANCOTROUGH 25.77 (H) 09/08/2019       Culture Results:  Microbiology Results (last 10 days)       Procedure Component Value - Date/Time    Blood Culture - Blood, Hand, Left [[id number]] Collected:  09/06/19 1856    Lab Status:  Preliminary result Specimen:  Blood from Hand, Left Updated:  09/08/19 1930     Blood Culture No growth at 2 days    Urine Culture - Urine, Urine, Clean Catch [[id number]]  (Abnormal)  (Susceptibility) Collected:  09/06/19 1844    Lab Status:  Preliminary result Specimen:  Urine, Clean Catch Updated:  09/08/19 0634     Urine Culture >100,000 CFU/mL Klebsiella pneumoniae ssp pneumoniae    Susceptibility        Klebsiella pneumoniae ssp pneumoniae     LOGAN (Preliminary)     Ampicillin Resistant     Ampicillin + Sulbactam Susceptible     Cefazolin Susceptible     Cefepime Susceptible     Ceftriaxone Susceptible     Ertapenem Susceptible     ESBL Confirmation Test Negative "     Gentamicin Susceptible     Levofloxacin Susceptible     Meropenem Susceptible     Nitrofurantoin Resistant     Piperacillin + Tazobactam Susceptible     Trimethoprim + Sulfamethoxazole Susceptible                      Blood Culture - Blood, Hand, Left [[id number]] Collected:  09/06/19 1710    Lab Status:  Preliminary result Specimen:  Blood from Hand, Left Updated:  09/08/19 4485     Blood Culture No growth at 2 days            Radha Mittal, Chanda   09/08/19 9:32 PM

## 2019-09-09 NOTE — PLAN OF CARE
Problem: Skin Injury Risk (Adult)  Goal: Skin Health and Integrity  Outcome: Ongoing (interventions implemented as appropriate)      Problem: Patient Care Overview  Goal: Plan of Care Review  Outcome: Ongoing (interventions implemented as appropriate)   09/09/19 0556   Coping/Psychosocial   Plan of Care Reviewed With patient   OTHER   Outcome Summary Vanco trough drawn on previous shift, high. Next dose to be adjusted by pharmacy. Stable on RA with saturations in the mid to upper 90's. Alert but orientated to person only. IV fluids with Potassium infusing. Turn Q 2 hours. Campbell present upon admission remains in place. Buttocks excoriation seems less red. Medicated cream applied. Dressing in place to sacral pressure injuries. Medicated x 1 this shift for C/O back pain. Safety maintained.       Problem: Fall Risk (Adult)  Goal: Absence of Fall  Outcome: Ongoing (interventions implemented as appropriate)      Problem: Pneumonia (Adult)  Goal: Signs and Symptoms of Listed Potential Problems Will be Absent, Minimized or Managed (Pneumonia)  Outcome: Ongoing (interventions implemented as appropriate)      Problem: Nutrition, Imbalanced: Inadequate Oral Intake (Adult)  Goal: Improved Oral Intake  Outcome: Ongoing (interventions implemented as appropriate)    Goal: Prevent Further Weight Loss  Outcome: Ongoing (interventions implemented as appropriate)

## 2019-09-09 NOTE — CONSULTS
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 She could not tell me, no family is present, the medical chart does not disclose and the nursing staff does not know. For some reason, she has not walked for approximately 6 months, has become weak and stiff, and apparently malnourished. The only explanation she could come up with was a \"string of bad news\" and the [employer] are on a losing streak. She was transported to [facility] by EMS because of weakness, lethargy and mental status change.    PMH, Surg Hx,  Soc Hx, and ROS reviewed. The patient is a very poor historian and is unable to answer most questions.    Exam: large frame late middle age lady who is awake but not alert, she is slow and weak and stiff. Her speech is slurred, slow, and weak. Her teeth are coated with plaque and slime. Face reveals nasal erythema, orbital and temporal fat atrophy. Hair texture suggests possible hypothyroidism. She has recent superficial abrasions on lateral upper portion of right leg. There is a 2cm diameter pressure ulcer extending through the skin into the deeper tissue of the posterior right heel. She is in protecting booties for both feet. There is a small area of Candidal intertrigo in the left medial inframammary crease being treated with Nystatin powder. Her entire gluteal and upper posterior thigh skin is erythematous with obvious Candidal moisture associated diaper rash partially covered by Doris's Magic Butt cream. There are parallel 2cm vertically oriented skin tears on each side of the midline at the upper intergluteal crease level.    Impression: 1) Depression with mental status change, loss of interest, lethargy            2) Generalized stiffness, weakness, and disuse atrophy            3) Candidal moisture associated dermatitis (intertrigo and diaper rash)           "  4) Stage III pressure ulcer posterior right heel.    Plan: Dolphin mattress set at 3 dots. Turn side to side q 2 hrs. Allow back side air exposure; keep dry, change promptly when soiled or wet, use only ONE chux beneath her rash area. Stay in booties.  Doris's Magic Butt cream to Candidal dermatitis TID and prn to keep visibly medicated.  Avoid traction on buttocks when cleaning bottom and turning.Plurogel/adaptic (single layer) and small bandaid or Optifoam to right heel ulcer; change daily.    Thank you for asking me to see Ms Croft.      Sergio Marx MD, FACS

## 2019-09-09 NOTE — THERAPY DISCHARGE NOTE
Acute Care - Physical Therapy Initial Eval/Discharge  [facility]     Patient Name: Bita Croft  : 1955  MRN: [id number]  Today's Date: 2019   Onset of Illness/Injury or Date of Surgery: (P) 19     Referring Physician: MAX) Dr. Gonzales      Admit Date: 2019    Visit Dx:    ICD-10-CM ICD-9-CM   1. Pneumonia due to infectious organism, unspecified laterality, unspecified part of lung J18.9 136.9     484.8   2. Failure to thrive in adult R62.7 783.7   3. Dysphagia, unspecified type R13.10 787.20     Patient Active Problem List   Diagnosis   • Confusion   • Depression   • Anxiety   • Hyponatremia-often polydyspia involved   • Seizure disorder (CMS/HCC)   • Chronic neck pain   • Chronic back pain-Ruxer   • Hypothyroidism   • Abnormal thyroid blood test   • Hypertension   • Wellness examination-done   • Elevated fasting glucose   • Anemia   • Iron deficiency   • Degenerative joint disease of spine   • Conversion disorder with abnormal movement   • Reflux laryngitis   • Gait difficulty   • Weakness of both legs   • Sore throat   • Polydipsia   • Nocturia   • Frequency of urination   • Urge incontinence   • Laboratory test*   • Chronic narcotic use-Ruxer   • Abnormal x-ray   • Noncompliance   • Lung nodule, solitary   • Non-smoker   • Hx of colon cancer, stage I   • Altered mental status   • Anticoagulated: DVT/(?)   • History of radiation therapy   • Closed right hip fracture, initial encounter (CMS/HCC)   • Malignant neoplasm of upper lobe of right lung (CMS/HCC)   • Skin ulcer (CMS/HCC)   • Pneumonia due to infectious organism     Past Medical History:   Diagnosis Date   • Asthma    • Cancer (CMS/HCC)     colon cancer   • Depression    • Disease of thyroid gland    • Hypertension    • Insomnia    • Seizure (CMS/HCC)    • Sleep apnea      Past Surgical History:   Procedure Laterality Date   • BACK SURGERY     • COLON SURGERY     • GASTRIC BYPASS     • HIP HEMIARTHROPLASTY Right 3/29/2019     Procedure: HIP HEMIARTHROPLASTY;  Surgeon: Mehul Loaiza MD;  Location:  [facility] OR;  Service: Orthopedics   • HYSTERECTOMY     • KNEE ARTHROSCOPY      bilateral   • NECK SURGERY     • ORIF WRIST FRACTURE Right 6/9/2017    Procedure: WRIST OPEN REDUCTION INTERNAL FIXATION, RIGHT;  Surgeon: Alec Cardoza MD;  Location:  [facility] OR;  Service:    • TONSILLECTOMY     • WRIST FRACTURE SURGERY            PT ASSESSMENT (last 12 hours)      Physical Therapy Evaluation     Row Name 09/09/19 0900          PT Evaluation Time/Intention    Evaluation Not Performed  other (see comments)  -HR     Comment: Evaluation Not Performed  Family is pursuing hospice for patient and does not want PT or OT due to her poor medical condition and poor prognosis.   -HR     Row Name             Wound 09/06/19 2213 Right heel Pressure Injury    Wound - Properties Group Date first assessed: 09/06/19  -SC Time first assessed: 2213  -SC Present on Hospital Admission: Y  -SC Side: Right  -SC Location: heel  -SC Primary Wound Type: Pressure inj  -SC Stage, Pressure Injury: unstageable  -SC    Row Name             Wound 09/06/19 2213 Right breast Other (comment)    Wound - Properties Group Date first assessed: 09/06/19  -SC Time first assessed: 2213  -SC Present on Hospital Admission: Y  -SC Side: Right  -SC Location: breast  -SC Primary Wound Type: Other  -SC, excoriation  Stage, Pressure Injury: Stage 1  -SC    Row Name             Wound 09/06/19 2213 medial sacral spine Pressure Injury    Wound - Properties Group Date first assessed: 09/06/19  -SC Time first assessed: 2213  -SC Present on Hospital Admission: Y  -SC Orientation: medial  -SC Location: sacral spine  -SC Primary Wound Type: Pressure inj  -SC Stage, Pressure Injury: Stage 4;other (see comments)  -SC, tunneling present  Additional Comments: 1 1/2 x 3/4 - 1/2 deep  -SC    Row Name             Wound 09/06/19 2213 medial;lower;other (see comments) sacral spine Pressure Injury    Wound -  Properties Group Date first assessed: 09/06/19  -SC Time first assessed: 2213  -SC Present on Hospital Admission: Y  -SC Orientation: medial;lower;other (see comments)  -SC, below stage 4 pressure injury  Location: sacral spine  -SC Primary Wound Type: Pressure inj  -SC Stage, Pressure Injury: Stage 2  -SC Additional Comments: 1 x 1/2   -SC    Row Name             Wound 07/30/19 1620 Right lower ankle abrasion    Wound - Properties Group Date first assessed: 07/30/19  -AO Time first assessed: 1620  -AO Side: Right  -AO Orientation: lower  -AO Location: ankle  -AO Present On Admission : yes  -AO Type: abrasion  -AO    Row Name             Wound 07/30/19 1615 Bilateral coccyx pressure injury    Wound - Properties Group Date first assessed: 07/30/19  -AO Time first assessed: 1615  -AO Side: Bilateral  -AO Location: coccyx  -AO Present On Admission : yes;picture taken  -AO Type: pressure injury  -AO    Row Name             Wound 07/30/19 1615 Right heel pressure injury    Wound - Properties Group Date first assessed: 07/30/19  -AO Time first assessed: 1615  -AO Side: Right  -AO Location: heel  -AO Stage, Pressure Injury: deep tissue injury  -AO Present On Admission : yes;picture taken  -AO Type: pressure injury  -AO    Row Name             Wound 07/30/19 1615 Right lower calf abrasion    Wound - Properties Group Date first assessed: 07/30/19  -AO Time first assessed: 1615  -AO Side: Right  -AO Orientation: lower  -AO Location: calf  -AO Present On Admission : yes;picture taken  -AO Type: abrasion  -AO    Row Name             Wound 09/06/19 2213 Bilateral posterior other (see notes) MASD (Moisutre associated skin damage)    Wound - Properties Group Date first assessed: 09/06/19  -SC Time first assessed: 2213  -SC Present on Hospital Admission: Y  -SC Side: Bilateral  -SC Orientation: posterior  -SC Location: other (see notes)  -SC, gluteal and coccyx  Primary Wound Type: MASD  -SC Stage, Pressure Injury: Stage 1  -SC     Row Name 09/09/19 0900          Physical Therapy Discharge Summary    Additional Documentation  Discharge Summary, PT Eval (Group)  -HR     Row Name 09/09/19 0900          Discharge Summary, PT Eval    Reason for Discharge (PT Discharge Summary)  patient/family request discontinuation of services  -HR       User Key  (r) = Recorded By, (t) = Taken By, (c) = Cosigned By    Initials Name Provider Type    HR Lakisha Boyce, PT, DPT, SONDRA Physical Therapist    Kenzie Beckham, RN Registered Nurse    Tala Livingston RN Registered Nurse              PT Recommendation and Plan     Outcome Summary/Treatment Plan (PT)  Reason for Discharge (PT Discharge Summary): patient/family request discontinuation of services  Plan of Care Reviewed With: spouse  Progress: declining         Time Calculation:                   Lakisha Boyce, PT, DPT, SONDRA  9/9/2019

## 2019-09-10 NOTE — PLAN OF CARE
Problem: Skin Injury Risk (Adult)  Goal: Skin Health and Integrity  Outcome: Ongoing (interventions implemented as appropriate)   09/10/19 0442   Skin Injury Risk (Adult)   Skin Health and Integrity making progress toward outcome       Problem: Patient Care Overview  Goal: Plan of Care Review  Outcome: Ongoing (interventions implemented as appropriate)   09/10/19 0442   Coping/Psychosocial   Plan of Care Reviewed With patient   Plan of Care Review   Progress declining   OTHER   Outcome Summary Rested quietly this shift. Wound care ongoing. Repositioned q2 hrs.   Tolerating po. BM x1 this shift. F/C continues. Safety maintained. Call light within reach. Will continue to monitor.     Goal: Individualization and Mutuality  Outcome: Ongoing (interventions implemented as appropriate)   09/10/19 0442   Individualization   Patient Specific Goals (Include Timeframe) Wound healing.   Patient Specific Interventions Doris's butt cream. Reposition q2hrs.     Goal: Interprofessional Rounds/Family Conf  Outcome: Ongoing (interventions implemented as appropriate)   09/10/19 0442   Interdisciplinary Rounds/Family Conf   Participants nursing;patient       Problem: Fall Risk (Adult)  Goal: Absence of Fall  Outcome: Ongoing (interventions implemented as appropriate)   09/10/19 0442   Fall Risk (Adult)   Absence of Fall making progress toward outcome       Problem: Pneumonia (Adult)  Goal: Signs and Symptoms of Listed Potential Problems Will be Absent, Minimized or Managed (Pneumonia)  Outcome: Ongoing (interventions implemented as appropriate)   09/10/19 0442   Goal/Outcome Evaluation   Problems Assessed (Pneumonia) all   Problems Present (Pneumonia) infection progression       Problem: Nutrition, Imbalanced: Inadequate Oral Intake (Adult)  Goal: Improved Oral Intake  Outcome: Ongoing (interventions implemented as appropriate)   09/10/19 0442   Nutrition, Imbalanced: Inadequate Oral Intake (Adult)   Improved Oral Intake making  progress toward outcome     Goal: Prevent Further Weight Loss  Outcome: Ongoing (interventions implemented as appropriate)   09/10/19 0442   Nutrition, Imbalanced: Inadequate Oral Intake (Adult)   Prevent Further Weight Loss making progress toward outcome

## 2019-09-10 NOTE — PLAN OF CARE
Problem: Skin Injury Risk (Adult)  Goal: Skin Health and Integrity  Outcome: Ongoing (interventions implemented as appropriate)      Problem: Patient Care Overview  Goal: Plan of Care Review  Outcome: Ongoing (interventions implemented as appropriate)   09/10/19 0442 09/10/19 1140   Coping/Psychosocial   Plan of Care Reviewed With patient --    Plan of Care Review   Progress declining --    OTHER   Outcome Summary --  skin on pt back side look much improved from when I saw it on Saturday and Sunday. will continue to monitor and reposition q 2 hours and change pads frequently.       Problem: Fall Risk (Adult)  Goal: Absence of Fall  Outcome: Ongoing (interventions implemented as appropriate)      Problem: Pneumonia (Adult)  Goal: Signs and Symptoms of Listed Potential Problems Will be Absent, Minimized or Managed (Pneumonia)  Outcome: Ongoing (interventions implemented as appropriate)      Problem: Nutrition, Imbalanced: Inadequate Oral Intake (Adult)  Goal: Improved Oral Intake  Outcome: Ongoing (interventions implemented as appropriate)    Goal: Prevent Further Weight Loss  Outcome: Ongoing (interventions implemented as appropriate)

## 2019-09-10 NOTE — NURSING NOTE
WOCN Note      Patient: Bita Croft  MRN: [id number] : 1955         Problem List:   Patient Active Problem List    Diagnosis   • Pneumonia due to infectious organism [J18.9]   • Skin ulcer (CMS/HCC) [L98.499]   • Malignant neoplasm of upper lobe of right lung (CMS/HCC) [C34.11]   • Closed right hip fracture, initial encounter (CMS/[facility]) [S72.001A]   • Anticoagulated: DVT/(?) [Z79.01]   • History of radiation therapy [Z92.3]   • Altered mental status [R41.82]   • Non-smoker [Z78.9]   • Hx of colon cancer, stage I [Z85.038]   • Lung nodule, solitary [R91.1]   • Abnormal x-ray [R93.89]   • Noncompliance [Z91.19]   • Chronic narcotic use-Ruxer [F11.90]   • Laboratory test* [Z01.89]   • Nocturia [R35.1]   • Frequency of urination [R35.0]   • Urge incontinence [N39.41]   • Polydipsia [R63.1]   • Reflux laryngitis [J04.0, K21.9]   • Gait difficulty [R26.9]   • Weakness of both legs [R29.898]   • Sore throat [J02.9]   • Conversion disorder with abnormal movement [F44.4]   • Wellness examination-done [Z00.00]   • Elevated fasting glucose [R73.01]   • Anemia [D64.9]   • Iron deficiency [E61.1]   • Degenerative joint disease of spine [M47.9]   • Hypertension [I10]   • Abnormal thyroid blood test [R79.89]   • Confusion [R41.0]   • Depression [F32.9]   • Anxiety [F41.9]   • Hyponatremia-often polydyspia involved [E87.1]   • Seizure disorder (CMS/HCC) [G40.909]   • Chronic neck pain [M54.2, G89.29]   • Chronic back pain-Ruxer [M54.9, G89.29]   • Hypothyroidism [E03.9]         Reason for Visit: Patient is an 63 y.o. female, being seen by WO for care of wounds.    Patient presents with multiple wounds.  She has been residing at a nursing home.  She is very lethargic and unable to give many clear answers when asked questions.      She has an unstageable pressure injury on right posterior heel.  It is covered with 100% slough.  Sacrum has two pressure injuries that are unstageable. The base is unable to be assessed on  both of these ulcers.  It appears to be undermining of the midline ulcer.  Her bilateral buttocks and posterior upper thighs appear to have a yeast infection with a bright red base.  It is a widespread rash.  She has the same red yeast rash under her left breast.      Pictures taken by nursing staff:                 Recommendations:  Suggest consult to Dr. Marx.     )Lisa Bolton RN 9/9/2019

## 2019-09-10 NOTE — PROGRESS NOTES
Continued Stay Note   [facility]     Patient Name: Bita Croft  MRN: [id number]  Today's Date: 9/10/2019    Admit Date: 9/6/2019    Discharge Plan     Row Name 09/10/19 1342       Plan    Plan  [facility]- they are working on precert    Patient/Family in Agreement with Plan  yes    Plan Comments  Spoke with Malou from [facility] and informed that Dr. Gonzales placed d/c orders. Pt needs precert so they will begin process and inform upon completion. Will follow. [phone]        Discharge Codes    No documentation.       Expected Discharge Date and Time     Expected Discharge Date Expected Discharge Time    Sep 10, 2019             VALERIE Campos

## 2019-09-10 NOTE — DISCHARGE SUMMARY
"Patient ID: Bita Croft  MRN: [id number]     Acct:  [id number]    Admit Date: 9/6/2019   Discharge Date: 9.13.19  Date of service: 9.13.19    Consults:    IP CONSULT TO NUTRITION SERVICES  IP CONSULT TO CASE MANAGEMENT   IP CONSULT TO NUTRITION SERVICES  IP CONSULT TO WOUND CARE MD    PATIENT PROFILE: The patient is a 62 y/o white  female resident of [facility]; she was cooperative.      CHIEF COMPLAINT: \"she is dying\"     HISTORY OF PRESENT ILLNESS: I had a family meeting with her brother and  the day of admission.  They indicated they thought she lost about 20 pounds in the last 2 weeks and was not eating anything at the nursing home.  Through 4 days earlier I talked to the director of nurses and she indicated the patient was not improving with therapy in general and with wondered if with her history her family would want her to be hospice patient.  She does have a history of probable lung cancer (she was not well enough to do surgery she was treated with radiation):  she has significant spinal stenosis for which her medical condition makes her nonoperable and at least for the last 6 months she has not been able to ambulate; she is either been wheelchair or bedridden.  She has a long history of depression anxiety probable seizure disorder may be even a psych chronic condition.  She is seen numerous psychiatrists and takes large doses of psychiatric as well as pain medications.  She is frequently quite lethargic and subdued.  So the decision made by the family was to bring her to the ER; there she was found to be hypotensive hypoxic (which corrected with oxygen) and we elected to admit with fluids empiric antibiotics (there was concern she might have an infiltrate in her urine had abnormalities) allow speech to evaluate use fluids slowly withdrawal some of her polypharmacy and then the next 48+ hours to see if there would be any improvement in her condition that would help define whether " we need to do continue to press on or whether she should be hospice.  She is apparently discussed with her  when she was well that she would never want life support such as with a feeding tube.     No Known Allergies     HOME MEDICATIONS:          Prior to Admission medications    Medication Sig Start Date End Date Taking? Authorizing Provider   acetaminophen (TYLENOL) 325 MG tablet Take 2 tablets by mouth Every 4 (Four) Hours As Needed for Mild Pain  or Fever. 6/7/18   Yes Josafat Gonzales MD   bisacodyl (DULCOLAX) 10 MG suppository Insert 10 mg into the rectum Every 72 (Seventy-Two) Hours As Needed for Constipation.     Yes Beverly Wells MD   clonazePAM (KlonoPIN) 1 MG tablet Take 1 tablet by mouth 3 (Three) Times a Day As Needed for Anxiety. 8/6/19   Yes Josafat Gonzales MD   collagenase 250 UNIT/GM ointment Apply  topically to the appropriate area as directed Every 12 (Twelve) Hours. 8/6/19   Yes Josafat Gonzales MD   cyclobenzaprine (FLEXERIL) 10 MG tablet Take 1 tablet by mouth 3 (Three) Times a Day. 3/14/19   Yes Josafat Gonzales MD   DULoxetine (CYMBALTA) 30 MG capsule Take 30 mg by mouth Daily.     Yes Beverly Wells MD   gabapentin (NEURONTIN) 600 MG tablet Take 1 tablet by mouth 3 (Three) Times a Day. 8/6/19   Yes Josafat Gonzales MD   levETIRAcetam XR (KEPPRA XR) 500 MG 24 hr tablet Take 2 tablets by mouth Every 12 (Twelve) Hours. 3/14/19   Yes Josafat Gonzales MD   levothyroxine (SYNTHROID, LEVOTHROID) 100 MCG tablet Take 100 mcg by mouth Daily.     Yes Beverly Wells MD   Multiple Vitamin (MULTI-VITAMIN DAILY) tablet Take 1 tablet by mouth Daily.     Yes Beverly Wells MD   nystatin (MYCOSTATIN) 153045 UNIT/GM powder Apply  topically to the appropriate area as directed 2 (Two) Times a Day. Apply to groin.     Yes Beverly Wells MD   oxyCODONE (ROXICODONE) 10 MG tablet Take 1 tablet by mouth Every 4 (Four) Hours As Needed for  Moderate Pain . 19   Yes Josafat Gonzales MD   potassium chloride (K-DUR,KLOR-CON) 20 MEQ CR tablet Take 20 mEq by mouth 3 (Three) Times a Day.     Yes Beverly Wells MD   QUEtiapine (SEROquel) 100 MG tablet Take 1 tablet by mouth 2 (Two) Times a Day. 17   Yes Josafat Gonzales MD   QUEtiapine (SEROquel) 300 MG tablet Take 300 mg by mouth Every Night.     Yes Beverly Wells MD   rivaroxaban (XARELTO) 10 MG tablet Take 10 mg by mouth Daily.     Yes Beverly Wells MD   tolterodine LA (DETROL LA) 4 MG 24 hr capsule Take 4 mg by mouth Daily.     Yes Beverly Wells MD   traZODone (DESYREL) 50 MG tablet Take 1 tablet by mouth Every Night. 18   Yes Josafat Gonzales MD   venlafaxine XR (EFFEXOR-XR) 150 MG 24 hr capsule Take 2 capsules by mouth Daily. 18   Yes Josafat Gonzales MD   vitamin C (ASCORBIC ACID) 250 MG tablet Take 250 mg by mouth 2 (Two) Times a Day.     Yes Beverly Wells MD   vitamin D (ERGOCALCIFEROL) 52819 units capsule capsule Take 50,000 Units by mouth 1 (One) Time Per Week. .     Yes Beverly Wells MD   metoprolol tartrate (LOPRESSOR) 50 MG tablet TAKE 1 TABLET BY MOUTH DAILY 19   Josafat Gonzales MD   nystatin (nystatin) 747449 UNIT/GM powder WASH AFFECTED AREA AND DRY WELL THREE TIMES DAILY. AFTER DRY APPLY NYSTATIN POWDER TWICE DAILY 19   Josafat Gonzales MD   solifenacin (VESICARE) 10 MG tablet Take 1 tablet by mouth Daily. 19   Josafat Gonzales MD         PAST HISTORY:  CHILDHOOD: unremarkable.      PROCEDURES:   SCANNED  [id number]  Colonoscopy+tort-mass/MMH//8.4.14/BE  EGD+nl/BHP/Mc/5.28.15/UGI  Colonoscopy+polyp/MM//9.21.15/3y     SURGERIES:  T&A/age 11  R knee/H Hunt/LH/age 13  SANDRA+BSO/[place]/[facility]/age 32  T12/[place]/  Gastric by-pass/[place]/  L knee/Juan/[place]/  C surgery/[place]/06  C surgery/[place]a/08  Lap  GB/[facility]/Armando/3-18-10  L arthroscope/  R lap colectomy/[place]/8.13.14   ORIF L wrist/[place]//7.28.16  R hip fx//3.29.19     FAMILY HISTORY:  HTN/m,a-m,f,gf-p,  Heart/m,f,gf-p,gm-p,gm-m  DM/u-m,  CA-colon/none  CA-other/none  CA-breast/none     HABITS:  Tobacco-smoker/never  Tobacco-2nd handed/limited  Alcohol/none  Drugs/     SOCIAL HISTORY:  /1987,  Children/1  Employment/[employer] tx-unit 1/disability  Disability/     PATIENT EDUCATION:     ADVISED/OFFERED:  Colonoscopy/8.2.10+  MRI LS spine/6.11.12     HOSPITAL ADMITS:      MMH:   11.8.08-11.12.08-hyponatremia  2.8.10-2.9.10-L flank pain  3.13.14-3.15.14 hever/TIA  12.26.17-12.27.17    Shortness of breath  Cough  Flu B  Bronchitis-acute  Abnormal CT chest-AGUILAR spiculated nodule  Chest wall pain  Hyponatremia 128  Neutropenia 2.54     5.26.18-5.31.18  Lethargy-likely 2nd overdose Imodium-resolved  Confusion-same-resolved  Brief intubation (airway protection) 26-27th  Abnormal CXR-? Pneumonia/atelectasis  Overdose Imodium; initial ? suicide jesture refuted  Depression-acute/chronic  Anxiety-acute/chronic  Hypotension-brief and decrease Rx  Cheilosis-improved empiric monistat  Gait decline-acute/chronic  Anemia-component phlebotomy, (substrate neg)  +BC/staph homnis/epidermitis (contaminet)  E coli bacteruria-likely UTI  UTI-     BH:   7.21-7.23.95 abdominal pain  2.19.08-2.21.08-DVT  2.9.10-2.11.10-abdominal pain/intercostal  3.11.10-3.19.10- hever-abdominal pain+SIADH  3.24.15-3.30.15 diarrhea  5.26.15-5.31.15 nausea/vomiting/diarrhea  7.12.17-7.17.17 accidential injection water bottle cap     3.10.19-3.14.19  Confusion-delirium (? Sepsis, ? Seizure)-resolved  Hx seizure disorder-normal EEG  Abnormal UA-(21-30WBC, mod leuk, 2+ bacteria) maybe UTI (BC neg, 10.5 strept                               Group B)  CXR-infiltrate (note lack cough, etc)-maybe pneumonia-persistant L peihilair/mid                               Lung infiltrate  Sepsis (tachycardia  "104, lactic acid 6.0)-maybe  Low phenytoin level 3.0-not taking-improved  Hypopotassemia-2.9-payulvvz-ghifvjni  Drug screen THC, opiate  Hpghanrwy-alrcrzwdi-hxhdpxhh  NPO-resolved  Cerebral atrophy-frontal  YOLIE-without treatment  Non-compliance; Rx (dilantin, keppra), CPAP  B12 def (post gastric bypass);   Vit D def (post gastric bypass)  Polypharmacy; difficult Rx list     3.28.2019 -4.1.19  R hip fracture-post surgery  Pain R hip-improving  R hip incision-healing  Gait difficulty-acute/chronic-slowly improving  Post-operative state-improving  Anticoagulation needs: hx DVT, DVT risk/(coumadin) (lovonex), xarelto  Anemia-chronic-component post op/blood loss  Arthritis-diffuse  Joint pains-multiple-chronic (worse without Rx)  Back pain-chronic (worse without Rx)  Neck pain-chronic (worse without Rx)  Anxiety-chronic (worse without Rx)  Medication error (accidentaly holding of Rx)     7.30.19-8.6.19  Wounds-back (cultures klebsiella, proteus buttock)  Cellulitis-back-(klebsiella/proteus)  Wounds-heels  Urinary incontinence  Bacteruria-pseudomonas  Abnormal urine-unlikely UTI (cultures neg)  IV abx: vancomycin (7.30.19-8.3.19), zosyn (7.30.19-8.3.19)  Fecal incontinence  Campbell catheter-needed for wound care  Lethargy-variable  Nwcvuzjjh-gzqxiycgurdxks-Ag/hyponatremia/others  Hyponatremia 127-improved  SIADH-chronic  hypokalemia  Malnutrition-severe (weight loss, low t protein, albumin)  Hypoglycemia-nutrition related  Dehydration (SG > 1.030)-resolved  Hypotension-affect lopressor, Rx, dehydration-improved  Bedrest  Diarrhea-abx related  Itching  Rash-dry skin     LH:   10.26.16-11.6.16 seizure     GENERAL:  Inactive/slower with limits, speed, stamina for age and pains, desire, above. Sleep is ok usually with Rx.  Has pain management and psych past. No fever reported at nursing home.   SKIN: continues with \"gaulded\" rash to back.   ENDO:  No syncope, near or diaphoretic sweaty spells.  BS Ok since here.  HEENT: No head " "injury or headache.  No vision change.   No significant hearing loss. Ears without pain/drainage.  No sore throat.  No significant nasal/sinus congestion/drainage. No epistaxis.  CHEST: No chest wall tenderness or mass. No significant cough, wheeze, SOB; no hemoptysis.  CV: No chest pain, palpitations, significant ankle edema.  GI: No heartburn, dysphagia.  No abdominal pain, diarrhea, constipation, rectal bleeding, or melena;  incontinent.  :  Voids with incontinence; came with garcia from nursing home.   ORTHO: No painful/swollen joints but various on /off sore.  Usually has sore neck or back.  No acute neck or back pain without recent injury.   NEURO: No dizziness, weakness of extremities.  Usually has LE numbness/paresthesias.   PSYCH: Prior memory loss.  Mood always variable; often anxious, depressed but/and not suicidal though has had prior overdose.  Tolerates stress variably.          PHYSICAL EXAMINATION:  /65 (BP Location: Right arm, Patient Position: Lying)   Pulse 110   Temp 98.7 °F (37.1 °C) (Oral)   Resp 16   Ht 182.9 cm (72.01\")   Wt 85.8 kg (189 lb 3.2 oz)   LMP  (LMP Unknown)   SpO2 100%   BMI 25.65 kg/m²      GENERAL:  AFA/developed in no acute distress. Thin.   SKIN: Pale; scabbed heel ulcers and diffuse reddness back.   Mild chelosis.  HEENT: Normal cephalic without trauma.  Pupils equal round reactive to light; pupils  Dilated.  Extraocular motions full without nystagmus.    Oral cavity without growths, exudates, and moist.  Posterior pharynx without mass, obstruction, redness.  No thyromegaly, mass, tenderness, lymphadenopathy and supple.  CV: Regular rhythm.  No murmur, gallop trace LE edema. Posterior pulses intact.  No carotid bruits.   CHEST: No chest wall tenderness or mass.   LUNGS: Symmetric motion with clear to auscultation.  ABD: Soft, nontender without mass.   ORTHO: Symmetric extremities without swelling/point tenderness.  Ankles plantar  Flexed.   NEURO: CN 2-12 " grossly intact.  Symmetric facies. 1/4 x bicep equal reflexes.  UE/LE   2/5 strength throughout.  Nonfocal use extremities. Speech weak.   PSYCH: Oriented x 2; not date.  Pleasant calm, well kept.  Shallow; minimal purposeful/directed conservation.      ASSESSMENT/PROBLEM LIST:   64 y/o white female   Allergy/intolerance: see above  Procedural history: see above  Family history: see above  Obesity.  This is status post gastric bypass initial failure and regain of  weight.     1 para 1 AB 0.    Surgical menopause.  Osteoporosis on previous wrist study (hip fx 3.28.19)   Hypertension.    Hypothyroidism.    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    History of DVT - left peroneal popliteal, 2008, treated              successfully with anticoagulation.   Anticoagulation needs: hx DVT, DVT risk/(coumadin) lovonex  Chronic insomnia.  Spinal spondylosis; this is primarily cervical with multiple surgeries.  Chronic neck pain.    Chronic back pain  Chronic narcotics-from pain management/Ruxer  Chronic depression - followed by Dr. Samnaiego.    Degenerative joint disease that is diffuse.   LE weakness  Gait difficulty-chronic   History of gastric bypass and metabolic risk it carries.    Colon cancer history with previous surgery and chemo, no obvious              recurrence.  Gastroesophageal reflux.  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  She ended up growing Klebsiella 10 to the fifth and Morganella 40-50,000 from her Campbell bag.  Her blood cultures were negative.  She never had a significant cough wheeze or other signs of anything to suggest that what was seen in the ER and chest x-ray was a pneumonia.  Medications were slowly wean; to a significant extent.  Her polypharmacy was weaned down significantly and in a manner to avoid any withdrawal of anything.  Even her Keppra was withdrawn and there was no signs of seizure activity.  Over the first day and a half she became gradually more alert; but unfortunately even at discharge she still confused.  She was seen by Dr. Marx on the ninth and made some recommendations for her various wound care.  Dietary saw her and of course diagnosed severe malnutrition; supplements and nutritional support was given and I am uncertain at this point if this made a significant degree of improvement in what she is taking in orally.  Her Campbell was maintained primarily to maintain her back from getting any worse with rash and issues.  She was able to be weaned off of oxygen.  So we have accomplished what we promised; we treated with antibiotics gave her fluid support various other skilled care support and wean her medications.  Now we remain uncertain on whether this is going to contribute to a progressive improvement or actually a return to decline.  From previous conversation I believe the family will move towards hospice if she shows further decline.    We were anticipating her leaving here on the 10th.  Insurance did not okay her for payment at the nursing home.  The family would not self-pay.  We had to cancel that discharge.  Time was then spent working on the billing issues and the day before discharge the nursing home was told that the insurance was not going to cover her  care.  The family decided to self-pay at least for short period of time.  Along with that she seemed a little more edematous and I gave IV Lasix and 2 runs of IV potassium as her potassium was trending low.  Dr. Marx also reviewed lesions on her heel and back and decided she needs some debridement and that was done late on the 12th.  It was too late then to send her to the nursing home; were attempting now to return to [facility] for skilled care and plans as before.    Labs included:     Labs 24 hr before discharge:  Lab Results (last 24 hours)     Procedure Component Value Units Date/Time    Basic Metabolic Panel [[id number]]  (Abnormal) Collected:  09/10/19 0635    Specimen:  Blood Updated:  09/10/19 0802     Glucose 66 mg/dL      BUN 13 mg/dL      Creatinine 0.50 mg/dL      Sodium 136 mmol/L      Potassium 3.1 mmol/L      Chloride 111 mmol/L      CO2 24.0 mmol/L      Calcium 7.2 mg/dL      eGFR Non African Amer 125 mL/min/1.73      BUN/Creatinine Ratio 26.0     Anion Gap 1.0 mmol/L     Narrative:       GFR Normal >60  Chronic Kidney Disease <60  Kidney Failure <15    CBC & Differential [[id number]] Collected:  09/10/19 0635    Specimen:  Blood Updated:  09/10/19 0745    Narrative:       The following orders were created for panel order CBC & Differential.  Procedure                               Abnormality         Status                     ---------                               -----------         ------                     CBC Auto Differential[[id number]]        Abnormal            Final result                 Please view results for these tests on the individual orders.    CBC Auto Differential [[id number]]  (Abnormal) Collected:  09/10/19 0635    Specimen:  Blood Updated:  09/10/19 0745     WBC 9.05 10*3/mm3      RBC 3.48 10*6/mm3      Hemoglobin 10.2 g/dL      Hematocrit 30.6 %      MCV 87.9 fL      MCH 29.3 pg      MCHC 33.3 g/dL      RDW 17.9 %      RDW-SD 56.1 fl      MPV 11.4 fL      Platelets 330  10*3/mm3      Neutrophil % 76.5 %      Lymphocyte % 13.9 %      Monocyte % 7.4 %      Eosinophil % 1.2 %      Basophil % 0.6 %      Immature Grans % 0.4 %      Neutrophils, Absolute 6.92 10*3/mm3      Lymphocytes, Absolute 1.26 10*3/mm3      Monocytes, Absolute 0.67 10*3/mm3      Eosinophils, Absolute 0.11 10*3/mm3      Basophils, Absolute 0.05 10*3/mm3      Immature Grans, Absolute 0.04 10*3/mm3      nRBC 0.0 /100 WBC     Blood Culture - Blood, Hand, Left [[id number]] Collected:  09/06/19 1856    Specimen:  Blood from Hand, Left Updated:  09/09/19 1924     Blood Culture No growth at 3 days    Blood Culture - Blood, Hand, Left [[id number]] Collected:  09/06/19 1710    Specimen:  Blood from Hand, Left Updated:  09/09/19 1745     Blood Culture No growth at 3 days          Lab Results otherwise  CBC:   Results from last 7 days   Lab Units 09/10/19  0635 09/09/19  0616 09/08/19  1647 09/07/19  0434 09/06/19  1710   WBC 10*3/mm3 9.05 5.99 8.57 10.25 13.49*   HEMOGLOBIN g/dL 10.2* 9.7* 9.8* 10.4* 11.4*   HEMATOCRIT % 30.6* 29.0* 29.4* 31.8* 34.5   PLATELETS 10*3/mm3 330 302 324 322 302     BMP:  Results from last 7 days   Lab Units 09/10/19  0635 09/09/19  0616 09/08/19  0744 09/07/19  0434 09/06/19  1710   SODIUM mmol/L 136 138 136 138 136   POTASSIUM mmol/L 3.1* 3.2* 3.2* 4.5 4.7   CHLORIDE mmol/L 111* 112* 110 109 104   CO2 mmol/L 24.0 22.0* 24.0 27.0 27.0   BUN mg/dL 13 15 21 28* 28*   CREATININE mg/dL 0.50 0.51 0.50 0.48* 0.56   GLUCOSE mg/dL 66* 59* 64* 69* 69*   CALCIUM mg/dL 7.2* 6.9* 7.1* 7.3* 7.6*   ALT (SGPT) U/L  --   --   --   --  33     A1c:Invalid input(s): A1C   A1c:@LABRCNOP(A1c:7)@    Culture Results:   Blood Culture   Date Value Ref Range Status   09/06/2019 No growth at 3 days  Preliminary   09/06/2019 No growth at 3 days  Preliminary     Urine Culture   Date Value Ref Range Status   09/06/2019 (A)  Final    >100,000 CFU/mL Klebsiella pneumoniae ssp pneumoniae   09/06/2019 (A)  Final    40,000-50,000  CFU/mL Morganella morganii ssp morganii       DISCHARGE ASSESSMENT:  Reasons for admit/problems address while here:   Lethargy-improved  confusion  Anorexia  Weight loss  Severe malnutrition-protein/caloric  Hypotension-resolved  Respiratory failure-hypoxic-oxygen replaced  Abnormal urine (21-30 WBC, 4+ bacteruria with garcia)  UTI-klebsiella and Morganella/garcia  Polypharmacy-weaning  Hypopotassemia-IV affected  Edema; nutrition, IV fluids    Chronic problems affecting stay:  See above      PLAN:   See AVS    Discharge Disposition:  Skilled Nursing Facility ([place] - External)Back to [place] [facility]    Discharge Medications:     Discharge Medications      New Medications      Instructions Start Date   cefTRIAXone 1 g injection  Commonly known as:  ROCEPHIN   1 g, Intramuscular, Every 24 Hours      rody's amazing butt cream   1 application, Topical, 3 Times Daily PRN         Changes to Medications      Instructions Start Date   clonazePAM 1 MG tablet  Commonly known as:  KlonoPIN  What changed:    · how much to take  · when to take this   0.5 mg, Oral, 2 Times Daily PRN      gabapentin 600 MG tablet  Commonly known as:  NEURONTIN  What changed:    · how much to take  · when to take this   300 mg, Oral, 2 Times Daily      levETIRAcetam  MG 24 hr tablet  Commonly known as:  KEPPRA XR  What changed:  when to take this   1,000 mg, Oral, Daily      oxyCODONE 10 MG tablet  Commonly known as:  ROXICODONE  What changed:  how much to take   5 mg, Oral, Every 4 Hours PRN      QUEtiapine 100 MG tablet  Commonly known as:  SEROquel  What changed:    · how much to take  · when to take this  · Another medication with the same name was removed. Continue taking this medication, and follow the directions you see here.   25 mg, Oral, Nightly      venlafaxine XR 37.5 MG 24 hr capsule  Commonly known as:  EFFEXOR-XR  What changed:    · medication strength  · how much to take  · when to take this   37.5 mg, Oral,  Nightly         Continue These Medications      Instructions Start Date   acetaminophen 325 MG tablet  Commonly known as:  TYLENOL   650 mg, Oral, Every 4 Hours PRN      bisacodyl 10 MG suppository  Commonly known as:  DULCOLAX   10 mg, Rectal, Every 72 Hours PRN      collagenase 250 UNIT/GM ointment   Topical, Every 12 Hours Scheduled      DULoxetine 30 MG capsule  Commonly known as:  CYMBALTA   30 mg, Oral, Daily      levothyroxine 100 MCG tablet  Commonly known as:  SYNTHROID, LEVOTHROID   100 mcg, Oral, Daily      MULTI-VITAMIN DAILY tablet   1 tablet, Oral, Daily      nystatin 220471 UNIT/GM powder  Commonly known as:  MYCOSTATIN   Topical, 2 Times Daily, Apply to groin.      potassium chloride 20 MEQ CR tablet  Commonly known as:  K-DUR,KLOR-CON   20 mEq, Oral, 3 Times Daily      vitamin C 250 MG tablet  Commonly known as:  ASCORBIC ACID   250 mg, Oral, 2 Times Daily      vitamin D 49280 units capsule capsule  Commonly known as:  ERGOCALCIFEROL   50,000 Units, Oral, Weekly, Sunday.      XARELTO 10 MG tablet  Generic drug:  rivaroxaban   10 mg, Oral, Daily         Stop These Medications    cyclobenzaprine 10 MG tablet  Commonly known as:  FLEXERIL     tolterodine LA 4 MG 24 hr capsule  Commonly known as:  DETROL LA     traZODone 50 MG tablet  Commonly known as:  DESYREL            Discharge Care Plan/Instructions:   Admit to MRN  Routine NH admit orders/procedures  NH to re/establish code status-likely DNR    Rx:   Oxygen: keep sats > 90%  See AVS/discharge summary     Diet:   Additional type: preferred  Consistency:    Solids: general     Fluids: thin  Calories 3145-6882/24 hr  Fluids 60 oz/24 hr-stay hydrated  Dietary referral for nutritional assessment, advise and follow    Activity:   Gradually increase as able  PT, OT, speech referrals only if family wants  Up with assistance only until cleared by PT   Additional requests: Dolphin mattress set at 3 dots/or alternative low pressure mattress.  Turn side to  side q 2 hrs. Allow back side air exposure; keep dry, change promptly when soiled or wet, use only ONE chux beneath her rash area. Stay in booties.  Doris's Magic Butt cream to Candidal dermatitis TID and prn to keep visibly medicated.  Avoid traction on buttocks when cleaning bottom and turning-see below    Additional instructions:  LAB one week CBC, CMP around 9.17.19  Wound care:   A. Refer to  wound care  B. In house:   The heel ulcer will continue with Plurogel daily with small cover bandage and booties to both feet.    The two parallel tears in the coccygeal area are LOOSLY packed with saline moistened 1/4 inch plain nu-Gauze currently 2 inches long in the large wound on the left to be diminished (made shorter) with each daily dressing change. The smaller right wound has a small amount of Plurogel and a half centimeter of 1/4 inch NuGauze. [place] are covered with a very small (1.3x2.0cm) Optifoam. The remaining diaper rash can then be treated with Magic Butt cream.    Continue the Magic Butt cream to diaper dermatitis TID and PRN to keep affected skin visibly medicated.  [facility] care (note garcia though risky is needed to help with healing of the back wounds)    F/u appointments:   Dr Gonzales will see on next regular NH rounds (call between if needs to be seen earlier)  If/as family reviews if they want hospice; let Dr Gonzales know    Other appointments:   No future appointments. at office    CONDITION: stable/improved    PROGNOSIS: guarded/poor    Addendum 9.10.19   She was not able to be cleared by her insurance for admission back to [facility].  I was unaware of this as I proceeded through working her through the discharge process.  Apparently her  does not plan to private pay and would even take her home before he would allow that to happen.  This is not why; I am not sure that has been can adequately provide her care.  For this reason we are stopping the discharge process making today  a progress note and will continue to work with  in the nursing home to try to get her back to the nursing home.

## 2019-09-10 NOTE — PROGRESS NOTES
Continued Stay Note   [facility]     Patient Name: Bita Croft  MRN: [id number]  Today's Date: 9/10/2019    Admit Date: 9/6/2019    Discharge Plan     Row Name 09/10/19 0903       Plan    Plan  [facility]    Patient/Family in Agreement with Plan  yes    Plan Comments  Pt will return to [facility] upon d/c as before. Will follow. [phone]. If pt goes back SNF will need precert with ins., please give update on when d/c anticipated.  Per Malou at NH, pt could return hospice with family paying privately. Will follow.         Discharge Codes    No documentation.             VALERIE Campos

## 2019-09-10 NOTE — DISCHARGE INSTRUCTIONS
Admit to MRN  Routine NH admit orders/procedures  NH to re/establish code status-likely DNR    Rx:   Oxygen: keep sats > 90%  See AVS/discharge summary     Diet:   Additional type: preferred  Consistency:    Solids: general     Fluids: thin  Calories 2471-7417/24 hr  Fluids 60 oz/24 hr-stay hydrated  Dietary referral for nutritional assessment, advise and follow    Activity:   Gradually increase as able  PT, OT, speech referrals only if family wants  Up with assistance only until cleared by PT   Additional requests: Dolphin mattress set at 3 dots/or alternative low pressure mattress.  Turn side to side q 2 hrs. Allow back side air exposure; keep dry, change promptly when soiled or wet, use only ONE chux beneath her rash area. Stay in booties.  Doris's Magic Butt cream to Candidal dermatitis TID and prn to keep visibly medicated.  Avoid traction on buttocks when cleaning bottom and turning.Plurogel/adaptic (single layer) and small bandaid or Optifoam to right heel ulcer; change daily.    Additional instructions:  LAB one week CBC, CMP around 9.17.19  Wound care resume to heels/see above back  [facility]     F/u appointments:   Dr Gonzales will see on next regular NH rounds (call between if needs to be seen earlier)  If/as family reviews if they want hospice; let Dr Gonzales know

## 2019-09-11 NOTE — THERAPY DISCHARGE NOTE
Acute Care - Speech Language Pathology   Swallow Treatment Note/Discharge   [facility]     Patient Name: Bita Croft  : 1955  MRN: [id number]  Today's Date: 2019  Onset of Illness/Injury or Date of Surgery: (P) 19     Referring Physician: MAX) Dr. Gonzales      Admit Date: 2019  SLP treatment complete. Pt completed multiple trials of regular solids with thin liquids. Adequate bolus clearance observed w/o any overt s/s of aspiration. Pt ok to continue a regular diet with thin liquids. SLP is signing off. MD to re-consult if concerns arise.  Abran Martinez, MS CCC-SLP 2019 4:12 PM    Visit Dx:      ICD-10-CM ICD-9-CM   1. Pneumonia due to infectious organism, unspecified laterality, unspecified part of lung J18.9 136.9     484.8   2. Failure to thrive in adult R62.7 783.7   3. Dysphagia, unspecified type R13.10 787.20   4. Chronic back pain, unspecified back location, unspecified back pain laterality M54.9 724.5    G89.29 338.29   5. Anxiety F41.9 300.00     Patient Active Problem List   Diagnosis   • Confusion   • Depression   • Anxiety   • Hyponatremia-often polydyspia involved   • Seizure disorder (CMS/[facility])   • Chronic neck pain   • Chronic back pain-Ruxer   • Hypothyroidism   • Abnormal thyroid blood test   • Hypertension   • Wellness examination-done   • Elevated fasting glucose   • Anemia   • Iron deficiency   • Degenerative joint disease of spine   • Conversion disorder with abnormal movement   • Reflux laryngitis   • Gait difficulty   • Weakness of both legs   • Sore throat   • Polydipsia   • Nocturia   • Frequency of urination   • Urge incontinence   • Laboratory test*   • Chronic narcotic use-Ruxer   • Abnormal x-ray   • Noncompliance   • Lung nodule, solitary   • Non-smoker   • Hx of colon cancer, stage I   • Altered mental status   • Anticoagulated: DVT/(?)   • History of radiation therapy   • Closed right hip fracture, initial encounter (CMS/[facility])   • Malignant neoplasm of upper  lobe of right lung (CMS/HCC)   • Skin ulcer (CMS/HCC)   • Pneumonia due to infectious organism       Therapy Treatment  Rehabilitation Treatment Summary     Row Name 09/11/19 1456             Treatment Time/Intention    Discipline  speech language pathologist  -CS      Document Type  therapy note (daily note)  -CS2      Subjective Information  no complaints  -CS2      Mode of Treatment  speech-language pathology  -CS2      Patient/Family Observations  No family present  -CS2      Recorded by [CS] Abran Martinez MS CCC-SLP 09/11/19 1559  [CS2] Abran Martinez MS CCC-SLP 09/11/19 1607      [facility] 09/11/19 1456             Pain Assessment    Additional Documentation  Pain Scale: FACES Pre/Post-Treatment (Group)  -CS      Recorded by [CS] bAran Martinez MS CCC-SLP 09/11/19 1607      [facility] 09/11/19 1456             Pain Scale: FACES Pre/Post-Treatment    Pain: FACES Scale, Pretreatment  0-->no hurt  -CS      Pain: FACES Scale, Post-Treatment  0-->no hurt  -CS      Recorded by [CS] Abran Martinez MS CCC-SLP 09/11/19 1607      [facility]                Wound 09/06/19 2213 Right heel Pressure Injury    Wound - Properties Group Date first assessed: 09/06/19 [SC] Time first assessed: 2213 [SC] Present on Hospital Admission: Y [SC] Side: Right [SC] Location: heel [SC] Primary Wound Type: Pressure inj [SC] Stage, Pressure Injury: unstageable [SC] Recorded by:  [SC] Kenzie Graves RN 09/07/19 0327    Row Name                Wound 09/06/19 2213 Right breast Other (comment)    Wound - Properties Group Date first assessed: 09/06/19 [SC] Time first assessed: 2213 [SC] Present on Hospital Admission: Y [SC] Side: Right [SC] Location: breast [SC] Primary Wound Type: Other [SC], excoriation  Stage, Pressure Injury: Stage 1 [SC] Recorded by:  [SC] Kenzie Graves RN 09/07/19 0402    Row Name                Wound 09/06/19 2213 medial sacral spine Pressure Injury    Wound - Properties Group Date first assessed: 09/06/19 [SC] Time  first assessed: 2213 [SC] Present on Hospital Admission: Y [SC] Orientation: medial [SC] Location: sacral spine [SC] Primary Wound Type: Pressure inj [SC] Stage, Pressure Injury: Stage 4;other (see comments) [SC], tunneling present  Additional Comments: 1 1/2 x 3/4 - 1/2 deep [SC] Recorded by:  [SC] Kenzie Graves RN 09/07/19 0414    Row Name                Wound 09/06/19 2213 medial;lower;other (see comments) sacral spine Pressure Injury    Wound - Properties Group Date first assessed: 09/06/19 [SC] Time first assessed: 2213 [SC] Present on Hospital Admission: Y [SC] Orientation: medial;lower;other (see comments) [SC], below stage 4 pressure injury  Location: sacral spine [SC] Primary Wound Type: Pressure inj [SC] Stage, Pressure Injury: Stage 2 [SC] Additional Comments: 1 x 1/2  [SC] Recorded by:  [SC] Kenzie Graves RN 09/07/19 0416    Row Name                Wound 09/06/19 2213 Bilateral posterior other (see notes) MASD (Moisutre associated skin damage)    Wound - Properties Group Date first assessed: 09/06/19 [SC] Time first assessed: 2213 [SC] Present on Hospital Admission: Y [SC] Side: Bilateral [SC] Orientation: posterior [SC] Location: other (see notes) [SC], gluteal and coccyx  Primary Wound Type: MASD [SC] Stage, Pressure Injury: Stage 1 [SC] Recorded by:  [SC] Kenzie Graves RN 09/07/19 0437    Row Name 09/11/19 1456             Outcome Summary/Treatment Plan (SLP)    Daily Summary of Progress (SLP)  progress toward functional goals is good  -CS      Barriers to Overall Progress (SLP)  n/a  -CS      Plan for Continued Treatment (SLP)  D/C from SLP services  -CS      Anticipated Dischage Disposition  skilled nursing facility  -CS      Recorded by [CS] Abran Martinez MS CCC-SLP 09/11/19 9282        User Key  (r) = Recorded By, (t) = Taken By, (c) = Cosigned By    Initials Name Effective Dates Discipline    SC Kenzie Graves RN 08/02/16 -  Nurse    Abran Barrera MS CCC-SLP 04/03/18 -  SLP         Outcome Summary  Outcome Summary/Treatment Plan (SLP)  Daily Summary of Progress (SLP): progress toward functional goals is good (09/11/19 1456 : Abran Martinez MS CCC-SLP)  Barriers to Overall Progress (SLP): n/a (09/11/19 1456 : Abran Martinez MS CCC-SLP)  Plan for Continued Treatment (SLP): D/C from SLP services (09/11/19 1456 : Abran Martinez MS CCC-SLP)  Anticipated Dischage Disposition: skilled nursing facility (09/11/19 1611 : Abran Martinez, MS CCC-SLP)  Reason for Discharge: all goals and outcomes met, no further needs identified (09/11/19 1611 : Abran Martinez MS CCC-SLP)  SLP GOALS     Row Name 09/11/19 1456             Oral Nutrition/Hydration Goal 1 (SLP)    Oral Nutrition/Hydration Goal 1, SLP  Pt will tolerate LRD without overt s/s of aspiration.  -CS      Time Frame (Oral Nutrition/Hydration Goal 1, SLP)  by discharge  -CS      Barriers (Oral Nutrition/Hydration Goal 1, SLP)  n/a  -CS      Progress/Outcomes (Oral Nutrition/Hydration Goal 1, SLP)  goal met  -CS        User Key  (r) = Recorded By, (t) = Taken By, (c) = Cosigned By    Initials Name Provider Type    CS Abran Martinez MS CCC-SLP Speech and Language Pathologist          EDUCATION  The patient has been educated in the following areas:   Dysphagia (Swallowing Impairment).    SLP Recommendation and Plan  Daily Summary of Progress (SLP): progress toward functional goals is good  Barriers to Overall Progress (SLP): n/a  Plan for Continued Treatment (SLP): D/C from SLP services  Anticipated Dischage Disposition: skilled nursing facility        Reason for Discharge: all goals and outcomes met, no further needs identified           Time Calculation:   Time Calculation- SLP     Row Name 09/11/19 1610             Time Calculation- SLP    SLP Start Time  1456  -CS      SLP Stop Time  1510  -CS      SLP Time Calculation (min)  14 min  -CS      SLP Received On  09/11/19  -CS        User Key  (r) = Recorded By, (t) = Taken By, (c) = Cosigned By     Initials Name Provider Type     Abran Martinez MS CCC-SLP Speech and Language Pathologist          Therapy Charges for Today     Code Description Service Date Service Provider Modifiers Qty    [id number] HC ST TREATMENT SWALLOW 1 9/11/2019 Abran Martinez MS CCC-SLP GN 1               SLP Discharge Summary  Anticipated Dischage Disposition: skilled nursing facility  Reason for Discharge: all goals and outcomes met, no further needs identified  Progress Toward Achieving Short/long Term Goals: all goals met within established timelines  Discharge Destination: other (see comments)(Pt remains in acute care.)    Abran Martinez MS CCC-SLP  9/11/2019

## 2019-09-11 NOTE — NURSING NOTE
Spoke with Dr. Marx about the possibility of placing a wound vac. To the wound on her coccyx to improve healing per Dr. Gonzales's request.  He plans to see her tomorrow if she is still in house and will go from there.

## 2019-09-11 NOTE — PLAN OF CARE
Problem: Patient Care Overview  Goal: Plan of Care Review  Outcome: Ongoing (interventions implemented as appropriate)   09/11/19 6793   Coping/Psychosocial   Plan of Care Reviewed With patient   Plan of Care Review   Progress improving   OTHER   Outcome Summary SLP treatment complete. Pt completed multiple trials of regular solids with thin liquids. Adequate bolus clearance observed w/o any overt s/s of aspiration. Pt ok to continue a regular diet with thin liquids. SLP is signing off. MD to re-consult if concerns arise.

## 2019-09-11 NOTE — PLAN OF CARE
Problem: Skin Injury Risk (Adult)  Goal: Skin Health and Integrity  Outcome: Ongoing (interventions implemented as appropriate)      Problem: Patient Care Overview  Goal: Plan of Care Review  Outcome: Ongoing (interventions implemented as appropriate)   09/11/19 0402 09/11/19 1534   Coping/Psychosocial   Plan of Care Reviewed With patient --    Plan of Care Review   Progress no change --    OTHER   Outcome Summary --  VSS. Alert and more oriented, she knew self, palce and year. Safety maintained. Skin in improving on her bottom. She did ask for the bedpan which she had not done before.        Problem: Fall Risk (Adult)  Goal: Absence of Fall  Outcome: Ongoing (interventions implemented as appropriate)      Problem: Pneumonia (Adult)  Goal: Signs and Symptoms of Listed Potential Problems Will be Absent, Minimized or Managed (Pneumonia)  Outcome: Ongoing (interventions implemented as appropriate)      Problem: Nutrition, Imbalanced: Inadequate Oral Intake (Adult)  Goal: Improved Oral Intake  Outcome: Ongoing (interventions implemented as appropriate)    Goal: Prevent Further Weight Loss  Outcome: Ongoing (interventions implemented as appropriate)

## 2019-09-11 NOTE — PLAN OF CARE
"Problem: Patient Care Overview  Goal: Plan of Care Review  Outcome: Ongoing (interventions implemented as appropriate)   09/11/19 6159   Coping/Psychosocial   Plan of Care Reviewed With patient   Plan of Care Review   Progress no change   OTHER   Outcome Summary Pt reports her appetite is \"okay\". She states that she feels she is eating pretty good however PO intake is 28% avg of 6 meals. Calorie Count completed, avg caloric intake of ~532calories daily. Pt is not adequtely meeting nutritional needs via PO intake. Pt states she is also not drinking the Boost d/t them being too \"heavy\". Offered Magic cup, pt agreed, ordered daily with dinner. Encouraged PO intake. Will continue to follow.          "

## 2019-09-11 NOTE — PLAN OF CARE
Problem: Patient Care Overview  Goal: Plan of Care Review  Outcome: Ongoing (interventions implemented as appropriate)   09/11/19 0402   Coping/Psychosocial   Plan of Care Reviewed With patient   Plan of Care Review   Progress no change   OTHER   Outcome Summary VSS, pt rested well through night. Educated on need for q2 turns but at times she still refused. Pt is pleasantly confused, safety maintained and will continue to monitor.

## 2019-09-11 NOTE — DISCHARGE PLACEMENT REQUEST
"[place] [phone]  Bita Croft (63 y.o. Female)     Date of Birth Social Security Number Address Home Phone MRN    1955  [address]   [place] [address] [phone] [id number]    Yarsanism Marital Status          Hinduism        Admission Date Admission Type Admitting Provider Attending Provider Department, Room/Bed    9/6/19 Emergency Josafat Gonzales MD Oliver, Randy Eugene, MD [facility] 4C, 491/1    Discharge Date Discharge Disposition Discharge Destination                       Attending Provider:  Josafat Gonzlaes MD    Allergies:  No Known Allergies    Isolation:  None   Infection:  None   Code Status:  No CPR    Ht:  182.9 cm (72.01\")   Wt:  85.8 kg (189 lb 3.2 oz)    Admission Cmt:  None   Principal Problem:  None                Active Insurance as of 9/6/2019     Primary Coverage     Payor Plan Insurance Group Employer/Plan Group    [employer] AETNA [id number]     Payor Plan Address Payor Plan Phone Number Payor Plan Fax Number Effective Dates    [address] [phone]  7/1/2017 - None Entered    [facility] [id number]       Subscriber Name Subscriber Birth Date Member ID       OCHOA CROFT 1955 [id number]                 Emergency Contacts      (Rel.) Home Phone Work Phone Mobile Phone    Carlito Croft (Spouse) [phone] -- --               History & Physical      Josafat Gonzales MD at 9/7/2019  9:09 PM          Patient ID: Bita Croft  MRN: [id number]     Acct:  [id number]  Admit Date: 9/6/2019   Date of service: 9.7.2019    SOURCE: The source of this information is prior knowledge of the patient, review of her office records, her current chart, as well as discussion with her brother, , prior discussion with NH personal; all are considered reliable (patient is confused/unreliable).      PATIENT PROFILE: The patient is a 62 y/o white  female resident of [facility]; she was cooperative.      CHIEF " "COMPLAINT: \"she is dying\"     HISTORY OF PRESENT ILLNESS: I had a family meeting with her brother and  the day of admission.  They indicated they thought she lost about 20 pounds in the last 2 weeks and was not eating anything at the nursing home.  Through 4 days earlier I talked to the director of nurses and she indicated the patient was not improving with therapy in general and with wondered if with her history her family would want her to be hospice patient.  She does have a history of probable lung cancer (she was not well enough to do surgery she was treated with radiation):  she has significant spinal stenosis for which her medical condition makes her nonoperable and at least for the last 6 months she has not been able to ambulate; she is either been wheelchair or bedridden.  She has a long history of depression anxiety probable seizure disorder may be even a psych chronic condition.  She is seen numerous psychiatrists and takes large doses of psychiatric as well as pain medications.  She is frequently quite lethargic and subdued.  So the decision made by the family was to bring her to the ER; there she was found to be hypotensive hypoxic (which corrected with oxygen) and we elected to admit with fluids empiric antibiotics (there was concern she might have an infiltrate in her urine had abnormalities) allow speech to evaluate use fluids slowly withdrawal some of her polypharmacy and then the next 48+ hours to see if there would be any improvement in her condition that would help define whether we need to do continue to press on or whether she should be hospice.  She is apparently discussed with her  when she was well that she would never want life support such as with a feeding tube.    No Known Allergies    HOME MEDICATIONS:  Prior to Admission medications    Medication Sig Start Date End Date Taking? Authorizing Provider   acetaminophen (TYLENOL) 325 MG tablet Take 2 tablets by mouth Every 4 " (Four) Hours As Needed for Mild Pain  or Fever. 6/7/18  Yes Josafat Gonzales MD   bisacodyl (DULCOLAX) 10 MG suppository Insert 10 mg into the rectum Every 72 (Seventy-Two) Hours As Needed for Constipation.   Yes Beverly Wells MD   clonazePAM (KlonoPIN) 1 MG tablet Take 1 tablet by mouth 3 (Three) Times a Day As Needed for Anxiety. 8/6/19  Yes Josafat Gonzales MD   collagenase 250 UNIT/GM ointment Apply  topically to the appropriate area as directed Every 12 (Twelve) Hours. 8/6/19  Yes Josafat Gonzales MD   cyclobenzaprine (FLEXERIL) 10 MG tablet Take 1 tablet by mouth 3 (Three) Times a Day. 3/14/19  Yes Josafat Gonzales MD   DULoxetine (CYMBALTA) 30 MG capsule Take 30 mg by mouth Daily.   Yes Beverly Wells MD   gabapentin (NEURONTIN) 600 MG tablet Take 1 tablet by mouth 3 (Three) Times a Day. 8/6/19  Yes Josafat Gonzales MD   levETIRAcetam XR (KEPPRA XR) 500 MG 24 hr tablet Take 2 tablets by mouth Every 12 (Twelve) Hours. 3/14/19  Yes Josafat Gonzales MD   levothyroxine (SYNTHROID, LEVOTHROID) 100 MCG tablet Take 100 mcg by mouth Daily.   Yes Beverly Wells MD   Multiple Vitamin (MULTI-VITAMIN DAILY) tablet Take 1 tablet by mouth Daily.   Yes Beverly Wells MD   nystatin (MYCOSTATIN) 376439 UNIT/GM powder Apply  topically to the appropriate area as directed 2 (Two) Times a Day. Apply to groin.   Yes Beverly Wells MD   oxyCODONE (ROXICODONE) 10 MG tablet Take 1 tablet by mouth Every 4 (Four) Hours As Needed for Moderate Pain . 8/6/19  Yes Josafat Gonzales MD   potassium chloride (K-DUR,KLOR-CON) 20 MEQ CR tablet Take 20 mEq by mouth 3 (Three) Times a Day.   Yes Beverly Wells MD   QUEtiapine (SEROquel) 100 MG tablet Take 1 tablet by mouth 2 (Two) Times a Day. 7/17/17  Yes Josafat Gonzales MD   QUEtiapine (SEROquel) 300 MG tablet Take 300 mg by mouth Every Night.   Yes Provider, MD Beverly   rivaroxaban (XARELTO) 10 MG tablet  Take 10 mg by mouth Daily.   Yes Beverly Wells MD   tolterodine LA (DETROL LA) 4 MG 24 hr capsule Take 4 mg by mouth Daily.   Yes Beverly Wells MD   traZODone (DESYREL) 50 MG tablet Take 1 tablet by mouth Every Night. 18  Yes Josafat Gonzales MD   venlafaxine XR (EFFEXOR-XR) 150 MG 24 hr capsule Take 2 capsules by mouth Daily. 18  Yes Josafat Gonzales MD   vitamin C (ASCORBIC ACID) 250 MG tablet Take 250 mg by mouth 2 (Two) Times a Day.   Yes Beverly Wells MD   vitamin D (ERGOCALCIFEROL) 63368 units capsule capsule Take 50,000 Units by mouth 1 (One) Time Per Week. .   Yes Beverly Wells MD   metoprolol tartrate (LOPRESSOR) 50 MG tablet TAKE 1 TABLET BY MOUTH DAILY 19  Josafat Gonzales MD   nystatin (nystatin) 260885 UNIT/GM powder WASH AFFECTED AREA AND DRY WELL THREE TIMES DAILY. AFTER DRY APPLY NYSTATIN POWDER TWICE DAILY 19  Josafat Gonzales MD   solifenacin (VESICARE) 10 MG tablet Take 1 tablet by mouth Daily. 19  Josafat Gonzales MD       PAST HISTORY:  CHILDHOOD: unremarkable.      PROCEDURES:   SCANNED  [id number]  Colonoscopy+tort-mass/[facility]//8.4.14/BE  EGD+nl/[facility]//.15/UGI  Colonoscopy+polyp/[facility]//9.21.15/3y     SURGERIES:  T&A/age 11  R knee/H Hunt/LH/age 13  SANDRA+BSO/Mckeon/[facility]/age 32  T12/Garrnett/Lorena/  Gastric by-pass/Lorena/  L knee/Martinez/Lorena/  C surgery/Tequila/Dorothea/06  C surgery/Tequila/Dorothea/08  Lap GB/[facility]/Armando/3-18-10  L arthroscope/  R lap colectomy/Dorothea/.14   ORIF L wrist/[place]//.16  R hip fx/BH/3.29.19     FAMILY HISTORY:  HTN/m,a-m,f,gf-p,  Heart/m,f,gf-p,gm-p,gm-m  DM/u-m,  CA-colon/none  CA-other/none  CA-breast/none     HABITS:  Tobacco-smoker/never  Tobacco-2nd handed/limited  Alcohol/none  Drugs/     SOCIAL HISTORY:  /1987,  Children/1  Employment/[employer] tx-unit 1/disability  Disability/     PATIENT  EDUCATION:     ADVISED/OFFERED:  Colonoscopy/8.2.10+  MRI LS spine/6.11.12     HOSPITAL ADMITS:      MMH:   11.8.08-11.12.08-hyponatremia  2.8.10-2.9.10-L flank pain  3.13.14-3.15.14 hever/TIA  12.26.17-12.27.17    Shortness of breath  Cough  Flu B  Bronchitis-acute  Abnormal CT chest-AGUILAR spiculated nodule  Chest wall pain  Hyponatremia 128  Neutropenia 2.54     5.26.18-5.31.18  Lethargy-likely 2nd overdose Imodium-resolved  Confusion-same-resolved  Brief intubation (airway protection) 26-27th  Abnormal CXR-? Pneumonia/atelectasis  Overdose Imodium; initial ? suicide jesture refuted  Depression-acute/chronic  Anxiety-acute/chronic  Hypotension-brief and decrease Rx  Cheilosis-improved empiric monistat  Gait decline-acute/chronic  Anemia-component phlebotomy, (substrate neg)  +BC/staph homnis/epidermitis (contaminet)  E coli bacteruria-likely UTI  UTI-     BH:   7.21-7.23.95 abdominal pain  2.19.08-2.21.08-DVT  2.9.10-2.11.10-abdominal pain/intercostal  3.11.10-3.19.10- hever-abdominal pain+SIADH  3.24.15-3.30.15 diarrhea  5.26.15-5.31.15 nausea/vomiting/diarrhea  7.12.17-7.17.17 accidential injection water bottle cap     3.10.19-3.14.19  Confusion-delirium (? Sepsis, ? Seizure)-resolved  Hx seizure disorder-normal EEG  Abnormal UA-(21-30WBC, mod leuk, 2+ bacteria) maybe UTI (BC neg, 10.5 strept                               Group B)  CXR-infiltrate (note lack cough, etc)-maybe pneumonia-persistant L peihilair/mid                               Lung infiltrate  Sepsis (tachycardia 104, lactic acid 6.0)-maybe  Low phenytoin level 3.0-not taking-improved  Hypopotassemia-2.7-giaawyln-pczlqduw  Drug screen THC, opiate  Votplrhnu-rgnbhplfl-zpocqsht  NPO-resolved  Cerebral atrophy-frontal  YOLIE-without treatment  Non-compliance; Rx (dilantin, keppra), CPAP  B12 def (post gastric bypass);   Vit D def (post gastric bypass)  Polypharmacy; difficult Rx list     3.28.2019 -4.1.19  R hip fracture-post surgery  Pain R  "hip-improving  R hip incision-healing  Gait difficulty-acute/chronic-slowly improving  Post-operative state-improving  Anticoagulation needs: hx DVT, DVT risk/(coumadin) (lovonex), xarelto  Anemia-chronic-component post op/blood loss  Arthritis-diffuse  Joint pains-multiple-chronic (worse without Rx)  Back pain-chronic (worse without Rx)  Neck pain-chronic (worse without Rx)  Anxiety-chronic (worse without Rx)  Medication error (accidentaly holding of Rx)    7.30.19-8.6.19  Wounds-back (cultures klebsiella, proteus buttock)  Cellulitis-back-(klebsiella/proteus)  Wounds-heels  Urinary incontinence  Bacteruria-pseudomonas  Abnormal urine-unlikely UTI (cultures neg)  IV abx: vancomycin (7.30.19-8.3.19), zosyn (7.30.19-8.3.19)  Fecal incontinence  Campbell catheter-needed for wound care  Lethargy-variable  Axypkkevd-rfpahcjffztfnd-Du/hyponatremia/others  Hyponatremia 127-improved  SIADH-chronic  hypokalemia  Malnutrition-severe (weight loss, low t protein, albumin)  Hypoglycemia-nutrition related  Dehydration (SG > 1.030)-resolved  Hypotension-affect lopressor, Rx, dehydration-improved  Bedrest  Diarrhea-abx related  Itching  Rash-dry skin     LH:   10.26.16-11.6.16 seizure     GENERAL:  Inactive/slower with limits, speed, stamina for age and pains, desire, above. Sleep is ok usually with Rx.  Has pain management and psych past. No fever reported at nursing home.   SKIN: continues with \"gaulded\" rash to back.   ENDO:  No syncope, near or diaphoretic sweaty spells.  BS Ok since here.  HEENT: No head injury or headache.  No vision change.   No significant hearing loss. Ears without pain/drainage.  No sore throat.  No significant nasal/sinus congestion/drainage. No epistaxis.  CHEST: No chest wall tenderness or mass. No significant cough, wheeze, SOB; no hemoptysis.  CV: No chest pain, palpitations, significant ankle edema.  GI: No heartburn, dysphagia.  No abdominal pain, diarrhea, constipation, rectal bleeding, or " "melena;  incontinent.  :  Voids with incontinence; came with garcia from nursing home.   ORTHO: No painful/swollen joints but various on /off sore.  Usually has sore neck or back.  No acute neck or back pain without recent injury.   NEURO: No dizziness, weakness of extremities.  Usually has LE numbness/paresthesias.   PSYCH: Prior memory loss.  Mood always variable; often anxious, depressed but/and not suicidal though has had prior overdose.  Tolerates stress variably.         PHYSICAL EXAMINATION:  /65 (BP Location: Right arm, Patient Position: Lying)   Pulse 110   Temp 98.7 °F (37.1 °C) (Oral)   Resp 16   Ht 182.9 cm (72.01\")   Wt 85.8 kg (189 lb 3.2 oz)   LMP  (LMP Unknown)   SpO2 100%   BMI 25.65 kg/m²      GENERAL:  AFA/developed in no acute distress. Thin.   SKIN: Pale; scabbed heel ulcers and diffuse reddness back.   Mild chelosis.  HEENT: Normal cephalic without trauma.  Pupils equal round reactive to light; pupils  Dilated.  Extraocular motions full without nystagmus.    Oral cavity without growths, exudates, and moist.  Posterior pharynx without mass, obstruction, redness.  No thyromegaly, mass, tenderness, lymphadenopathy and supple.  CV: Regular rhythm.  No murmur, gallop trace LE edema. Posterior pulses intact.  No carotid bruits.   CHEST: No chest wall tenderness or mass.   LUNGS: Symmetric motion with clear to auscultation.  ABD: Soft, nontender without mass.   ORTHO: Symmetric extremities without swelling/point tenderness.  Ankles plantar  Flexed.   NEURO: CN 2-12 grossly intact.  Symmetric facies. 1/4 x bicep equal reflexes.  UE/LE   2/5 strength throughout.  Nonfocal use extremities. Speech weak.   PSYCH: Oriented x 2; not date.  Pleasant calm, well kept.  Shallow; minimal purposeful/directed conservation.      ASSESSMENT/PROBLEM LIST:   64 y/o white female   Allergy/intolerance: see above  Procedural history: see above  Family history: see above  Obesity.  This is status post " gastric bypass initial failure and regain of  weight.     1 para 1 AB 0.    Surgical menopause.  Osteoporosis on previous wrist study (hip fx 3..)   Hypertension.    Hypothyroidism.    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    History of DVT - left peroneal popliteal, 2008, treated              successfully with anticoagulation.   Anticoagulation needs: hx DVT, DVT risk/(coumadin) lovonex  Chronic insomnia.  Spinal spondylosis; this is primarily cervical with multiple surgeries.  Chronic neck pain.    Chronic back pain  Chronic narcotics-from pain management/Ruxer  Chronic depression - followed by Dr. Samaniego.    Degenerative joint disease that is diffuse.   LE weakness  Gait difficulty-chronic   History of gastric bypass and metabolic risk it carries.    Colon cancer history with previous surgery and chemo, no obvious              recurrence.  Gastroesophageal reflux.  Recurrent laryngitis  Documented neuropathy by EMG nerve conduction  Seizure disorder ()  antieliptics-Keppra/Dilantin  Cerebral atrophy  CT head old CVA (.)  Abnormal CT chest-treating nonsurgically  Polypharmacy   Urinary incontience (history urge incontinence)  Vitamin D def (post gastric bypass)  Vitamin B12 def (post gastric bypass)  YOLIE    REASON FOR ADMISSION:    Lethargy  Anorexia  Weight loss  Hypotension  Respiratory failure-hypoxic  Abnormal urine (21-30 WBC, 4+ bacteruria with garcia)  polypharmacy    PLANS:   Rx-reviewed; ordered as needed and will review daily/as needed.   Includes anticoagulation for  DVT prevention as able.  Decrease all nonessential Rx and hold bp Rx.   LAB-reviewed; ordered as needed and will review daily.  Particular:  Daily CBC, BMP  Imaging-reviewed; ordered as needed and will review daily.  Particular:  No additional  Consults none  Diet: as able/encouraged  Fluids: 100  cc hr NS/KCL  Special issues: hospice?  DC planning: back to [facility] despite approach  Code status: DNR    Electronically signed by Josafat Gonzales MD at 9/7/2019  9:21 PM       Hospital Medications (active)       Dose Frequency Start End    acetaminophen (TYLENOL) tablet 650 mg 650 mg Every 4 Hours PRN 9/6/2019     Sig - Route: Take 2 tablets by mouth Every 4 (Four) Hours As Needed for Mild Pain  or Fever. - Oral    bisacodyl (DULCOLAX) suppository 10 mg 10 mg Every 72 Hours PRN 9/6/2019     Sig - Route: Insert 1 suppository into the rectum Every 72 (Seventy-Two) Hours As Needed for Constipation. - Rectal    cefTRIAXone (ROCEPHIN) 1 g/100 mL 0.9% NS (MBP) 1 g Every 24 Hours 9/9/2019 9/14/2019    Sig - Route: Infuse 100 mL into a venous catheter Daily. - Intravenous    clonazePAM (KlonoPIN) tablet 0.5 mg 0.5 mg 3 Times Daily PRN 9/6/2019     Sig - Route: Take 1 tablet by mouth 3 (Three) Times a Day As Needed for Anxiety. - Oral    DULoxetine (CYMBALTA) DR capsule 30 mg 30 mg Daily 9/7/2019     Sig - Route: Take 1 capsule by mouth Daily. - Oral    gabapentin (NEURONTIN) capsule 300 mg 300 mg Every 12 Hours Scheduled 9/8/2019     Sig - Route: Take 1 capsule by mouth Every 12 (Twelve) Hours. - Oral    levETIRAcetam XR (KEPPRA XR) 24 hr tablet 1,000 mg 1,000 mg Daily 9/7/2019     Sig - Route: Take 2 tablets by mouth Daily. - Oral    levothyroxine (SYNTHROID, LEVOTHROID) tablet 100 mcg 100 mcg Every Early Morning 9/7/2019     Sig - Route: Take 1 tablet by mouth Every Morning. - Oral    nystatin (MYCOSTATIN) powder  Every 8 Hours Scheduled 9/6/2019     Sig - Route: Apply  topically to the appropriate area as directed Every 8 (Eight) Hours. - Topical    oxyCODONE (ROXICODONE) immediate release tablet 5 mg 5 mg Every 4 Hours PRN 9/6/2019     Sig - Route: Take 1 tablet by mouth Every 4 (Four) Hours As Needed for Moderate Pain . - Oral    potassium chloride (MICRO-K) CR capsule 20 mEq 20 mEq 2 Times Daily With Meals  9/9/2019     Sig - Route: Take 2 capsules by mouth 2 (Two) Times a Day With Meals. - Oral    QUEtiapine (SEROquel) tablet 25 mg 25 mg Nightly 9/9/2019     Sig - Route: Take 1 tablet by mouth Every Night. - Oral    rivaroxaban (XARELTO) tablet 10 mg 10 mg Daily 9/7/2019     Sig - Route: Take 0.5 tablets by mouth Daily. - Oral    sodium chloride 0.9 % flush 10 mL 10 mL Every 12 Hours Scheduled 9/6/2019     Sig - Route: Infuse 10 mL into a venous catheter Every 12 (Twelve) Hours. - Intravenous    sodium chloride 0.9 % flush 10 mL 10 mL As Needed 9/6/2019     Sig - Route: Infuse 10 mL into a venous catheter As Needed for Line Care. - Intravenous    venlafaxine XR (EFFEXOR-XR) 24 hr capsule 37.5 mg 37.5 mg Daily 9/9/2019     Sig - Route: Take 1 capsule by mouth Daily. - Oral    Doris's amazing butt cream  As Needed 9/7/2019     Sig - Route: Apply  topically to the appropriate area as directed As Needed for skin irritation. - Topical          Operative/Procedure Notes (last 24 hours) (Notes from 9/10/2019  8:43 AM through 9/11/2019  8:43 AM)     No notes of this type exist for this encounter.        Physician Progress Notes (last 24 hours) (Notes from 9/10/2019  8:43 AM through 9/11/2019  8:43 AM)     No notes of this type exist for this encounter.        Consult Notes (last 24 hours) (Notes from 9/10/2019  8:43 AM through 9/11/2019  8:43 AM)     No notes of this type exist for this encounter.        Nutrition Notes (last 24 hours) (Notes from 9/10/2019  8:43 AM through 9/11/2019  8:43 AM)     No notes of this type exist for this encounter.           Physical Therapy Notes (last 48 hours) (Notes from 9/9/2019  8:43 AM through 9/11/2019  8:43 AM)      Lakisha Boyce, PT, DPT, CLT-TREVER at 9/9/2019  9:43 AM  Version 1 of 1         Problem: Patient Care Overview  Goal: Plan of Care Review   09/09/19 0815 09/09/19 0941   Coping/Psychosocial   Plan of Care Reviewed With --  spouse   Plan of Care Review   Progress --   declining   OTHER   Outcome Summary Attempted OT eval. Pt too lethargic to fully participate. Spoke w/ spouse who is considering pursuing hospice and has requested that PT/OT sign off. Informed spouse that therapy can be reconsulted PRN. OT to sign off at this time.  --      PT is also signing off per family request.        Electronically signed by Lakisha Boyce PT, DPT, CLT-TREVER at 2019  9:43 AM     Lakisha Boyce, PT, DPT, CLT-TREVER at 2019  9:43 AM  Version 1 of 1         Acute Care - Physical Therapy Initial Eval/Discharge  [facility]     Patient Name: Bita Croft  : 1955  MRN: [id number]  Today's Date: 2019   Onset of Illness/Injury or Date of Surgery: (P) 19     Referring Physician: Dr. Christian (P)      Admit Date: 2019    Visit Dx:    ICD-10-CM ICD-9-CM   1. Pneumonia due to infectious organism, unspecified laterality, unspecified part of lung J18.9 136.9     484.8   2. Failure to thrive in adult R62.7 783.7   3. Dysphagia, unspecified type R13.10 787.20     Patient Active Problem List   Diagnosis   • Confusion   • Depression   • Anxiety   • Hyponatremia-often polydyspia involved   • Seizure disorder (CMS/[facility])   • Chronic neck pain   • Chronic back pain-Ruxer   • Hypothyroidism   • Abnormal thyroid blood test   • Hypertension   • Wellness examination-done   • Elevated fasting glucose   • Anemia   • Iron deficiency   • Degenerative joint disease of spine   • Conversion disorder with abnormal movement   • Reflux laryngitis   • Gait difficulty   • Weakness of both legs   • Sore throat   • Polydipsia   • Nocturia   • Frequency of urination   • Urge incontinence   • Laboratory test*   • Chronic narcotic use-Ruxer   • Abnormal x-ray   • Noncompliance   • Lung nodule, solitary   • Non-smoker   • Hx of colon cancer, stage I   • Altered mental status   • Anticoagulated: DVT/(?)   • History of radiation therapy   • Closed right hip fracture, initial encounter (CMS/[facility])    • Malignant neoplasm of upper lobe of right lung (CMS/HCC)   • Skin ulcer (CMS/HCC)   • Pneumonia due to infectious organism     Past Medical History:   Diagnosis Date   • Asthma    • Cancer (CMS/HCC)     colon cancer   • Depression    • Disease of thyroid gland    • Hypertension    • Insomnia    • Seizure (CMS/HCC)    • Sleep apnea      Past Surgical History:   Procedure Laterality Date   • BACK SURGERY     • COLON SURGERY     • GASTRIC BYPASS     • HIP HEMIARTHROPLASTY Right 3/29/2019    Procedure: HIP HEMIARTHROPLASTY;  Surgeon: Mehul Loaiza MD;  Location:  [facility] OR;  Service: Orthopedics   • HYSTERECTOMY     • KNEE ARTHROSCOPY      bilateral   • NECK SURGERY     • ORIF WRIST FRACTURE Right 6/9/2017    Procedure: WRIST OPEN REDUCTION INTERNAL FIXATION, RIGHT;  Surgeon: Alec Cardoza MD;  Location:  [facility] OR;  Service:    • TONSILLECTOMY     • WRIST FRACTURE SURGERY            PT ASSESSMENT (last 12 hours)      Physical Therapy Evaluation     Row Name 09/09/19 0900          PT Evaluation Time/Intention    Evaluation Not Performed  other (see comments)  -HR     Comment: Evaluation Not Performed  Family is pursuing hospice for patient and does not want PT or OT due to her poor medical condition and poor prognosis.   -HR     Row Name             Wound 09/06/19 2213 Right heel Pressure Injury    Wound - Properties Group Date first assessed: 09/06/19  -SC Time first assessed: 2213  -SC Present on Hospital Admission: Y  -SC Side: Right  -SC Location: heel  -SC Primary Wound Type: Pressure inj  -SC Stage, Pressure Injury: unstageable  -SC    Row Name             Wound 09/06/19 2213 Right breast Other (comment)    Wound - Properties Group Date first assessed: 09/06/19  -SC Time first assessed: 2213  -SC Present on Hospital Admission: Y  -SC Side: Right  -SC Location: breast  -SC Primary Wound Type: Other  -SC, excoriation  Stage, Pressure Injury: Stage 1  -SC    Row Name             Wound 09/06/19 2213 medial  sacral spine Pressure Injury    Wound - Properties Group Date first assessed: 09/06/19  -SC Time first assessed: 2213  -SC Present on Hospital Admission: Y  -SC Orientation: medial  -SC Location: sacral spine  -SC Primary Wound Type: Pressure inj  -SC Stage, Pressure Injury: Stage 4;other (see comments)  -SC, tunneling present  Additional Comments: 1 1/2 x 3/4 - 1/2 deep  -SC    Row Name             Wound 09/06/19 2213 medial;lower;other (see comments) sacral spine Pressure Injury    Wound - Properties Group Date first assessed: 09/06/19  -SC Time first assessed: 2213  -SC Present on Hospital Admission: Y  -SC Orientation: medial;lower;other (see comments)  -SC, below stage 4 pressure injury  Location: sacral spine  -SC Primary Wound Type: Pressure inj  -SC Stage, Pressure Injury: Stage 2  -SC Additional Comments: 1 x 1/2   -SC    Row Name             Wound 07/30/19 1620 Right lower ankle abrasion    Wound - Properties Group Date first assessed: 07/30/19  -AO Time first assessed: 1620  -AO Side: Right  -AO Orientation: lower  -AO Location: ankle  -AO Present On Admission : yes  -AO Type: abrasion  -AO    Row Name             Wound 07/30/19 1615 Bilateral coccyx pressure injury    Wound - Properties Group Date first assessed: 07/30/19  -AO Time first assessed: 1615  -AO Side: Bilateral  -AO Location: coccyx  -AO Present On Admission : yes;picture taken  -AO Type: pressure injury  -AO    Row Name             Wound 07/30/19 1615 Right heel pressure injury    Wound - Properties Group Date first assessed: 07/30/19  -AO Time first assessed: 1615  -AO Side: Right  -AO Location: heel  -AO Stage, Pressure Injury: deep tissue injury  -AO Present On Admission : yes;picture taken  -AO Type: pressure injury  -AO    Row Name             Wound 07/30/19 1615 Right lower calf abrasion    Wound - Properties Group Date first assessed: 07/30/19  -AO Time first assessed: 1615  -AO Side: Right  -AO Orientation: lower  -AO Location: calf   -AO Present On Admission : yes;picture taken  -AO Type: abrasion  -AO    Row Name             Wound 09/06/19 2213 Bilateral posterior other (see notes) MASD (Moisutre associated skin damage)    Wound - Properties Group Date first assessed: 09/06/19  -SC Time first assessed: 2213  -SC Present on Hospital Admission: Y  -SC Side: Bilateral  -SC Orientation: posterior  -SC Location: other (see notes)  -SC, gluteal and coccyx  Primary Wound Type: MASD  -SC Stage, Pressure Injury: Stage 1  -SC    Row Name 09/09/19 0900          Physical Therapy Discharge Summary    Additional Documentation  Discharge Summary, PT Eval (Group)  -HR     Row Name 09/09/19 0900          Discharge Summary, PT Eval    Reason for Discharge (PT Discharge Summary)  patient/family request discontinuation of services  -HR       User Key  (r) = Recorded By, (t) = Taken By, (c) = Cosigned By    Initials Name Provider Type    HR Lakisha Boyce, PT, DPT, CLT-TREVER Physical Therapist    Kenzie Beckham RN Registered Nurse    Tala Livingston RN Registered Nurse              PT Recommendation and Plan     Outcome Summary/Treatment Plan (PT)  Reason for Discharge (PT Discharge Summary): patient/family request discontinuation of services  Plan of Care Reviewed With: spouse  Progress: declining         Time Calculation:                   Lakisha Boyce PT, DPT, CLT-TREVER  9/9/2019         Electronically signed by Lakisha Boyce PT, DPT, CLT-TREVER at 9/9/2019  9:44 AM       Occupational Therapy Notes (last 48 hours) (Notes from 9/9/2019  8:43 AM through 9/11/2019  8:43 AM)     No notes of this type exist for this encounter.        Speech Language Pathology Notes (last 48 hours) (Notes from 9/9/2019  8:44 AM through 9/11/2019  8:44 AM)     No notes of this type exist for this encounter.        Respiratory Therapy Notes (last 24 hours) (Notes from 9/10/2019  8:44 AM through 9/11/2019  8:44 AM)     No notes of this type exist for this  "encounter.        Josafat Gonzales MD   Physician   Medicine          Discharge Summary   Signed        Date of Service:  9/10/2019 12:49 PM   Creation Time:  9/10/2019 12:49 PM                         Signed                                     Expand widget buttonCollapse widget button        Show:Clear all      ManualTemplateCopied    Added by:          Josafat Gonzales MD      [facility] for detailscustomization button                                                                                                                                                                                      Patient ID: Bita Croft    MRN: [id number]                                 Acct:  [id number]         Admit Date: 9/6/2019     Discharge Date: 9.10.19    Date of service: 9.10.19         Consults:      IP CONSULT TO NUTRITION SERVICES    IP CONSULT TO CASE MANAGEMENT     IP CONSULT TO NUTRITION SERVICES    IP CONSULT TO WOUND CARE MD         PATIENT PROFILE: The patient is a 62 y/o white  female resident of [facility]; she was cooperative.          CHIEF COMPLAINT: \"she is dying\"         HISTORY OF PRESENT ILLNESS: I had a family meeting with her brother and  the day of admission.  They indicated they thought she lost about 20 pounds in the last 2 weeks and was not eating anything at the nursing home.  Through 4 days earlier I talked to the director of nurses and she indicated the patient was not improving with therapy in general and with wondered if with her history her family would want her to be hospice patient.  She does have a history of probable lung cancer (she was not well enough to do surgery she was treated with radiation):  she has significant spinal stenosis for which her medical condition makes her nonoperable and at least for the last 6 months she has not been able to ambulate; she is either been wheelchair or bedridden.  She has a long history of depression anxiety probable " seizure disorder may be even a psych chronic condition.  She is seen numerous psychiatrists and takes large doses of psychiatric as well as pain medications.  She is frequently quite lethargic and subdued.  So the decision made by the family was to bring her to the ER; there she was found to be hypotensive hypoxic (which corrected with oxygen) and we elected to admit with fluids empiric antibiotics (there was concern she might have an infiltrate in her urine had abnormalities) allow speech to evaluate use fluids slowly withdrawal some of her polypharmacy and then the next 48+ hours to see if there would be any improvement in her condition that would help define whether we need to do continue to press on or whether she should be hospice.  She is apparently discussed with her  when she was well that she would never want life support such as with a feeding tube.         No Known Allergies         HOME MEDICATIONS:                                            Prior to Admission medications        Medication     Sig     Start Date     End Date     Taking?     Authorizing Provider       acetaminophen (TYLENOL) 325 MG tablet     Take 2 tablets by mouth Every 4 (Four) Hours As Needed for Mild Pain  or Fever.     6/7/18           Yes     Josafat Gonzales MD       bisacodyl (DULCOLAX) 10 MG suppository     Insert 10 mg into the rectum Every 72 (Seventy-Two) Hours As Needed for Constipation.                 Yes     ProviderBeverly MD       clonazePAM (KlonoPIN) 1 MG tablet     Take 1 tablet by mouth 3 (Three) Times a Day As Needed for Anxiety.     8/6/19           Yes     Josafat Gonzales MD       collagenase 250 UNIT/GM ointment     Apply  topically to the appropriate area as directed Every 12 (Twelve) Hours.     8/6/19           Yes     Josafat Gonzales MD       cyclobenzaprine (FLEXERIL) 10 MG tablet     Take 1 tablet by mouth 3 (Three) Times a Day.     3/14/19           Yes     Josafat Gonzales  MD Geronimo       DULoxetine (CYMBALTA) 30 MG capsule     Take 30 mg by mouth Daily.                 Yes     Beverly Wells MD       gabapentin (NEURONTIN) 600 MG tablet     Take 1 tablet by mouth 3 (Three) Times a Day.     8/6/19           Yes     Josafat Gonzales MD       levETIRAcetam XR (KEPPRA XR) 500 MG 24 hr tablet     Take 2 tablets by mouth Every 12 (Twelve) Hours.     3/14/19           Yes     Josafat Gonzales MD       levothyroxine (SYNTHROID, LEVOTHROID) 100 MCG tablet     Take 100 mcg by mouth Daily.                 Yes     Beverly Wells MD       Multiple Vitamin (MULTI-VITAMIN DAILY) tablet     Take 1 tablet by mouth Daily.                 Yes     Beverly Wells MD       nystatin (MYCOSTATIN) 624100 UNIT/GM powder     Apply  topically to the appropriate area as directed 2 (Two) Times a Day. Apply to groin.                 Yes     Beverly Wells MD       oxyCODONE (ROXICODONE) 10 MG tablet     Take 1 tablet by mouth Every 4 (Four) Hours As Needed for Moderate Pain .     8/6/19           Yes     Josafat Gonzales MD       potassium chloride (K-DUR,KLOR-CON) 20 MEQ CR tablet     Take 20 mEq by mouth 3 (Three) Times a Day.                 Yes     Beverly Wells MD       QUEtiapine (SEROquel) 100 MG tablet     Take 1 tablet by mouth 2 (Two) Times a Day.     7/17/17           Yes     Josafat Gonzales MD       QUEtiapine (SEROquel) 300 MG tablet     Take 300 mg by mouth Every Night.                 Yes     Beverly Wells MD       rivaroxaban (XARELTO) 10 MG tablet     Take 10 mg by mouth Daily.                 Yes     Beverly Wells MD       tolterodine LA (DETROL LA) 4 MG 24 hr capsule     Take 4 mg by mouth Daily.                 Yes     Beverly Wells MD       traZODone (DESYREL) 50 MG tablet     Take 1 tablet by mouth Every Night.     11/14/18           Yes     Josafat Gonzales MD       venlafaxine XR (EFFEXOR-XR) 150 MG 24 hr  capsule     Take 2 capsules by mouth Daily.     18           Yes     Josafat Gonzales MD       vitamin C (ASCORBIC ACID) 250 MG tablet     Take 250 mg by mouth 2 (Two) Times a Day.                 Yes     Provider, MD Beverly       vitamin D (ERGOCALCIFEROL) 01763 units capsule capsule     Take 50,000 Units by mouth 1 (One) Time Per Week. .                 Yes     ProviderBeverly MD       metoprolol tartrate (LOPRESSOR) 50 MG tablet     TAKE 1 TABLET BY MOUTH DAILY     19           Josafat Gonzales MD       nystatin (nystatin) 776067 UNIT/GM powder     WASH AFFECTED AREA AND DRY WELL THREE TIMES DAILY. AFTER DRY APPLY NYSTATIN POWDER TWICE DAILY     19           Josafat Gonzales MD       solifenacin (VESICARE) 10 MG tablet     Take 1 tablet by mouth Daily.     19           Josafat Gonzales MD                 PAST HISTORY:    CHILDHOOD: unremarkable.          PROCEDURES:   SCANNED    [id number]    Colonoscopy+tort-mass/[facility]//.14/BE    EGD+nl/P//.15/UGI    Colonoscopy+polyp/[facility]//9.15/3y         SURGERIES:    T&A/age 11    R knee/H Hunt/LH/age 13    SANDRA+BSO/[facility]/age 32    T12/Mary/Lorena/    Gastric by-pass//    L knee/Martinez/Lorena/    C surgery/Tequila/Dorothea/06    C surgery/Tequila/Dorothea/08    Lap GB/WB/Armando/3-18-10    L arthroscope/    R lap colectomy/Dorothea/.14     ORIF L wrist/Nj//16    R hip fx//3.29.19         FAMILY HISTORY:    HTN/m,a-m,f,gf-p,    Heart/m,f,gf-p,gm-p,gm-m    DM/u-m,    CA-colon/none    CA-other/none    CA-breast/none         HABITS:    Tobacco-smoker/never    Tobacco-2nd handed/limited    Alcohol/none    Drugs/         SOCIAL HISTORY:    /1987,    Children/1    Employment/[employer] tx-unit 1/disability    Disability/         PATIENT EDUCATION:         ADVISED/OFFERED:    Colonoscopy/8.2.10+    MRI LS spine/12         HOSPITAL  ADMITS:          [facility]:   11.8.08-11.12.08-hyponatremia    2.8.10-2.9.10-L flank pain    3.13.14-3.15.14 hever/TIA    12.26.17-12.27.17      Shortness of breath    Cough    Flu B    Bronchitis-acute    Abnormal CT chest-AGUILAR spiculated nodule    Chest wall pain    Hyponatremia 128    Neutropenia 2.54         5.26.18-5.31.18    Lethargy-likely 2nd overdose Imodium-resolved    Confusion-same-resolved    Brief intubation (airway protection) 26-27th    Abnormal CXR-? Pneumonia/atelectasis    Overdose Imodium; initial ? suicide jesture refuted    Depression-acute/chronic    Anxiety-acute/chronic    Hypotension-brief and decrease Rx    Cheilosis-improved empiric monistat    Gait decline-acute/chronic    Anemia-component phlebotomy, (substrate neg)    +BC/staph homnis/epidermitis (contaminet)    E coli bacteruria-likely UTI    UTI-         BH:   7.21-7.23.95 abdominal pain    2.19.08-2.21.08-DVT    2.9.10-2.11.10-abdominal pain/intercostal    3.11.10-3.19.10- hever-abdominal pain+SIADH    3.24.15-3.30.15 diarrhea    5.26.15-5.31.15 nausea/vomiting/diarrhea    7.12.17-7.17.17 accidential injection water bottle cap         3.10.19-3.14.19    Confusion-delirium (? Sepsis, ? Seizure)-resolved    Hx seizure disorder-normal EEG    Abnormal UA-(21-30WBC, mod leuk, 2+ bacteria) maybe UTI (BC neg, 10.5 strept                                 Group B)    CXR-infiltrate (note lack cough, etc)-maybe pneumonia-persistant L peihilair/mid                               Lung infiltrate    Sepsis (tachycardia 104, lactic acid 6.0)-maybe    Low phenytoin level 3.0-not taking-improved    Hypopotassemia-2.1-uvqzwtdb-sqmwkxrq    Drug screen THC, opiate    Derycrbjq-ecjtzcade-nhsuzqfz    NPO-resolved    Cerebral atrophy-frontal    YOLIE-without treatment    Non-compliance; Rx (dilantin, keppra), CPAP    B12 def (post gastric bypass);     Vit D def (post gastric bypass)    Polypharmacy; difficult Rx list         3.28.2019 -4.1.19    ROXIE hip  "fracture-post surgery    Pain R hip-improving    R hip incision-healing    Gait difficulty-acute/chronic-slowly improving    Post-operative state-improving    Anticoagulation needs: hx DVT, DVT risk/(coumadin) (lovonex), xarelto    Anemia-chronic-component post op/blood loss    Arthritis-diffuse    Joint pains-multiple-chronic (worse without Rx)    Back pain-chronic (worse without Rx)    Neck pain-chronic (worse without Rx)    Anxiety-chronic (worse without Rx)    Medication error (accidentaly holding of Rx)         7.30.19-8.6.19    Wounds-back (cultures klebsiella, proteus buttock)    Cellulitis-back-(klebsiella/proteus)    Wounds-heels    Urinary incontinence    Bacteruria-pseudomonas    Abnormal urine-unlikely UTI (cultures neg)    IV abx: vancomycin (7.30.19-8.3.19), zosyn (7.30.19-8.3.19)    Fecal incontinence    Campbell catheter-needed for wound care    Lethargy-variable    Seukbhxcv-jzolpddyfxclem-Vi/hyponatremia/others    Hyponatremia 127-improved    SIADH-chronic    hypokalemia    Malnutrition-severe (weight loss, low t protein, albumin)    Hypoglycemia-nutrition related    Dehydration (SG > 1.030)-resolved    Hypotension-affect lopressor, Rx, dehydration-improved    Bedrest    Diarrhea-abx related    Itching    Rash-dry skin         LH:     10.26.16-11.6.16 seizure         GENERAL:  Inactive/slower with limits, speed, stamina for age and pains, desire, above. Sleep is ok usually with Rx.  Has pain management and psych past. No fever reported at nursing home.     SKIN: continues with \"gaulded\" rash to back.     ENDO:  No syncope, near or diaphoretic sweaty spells.  BS Ok since here.    HEENT: No head injury or headache.  No vision change.   No significant hearing loss. Ears without pain/drainage.  No sore throat.  No significant nasal/sinus congestion/drainage. No epistaxis.    CHEST: No chest wall tenderness or mass. No significant cough, wheeze, SOB; no hemoptysis.    CV: No chest pain, palpitations, " "significant ankle edema.    GI: No heartburn, dysphagia.  No abdominal pain, diarrhea, constipation, rectal bleeding, or melena;  incontinent.    :  Voids with incontinence; came with garcia from nursing home.     ORTHO: No painful/swollen joints but various on /off sore.  Usually has sore neck or back.  No acute neck or back pain without recent injury.     NEURO: No dizziness, weakness of extremities.  Usually has LE numbness/paresthesias.     PSYCH: Prior memory loss.  Mood always variable; often anxious, depressed but/and not suicidal though has had prior overdose.  Tolerates stress variably.                PHYSICAL EXAMINATION:    /65 (BP Location: Right arm, Patient Position: Lying)   Pulse 110   Temp 98.7 °F (37.1 °C) (Oral)   Resp 16   Ht 182.9 cm (72.01\")   Wt 85.8 kg (189 lb 3.2 oz)   LMP  (LMP Unknown)   SpO2 100%   BMI 25.65 kg/m²          GENERAL:  AFA/developed in no acute distress. Thin.     SKIN: Pale; scabbed heel ulcers and diffuse reddness back.   Mild chelosis.    HEENT: Normal cephalic without trauma.  Pupils equal round reactive to light; pupils    Dilated.  Extraocular motions full without nystagmus.      Oral cavity without growths, exudates, and moist.  Posterior pharynx without mass, obstruction, redness.  No thyromegaly, mass, tenderness, lymphadenopathy and supple.    CV: Regular rhythm.  No murmur, gallop trace LE edema. Posterior pulses intact.  No carotid bruits.     CHEST: No chest wall tenderness or mass.     LUNGS: Symmetric motion with clear to auscultation.    ABD: Soft, nontender without mass.     ORTHO: Symmetric extremities without swelling/point tenderness.  Ankles plantar    Flexed.     NEURO: CN 2-12 grossly intact.  Symmetric facies. 1/4 x bicep equal reflexes.  UE/LE   2/5 strength throughout.  Nonfocal use extremities. Speech weak.     PSYCH: Oriented x 2; not date.  Pleasant calm, well kept.  Shallow; minimal purposeful/directed conservation.      "     ASSESSMENT/PROBLEM LIST:     64 y/o white female     Allergy/intolerance: see above    Procedural history: see above    Family history: see above    Obesity.  This is status post gastric bypass initial failure and regain of    weight.       1 para 1 AB 0.      Surgical menopause.    Osteoporosis on previous wrist study (hip fx 3.)     Hypertension.      Hypothyroidism.      History of recurrent hyponatremia felt to be syndrome of inappropriate                antidiuretic hormone, as least from Ziac or Cymbalta and question others.      History of DVT - left peroneal popliteal, 2008, treated                successfully with anticoagulation.     Anticoagulation needs: hx DVT, DVT risk/(coumadin) lovonex    Chronic insomnia.    Spinal spondylosis; this is primarily cervical with multiple surgeries.    Chronic neck pain.      Chronic back pain    Chronic narcotics-from pain management/Ruxer    Chronic depression - followed by Dr. Samaniego.      Degenerative joint disease that is diffuse.     LE weakness    Gait difficulty-chronic     History of gastric bypass and metabolic risk it carries.      Colon cancer history with previous surgery and chemo, no obvious                recurrence.    Gastroesophageal reflux.    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  She  ended up growing Klebsiella 10 to the fifth and Morganella 40-50,000 from her Campbell bag.  Her blood cultures were negative.  She never had a significant cough wheeze or other signs of anything to suggest that what was seen in the ER and chest x-ray was a pneumonia.  Medications were slowly wean; to a significant extent.  Her polypharmacy was weaned down significantly and in a manner to avoid any withdrawal of anything.  Even her Keppra was withdrawn and there was no signs of seizure activity.  Over the first day and a half she became gradually more alert; but unfortunately even at discharge she still confused.  She was seen by Dr. Marx on the ninth and made some recommendations for her various wound care.  Dietary saw her and of course diagnosed severe malnutrition; supplements and nutritional support was given and I am uncertain at this point if this made a significant degree of improvement in what she is taking in orally.  Her Campbell was maintained primarily to maintain her back from getting any worse with rash and issues.  She was able to be weaned off of oxygen.  So we have accomplished what we promised; we treated with antibiotics gave her fluid support various other skilled care support and wean her medications.  Now we remain uncertain on whether this is going to contribute to a progressive improvement or actually a return to decline.  From previous conversation I believe the family will move towards hospice if she shows further decline.         Labs included:          Labs 24 hr before discharge:               Lab Results (last 24 hours)                    Procedure       Component       Value       Units       Date/Time               Basic Metabolic Panel [[id number]]  (Abnormal)     Collected:  09/10/19 0635             Specimen:  Blood     Updated:  09/10/19 0802                   Glucose     66(abnormally low)     mg/dL                         BUN     13     mg/dL                         Creatinine      0.50     mg/dL                         Sodium     136     mmol/L                         Potassium     3.1(abnormally low)     mmol/L                         Chloride     111(abnormally high)     mmol/L                         CO2     24.0     mmol/L                         Calcium     7.2(abnormally low)     mg/dL                         eGFR Non  Amer     125     mL/min/1.73                         BUN/Creatinine Ratio     26.0(abnormally high)                   Anion Gap     1.0(abnormally low)     mmol/L                   Narrative:                GFR Normal >60    Chronic Kidney Disease <60    Kidney Failure <15             CBC & Differential [[id number]]     Collected:  09/10/19 0635             Specimen:  Blood     Updated:  09/10/19 0745             Narrative:                The following orders were created for panel order CBC & Differential.    Procedure                               Abnormality         Status                       ---------                               -----------         ------                       CBC Auto Differential[[id number]]        Abnormal            Final result                      Please view results for these tests on the individual orders.             CBC Auto Differential [[id number]]  (Abnormal)     Collected:  09/10/19 0635             Specimen:  Blood     Updated:  09/10/19 0745                   WBC     9.05     10*3/mm3                         RBC     3.48(abnormally low)     10*6/mm3                         Hemoglobin     10.2(abnormally low)     g/dL                         Hematocrit     30.6(abnormally low)     %                         MCV     87.9     fL                         MCH     29.3     pg                         MCHC     33.3     g/dL                         RDW     17.9(abnormally high)     %                         RDW-SD     56.1(abnormally high)     fl                         MPV     11.4     fL                         Platelets     330      10*3/mm3                         Neutrophil %     76.5(abnormally high)     %                         Lymphocyte %     13.9(abnormally low)     %                         Monocyte %     7.4     %                         Eosinophil %     1.2     %                         Basophil %     0.6     %                         Immature Grans %     0.4     %                         Neutrophils, Absolute     6.92     10*3/mm3                         Lymphocytes, Absolute     1.26     10*3/mm3                         Monocytes, Absolute     0.67     10*3/mm3                         Eosinophils, Absolute     0.11     10*3/mm3                         Basophils, Absolute     0.05     10*3/mm3                         Immature Grans, Absolute     0.04     10*3/mm3                         nRBC     0.0     /100 WBC                   Blood Culture - Blood, Hand, Left [[id number]]     Collected:  09/06/19 1856             Specimen:  Blood from Hand, Left     Updated:  09/09/19 1924                   Blood Culture     No growth at 3 days             Blood Culture - Blood, Hand, Left [[id number]]     Collected:  09/06/19 1710             Specimen:  Blood from Hand, Left     Updated:  09/09/19 1745                   Blood Culture     No growth at 3 days                             Lab Results otherwise    CBC:                Results from last 7 days       Lab     Units     09/10/19    0635     09/09/19    0616     09/08/19    1647     09/07/19    0434     09/06/19    1710       WBC     10*3/mm3     9.05     5.99     8.57     10.25     13.49*       HEMOGLOBIN     g/dL     10.2*     9.7*     9.8*     10.4*     11.4*       HEMATOCRIT     %     30.6*     29.0*     29.4*     31.8*     34.5       PLATELETS     10*3/mm3     330     302     324     322     302            BMP:               Results from last 7 days       Lab     Units     09/10/19    0635     09/09/19    0616     09/08/19    0744     09/07/19    0434     09/06/19    1710        SODIUM     mmol/L     136     138     136     138     136       POTASSIUM     mmol/L     3.1*     3.2*     3.2*     4.5     4.7       CHLORIDE     mmol/L     111*     112*     110     109     104       CO2     mmol/L     24.0     22.0*     24.0     27.0     27.0       BUN     mg/dL     13     15     21     28*     28*       CREATININE     mg/dL     0.50     0.51     0.50     0.48*     0.56       GLUCOSE     mg/dL     66*     59*     64*     69*     69*       CALCIUM     mg/dL     7.2*     6.9*     7.1*     7.3*     7.6*       ALT (SGPT)     U/L      --       --       --       --      33            A1c:Invalid input(s): A1C     A1c:@LABRCNOP(A1c:7)@         Culture Results:             Blood Culture       Date     Value     Ref Range     Status       09/06/2019     No growth at 3 days           Preliminary       09/06/2019     No growth at 3 days           Preliminary                     Urine Culture       Date     Value     Ref Range     Status       09/06/2019     (A)           Final             >100,000 CFU/mL Klebsiella pneumoniae ssp pneumoniae       09/06/2019     (A)           Final             40,000-50,000 CFU/mL Morganella morganii ssp morganii                 DISCHARGE ASSESSMENT:    Reasons for admit/problems address while here:     Lethargy-improved    confusion    Anorexia    Weight loss    Severe malnutrition-protein/caloric    Hypotension-resolved    Respiratory failure-hypoxic-oxygen replaced    Abnormal urine (21-30 WBC, 4+ bacteruria with garcia)    UTI-klebsiella and Morganella/garcia    Polypharmacy-weaning    Hypopotassemia-IV affected    Edema; nutrition, IV fluids         Chronic problems affecting stay:    See above           PLAN:     See AVS         Discharge Disposition:    Skilled Nursing Facility (DC - External)Back to [facility]         Discharge Medications:                   Discharge Medications                      New Medications                     Instructions       Start Date         cefTRIAXone 1 g injection    Commonly known as:  ROCEPHIN          1 g, Intramuscular, Every 24 Hours                  rody's amazing butt cream          1 application, Topical, 3 Times Daily PRN                                          Changes to Medications                    Instructions       Start Date         clonazePAM 1 MG tablet    Commonly known as:  KlonoPIN    What changed:      •how much to take      •when to take this            0.5 mg, Oral, 2 Times Daily PRN                  gabapentin 600 MG tablet    Commonly known as:  NEURONTIN    What changed:      •how much to take      •when to take this            300 mg, Oral, 2 Times Daily                  levETIRAcetam  MG 24 hr tablet    Commonly known as:  KEPPRA XR    What changed:  when to take this          1,000 mg, Oral, Daily                  oxyCODONE 10 MG tablet    Commonly known as:  ROXICODONE    What changed:  how much to take          5 mg, Oral, Every 4 Hours PRN                  QUEtiapine 100 MG tablet    Commonly known as:  SEROquel    What changed:      •how much to take      •when to take this      •Another medication with the same name was removed. Continue taking this medication, and follow the directions you see here.            25 mg, Oral, Nightly                  venlafaxine XR 37.5 MG 24 hr capsule    Commonly known as:  EFFEXOR-XR    What changed:      •medication strength      •how much to take      •when to take this            37.5 mg, Oral, Nightly                                          Continue These Medications                    Instructions       Start Date         acetaminophen 325 MG tablet    Commonly known as:  TYLENOL          650 mg, Oral, Every 4 Hours PRN                  bisacodyl 10 MG suppository    Commonly known as:  DULCOLAX          10 mg, Rectal, Every 72 Hours PRN                  collagenase 250 UNIT/GM ointment          Topical, Every 12 Hours  Scheduled                  DULoxetine 30 MG capsule    Commonly known as:  CYMBALTA          30 mg, Oral, Daily                  levothyroxine 100 MCG tablet    Commonly known as:  SYNTHROID, LEVOTHROID          100 mcg, Oral, Daily                  MULTI-VITAMIN DAILY tablet          1 tablet, Oral, Daily                  nystatin 693358 UNIT/GM powder    Commonly known as:  MYCOSTATIN          Topical, 2 Times Daily, Apply to groin.                  potassium chloride 20 MEQ CR tablet    Commonly known as:  K-DUR,KLOR-CON          20 mEq, Oral, 3 Times Daily                  vitamin C 250 MG tablet    Commonly known as:  ASCORBIC ACID          250 mg, Oral, 2 Times Daily                  vitamin D 55613 units capsule capsule    Commonly known as:  ERGOCALCIFEROL          50,000 Units, Oral, Weekly, Sunday.                  XARELTO 10 MG tablet    Generic drug:  rivaroxaban          10 mg, Oral, Daily                                Stop These Medications          cyclobenzaprine 10 MG tablet    Commonly known as:  FLEXERIL            tolterodine LA 4 MG 24 hr capsule    Commonly known as:  DETROL LA            traZODone 50 MG tablet    Commonly known as:  DESYREL                                Discharge Care Plan/Instructions:     Admit to MRN    Routine NH admit orders/procedures    NH to re/establish code status-likely DNR         Rx:     Oxygen: keep sats > 90%    See AVS/discharge summary          Diet:     Additional type: preferred    Consistency:                 Solids: general                  Fluids: thin    Calories 4044-7624/24 hr    Fluids 60 oz/24 hr-stay hydrated    Dietary referral for nutritional assessment, advise and follow         Activity:   Gradually increase as able    PT, OT, speech referrals only if family wants    Up with assistance only until cleared by PT     Additional requests: Dolphin mattress set at 3 dots/or alternative low pressure mattress.  Turn side to side q 2 hrs. Allow back  side air exposure; keep dry, change promptly when soiled or wet, use only ONE chux beneath her rash area. Stay in booties.    Doris's Magic Butt cream to Candidal dermatitis TID and prn to keep visibly medicated.    Avoid traction on buttocks when cleaning bottom and turning.Plurogel/adaptic (single layer) and small bandaid or Optifoam to right heel ulcer; change daily.         Additional instructions:  LAB one week CBC, CMP around 9.17.19    Wound care resume to heels/see above back    [facility]          F/u appointments:     Dr Gonzales will see on next regular NH rounds (call between if needs to be seen earlier)    If/as family reviews if they want hospice; let Dr Gonzales know         Other appointments:     No future appointments. at office         CONDITION: stable/improved         PROGNOSIS: guarded/poor

## 2019-09-11 NOTE — PROGRESS NOTES
LOS: 5 days   Patient Care Team:  Josafat Gonzales MD as PCP - General  Josafat Gonzales MD as PCP - Family Medicine  [facility] Phys[facility] (Cardiology[facility]  Pawel Ho MD as Consulting Physician (Urology)  Krystal Dorman APRN as Nurse Practitioner (Neurology)    Chief Complaint:  decline    Subjective      HISTORY OF PRESENT ILLNESS: I had a family meeting with her brother and  the day of admission.  They indicated they thought she lost about 20 pounds in the last 2 weeks and was not eating anything at the nursing home.  Through 4 days earlier I talked to the director of nurses and she indicated the patient was not improving with therapy in general and with wondered if with her history her family would want her to be hospice patient.  She does have a history of probable lung cancer (she was not well enough to do surgery she was treated with radiation):  she has significant spinal stenosis for which her medical condition makes her nonoperable and at least for the last 6 months she has not been able to ambulate; she is either been wheelchair or bedridden.  She has a long history of depression anxiety probable seizure disorder may be even a psych chronic condition.  She is seen numerous psychiatrists and takes large doses of psychiatric as well as pain medications.  She is frequently quite lethargic and subdued.  So the decision made by the family was to bring her to the ER; there she was found to be hypotensive hypoxic (which corrected with oxygen) and we elected to admit with fluids empiric antibiotics (there was concern she might have an infiltrate in her urine had abnormalities) allow speech to evaluate use fluids slowly withdrawal some of her polypharmacy and then the next 48+ hours to see if there would be any improvement in her condition that would help define whether we need to do continue to press on or whether she should be hospice.  She is  apparently discussed with her  when she was well that she would never want life support such as with a feeding tube.     Interval History: Eating without diarrhea.  Drinking and voiding; incontinent of urine.  Does not seem to have a difficulty with significant pain anxiety depression with multiple drugs of the been weaned while she is here.  Had her discharge summary ready for her to go back to [facility] he has night; and discovered her insurance was not approved to send her back and her  would not allow her to go back private pay.  Her discharge had to be stopped.  Now nursing wonders if she needs a wound VAC on her sacrum/buttocks decub.    .  Current Facility-Administered Medications:   •  acetaminophen (TYLENOL) tablet 650 mg, 650 mg, Oral, Q4H PRN, Josafat Gonzales MD  •  bisacodyl (DULCOLAX) suppository 10 mg, 10 mg, Rectal, Q72H PRN, Josafat Gonzales MD  •  cefTRIAXone (ROCEPHIN) 1 g/100 mL 0.9% NS (MBP), 1 g, Intravenous, Q24H, Josafat Gonzales MD, 1 g at 09/11/19 0817  •  clonazePAM (KlonoPIN) tablet 0.5 mg, 0.5 mg, Oral, TID PRN, Josafat Gonzales MD, 0.5 mg at 09/10/19 2005  •  DULoxetine (CYMBALTA) DR capsule 30 mg, 30 mg, Oral, Daily, Josafat Gonzales MD, 30 mg at 09/11/19 1219  •  gabapentin (NEURONTIN) capsule 300 mg, 300 mg, Oral, Q12H, Josafat Gonzales MD, 300 mg at 09/11/19 1219  •  levETIRAcetam XR (KEPPRA XR) 24 hr tablet 1,000 mg, 1,000 mg, Oral, Daily, Josafat Gonzales MD, 1,000 mg at 09/11/19 1220  •  levothyroxine (SYNTHROID, LEVOTHROID) tablet 100 mcg, 100 mcg, Oral, Q AM, Josafat Gonzales MD, 100 mcg at 09/11/19 0605  •  nystatin (MYCOSTATIN) powder, , Topical, Q8H, Josafat Gonzales MD  •  oxyCODONE (ROXICODONE) immediate release tablet 5 mg, 5 mg, Oral, Q4H PRN, Josafat Gonzales MD, 5 mg at 09/11/19 1736  •  potassium chloride (MICRO-K) CR capsule 20 mEq, 20 mEq, Oral, BID With Meals, Josafat Gonzales MD, 20 mEq at  "09/11/19 1736  •  QUEtiapine (SEROquel) tablet 25 mg, 25 mg, Oral, Nightly, Josafat Gonzales MD, 25 mg at 09/10/19 2005  •  rivaroxaban (XARELTO) tablet 10 mg, 10 mg, Oral, Daily, Josafat Gonzales MD, 10 mg at 09/11/19 1220  •  sodium chloride 0.9 % flush 10 mL, 10 mL, Intravenous, Q12H, Josafat Gonzales MD, 10 mL at 09/11/19 1000  •  sodium chloride 0.9 % flush 10 mL, 10 mL, Intravenous, PRN, Josafat Gonzales MD  •  venlafaxine XR (EFFEXOR-XR) 24 hr capsule 37.5 mg, 37.5 mg, Oral, Daily, Josafat Gonzales MD, 37.5 mg at 09/11/19 1221  •  Doris's amazing butt cream, , Topical, PRN, Josafat Gonzales MD    Review of Systems:   GENERAL:  Inactive/slower with limits, speed, stamina for age and pains, desire, above. Sleep is ok usually with Rx; on/off here.  Has pain management and psych past. No fever reported at nursing home/since here.   SKIN: continues with \"gaulded\" rash to back and prior heel ulcers.    ENDO:  No syncope, near or diaphoretic sweaty spells.  BS Ok since here.  HEENT: No head injury or headache.  No vision change.   No significant hearing loss. Ears without pain/drainage.  No sore throat.  No significant nasal/sinus congestion/drainage. No epistaxis.  CHEST: No chest wall tenderness or mass. No significant cough, wheeze, SOB; no hemoptysis.  CV: No chest pain, palpitations, significant ankle edema; some hand/feet edema pitting.   GI: No heartburn, dysphagia.  No abdominal pain, diarrhea, constipation, rectal bleeding, or melena;  incontinent stool/prior voiding.   :  Voids usually with incontinence; came with garcia from nursing home.   ORTHO: No painful/swollen joints but various on /off sore.  Usually has sore neck or back.  No acute neck or back pain without recent injury.   NEURO: No dizziness, weakness of extremities.  Usually has LE numbness/paresthesias.   PSYCH: Prior memory loss.  Mood always variable; often anxious, depressed but/and not suicidal though has had " "prior overdose.  Tolerates stress variably    Objective     Vital Signs  /70 (BP Location: Left arm, Patient Position: Lying)   Pulse 101   Temp 98.4 °F (36.9 °C) (Oral)   Resp 16   Ht 182.9 cm (72.01\")   Wt 85.8 kg (189 lb 3.2 oz)   LMP  (LMP Unknown)   SpO2 96%   BMI 25.65 kg/m²   Temp:  [97.9 °F (36.6 °C)-98.4 °F (36.9 °C)] 98.4 °F (36.9 °C)  Heart Rate:  [] 101  Resp:  [16-18] 16  BP: (109-118)/(65-75) 118/70      Intake/Output Summary (Last 24 hours) at 9/11/2019 1747  Last data filed at 9/11/2019 1300  Gross per 24 hour   Intake 520 ml   Output 675 ml   Net -155 ml       Lab Results:  Lab Results (last 24 hours)     Procedure Component Value Units Date/Time    Blood Culture - Blood, Hand, Left [[id number]] Collected:  09/06/19 1710    Specimen:  Blood from Hand, Left Updated:  09/11/19 1746     Blood Culture No growth at 5 days    Basic Metabolic Panel [[id number]]  (Abnormal) Collected:  09/11/19 0552    Specimen:  Blood Updated:  09/11/19 0701     Glucose 69 mg/dL      BUN 10 mg/dL      Creatinine 0.31 mg/dL      Sodium 139 mmol/L      Potassium 3.4 mmol/L      Chloride 109 mmol/L      CO2 25.0 mmol/L      Calcium 7.0 mg/dL      eGFR Non African Amer >150 mL/min/1.73      BUN/Creatinine Ratio 32.3     Anion Gap 5.0 mmol/L     Narrative:       GFR Normal >60  Chronic Kidney Disease <60  Kidney Failure <15    CBC & Differential [[id number]] Collected:  09/11/19 0552    Specimen:  Blood Updated:  09/11/19 0631    Narrative:       The following orders were created for panel order CBC & Differential.  Procedure                               Abnormality         Status                     ---------                               -----------         ------                     CBC Auto Differential[[id number]]        Abnormal            Final result                 Please view results for these tests on the individual orders.    CBC Auto Differential [[id number]]  (Abnormal) Collected:  09/11/19 0552    " Specimen:  Blood Updated:  09/11/19 0631     WBC 8.92 10*3/mm3      RBC 3.26 10*6/mm3      Hemoglobin 9.7 g/dL      Hematocrit 29.0 %      MCV 89.0 fL      MCH 29.8 pg      MCHC 33.4 g/dL      RDW 18.0 %      RDW-SD 57.1 fl      MPV 11.0 fL      Platelets 328 10*3/mm3      Neutrophil % 73.0 %      Lymphocyte % 13.6 %      Monocyte % 7.4 %      Eosinophil % 4.6 %      Basophil % 0.7 %      Immature Grans % 0.7 %      Neutrophils, Absolute 6.52 10*3/mm3      Lymphocytes, Absolute 1.21 10*3/mm3      Monocytes, Absolute 0.66 10*3/mm3      Eosinophils, Absolute 0.41 10*3/mm3      Basophils, Absolute 0.06 10*3/mm3      Immature Grans, Absolute 0.06 10*3/mm3      nRBC 0.0 /100 WBC     Blood Culture - Blood, Hand, Left [[id number]] Collected:  09/06/19 1856    Specimen:  Blood from Hand, Left Updated:  09/10/19 1923     Blood Culture No growth at 4 days          CBC:   Results from last 7 days   Lab Units 09/11/19  0552 09/10/19  0635 09/09/19  0616 09/08/19  1647 09/07/19  0434 09/06/19  1710   WBC 10*3/mm3 8.92 9.05 5.99 8.57 10.25 13.49*   HEMOGLOBIN g/dL 9.7* 10.2* 9.7* 9.8* 10.4* 11.4*   HEMATOCRIT % 29.0* 30.6* 29.0* 29.4* 31.8* 34.5   PLATELETS 10*3/mm3 328 330 302 324 322 302     BMP:   Results from last 7 days   Lab Units 09/11/19  0552 09/10/19  0635 09/09/19  0616 09/08/19  0744 09/07/19  0434 09/06/19  1710   SODIUM mmol/L 139 136 138 136 138 136   POTASSIUM mmol/L 3.4* 3.1* 3.2* 3.2* 4.5 4.7   CHLORIDE mmol/L 109* 111* 112* 110 109 104   CO2 mmol/L 25.0 24.0 22.0* 24.0 27.0 27.0   BUN mg/dL 10 13 15 21 28* 28*   CREATININE mg/dL 0.31* 0.50 0.51 0.50 0.48* 0.56   EGFR IF NONAFRICN AM mL/min/1.73 >150 125 122 125 131 109   GLUCOSE mg/dL 69 66* 59* 64* 69* 69*   CALCIUM mg/dL 7.0* 7.2* 6.9* 7.1* 7.3* 7.6*   ALT (SGPT) U/L  --   --   --   --   --  33     INR:   Results from last 7 days   Lab Units 09/06/19  1710   INR  0.95       Culture Results:   Blood Culture   Date Value Ref Range Status   09/06/2019 No growth  at 4 days  Preliminary   09/06/2019 No growth at 5 days  Final     Urine Culture   Date Value Ref Range Status   09/06/2019 (A)  Final    >100,000 CFU/mL Klebsiella pneumoniae ssp pneumoniae   09/06/2019 (A)  Final    40,000-50,000 CFU/mL Morganella morganii ssp morganii       Radiology: None    Additional Studies Reviewed: None    Physical Exam:  GENERAL:  AFA/developed in no acute distress. Thin.   SKIN: Pale; scabbed heel ulcers and diffuse reddness back.   Mild chelosis.  HEENT: Normal cephalic without trauma.  Pupils equal round reactive to light; pupils  Dilated.  Extraocular motions full without nystagmus.    Oral cavity without growths, exudates, and moist.  Posterior pharynx without mass, obstruction, redness.  No thyromegaly, mass, tenderness, lymphadenopathy and supple.  CV: Regular rhythm.  No murmur, gallop trace LE edema. Posterior pulses intact.  No carotid bruits.   CHEST: No chest wall tenderness or mass.   LUNGS: Symmetric motion with clear to auscultation.  ABD: Soft, nontender without mass.   ORTHO: Symmetric extremities without swelling/point tenderness.  Ankles plantar  Flexed.   NEURO: CN 2-12 grossly intact.  Symmetric facies. 1/4 x bicep equal reflexes.  UE/LE   2/5 strength throughout.  Nonfocal use extremities. Speech weak.   PSYCH: Disoriented x 2; knew me today.   Pleasant calm, well kept.  Shallow; minimal purposeful/directed conservation.     Results Review:    above    ASSESSMENT/PLAN:  Reason for admit/problems addressing acutely:  Lethargy-improved  Anorexia  Weight loss  Severe malnutrition  Hypotension-resolved  Respiratory failure-hypoxic-oxygen replaced  Abnormal urine (21-30 WBC, 4+ bacteruria with garcia)  UTI-klebsiella and Morganella/garcia  Polypharmacy-weaning  Hypopotassemia-IV affected  Edema; nutrition, IV fluids  Wounds-heel  Wounds-back  Wounds-other    Chronic problems to review/consider during care:  62 y/o white female   Allergy/intolerance: see above  Procedural  history: see above  Family history: see above  Obesity.  This is status post gastric bypass initial failure and regain of  weight.     1 para 1 AB 0.    Surgical menopause.  Osteoporosis on previous wrist study (hip fx 3..)   Hypertension.    Hypothyroidism.    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    History of DVT - left peroneal popliteal, 2008, treated              successfully with anticoagulation.   Anticoagulation needs: hx DVT, DVT risk/(coumadin) lovonex  Chronic insomnia.  Spinal spondylosis; this is primarily cervical with multiple surgeries.  Chronic neck pain.    Chronic back pain  Chronic narcotics-from pain management/[facility]  Chronic depression - followed by Dr. Samaniego.    Degenerative joint disease that is diffuse.   LE weakness  Gait difficulty-chronic   History of gastric bypass and metabolic risk it carries.    Colon cancer history with previous surgery and chemo, no obvious              recurrence.  Gastroesophageal reflux.  Recurrent laryngitis  Documented neuropathy by EMG nerve conduction  Seizure disorder (2016)  antieliptics-Keppra/Dilantin  Cerebral atrophy  CT head old CVA (.)  Abnormal CT chest-treating nonsurgically  Polypharmacy   Urinary incontience (history urge incontinence)  Vitamin D def (post gastric bypass)  Vitamin B12 def (post gastric bypass)  YOLIE     Rx-reviewed, considered with changes as needed: no changes here  Labs reviewed, considered and changes made as needed: no changes/stop  Imaging reviewed, and need for considered: no changes  Consults needed: none  Diet reviewed, considered and changes made as needed: no changes/encouraged  Fluids reviewed, considered and changes made as needed: no changes/none  Increase activity: PT referral planned at NH  Code status: DNR/limited  Discussed possible/future discharge plans: patient expects  MRN best option  Case discussed with nursing/NH   Available to talk if needed with POA/caregiver and members care team.    Josafat Gonzales MD  09/11/19  5:47 PM

## 2019-09-12 NOTE — PROGRESS NOTES
Continued Stay Note   [facility]     Patient Name: Bita Croft  MRN: [id number]  Today's Date: 9/12/2019    Admit Date: 9/6/2019    Discharge Plan     Row Name 09/12/19 0859       Plan    Plan  [facility]    Patient/Family in Agreement with Plan  yes    Plan Comments  Spoke with Angelica from [facility] [phone] and ins. has denied but they are going to speak with family and see about possible peer to peer review. Will follow.         Discharge Codes    No documentation.       Expected Discharge Date and Time     Expected Discharge Date Expected Discharge Time    Sep 10, 2019             VALERIE Campos

## 2019-09-12 NOTE — PLAN OF CARE
Problem: Skin Injury Risk (Adult)  Goal: Skin Health and Integrity  Outcome: Ongoing (interventions implemented as appropriate)      Problem: Patient Care Overview  Goal: Plan of Care Review  Outcome: Ongoing (interventions implemented as appropriate)   09/12/19 7277   Coping/Psychosocial   Plan of Care Reviewed With patient   Plan of Care Review   Progress no change   OTHER   Outcome Summary No c/o pain or discomfort. VSS. Wound care to come see patient. Possible dc to NH later this shift. Pt alert to self only. Turning q2 as patient allowed. 2 runs of K+ to be given. 1x dose IV lasix given. Safety maintained. Bed check on. Will continue to monitor.      Goal: Individualization and Mutuality  Outcome: Ongoing (interventions implemented as appropriate)    Goal: Discharge Needs Assessment  Outcome: Ongoing (interventions implemented as appropriate)    Goal: Interprofessional Rounds/Family Conf  Outcome: Ongoing (interventions implemented as appropriate)      Problem: Fall Risk (Adult)  Goal: Absence of Fall  Outcome: Ongoing (interventions implemented as appropriate)      Problem: Pneumonia (Adult)  Goal: Signs and Symptoms of Listed Potential Problems Will be Absent, Minimized or Managed (Pneumonia)  Outcome: Ongoing (interventions implemented as appropriate)      Problem: Nutrition, Imbalanced: Inadequate Oral Intake (Adult)  Goal: Improved Oral Intake  Outcome: Ongoing (interventions implemented as appropriate)    Goal: Prevent Further Weight Loss  Outcome: Ongoing (interventions implemented as appropriate)

## 2019-09-12 NOTE — PROGRESS NOTES
Continued Stay Note  [facility]     Patient Name: Bita Croft  MRN: [id number]  Today's Date: 9/12/2019    Admit Date: 9/6/2019    Discharge Plan     Row Name 09/12/19 1407       Plan    Plan Comments  Pt ins. denied pt return to [facility] under them paying.  Corin from facility contacted family and pt will return back private pay.  Informed Dr. Gonzales.         Discharge Codes    No documentation.       Expected Discharge Date and Time     Expected Discharge Date Expected Discharge Time    Sep 10, 2019             VALERIE Campos

## 2019-09-12 NOTE — PROGRESS NOTES
Wound Care Progress Note    62 year old with general failure to thrive resident at [facility] in [place] and admitted to [facility] 9/6/2019 with weakness, lethargy and change in mental status. When seen on 9/9/19, she had rather severe moisture associated dermatitis of the lower back, buttocks, and posterior upper thighs. She also had an escharotic P/U on the posterior right heel and two parallel longitudinal skin tears in the left and right coccygeal area.   She is being prepped for return to the nursing home later today or in the morning.    On examination, the MA dermatitis is considerably improved with Doris's Magic Butt cream. The two tears, however, persist and the left side is open with yellow shaggy necrotic tissue in it. The heel ulcer has partially digested with Plurogel and all three are ready for debridement.    Bedside excisional debridement performed with forceps and scissors excising necrotic skin and subcutaneous tissue.     The heel ulcer will continue with Plurogel daily with small cover bandage and booties to both feet.    The two parallel tears in the coccygeal area are dressed with a small amount of Plurogel and LOOSELY packed with saline moistened 1/4 inch plain nu-Gauze currently 2 inches long in the large wound on the left to be diminished (made shorter) with each daily dressing change. The smaller right wound has a small amount of Plurogel and a half centimeter of 1/4 inch NuGauze. Both are covered with a very small (1.3x2.0cm) Optifoam. The remaining diaper rash can then be treated with Magic Butt cream.    Continue the Magic Butt cream to diaper dermatitis TID and PRN to keep affected skin visibly medicated.    Thank you for asking me to see Ms. Lang Marx MD, FACS

## 2019-09-12 NOTE — PLAN OF CARE
Problem: Patient Care Overview  Goal: Plan of Care Review  Outcome: Ongoing (interventions implemented as appropriate)   09/12/19 8444   Coping/Psychosocial   Plan of Care Reviewed With patient   Plan of Care Review   Progress no change   OTHER   Outcome Summary VSS, pt rested all night. Minimal c/o pain. Refused some turns and educated on need for q2 turning. MD to address need for WC for saccral ulcer for d/c. Safety maintained and will continue to monitor.

## 2019-09-13 NOTE — DISCHARGE PLACEMENT REQUEST
"Cally Thomason [phone]  LangBita figueroa (63 y.o. Female)     Date of Birth Social Security Number Address Home Phone MRN    1955  [address]   [place] [address] [phone] [id number]    Gnosticist Marital Status          Christianity        Admission Date Admission Type Admitting Provider Attending Provider Department, Room/Bed    9/6/19 Emergency Josafat Gonzales MD Oliver, Randy Eugene, MD [facility] 4C, 491/1    Discharge Date Discharge Disposition Discharge Destination         Skilled Nursing Facility (DC - External)              Attending Provider:  Josafat Gonzales MD    Allergies:  No Known Allergies    Isolation:  None   Infection:  None   Code Status:  No CPR    Ht:  182.9 cm (72.01\")   Wt:  87.9 kg (193 lb 12.8 oz)    Admission Cmt:  None   Principal Problem:  None                Active Insurance as of 9/6/2019     Primary Coverage     Payor Plan Insurance Group Employer/Plan Group    [employer] [id number]     Payor Plan Address Payor Plan Phone Number Payor Plan Fax Number Effective Dates    [address] [phone]  7/1/2017 - None Entered    [place] [address]       Subscriber Name Subscriber Birth Date Member ID       OCHOA CROFT 1955 [id number]                 Emergency Contacts      (Rel.) Home Phone Work Phone Mobile Phone    Carlito Croft (Spouse) [phone] -- --              "

## 2019-09-13 NOTE — PLAN OF CARE
Problem: Skin Injury Risk (Adult)  Goal: Skin Health and Integrity  Outcome: Ongoing (interventions implemented as appropriate)   09/13/19 0345   Skin Injury Risk (Adult)   Skin Health and Integrity making progress toward outcome       Problem: Patient Care Overview  Goal: Plan of Care Review  Outcome: Ongoing (interventions implemented as appropriate)   09/13/19 0345   Coping/Psychosocial   Plan of Care Reviewed With patient   Plan of Care Review   Progress no change   OTHER   Outcome Summary PRN pain med given with good relief. VSS. Possible d/c to NH today. Turn q2h, Doris's to buttocks, wound care as ordered. Bed alarm in use. Safety maintained. Continue to monitor.       Problem: Fall Risk (Adult)  Goal: Absence of Fall  Outcome: Ongoing (interventions implemented as appropriate)   09/13/19 0345   Fall Risk (Adult)   Absence of Fall achieves outcome       Problem: Pneumonia (Adult)  Goal: Signs and Symptoms of Listed Potential Problems Will be Absent, Minimized or Managed (Pneumonia)  Outcome: Ongoing (interventions implemented as appropriate)   09/13/19 0345   Goal/Outcome Evaluation   Problems Assessed (Pneumonia) all   Problems Present (Pneumonia) infection progression       Problem: Nutrition, Imbalanced: Inadequate Oral Intake (Adult)  Goal: Improved Oral Intake  Outcome: Ongoing (interventions implemented as appropriate)   09/13/19 0345   Nutrition, Imbalanced: Inadequate Oral Intake (Adult)   Improved Oral Intake making progress toward outcome     Goal: Prevent Further Weight Loss  Outcome: Ongoing (interventions implemented as appropriate)   09/13/19 0345   Nutrition, Imbalanced: Inadequate Oral Intake (Adult)   Prevent Further Weight Loss making progress toward outcome

## 2019-09-13 NOTE — TELEPHONE ENCOUNTER
Sylvia called asking if you wanted her to continue the Lopressor? It was not on the D/C'd orders, but was on it prior.

## 2019-09-13 NOTE — PROGRESS NOTES
Continued Stay Note  [facility]     Patient Name: Bita Croft  MRN: [id number]  Today's Date: 9/13/2019    Admit Date: 9/6/2019    Discharge Plan     Row Name 09/13/19 0947       Plan    Plan  [facility]    Patient/Family in Agreement with Plan  yes    Final Discharge Disposition Code  04 - intermediate care facility    Final Note  Pt is being discharged to [facility] today.  Informed Corin from Nursing facility [phone] of d/c today, pt will be admitted at [facility] care.  Informed spouse of d/c, Carlito Croft [phone]. Pt will be transported via [facility] ambulance [phone].    [facility] [phone]        Discharge Codes    No documentation.       Expected Discharge Date and Time     Expected Discharge Date Expected Discharge Time    Sep 13, 2019             VALERIE Campos

## 2019-09-14 NOTE — PAYOR COMM NOTE
"DC TO SNF 9-13-19  [id number]   [id number]    Bita Croft (63 y.o. Female)     Date of Birth Social Security Number Address Home Phone MRN    1955  [address]   [place] [address] [phone] [id number]    Restoration Marital Status          Jehovah's witness        Admission Date Admission Type Admitting Provider Attending Provider Department, Room/Bed    9/6/19 Emergency Josafat Gonzales MD  [facility] 4C, 491/1    Discharge Date Discharge Disposition Discharge Destination        9/13/2019 Skilled Nursing Facility (DC - External)              Attending Provider:  (none)   Allergies:  No Known Allergies    Isolation:  None   Infection:  None   Code Status:  Prior    Ht:  182.9 cm (72.01\")   Wt:  87.9 kg (193 lb 12.8 oz)    Admission Cmt:  None   Principal Problem:  None                Active Insurance as of 9/6/2019     Primary Coverage     Payor Plan Insurance Group Employer/Plan Group    [employer]ERCIAL AETNA [id number]     Payor Plan Address Payor Plan Phone Number Payor Plan Fax Number Effective Dates    [address] [phone]  7/1/2017 - None Entered    [place] [address]       Subscriber Name Subscriber Birth Date Member ID       OCHOA CROFT 1955 [id number]                 Emergency Contacts      (Rel.) Home Phone Work Phone Mobile Phone    Carlito Croft (Spouse) [phone] -- --               Physician Progress Notes (last 48 hours) (Notes from 9/12/2019  8:57 AM through 9/14/2019  8:57 AM)      Sergio Marx MD at 9/12/2019  6:42 PM        Wound Care Progress Note    62 year old with general failure to thrive resident at [facility] in [place] and admitted to [facility] 9/6/2019 with weakness, lethargy and change in mental status. When seen on 9/9/19, she had rather severe moisture associated dermatitis of the lower back, buttocks, and posterior upper thighs. She also had an escharotic P/U on the posterior right heel and two " parallel longitudinal skin tears in the left and right coccygeal area.   She is being prepped for return to the nursing home later today or in the morning.    On examination, the MA dermatitis is considerably improved with Doris's Magic Butt cream. The two tears, however, persist and the left side is open with yellow shaggy necrotic tissue in it. The heel ulcer has partially digested with Plurogel and all three are ready for debridement.    Bedside excisional debridement performed with forceps and scissors excising necrotic skin and subcutaneous tissue.     The heel ulcer will continue with Plurogel daily with small cover bandage and booties to both feet.    The two parallel tears in the coccygeal area are dressed with a small amount of Plurogel and LOOSELY packed with saline moistened 1/4 inch plain nu-Gauze currently 2 inches long in the large wound on the left to be diminished (made shorter) with each daily dressing change. The smaller right wound has a small amount of Plurogel and a half centimeter of 1/4 inch NuGauze. Both are covered with a very small (1.3x2.0cm) Optifoam. The remaining diaper rash can then be treated with Magic Butt cream.    Continue the Magic Butt cream to diaper dermatitis TID and PRN to keep affected skin visibly medicated.    Thank you for asking me to see Ms. Lang Marx MD, FACS    Electronically signed by Sergio Marx MD at 9/12/2019  7:14 PM       Consult Notes (last 48 hours) (Notes from 9/12/2019  8:57 AM through 9/14/2019  8:57 AM)     No notes of this type exist for this encounter.           Discharge Summary      Josafat Gonzales MD at 9/13/2019  8:29 AM          Patient ID: Bita Croft  MRN: [id number]     Acct:  [id number]    Admit Date: 9/6/2019   Discharge Date: 9.13.19  Date of service: 9.13.19    Consults:    IP CONSULT TO NUTRITION SERVICES  IP CONSULT TO CASE MANAGEMENT   IP CONSULT TO NUTRITION SERVICES  IP CONSULT TO WOUND  "CARE MD    PATIENT PROFILE: The patient is a 64 y/o white  female resident of [facility]; she was cooperative.      CHIEF COMPLAINT: \"she is dying\"     HISTORY OF PRESENT ILLNESS: I had a family meeting with her brother and  the day of admission.  They indicated they thought she lost about 20 pounds in the last 2 weeks and was not eating anything at the nursing home.  Through 4 days earlier I talked to the director of nurses and she indicated the patient was not improving with therapy in general and with wondered if with her history her family would want her to be hospice patient.  She does have a history of probable lung cancer (she was not well enough to do surgery she was treated with radiation):  she has significant spinal stenosis for which her medical condition makes her nonoperable and at least for the last 6 months she has not been able to ambulate; she is either been wheelchair or bedridden.  She has a long history of depression anxiety probable seizure disorder may be even a psych chronic condition.  She is seen numerous psychiatrists and takes large doses of psychiatric as well as pain medications.  She is frequently quite lethargic and subdued.  So the decision made by the family was to bring her to the ER; there she was found to be hypotensive hypoxic (which corrected with oxygen) and we elected to admit with fluids empiric antibiotics (there was concern she might have an infiltrate in her urine had abnormalities) allow speech to evaluate use fluids slowly withdrawal some of her polypharmacy and then the next 48+ hours to see if there would be any improvement in her condition that would help define whether we need to do continue to press on or whether she should be hospice.  She is apparently discussed with her  when she was well that she would never want life support such as with a feeding tube.     No Known Allergies     HOME MEDICATIONS:          Prior to Admission medications  "   Medication Sig Start Date End Date Taking? Authorizing Provider   acetaminophen (TYLENOL) 325 MG tablet Take 2 tablets by mouth Every 4 (Four) Hours As Needed for Mild Pain  or Fever. 6/7/18   Yes Josafat Gonzales MD   bisacodyl (DULCOLAX) 10 MG suppository Insert 10 mg into the rectum Every 72 (Seventy-Two) Hours As Needed for Constipation.     Yes Beverly Wells MD   clonazePAM (KlonoPIN) 1 MG tablet Take 1 tablet by mouth 3 (Three) Times a Day As Needed for Anxiety. 8/6/19   Yes Josafat Gonzales MD   collagenase 250 UNIT/GM ointment Apply  topically to the appropriate area as directed Every 12 (Twelve) Hours. 8/6/19   Yes Josafat Gonzales MD   cyclobenzaprine (FLEXERIL) 10 MG tablet Take 1 tablet by mouth 3 (Three) Times a Day. 3/14/19   Yes Josafat Gonzales MD   DULoxetine (CYMBALTA) 30 MG capsule Take 30 mg by mouth Daily.     Yes Beverly Wells MD   gabapentin (NEURONTIN) 600 MG tablet Take 1 tablet by mouth 3 (Three) Times a Day. 8/6/19   Yes Josafat Gonzales MD   levETIRAcetam XR (KEPPRA XR) 500 MG 24 hr tablet Take 2 tablets by mouth Every 12 (Twelve) Hours. 3/14/19   Yes Josafat Gonzales MD   levothyroxine (SYNTHROID, LEVOTHROID) 100 MCG tablet Take 100 mcg by mouth Daily.     Yes Beverly Wells MD   Multiple Vitamin (MULTI-VITAMIN DAILY) tablet Take 1 tablet by mouth Daily.     Yes Beverly Wells MD   nystatin (MYCOSTATIN) 879900 UNIT/GM powder Apply  topically to the appropriate area as directed 2 (Two) Times a Day. Apply to groin.     Yes Beverly Wells MD   oxyCODONE (ROXICODONE) 10 MG tablet Take 1 tablet by mouth Every 4 (Four) Hours As Needed for Moderate Pain . 8/6/19   Yes Josafat Gonzales MD   potassium chloride (K-DUR,KLOR-CON) 20 MEQ CR tablet Take 20 mEq by mouth 3 (Three) Times a Day.     Yes Beverly Wells MD   QUEtiapine (SEROquel) 100 MG tablet Take 1 tablet by mouth 2 (Two) Times a Day. 7/17/17   Yes Christian,  Josafat Melton MD   QUEtiapine (SEROquel) 300 MG tablet Take 300 mg by mouth Every Night.     Yes Beverly Wells MD   rivaroxaban (XARELTO) 10 MG tablet Take 10 mg by mouth Daily.     Yes Beverly Wells MD   tolterodine LA (DETROL LA) 4 MG 24 hr capsule Take 4 mg by mouth Daily.     Yes Beverly Wells MD   traZODone (DESYREL) 50 MG tablet Take 1 tablet by mouth Every Night. 18   Yes Josafat Gonzales MD   venlafaxine XR (EFFEXOR-XR) 150 MG 24 hr capsule Take 2 capsules by mouth Daily. 18   Yes Josafat Gonzales MD   vitamin C (ASCORBIC ACID) 250 MG tablet Take 250 mg by mouth 2 (Two) Times a Day.     Yes Beverly Wells MD   vitamin D (ERGOCALCIFEROL) 86207 units capsule capsule Take 50,000 Units by mouth 1 (One) Time Per Week. .     Yes Beverly Wells MD   metoprolol tartrate (LOPRESSOR) 50 MG tablet TAKE 1 TABLET BY MOUTH DAILY 19   Josafat Gonzales MD   nystatin (nystatin) 285955 UNIT/GM powder WASH AFFECTED AREA AND DRY WELL THREE TIMES DAILY. AFTER DRY APPLY NYSTATIN POWDER TWICE DAILY 19   Josafat Gonzales MD   solifenacin (VESICARE) 10 MG tablet Take 1 tablet by mouth Daily. 19   Josafat Gonzales MD         PAST HISTORY:  CHILDHOOD: unremarkable.      PROCEDURES:   SCANNED  [id number]  Colonoscopy+tort-mass/[facility]//8.4.14/BE  EGD+nl/[facility]//5.15/UGI  Colonoscopy+polyp/[facility]//9.21.15/3y     SURGERIES:  T&A/age 11  R knee/[facility]/LH/age 13  SANDRA+BSO/[place]/WB/age 32  T12/[place]/  Gastric by-pass/[place]/  L knee/[place]/  C surgery/[place]/06  C surgery/[place]/08  Lap GB/[facility]/[place]/3-18-10  L arthroscope/  R lap colectomy/[place]/.   ORIF L wrist/[place]//16  R hip fx//3.29.19     FAMILY HISTORY:  HTN/m,a-m,f,gf-p,  Heart/m,f,gf-p,gm-p,gm-m  DM/u-m,  CA-colon/none  CA-other/none  CA-breast/none     HABITS:  Tobacco-smoker/never  Tobacco-2nd  handed/limited  Alcohol/none  Drugs/     SOCIAL HISTORY:  /1987,  Children/1  Employment/[employer] tx-unit 1/disability  Disability/     PATIENT EDUCATION:     ADVISED/OFFERED:  Colonoscopy/8.2.10+  MRI LS spine/6.11.12     HOSPITAL ADMITS:      MMH:   11.8.08-11.12.08-hyponatremia  2.8.10-2.9.10-L flank pain  3.13.14-3.15.14 hever/TIA  12.26.17-12.27.17    Shortness of breath  Cough  Flu B  Bronchitis-acute  Abnormal CT chest-AGUILAR spiculated nodule  Chest wall pain  Hyponatremia 128  Neutropenia 2.54     5.26.18-5.31.18  Lethargy-likely 2nd overdose Imodium-resolved  Confusion-same-resolved  Brief intubation (airway protection) 26-27th  Abnormal CXR-? Pneumonia/atelectasis  Overdose Imodium; initial ? suicide jesture refuted  Depression-acute/chronic  Anxiety-acute/chronic  Hypotension-brief and decrease Rx  Cheilosis-improved empiric monistat  Gait decline-acute/chronic  Anemia-component phlebotomy, (substrate neg)  +BC/staph homnis/epidermitis (contaminet)  E coli bacteruria-likely UTI  UTI-     BH:   7.21-7.23.95 abdominal pain  2.19.08-2.21.08-DVT  2.9.10-2.11.10-abdominal pain/intercostal  3.11.10-3.19.10- hever-abdominal pain+SIADH  3.24.15-3.30.15 diarrhea  5.26.15-5.31.15 nausea/vomiting/diarrhea  7.12.17-7.17.17 accidential injection water bottle cap     3.10.19-3.14.19  Confusion-delirium (? Sepsis, ? Seizure)-resolved  Hx seizure disorder-normal EEG  Abnormal UA-(21-30WBC, mod leuk, 2+ bacteria) maybe UTI (BC neg, 10.5 strept                               Group B)  CXR-infiltrate (note lack cough, etc)-maybe pneumonia-persistant L peihilair/mid                               Lung infiltrate  Sepsis (tachycardia 104, lactic acid 6.0)-maybe  Low phenytoin level 3.0-not taking-improved  Hypopotassemia-2.3-uwxdmcst-wjovoemx  Drug screen THC, opiate  Rboungtuj-jwyqqgrtk-cunlminr  NPO-resolved  Cerebral atrophy-frontal  YOLIE-without treatment  Non-compliance; Rx (dilantin, keppra), CPAP  B12 def (post  "gastric bypass);   Vit D def (post gastric bypass)  Polypharmacy; difficult Rx list     3.28.2019 -4.1.19  R hip fracture-post surgery  Pain R hip-improving  R hip incision-healing  Gait difficulty-acute/chronic-slowly improving  Post-operative state-improving  Anticoagulation needs: hx DVT, DVT risk/(coumadin) (lovonex), xarelto  Anemia-chronic-component post op/blood loss  Arthritis-diffuse  Joint pains-multiple-chronic (worse without Rx)  Back pain-chronic (worse without Rx)  Neck pain-chronic (worse without Rx)  Anxiety-chronic (worse without Rx)  Medication error (accidentaly holding of Rx)     7.30.19-8.6.19  Wounds-back (cultures klebsiella, proteus buttock)  Cellulitis-back-(klebsiella/proteus)  Wounds-heels  Urinary incontinence  Bacteruria-pseudomonas  Abnormal urine-unlikely UTI (cultures neg)  IV abx: vancomycin (7.30.19-8.3.19), zosyn (7.30.19-8.3.19)  Fecal incontinence  Campbell catheter-needed for wound care  Lethargy-variable  Trytqykjs-igjlydixpwaule-Vj/hyponatremia/others  Hyponatremia 127-improved  SIADH-chronic  hypokalemia  Malnutrition-severe (weight loss, low t protein, albumin)  Hypoglycemia-nutrition related  Dehydration (SG > 1.030)-resolved  Hypotension-affect lopressor, Rx, dehydration-improved  Bedrest  Diarrhea-abx related  Itching  Rash-dry skin     LH:   10.26.16-11.6.16 seizure     GENERAL:  Inactive/slower with limits, speed, stamina for age and pains, desire, above. Sleep is ok usually with Rx.  Has pain management and psych past. No fever reported at nursing home.   SKIN: continues with \"gaulded\" rash to back.   ENDO:  No syncope, near or diaphoretic sweaty spells.  BS Ok since here.  HEENT: No head injury or headache.  No vision change.   No significant hearing loss. Ears without pain/drainage.  No sore throat.  No significant nasal/sinus congestion/drainage. No epistaxis.  CHEST: No chest wall tenderness or mass. No significant cough, wheeze, SOB; no hemoptysis.  CV: No chest " "pain, palpitations, significant ankle edema.  GI: No heartburn, dysphagia.  No abdominal pain, diarrhea, constipation, rectal bleeding, or melena;  incontinent.  :  Voids with incontinence; came with garcia from nursing home.   ORTHO: No painful/swollen joints but various on /off sore.  Usually has sore neck or back.  No acute neck or back pain without recent injury.   NEURO: No dizziness, weakness of extremities.  Usually has LE numbness/paresthesias.   PSYCH: Prior memory loss.  Mood always variable; often anxious, depressed but/and not suicidal though has had prior overdose.  Tolerates stress variably.          PHYSICAL EXAMINATION:  /65 (BP Location: Right arm, Patient Position: Lying)   Pulse 110   Temp 98.7 °F (37.1 °C) (Oral)   Resp 16   Ht 182.9 cm (72.01\")   Wt 85.8 kg (189 lb 3.2 oz)   LMP  (LMP Unknown)   SpO2 100%   BMI 25.65 kg/m²      GENERAL:  AFA/developed in no acute distress. Thin.   SKIN: Pale; scabbed heel ulcers and diffuse reddness back.   Mild chelosis.  HEENT: Normal cephalic without trauma.  Pupils equal round reactive to light; pupils  Dilated.  Extraocular motions full without nystagmus.    Oral cavity without growths, exudates, and moist.  Posterior pharynx without mass, obstruction, redness.  No thyromegaly, mass, tenderness, lymphadenopathy and supple.  CV: Regular rhythm.  No murmur, gallop trace LE edema. Posterior pulses intact.  No carotid bruits.   CHEST: No chest wall tenderness or mass.   LUNGS: Symmetric motion with clear to auscultation.  ABD: Soft, nontender without mass.   ORTHO: Symmetric extremities without swelling/point tenderness.  Ankles plantar  Flexed.   NEURO: CN 2-12 grossly intact.  Symmetric facies. 1/4 x bicep equal reflexes.  UE/LE   2/5 strength throughout.  Nonfocal use extremities. Speech weak.   PSYCH: Oriented x 2; not date.  Pleasant calm, well kept.  Shallow; minimal purposeful/directed conservation.      ASSESSMENT/PROBLEM LIST:   62 y/o " white female   Allergy/intolerance: see above  Procedural history: see above  Family history: see above  Obesity.  This is status post gastric bypass initial failure and regain of  weight.     1 para 1 AB 0.    Surgical menopause.  Osteoporosis on previous wrist study (hip fx 3..)   Hypertension.    Hypothyroidism.    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    History of DVT - left peroneal popliteal, 2008, treated              successfully with anticoagulation.   Anticoagulation needs: hx DVT, DVT risk/(coumadin) lovonex  Chronic insomnia.  Spinal spondylosis; this is primarily cervical with multiple surgeries.  Chronic neck pain.    Chronic back pain  Chronic narcotics-from pain management/Ruxer  Chronic depression - followed by Dr. Samaniego.    Degenerative joint disease that is diffuse.   LE weakness  Gait difficulty-chronic   History of gastric bypass and metabolic risk it carries.    Colon cancer history with previous surgery and chemo, no obvious              recurrence.  Gastroesophageal reflux.  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  She ended up growing Klebsiella 10 to the fifth and Morganella 40-50,000 from her Garcia bag.  Her blood cultures were negative.  She never had a  significant cough wheeze or other signs of anything to suggest that what was seen in the ER and chest x-ray was a pneumonia.  Medications were slowly wean; to a significant extent.  Her polypharmacy was weaned down significantly and in a manner to avoid any withdrawal of anything.  Even her Keppra was withdrawn and there was no signs of seizure activity.  Over the first day and a half she became gradually more alert; but unfortunately even at discharge she still confused.  She was seen by Dr. Marx on the ninth and made some recommendations for her various wound care.  Dietary saw her and of course diagnosed severe malnutrition; supplements and nutritional support was given and I am uncertain at this point if this made a significant degree of improvement in what she is taking in orally.  Her Campbell was maintained primarily to maintain her back from getting any worse with rash and issues.  She was able to be weaned off of oxygen.  So we have accomplished what we promised; we treated with antibiotics gave her fluid support various other skilled care support and wean her medications.  Now we remain uncertain on whether this is going to contribute to a progressive improvement or actually a return to decline.  From previous conversation I believe the family will move towards hospice if she shows further decline.    We were anticipating her leaving here on the 10th.  Insurance did not okay her for payment at the nursing home.  The family would not self-pay.  We had to cancel that discharge.  Time was then spent working on the billing issues and the day before discharge the nursing home was told that the insurance was not going to cover her care.  The family decided to self-pay at least for short period of time.  Along with that she seemed a little more edematous and I gave IV Lasix and 2 runs of IV potassium as her potassium was trending low.  Dr. Marx also reviewed lesions on her heel and back and decided she needs some  debridement and that was done late on the 12th.  It was too late then to send her to the nursing home; were attempting now to return to [facility] for skilled care and plans as before.    Labs included:     Labs 24 hr before discharge:  Lab Results (last 24 hours)     Procedure Component Value Units Date/Time    Basic Metabolic Panel [[id number]]  (Abnormal) Collected:  09/10/19 0635    Specimen:  Blood Updated:  09/10/19 0802     Glucose 66 mg/dL      BUN 13 mg/dL      Creatinine 0.50 mg/dL      Sodium 136 mmol/L      Potassium 3.1 mmol/L      Chloride 111 mmol/L      CO2 24.0 mmol/L      Calcium 7.2 mg/dL      eGFR Non African Amer 125 mL/min/1.73      BUN/Creatinine Ratio 26.0     Anion Gap 1.0 mmol/L     Narrative:       GFR Normal >60  Chronic Kidney Disease <60  Kidney Failure <15    CBC & Differential [[id number]] Collected:  09/10/19 0635    Specimen:  Blood Updated:  09/10/19 0745    Narrative:       The following orders were created for panel order CBC & Differential.  Procedure                               Abnormality         Status                     ---------                               -----------         ------                     CBC Auto Differential[[id number]]        Abnormal            Final result                 Please view results for these tests on the individual orders.    CBC Auto Differential [[id number]]  (Abnormal) Collected:  09/10/19 0635    Specimen:  Blood Updated:  09/10/19 0745     WBC 9.05 10*3/mm3      RBC 3.48 10*6/mm3      Hemoglobin 10.2 g/dL      Hematocrit 30.6 %      MCV 87.9 fL      MCH 29.3 pg      MCHC 33.3 g/dL      RDW 17.9 %      RDW-SD 56.1 fl      MPV 11.4 fL      Platelets 330 10*3/mm3      Neutrophil % 76.5 %      Lymphocyte % 13.9 %      Monocyte % 7.4 %      Eosinophil % 1.2 %      Basophil % 0.6 %      Immature Grans % 0.4 %      Neutrophils, Absolute 6.92 10*3/mm3      Lymphocytes, Absolute 1.26 10*3/mm3      Monocytes, Absolute 0.67 10*3/mm3       Eosinophils, Absolute 0.11 10*3/mm3      Basophils, Absolute 0.05 10*3/mm3      Immature Grans, Absolute 0.04 10*3/mm3      nRBC 0.0 /100 WBC     Blood Culture - Blood, Hand, Left [[id number]] Collected:  09/06/19 1856    Specimen:  Blood from Hand, Left Updated:  09/09/19 1924     Blood Culture No growth at 3 days    Blood Culture - Blood, Hand, Left [[id number]] Collected:  09/06/19 1710    Specimen:  Blood from Hand, Left Updated:  09/09/19 1745     Blood Culture No growth at 3 days          Lab Results otherwise  CBC:   Results from last 7 days   Lab Units 09/10/19  0635 09/09/19  0616 09/08/19  1647 09/07/19  0434 09/06/19  1710   WBC 10*3/mm3 9.05 5.99 8.57 10.25 13.49*   HEMOGLOBIN g/dL 10.2* 9.7* 9.8* 10.4* 11.4*   HEMATOCRIT % 30.6* 29.0* 29.4* 31.8* 34.5   PLATELETS 10*3/mm3 330 302 324 322 302     BMP:  Results from last 7 days   Lab Units 09/10/19  0635 09/09/19  0616 09/08/19  0744 09/07/19  0434 09/06/19  1710   SODIUM mmol/L 136 138 136 138 136   POTASSIUM mmol/L 3.1* 3.2* 3.2* 4.5 4.7   CHLORIDE mmol/L 111* 112* 110 109 104   CO2 mmol/L 24.0 22.0* 24.0 27.0 27.0   BUN mg/dL 13 15 21 28* 28*   CREATININE mg/dL 0.50 0.51 0.50 0.48* 0.56   GLUCOSE mg/dL 66* 59* 64* 69* 69*   CALCIUM mg/dL 7.2* 6.9* 7.1* 7.3* 7.6*   ALT (SGPT) U/L  --   --   --   --  33     A1c:Invalid input(s): A1C   A1c:@LABRCNOP(A1c:7)@    Culture Results:   Blood Culture   Date Value Ref Range Status   09/06/2019 No growth at 3 days  Preliminary   09/06/2019 No growth at 3 days  Preliminary     Urine Culture   Date Value Ref Range Status   09/06/2019 (A)  Final    >100,000 CFU/mL Klebsiella pneumoniae ssp pneumoniae   09/06/2019 (A)  Final    40,000-50,000 CFU/mL Morganella morganii ssp morganii       DISCHARGE ASSESSMENT:  Reasons for admit/problems address while here:   Lethargy-improved  confusion  Anorexia  Weight loss  Severe malnutrition-protein/caloric  Hypotension-resolved  Respiratory failure-hypoxic-oxygen replaced  Abnormal  urine (21-30 WBC, 4+ bacteruria with garcia)  UTI-klebsiella and Morganella/garcia  Polypharmacy-weaning  Hypopotassemia-IV affected  Edema; nutrition, IV fluids    Chronic problems affecting stay:  See above      PLAN:   See AVS    Discharge Disposition:  Skilled Nursing Facility (DC - External)Back to [facility] and nursing    Discharge Medications:     Discharge Medications      New Medications      Instructions Start Date   cefTRIAXone 1 g injection  Commonly known as:  ROCEPHIN   1 g, Intramuscular, Every 24 Hours      rody's amazing butt cream   1 application, Topical, 3 Times Daily PRN         Changes to Medications      Instructions Start Date   clonazePAM 1 MG tablet  Commonly known as:  KlonoPIN  What changed:    · how much to take  · when to take this   0.5 mg, Oral, 2 Times Daily PRN      gabapentin 600 MG tablet  Commonly known as:  NEURONTIN  What changed:    · how much to take  · when to take this   300 mg, Oral, 2 Times Daily      levETIRAcetam  MG 24 hr tablet  Commonly known as:  KEPPRA XR  What changed:  when to take this   1,000 mg, Oral, Daily      oxyCODONE 10 MG tablet  Commonly known as:  ROXICODONE  What changed:  how much to take   5 mg, Oral, Every 4 Hours PRN      QUEtiapine 100 MG tablet  Commonly known as:  SEROquel  What changed:    · how much to take  · when to take this  · Another medication with the same name was removed. Continue taking this medication, and follow the directions you see here.   25 mg, Oral, Nightly      venlafaxine XR 37.5 MG 24 hr capsule  Commonly known as:  EFFEXOR-XR  What changed:    · medication strength  · how much to take  · when to take this   37.5 mg, Oral, Nightly         Continue These Medications      Instructions Start Date   acetaminophen 325 MG tablet  Commonly known as:  TYLENOL   650 mg, Oral, Every 4 Hours PRN      bisacodyl 10 MG suppository  Commonly known as:  DULCOLAX   10 mg, Rectal, Every 72 Hours PRN      collagenase 250 UNIT/GM  ointment   Topical, Every 12 Hours Scheduled      DULoxetine 30 MG capsule  Commonly known as:  CYMBALTA   30 mg, Oral, Daily      levothyroxine 100 MCG tablet  Commonly known as:  SYNTHROID, LEVOTHROID   100 mcg, Oral, Daily      MULTI-VITAMIN DAILY tablet   1 tablet, Oral, Daily      nystatin 160028 UNIT/GM powder  Commonly known as:  MYCOSTATIN   Topical, 2 Times Daily, Apply to groin.      potassium chloride 20 MEQ CR tablet  Commonly known as:  K-DUR,KLOR-CON   20 mEq, Oral, 3 Times Daily      vitamin C 250 MG tablet  Commonly known as:  ASCORBIC ACID   250 mg, Oral, 2 Times Daily      vitamin D 26988 units capsule capsule  Commonly known as:  ERGOCALCIFEROL   50,000 Units, Oral, Weekly, Sunday.      XARELTO 10 MG tablet  Generic drug:  rivaroxaban   10 mg, Oral, Daily         Stop These Medications    cyclobenzaprine 10 MG tablet  Commonly known as:  FLEXERIL     tolterodine LA 4 MG 24 hr capsule  Commonly known as:  DETROL LA     traZODone 50 MG tablet  Commonly known as:  DESYREL            Discharge Care Plan/Instructions:   Admit to [facility]  Routine NH admit orders/procedures  NH to re/establish code status-likely DNR    Rx:   Oxygen: keep sats > 90%  See AVS/discharge summary     Diet:   Additional type: preferred  Consistency:    Solids: general     Fluids: thin  Calories 2335-1850/24 hr  Fluids 60 oz/24 hr-stay hydrated  Dietary referral for nutritional assessment, advise and follow    Activity:   Gradually increase as able  PT, OT, speech referrals only if family wants  Up with assistance only until cleared by PT   Additional requests: Dolphin mattress set at 3 dots/or alternative low pressure mattress.  Turn side to side q 2 hrs. Allow back side air exposure; keep dry, change promptly when soiled or wet, use only ONE chux beneath her rash area. Stay in booties.  Doris's Magic Butt cream to Candidal dermatitis TID and prn to keep visibly medicated.  Avoid traction on buttocks when cleaning bottom and  turning-see below    Additional instructions:  LAB one week CBC, CMP around 9.17.19  Wound care:   A. Refer to  wound care  B. In house:   The heel ulcer will continue with Plurogel daily with small cover bandage and booties to both feet.    The two parallel tears in the coccygeal area are LOOSLY packed with saline moistened 1/4 inch plain nu-Gauze currently 2 inches long in the large wound on the left to be diminished (made shorter) with each daily dressing change. The smaller right wound has a small amount of Plurogel and a half centimeter of 1/4 inch NuGauze. [place] are covered with a very small (1.3x2.0cm) Optifoam. The remaining diaper rash can then be treated with Magic Butt cream.    Continue the Magic Butt cream to diaper dermatitis TID and PRN to keep affected skin visibly medicated.  Garcia care (note garcia though risky is needed to help with healing of the back wounds)    F/u appointments:   Dr Gonzales will see on next regular NH rounds (call between if needs to be seen earlier)  If/as family reviews if they want hospice; let Dr Gonzales know    Other appointments:   No future appointments. at office    CONDITION: stable/improved    PROGNOSIS: guarded/poor    Addendum 9.10.19   She was not able to be cleared by her insurance for admission back to [facility].  I was unaware of this as I proceeded through working her through the discharge process.  Apparently her  does not plan to private pay and would even take her home before he would allow that to happen.  This is not why; I am not sure that has been can adequately provide her care.  For this reason we are stopping the discharge process making today a progress note and will continue to work with  in the nursing home to try to get her back to the nursing home.    Electronically signed by Josafat Gonzales MD at 9/13/2019 10:37 AM

## 2019-10-01 NOTE — TELEPHONE ENCOUNTER
Let  know I have reached out to nursing for a complete report on where we are at right now and think we need another family meeting tomorrow afternoon;  He/? brother

## 2019-10-02 NOTE — TELEPHONE ENCOUNTER
Faxed request from [facility]      Requested Prescriptions     Pending Prescriptions Disp Refills   • oxyCODONE (ROXICODONE) 10 MG tablet 90 tablet 0     Sig: Take 0.5 tablets by mouth Every 4 (Four) Hours As Needed for Moderate Pain .

## 2019-10-02 NOTE — TELEPHONE ENCOUNTER
Spoke to  and advised him to meet at [facility] with Dr. Gonzales

## 2019-10-07 NOTE — TELEPHONE ENCOUNTER
Will have to wait till ro lunch hour to put together  Need current Rx list anyway  Need to know what/any Rx need to be written  Need to know which home health planned to be used  Need to confirm with NH all equipment ready

## 2019-10-07 NOTE — TELEPHONE ENCOUNTER
Pt being discharged from nursing home and needs RX   klonopin 0.5 mg bid  Oxycondone hcl 5 mg Q 4 hour prn   xarelto 10 mg Qd    rx's prepared for Dr Gonzales to be cosigned and sent to gael

## 2019-10-07 NOTE — TELEPHONE ENCOUNTER
Leaving NH today  NH cannot make home health referral  Dr Gonzales recalls having trouble getting that before  Order placed; need to talk to /home health

## 2019-10-08 NOTE — TELEPHONE ENCOUNTER
Called .  He is got all his equipment medications and things are going well.  He is turning his wife every 2 hours and even has a fan on her back right now.  He is made aware that [place] so they will try to see her within 48 hours.  He will let me know if there is any problems.

## 2019-10-09 NOTE — TELEPHONE ENCOUNTER
called and he said you wanted to know if they did not hear from [facility] and he has not. The 48 hours will be up in about an hour from now.

## 2019-10-09 NOTE — TELEPHONE ENCOUNTER
LH did talk with the  and he now wants Hospice instead of Home Health. They need a new referral if you are agreeable?

## 2019-10-09 NOTE — TELEPHONE ENCOUNTER
Ask [facility] how they are coming on the referral?   Keep  updated what LH says  Is he having any problems with her heels/back/wounds?

## 2019-10-10 NOTE — TELEPHONE ENCOUNTER
After talking to MD and SHAEMKA/MA, order was created for Hospice services to take over and faxed to [phone]

## 2019-10-10 NOTE — PAYOR COMM NOTE
"Bita Croft (63 y.o. Female)     Date of Birth Social Security Number Address Home Phone MRN    1955  [address]   [facility] [place] [place] [address] [phone] [id number]    Rastafari Marital Status          Rastafarian        Admission Date Admission Type Admitting Provider Attending Provider Department, Room/Bed    9/6/19 Emergency Josafat Gonzales MD Oliver, Randy Eugene, MD [facility] 4C, 491/1    Discharge Date Discharge Disposition Discharge Destination                       Attending Provider:  Josafat Gonzales MD    Allergies:  No Known Allergies    Isolation:  None   Infection:  None   Code Status:  No CPR    Ht:  182.9 cm (72.01\")   Wt:  85.8 kg (189 lb 3.2 oz)    Admission Cmt:  None   Principal Problem:  None                Active Insurance as of 9/6/2019     Primary Coverage     Payor Plan Insurance Group Employer/Plan Group    [employer]ERCIAL AETNA [id number]     Payor Plan Address Payor Plan Phone Number Payor Plan Fax Number Effective Dates    [address] [phone]  7/1/2017 - None Entered    [facility] [id number]       Subscriber Name Subscriber Birth Date Member ID       OCHOA CROFT 1955 [id number]                 Emergency Contacts      (Rel.) Home Phone Work Phone Mobile Phone    Carlito Croft (Spouse) [phone] -- --              Vital Signs (last 2 days)     Date/Time   Temp   Temp src   Pulse   Resp   BP   Patient Position   SpO2    09/11/19 0700   98.3 (36.8)   Oral   97   16   117/75   Lying   97    09/11/19 0356   97.9 (36.6)   Oral   91   18   109/65   Lying   98    09/10/19 1700   99.5 (37.5)   Oral   105   18   112/82   Lying   97    09/10/19 1100   98.5 (36.9)   Oral   95   16   142/76   Lying   95    09/10/19 0700   98.1 (36.7)   Oral   102   20   118/80   Lying   94    09/09/19 2100   98.1 (36.7)   Oral   90   18   120/86   Lying   97    09/09/19 1900   98 (36.7)   Oral   100   18   114/76   Lying   98    09/09/19 0700   97.8 " (36.6)   Oral   94   18   111/64   Lying   100            Hospital Medications (all)       Dose Frequency Start End    acetaminophen (TYLENOL) tablet 650 mg 650 mg Every 4 Hours PRN 9/6/2019     Sig - Route: Take 2 tablets by mouth Every 4 (Four) Hours As Needed for Mild Pain  or Fever. - Oral    bisacodyl (DULCOLAX) suppository 10 mg 10 mg Every 72 Hours PRN 9/6/2019     Sig - Route: Insert 1 suppository into the rectum Every 72 (Seventy-Two) Hours As Needed for Constipation. - Rectal    cefTRIAXone (ROCEPHIN) 1 g/100 mL 0.9% NS (MBP) 1 g Every 24 Hours 9/9/2019 9/14/2019    Sig - Route: Infuse 100 mL into a venous catheter Daily. - Intravenous    clonazePAM (KlonoPIN) tablet 0.5 mg 0.5 mg 3 Times Daily PRN 9/6/2019     Sig - Route: Take 1 tablet by mouth 3 (Three) Times a Day As Needed for Anxiety. - Oral    DULoxetine (CYMBALTA) DR capsule 30 mg 30 mg Daily 9/7/2019     Sig - Route: Take 1 capsule by mouth Daily. - Oral    gabapentin (NEURONTIN) capsule 300 mg 300 mg Every 12 Hours Scheduled 9/8/2019     Sig - Route: Take 1 capsule by mouth Every 12 (Twelve) Hours. - Oral    levETIRAcetam XR (KEPPRA XR) 24 hr tablet 1,000 mg 1,000 mg Daily 9/7/2019     Sig - Route: Take 2 tablets by mouth Daily. - Oral    levothyroxine (SYNTHROID, LEVOTHROID) tablet 100 mcg 100 mcg Every Early Morning 9/7/2019     Sig - Route: Take 1 tablet by mouth Every Morning. - Oral    nystatin (MYCOSTATIN) powder  Every 8 Hours Scheduled 9/6/2019     Sig - Route: Apply  topically to the appropriate area as directed Every 8 (Eight) Hours. - Topical    oxyCODONE (ROXICODONE) immediate release tablet 5 mg 5 mg Every 4 Hours PRN 9/6/2019     Sig - Route: Take 1 tablet by mouth Every 4 (Four) Hours As Needed for Moderate Pain . - Oral    piperacillin-tazobactam (ZOSYN) 4.5 g/100 mL 0.9% NS IVPB (mbp) 4.5 g Once 9/6/2019 9/6/2019    Sig - Route: Infuse 100 mL into a venous catheter 1 (One) Time. - Intravenous    Cosign for Ordering: Accepted by  Ryan Henning MD on 9/7/2019  6:44 AM    potassium chloride (MICRO-K) CR capsule 20 mEq 20 mEq 2 Times Daily With Meals 9/9/2019     Sig - Route: Take 2 capsules by mouth 2 (Two) Times a Day With Meals. - Oral    QUEtiapine (SEROquel) tablet 25 mg 25 mg Nightly 9/9/2019     Sig - Route: Take 1 tablet by mouth Every Night. - Oral    rivaroxaban (XARELTO) tablet 10 mg 10 mg Daily 9/7/2019     Sig - Route: Take 0.5 tablets by mouth Daily. - Oral    sodium chloride 0.9 % bolus 1,000 mL 1,000 mL Once 9/6/2019 9/6/2019    Sig - Route: Infuse 1,000 mL into a venous catheter 1 (One) Time. - Intravenous    Cosign for Ordering: Accepted by Ryan Henning MD on 9/7/2019  6:44 AM    sodium chloride 0.9 % flush 10 mL 10 mL Every 12 Hours Scheduled 9/6/2019     Sig - Route: Infuse 10 mL into a venous catheter Every 12 (Twelve) Hours. - Intravenous    sodium chloride 0.9 % flush 10 mL 10 mL As Needed 9/6/2019     Sig - Route: Infuse 10 mL into a venous catheter As Needed for Line Care. - Intravenous    vancomycin 1 g/250 mL 0.9% NS (vial-mate) 1,000 mg Once 9/6/2019 9/6/2019    Sig - Route: Infuse 250 mL into a venous catheter 1 (One) Time. - Intravenous    Cosign for Ordering: Accepted by Ryan Henning MD on 9/7/2019  6:44 AM    venlafaxine XR (EFFEXOR-XR) 24 hr capsule 37.5 mg 37.5 mg Daily 9/9/2019     Sig - Route: Take 1 capsule by mouth Daily. - Oral    Doris's amazing butt cream  As Needed 9/7/2019     Sig - Route: Apply  topically to the appropriate area as directed As Needed for skin irritation. - Topical    cyclobenzaprine (FLEXERIL) tablet 5 mg (Discontinued) 5 mg 3 Times Daily 9/6/2019 9/7/2019    Sig - Route: Take 0.5 tablets by mouth 3 (Three) Times a Day. - Oral    cyclobenzaprine (FLEXERIL) tablet 5 mg (Discontinued) 5 mg 2 Times Daily 9/8/2019 9/9/2019    Sig - Route: Take 1 tablet by mouth 2 (Two) Times a Day. - Oral    gabapentin (NEURONTIN) capsule 400 mg (Discontinued) 400 mg Every 12 Hours Scheduled 9/6/2019  9/7/2019    Sig - Route: Take 4 capsules by mouth Every 12 (Twelve) Hours. - Oral    nystatin (MYCOSTATIN) powder (Discontinued)  3 Times Daily 9/6/2019 9/6/2019    Sig - Route: Apply  topically to the appropriate area as directed 3 (Three) Times a Day. - Topical    Reason for Discontinue: Duplicate order    Pharmacy to dose vancomycin (Discontinued)  Continuous PRN 9/6/2019 9/6/2019    Sig - Route: Continuous As Needed for Consult. - Does not apply    Reason for Discontinue: Reorder    Pharmacy to Dose Zosyn (Discontinued)  Continuous PRN 9/6/2019 9/9/2019    Sig - Route: Continuous As Needed for Consult. - Does not apply    Reason for Discontinue: Discontinued by another clinician    piperacillin-tazobactam (ZOSYN) 3.375 g in iso-osmotic dextrose 50 ml (premix) (Discontinued) 3.375 g Every 8 Hours 9/7/2019 9/9/2019    Sig - Route: Infuse 50 mL into a venous catheter Every 8 (Eight) Hours. - Intravenous    QUEtiapine (SEROquel) tablet 25 mg (Discontinued) 25 mg Every 12 Hours Scheduled 9/8/2019 9/9/2019    Sig - Route: Take 1 tablet by mouth Every 12 (Twelve) Hours. - Oral    QUEtiapine (SEROquel) tablet 50 mg (Discontinued) 50 mg Every 12 Hours Scheduled 9/6/2019 9/7/2019    Sig - Route: Take 2 tablets by mouth Every 12 (Twelve) Hours. - Oral    sodium chloride 0.9 % infusion (Discontinued) 100 mL/hr Continuous 9/6/2019 9/8/2019    Sig - Route: Infuse 100 mL/hr into a venous catheter Continuous. - Intravenous    sodium chloride 0.9 % with KCl 20 mEq/L infusion (Discontinued) 100 mL/hr Continuous 9/8/2019 9/9/2019    Sig - Route: Infuse 100 mL/hr into a venous catheter Continuous. - Intravenous    vancomycin (VANCOCIN) in iso-osmotic dextrose IVPB 1 g (premix) 200 mL (Discontinued) 1,000 mg Every 12 Hours 9/9/2019 9/9/2019    Sig - Route: Infuse 200 mL into a venous catheter Every 12 (Twelve) Hours. - Intravenous    vancomycin 1500 mg/500 mL 0.9% NS IVPB ([facility]) (Discontinued) 1,500 mg Every 12 Hours 9/7/2019  9/8/2019    Sig - Route: Infuse 500 mL into a venous catheter Every 12 (Twelve) Hours. - Intravenous    venlafaxine XR (EFFEXOR-XR) 24 hr capsule 75 mg (Discontinued) 75 mg Daily 9/7/2019 9/9/2019    Sig - Route: Take 1 capsule by mouth Daily. - Oral          Lab Results (last 48 hours)     Procedure Component Value Units Date/Time    Basic Metabolic Panel [[id number]]  (Abnormal) Collected:  09/11/19 0552    Specimen:  Blood Updated:  09/11/19 0701     Glucose 69 mg/dL      BUN 10 mg/dL      Creatinine 0.31 mg/dL      Sodium 139 mmol/L      Potassium 3.4 mmol/L      Chloride 109 mmol/L      CO2 25.0 mmol/L      Calcium 7.0 mg/dL      eGFR Non African Amer >150 mL/min/1.73      BUN/Creatinine Ratio 32.3     Anion Gap 5.0 mmol/L     Narrative:       GFR Normal >60  Chronic Kidney Disease <60  Kidney Failure <15    CBC & Differential [[id number]] Collected:  09/11/19 0552    Specimen:  Blood Updated:  09/11/19 0631    Narrative:       The following orders were created for panel order CBC & Differential.  Procedure                               Abnormality         Status                     ---------                               -----------         ------                     CBC Auto Differential[[id number]]        Abnormal            Final result                 Please view results for these tests on the individual orders.    CBC Auto Differential [[id number]]  (Abnormal) Collected:  09/11/19 0552    Specimen:  Blood Updated:  09/11/19 0631     WBC 8.92 10*3/mm3      RBC 3.26 10*6/mm3      Hemoglobin 9.7 g/dL      Hematocrit 29.0 %      MCV 89.0 fL      MCH 29.8 pg      MCHC 33.4 g/dL      RDW 18.0 %      RDW-SD 57.1 fl      MPV 11.0 fL      Platelets 328 10*3/mm3      Neutrophil % 73.0 %      Lymphocyte % 13.6 %      Monocyte % 7.4 %      Eosinophil % 4.6 %      Basophil % 0.7 %      Immature Grans % 0.7 %      Neutrophils, Absolute 6.52 10*3/mm3      Lymphocytes, Absolute 1.21 10*3/mm3      Monocytes, Absolute 0.66  10*3/mm3      Eosinophils, Absolute 0.41 10*3/mm3      Basophils, Absolute 0.06 10*3/mm3      Immature Grans, Absolute 0.06 10*3/mm3      nRBC 0.0 /100 WBC     Blood Culture - Blood, Hand, Left [[id number]] Collected:  09/06/19 1856    Specimen:  Blood from Hand, Left Updated:  09/10/19 1923     Blood Culture No growth at 4 days    Blood Culture - Blood, Hand, Left [[id number]] Collected:  09/06/19 1710    Specimen:  Blood from Hand, Left Updated:  09/10/19 1746     Blood Culture No growth at 4 days    Basic Metabolic Panel [[id number]]  (Abnormal) Collected:  09/10/19 0635    Specimen:  Blood Updated:  09/10/19 0802     Glucose 66 mg/dL      BUN 13 mg/dL      Creatinine 0.50 mg/dL      Sodium 136 mmol/L      Potassium 3.1 mmol/L      Chloride 111 mmol/L      CO2 24.0 mmol/L      Calcium 7.2 mg/dL      eGFR Non African Amer 125 mL/min/1.73      BUN/Creatinine Ratio 26.0     Anion Gap 1.0 mmol/L     Narrative:       GFR Normal >60  Chronic Kidney Disease <60  Kidney Failure <15    CBC & Differential [[id number]] Collected:  09/10/19 0635    Specimen:  Blood Updated:  09/10/19 0745    Narrative:       The following orders were created for panel order CBC & Differential.  Procedure                               Abnormality         Status                     ---------                               -----------         ------                     CBC Auto Differential[[id number]]        Abnormal            Final result                 Please view results for these tests on the individual orders.    CBC Auto Differential [[id number]]  (Abnormal) Collected:  09/10/19 0635    Specimen:  Blood Updated:  09/10/19 0745     WBC 9.05 10*3/mm3      RBC 3.48 10*6/mm3      Hemoglobin 10.2 g/dL      Hematocrit 30.6 %      MCV 87.9 fL      MCH 29.3 pg      MCHC 33.3 g/dL      RDW 17.9 %      RDW-SD 56.1 fl      MPV 11.4 fL      Platelets 330 10*3/mm3      Neutrophil % 76.5 %      Lymphocyte % 13.9 %      Monocyte % 7.4 %      Eosinophil %  1.2 %      Basophil % 0.6 %      Immature Grans % 0.4 %      Neutrophils, Absolute 6.92 10*3/mm3      Lymphocytes, Absolute 1.26 10*3/mm3      Monocytes, Absolute 0.67 10*3/mm3      Eosinophils, Absolute 0.11 10*3/mm3      Basophils, Absolute 0.05 10*3/mm3      Immature Grans, Absolute 0.04 10*3/mm3      nRBC 0.0 /100 WBC           Lab Results (last 48 hours)     Procedure Component Value Units Date/Time    Basic Metabolic Panel [[id number]]  (Abnormal) Collected:  09/11/19 0552    Specimen:  Blood Updated:  09/11/19 0701     Glucose 69 mg/dL      BUN 10 mg/dL      Creatinine 0.31 mg/dL      Sodium 139 mmol/L      Potassium 3.4 mmol/L      Chloride 109 mmol/L      CO2 25.0 mmol/L      Calcium 7.0 mg/dL      eGFR Non African Amer >150 mL/min/1.73      BUN/Creatinine Ratio 32.3     Anion Gap 5.0 mmol/L     Narrative:       GFR Normal >60  Chronic Kidney Disease <60  Kidney Failure <15    CBC & Differential [[id number]] Collected:  09/11/19 0552    Specimen:  Blood Updated:  09/11/19 0631    Narrative:       The following orders were created for panel order CBC & Differential.  Procedure                               Abnormality         Status                     ---------                               -----------         ------                     CBC Auto Differential[[id number]]        Abnormal            Final result                 Please view results for these tests on the individual orders.    CBC Auto Differential [[id number]]  (Abnormal) Collected:  09/11/19 0552    Specimen:  Blood Updated:  09/11/19 0631     WBC 8.92 10*3/mm3      RBC 3.26 10*6/mm3      Hemoglobin 9.7 g/dL      Hematocrit 29.0 %      MCV 89.0 fL      MCH 29.8 pg      MCHC 33.4 g/dL      RDW 18.0 %      RDW-SD 57.1 fl      MPV 11.0 fL      Platelets 328 10*3/mm3      Neutrophil % 73.0 %      Lymphocyte % 13.6 %      Monocyte % 7.4 %      Eosinophil % 4.6 %      Basophil % 0.7 %      Immature Grans % 0.7 %      Neutrophils, Absolute 6.52 10*3/mm3       Lymphocytes, Absolute 1.21 10*3/mm3      Monocytes, Absolute 0.66 10*3/mm3      Eosinophils, Absolute 0.41 10*3/mm3      Basophils, Absolute 0.06 10*3/mm3      Immature Grans, Absolute 0.06 10*3/mm3      nRBC 0.0 /100 WBC     Blood Culture - Blood, Hand, Left [[id number]] Collected:  09/06/19 1856    Specimen:  Blood from Hand, Left Updated:  09/10/19 1923     Blood Culture No growth at 4 days    Blood Culture - Blood, Hand, Left [[id number]] Collected:  09/06/19 1710    Specimen:  Blood from Hand, Left Updated:  09/10/19 1746     Blood Culture No growth at 4 days    Basic Metabolic Panel [[id number]]  (Abnormal) Collected:  09/10/19 0635    Specimen:  Blood Updated:  09/10/19 0802     Glucose 66 mg/dL      BUN 13 mg/dL      Creatinine 0.50 mg/dL      Sodium 136 mmol/L      Potassium 3.1 mmol/L      Chloride 111 mmol/L      CO2 24.0 mmol/L      Calcium 7.2 mg/dL      eGFR Non African Amer 125 mL/min/1.73      BUN/Creatinine Ratio 26.0     Anion Gap 1.0 mmol/L     Narrative:       GFR Normal >60  Chronic Kidney Disease <60  Kidney Failure <15    CBC & Differential [[id number]] Collected:  09/10/19 0635    Specimen:  Blood Updated:  09/10/19 0745    Narrative:       The following orders were created for panel order CBC & Differential.  Procedure                               Abnormality         Status                     ---------                               -----------         ------                     CBC Auto Differential[[id number]]        Abnormal            Final result                 Please view results for these tests on the individual orders.    CBC Auto Differential [[id number]]  (Abnormal) Collected:  09/10/19 0635    Specimen:  Blood Updated:  09/10/19 0745     WBC 9.05 10*3/mm3      RBC 3.48 10*6/mm3      Hemoglobin 10.2 g/dL      Hematocrit 30.6 %      MCV 87.9 fL      MCH 29.3 pg      MCHC 33.3 g/dL      RDW 17.9 %      RDW-SD 56.1 fl      MPV 11.4 fL      Platelets 330 10*3/mm3      Neutrophil % 76.5  %      Lymphocyte % 13.9 %      Monocyte % 7.4 %      Eosinophil % 1.2 %      Basophil % 0.6 %      Immature Grans % 0.4 %      Neutrophils, Absolute 6.92 10*3/mm3      Lymphocytes, Absolute 1.26 10*3/mm3      Monocytes, Absolute 0.67 10*3/mm3      Eosinophils, Absolute 0.11 10*3/mm3      Basophils, Absolute 0.05 10*3/mm3      Immature Grans, Absolute 0.04 10*3/mm3      nRBC 0.0 /100 WBC         ECG/EMG Results (last 48 hours)     ** No results found for the last 48 hours. **        Orders (last 48 hrs)     Start     Ordered    09/11/19 0600  CBC Auto Differential  PROCEDURE ONCE      09/11/19 0001    09/10/19 1309  Discharge patient  Once,   Status:  Canceled      09/10/19 1310    09/10/19 0600  CBC Auto Differential  PROCEDURE ONCE      09/10/19 0001    09/10/19 0000  oxyCODONE (ROXICODONE) 10 MG tablet  Every 4 Hours PRN      09/10/19 1310    09/10/19 0000  clonazePAM (KlonoPIN) 1 MG tablet  2 Times Daily PRN      09/10/19 1310    09/10/19 0000  levETIRAcetam XR (KEPPRA XR) 500 MG 24 hr tablet  Daily      09/10/19 1310    09/10/19 0000  gabapentin (NEURONTIN) 600 MG tablet  2 Times Daily      09/10/19 1310    09/10/19 0000  venlafaxine XR (EFFEXOR-XR) 37.5 MG 24 hr capsule  Nightly      09/10/19 1310    09/10/19 0000  QUEtiapine (SEROquel) 100 MG tablet  Nightly      09/10/19 1310    09/10/19 0000  Hydrocortisone (COLLIN'S AMAZING BUTT) cream  3 Times Daily PRN     Comments:  Recipe Contains: 1:1:1:1 of hydrocortisone 1% oint, bacitracin 500 unit/gram oint, zinc 20% oint, nystatin 100,000 unit/gram oint    09/10/19 1310    09/10/19 0000  cefTRIAXone (ROCEPHIN) 1 g injection  Every 24 Hours      09/10/19 1314    09/09/19 2100  QUEtiapine (SEROquel) tablet 25 mg  Nightly      09/09/19 0825    09/09/19 1800  Wound Care  2 Times Daily      09/09/19 1647    09/09/19 1648  Wound Care  Daily      09/09/19 1647    09/09/19 1647  Wound Care  Once      09/09/19 1647    09/09/19 1646  Turn patient  Now Then Every 2 Hours       09/09/19 1651    09/09/19 1646  Dolphin mattress set at 3 dots.  Once     Comments:  DOLPHIN MATTRESS- LESS THAN 3 DOTS    09/09/19 1651    09/09/19 1646  Do not turn to affected area  Continuous      09/09/19 1651    09/09/19 1600  Catheter Care  Every 4 Hours      09/09/19 1308    09/09/19 1421  Inpatient Wound Care MD Consult  Once     Specialty:  Wound Care  Provider:  Sergio Marx MD    09/09/19 1422    09/09/19 1400  vancomycin (VANCOCIN) in iso-osmotic dextrose IVPB 1 g (premix) 200 mL  Every 12 Hours,   Status:  Discontinued      09/08/19 2132    09/09/19 1309  Continue Indwelling Urinary Catheter  Once      09/09/19 1308    09/09/19 1309  Assess Need for Indwelling Urinary Catheter - Follow Removal Protocol  Continuous     Comments:  Indwelling Urinary Catheter Removal Criteria  Discontinue Indwelling Urinary Catheter Unless One of the Following is Present:  Urinary Retention or Obstruction  Chronic Urinary Catheter Use  End of Life  Critical Illness with Strict I/O   Tract or Abdominal Surgery  Stage 3/4 Sacral / Perineal Wound  Required Activity Restriction: Trauma  Required Activity Restriction: Spine Surgery  If Patient is Being Followed by Urology Contact Them PRIOR to Removal  Do Not Remove Indwelling Urinary Catheter Order is Present with a CLINICAL REASON to Maintain the Catheter. Provider is Required to Include a Clinical Reason to Maintain a Urinary Catheter    Patient Admitted With Indwelling Urinary Catheter (Not Placed at [facility]ominational Facility)  Assess for Continued Need & Document Medical Necessity  If Infection is Suspected, Contact the Provider        09/09/19 1308    09/09/19 1028  Inpatient Nutrition Consult  Once     Provider:  (Not yet assigned)    09/09/19 1027    09/09/19 1028  Calorie Count  Until Discontinued      09/09/19 1027    09/09/19 0915  venlafaxine XR (EFFEXOR-XR) 24 hr capsule 37.5 mg  Daily      09/09/19 0825    09/09/19 0915  potassium chloride (MICRO-K) CR capsule  20 mEq  2 Times Daily With Meals      09/09/19 0825 09/09/19 0915  cefTRIAXone (ROCEPHIN) 1 g/100 mL 0.9% NS (MBP)  Every 24 Hours      09/09/19 0826 09/08/19 1815  sodium chloride 0.9 % with KCl 20 mEq/L infusion  Continuous,   Status:  Discontinued      09/08/19 1722    09/08/19 0900  gabapentin (NEURONTIN) capsule 300 mg  Every 12 Hours Scheduled      09/07/19 2136 09/08/19 0900  cyclobenzaprine (FLEXERIL) tablet 5 mg  2 Times Daily,   Status:  Discontinued      09/07/19 2136 09/08/19 0900  QUEtiapine (SEROquel) tablet 25 mg  Every 12 Hours Scheduled,   Status:  Discontinued      09/07/19 2136 09/07/19 1800  Dietary Nutrition Supplements Boost Plus (Ensure Enlive, Ensure Plus)  Daily With Breakfast & Dinner      09/07/19 1139 09/07/19 1059  Doris's amazing butt cream  As Needed      09/07/19 1059 09/07/19 0900  DULoxetine (CYMBALTA) DR capsule 30 mg  Daily      09/06/19 2116 09/07/19 0900  levETIRAcetam XR (KEPPRA XR) 24 hr tablet 1,000 mg  Daily      09/06/19 2116 09/07/19 0900  rivaroxaban (XARELTO) tablet 10 mg  Daily      09/06/19 2116 09/07/19 0900  venlafaxine XR (EFFEXOR-XR) 24 hr capsule 75 mg  Daily,   Status:  Discontinued      09/06/19 2116 09/07/19 0600  levothyroxine (SYNTHROID, LEVOTHROID) tablet 100 mcg  Every Early Morning      09/06/19 2116 09/07/19 0600  Basic Metabolic Panel  Daily      09/06/19 2119 09/07/19 0600  CBC & Differential  Daily      09/06/19 2119 09/07/19 0100  piperacillin-tazobactam (ZOSYN) 3.375 g in iso-osmotic dextrose 50 ml (premix)  Every 8 Hours,   Status:  Discontinued      09/06/19 2243 09/06/19 2200  nystatin (MYCOSTATIN) powder  Every 8 Hours Scheduled      09/06/19 2116 09/06/19 2121  sodium chloride 0.9 % flush 10 mL  Every 12 Hours Scheduled      09/06/19 2119 09/06/19 2120  Pharmacy to Dose Zosyn  Continuous PRN,   Status:  Discontinued      09/06/19 2120 09/06/19 2116  sodium chloride 0.9 % flush 10 mL  As  Needed      09/06/19 2119 09/06/19 2113  oxyCODONE (ROXICODONE) immediate release tablet 5 mg  Every 4 Hours PRN      09/06/19 2116 09/06/19 2111  clonazePAM (KlonoPIN) tablet 0.5 mg  3 Times Daily PRN      09/06/19 2116 09/06/19 2111  bisacodyl (DULCOLAX) suppository 10 mg  Every 72 Hours PRN      09/06/19 2116 09/06/19 2111  acetaminophen (TYLENOL) tablet 650 mg  Every 4 Hours PRN      09/06/19 2116    Unscheduled  Respiratory Therapy to Obtain Blood Via Arterial Puncture  As Needed      09/08/19 1244    Unscheduled  Apply moisture barrier after any incontinence  As Needed     Comments:  Dolphin mattress set at 3 dots. Turn side to side q 2 hrs. Allow back side air exposure; keep dry, change promptly when soiled or wet, use only ONE chux beneath her rash area. Stay in booties.  Doris's Magic Butt cream to Candidal dermatitis TID and prn to keep visibly medicated.  Avoid traction on buttocks when cleaning bottom and turning.Plurogel/adaptic (single layer) and small bandaid or Optifoam to right heel ulcer; change daily.    Note: Corrections: left breast, not right. Sacral spine is not pressure injury but rather skin traction tears in dermatitic skin.    09/09/19 1651    Unscheduled  Wound care  As Needed     Comments:  See comments above for orders and corrections.    09/09/19 1651    --  tolterodine LA (DETROL LA) 4 MG 24 hr capsule  Daily      09/07/19 1308    --  SCANNED EKG      09/06/19 0000           Physician Progress Notes (last 48 hours) (Notes from 9/9/2019  8:02 AM through 9/11/2019  8:02 AM)      Josafat Gonzales MD at 9/9/2019  8:16 AM               LOS: 3 days   Patient Care Team:  Josafat Gonzales MD as PCP - General  Josafat Gonzales MD as PCP - Family Medicine  [facility] Pawel Edmondson MD as Consulting Physician (Cardiology)  Pawel Ho MD as Consulting Physician (Urology)  Krystal Dorman APRN as Nurse Practitioner (Neurology)    Chief Complaint:  Overall  decline    Subjective      HISTORY OF PRESENT ILLNESS: I had a family meeting with her brother and  the day of admission.  They indicated they thought she lost about 20 pounds in the last 2 weeks and was not eating anything at the nursing home.  Through 4 days earlier I talked to the director of nurses and she indicated the patient was not improving with therapy in general and with wondered if with her history her family would want her to be hospice patient.  She does have a history of probable lung cancer (she was not well enough to do surgery she was treated with radiation):  she has significant spinal stenosis for which her medical condition makes her nonoperable and at least for the last 6 months she has not been able to ambulate; she is either been wheelchair or bedridden.  She has a long history of depression anxiety probable seizure disorder may be even a psych chronic condition.  She is seen numerous psychiatrists and takes large doses of psychiatric as well as pain medications.  She is frequently quite lethargic and subdued.  So the decision made by the family was to bring her to the ER; there she was found to be hypotensive hypoxic (which corrected with oxygen) and we elected to admit with fluids empiric antibiotics (there was concern she might have an infiltrate in her urine had abnormalities) allow speech to evaluate use fluids slowly withdrawal some of her polypharmacy and then the next 48+ hours to see if there would be any improvement in her condition that would help define whether we need to do continue to press on or whether she should be hospice.  She is apparently discussed with her  when she was well that she would never want life support such as with a feeding tube.    Interval History: tolerating wean of several Rx; more alert with IV, abx, and Rx wean.  ? (no one knows) ate well.     .  Current Facility-Administered Medications:   •  acetaminophen (TYLENOL) tablet 650 mg, 650 mg,  Oral, Q4H PRN, Josafat Gonzales MD  •  bisacodyl (DULCOLAX) suppository 10 mg, 10 mg, Rectal, Q72H PRN, Josafat Gonzales MD  •  clonazePAM (KlonoPIN) tablet 0.5 mg, 0.5 mg, Oral, TID PRN, Josafat Gonzales MD  •  cyclobenzaprine (FLEXERIL) tablet 5 mg, 5 mg, Oral, BID, Josafat Gnozales MD, 5 mg at 09/08/19 2116  •  DULoxetine (CYMBALTA) DR capsule 30 mg, 30 mg, Oral, Daily, Josafat Gonzales MD, 30 mg at 09/08/19 0954  •  gabapentin (NEURONTIN) capsule 300 mg, 300 mg, Oral, Q12H, Josafat Gonzales MD, 300 mg at 09/08/19 2116  •  levETIRAcetam XR (KEPPRA XR) 24 hr tablet 1,000 mg, 1,000 mg, Oral, Daily, Josafat Gonzales MD, 1,000 mg at 09/08/19 0955  •  levothyroxine (SYNTHROID, LEVOTHROID) tablet 100 mcg, 100 mcg, Oral, Q AM, Josafat Gonzales MD, 100 mcg at 09/09/19 0701  •  nystatin (MYCOSTATIN) powder, , Topical, Q8H, Josafat Gonzales MD  •  oxyCODONE (ROXICODONE) immediate release tablet 5 mg, 5 mg, Oral, Q4H PRN, Josafat Gonzales MD, 5 mg at 09/08/19 2155  •  Pharmacy to Dose Zosyn, , Does not apply, Continuous PRN, Josafat Gonzales MD  •  piperacillin-tazobactam (ZOSYN) 3.375 g in iso-osmotic dextrose 50 ml (premix), 3.375 g, Intravenous, Q8H, Josafat Gonzales MD, Last Rate: 0 mL/hr at 09/08/19 2217, 3.375 g at 09/09/19 0004  •  QUEtiapine (SEROquel) tablet 25 mg, 25 mg, Oral, Q12H, Josafat Gonzales MD, 25 mg at 09/08/19 2116  •  rivaroxaban (XARELTO) tablet 10 mg, 10 mg, Oral, Daily, Josafat Gonzales MD, 10 mg at 09/08/19 0953  •  sodium chloride 0.9 % flush 10 mL, 10 mL, Intravenous, Q12H, Josafat Gonzales MD, 10 mL at 09/08/19 2117  •  sodium chloride 0.9 % flush 10 mL, 10 mL, Intravenous, PRN, Josafat Gonzales MD  •  sodium chloride 0.9 % with KCl 20 mEq/L infusion, 100 mL/hr, Intravenous, Continuous, Josafat Gonzales MD, Last Rate: 100 mL/hr at 09/09/19 0252, 100 mL/hr at 09/09/19 0252  •  vancomycin (VANCOCIN) in iso-osmotic  "dextrose IVPB 1 g (premix) 200 mL, 1,000 mg, Intravenous, Q12H, Josafat Gonzales MD  •  venlafaxine XR (EFFEXOR-XR) 24 hr capsule 75 mg, 75 mg, Oral, Daily, Josafat Gonzales MD, 75 mg at 09/08/19 0954  •  Doris's amazing butt cream, , Topical, PRN, Josafat Gonzales MD    Review of Systems:   GENERAL:  Inactive/slower with limits, speed, stamina for age and pains, desire, above. Sleep is ok usually with Rx; on/off here.  Has pain management and psych past. No fever reported at nursing home/since here.   SKIN: continues with \"gaulded\" rash to back and prior heel ulcers.    ENDO:  No syncope, near or diaphoretic sweaty spells.  BS Ok since here.  HEENT: No head injury or headache.  No vision change.   No significant hearing loss. Ears without pain/drainage.  No sore throat.  No significant nasal/sinus congestion/drainage. No epistaxis.  CHEST: No chest wall tenderness or mass. No significant cough, wheeze, SOB; no hemoptysis.  CV: No chest pain, palpitations, significant ankle edema; some hand/feet edema pitting.   GI: No heartburn, dysphagia.  No abdominal pain, diarrhea, constipation, rectal bleeding, or melena;  incontinent stool/prior voiding.   :  Voids usually with incontinence; came with garcia from nursing home.   ORTHO: No painful/swollen joints but various on /off sore.  Usually has sore neck or back.  No acute neck or back pain without recent injury.   NEURO: No dizziness, weakness of extremities.  Usually has LE numbness/paresthesias.   PSYCH: Prior memory loss.  Mood always variable; often anxious, depressed but/and not suicidal though has had prior overdose.  Tolerates stress variably.      Objective     Vital Signs  /64 (BP Location: Right arm, Patient Position: Lying)   Pulse 94   Temp 97.8 °F (36.6 °C) (Oral)   Resp 18   Ht 182.9 cm (72.01\")   Wt 85.8 kg (189 lb 3.2 oz)   LMP  (LMP Unknown)   SpO2 100%   BMI 25.65 kg/m²    Temp:  [97 °F (36.1 °C)-97.8 °F (36.6 °C)] 97.8 °F " (36.6 °C)  Heart Rate:  [94-98] 94  Resp:  [16-18] 18  BP: (108-117)/(64-76) 111/64      Intake/Output Summary (Last 24 hours) at 9/9/2019 0816  Last data filed at 9/9/2019 0250  Gross per 24 hour   Intake 1733 ml   Output 725 ml   Net 1008 ml       Lab Results:  Lab Results (last 24 hours)     Procedure Component Value Units Date/Time    Urine Culture - Urine, Urine, Clean Catch [[id number]]  (Abnormal)  (Susceptibility) Collected:  09/06/19 1844    Specimen:  Urine, Clean Catch Updated:  09/09/19 0726     Urine Culture >100,000 CFU/mL Klebsiella pneumoniae ssp pneumoniae      40,000-50,000 CFU/mL Morganella morganii ssp morganii    Susceptibility      Klebsiella pneumoniae ssp pneumoniae     LOGAN     Ampicillin Resistant     Ampicillin + Sulbactam Susceptible     Cefazolin Susceptible     Cefepime Susceptible     Ceftriaxone Susceptible     Ertapenem Susceptible     ESBL Confirmation Test Negative     Gentamicin Susceptible     Levofloxacin Susceptible     Meropenem Susceptible     Nitrofurantoin Resistant     Piperacillin + Tazobactam Susceptible     Trimethoprim + Sulfamethoxazole Susceptible                Susceptibility      Morganella morganii ssp morganii     LOGAN     Ampicillin Resistant     Ampicillin + Sulbactam Resistant     Cefazolin Resistant     Cefepime Susceptible     Ceftriaxone Susceptible     Ertapenem Susceptible     Gentamicin Susceptible     Levofloxacin Susceptible     Meropenem Susceptible     Nitrofurantoin Resistant     Piperacillin + Tazobactam Susceptible     Trimethoprim + Sulfamethoxazole Susceptible                    Basic Metabolic Panel [[id number]]  (Abnormal) Collected:  09/09/19 0616    Specimen:  Blood Updated:  09/09/19 0709     Glucose 59 mg/dL      BUN 15 mg/dL      Creatinine 0.51 mg/dL      Sodium 138 mmol/L      Potassium 3.2 mmol/L      Chloride 112 mmol/L      CO2 22.0 mmol/L      Calcium 6.9 mg/dL      eGFR Non African Amer 122 mL/min/1.73      BUN/Creatinine Ratio 29.4      Anion Gap 4.0 mmol/L     Narrative:       GFR Normal >60  Chronic Kidney Disease <60  Kidney Failure <15    CBC & Differential [[id number]] Collected:  09/09/19 0616    Specimen:  Blood Updated:  09/09/19 0639    Narrative:       The following orders were created for panel order CBC & Differential.  Procedure                               Abnormality         Status                     ---------                               -----------         ------                     CBC Auto Differential[[id number]]        Abnormal            Final result                 Please view results for these tests on the individual orders.    CBC Auto Differential [[id number]]  (Abnormal) Collected:  09/09/19 0616    Specimen:  Blood Updated:  09/09/19 0639     WBC 5.99 10*3/mm3      RBC 3.31 10*6/mm3      Hemoglobin 9.7 g/dL      Hematocrit 29.0 %      MCV 87.6 fL      MCH 29.3 pg      MCHC 33.4 g/dL      RDW 17.7 %      RDW-SD 55.9 fl      MPV 10.8 fL      Platelets 302 10*3/mm3      Neutrophil % 62.5 %      Lymphocyte % 22.7 %      Monocyte % 8.3 %      Eosinophil % 5.0 %      Basophil % 0.8 %      Immature Grans % 0.7 %      Neutrophils, Absolute 3.74 10*3/mm3      Lymphocytes, Absolute 1.36 10*3/mm3      Monocytes, Absolute 0.50 10*3/mm3      Eosinophils, Absolute 0.30 10*3/mm3      Basophils, Absolute 0.05 10*3/mm3      Immature Grans, Absolute 0.04 10*3/mm3      nRBC 0.0 /100 WBC     Blood Culture - Blood, Hand, Left [[id number]] Collected:  09/06/19 1856    Specimen:  Blood from Hand, Left Updated:  09/08/19 1930     Blood Culture No growth at 2 days    Blood Culture - Blood, Hand, Left [[id number]] Collected:  09/06/19 1710    Specimen:  Blood from Hand, Left Updated:  09/08/19 1745     Blood Culture No growth at 2 days    CBC & Differential [[id number]] Collected:  09/08/19 1647    Specimen:  Blood Updated:  09/08/19 1736    Narrative:       The following orders were created for panel order CBC & Differential.  Procedure                                Abnormality         Status                     ---------                               -----------         ------                     CBC Auto Differential[[id number]]        Abnormal            Final result                 Please view results for these tests on the individual orders.    CBC Auto Differential [[id number]]  (Abnormal) Collected:  09/08/19 1647    Specimen:  Blood Updated:  09/08/19 1736     WBC 8.57 10*3/mm3      RBC 3.35 10*6/mm3      Hemoglobin 9.8 g/dL      Hematocrit 29.4 %      MCV 87.8 fL      MCH 29.3 pg      MCHC 33.3 g/dL      RDW 17.6 %      RDW-SD 55.3 fl      MPV 11.0 fL      Platelets 324 10*3/mm3      Neutrophil % 74.4 %      Lymphocyte % 14.5 %      Monocyte % 7.5 %      Eosinophil % 2.5 %      Basophil % 0.6 %      Immature Grans % 0.5 %      Neutrophils, Absolute 6.39 10*3/mm3      Lymphocytes, Absolute 1.24 10*3/mm3      Monocytes, Absolute 0.64 10*3/mm3      Eosinophils, Absolute 0.21 10*3/mm3      Basophils, Absolute 0.05 10*3/mm3      Immature Grans, Absolute 0.04 10*3/mm3      nRBC 0.0 /100 WBC     Vancomycin, Trough [[id number]]  (Abnormal) Collected:  09/08/19 1647    Specimen:  Blood Updated:  09/08/19 1710     Vancomycin Trough 25.77 mcg/mL     Basic Metabolic Panel [[id number]]  (Abnormal) Collected:  09/08/19 0744    Specimen:  Blood Updated:  09/08/19 0841     Glucose 64 mg/dL      BUN 21 mg/dL      Creatinine 0.50 mg/dL      Sodium 136 mmol/L      Potassium 3.2 mmol/L      Chloride 110 mmol/L      CO2 24.0 mmol/L      Calcium 7.1 mg/dL      eGFR Non African Amer 125 mL/min/1.73      BUN/Creatinine Ratio 42.0     Anion Gap 2.0 mmol/L     Narrative:       GFR Normal >60  Chronic Kidney Disease <60  Kidney Failure <15          CBC:   Results from last 7 days   Lab Units 09/09/19  0616 09/08/19  1647 09/07/19  0434 09/06/19  1710   WBC 10*3/mm3 5.99 8.57 10.25 13.49*   HEMOGLOBIN g/dL 9.7* 9.8* 10.4* 11.4*   HEMATOCRIT % 29.0* 29.4* 31.8* 34.5   PLATELETS  10*3/mm3 302 324 322 302     BMP:   Results from last 7 days   Lab Units 09/09/19  0616 09/08/19  0744 09/07/19  0434 09/06/19  1710   SODIUM mmol/L 138 136 138 136   POTASSIUM mmol/L 3.2* 3.2* 4.5 4.7   CHLORIDE mmol/L 112* 110 109 104   CO2 mmol/L 22.0* 24.0 27.0 27.0   BUN mg/dL 15 21 28* 28*   CREATININE mg/dL 0.51 0.50 0.48* 0.56   EGFR IF NONAFRICN AM mL/min/1.73 122 125 131 109   GLUCOSE mg/dL 59* 64* 69* 69*   CALCIUM mg/dL 6.9* 7.1* 7.3* 7.6*   ALT (SGPT) U/L  --   --   --  33     INR:   Results from last 7 days   Lab Units 09/06/19  1710   INR  0.95       Culture Results:   Blood Culture   Date Value Ref Range Status   09/06/2019 No growth at 2 days  Preliminary   09/06/2019 No growth at 2 days  Preliminary     Urine Culture   Date Value Ref Range Status   09/06/2019 (A)  Final    >100,000 CFU/mL Klebsiella pneumoniae ssp pneumoniae   09/06/2019 (A)  Final    40,000-50,000 CFU/mL Morganella morganii ssp morganii       Radiology: None    Additional Studies Reviewed: None    Physical Exam:  GENERAL:  AFA/developed in no acute distress. Thin.   SKIN: Pale; scabbed heel ulcers and diffuse reddness back.   Mild chelosis.  HEENT: Normal cephalic without trauma.  Pupils equal round reactive to light; pupils  Dilated.  Extraocular motions full without nystagmus.    Oral cavity without growths, exudates, and moist.  Posterior pharynx without mass, obstruction, redness.  No thyromegaly, mass, tenderness, lymphadenopathy and supple.  CV: Regular rhythm.  No murmur, gallop trace LE edema. Posterior pulses intact.  No carotid bruits.   CHEST: No chest wall tenderness or mass.   LUNGS: Symmetric motion with clear to auscultation.  ABD: Soft, nontender without mass.   ORTHO: Symmetric extremities without swelling/point tenderness.  Ankles plantar  Flexed.   NEURO: CN 2-12 grossly intact.  Symmetric facies. 1/4 x bicep equal reflexes.  UE/LE   2/5 strength throughout.  Nonfocal use extremities. Speech weak.   PSYCH:  Disoriented x 2; knew me today.   Pleasant calm, well kept.  Shallow; minimal purposeful/directed conservation.     Results Review:    above    ASSESSMENT/PLAN:  Reason for admit/problems addressing acutely:  Lethargy-improved  Anorexia  Weight loss  Severe malnutrition  Hypotension-resolved  Respiratory failure-hypoxic-oxygen replaced  Abnormal urine (21-30 WBC, 4+ bacteruria with garcia)  UTI-klebsiella and Morganella/garcia  Polypharmacy-weaning  Hypopotassemia-IV affected  Edema; nutrition, IV fluids    Chronic problems to review/consider during care:  64 y/o white female   Allergy/intolerance: see above  Procedural history: see above  Family history: see above  Obesity.  This is status post gastric bypass initial failure and regain of  weight.     1 para 1 AB 0.    Surgical menopause.  Osteoporosis on previous wrist study (hip fx 3.28.19)   Hypertension.    Hypothyroidism.    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    History of DVT - left peroneal popliteal, 2008, treated              successfully with anticoagulation.   Anticoagulation needs: hx DVT, DVT risk/(coumadin) lovonex  Chronic insomnia.  Spinal spondylosis; this is primarily cervical with multiple surgeries.  Chronic neck pain.    Chronic back pain  Chronic narcotics-from pain management/[facility]  Chronic depression - followed by Dr. Samaniego.    Degenerative joint disease that is diffuse.   LE weakness  Gait difficulty-chronic   History of gastric bypass and metabolic risk it carries.    Colon cancer history with previous surgery and chemo, no obvious              recurrence.  Gastroesophageal reflux.  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)  Abnormal CT chest-treating  "nonsurgically  Polypharmacy   Urinary incontience (history urge incontinence)  Vitamin D def (post gastric bypass)  Vitamin B12 def (post gastric bypass)  YOLIE    Rx-reviewed, considered with changes as needed: change abx to rocephin, po KCL,    Further wean effexor, seroquel and stop flexeril  Labs reviewed, considered and changes made as needed: no changes  Imaging reviewed, and need for considered: no changes  Consults needed: none  Diet reviewed, considered and changes made as needed: no changes/need calorie count  Fluids reviewed, considered and changes made as needed: lock  Increase activity: encouraged  Code status: DNR  Discussed possible/future discharge plans: family expects back to [facility]; ? With hospice/not  Case discussed with noneone  Available to talk if needed with POA/caregiver and members care team.    Josafat Gonzales MD  09/09/19  8:16 AM          Electronically signed by Josafat Gonzales MD at 9/9/2019  8:26 AM          Consult Notes (last 48 hours) (Notes from 9/9/2019  8:02 AM through 9/11/2019  8:02 AM)      Marx, Sergio Alegre MD at 9/9/2019  4:14 PM      Consult Orders    1. 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 She could not tell me, no family is present, the medical chart does not disclose and the nursing staff does not know. For some reason, she has not walked for approximately 6 months, has become weak and stiff, and apparently malnourished. The only explanation she could come up with was a \"string of bad news\" and the [employer] are on a losing streak. She was transported to [facility] by EMS because of weakness, lethargy and mental status change.    PMH, Surg Hx,  Soc Hx, and ROS reviewed. The patient is a very poor " historian and is unable to answer most questions.    Exam: large frame late middle age lady who is awake but not alert, she is slow and weak and stiff. Her speech is slurred, slow, and weak. Her teeth are coated with plaque and slime. Face reveals nasal erythema, orbital and temporal fat atrophy. Hair texture suggests possible hypothyroidism. She has recent superficial abrasions on lateral upper portion of right leg. There is a 2cm diameter pressure ulcer extending through the skin into the deeper tissue of the posterior right heel. She is in protecting booties for both feet. There is a small area of Candidal intertrigo in the left medial inframammary crease being treated with Nystatin powder. Her entire gluteal and upper posterior thigh skin is erythematous with obvious Candidal moisture associated diaper rash partially covered by Doris's Magic Butt cream. There are parallel 2cm vertically oriented skin tears on each side of the midline at the upper intergluteal crease level.    Impression: 1) Depression with mental status change, loss of interest, lethargy            2) Generalized stiffness, weakness, and disuse atrophy            3) Candidal moisture associated dermatitis (intertrigo and diaper rash)            4) Stage III pressure ulcer posterior right heel.    Plan: Dolphin mattress set at 3 dots. Turn side to side q 2 hrs. Allow back side air exposure; keep dry, change promptly when soiled or wet, use only ONE chux beneath her rash area. Stay in booties.  Doris's Magic Butt cream to Candidal dermatitis TID and prn to keep visibly medicated.  Avoid traction on buttocks when cleaning bottom and turning.Plurogel/adaptic (single layer) and small bandaid or Optifoam to right heel ulcer; change daily.    Thank you for asking me to see Ms Croft.      Sergio Marx MD, FACS        Electronically signed by Sergio Marx MD at 9/9/2019  4:44 PM        Shannon

## 2019-10-10 NOTE — TELEPHONE ENCOUNTER
"Spoke with  and I explained that Hospice usually only care for comfort care and he stated \"that's ok\".  "

## 2019-10-10 NOTE — TELEPHONE ENCOUNTER
----- Message from Susanne Gomez sent at 10/9/2019  1:55 PM CDT -----  Patient Calls     Josafat Gonzales MD   [facility] Clinical Pool 35 minutes ago (1:18 PM)     Ask [facility] how they are coming on the referral?   Keep  updated what LH says  Is he having any problems with her heels/back/wounds?       Documentation     You routed conversation to Josafat Gonzales MD 39 minutes ago (1:14 PM)    Bita Croft [phone]  You 43 minutes ago (1:11 PM)    You 43 minutes ago (1:11 PM)           called and he said you wanted to know if they did not hear from [facility] and he has not. The 48 hours will be up in about an hour from now.      Documentation

## 2019-10-10 NOTE — TELEPHONE ENCOUNTER
Ask him to let home health come first and see if hospice will take on wound care, garcia care  Warn him; hospice usually only does comfort care

## 2022-02-03 NOTE — PLAN OF CARE
Problem: Patient Care Overview  Goal: Plan of Care Review  Outcome: Ongoing (interventions implemented as appropriate)   03/12/19 2955   Coping/Psychosocial   Plan of Care Reviewed With patient;spouse   OTHER   Outcome Summary pt disoriented to time and says she cant remember what happened to her, alert, K+ still low, following protocol for replacement, no neuro changes, NSR on tele, VSS, safety maintained   Plan of Care Review   Progress improving       Problem: Fall Risk (Adult)  Goal: Absence of Fall  Outcome: Ongoing (interventions implemented as appropriate)      Problem: Skin Injury Risk (Adult)  Goal: Skin Health and Integrity  Outcome: Ongoing (interventions implemented as appropriate)      Problem: Confusion, Acute (Adult)  Goal: Cognitive/Functional Impairments Minimized  Outcome: Ongoing (interventions implemented as appropriate)    Goal: Safety  Outcome: Ongoing (interventions implemented as appropriate)      Problem: Infection, Risk/Actual (Adult)  Goal: Infection Prevention/Resolution  Outcome: Ongoing (interventions implemented as appropriate)      Problem: Sepsis/Septic Shock (Adult)  Goal: Signs and Symptoms of Listed Potential Problems Will be Absent, Minimized or Managed (Sepsis/Septic Shock)  Outcome: Ongoing (interventions implemented as appropriate)         Yes

## 2023-04-03 NOTE — ANESTHESIA PROCEDURE NOTES
Patient ambulatory in ED with complaint of upper respiratory issues. Patient states she went to the ED on Sunday and she tested positive for Covid. Patient has been out of work for fever. She states she has been feeling better but she needs a Covid test before returning to work. Patient states she hasn't had a fever since Thursday night. Peripheral Block    Pre-sedation assessment completed: 3/29/2019 11:07 AM    Patient reassessed immediately prior to procedure    Patient location during procedure: holding area  Start time: 3/29/2019 11:10 AM  Stop time: 3/29/2019 11:13 AM  Reason for block: procedure for pain, at surgeon's request, post-op pain management and Requested by Dr. Loaiza  Performed by  Anesthesiologist: John Forman MD  Preanesthetic Checklist  Completed: patient identified, site marked, surgical consent, pre-op evaluation, timeout performed, IV checked, risks and benefits discussed and monitors and equipment checked  Prep:  Pt Position: supine  Prep: ChloraPrep  Patient monitoring: blood pressure monitoring, continuous pulse oximetry and EKG  Procedure  Sedation:yes  Performed under: MAC  Guidance:ultrasound guided and fascial plane identified and local anesthetic infiltrated in iliaca compartment  ULTRASOUND INTERPRETATION. Using ultrasound guidance a 20 G gauge needle was placed in close proximity to the nerve, at which point, under ultrasound guidance anesthetic was injected in the area of the nerve and spread of the anesthesia was seen on ultrasound in close proximity thereto.  There were no abnormalities seen on ultrasound; a digital image was taken; and the patient tolerated the procedure with no complications. Images:still images obtained (picture printed and placed in patients chart)    Block Type:fascia iliaca compartment  Injection Technique:single-shot  Needle Type:echogenic  Needle Gauge:20 G  Resistance on Injection: none  Medications Used: ropivacaine (NAROPIN) injection 0.5 %, 20 mL  ropivacaine (NAROPIN) 0.2% injection, 20 mL  Med admintered at 3/29/2019 11:12 AM  Post Assessment  Injection Assessment: negative aspiration for heme, no paresthesia on injection and incremental injection  Patient Tolerance:comfortable throughout block  Complications:no

## 2023-08-01 NOTE — TELEPHONE ENCOUNTER
See telephone encounter from 7/28/23   Wish he and Bita's brother could stop and talk to me; can we arrange this?

## 2024-12-02 NOTE — TELEPHONE ENCOUNTER
Nurse is wanting to know if she needs to be on Xarelto. She brought in an empty card   02-Dec-2024 22:30

## (undated) DEVICE — HEWSON SUTURE RETRIEVER: Brand: HEWSON SUTURE RETRIEVER

## (undated) DEVICE — SYS SKIN CLS DERMABOND PRINEO W/22CM MESH TP

## (undated) DEVICE — OPTIFOAM GENTLE SA, POSTOP, 4X8: Brand: MEDLINE

## (undated) DEVICE — ANTIBACTERIAL UNDYED BRAIDED (POLYGLACTIN 910), SYNTHETIC ABSORBABLE SUTURE: Brand: COATED VICRYL

## (undated) DEVICE — PK HIP TOTL 30

## (undated) DEVICE — CVR UNIV C/ARM

## (undated) DEVICE — BNDG ESMARK 4IN 9FT LF STRL BLU

## (undated) DEVICE — COTTON UNDERCAST PADDING,REGULAR FINISH: Brand: WEBRIL

## (undated) DEVICE — 4-PORT MANIFOLD: Brand: NEPTUNE 2

## (undated) DEVICE — GLV SURG TRIUMPH GREEN W/ALOE PF LTX 7 STRL

## (undated) DEVICE — BNDG ELAS ECONO 4IN 5YD LF TN

## (undated) DEVICE — BIT DRL QC DIA W/DEPTHMARK 1.8X110MM

## (undated) DEVICE — GLV SURG TRIUMPH MICRO PF LTX 7.5 STRL

## (undated) DEVICE — GLV SURG TRIUMPH MICRO PF LTX 8 STRL

## (undated) DEVICE — SUT ETHLN 3/0 FS1 30IN 669H

## (undated) DEVICE — DRSNG WND GZ CURAD OIL EMULSION 3X3IN STRL

## (undated) DEVICE — GLV SURG TRIUMPH GREEN W/ALOE PF LTX 8 STRL

## (undated) DEVICE — PK EXTRM 30

## (undated) DEVICE — BLD SAW ACRION AGGR 1.27X19X90MM STRL

## (undated) DEVICE — PROXIMATE RH ROTATING HEAD SKIN STAPLERS (35 WIDE) CONTAINS 35 STAINLESS STEEL STAPLES: Brand: PROXIMATE

## (undated) DEVICE — PK TURNOVER RM ADV

## (undated) DEVICE — GOWN,PREVENTION PLUS,XLONG/XLARGE,STRL: Brand: MEDLINE

## (undated) DEVICE — TRY PREP SCRB VAG PVP

## (undated) DEVICE — GLV SURG TRIUMPH ORTHO W/ALOE PF LTX 7.5 STRL

## (undated) DEVICE — DISPOSABLE TOURNIQUET CUFF SINGLE BLADDER, SINGLE PORT AND QUICK CONNECT CONNECTOR: Brand: COLOR CUFF

## (undated) DEVICE — BNDG ELAS SUREWRAP W/CLIP 3IN 5YD LF

## (undated) DEVICE — GLV SURG BIOGEL LTX PF 6 1/2